# Patient Record
Sex: MALE | Race: WHITE | NOT HISPANIC OR LATINO | Employment: OTHER | URBAN - METROPOLITAN AREA
[De-identification: names, ages, dates, MRNs, and addresses within clinical notes are randomized per-mention and may not be internally consistent; named-entity substitution may affect disease eponyms.]

---

## 2017-03-12 ENCOUNTER — OFFICE VISIT (OUTPATIENT)
Dept: URGENT CARE | Facility: MEDICAL CENTER | Age: 67
End: 2017-03-12
Payer: MEDICARE

## 2017-03-12 DIAGNOSIS — J02.9 ACUTE PHARYNGITIS: ICD-10-CM

## 2017-03-12 PROCEDURE — 99203 OFFICE O/P NEW LOW 30 MIN: CPT

## 2017-03-12 PROCEDURE — G0463 HOSPITAL OUTPT CLINIC VISIT: HCPCS

## 2017-03-12 PROCEDURE — 87430 STREP A AG IA: CPT

## 2017-03-13 ENCOUNTER — APPOINTMENT (OUTPATIENT)
Dept: LAB | Facility: HOSPITAL | Age: 67
End: 2017-03-13
Payer: MEDICARE

## 2017-03-13 DIAGNOSIS — J02.9 ACUTE PHARYNGITIS: ICD-10-CM

## 2017-03-13 PROCEDURE — 87070 CULTURE OTHR SPECIMN AEROBIC: CPT

## 2017-03-15 LAB — BACTERIA THROAT CULT: NORMAL

## 2017-03-20 ENCOUNTER — TRANSCRIBE ORDERS (OUTPATIENT)
Dept: ADMINISTRATIVE | Facility: HOSPITAL | Age: 67
End: 2017-03-20

## 2017-03-20 ENCOUNTER — HOSPITAL ENCOUNTER (OUTPATIENT)
Dept: RADIOLOGY | Facility: MEDICAL CENTER | Age: 67
Discharge: HOME | End: 2017-03-20
Payer: MEDICARE

## 2017-03-20 DIAGNOSIS — J20.9 ACUTE BRONCHITIS, UNSPECIFIED ORGANISM: ICD-10-CM

## 2017-03-20 DIAGNOSIS — J20.9 ACUTE BRONCHITIS, UNSPECIFIED ORGANISM: Primary | ICD-10-CM

## 2017-03-20 PROCEDURE — 71020 HB CHEST X-RAY 2VW FRONTAL&LATL: CPT

## 2018-01-18 NOTE — PROGRESS NOTES
Assessment    1  Acute URI (465 9) (J06 9)    Plan  Acute URI    · Benzonatate 100 MG Oral Capsule; TAKE 1 CAPSULE 3 TIMES DAILY AS NEEDED   · Lidocaine Viscous 2 % Mouth/Throat Solution; USE 5ML EVERY 2 HOURS AS  NEEDED  Sore throat    · Rapid StrepA- POC; Source:Throat; Status:Resulted - Requires Verification,Retrospective  By Protocol Authorization;   Done: [id number] 10:00AM    Discussion/Summary  Discussion Summary:   Rapid strep test-negative  Patient placed on Xylocaine Viscous for sore throat and Tessalon for cough  Vies patient's follow-up with primary care provider in 2-3 days of symptoms worsen  Chief Complaint    1  Sore Throat  Chief Complaint Free Text Note Form: Pt with complaints of sore throat for 2 days  No other symptoms  Denies fever, chills or bodyaches  History of Present Illness  HPI: Patient with 2 day history of sore throat  Describes it is scratchy in nature  He's been taking some over-the-counter Robitussin for sore throat and cough with mild improvement  Denies any fever or chills  But is complaining some postnasal drip  Hospital Based Practices Required Assessment:   Pain Assessment   the patient states they have pain  The pain is located in the throat  The patient describes the pain as aching  (on a scale of 0 to 10, the patient rates the pain at 3 )    Prefered Language is  english  Primary Language is  english  Review of Systems  Focused-Male:   Constitutional: no fever or chills, feels well, no tiredness, no recent weight loss or weight gain  ENT: sore throat and nasal discharge  Respiratory: cough  Past Medical History    1  History of hypercholesterolemia (V12 29) (Z86 39)   2  History of hypertension (V12 59) (Z86 79)  Active Problems And Past Medical History Reviewed: The active problems and past medical history were reviewed and updated today  Social History    · Never smoker   · Occasional alcohol use    Current Meds   1  Atenolol-Chlorthalidone 50-25 MG Oral Tablet; Therapy: (Recorded:12Mar2017) to Recorded   2  Atorvastatin Calcium 20 MG Oral Tablet; Therapy: (Recorded:12Mar2017) to Recorded  Medication List Reviewed: The medication list was reviewed and updated today  Allergies    1  No Known Drug Allergies    Vitals  Signs   Recorded: [id number] 09:47AM   Temperature: 96 8 F  Heart Rate: 62  Respiration: 16  Systolic: 994  Diastolic: 74  Height: 6 ft   Weight: 263 lb   BMI Calculated: 35 67  BSA Calculated: 2 39  O2 Saturation: 99  Pain Scale: 3    Physical Exam    Constitutional   General appearance: No acute distress, well appearing and well nourished  Ears, Nose, Mouth, and Throat   External inspection of ears and nose: Normal     Otoscopic examination: Tympanic membrance translucent with normal light reflex  Canals patent without erythema  Nasal mucosa, septum, and turbinates: Abnormal   Hypertrophic turbinates  Clear to whitish mucoid discharge in posterior pharynx  No exudates are visualized  Pulmonary   Respiratory effort: No increased work of breathing or signs of respiratory distress  Auscultation of lungs: Clear to auscultation  Cardiovascular   Palpation of heart: Normal PMI, no thrills  Lymphatic   Palpation of lymph nodes in neck: No lymphadenopathy         Results/Data  Lab Studies Reviewed: Rapid strep test-negative      Signatures   Electronically signed by : HOPE Coombs ; Mar 12 2017 10:05AM EST                       (Author)

## 2018-11-14 ENCOUNTER — APPOINTMENT (OUTPATIENT)
Dept: MRI IMAGING | Facility: HOSPITAL | Age: 68
DRG: 556 | End: 2018-11-14
Payer: MEDICARE

## 2018-11-14 ENCOUNTER — APPOINTMENT (EMERGENCY)
Dept: CT IMAGING | Facility: HOSPITAL | Age: 68
DRG: 556 | End: 2018-11-14
Payer: MEDICARE

## 2018-11-14 ENCOUNTER — HOSPITAL ENCOUNTER (INPATIENT)
Facility: HOSPITAL | Age: 68
LOS: 1 days | Discharge: HOME | DRG: 556 | End: 2018-11-16
Attending: EMERGENCY MEDICINE | Admitting: FAMILY MEDICINE
Payer: MEDICARE

## 2018-11-14 ENCOUNTER — APPOINTMENT (OUTPATIENT)
Dept: RADIOLOGY | Facility: HOSPITAL | Age: 68
DRG: 556 | End: 2018-11-14
Payer: MEDICARE

## 2018-11-14 DIAGNOSIS — R29.898 UPPER EXTREMITY WEAKNESS: Primary | ICD-10-CM

## 2018-11-14 DIAGNOSIS — R29.898 BILATERAL ARM WEAKNESS: ICD-10-CM

## 2018-11-14 DIAGNOSIS — R05.9 COUGH: ICD-10-CM

## 2018-11-14 PROBLEM — I50.9 CHF (CONGESTIVE HEART FAILURE) (HCC): Status: ACTIVE | Noted: 2018-11-14

## 2018-11-14 PROBLEM — E87.6 HYPOKALEMIA: Status: ACTIVE | Noted: 2018-11-14

## 2018-11-14 PROBLEM — E78.5 HYPERLIPIDEMIA: Status: ACTIVE | Noted: 2018-11-14

## 2018-11-14 PROBLEM — Z98.890 HX OF RIGHT KNEE SURGERY: Status: ACTIVE | Noted: 2018-11-14

## 2018-11-14 PROBLEM — I10 ESSENTIAL HYPERTENSION: Status: ACTIVE | Noted: 2018-11-14

## 2018-11-14 PROBLEM — R53.1 WEAKNESS: Status: ACTIVE | Noted: 2018-11-14

## 2018-11-14 PROBLEM — R79.89 ELEVATED BRAIN NATRIURETIC PEPTIDE (BNP) LEVEL: Status: ACTIVE | Noted: 2018-11-14

## 2018-11-14 LAB
ANION GAP SERPL CALCULATED.3IONS-SCNC: 9 MMOL/L (ref 4–13)
ATRIAL RATE: 71 BPM
BACTERIA UR QL AUTO: ABNORMAL /HPF
BASOPHILS # BLD AUTO: 0.02 THOUSANDS/ΜL (ref 0–0.1)
BASOPHILS NFR BLD AUTO: 0 % (ref 0–1)
BILIRUB UR QL STRIP: ABNORMAL
BUN SERPL-MCNC: 14 MG/DL (ref 5–25)
CALCIUM SERPL-MCNC: 9.2 MG/DL (ref 8.3–10.1)
CHLORIDE SERPL-SCNC: 97 MMOL/L (ref 100–108)
CLARITY UR: CLEAR
CO2 SERPL-SCNC: 30 MMOL/L (ref 21–32)
COLOR UR: ABNORMAL
CREAT SERPL-MCNC: 1.06 MG/DL (ref 0.6–1.3)
EOSINOPHIL # BLD AUTO: 0.27 THOUSAND/ΜL (ref 0–0.61)
EOSINOPHIL NFR BLD AUTO: 3 % (ref 0–6)
ERYTHROCYTE [DISTWIDTH] IN BLOOD BY AUTOMATED COUNT: 12.2 % (ref 11.6–15.1)
GFR SERPL CREATININE-BSD FRML MDRD: 72 ML/MIN/1.73SQ M
GLUCOSE SERPL-MCNC: 168 MG/DL (ref 65–140)
GLUCOSE UR STRIP-MCNC: NEGATIVE MG/DL
HCT VFR BLD AUTO: 40.6 % (ref 36.5–49.3)
HGB BLD-MCNC: 14.1 G/DL (ref 12–17)
HGB UR QL STRIP.AUTO: ABNORMAL
IMM GRANULOCYTES # BLD AUTO: 0.05 THOUSAND/UL (ref 0–0.2)
IMM GRANULOCYTES NFR BLD AUTO: 1 % (ref 0–2)
KETONES UR STRIP-MCNC: ABNORMAL MG/DL
LEUKOCYTE ESTERASE UR QL STRIP: NEGATIVE
LYMPHOCYTES # BLD AUTO: 1.26 THOUSANDS/ΜL (ref 0.6–4.47)
LYMPHOCYTES NFR BLD AUTO: 15 % (ref 14–44)
MCH RBC QN AUTO: 30.9 PG (ref 26.8–34.3)
MCHC RBC AUTO-ENTMCNC: 34.7 G/DL (ref 31.4–37.4)
MCV RBC AUTO: 89 FL (ref 82–98)
MONOCYTES # BLD AUTO: 0.7 THOUSAND/ΜL (ref 0.17–1.22)
MONOCYTES NFR BLD AUTO: 8 % (ref 4–12)
NEUTROPHILS # BLD AUTO: 6.38 THOUSANDS/ΜL (ref 1.85–7.62)
NEUTS SEG NFR BLD AUTO: 73 % (ref 43–75)
NITRITE UR QL STRIP: NEGATIVE
NON-SQ EPI CELLS URNS QL MICRO: ABNORMAL /HPF
NRBC BLD AUTO-RTO: 0 /100 WBCS
NT-PROBNP SERPL-MCNC: 1143 PG/ML
P AXIS: -74 DEGREES
PH UR STRIP.AUTO: 6.5 [PH] (ref 4.5–8)
PLATELET # BLD AUTO: 219 THOUSANDS/UL (ref 149–390)
PMV BLD AUTO: 9.3 FL (ref 8.9–12.7)
POTASSIUM SERPL-SCNC: 3.2 MMOL/L (ref 3.5–5.3)
PR INTERVAL: 122 MS
PROT UR STRIP-MCNC: NEGATIVE MG/DL
QRS AXIS: 29 DEGREES
QRSD INTERVAL: 98 MS
QT INTERVAL: 382 MS
QTC INTERVAL: 415 MS
RBC # BLD AUTO: 4.56 MILLION/UL (ref 3.88–5.62)
RBC #/AREA URNS AUTO: ABNORMAL /HPF
SODIUM SERPL-SCNC: 136 MMOL/L (ref 136–145)
SP GR UR STRIP.AUTO: 1.01 (ref 1–1.03)
T WAVE AXIS: 63 DEGREES
TROPONIN I SERPL-MCNC: <0.02 NG/ML
UROBILINOGEN UR QL STRIP.AUTO: 1 E.U./DL
VENTRICULAR RATE: 71 BPM
WBC # BLD AUTO: 8.68 THOUSAND/UL (ref 4.31–10.16)
WBC #/AREA URNS AUTO: ABNORMAL /HPF

## 2018-11-14 PROCEDURE — 99285 EMERGENCY DEPT VISIT HI MDM: CPT

## 2018-11-14 PROCEDURE — 93010 ELECTROCARDIOGRAM REPORT: CPT | Performed by: INTERNAL MEDICINE

## 2018-11-14 PROCEDURE — 84484 ASSAY OF TROPONIN QUANT: CPT | Performed by: PHYSICIAN ASSISTANT

## 2018-11-14 PROCEDURE — 84484 ASSAY OF TROPONIN QUANT: CPT | Performed by: EMERGENCY MEDICINE

## 2018-11-14 PROCEDURE — 83880 ASSAY OF NATRIURETIC PEPTIDE: CPT | Performed by: EMERGENCY MEDICINE

## 2018-11-14 PROCEDURE — 36415 COLL VENOUS BLD VENIPUNCTURE: CPT | Performed by: EMERGENCY MEDICINE

## 2018-11-14 PROCEDURE — 70551 MRI BRAIN STEM W/O DYE: CPT

## 2018-11-14 PROCEDURE — 85025 COMPLETE CBC W/AUTO DIFF WBC: CPT | Performed by: EMERGENCY MEDICINE

## 2018-11-14 PROCEDURE — 99220 PR INITIAL OBSERVATION CARE/DAY 70 MINUTES: CPT | Performed by: PHYSICIAN ASSISTANT

## 2018-11-14 PROCEDURE — 93005 ELECTROCARDIOGRAM TRACING: CPT

## 2018-11-14 PROCEDURE — 70498 CT ANGIOGRAPHY NECK: CPT

## 2018-11-14 PROCEDURE — 81001 URINALYSIS AUTO W/SCOPE: CPT | Performed by: FAMILY MEDICINE

## 2018-11-14 PROCEDURE — 70496 CT ANGIOGRAPHY HEAD: CPT

## 2018-11-14 PROCEDURE — 80048 BASIC METABOLIC PNL TOTAL CA: CPT | Performed by: EMERGENCY MEDICINE

## 2018-11-14 PROCEDURE — 71046 X-RAY EXAM CHEST 2 VIEWS: CPT

## 2018-11-14 RX ORDER — ATORVASTATIN CALCIUM 10 MG/1
20 TABLET, FILM COATED ORAL EVERY EVENING
Status: DISCONTINUED | OUTPATIENT
Start: 2018-11-14 | End: 2018-11-16 | Stop reason: HOSPADM

## 2018-11-14 RX ORDER — CEPHALEXIN 500 MG/1
500 CAPSULE ORAL EVERY 6 HOURS SCHEDULED
COMMUNITY
End: 2018-11-16 | Stop reason: HOSPADM

## 2018-11-14 RX ORDER — CHLORTHALIDONE 25 MG/1
25 TABLET ORAL DAILY
Status: DISCONTINUED | OUTPATIENT
Start: 2018-11-14 | End: 2018-11-16 | Stop reason: HOSPADM

## 2018-11-14 RX ORDER — ATORVASTATIN CALCIUM 20 MG/1
20 TABLET, FILM COATED ORAL DAILY
COMMUNITY
End: 2019-05-06 | Stop reason: HOSPADM

## 2018-11-14 RX ORDER — POLYETHYLENE GLYCOL 3350 17 G/17G
17 POWDER, FOR SOLUTION ORAL DAILY
Status: DISCONTINUED | OUTPATIENT
Start: 2018-11-14 | End: 2018-11-16 | Stop reason: HOSPADM

## 2018-11-14 RX ORDER — ATENOLOL 50 MG/1
50 TABLET ORAL DAILY
Status: DISCONTINUED | OUTPATIENT
Start: 2018-11-14 | End: 2018-11-16 | Stop reason: HOSPADM

## 2018-11-14 RX ORDER — CHLORTHALIDONE 25 MG/1
25 TABLET ORAL DAILY
COMMUNITY
End: 2019-05-06 | Stop reason: HOSPADM

## 2018-11-14 RX ORDER — CHLORTHALIDONE 25 MG/1
25 TABLET ORAL DAILY
Status: DISCONTINUED | OUTPATIENT
Start: 2018-11-14 | End: 2018-11-14 | Stop reason: SDUPTHER

## 2018-11-14 RX ORDER — POTASSIUM CHLORIDE 20 MEQ/1
20 TABLET, EXTENDED RELEASE ORAL ONCE
Status: COMPLETED | OUTPATIENT
Start: 2018-11-14 | End: 2018-11-14

## 2018-11-14 RX ORDER — ASPIRIN 325 MG
81 TABLET ORAL DAILY
COMMUNITY
End: 2019-04-27

## 2018-11-14 RX ORDER — ONDANSETRON 2 MG/ML
4 INJECTION INTRAMUSCULAR; INTRAVENOUS EVERY 6 HOURS PRN
Status: DISCONTINUED | OUTPATIENT
Start: 2018-11-14 | End: 2018-11-16 | Stop reason: HOSPADM

## 2018-11-14 RX ORDER — DOCUSATE SODIUM 100 MG/1
100 CAPSULE, LIQUID FILLED ORAL 2 TIMES DAILY
Status: DISCONTINUED | OUTPATIENT
Start: 2018-11-14 | End: 2018-11-16 | Stop reason: HOSPADM

## 2018-11-14 RX ORDER — ACETAMINOPHEN 325 MG/1
650 TABLET ORAL EVERY 4 HOURS PRN
Status: DISCONTINUED | OUTPATIENT
Start: 2018-11-14 | End: 2018-11-16 | Stop reason: HOSPADM

## 2018-11-14 RX ORDER — ATENOLOL AND CHLORTHALIDONE TABLET 50; 25 MG/1; MG/1
1 TABLET ORAL DAILY
COMMUNITY
End: 2019-05-06 | Stop reason: HOSPADM

## 2018-11-14 RX ADMIN — DOCUSATE SODIUM 100 MG: 100 CAPSULE, LIQUID FILLED ORAL at 17:20

## 2018-11-14 RX ADMIN — ATORVASTATIN CALCIUM 20 MG: 10 TABLET, FILM COATED ORAL at 17:19

## 2018-11-14 RX ADMIN — IOHEXOL 85 ML: 350 INJECTION, SOLUTION INTRAVENOUS at 12:38

## 2018-11-14 RX ADMIN — ENOXAPARIN SODIUM 40 MG: 40 INJECTION SUBCUTANEOUS at 17:16

## 2018-11-14 RX ADMIN — CHLORTHALIDONE 25 MG: 25 TABLET ORAL at 17:20

## 2018-11-14 RX ADMIN — POLYETHYLENE GLYCOL (3350) 17 G: 17 POWDER, FOR SOLUTION ORAL at 17:16

## 2018-11-14 RX ADMIN — POTASSIUM CHLORIDE 20 MEQ: 1500 TABLET, EXTENDED RELEASE ORAL at 17:16

## 2018-11-14 RX ADMIN — ATENOLOL 50 MG: 50 TABLET ORAL at 17:16

## 2018-11-14 NOTE — ED PROVIDER NOTES
History  Chief Complaint   Patient presents with    Extremity Weakness     pt reports weakness to b/l arms reports left greater than right, pt denies numbness or tingling  pt reports right knee surgery on monday was prescribed antibiotics that evening had swelling of throat shortly after antibiotic  pt then reports he took 3 additional doses of antibiotic yesterday and noticed weakness to b/l arm in the evening  pt denies weakness to lower extremities, denies slurred speach or facial droop  HPI    Prior to Admission Medications   Prescriptions Last Dose Informant Patient Reported? Taking?   aspirin 325 mg tablet   Yes Yes   Sig: Take 325 mg by mouth daily   atenolol-chlorthalidone (TENORETIC) 50-25 mg per tablet   Yes No   Sig: Take 1 tablet by mouth daily     atorvastatin (LIPITOR) 20 mg tablet   Yes No   Sig: Take 20 mg by mouth daily   cephalexin (KEFLEX) 500 mg capsule   Yes Yes   Sig: Take 500 mg by mouth every 6 (six) hours   chlorthalidone 25 mg tablet   Yes Yes   Sig: Take 25 mg by mouth daily      Facility-Administered Medications: None       Past Medical History:   Diagnosis Date    CHF (congestive heart failure) ([facility] [address] )     Hypertension        History reviewed  No pertinent surgical history  History reviewed  No pertinent family history  I have reviewed and agree with the history as documented  Social History   Substance Use Topics    Smoking status: Not on file    Smokeless tobacco: Not on file    Alcohol use Not on file        Review of Systems    Physical Exam  Physical Exam   Constitutional: He is oriented to person, place, and time  He appears well-developed and well-nourished  HENT:   Mouth/Throat: No oropharyngeal exudate  TMs normal bilaterally no pharyngeal erythema no rhinorrhea nontender palpation of sinuses, normal looking turbinates   Eyes: Conjunctivae and EOM are normal    Neck: Normal range of motion  Neck supple  No meningeal signs    Under palpation over cervical thoracic spines  Patient has no pain with axial loading of the spine  There is no tenderness palpation noted over the cervical nerve roots  Cardiovascular: Normal rate, regular rhythm, normal heart sounds and intact distal pulses  Pulmonary/Chest: Effort normal and breath sounds normal  No respiratory distress  He has no wheezes  He has no rales  He exhibits no tenderness  Abdominal: Soft  Bowel sounds are normal  He exhibits no distension and no mass  There is no tenderness  No hernia  No cvat   Musculoskeletal: Normal range of motion  He exhibits no edema  Patient with weakness noted in the bilateral shoulders with no tenderness palpation, swelling, redness of the joint  Lymphadenopathy:     He has no cervical adenopathy  Neurological: He is alert and oriented to person, place, and time  No cranial nerve deficit  Please refer to NIH stroke scale   Skin: No rash noted  No erythema  No edema   Psychiatric: He has a normal mood and affect  His behavior is normal    Nursing note and vitals reviewed        Vital Signs  ED Triage Vitals   Temperature Pulse Respirations Blood Pressure SpO2   11/14/18 1028 11/14/18 1024 11/14/18 1024 11/14/18 1024 11/14/18 1024   97 8 °F (36 6 °C) 80 19 140/80 97 %      Temp Source Heart Rate Source Patient Position - Orthostatic VS BP Location FiO2 (%)   11/14/18 1028 11/14/18 1024 11/14/18 1024 11/14/18 1024 --   Oral Monitor Sitting Right arm       Pain Score       11/14/18 1028       No Pain           Vitals:    11/14/18 1024 11/14/18 1028 11/14/18 1314   BP: 140/80  128/73   Pulse: 80 78 66   Patient Position - Orthostatic VS: Sitting  Lying       Visual Acuity      ED Medications  Medications   iohexol (OMNIPAQUE) 350 MG/ML injection (MULTI-DOSE) 85 mL (85 mL Intravenous Given 11/14/18 1238)       Diagnostic Studies  Results Reviewed     Procedure Component Value Units Date/Time    Basic metabolic panel [[id number]]  (Abnormal) Collected:  11/14/18 1108    Lab Status:  Final result Specimen:  Blood from Arm, Left Updated:  11/14/18 1143     Sodium 136 mmol/L      Potassium 3 2 (L) mmol/L      Chloride 97 (L) mmol/L      CO2 30 mmol/L      ANION GAP 9 mmol/L      BUN 14 mg/dL      Creatinine 1 06 mg/dL      Glucose 168 (H) mg/dL      Calcium 9 2 mg/dL      eGFR 72 ml/min/1 73sq m     Narrative:         National Kidney Disease Education Program recommendations are as follows:  GFR calculation is accurate only with a steady state creatinine  Chronic Kidney disease less than 60 ml/min/1 73 sq  meters  Kidney failure less than 15 ml/min/1 73 sq  meters      B-type natriuretic peptide [[id number]]  (Abnormal) Collected:  11/14/18 1108    Lab Status:  Final result Specimen:  Blood from Arm, Left Updated:  11/14/18 1143     NT-proBNP 1,143 (H) pg/mL     Troponin I [[id number]]  (Normal) Collected:  11/14/18 1108    Lab Status:  Final result Specimen:  Blood from Arm, Left Updated:  11/14/18 1141     Troponin I <0 02 ng/mL     CBC and differential [[id number]] Collected:  11/14/18 1108    Lab Status:  Final result Specimen:  Blood from Arm, Left Updated:  11/14/18 1119     WBC 8 68 Thousand/uL      RBC 4 56 Million/uL      Hemoglobin 14 1 g/dL      Hematocrit 40 6 %      MCV 89 fL      MCH 30 9 pg      MCHC 34 7 g/dL      RDW 12 2 %      MPV 9 3 fL      Platelets 422 Thousands/uL      nRBC 0 /100 WBCs      Neutrophils Relative 73 %      Immat GRANS % 1 %      Lymphocytes Relative 15 %      Monocytes Relative 8 %      Eosinophils Relative 3 %      Basophils Relative 0 %      Neutrophils Absolute 6 38 Thousands/µL      Immature Grans Absolute 0 05 Thousand/uL      Lymphocytes Absolute 1 26 Thousands/µL      Monocytes Absolute 0 70 Thousand/µL      Eosinophils Absolute 0 27 Thousand/µL      Basophils Absolute 0 02 Thousands/µL     POCT urinalysis dipstick [[id number]]     Lab Status:  No result Specimen:  Urine                  CTA head and neck with and without contrast   ED Interpretation by Shahnaz Archer MD (11/14 0719)   No acute intracranial disease  No large vessel flow restrictive disease within the head or neck  Polypoid soft tissue density material within both sides of the nasal vault, direct visualization suggested  Final Result by Que Carreon MD (11/14 8264)      No acute intracranial disease  No large vessel flow restrictive disease within the head or neck  Polypoid soft tissue density material within both sides of the nasal vault, direct visualization suggested  The study was marked in EPIC for significant notification  Workstation performed: [id number]                    Procedures  ECG 12 Lead Documentation  Date/Time: 11/14/2018 12:50 PM  Performed by: Tomer Canseco by: Crystal CASTANO     ECG reviewed by me, the ED Provider: yes    Patient location:  ED  Rate:     ECG rate:  71    ECG rate assessment: normal    Rhythm:     Rhythm: sinus rhythm    Ectopy:     Ectopy: none    QRS:     QRS axis:  Normal    QRS intervals:  Normal  Conduction:     Conduction: normal    ST segments:     ST segments:  Normal  T waves:     T waves: normal             Phone Contacts  ED Phone Contact    ED Course  ED Course as of Nov 14 1321 Wed Nov 14, 2018   1309 Case d/w Dr Ashley Eason of neurology  Will admit   Stroke Assessment     Row Name 11/14/18 1314             NIH Stroke Scale    Interval Baseline      Level of Consciousness (1a ) 0      LOC Questions (1b ) 0      LOC Commands (1c ) 0      Best Gaze (2 ) 0      Visual (3 ) 0      Facial Palsy (4 ) 0      Motor Arm, Left (5a ) 1      Motor Arm, Right (5b ) 1      Motor Leg, Left (6a ) 0      Motor Leg, Right (6b ) 0      Limb Ataxia (7 ) 0      Sensory (8 ) 0      Best Language (9 ) 0      Dysarthria (10 ) 0      Extinction and Inattention (11 ) (Formerly Neglect) 0      Total 2                First Filed Value   TPA Decision  Patient not a TPA candidate  Patient is not a candidate options  -- [onset of symptoms outside of window for tpa]                        MDM  Number of Diagnoses or Management Options  Diagnosis management comments: Bilateral shoulder pain/weakness-will do cta head/neck to r/o acute pathology, cardiac work up, d/w neuro    CritCare Time    Disposition  Final diagnoses:   Upper extremity weakness - bilateral     Time reflects when diagnosis was documented in both MDM as applicable and the Disposition within this note     Time User Action Codes Description Comment    11/14/2018  1:20 PM Gordo Monzon Add [R29 898] Upper extremity weakness     11/14/2018  1:21 PM Gordo Monzon Modify [R29 898] Upper extremity weakness bilateral      ED Disposition     None      Follow-up Information    None         Patient's Medications   Discharge Prescriptions    No medications on file     No discharge procedures on file      ED Provider  Electronically Signed by           Keyonna Jerome MD  11/14/18 9094

## 2018-11-14 NOTE — PLAN OF CARE
CARDIOVASCULAR - ADULT     Maintains optimal cardiac output and hemodynamic stability Progressing     Absence of cardiac dysrhythmias or at baseline rhythm Progressing        DISCHARGE PLANNING     Discharge to home or other facility with appropriate resources Progressing        Knowledge Deficit     Patient/family/caregiver demonstrates understanding of disease process, treatment plan, medications, and discharge instructions Progressing        METABOLIC, FLUID AND ELECTROLYTES - ADULT     Electrolytes maintained within normal limits Progressing     Fluid balance maintained Progressing        NEUROSENSORY - ADULT     Achieves stable or improved neurological status Progressing     Achieves maximal functionality and self care Progressing        Potential for Falls     Patient will remain free of falls Progressing        SAFETY ADULT     Maintain or return to baseline ADL function Progressing     Maintain or return mobility status to optimal level Progressing

## 2018-11-14 NOTE — ASSESSMENT & PLAN NOTE
· Pro-BNP 1,143 POA  · Denies history of CHF, denies chest pain, shortness of breath, leg swelling or increased weight gain  · Does not appear to be fluid overloaded  · Will order chest x-ray and echocardiogram   · Pending results echocardiogram will consult Cardiology

## 2018-11-14 NOTE — H&P
H&P- Duran Setter 1950, 76 y o  male MRN: [id number]  Unit/Bed#: E4 -01 Encounter: [id number]  Primary Care Provider: Nicolasa Jamil MD   Date and time admitted to hospital: 11/14/2018 10:29 AM    * Bilateral arm weakness   Assessment & Plan    · POA, bilateral arm weakness since yesterday with numbness and tingling, L>R   · CT head:  No acute intracranial disease, no large vessel flow restrictive disease in the head or neck  · Initial Troponin negative  · proBNP 1,143   · Will place patient on stroke pathway, order MRI of brain, echocardiogram, chest x-ray  · Will consult Neurology  · trend troponins  · Pending results of echocardiogram, may consult Cardiology  · Neuro checks      Essential hypertension   Assessment & Plan    · BP currently acceptable  · Continue atenolol, chlorthalidone per home regimen  · Monitor vitals     Elevated brain natriuretic peptide (BNP) level   Assessment & Plan    · Pro-BNP 1,143 POA  · Denies history of CHF, denies chest pain, shortness of breath, leg swelling or increased weight gain  · Does not appear to be fluid overloaded  · Will order chest x-ray and echocardiogram   · Pending results echocardiogram will consult Cardiology     Hypokalemia   Assessment & Plan    · K+ 3 2, will replete  · Monitor BMP      Hyperlipidemia   Assessment & Plan    · Will check lipid panel  · Continue statin     Hx of right knee surgery   Assessment & Plan    · Patient recently underwent meniscus surgery of right knee at [facility]         VTE Prophylaxis: Enoxaparin (Lovenox)  / sequential compression device   Code Status:  Level 1 full code  POLST: There is no POLST form on file for this patient (pre-hospital)  Discussion with family:  Patient, wife at bedside    Anticipated Length of Stay:  Patient will be admitted on an Observation basis with an anticipated length of stay of  < 2 midnights     Justification for Hospital Stay:  Bilateral arm weakness    Total Time for Visit, including Counseling / Coordination of Care: 45 minutes  Greater than 50% of this total time spent on direct patient counseling and coordination of care  Chief Complaint:   Bilateral arm weakness with numbness and tingling    History of Present Illness:    Duran Cunningham is a 76 y o  male with past medical history of hypertension, recent meniscus surgery on his right knee at [facility] (on 11/12) who presents with bilateral arm weakness with numbness and tingling  Patient recently underwent meniscus surgery of right knee at [facility] and was prescribed Keflex after that procedure  He notes feeling like his throat was swollen after the 1st dose of Keflex on Monday that resolved on its own  He proceeded to take an additional 3 doses of Keflex on Tuesday, denies continued throat swelling but  he admits to noting this bilateral arm weakness  He admits to continued weakness in his left arm but improvement in his right arm weakness  He continues to admit to numbness and tingling feeling in both arms, as well as muscular pain in his upper back  He admits to pain with certain movements of his left upper extremity  He denies any chest pain, shortness of breath, weight gain, leg swelling, nausea, vomiting, diarrhea, leg pain, lightheadedness, dizziness, facial droop, dysarthria, confusion  He denies any trauma to his upper extremities  He denies a history of stroke or heart attack or history of congestive heart failure  He admits to taking aspirin after his surgery on Monday  Review of Systems:    Review of Systems   Constitutional: Negative for chills, diaphoresis and fever  Respiratory: Negative for cough and shortness of breath  Cardiovascular: Negative for chest pain, palpitations and leg swelling  Gastrointestinal: Negative for abdominal pain, diarrhea, nausea and vomiting  Genitourinary: Negative for difficulty urinating, dysuria and frequency  Musculoskeletal: Positive for back pain     Neurological: Positive for weakness, light-headedness and numbness  Negative for dizziness, facial asymmetry, speech difficulty and headaches  Psychiatric/Behavioral: Negative for confusion  All other systems reviewed and are negative  Past Medical and Surgical History:     Past Medical History:   Diagnosis Date    CHF [facility]congestive heart failure) ([facility] [address] [facility]     Hypertension        History reviewed  No pertinent surgical history  Meds/Allergies:    Prior to Admission medications    Medication Sig Start Date End Date Taking? Authorizing Provider   aspirin 325 mg tablet Take 325 mg by mouth daily   Yes Historical Provider, MD   atenolol-chlorthalidone (TENORETIC) 50-25 mg per tablet Take 1 tablet by mouth daily     Yes Historical Provider, MD   atorvastatin (LIPITOR) 20 mg tablet Take 20 mg by mouth daily   Yes Historical Provider, MD   cephalexin (KEFLEX) 500 mg capsule Take 500 mg by mouth every 6 (six) hours    Historical Provider, MD   chlorthalidone 25 mg tablet Take 25 mg by mouth daily    Historical Provider, MD     I have reviewed home medications with patient personally  Allergies: No Known Allergies    Social History:     Marital Status: /Civil Union   Occupation:  Retired  Patient Pre-hospital Living Situation:  Lives at home with wife  Patient Pre-hospital Level of Mobility:  Ambulatory without assistance prior to right meniscus surgery  Patient Pre-hospital Diet Restrictions:  Regular diet  Substance Use History:   History   Alcohol use Not on file     History   Smoking Status    Not on file   Smokeless Tobacco    Not on file     History   Drug use: Unknown       Family History:    History reviewed  No pertinent family history      Physical Exam:     Vitals:   Blood Pressure: 163/94 (11/14/18 1530)  Pulse: 72 (11/14/18 1530)  Temperature: 98 1 °F (36 7 °C) (11/14/18 1530)  Temp Source: Tympanic (11/14/18 1441)  Respirations: 18 (11/14/18 1530)  Height: 6' (182 9 cm) (11/14/18 1530)  Weight - Scale: 109 kg (240 lb 9 6 oz) (11/14/18 1530)  SpO2: 95 % (11/14/18 1530)    Physical Exam   Constitutional: He is oriented to person, place, and time  He appears well-developed  No distress  HENT:   Head: Normocephalic and atraumatic  Mouth/Throat: Oropharynx is clear and moist    Eyes: Pupils are equal, round, and reactive to light  Neck: Neck supple  Cardiovascular: Normal rate, regular rhythm and intact distal pulses  Pulmonary/Chest: Effort normal and breath sounds normal  No respiratory distress  He has no wheezes  He has no rales  He exhibits no tenderness  Abdominal: Soft  Bowel sounds are normal  He exhibits no distension  There is no tenderness  Musculoskeletal:   Presence of clean dry Ace bandage around right knee, pain upon adduction of the left arm across chest   Neurological: He is alert and oriented to person, place, and time  Strength 5/5 lower extremities, upper extremity strength R>L, sensation fully intact upper and lower extremities, no focal neurologic deficits, no facial asymmetry or dysarthria noted, no cranial nerve deficits   Skin: He is not diaphoretic  Nursing note and vitals reviewed  Additional Data:     Lab Results: I have personally reviewed pertinent reports  Results from last 7 days  Lab Units 11/14/18  1108   WBC Thousand/uL 8 68   HEMOGLOBIN g/dL 14 1   HEMATOCRIT % 40 6   PLATELETS Thousands/uL 219   NEUTROS ABS Thousands/µL 6 38   NEUTROS PCT % 73   LYMPHS PCT % 15   MONOS PCT % 8   EOS PCT % 3       Results from last 7 days  Lab Units 11/14/18  1108   POTASSIUM mmol/L 3 2*   CHLORIDE mmol/L 97*   CO2 mmol/L 30   BUN mg/dL 14   CREATININE mg/dL 1 06   ANION GAP mmol/L 9   CALCIUM mg/dL 9 2                       Imaging: I have personally reviewed pertinent reports  CTA head and neck with and without contrast   ED Interpretation by Kandice Tate MD (11/14 9432)   No acute intracranial disease  No large vessel flow restrictive disease within the head or neck  Polypoid soft tissue density material within both sides of the nasal vault, direct visualization suggested  Final Result by Mi Alexander MD (11/14 8623)      No acute intracranial disease  No large vessel flow restrictive disease within the head or neck  Polypoid soft tissue density material within both sides of the nasal vault, direct visualization suggested  The study was marked in EPIC for significant notification  Workstation performed: [id number]         MRI Inpatient Order    (Results Pending)   XR chest pa & lateral    (Results Pending)       EKG, Pathology, and Other Studies Reviewed on Admission:   · EKG:  Rate 71, normal sinus rhythm, normal EKG  · CT head and neck:  No acute intracranial disease  No large vessel flow restrictive disease in the head or neck  Polypoid soft tissue density material within both sides of the nasal vault, direct visualization suggested    Allscripts / Epic Records Reviewed: Yes     ** Please Note: This note has been constructed using a voice recognition system   **

## 2018-11-14 NOTE — ASSESSMENT & PLAN NOTE
· POA, bilateral arm weakness since yesterday with numbness and tingling, L>R   · CT head:  No acute intracranial disease, no large vessel flow restrictive disease in the head or neck  · Initial Troponin negative  · proBNP 1,143   · Will place patient on stroke pathway, order MRI of brain, echocardiogram, chest x-ray  · Will consult Neurology  · trend troponins  · Pending results of echocardiogram, may consult Cardiology  · Neuro checks

## 2018-11-15 ENCOUNTER — APPOINTMENT (OUTPATIENT)
Dept: NON INVASIVE DIAGNOSTICS | Facility: HOSPITAL | Age: 68
DRG: 556 | End: 2018-11-15
Payer: MEDICARE

## 2018-11-15 ENCOUNTER — APPOINTMENT (OUTPATIENT)
Dept: MRI IMAGING | Facility: HOSPITAL | Age: 68
DRG: 556 | End: 2018-11-15
Payer: MEDICARE

## 2018-11-15 PROBLEM — R73.09 ABNORMAL GLUCOSE LEVEL: Status: ACTIVE | Noted: 2018-11-15

## 2018-11-15 PROBLEM — R05.9 COUGH: Status: ACTIVE | Noted: 2018-11-15

## 2018-11-15 LAB
ANION GAP SERPL CALCULATED.3IONS-SCNC: 12 MMOL/L (ref 4–13)
BUN SERPL-MCNC: 17 MG/DL (ref 5–25)
CALCIUM SERPL-MCNC: 9.1 MG/DL (ref 8.3–10.1)
CHLORIDE SERPL-SCNC: 98 MMOL/L (ref 100–108)
CHOLEST SERPL-MCNC: 118 MG/DL (ref 50–200)
CO2 SERPL-SCNC: 26 MMOL/L (ref 21–32)
CREAT SERPL-MCNC: 0.99 MG/DL (ref 0.6–1.3)
EST. AVERAGE GLUCOSE BLD GHB EST-MCNC: 134 MG/DL
GFR SERPL CREATININE-BSD FRML MDRD: 78 ML/MIN/1.73SQ M
GLUCOSE P FAST SERPL-MCNC: 131 MG/DL (ref 65–99)
GLUCOSE SERPL-MCNC: 131 MG/DL (ref 65–140)
HBA1C MFR BLD: 6.3 % (ref 4.2–6.3)
HDLC SERPL-MCNC: 37 MG/DL (ref 40–60)
LDLC SERPL CALC-MCNC: 65 MG/DL (ref 0–100)
POTASSIUM SERPL-SCNC: 3.1 MMOL/L (ref 3.5–5.3)
SODIUM SERPL-SCNC: 136 MMOL/L (ref 136–145)
TRIGL SERPL-MCNC: 79 MG/DL

## 2018-11-15 PROCEDURE — 93306 TTE W/DOPPLER COMPLETE: CPT | Performed by: INTERNAL MEDICINE

## 2018-11-15 PROCEDURE — 71550 MRI CHEST W/O DYE: CPT

## 2018-11-15 PROCEDURE — 93306 TTE W/DOPPLER COMPLETE: CPT

## 2018-11-15 PROCEDURE — 80061 LIPID PANEL: CPT | Performed by: PHYSICIAN ASSISTANT

## 2018-11-15 PROCEDURE — 80048 BASIC METABOLIC PNL TOTAL CA: CPT | Performed by: PHYSICIAN ASSISTANT

## 2018-11-15 PROCEDURE — 99222 1ST HOSP IP/OBS MODERATE 55: CPT | Performed by: PSYCHIATRY & NEUROLOGY

## 2018-11-15 PROCEDURE — 93970 EXTREMITY STUDY: CPT

## 2018-11-15 PROCEDURE — 72141 MRI NECK SPINE W/O DYE: CPT

## 2018-11-15 PROCEDURE — 83036 HEMOGLOBIN GLYCOSYLATED A1C: CPT | Performed by: PHYSICIAN ASSISTANT

## 2018-11-15 PROCEDURE — 99232 SBSQ HOSP IP/OBS MODERATE 35: CPT | Performed by: FAMILY MEDICINE

## 2018-11-15 RX ORDER — POTASSIUM CHLORIDE 20 MEQ/1
40 TABLET, EXTENDED RELEASE ORAL ONCE
Status: COMPLETED | OUTPATIENT
Start: 2018-11-15 | End: 2018-11-15

## 2018-11-15 RX ORDER — BENZONATATE 100 MG/1
100 CAPSULE ORAL 3 TIMES DAILY PRN
Status: DISCONTINUED | OUTPATIENT
Start: 2018-11-15 | End: 2018-11-16 | Stop reason: HOSPADM

## 2018-11-15 RX ADMIN — BENZONATATE 100 MG: 100 CAPSULE ORAL at 12:49

## 2018-11-15 RX ADMIN — ATENOLOL 50 MG: 50 TABLET ORAL at 08:28

## 2018-11-15 RX ADMIN — ENOXAPARIN SODIUM 40 MG: 40 INJECTION SUBCUTANEOUS at 08:29

## 2018-11-15 RX ADMIN — ATORVASTATIN CALCIUM 20 MG: 10 TABLET, FILM COATED ORAL at 17:35

## 2018-11-15 RX ADMIN — POTASSIUM CHLORIDE 40 MEQ: 1500 TABLET, EXTENDED RELEASE ORAL at 08:29

## 2018-11-15 RX ADMIN — DOCUSATE SODIUM 100 MG: 100 CAPSULE, LIQUID FILLED ORAL at 17:35

## 2018-11-15 RX ADMIN — POLYETHYLENE GLYCOL (3350) 17 G: 17 POWDER, FOR SOLUTION ORAL at 08:30

## 2018-11-15 RX ADMIN — CHLORTHALIDONE 25 MG: 25 TABLET ORAL at 08:29

## 2018-11-15 RX ADMIN — BENZONATATE 100 MG: 100 CAPSULE ORAL at 19:52

## 2018-11-15 RX ADMIN — DOCUSATE SODIUM 100 MG: 100 CAPSULE, LIQUID FILLED ORAL at 08:28

## 2018-11-15 NOTE — PROGRESS NOTES
Progress Note - Krupa Case 1950, 76 y o  male MRN: [id number]  Unit/Bed#: E4 -01 Encounter: [id number]  Primary Care Provider: Colby Gilman MD   Date and time admitted to hospital: 11/14/2018 10:29 AM               Late entry note from 12:00 PM on 11/15/18       * Bilateral arm weakness   Assessment & Plan    · POA, bilateral arm weakness since yesterday with numbness and tingling, L>R   · CT head:  No acute intracranial disease, no large vessel flow restrictive disease in the head or neck  · Serial troponin negative  · proBNP 1,143   · Patient placed on stroke pathway  · MRI of brain:  No acute intracranial abnormality    Scattered nonspecific T2 and FLAIR foci are typically related to chronic small vessel ischemic changes, polypoid mucosal thickening throughout the paranasal sinuses   · Echocardiogram pending  · Chest x-ray pending  · Will order MRI cervical spine   · Appreciate Neurology consult  · Upper extremity venous doppler to exclude venous thrombosis bilaterally   · Added MRI brachial plexus   · If symptoms persist recommend EMG outpatient in 4-6 weeks   · Unclear etiology?: no evidence of stroke on CT/MRI, possible brachial plexus, cervical nerve injury/impingement, compression from recent use of crutches        Essential hypertension   Assessment & Plan    · BP currently acceptable  · Continue atenolol, chlorthalidone per home regimen  · Monitor vitals     Elevated brain natriuretic peptide (BNP) level   Assessment & Plan    · Pro-BNP 1,143 POA  · Denies history of CHF, denies chest pain, shortness of breath, leg swelling or increased weight gain  · Does not appear to be fluid overloaded  · chest x-ray and echocardiogram pending      Hypokalemia   Assessment & Plan    · K+ 3 1 after repletion yesterday   · Will replete again today   · Monitor BMP      Hyperlipidemia   Assessment & Plan    · Lipid panel: LDL 65  · Continue statin     Hx of right knee surgery   Assessment & Plan · Patient recently underwent meniscus surgery of right knee at [facility]  · No signs of acute DVT currently     Abnormal glucose level   Assessment & Plan    · Elevated fasting glucose  · UA:  + ketones  · A1c pending     Cough   Assessment & Plan    · POA without tachycardia, or leukocytosis   · Pending chest xray   · Will order tessalon perles        VTE Pharmacologic Prophylaxis:   Pharmacologic: Enoxaparin (Lovenox)  Mechanical VTE Prophylaxis in Place: Yes    Patient Centered Rounds: I have performed bedside rounds with nursing staff today  Discussions with Specialists or Other Care Team Provider: Neurology     Education and Discussions with Family / Patient: patient, wife at bedside     Time Spent for Care: 30 minutes  More than 50% of total time spent on counseling and coordination of care as described above  Current Length of Stay: 0 day(s)    Current Patient Status: Observation   Certification Statement: The patient will continue to require additional inpatient hospital stay due to evaluation for bilateral upper extremities weakness    Discharge Plan: pending     Code Status: Level 1 - Full Code      Subjective:   Patient continues to complain of upper extremity swelling, numbness, tingling, and pain with movements  He now states symptoms are worse on the right upper extremity  He denies chest pain, shortness of breath, abdominal pain, fevers, night sweats chills  He is complaining of a productive cough intermittently  Objective:     Vitals:   Temp (24hrs), Av 9 °F (36 6 °C), Min:97 °F (36 1 °C), Max:99 1 °F (37 3 °C)    Temp:  [97 °F (36 1 °C)-99 1 °F (37 3 °C)] 97 3 °F (36 3 °C)  HR:  [66-74] 70  Resp:  [18] 18  BP: (114-181)/() 114/85  SpO2:  [94 %-97 %] 95 %  Body mass index is 32 53 kg/m²  Input and Output Summary (last 24 hours):        Intake/Output Summary (Last 24 hours) at 11/15/18 1520  Last data filed at 11/15/18 0801   Gross per 24 hour   Intake              360 ml   Output 0 ml   Net              360 ml       Physical Exam:     Physical Exam   Constitutional: He is oriented to person, place, and time  No distress  HENT:   Head: Normocephalic and atraumatic  Mouth/Throat: Oropharynx is clear and moist    Eyes: Pupils are equal, round, and reactive to light  EOM are normal    Neck: Neck supple  Cardiovascular: Normal rate, regular rhythm and intact distal pulses  No peripheral edema noted   Pulmonary/Chest: Effort normal and breath sounds normal  No respiratory distress  He has no wheezes  Abdominal: Soft  Bowel sounds are normal  He exhibits no distension  There is no tenderness  Musculoskeletal:   Swelling of the hands bilaterally R>L, pain upon extension and abduction of both upper extremities    Neurological: He is alert and oriented to person, place, and time  Unable to fully close right hand for hand , otherwise strength symmetric, sensation intact bilaterally, no facial asymmetry or droop noted   Skin: Skin is warm and dry  He is not diaphoretic  Psychiatric: He has a normal mood and affect  Nursing note and vitals reviewed  Additional Data:     Labs:      Results from last 7 days  Lab Units 11/14/18  1108   WBC Thousand/uL 8 68   HEMOGLOBIN g/dL 14 1   HEMATOCRIT % 40 6   PLATELETS Thousands/uL 219   NEUTROS PCT % 73   LYMPHS PCT % 15   MONOS PCT % 8   EOS PCT % 3       Results from last 7 days  Lab Units 11/15/18  0517   POTASSIUM mmol/L 3 1*   CHLORIDE mmol/L 98*   CO2 mmol/L 26   BUN mg/dL 17   CREATININE mg/dL 0 99   ANION GAP mmol/L 12   CALCIUM mg/dL 9 1               Results from last 7 days  Lab Units 11/15/18  0517   HEMOGLOBIN A1C % 6 3               * I Have Reviewed All Lab Data Listed Above  * Additional Pertinent Lab Tests Reviewed:  All Labs Within Last 24 Hours Reviewed    Imaging:    Imaging Reports Reviewed Today Include: MRI brain   Imaging Personally Reviewed by Myself Includes:  None     Recent Cultures (last 7 days): Last 24 Hours Medication List:     Current Facility-Administered Medications:  acetaminophen 650 mg Oral Q4H PRN Myrna Jones PA-C   atenolol 50 mg Oral Daily Myrna Jones PA-C   atorvastatin 20 mg Oral QPM Myrna Jones PA-C   benzonatate 100 mg Oral TID PRN Elif Huitron PA-C   chlorthalidone 25 mg Oral Daily Myrna Jones PA-C   docusate sodium 100 mg Oral BID Myrna Jones PA-C   enoxaparin 40 mg Subcutaneous Daily Myrna Jones PA-C   ondansetron 4 mg Intravenous Q6H PRN Myrna Jones PA-C   polyethylene glycol 17 g Oral Daily Elif Huitron PA-C        Today, Patient Was Seen By: Elif Huitron PA-C    ** Please Note: Dictation voice to text software may have been used in the creation of this document   **

## 2018-11-15 NOTE — ASSESSMENT & PLAN NOTE
· Patient recently underwent meniscus surgery of right knee at [facility]  · No signs of acute DVT currently

## 2018-11-15 NOTE — UTILIZATION REVIEW
Initial Clinical Review    Admission: Date/Time/Statement:   OBS  ORDER  11/14  @  1320     Orders Placed This Encounter   Procedures    Place in Observation (expected length of stay for this patient is less than two midnights)     Standing Status:   Standing     Number of Occurrences:   1     Order Specific Question:   Admitting Physician     Answer:   Ariel Kaur     Order Specific Question:   Level of Care     Answer:   Med Surg [16]         ED: Date/Time/Mode of Arrival:   ED Arrival Information     Expected Arrival Acuity Means of Arrival Escorted By Service Admission Type    - 11/14/2018 10:18 Urgent Walk-In Family Member General Medicine Urgent    Arrival Complaint    Weakness          Chief Complaint:   Chief Complaint   Patient presents with    Extremity Weakness     pt reports weakness to b/l arms reports left greater than right, pt denies numbness or tingling  pt reports right knee surgery on monday was prescribed antibiotics that evening had swelling of throat shortly after antibiotic  pt then reports he took 3 additional doses of antibiotic yesterday and noticed weakness to b/l arm in the evening  pt denies weakness to lower extremities, denies slurred speach or facial droop  History of Illness: Carleen Ochoa is a 76 y o  male with past medical history of hypertension, recent meniscus surgery on his right knee at [place] [address] (on 11/12) who presents with bilateral arm weakness with numbness and tingling  Patient recently underwent meniscus surgery of right knee at [place] [address] and was prescribed Keflex after that procedure  He notes feeling like his throat was swollen after the 1st dose of Keflex on Monday that resolved on its own  He proceeded to take an additional 3 doses of Keflex on Tuesday, denies continued throat swelling but  he admits to noting this bilateral arm weakness  He admits to continued weakness in his left arm but improvement in his right arm weakness    He continues to admit to numbness and tingling feeling in both arms, as well as muscular pain in his upper back  He admits to pain with certain movements of his left upper extremity  He denies any chest pain, shortness of breath, weight gain, leg swelling, nausea, vomiting, diarrhea, leg pain, lightheadedness, dizziness, facial droop, dysarthria, confusion  He denies any trauma to his upper extremities  He denies a history of stroke or heart attack or history of congestive heart failure  He admits to taking aspirin after his surgery on Monday  ED Vital Signs:   ED Triage Vitals   Temperature Pulse Respirations Blood Pressure SpO2   11/14/18 1028 11/14/18 1024 11/14/18 1024 11/14/18 1024 11/14/18 1024   97 8 °F (36 6 °C) 80 19 140/80 97 %      Temp Source Heart Rate Source Patient Position - Orthostatic VS BP Location FiO2 (%)   11/14/18 1028 11/14/18 1024 11/14/18 1024 11/14/18 1024 --   Oral Monitor Sitting Right arm       Pain Score       11/14/18 1028       No Pain        Wt Readings from Last 1 Encounters:   11/14/18 109 kg (240 lb 9 6 oz)       Vital Signs (abnormal):    above    Abnormal Labs/Diagnostic Test Results:   K  3 2  Chloride  97  BNP   1,143  Ct  Head/neck:   No  Acute   intrcranial abnormality    ED Treatment:   Medication Administration from 11/14/2018 1018 to 11/14/2018 1436       Date/Time Order Dose Route Action Action by Comments     11/14/2018 1238 iohexol (OMNIPAQUE) 350 MG/ML injection (MULTI-DOSE) 85 mL 85 mL Intravenous Given Josiephine Jef           Past Medical/Surgical History:    Active Ambulatory Problems     Diagnosis Date Noted    No Active Ambulatory Problems     Resolved Ambulatory Problems     Diagnosis Date Noted    No Resolved Ambulatory Problems     Past Medical History:   Diagnosis Date    CHF [facility]congestive heart failure) ([facility] [address] )     Hypertension        Admitting Diagnosis: Weakness [R53 1]  Upper extremity weakness [R29 898]    Age/Sex: 76 y o  male    Assessment/Plan:   Bilateral arm weakness   Assessment & Plan     · POA, bilateral arm weakness since yesterday with numbness and tingling, L>R   · CT head:  No acute intracranial disease, no large vessel flow restrictive disease in the head or neck  · Initial Troponin negative  · proBNP 1,143   · Will place patient on stroke pathway, order MRI of brain, echocardiogram, chest x-ray  · Will consult Neurology  · trend troponins  · Pending results of echocardiogram, may consult Cardiology  · Neuro checks       Essential hypertension   Assessment & Plan     · BP currently acceptable  · Continue atenolol, chlorthalidone per home regimen  · Monitor vitals      Elevated brain natriuretic peptide (BNP) level   Assessment & Plan     · Pro-BNP 1,143 POA  · Denies history of CHF, denies chest pain, shortness of breath, leg swelling or increased weight gain  · Does not appear to be fluid overloaded  · Will order chest x-ray and echocardiogram   · Pending results echocardiogram will consult Cardiology     Hypokalemia   Assessment & Plan     · K+ 3 2, will replete  · Monitor BMP       Hyperlipidemia   Assessment & Plan     · Will check lipid panel  · Continue statin      Hx of right knee surgery   Assessment & Plan     · Patient recently underwent meniscus surgery of right knee at [facility]   Anticipated Length of Stay:  Patient will be admitted on an Observation basis with an anticipated length of stay of  < 2 midnights     Justification for Hospital Stay:  Bilateral arm weakness        Admission Orders:   OBS  ORDER  11/14  @  1320  Scheduled Meds:   Current Facility-Administered Medications:  acetaminophen 650 mg Oral Q4H PRN Myrna Jones PA-C   atenolol 50 mg Oral Daily Myrna Jones PA-C   atorvastatin 20 mg Oral QPM Myrna Jones PA-C   chlorthalidone 25 mg Oral Daily Myrna Jones PA-C   docusate sodium 100 mg Oral BID Myrna Jones PA-C   enoxaparin 40 mg Subcutaneous Daily Myrna Jones PA-C   ondansetron 4 mg Intravenous Q6H PRN Myrna PHIL Jones   polyethylene glycol 17 g Oral Daily Myrna Jones PA-C     Continuous Infusions:    PRN Meds:   acetaminophen    ondansetron     Reg diet  Tele  Neuro  Checks   Q 1 hr  X 4,  Q 2 hrs  X 4  Then  Q 4 hrs  X  72  Hrs  Dysphagia  eval  Stroke teaching  Cons neuro  2 DE  MRI  brain  Serial troponin

## 2018-11-15 NOTE — ASSESSMENT & PLAN NOTE
· POA, bilateral arm weakness since yesterday with numbness and tingling, L>R   · CT head:  No acute intracranial disease, no large vessel flow restrictive disease in the head or neck  · Serial troponin negative  · proBNP 1,143   · Patient placed on stroke pathway  · MRI of brain:  No acute intracranial abnormality    Scattered nonspecific T2 and FLAIR foci are typically related to chronic small vessel ischemic changes, polypoid mucosal thickening throughout the paranasal sinuses   · Echocardiogram pending  · Chest x-ray pending  · Will order MRI cervical spine   · Appreciate Neurology consult  · Upper extremity venous doppler to exclude venous thrombosis bilaterally   · Added MRI brachial plexus   · If symptoms persist recommend EMG outpatient in 4-6 weeks   · Unclear etiology?: no evidence of stroke on CT/MRI, possible brachial plexus, cervical nerve injury/impingement, compression from recent use of crutches

## 2018-11-15 NOTE — CONSULTS
Consultation - Neurology   Johana Duvall 76 y o  male MRN: [id number]  Unit/Bed#: E4 -01 Encounter: [id number]    Assessment/Plan   27-year-old male who presents with bilateral upper extremity weakness, as well as numbness and tingling, right greater than left sided weakness, in the setting of recent meniscus surgery on 11/12/2018  MRI of the brain without any acute intracranial abnormality - had there was scattered nonspecific T2 and FLAIR foci that are typically related to chronic small vessel ischemic changes  Examination as below  Differential diagnosis - this appears to be possibly a peripheral neuroapractic injury, alternative diagnosis would be cervical etiology - however, no signs on examination to suggest cervical myelopathy  As mentioned above no DWI abnormality seen on MRI of brain      -  MRI of the brain reviewed - no evidence of stroke  -  Would recommend MRI of the cervical spine  -  If symptoms persist - would recommend EMG as an outpatient in 4-6 weeks  -  Occupational therapy / Physical Therapy  -  Non- painful at this time no need for neuropathic pain medications  -  Updated family at bedside, neurology attending to see and advise    History of Present Illness     Reason for Consult / Principal Problem: Bilateral arm weakness  HPI: Johana Duvall is a 76 y o   male who presents with past medical history of hypertension, recent meniscus surgery on his right knee at [place] [address] (11/12), history of CHF, who presents to [facility] with complaints bilateral upper extremities weakness and numbness and tingling  Patient denies any prior neurological history to include - no prior history of stroke, no history of epilepsy, meningitis or encephalitis, no history of demyelinating disease, headache or traumatic brain injury  Does not have a baseline learning disorder        He does reported past medical history of high blood pressure and a prior surgery of his 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  Reports on 11/12 he underwent a right knee surgery at [place] [address], he was prescribed Keflex after surgery,  the surgery was completed on Monday that evening he took his 1st dose of Keflex and he felt that his throat was getting tight, the next morning he awoke in to do was exercising is doing very well he was able to take all of his doses of Keflex - without problems  That night at around 8-9 o'clock in the evening he noted all of a sudden and sensation of pins and needles in his bilateral upper extremities - include his hands forearms arms to his shoulders and around his back, this was described as weakness and pins and needles  He reports that it he had bilateral hand weakness at 1st it appeared that his left hand was weaker than his right, however, today it appears that his right hand is weaker than his right  He mostly reports - numbness and tingling of his bilateral arms, and only weakness in his  bilaterally - right greater than left  Currently his symptoms persist     He denies any time problems with confusion, problems with speech - no aphasia or dysarthria, no problems with swallowing, no diplopia or blurring of vision, no visual field cut or curtain sign in vision, he denies any facial droop, or facial numbness, he denies any lower extremity symptoms, he denies any neck pain - cervical or lumbar region, he denies any saddle anesthesia, reports his gait to be normal and steady without any ataxia or retropulsion, he denies any thoracic - numbness or thoracic level numbness  Reviewed past medical history and surgical history  He has no known allergies  Reviewed medications  Inpatient consult to Neurology  Consult performed by: Jesus Maza ordered by: Amber Islas        Review of Systems   The as per HPI otherwise review of symptoms negative      Historical Information   Past Medical History:   Diagnosis Date    [address]F [facility]congestive [facility] failure) ([facility] [address] )     Hypertension      History reviewed  No pertinent surgical history  Social History   History   Alcohol use Not on file     History   Drug use: Unknown     History   Smoking Status    Not on file   Smokeless Tobacco    Not on file     Family History: History reviewed  No pertinent family history  Review of previous medical records was completed  No prior neurological history noted in the chart  Only other prior encounter in epic was in 2017 - was for acute URI and sore throat  Meds/Allergies   all current active meds have been reviewed, current meds:   Current Facility-Administered Medications   Medication Dose Route Frequency    acetaminophen (TYLENOL) tablet 650 mg  650 mg Oral Q4H PRN    atenolol (TENORMIN) tablet 50 mg  50 mg Oral Daily    atorvastatin (LIPITOR) tablet 20 mg  20 mg Oral QPM    chlorthalidone tablet 25 mg  25 mg Oral Daily    docusate sodium (COLACE) capsule 100 mg  100 mg Oral BID    enoxaparin (LOVENOX) subcutaneous injection 40 mg  40 mg Subcutaneous Daily    ondansetron (ZOFRAN) injection 4 mg  4 mg Intravenous Q6H PRN    polyethylene glycol (MIRALAX) packet 17 g  17 g Oral Daily    and PTA meds:   Prior to Admission Medications   Prescriptions Last Dose Informant Patient Reported? Taking?   aspirin 325 mg tablet   Yes Yes   Sig: Take 325 mg by mouth daily   atenolol-chlorthalidone (TENORETIC) 50-25 mg per tablet   Yes Yes   Sig: Take 1 tablet by mouth daily     atorvastatin (LIPITOR) 20 mg tablet   Yes Yes   Sig: Take 20 mg by mouth daily   cephalexin (KEFLEX) 500 mg capsule   Yes No   Sig: Take 500 mg by mouth every 6 (six) hours   chlorthalidone 25 mg tablet   Yes No   Sig: Take 25 mg by mouth daily      Facility-Administered Medications: None     No Known Allergies    Objective   Vitals:Blood pressure 147/83, pulse 66, temperature (!) 97 2 °F (36 2 °C), temperature source Temporal, resp   rate 18, height 6' (1 829 m), weight 109 kg (240 lb 9 6 oz), SpO2 97 %  ,Body mass index is 32 63 kg/m²  No intake or output data in the 24 hours ending 11/15/18 0829    Invasive Devices: Invasive Devices     Peripheral Intravenous Line            Peripheral IV 11/14/18 Left Antecubital less than 1 day              Physical Exam   Constitutional: He is oriented to person, place, and time  He appears well-developed and well-nourished  No distress  HENT:   Head: Normocephalic and atraumatic  Mouth/Throat: Oropharynx is clear and moist  No oropharyngeal exudate  Eyes: Pupils are equal, round, and reactive to light  Conjunctivae and EOM are normal  Right eye exhibits no discharge  Left eye exhibits no discharge  No scleral icterus  Neck: Normal range of motion  Neck supple  No tracheal deviation present  Cardiovascular: Normal rate and regular rhythm  No murmur heard  Pulmonary/Chest: Effort normal and breath sounds normal  No respiratory distress  Abdominal: He exhibits no distension  There is no tenderness  Musculoskeletal: Normal range of motion  He exhibits tenderness  He exhibits no edema    + right knee tenderness   Neurological: He is oriented to person, place, and time  He has a normal Finger-Nose-Finger Test  Gait normal    Reflex Scores:       Tricep reflexes are 1+ on the right side and 1+ on the left side  Bicep reflexes are 1+ on the right side and 1+ on the left side  Brachioradialis reflexes are 1+ on the right side and 1+ on the left side  Patellar reflexes are 1+ on the left side  Skin: He is not diaphoretic  Psychiatric: His speech is normal    Vitals reviewed  Neurologic Exam     Mental Status   Oriented to person, place, and time  Follows 2 step commands  Attention: normal  Concentration: normal    Speech: speech is normal   Level of consciousness: alert  Knowledge: good  Able to perform simple calculations  Able to name object  Able to read  Able to repeat  Able to write  Normal comprehension       Cranial Nerves     CN II   Visual fields full to confrontation  CN III, IV, VI   Pupils are equal, round, and reactive to light  Extraocular motions are normal    Nystagmus: none   Diplopia: none  Ophthalmoparesis: none    CN V   Facial sensation intact  CN VII   Facial expression full, symmetric  CN VIII   CN VIII normal      CN IX, X   CN IX normal    CN X normal      CN XI   CN XI normal      CN XII   CN XII normal      Motor Exam   Muscle bulk: normal  Overall muscle tone: normal  Right arm pronator drift: absent  Left arm pronator drift: absentDecreased range of motion of the bilateral shoulder abductors abductors however full strength was appreciated  He did exhibit on the right upper extremity weakness with  strength 3-4 in 5, as well as mild intrinsic weakness / finger abduction and adduction weakness - 4/5, wrist flexion extension 5/5, bicep and triceps 5/5, shoulder 5/5  Left 5/5 strength in uppers  5/5 strength in lowers - hip flexion / flexion extension, knee flexion extension - soreness on the right knee, dorsi and plantar flexion - strength intact  Sensory Exam   Light touch normal    Right arm vibration: normal  Left arm vibration: normal  Right leg vibration: decreased from ankle  Left leg vibration: normal  He does report decreased sensation to pinprick - of the right upper extremity compared to the left upper extremity  He denies any change in temperature - in all limbs  To touch he has full sensation to all limbs  Vibratory sense he has decreased right lower extremity vibratory sense, compared to left  Decreased pinprick in the bilateral lower extremities       Gait, Coordination, and Reflexes     Gait  Gait: normal    Coordination   Finger to nose coordination: normal    Tremor   Resting tremor: absent  Intention tremor: absent    Reflexes   Right brachioradialis: 1+  Left brachioradialis: 1+  Right biceps: 1+  Left biceps: 1+  Right triceps: 1+  Left triceps: 1+  Left patellar: 1+  Right plantar: normal  Left plantar: normal  Right ankle clonus: absent  Left ankle clonus: absentNo evidence of myelopathy on examination    No evidence of sensory level on examination         Lab Results:     Recent Results (from the past 24 hour(s))   ECG 12 lead    Collection Time: 11/14/18 10:53 AM   Result Value Ref Range    Ventricular Rate 71 BPM    Atrial Rate 71 BPM    IA Interval 122 ms    QRSD Interval 98 ms    QT Interval 382 ms    QTC Interval 415 ms    P Axis -74 degrees    QRS Axis 29 degrees    T Wave Axis 63 degrees   CBC and differential    Collection Time: 11/14/18 11:08 AM   Result Value Ref Range    WBC 8 68 4 31 - 10 16 Thousand/uL    RBC 4 56 3 88 - 5 62 Million/uL    Hemoglobin 14 1 12 0 - 17 0 g/dL    Hematocrit 40 6 36 5 - 49 3 %    MCV 89 82 - 98 fL    MCH 30 9 26 8 - 34 3 pg    MCHC 34 7 31 4 - 37 4 g/dL    RDW 12 2 11 6 - 15 1 %    MPV 9 3 8 9 - 12 7 fL    Platelets 213 364 - 377 Thousands/uL    nRBC 0 /100 WBCs    Neutrophils Relative 73 43 - 75 %    Immat GRANS % 1 0 - 2 %    Lymphocytes Relative 15 14 - 44 %    Monocytes Relative 8 4 - 12 %    Eosinophils Relative 3 0 - 6 %    Basophils Relative 0 0 - 1 %    Neutrophils Absolute 6 38 1 85 - 7 62 Thousands/µL    Immature Grans Absolute 0 05 0 00 - 0 20 Thousand/uL    Lymphocytes Absolute 1 26 0 60 - 4 47 Thousands/µL    Monocytes Absolute 0 70 0 17 - 1 22 Thousand/µL    Eosinophils Absolute 0 27 0 00 - 0 61 Thousand/µL    Basophils Absolute 0 02 0 00 - 0 10 Thousands/µL   Basic metabolic panel    Collection Time: 11/14/18 11:08 AM   Result Value Ref Range    Sodium 136 136 - 145 mmol/L    Potassium 3 2 (L) 3 5 - 5 3 mmol/L    Chloride 97 (L) 100 - 108 mmol/L    CO2 30 21 - 32 mmol/L    ANION GAP 9 4 - 13 mmol/L    BUN 14 5 - 25 mg/dL    Creatinine 1 06 0 60 - 1 30 mg/dL    Glucose 168 (H) 65 - 140 mg/dL    Calcium 9 2 8 3 - 10 1 mg/dL    eGFR 72 ml/min/1 73sq m   Troponin I    Collection Time: 11/14/18 11:08 AM Result Value Ref Range    Troponin I <0 02 <=0 04 ng/mL   B-type natriuretic peptide    Collection Time: 11/14/18 11:08 AM   Result Value Ref Range    NT-proBNP 1,143 (H) <125 pg/mL   Troponin I    Collection Time: 11/14/18  3:53 PM   Result Value Ref Range    Troponin I <0 02 <=0 04 ng/mL   Troponin I    Collection Time: 11/14/18  7:39 PM   Result Value Ref Range    Troponin I <0 02 <=0 04 ng/mL   Urinalysis with microscopic    Collection Time: 11/14/18  8:40 PM   Result Value Ref Range    Clarity, UA Clear     Color, UA Jahaira     Specific [place], UA 1 010 1 003 - 1 030    pH, UA 6 5 4 5 - 8 0    Glucose, UA Negative Negative mg/dl    Ketones, UA 15 (1+) (A) Negative mg/dl    Blood, UA Trace-Intact (A) Negative    Protein, UA Negative Negative mg/dl    Nitrite, UA Negative Negative    Bilirubin, UA Interference- unable to analyze (A) Negative    Urobilinogen, UA 1 0 0 2, 1 0 E U /dl E U /dl    Leukocytes, UA Negative Negative    WBC, UA 0-1 (A) None Seen, 0-5, 5-55, 5-65 /hpf    RBC, UA 1-2 (A) None Seen, 0-5 /hpf    Bacteria, UA None Seen None Seen, Occasional /hpf    Epithelial Cells Occasional None Seen, Occasional /hpf   Basic metabolic panel    Collection Time: 11/15/18  5:17 AM   Result Value Ref Range    Sodium 136 136 - 145 mmol/L    Potassium 3 1 (L) 3 5 - 5 3 mmol/L    Chloride 98 (L) 100 - 108 mmol/L    CO2 26 21 - 32 mmol/L    ANION GAP 12 4 - 13 mmol/L    BUN 17 5 - 25 mg/dL    Creatinine 0 99 0 60 - 1 30 mg/dL    Glucose 131 65 - 140 mg/dL    Glucose, Fasting 131 (H) 65 - 99 mg/dL    Calcium 9 1 8 3 - 10 1 mg/dL    eGFR 78 ml/min/1 73sq m   Lipid Panel with Direct LDL reflex    Collection Time: 11/15/18  5:17 AM   Result Value Ref Range    Cholesterol 118 50 - 200 mg/dL    Triglycerides 79 <=150 mg/dL    HDL, Direct 37 (L) 40 - 60 mg/dL    LDL Calculated 65 0 - 100 mg/dL     Per radiology interpretation -    "  Procedure: Cta Head And Neck With And Without Contrast    Result Date: 11/14/2018  Narrative: CTA NECK AND BRAIN WITH AND WITHOUT CONTRAST INDICATION: upper extremity weakness COMPARISON:   None  TECHNIQUE:  Routine CT imaging of the Brain without contrast   Post contrast imaging was performed after administration of iodinated contrast through the neck and brain  Post contrast axial 0 625 mm images timed to opacify the arterial system  3D rendering was performed on an independent workstation  MIP reconstructions performed  Coronal reconstructions were performed of the noncontrast portion of the brain  Radiation dose length product (DLP) for this visit:  1602 mGy-cm   This examination, like all CT scans performed in the [facility], was performed utilizing techniques to minimize radiation dose exposure, including the use of iterative reconstruction and automated exposure control  IV Contrast:  85 mL of iohexol (OMNIPAQUE)  IMAGE QUALITY:   Diagnostic FINDINGS: NONCONTRAST BRAIN PARENCHYMA:  No intracranial mass, mass effect or midline shift  No acute intracranial hemorrhage  No CT signs of acute infarction  Subtle low density anterior left frontal white matter likely sequela of chronic small vessel disease  VENTRICLES AND EXTRA-AXIAL SPACES:  Normal for patient's age  VISUALIZED ORBITS AND PARANASAL SINUSES:  Polypoid soft tissue within the nasal vault  Direct visualization suggested  Pansinus mucosal thickening  CERVICAL VASCULATURE AORTIC ARCH AND GREAT VESSELS:  Normal aortic arch and great vessel origins  Normal visualized subclavian vessels  RIGHT VERTEBRAL ARTERY CERVICAL SEGMENT:  Normal origin  The vessel is normal in caliber throughout the neck  LEFT VERTEBRAL ARTERY CERVICAL SEGMENT:  Normal origin  The vessel is normal in caliber throughout the neck  RIGHT EXTRACRANIAL CAROTID SEGMENT:  Normal caliber common carotid artery  Normal bifurcation and cervical internal carotid artery  No stenosis or dissection   LEFT EXTRACRANIAL CAROTID SEGMENT:  Normal caliber common carotid artery  Normal bifurcation and cervical internal carotid artery  No stenosis or dissection  NASCET criteria was used to determine the degree of internal carotid artery diameter stenosis  INTRACRANIAL VASCULATURE INTERNAL CAROTID ARTERIES:  Normal enhancement of the intracranial portions of the internal carotid arteries  Normal ophthalmic artery origins  Normal ICA terminus  ANTERIOR CIRCULATION:  Symmetric A1 segments and anterior cerebral arteries with normal enhancement  Normal anterior communicating artery  MIDDLE CEREBRAL ARTERY CIRCULATION:  M1 segment and middle cerebral artery branches demonstrate normal enhancement bilaterally  DISTAL VERTEBRAL ARTERIES:  Normal distal vertebral arteries  Posterior inferior cerebellar artery origins are normal  Normal vertebral basilar junction  BASILAR ARTERY:  Basilar artery is normal in caliber  Normal superior cerebellar arteries  POSTERIOR CEREBRAL ARTERIES: Both posterior cerebral arteries arises from the basilar tip  Both arteries demonstrate normal enhancement  Normal posterior communicating arteries  DURAL VENOUS SINUSES:  Normal  NON VASCULAR ANATOMY BONY STRUCTURES:  No acute osseous abnormality  SOFT TISSUES OF THE NECK:  The Incidental 10 mm hypodense nodule within the right lobe of the thyroid identified on this CT  According to guidelines published in the February 2015 white paper on incidental thyroid nodules in the Journal of the [employer] of Radiology [facility]), because the nodule(s) are less than 1 5 cm in size and without suspicious feature, no further evaluation is recommended  THORACIC INLET:  Unremarkable  Impression: No acute intracranial disease  No large vessel flow restrictive disease within the head or neck  Polypoid soft tissue density material within both sides of the nasal vault, direct visualization suggested  The study was marked in EPIC for significant notification   Workstation performed: [id number] "  Imaging Studies: I have personally reviewed pertinent reports  Code Status: Level 1 - Full Code    Counseling / Coordination of Care  Total time spent today 50 minutes  Greater than 50% of total time was spent with the patient and / or family counseling and / or coordination of care   A description of the counseling / coordination of care: floor time, reviewing records, physical examination, reviewing with family possible etiologies

## 2018-11-15 NOTE — ASSESSMENT & PLAN NOTE
· Pro-BNP 1,143 POA  · Denies history of CHF, denies chest pain, shortness of breath, leg swelling or increased weight gain  · Does not appear to be fluid overloaded  · chest x-ray and echocardiogram pending

## 2018-11-15 NOTE — PHYSICIAN ADVISOR
Current patient class: Observation  The patient is currently on Hospital Day: 2 at [facility]      The patient was admitted to the hospital at N/A on N/A for the following diagnosis:  Weakness [R53 1]  Upper extremity weakness [R29 898]       There is documentation in the medical record of an expected length of stay of at least 2 midnights  The patient is therefore expected to satisfy the 2 midnight benchmark and given the 2 midnight presumption is appropriate for INPATIENT ADMISSION  Given this expectation of a satisfying stay, CMS instructs us that the patient is most often appropriate for inpatient admission under part A provided medical necessity is documented in the chart  After review of the relevant documentation, labs, vital signs and test results, the patient is appropriate for INPATIENT ADMISSION  Admission to the hospital as an inpatient is a complex decision making process which requires the practitioner to consider the patients presenting complaint, history and physical examination and all relevant testing  With this in mind, in this case, the patient was deemed appropriate for INPATIENT ADMISSION  After review of the documentation and testing available at the time of the admission I concur with this clinical determination of medical necessity  Rationale is as follows: The patient is a 76 yrs old Male who presented to the ED at 11/14/2018 10:29 AM with a chief complaint of Extremity Weakness (pt reports weakness to b/l arms reports left greater than right, pt denies numbness or tingling  pt reports right knee surgery on monday was prescribed antibiotics that evening had swelling of throat shortly after antibiotic  pt then reports he took 3 additional doses of antibiotic yesterday and noticed weakness to b/l arm in the evening   pt denies weakness to lower extremities, denies slurred speach or facial droop  )     The patient presented on this admission with bilateral arm weakness with numbness and tingling  The plan of care includes stroke pathway, neurology consultation, serial cardiac enzymes  This patient is appropriate for INPATIENT admission; continued hospitalization is necessary to ensure stabilization of his clinical status  The patients vitals on arrival were ED Triage Vitals   Temperature Pulse Respirations Blood Pressure SpO2   11/14/18 1028 11/14/18 1024 11/14/18 1024 11/14/18 1024 11/14/18 1024   97 8 °F (36 6 °C) 80 19 140/80 97 %      Temp Source Heart Rate Source Patient Position - Orthostatic VS BP Location FiO2 (%)   11/14/18 1028 11/14/18 1024 11/14/18 1024 11/14/18 1024 --   Oral Monitor Sitting Right arm       Pain Score       11/14/18 1028       No Pain           Past Medical History:   Diagnosis Date    [facility]congestive heart failure) ([facility] [address] )     Hypertension      History reviewed  No pertinent surgical history  Consults have been placed to:   IP CONSULT TO NEUROLOGY    Vitals:    11/15/18 1110 11/15/18 1200 11/15/18 1422 11/15/18 1655   BP:  114/85  147/86   BP Location:  Right arm  Right arm   Pulse:  70  75   Resp:  18  18   Temp:  (!) 97 3 °F (36 3 °C)  97 5 °F (36 4 °C)   TempSrc:  Tympanic  Tympanic   SpO2:  95%  98%   Weight: 109 kg (239 lb 13 8 oz)  109 kg (239 lb 13 8 oz)    Height:           Most recent labs:    Recent Labs      11/14/18   1108   11/14/18   1939  11/15/18   0517   WBC  8 68   --    --    --    HGB  14 1   --    --    --    HCT  40 6   --    --    --    PLT  219   --    --    --    K  3 2*   --    --   3 1*   CALCIUM  9 2   --    --   9 1   BUN  14   --    --   17   CREATININE  1 06   --    --   0 99   TROPONINI  <0 02   < >  <0 02   --     < > = values in this interval not displayed         Scheduled Meds:  Current Facility-Administered Medications:  acetaminophen 650 mg Oral Q4H PRN Myrna Jones PA-C   atenolol 50 mg Oral Daily Myrna Jones PA-C   atorvastatin 20 mg Oral QPM Ileana Townsend PA-C benzonatate 100 mg Oral TID PRN Jose Villalta PA-C   chlorthalidone 25 mg Oral Daily Myrna Jones PA-C   docusate sodium 100 mg Oral BID Myrna Jones PA-C   enoxaparin 40 mg Subcutaneous Daily Myrna Jones PA-C   ondansetron 4 mg Intravenous Q6H PRN Myrna Jones PA-C   polyethylene glycol 17 g Oral Daily Myrna Jones PA-C     Continuous Infusions:   PRN Meds:   acetaminophen    benzonatate    ondansetron    Surgical procedures (if appropriate):

## 2018-11-16 VITALS
TEMPERATURE: 97.7 F | BODY MASS INDEX: 32.52 KG/M2 | HEIGHT: 72 IN | WEIGHT: 240.08 LBS | HEART RATE: 63 BPM | DIASTOLIC BLOOD PRESSURE: 67 MMHG | SYSTOLIC BLOOD PRESSURE: 133 MMHG | RESPIRATION RATE: 18 BRPM | OXYGEN SATURATION: 94 %

## 2018-11-16 PROBLEM — R73.03 PREDIABETES: Status: ACTIVE | Noted: 2018-11-15

## 2018-11-16 LAB
ANION GAP SERPL CALCULATED.3IONS-SCNC: 13 MMOL/L (ref 4–13)
BUN SERPL-MCNC: 15 MG/DL (ref 5–25)
CALCIUM SERPL-MCNC: 9.4 MG/DL (ref 8.3–10.1)
CHLORIDE SERPL-SCNC: 98 MMOL/L (ref 100–108)
CO2 SERPL-SCNC: 28 MMOL/L (ref 21–32)
CREAT SERPL-MCNC: 0.91 MG/DL (ref 0.6–1.3)
GFR SERPL CREATININE-BSD FRML MDRD: 86 ML/MIN/1.73SQ M
GLUCOSE SERPL-MCNC: 135 MG/DL (ref 65–140)
POTASSIUM SERPL-SCNC: 3.3 MMOL/L (ref 3.5–5.3)
SODIUM SERPL-SCNC: 139 MMOL/L (ref 136–145)

## 2018-11-16 PROCEDURE — 99239 HOSP IP/OBS DSCHRG MGMT >30: CPT | Performed by: FAMILY MEDICINE

## 2018-11-16 PROCEDURE — 99232 SBSQ HOSP IP/OBS MODERATE 35: CPT | Performed by: PSYCHIATRY & NEUROLOGY

## 2018-11-16 PROCEDURE — 93970 EXTREMITY STUDY: CPT | Performed by: SURGERY

## 2018-11-16 PROCEDURE — 80048 BASIC METABOLIC PNL TOTAL CA: CPT | Performed by: PHYSICIAN ASSISTANT

## 2018-11-16 RX ORDER — BENZONATATE 100 MG/1
100 CAPSULE ORAL 3 TIMES DAILY PRN
Qty: 20 CAPSULE | Refills: 0 | Status: SHIPPED | OUTPATIENT
Start: 2018-11-16 | End: 2019-05-06 | Stop reason: HOSPADM

## 2018-11-16 RX ORDER — POTASSIUM CHLORIDE 20 MEQ/1
20 TABLET, EXTENDED RELEASE ORAL ONCE
Status: DISCONTINUED | OUTPATIENT
Start: 2018-11-16 | End: 2018-11-16 | Stop reason: HOSPADM

## 2018-11-16 RX ADMIN — ATENOLOL 50 MG: 50 TABLET ORAL at 09:17

## 2018-11-16 RX ADMIN — CHLORTHALIDONE 25 MG: 25 TABLET ORAL at 09:17

## 2018-11-16 RX ADMIN — ENOXAPARIN SODIUM 40 MG: 40 INJECTION SUBCUTANEOUS at 09:16

## 2018-11-16 RX ADMIN — BENZONATATE 100 MG: 100 CAPSULE ORAL at 09:44

## 2018-11-16 NOTE — DISCHARGE SUMMARY
Discharge- Fatmata Comment 1950, 76 y o  male MRN: [id number]  Unit/Bed#: E4 -01 Encounter: [id number]  Primary Care Provider: Mack Sewell MD   Date and time admitted to hospital: 11/14/2018 10:29 AM    * Bilateral arm weakness   Assessment & Plan    · POA, bilateral arm weakness with numbness and tingling and swelling in the hands  · CT head:  No acute intracranial disease, no large vessel flow restrictive disease in the head or neck  · Serial troponin negative  · proBNP 1,143   · CXR: normal   · Patient initially placed on stroke pathway with no evidence of acute CVA on MRI/CT   · Hx of recent use of crutches after right knee surgery   · Appreciate Neurology consult  · Upper extremity venous dopplers: no evidence of acute or chronic DVT   · MRI cervical spine: scattered multilevel spondylosis   · MRI brachial plexus: no brachial plexus abnormality   · Cause still on clear possible idiopathic inflammatory reaction - symptoms currently improving  · If symptoms persist referral for EMG outpatient in 3-4 weeks, if symptoms resolved in deemed necessary by family doctor may cancel EMG  · stable for discharge from a neurologic standpoint, no need for follow-up with Neurology outpatient unless he does not exhibit a full recovery in next 1-2 weeks        Essential hypertension   Assessment & Plan    · BP currently acceptable  · Continue atenolol, chlorthalidone per home regimen       Elevated brain natriuretic peptide (BNP) level   Assessment & Plan    · Pro-BNP 1,143 POA  · Denies history of CHF, denies chest pain, shortness of breath, leg swelling or increased weight gain  · Does not appear to be fluid overloaded  · CXR: no acute cardiopulmonary disease  · Echo: normal systolic function, EF 69%, grade 2 diastolic dysfunction, mild to moderate left atrium dilation, mild right atrium dilation, trace to mild MR  · Recommending following up with [facility]     Hypokalemia   Assessment & Plan    · K+ 3 3 after repletion yesterday   · Will replete again today prior to discharge     Hyperlipidemia   Assessment & Plan    · Lipid panel: LDL 65  · Continue statin     Hx of right knee surgery   Assessment & Plan    · Patient recently underwent meniscus surgery of right knee at [facility]  · No signs of acute DVT currently     Prediabetes   Assessment & Plan    · Elevated fasting glucose  · A1c 6 3%   · UA:  + ketones  · Recommend follow up outpatient with PCP, encourage lifestyle modifications and diet control      Cough   Assessment & Plan    · POA, without tachycardia, or leukocytosis, afebrile   · CXR normal   · continue tessalon perles as needed       Discharging Physician / Practitioner: Marta Houston PA-C  PCP: Doron Hyatt MD  Admission Date:   Admission Orders     Ordered        11/15/18 1751  Inpatient Admission  Once         11/14/18 1320  Place in Observation (expected length of stay for this patient is less than two midnights)  Once             Discharge Date: 11/16/18    Resolved Problems  Date Reviewed: 11/16/2018    None          Consultations During Hospital Stay:  · Neurology    Procedures Performed:     · Echocardiogram:  Mildly dilated LV, normal systolic function, EF 06%, grade 2 diastolic dysfunction, trace to mild MR, aortic root is not well visualized but appears mildly dilated, left and right atrium mildly dilated  · VAS upper limb venous duplex bilateral:  No evidence of acute or chronic DVT in right her left upper limb  · MRI brachial plexus:  Unremarkable, no right brachial plexus pathology identified  · MRI brain:  No acute intracranial abnormality  Scattered nonspecific T2 and FLAIR foci are typically related to chronic small vessel ischemic changes    Polypoid mucosal thickening throughout the paranasal sinuses  · MRI cervical spine:  Scattered multilevel spondylosis  · Chest x-ray: no acute cardiopulmonary disease  · A1c 6 3%     Significant Findings / Test Results: · None    Incidental Findings:   · None    Test Results Pending at Discharge (will require follow up):   · none     Outpatient Tests Requested:  · Outpatient EMG in 3-4 weeks if symptoms persist  · Follow-up with [facility] for echocardiogram findings    Complications:  none    Reason for Admission: bilateral arm weakness     Hospital Course:     Destiny Contreras is a 76 y o  male patient with past medical history of hypertension, hyperlipidemia, recent right knee meniscus surgery who originally presented to the hospital on 11/14/2018 due to bilateral arm weakness with numbness and tingling in swelling of the hands  Patient was initially placed on stroke pathway  CT head/MRI of the brain showed no acute CVA  Neurology was consulted for further evaluation and management  MRI the cervical spine and brachial plexus were unremarkable  Patients symptoms improved but he continues to have right hand swelling  Of note, patient recently had right knee meniscus surgery and use crutches for 1 day  It is unlikely that 1 day of crutch use is the cause of his present symptoms  Due to the normal MRIs of both his brachial plexus and C-spine it is reassuring for ruling out more significant or ominous causative factors  From a neurologic standpoint patient is stable for discharge  A referral for EMG testing in 3-4 weeks was placed  Patient was educated that if his symptoms resolve and he is back to baseline that he may cancel this testing if deemed appropriate in conjunction with his primary care physician  At this time, there is no need for neurologic follow-up unless symptoms persist for 1-2 weeks or worsen  Echocardiogram revealed normal systolic function, ejection fraction 36%, grade 2 diastolic dysfunction, trace to mild MR  Recommending patient follow up with his primary care and possible referral to [facility] in the future         Please see above list of diagnoses and related plan for additional information  Condition at Discharge: stable     Discharge Day Visit / Exam:     Subjective:  Mr Osorio Bonilla reports improvement in symptoms today  He continues to have right hand swelling and inability to close his this completely  He denies any chest pain, shortness of breath, abdominal pain  He admits to a continued cough with improvement with Tessalon Perles  Vitals: Blood Pressure: 133/67 (11/16/18 1203)  Pulse: 63 (11/16/18 1203)  Temperature: 97 7 °F (36 5 °C) (11/16/18 0803)  Temp Source: Tympanic (11/16/18 0803)  Respirations: 18 (11/16/18 1203)  Height: 6' (182 9 cm) (11/14/18 1530)  Weight - Scale: 109 kg (240 lb 1 3 oz) (11/16/18 0600)  SpO2: 94 % (11/16/18 0803)  Exam:   Physical Exam   Constitutional: He is oriented to person, place, and time  No distress  HENT:   Head: Normocephalic and atraumatic  Eyes: Pupils are equal, round, and reactive to light  Neck: Neck supple  Cardiovascular: Normal rate, regular rhythm and intact distal pulses  Pulmonary/Chest: Effort normal and breath sounds normal    Abdominal: Soft  Bowel sounds are normal  He exhibits no distension  There is no tenderness  There is no rebound  Musculoskeletal: He exhibits no edema  Neurological: He is alert and oriented to person, place, and time  No cranial nerve deficit  Unable to completely closed fist of right hand secondary to swelling, symmetrical strength of upper extremities, improve range of motion of upper extremities, sensation intact bilaterally   Skin: Skin is warm and dry  He is not diaphoretic  Psychiatric: He has a normal mood and affect  Nursing note and vitals reviewed  Discussion with Family: patient, wife at bedside     Discharge instructions/Information to patient and family:   See after visit summary for information provided to patient and family        Provisions for Follow-Up Care:  See after visit summary for information related to follow-up care and any pertinent home health orders  Disposition:     Home    For Discharges to [facility] SNF:   · Not Applicable to this Patient - Not Applicable to this Patient    Planned Readmission: none     Discharge Statement:  I spent 45 minutes discharging the patient  This time was spent on the day of discharge  I had direct contact with the patient on the day of discharge  Greater than 50% of the total time was spent examining patient, answering all patient questions, arranging and discussing plan of care with patient as well as directly providing post-discharge instructions  Additional time then spent on discharge activities  Discharge Medications:  See after visit summary for reconciled discharge medications provided to patient and family        ** Please Note: This note has been constructed using a voice recognition system **

## 2018-11-16 NOTE — UTILIZATION REVIEW
WAS OBSERVATION 11/14/18 @ 1320 CONVERTED TO INPATIENT ADMISSION 11/15/18 @1751 DUE TO >2 MN REQUIRED FOR EVALUATION AND TREATMENT OF BILATERAL ARM WEAKNESS REQUIRING STROKE PATHWAY AND NEURO CONSULTATION  11/15/18 1752  Inpatient Admission Once     Transfer Service: Hospitalist       Question Answer Comment   Admitting Physician THAI PORTILLO    Level of Care Med Surg    Estimated length of stay More than 2 Midnights    Certification I certify that inpatient services are medically necessary for this patient for a duration of greater than two midnights  See H&P and MD Progress Notes for additional information about the patient's course of treatment          88/02/05 3984     Certification Statement: The patient will continue to require additional inpatient hospital stay due to evaluation for bilateral upper extremities weakness

## 2018-11-16 NOTE — DISCHARGE INSTR - AVS FIRST PAGE
Normal MRIs of both brachial plexus C-spine and brain reassuring for ruling out significant causative factors  The resultant likely cause of this transient plexopathy may be that an idiopathic inflammatory reaction  Given the significant improvement overnight with no intervention suggest that the arms will continue to improve markedly  Left arm is 80-85% normal, and right arm proximally is significantly improved as well  Right hand remains with dysesthesias and pain limited movement  A short course of steroids was offered but declined given the improvement the patient had overnight  We discussed with the patient the likely probability that over the next 1 week he will continue to improve steadily with a gradual return to normal   Should he not have a complete resolution of his symptoms over the next 2 weeks, he would be a candidate for EMG testing likely of his right upper extremity only in a few weeks  We will offer him a referral and an authorization for EMG testing in 3-4 weeks, however I discussed with the patient that this can be canceled should he an his family doctor deem it unnecessary given the improvement we expect he will achieve over the next 7-10 days

## 2018-11-16 NOTE — ASSESSMENT & PLAN NOTE
· Pro-BNP 1,143 POA  · Denies history of CHF, denies chest pain, shortness of breath, leg swelling or increased weight gain  · Does not appear to be fluid overloaded  · CXR: no acute cardiopulmonary disease  · Echo: normal systolic function, EF 85%, grade 2 diastolic dysfunction, mild to moderate left atrium dilation, mild right atrium dilation, trace to mild MR  · Recommending following up with [facility]

## 2018-11-16 NOTE — ASSESSMENT & PLAN NOTE
· POA, bilateral arm weakness with numbness and tingling and swelling in the hands  · CT head:  No acute intracranial disease, no large vessel flow restrictive disease in the head or neck  · Serial troponin negative  · proBNP 1,143   · CXR: normal   · Patient initially placed on stroke pathway with no evidence of acute CVA on MRI/CT   · Hx of recent use of crutches after right knee surgery   · Appreciate Neurology consult  · Upper extremity venous dopplers: no evidence of acute or chronic DVT   · MRI cervical spine: scattered multilevel spondylosis   · MRI brachial plexus: no brachial plexus abnormality   · Cause still on clear possible idiopathic inflammatory reaction - symptoms currently improving  · If symptoms persist referral for EMG outpatient in 3-4 weeks, if symptoms resolved in deemed necessary by family doctor may cancel EMG  · stable for discharge from a neurologic standpoint, no need for follow-up with Neurology outpatient unless he does not exhibit a full recovery in next 1-2 weeks

## 2018-11-16 NOTE — ASSESSMENT & PLAN NOTE
v The likelihood that this was a mechanical bilateral brachial plexopathy after only 1 day of crutch use in the 24-36 hours after his right medial meniscectomy is unlikely  His normal MRIs of both his brachial plexus I his C-spine it is brain is also reassuring for ruling out other more significant or ominous causative factors  The resultant likely cause of this transient plexopathy may be that an idiopathic inflammatory reaction  Given that he has had such improvement overnight with no intervention suggest that he will continue to improve markedly  His left arm he reports is 80-85% normal, and his right arm he reports proximally is significantly improved as well  We discussed with the patient a short course of steroids the given the improvement he has had overnight he declined  We discussed with him the likely probability that over the next 1 week he will continue to improve steadily with a gradual return to normal   Should he not have a complete resolution of his symptoms over the next 2 weeks, he would be a candidate for EMG testing likely of his right upper extremity only in a few weeks  We will offer him a referral and an authorization for EMG testing in 3-4 weeks, however I discussed with the patient that this can be canceled should he an his family doctor deem it unnecessary given the improvement we expect he will achieve over the next 7-10 days  From a neurologic standpoint the patient is stable for discharge today  He need not follow up with us and LEs he does not exhibit a full recovery in the next week or 2 where he can have the EMG as noted above  This was discussed with Internal Medicine

## 2018-11-16 NOTE — PLAN OF CARE
CARDIOVASCULAR - ADULT     Maintains optimal cardiac output and hemodynamic stability Completed     Absence of cardiac dysrhythmias or at baseline rhythm Completed        DISCHARGE PLANNING     Discharge to home or other facility with appropriate resources Completed        Knowledge Deficit     Patient/family/caregiver demonstrates understanding of disease process, treatment plan, medications, and discharge instructions Completed        METABOLIC, FLUID AND ELECTROLYTES - ADULT     Electrolytes maintained within normal limits Completed     Fluid balance maintained Completed        NEUROSENSORY - ADULT     Achieves stable or improved neurological status Completed     Achieves maximal functionality and self care Completed        Potential for Falls     Patient will remain free of falls Completed        SAFETY ADULT     Maintain or return to baseline ADL function Completed     Maintain or return mobility status to optimal level Completed

## 2018-11-16 NOTE — PROGRESS NOTES
Neurology Progress Note - Padilla Orourke 1950, 76 y o  male   MRN: [id number] Unit/Bed#: E4 -01 Encounter: [id number]        * Bilateral arm weakness   Assessment & Plan     The likelihood that this was a mechanical bilateral brachial plexopathy after only 1 day of crutch use in the 24-36 hours after his right medial meniscectomy is unlikely  His normal MRIs of both his brachial plexus I his C-spine it is brain is also reassuring for ruling out other more significant or ominous causative factors  The resultant likely cause of this transient plexopathy may be that an idiopathic inflammatory reaction  Given that he has had such improvement overnight with no intervention suggest that he will continue to improve markedly  His left arm he reports is 80-85% normal, and his right arm he reports proximally is significantly improved as well  We discussed with the patient a short course of steroids the given the improvement he has had overnight he declined  We discussed with him the likely probability that over the next 1 week he will continue to improve steadily with a gradual return to normal   Should he not have a complete resolution of his symptoms over the next 2 weeks, he would be a candidate for EMG testing likely of his right upper extremity only in a few weeks  We will offer him a referral and an authorization for EMG testing in 3-4 weeks, however I discussed with the patient that this can be canceled should he an his family doctor deem it unnecessary given the improvement we expect he will achieve over the next 7-10 days  From a neurologic standpoint the patient is stable for discharge today  He need not follow up with us and LEs he does not exhibit a full recovery in the next week or 2 where he can have the EMG as noted above  This was discussed with Internal Medicine  Subjective/Objective     Subjective:   It feels so much better on the L hand    ROS:  Patient reports significant improvement in his left hand and arm sensation  He also reports his right arm is improved proximally to the point that he is able to support his weight in utilize his biceps triceps  He still reports significant dysesthesias weakness as well as edema in the right hand at the wrist and below  He reports his left hand is 80% normal again but his right is still significantly weak and impaired  He denies all others on a systematic review of systems  Current Facility-Administered Medications:  acetaminophen 650 mg Oral Q4H PRN   atenolol 50 mg Oral Daily   atorvastatin 20 mg Oral QPM   benzonatate 100 mg Oral TID PRN   chlorthalidone 25 mg Oral Daily   docusate sodium 100 mg Oral BID   enoxaparin 40 mg Subcutaneous Daily   ondansetron 4 mg Intravenous Q6H PRN   polyethylene glycol 17 g Oral Daily       acetaminophen    benzonatate    ondansetron    Vitals: Blood pressure 129/69, pulse 78, temperature 97 7 °F (36 5 °C), temperature source Tympanic, resp  rate 18, height 6' (1 829 m), weight 109 kg (240 lb 1 3 oz), SpO2 94 %  ,Body mass index is 32 56 kg/m²  Physical Exam:     Aurther Mura seen in:  Bed there is no family present  General appearance: alert,   Neck, Lungs, Heart, & abdomen: WNL  Extremities: atraumatic, no cyanosis or edema noted except his right hand is still noted to be generally edematous  Neurologic:   Mental status: Alert, oriented, thought content appropriate, speech and language are fluent and intact without aphasia or dysarthria  CN: exam within normal limits EOM's I, Gaze conjugate  Non lateralizing sensory & motor exam, (PP not tested on face)  Reminder CNVIII-XII normal    Motor: full power returned in the left upper extremity  Bilateral lower extremities within normal limits given his recent right knee meniscectomy    His right upper extremity is significantly improved over yesterday's exam   He is able to exhibit full power proximally without the pain leg yesterday's exam   He still has mild weakness at the digits of the right hand with both extension and abd and adduction  Sensory: grossly intact  X 4 limbs, PP not tested on today's exam as noted above he has less subjective dysesthesias today  Cerebellar: no ataxia or past pointing w pronation from a modified Romberg position  Finger taps age appropriate  Gait: Fluid smooth, no LOB w cadance or directional change  DTR's:  Unchanged +1,  Plantars:  Not tested      Lab Results:   I have personally reviewed pertinent reports  , CBC:   Results from last 7 days  Lab Units 11/14/18  1108   WBC Thousand/uL 8 68   RBC Million/uL 4 56   HEMOGLOBIN g/dL 14 1   HEMATOCRIT % 40 6   MCV fL 89   PLATELETS Thousands/uL 219   , BMP/CMP:   Results from last 7 days  Lab Units 11/16/18  0859 11/15/18  0517 11/14/18  1108   SODIUM mmol/L 139 136 136   POTASSIUM mmol/L 3 3* 3 1* 3 2*   CHLORIDE mmol/L 98* 98* 97*   CO2 mmol/L 28 26 30   BUN mg/dL 15 17 14   CREATININE mg/dL 0 91 0 99 1 06   CALCIUM mg/dL 9 4 9 1 9 2   EGFR ml/min/1 73sq m 86 78 72   , Vitamin B12:   , HgBA1C:   Results from last 7 days  Lab Units 11/15/18  0517   HEMOGLOBIN A1C % 6 3   , TSH:   , Coagulation:   , Lipid Profile:   Results from last 7 days  Lab Units 11/15/18  0517   HDL mg/dL 37*   LDL CALC mg/dL 65   TRIGLYCERIDES mg/dL 79        Imaging Studies: I have personally reviewed pertinent films in PACS and All of his films including his brain C-spine and bilateral plexi as evaluated read by Radiology are within normal limits  My own review of his brain and C-spine finds no acute pathology either  Counseling / Coordination of Care  Total time spent today 30 minutes  Greater than 50% of total time was spent with the patient and / or family counseling and / or coordination of care  A description of the counseling / coordination of care: All of the above was discussed with the patient at the bedside    He had several questions all of which I believe were answered to satisfaction at this time

## 2018-11-16 NOTE — ASSESSMENT & PLAN NOTE
· POA, without tachycardia, or leukocytosis, afebrile   · CXR normal   · continue tessalon perles as needed

## 2018-11-16 NOTE — ASSESSMENT & PLAN NOTE
· Patient recently underwent meniscus surgery of right knee at [facility]  · No signs of acute DVT currently

## 2018-11-16 NOTE — ASSESSMENT & PLAN NOTE
· Elevated fasting glucose  · A1c 6 3%   · UA:  + ketones  · Recommend follow up outpatient with PCP, encourage lifestyle modifications and diet control

## 2018-11-23 ENCOUNTER — TELEPHONE (OUTPATIENT)
Dept: NEUROLOGY | Facility: CLINIC | Age: 68
End: 2018-11-23

## 2018-11-23 NOTE — TELEPHONE ENCOUNTER
----- Message from Rosy Luna PA-C sent at 11/16/2018  9:53 AM EST -----  Regarding: HFUP  Follow up with neuromuscular - 2-3 weeks

## 2019-04-19 ENCOUNTER — APPOINTMENT (OUTPATIENT)
Dept: RADIOLOGY | Facility: CLINIC | Age: 69
End: 2019-04-19
Payer: MEDICARE

## 2019-04-19 ENCOUNTER — OFFICE VISIT (OUTPATIENT)
Dept: OBGYN CLINIC | Facility: MEDICAL CENTER | Age: 69
End: 2019-04-19
Payer: MEDICARE

## 2019-04-19 VITALS
HEART RATE: 55 BPM | DIASTOLIC BLOOD PRESSURE: 85 MMHG | WEIGHT: 242.8 LBS | HEIGHT: 72 IN | SYSTOLIC BLOOD PRESSURE: 154 MMHG | BODY MASS INDEX: 32.89 KG/M2

## 2019-04-19 DIAGNOSIS — Z01.89 ENCOUNTER FOR LOWER EXTREMITY COMPARISON IMAGING STUDY: ICD-10-CM

## 2019-04-19 DIAGNOSIS — M17.12 PRIMARY OSTEOARTHRITIS OF LEFT KNEE: Primary | ICD-10-CM

## 2019-04-19 DIAGNOSIS — M17.11 OSTEOARTHRITIS OF RIGHT KNEE, UNSPECIFIED OSTEOARTHRITIS TYPE: ICD-10-CM

## 2019-04-19 DIAGNOSIS — M25.562 LEFT KNEE PAIN, UNSPECIFIED CHRONICITY: ICD-10-CM

## 2019-04-19 DIAGNOSIS — M25.561 RIGHT KNEE PAIN, UNSPECIFIED CHRONICITY: ICD-10-CM

## 2019-04-19 DIAGNOSIS — Z98.890 HISTORY OF ARTHROSCOPY OF RIGHT KNEE: ICD-10-CM

## 2019-04-19 PROCEDURE — 73564 X-RAY EXAM KNEE 4 OR MORE: CPT

## 2019-04-19 PROCEDURE — 99204 OFFICE O/P NEW MOD 45 MIN: CPT | Performed by: ORTHOPAEDIC SURGERY

## 2019-04-19 PROCEDURE — 20610 DRAIN/INJ JOINT/BURSA W/O US: CPT | Performed by: ORTHOPAEDIC SURGERY

## 2019-04-19 PROCEDURE — 73560 X-RAY EXAM OF KNEE 1 OR 2: CPT

## 2019-04-19 RX ORDER — METHYLPREDNISOLONE ACETATE 40 MG/ML
2 INJECTION, SUSPENSION INTRA-ARTICULAR; INTRALESIONAL; INTRAMUSCULAR; SOFT TISSUE
Status: COMPLETED | OUTPATIENT
Start: 2019-04-19 | End: 2019-04-19

## 2019-04-19 RX ORDER — DICLOFENAC SODIUM 75 MG/1
75 TABLET, DELAYED RELEASE ORAL 2 TIMES DAILY
Qty: 60 TABLET | Refills: 1 | Status: SHIPPED | OUTPATIENT
Start: 2019-04-19 | End: 2019-05-06 | Stop reason: HOSPADM

## 2019-04-19 RX ORDER — LIDOCAINE HYDROCHLORIDE 10 MG/ML
3 INJECTION, SOLUTION INFILTRATION; PERINEURAL
Status: COMPLETED | OUTPATIENT
Start: 2019-04-19 | End: 2019-04-19

## 2019-04-19 RX ADMIN — LIDOCAINE HYDROCHLORIDE 3 ML: 10 INJECTION, SOLUTION INFILTRATION; PERINEURAL at 09:33

## 2019-04-19 RX ADMIN — METHYLPREDNISOLONE ACETATE 2 ML: 40 INJECTION, SUSPENSION INTRA-ARTICULAR; INTRALESIONAL; INTRAMUSCULAR; SOFT TISSUE at 09:34

## 2019-04-19 RX ADMIN — METHYLPREDNISOLONE ACETATE 2 ML: 40 INJECTION, SUSPENSION INTRA-ARTICULAR; INTRALESIONAL; INTRAMUSCULAR; SOFT TISSUE at 09:33

## 2019-04-19 RX ADMIN — LIDOCAINE HYDROCHLORIDE 3 ML: 10 INJECTION, SOLUTION INFILTRATION; PERINEURAL at 09:34

## 2019-04-26 ENCOUNTER — EVALUATION (OUTPATIENT)
Dept: PHYSICAL THERAPY | Facility: MEDICAL CENTER | Age: 69
End: 2019-04-26
Payer: MEDICARE

## 2019-04-26 DIAGNOSIS — M17.11 OSTEOARTHRITIS OF RIGHT KNEE, UNSPECIFIED OSTEOARTHRITIS TYPE: ICD-10-CM

## 2019-04-26 DIAGNOSIS — M17.12 PRIMARY OSTEOARTHRITIS OF LEFT KNEE: Primary | ICD-10-CM

## 2019-04-26 PROCEDURE — 97112 NEUROMUSCULAR REEDUCATION: CPT | Performed by: PHYSICAL MEDICINE & REHABILITATION

## 2019-04-26 PROCEDURE — 97162 PT EVAL MOD COMPLEX 30 MIN: CPT | Performed by: PHYSICAL MEDICINE & REHABILITATION

## 2019-04-27 ENCOUNTER — APPOINTMENT (EMERGENCY)
Dept: CT IMAGING | Facility: HOSPITAL | Age: 69
DRG: 023 | End: 2019-04-27
Payer: MEDICARE

## 2019-04-27 ENCOUNTER — HOSPITAL ENCOUNTER (OUTPATIENT)
Dept: RADIOLOGY | Facility: HOSPITAL | Age: 69
Discharge: HOME | DRG: 023 | End: 2019-04-27
Attending: NEUROLOGICAL SURGERY | Admitting: NEUROLOGICAL SURGERY
Payer: MEDICARE

## 2019-04-27 ENCOUNTER — APPOINTMENT (EMERGENCY)
Dept: RADIOLOGY | Facility: HOSPITAL | Age: 69
DRG: 023 | End: 2019-04-27
Payer: MEDICARE

## 2019-04-27 ENCOUNTER — HOSPITAL ENCOUNTER (EMERGENCY)
Facility: HOSPITAL | Age: 69
DRG: 023 | End: 2019-04-27
Attending: EMERGENCY MEDICINE | Admitting: EMERGENCY MEDICINE
Payer: MEDICARE

## 2019-04-27 ENCOUNTER — ANESTHESIA (INPATIENT)
Dept: RADIOLOGY | Facility: HOSPITAL | Age: 69
DRG: 023 | End: 2019-04-27
Payer: MEDICARE

## 2019-04-27 ENCOUNTER — HOSPITAL ENCOUNTER (INPATIENT)
Facility: HOSPITAL | Age: 69
LOS: 9 days | DRG: 023 | End: 2019-05-06
Attending: NEUROLOGICAL SURGERY | Admitting: INTERNAL MEDICINE
Payer: MEDICARE

## 2019-04-27 ENCOUNTER — ANESTHESIA EVENT (INPATIENT)
Dept: RADIOLOGY | Facility: HOSPITAL | Age: 69
DRG: 023 | End: 2019-04-27
Payer: MEDICARE

## 2019-04-27 VITALS
SYSTOLIC BLOOD PRESSURE: 149 MMHG | BODY MASS INDEX: 33.31 KG/M2 | DIASTOLIC BLOOD PRESSURE: 72 MMHG | HEART RATE: 57 BPM | RESPIRATION RATE: 16 BRPM | WEIGHT: 245.59 LBS | OXYGEN SATURATION: 96 %

## 2019-04-27 VITALS
HEART RATE: 60 BPM | RESPIRATION RATE: 16 BRPM | SYSTOLIC BLOOD PRESSURE: 142 MMHG | OXYGEN SATURATION: 100 % | DIASTOLIC BLOOD PRESSURE: 78 MMHG

## 2019-04-27 DIAGNOSIS — I63.511 ACUTE ISCHEMIC RIGHT MCA STROKE (HCC): ICD-10-CM

## 2019-04-27 DIAGNOSIS — R29.898 BILATERAL ARM WEAKNESS: Primary | ICD-10-CM

## 2019-04-27 DIAGNOSIS — I63.519 CEREBROVASCULAR ACCIDENT (CVA) DUE TO OCCLUSION OF MIDDLE CEREBRAL ARTERY, UNSPECIFIED BLOOD VESSEL LATERALITY (HCC): ICD-10-CM

## 2019-04-27 DIAGNOSIS — E78.2 MIXED HYPERLIPIDEMIA: ICD-10-CM

## 2019-04-27 DIAGNOSIS — I63.9 CVA (CEREBRAL VASCULAR ACCIDENT) (HCC): ICD-10-CM

## 2019-04-27 DIAGNOSIS — I67.9 CEREBRAL VASCULAR DISEASE: ICD-10-CM

## 2019-04-27 DIAGNOSIS — I10 ESSENTIAL HYPERTENSION: ICD-10-CM

## 2019-04-27 DIAGNOSIS — R53.1 LEFT-SIDED WEAKNESS: Primary | ICD-10-CM

## 2019-04-27 LAB
ANION GAP BLD CALC-SCNC: 18 MMOL/L (ref 4–13)
ANION GAP SERPL CALCULATED.3IONS-SCNC: 11 MMOL/L (ref 4–13)
APTT PPP: 30 SECONDS (ref 26–38)
BUN BLD-MCNC: 25 MG/DL (ref 5–25)
BUN SERPL-MCNC: 24 MG/DL (ref 5–25)
CA-I BLD-SCNC: 1.16 MMOL/L (ref 1.12–1.32)
CALCIUM SERPL-MCNC: 9.6 MG/DL (ref 8.3–10.1)
CHLORIDE BLD-SCNC: 100 MMOL/L (ref 100–108)
CHLORIDE SERPL-SCNC: 102 MMOL/L (ref 100–108)
CO2 SERPL-SCNC: 29 MMOL/L (ref 21–32)
CREAT BLD-MCNC: 1.1 MG/DL (ref 0.6–1.3)
CREAT SERPL-MCNC: 1.25 MG/DL (ref 0.6–1.3)
ERYTHROCYTE [DISTWIDTH] IN BLOOD BY AUTOMATED COUNT: 12.9 % (ref 11.6–15.1)
GFR SERPL CREATININE-BSD FRML MDRD: 59 ML/MIN/1.73SQ M
GFR SERPL CREATININE-BSD FRML MDRD: 69 ML/MIN/1.73SQ M
GLUCOSE SERPL-MCNC: 104 MG/DL (ref 65–140)
GLUCOSE SERPL-MCNC: 118 MG/DL (ref 65–140)
GLUCOSE SERPL-MCNC: 124 MG/DL (ref 65–140)
GLUCOSE SERPL-MCNC: 133 MG/DL (ref 65–140)
GLUCOSE SERPL-MCNC: 137 MG/DL (ref 65–140)
HCT VFR BLD AUTO: 47.8 % (ref 36.5–49.3)
HCT VFR BLD CALC: 47 % (ref 36.5–49.3)
HGB BLD-MCNC: 15.9 G/DL (ref 12–17)
HGB BLDA-MCNC: 16 G/DL (ref 12–17)
INR PPP: 0.92 (ref 0.86–1.17)
MCH RBC QN AUTO: 30.8 PG (ref 26.8–34.3)
MCHC RBC AUTO-ENTMCNC: 33.3 G/DL (ref 31.4–37.4)
MCV RBC AUTO: 93 FL (ref 82–98)
PCO2 BLD: 28 MMOL/L (ref 21–32)
PLATELET # BLD AUTO: 217 THOUSANDS/UL (ref 149–390)
PMV BLD AUTO: 8.9 FL (ref 8.9–12.7)
POTASSIUM BLD-SCNC: 3.5 MMOL/L (ref 3.5–5.3)
POTASSIUM SERPL-SCNC: 3.6 MMOL/L (ref 3.5–5.3)
PROTHROMBIN TIME: 12.5 SECONDS (ref 11.8–14.2)
RBC # BLD AUTO: 5.16 MILLION/UL (ref 3.88–5.62)
SODIUM BLD-SCNC: 141 MMOL/L (ref 136–145)
SODIUM SERPL-SCNC: 142 MMOL/L (ref 136–145)
SPECIMEN SOURCE: ABNORMAL
WBC # BLD AUTO: 6.52 THOUSAND/UL (ref 4.31–10.16)

## 2019-04-27 PROCEDURE — 93005 ELECTROCARDIOGRAM TRACING: CPT

## 2019-04-27 PROCEDURE — 80047 BASIC METABLC PNL IONIZED CA: CPT

## 2019-04-27 PROCEDURE — C1757 CATH, THROMBECTOMY/EMBOLECT: HCPCS

## 2019-04-27 PROCEDURE — 99291 CRITICAL CARE FIRST HOUR: CPT | Performed by: PSYCHIATRY & NEUROLOGY

## 2019-04-27 PROCEDURE — 03CG3ZZ EXTIRPATION OF MATTER FROM INTRACRANIAL ARTERY, PERCUTANEOUS APPROACH: ICD-10-PCS | Performed by: NEUROLOGICAL SURGERY

## 2019-04-27 PROCEDURE — 80048 BASIC METABOLIC PNL TOTAL CA: CPT | Performed by: EMERGENCY MEDICINE

## 2019-04-27 PROCEDURE — 82948 REAGENT STRIP/BLOOD GLUCOSE: CPT

## 2019-04-27 PROCEDURE — 36415 COLL VENOUS BLD VENIPUNCTURE: CPT | Performed by: EMERGENCY MEDICINE

## 2019-04-27 PROCEDURE — 70496 CT ANGIOGRAPHY HEAD: CPT

## 2019-04-27 PROCEDURE — C1894 INTRO/SHEATH, NON-LASER: HCPCS

## 2019-04-27 PROCEDURE — C1769 GUIDE WIRE: HCPCS

## 2019-04-27 PROCEDURE — 70450 CT HEAD/BRAIN W/O DYE: CPT

## 2019-04-27 PROCEDURE — 85027 COMPLETE CBC AUTOMATED: CPT | Performed by: EMERGENCY MEDICINE

## 2019-04-27 PROCEDURE — 99291 CRITICAL CARE FIRST HOUR: CPT | Performed by: ANESTHESIOLOGY

## 2019-04-27 PROCEDURE — 3E05317 INTRODUCTION OF OTHER THROMBOLYTIC INTO PERIPHERAL ARTERY, PERCUTANEOUS APPROACH: ICD-10-PCS | Performed by: NEUROLOGICAL SURGERY

## 2019-04-27 PROCEDURE — 70498 CT ANGIOGRAPHY NECK: CPT

## 2019-04-27 PROCEDURE — B31RYZZ FLUOROSCOPY OF INTRACRANIAL ARTERIES USING OTHER CONTRAST: ICD-10-PCS | Performed by: NEUROLOGICAL SURGERY

## 2019-04-27 PROCEDURE — B41FYZZ FLUOROSCOPY OF RIGHT LOWER EXTREMITY ARTERIES USING OTHER CONTRAST: ICD-10-PCS | Performed by: NEUROLOGICAL SURGERY

## 2019-04-27 PROCEDURE — 61645 PERQ ART M-THROMBECT &/NFS: CPT | Performed by: NEUROLOGICAL SURGERY

## 2019-04-27 PROCEDURE — 71045 X-RAY EXAM CHEST 1 VIEW: CPT

## 2019-04-27 PROCEDURE — NC001 PR NO CHARGE: Performed by: NEUROLOGICAL SURGERY

## 2019-04-27 PROCEDURE — 3E03317 INTRODUCTION OF OTHER THROMBOLYTIC INTO PERIPHERAL VEIN, PERCUTANEOUS APPROACH: ICD-10-PCS | Performed by: EMERGENCY MEDICINE

## 2019-04-27 PROCEDURE — 61645 PERQ ART M-THROMBECT &/NFS: CPT

## 2019-04-27 PROCEDURE — C1760 CLOSURE DEV, VASC: HCPCS

## 2019-04-27 PROCEDURE — C1887 CATHETER, GUIDING: HCPCS

## 2019-04-27 PROCEDURE — B318YZZ FLUOROSCOPY OF BILATERAL INTERNAL CAROTID ARTERIES USING OTHER CONTRAST: ICD-10-PCS | Performed by: NEUROLOGICAL SURGERY

## 2019-04-27 PROCEDURE — 99221 1ST HOSP IP/OBS SF/LOW 40: CPT | Performed by: NEUROLOGICAL SURGERY

## 2019-04-27 PROCEDURE — B313YZZ FLUOROSCOPY OF RIGHT COMMON CAROTID ARTERY USING OTHER CONTRAST: ICD-10-PCS | Performed by: NEUROLOGICAL SURGERY

## 2019-04-27 PROCEDURE — 99291 CRITICAL CARE FIRST HOUR: CPT

## 2019-04-27 PROCEDURE — 85730 THROMBOPLASTIN TIME PARTIAL: CPT | Performed by: EMERGENCY MEDICINE

## 2019-04-27 PROCEDURE — 96374 THER/PROPH/DIAG INJ IV PUSH: CPT

## 2019-04-27 PROCEDURE — 85014 HEMATOCRIT: CPT

## 2019-04-27 PROCEDURE — 85610 PROTHROMBIN TIME: CPT | Performed by: EMERGENCY MEDICINE

## 2019-04-27 PROCEDURE — 03CG3Z7 EXTIRPATION OF MATTER FROM INTRACRANIAL ARTERY USING STENT RETRIEVER, PERCUTANEOUS APPROACH: ICD-10-PCS | Performed by: NEUROLOGICAL SURGERY

## 2019-04-27 PROCEDURE — 99291 CRITICAL CARE FIRST HOUR: CPT | Performed by: EMERGENCY MEDICINE

## 2019-04-27 RX ORDER — SODIUM CHLORIDE 9 MG/ML
50 INJECTION, SOLUTION INTRAVENOUS ONCE
Status: DISCONTINUED | OUTPATIENT
Start: 2019-04-27 | End: 2019-04-27 | Stop reason: HOSPADM

## 2019-04-27 RX ORDER — SODIUM CHLORIDE 9 MG/ML
125 INJECTION, SOLUTION INTRAVENOUS CONTINUOUS
Status: DISCONTINUED | OUTPATIENT
Start: 2019-04-27 | End: 2019-04-28

## 2019-04-27 RX ORDER — SENNOSIDES 8.6 MG
1 TABLET ORAL
Status: DISCONTINUED | OUTPATIENT
Start: 2019-04-28 | End: 2019-05-06 | Stop reason: HOSPADM

## 2019-04-27 RX ORDER — ATORVASTATIN CALCIUM 40 MG/1
40 TABLET, FILM COATED ORAL EVERY EVENING
Status: DISCONTINUED | OUTPATIENT
Start: 2019-04-27 | End: 2019-04-27

## 2019-04-27 RX ORDER — ALBUTEROL SULFATE 2.5 MG/3ML
2.5 SOLUTION RESPIRATORY (INHALATION) EVERY 4 HOURS PRN
Status: DISCONTINUED | OUTPATIENT
Start: 2019-04-27 | End: 2019-05-04

## 2019-04-27 RX ORDER — SODIUM CHLORIDE 9 MG/ML
INJECTION, SOLUTION INTRAVENOUS CONTINUOUS PRN
Status: DISCONTINUED | OUTPATIENT
Start: 2019-04-27 | End: 2019-04-27 | Stop reason: SURG

## 2019-04-27 RX ORDER — ONDANSETRON 2 MG/ML
4 INJECTION INTRAMUSCULAR; INTRAVENOUS EVERY 6 HOURS PRN
Status: DISCONTINUED | OUTPATIENT
Start: 2019-04-27 | End: 2019-05-06 | Stop reason: HOSPADM

## 2019-04-27 RX ORDER — SODIUM CHLORIDE 9 MG/ML
125 INJECTION, SOLUTION INTRAVENOUS CONTINUOUS
Status: CANCELLED | OUTPATIENT
Start: 2019-04-27

## 2019-04-27 RX ORDER — SENNOSIDES 8.6 MG
1 TABLET ORAL DAILY
Status: DISCONTINUED | OUTPATIENT
Start: 2019-04-27 | End: 2019-04-27

## 2019-04-27 RX ORDER — ACETAMINOPHEN 325 MG/1
650 TABLET ORAL EVERY 6 HOURS PRN
Status: DISCONTINUED | OUTPATIENT
Start: 2019-04-27 | End: 2019-05-06 | Stop reason: HOSPADM

## 2019-04-27 RX ORDER — ATORVASTATIN CALCIUM 40 MG/1
40 TABLET, FILM COATED ORAL EVERY EVENING
Status: DISCONTINUED | OUTPATIENT
Start: 2019-04-28 | End: 2019-05-06 | Stop reason: HOSPADM

## 2019-04-27 RX ORDER — SODIUM CHLORIDE 9 MG/ML
75 INJECTION, SOLUTION INTRAVENOUS CONTINUOUS
Status: DISCONTINUED | OUTPATIENT
Start: 2019-04-27 | End: 2019-04-28 | Stop reason: SDUPTHER

## 2019-04-27 RX ORDER — LORAZEPAM 2 MG/ML
2 INJECTION INTRAMUSCULAR ONCE
Status: COMPLETED | OUTPATIENT
Start: 2019-04-27 | End: 2019-04-27

## 2019-04-27 RX ORDER — LABETALOL 20 MG/4 ML (5 MG/ML) INTRAVENOUS SYRINGE
10 EVERY 4 HOURS PRN
Status: DISCONTINUED | OUTPATIENT
Start: 2019-04-27 | End: 2019-05-06 | Stop reason: HOSPADM

## 2019-04-27 RX ADMIN — IODIXANOL 50 ML: 320 INJECTION, SOLUTION INTRAVASCULAR at 15:25

## 2019-04-27 RX ADMIN — ALTEPLASE 3 MG: 2.2 INJECTION, POWDER, LYOPHILIZED, FOR SOLUTION INTRAVENOUS at 14:31

## 2019-04-27 RX ADMIN — PHENYLEPHRINE HYDROCHLORIDE 50 MCG: 10 INJECTION INTRAVENOUS at 13:48

## 2019-04-27 RX ADMIN — ALTEPLASE 1 MG: 2.2 INJECTION, POWDER, LYOPHILIZED, FOR SOLUTION INTRAVENOUS at 14:41

## 2019-04-27 RX ADMIN — ALTEPLASE 3 MG: 2.2 INJECTION, POWDER, LYOPHILIZED, FOR SOLUTION INTRAVENOUS at 14:28

## 2019-04-27 RX ADMIN — PHENYLEPHRINE HYDROCHLORIDE 50 MCG: 10 INJECTION INTRAVENOUS at 14:21

## 2019-04-27 RX ADMIN — SODIUM CHLORIDE 2 MG/HR: 0.9 INJECTION, SOLUTION INTRAVENOUS at 14:46

## 2019-04-27 RX ADMIN — LORAZEPAM 2 MG: 2 INJECTION INTRAMUSCULAR; INTRAVENOUS at 12:36

## 2019-04-27 RX ADMIN — ALTEPLASE 81 MG: KIT at 13:05

## 2019-04-27 RX ADMIN — SODIUM CHLORIDE: 0.9 INJECTION, SOLUTION INTRAVENOUS at 13:39

## 2019-04-27 RX ADMIN — SODIUM CHLORIDE 100 ML/HR: 0.9 INJECTION, SOLUTION INTRAVENOUS at 17:20

## 2019-04-28 ENCOUNTER — APPOINTMENT (INPATIENT)
Dept: RADIOLOGY | Facility: HOSPITAL | Age: 69
DRG: 023 | End: 2019-04-28
Payer: MEDICARE

## 2019-04-28 ENCOUNTER — APPOINTMENT (INPATIENT)
Dept: NON INVASIVE DIAGNOSTICS | Facility: HOSPITAL | Age: 69
DRG: 023 | End: 2019-04-28
Payer: MEDICARE

## 2019-04-28 PROBLEM — G93.40 ENCEPHALOPATHY ACUTE: Status: ACTIVE | Noted: 2019-04-28

## 2019-04-28 LAB
ANION GAP SERPL CALCULATED.3IONS-SCNC: 5 MMOL/L (ref 4–13)
ANION GAP SERPL CALCULATED.3IONS-SCNC: 6 MMOL/L (ref 4–13)
BUN SERPL-MCNC: 14 MG/DL (ref 5–25)
BUN SERPL-MCNC: 18 MG/DL (ref 5–25)
CALCIUM SERPL-MCNC: 8.2 MG/DL (ref 8.3–10.1)
CALCIUM SERPL-MCNC: 8.3 MG/DL (ref 8.3–10.1)
CHLORIDE SERPL-SCNC: 105 MMOL/L (ref 100–108)
CHLORIDE SERPL-SCNC: 106 MMOL/L (ref 100–108)
CHOLEST SERPL-MCNC: 174 MG/DL (ref 50–200)
CO2 SERPL-SCNC: 28 MMOL/L (ref 21–32)
CO2 SERPL-SCNC: 28 MMOL/L (ref 21–32)
CREAT SERPL-MCNC: 0.91 MG/DL (ref 0.6–1.3)
CREAT SERPL-MCNC: 0.92 MG/DL (ref 0.6–1.3)
ERYTHROCYTE [DISTWIDTH] IN BLOOD BY AUTOMATED COUNT: 13.1 % (ref 11.6–15.1)
EST. AVERAGE GLUCOSE BLD GHB EST-MCNC: 128 MG/DL
GFR SERPL CREATININE-BSD FRML MDRD: 85 ML/MIN/1.73SQ M
GFR SERPL CREATININE-BSD FRML MDRD: 86 ML/MIN/1.73SQ M
GLUCOSE SERPL-MCNC: 133 MG/DL (ref 65–140)
GLUCOSE SERPL-MCNC: 138 MG/DL (ref 65–140)
GLUCOSE SERPL-MCNC: 138 MG/DL (ref 65–140)
GLUCOSE SERPL-MCNC: 146 MG/DL (ref 65–140)
HBA1C MFR BLD: 6.1 % (ref 4.2–6.3)
HCT VFR BLD AUTO: 42.5 % (ref 36.5–49.3)
HDLC SERPL-MCNC: 58 MG/DL (ref 40–60)
HGB BLD-MCNC: 14.1 G/DL (ref 12–17)
LDLC SERPL CALC-MCNC: 87 MG/DL (ref 0–100)
MAGNESIUM SERPL-MCNC: 1.7 MG/DL (ref 1.6–2.6)
MCH RBC QN AUTO: 30.5 PG (ref 26.8–34.3)
MCHC RBC AUTO-ENTMCNC: 33.2 G/DL (ref 31.4–37.4)
MCV RBC AUTO: 92 FL (ref 82–98)
OSMOLALITY UR/SERPL-RTO: 296 MMOL/KG (ref 282–298)
PLATELET # BLD AUTO: 190 THOUSANDS/UL (ref 149–390)
PMV BLD AUTO: 9.3 FL (ref 8.9–12.7)
POTASSIUM SERPL-SCNC: 3.4 MMOL/L (ref 3.5–5.3)
POTASSIUM SERPL-SCNC: 3.7 MMOL/L (ref 3.5–5.3)
RBC # BLD AUTO: 4.62 MILLION/UL (ref 3.88–5.62)
SODIUM SERPL-SCNC: 138 MMOL/L (ref 136–145)
SODIUM SERPL-SCNC: 140 MMOL/L (ref 136–145)
TRIGL SERPL-MCNC: 145 MG/DL
WBC # BLD AUTO: 11.24 THOUSAND/UL (ref 4.31–10.16)

## 2019-04-28 PROCEDURE — 83930 ASSAY OF BLOOD OSMOLALITY: CPT | Performed by: INTERNAL MEDICINE

## 2019-04-28 PROCEDURE — 82948 REAGENT STRIP/BLOOD GLUCOSE: CPT

## 2019-04-28 PROCEDURE — 80061 LIPID PANEL: CPT | Performed by: PHYSICIAN ASSISTANT

## 2019-04-28 PROCEDURE — 94760 N-INVAS EAR/PLS OXIMETRY 1: CPT

## 2019-04-28 PROCEDURE — 92610 EVALUATE SWALLOWING FUNCTION: CPT

## 2019-04-28 PROCEDURE — G8997 SWALLOW GOAL STATUS: HCPCS

## 2019-04-28 PROCEDURE — 80048 BASIC METABOLIC PNL TOTAL CA: CPT | Performed by: INTERNAL MEDICINE

## 2019-04-28 PROCEDURE — 93308 TTE F-UP OR LMTD: CPT | Performed by: INTERNAL MEDICINE

## 2019-04-28 PROCEDURE — 80048 BASIC METABOLIC PNL TOTAL CA: CPT | Performed by: PHYSICIAN ASSISTANT

## 2019-04-28 PROCEDURE — 99233 SBSQ HOSP IP/OBS HIGH 50: CPT | Performed by: EMERGENCY MEDICINE

## 2019-04-28 PROCEDURE — 70551 MRI BRAIN STEM W/O DYE: CPT

## 2019-04-28 PROCEDURE — 71045 X-RAY EXAM CHEST 1 VIEW: CPT

## 2019-04-28 PROCEDURE — 02HV33Z INSERTION OF INFUSION DEVICE INTO SUPERIOR VENA CAVA, PERCUTANEOUS APPROACH: ICD-10-PCS | Performed by: ANESTHESIOLOGY

## 2019-04-28 PROCEDURE — 92523 SPEECH SOUND LANG COMPREHEN: CPT

## 2019-04-28 PROCEDURE — 85027 COMPLETE CBC AUTOMATED: CPT | Performed by: PHYSICIAN ASSISTANT

## 2019-04-28 PROCEDURE — 99233 SBSQ HOSP IP/OBS HIGH 50: CPT | Performed by: PSYCHIATRY & NEUROLOGY

## 2019-04-28 PROCEDURE — G8996 SWALLOW CURRENT STATUS: HCPCS

## 2019-04-28 PROCEDURE — NC001 PR NO CHARGE: Performed by: EMERGENCY MEDICINE

## 2019-04-28 PROCEDURE — 83735 ASSAY OF MAGNESIUM: CPT | Performed by: PHYSICIAN ASSISTANT

## 2019-04-28 PROCEDURE — 36556 INSERT NON-TUNNEL CV CATH: CPT | Performed by: ANESTHESIOLOGY

## 2019-04-28 PROCEDURE — 93308 TTE F-UP OR LMTD: CPT

## 2019-04-28 PROCEDURE — 83036 HEMOGLOBIN GLYCOSYLATED A1C: CPT | Performed by: PHYSICIAN ASSISTANT

## 2019-04-28 PROCEDURE — 93321 DOPPLER ECHO F-UP/LMTD STD: CPT | Performed by: INTERNAL MEDICINE

## 2019-04-28 PROCEDURE — 93325 DOPPLER ECHO COLOR FLOW MAPG: CPT | Performed by: INTERNAL MEDICINE

## 2019-04-28 RX ORDER — POTASSIUM CHLORIDE 14.9 MG/ML
20 INJECTION INTRAVENOUS
Status: COMPLETED | OUTPATIENT
Start: 2019-04-28 | End: 2019-04-28

## 2019-04-28 RX ORDER — 3% SODIUM CHLORIDE 3 G/100ML
25 INJECTION, SOLUTION INTRAVENOUS CONTINUOUS
Status: DISCONTINUED | OUTPATIENT
Start: 2019-04-28 | End: 2019-05-02

## 2019-04-28 RX ORDER — POTASSIUM CHLORIDE 14.9 MG/ML
20 INJECTION INTRAVENOUS
Status: DISCONTINUED | OUTPATIENT
Start: 2019-04-28 | End: 2019-04-28

## 2019-04-28 RX ADMIN — LABETALOL 20 MG/4 ML (5 MG/ML) INTRAVENOUS SYRINGE 10 MG: at 14:20

## 2019-04-28 RX ADMIN — SODIUM CHLORIDE 30 ML/HR: 3 INJECTION, SOLUTION INTRAVENOUS at 17:10

## 2019-04-28 RX ADMIN — POTASSIUM CHLORIDE 20 MEQ: 200 INJECTION, SOLUTION INTRAVENOUS at 08:58

## 2019-04-28 RX ADMIN — POTASSIUM CHLORIDE 20 MEQ: 200 INJECTION, SOLUTION INTRAVENOUS at 10:11

## 2019-04-28 RX ADMIN — LABETALOL 20 MG/4 ML (5 MG/ML) INTRAVENOUS SYRINGE 10 MG: at 20:56

## 2019-04-29 ENCOUNTER — APPOINTMENT (INPATIENT)
Dept: RADIOLOGY | Facility: HOSPITAL | Age: 69
DRG: 023 | End: 2019-04-29
Payer: MEDICARE

## 2019-04-29 ENCOUNTER — APPOINTMENT (INPATIENT)
Dept: NON INVASIVE DIAGNOSTICS | Facility: HOSPITAL | Age: 69
DRG: 023 | End: 2019-04-29
Payer: MEDICARE

## 2019-04-29 LAB
ANION GAP SERPL CALCULATED.3IONS-SCNC: 5 MMOL/L (ref 4–13)
ANION GAP SERPL CALCULATED.3IONS-SCNC: 5 MMOL/L (ref 4–13)
ANION GAP SERPL CALCULATED.3IONS-SCNC: 7 MMOL/L (ref 4–13)
ATRIAL RATE: 58 BPM
ATRIAL RATE: 62 BPM
BASOPHILS # BLD AUTO: 0.02 THOUSANDS/ΜL (ref 0–0.1)
BASOPHILS NFR BLD AUTO: 0 % (ref 0–1)
BUN SERPL-MCNC: 14 MG/DL (ref 5–25)
BUN SERPL-MCNC: 14 MG/DL (ref 5–25)
BUN SERPL-MCNC: 15 MG/DL (ref 5–25)
CALCIUM SERPL-MCNC: 8.3 MG/DL (ref 8.3–10.1)
CALCIUM SERPL-MCNC: 8.4 MG/DL (ref 8.3–10.1)
CALCIUM SERPL-MCNC: 8.5 MG/DL (ref 8.3–10.1)
CHLORIDE SERPL-SCNC: 109 MMOL/L (ref 100–108)
CHLORIDE SERPL-SCNC: 110 MMOL/L (ref 100–108)
CHLORIDE SERPL-SCNC: 112 MMOL/L (ref 100–108)
CO2 SERPL-SCNC: 26 MMOL/L (ref 21–32)
CO2 SERPL-SCNC: 27 MMOL/L (ref 21–32)
CO2 SERPL-SCNC: 28 MMOL/L (ref 21–32)
CREAT SERPL-MCNC: 0.8 MG/DL (ref 0.6–1.3)
CREAT SERPL-MCNC: 0.83 MG/DL (ref 0.6–1.3)
CREAT SERPL-MCNC: 0.85 MG/DL (ref 0.6–1.3)
EOSINOPHIL # BLD AUTO: 0.04 THOUSAND/ΜL (ref 0–0.61)
EOSINOPHIL NFR BLD AUTO: 1 % (ref 0–6)
ERYTHROCYTE [DISTWIDTH] IN BLOOD BY AUTOMATED COUNT: 13 % (ref 11.6–15.1)
GFR SERPL CREATININE-BSD FRML MDRD: 90 ML/MIN/1.73SQ M
GFR SERPL CREATININE-BSD FRML MDRD: 90 ML/MIN/1.73SQ M
GFR SERPL CREATININE-BSD FRML MDRD: 92 ML/MIN/1.73SQ M
GLUCOSE SERPL-MCNC: 124 MG/DL (ref 65–140)
GLUCOSE SERPL-MCNC: 128 MG/DL (ref 65–140)
GLUCOSE SERPL-MCNC: 131 MG/DL (ref 65–140)
GLUCOSE SERPL-MCNC: 132 MG/DL (ref 65–140)
GLUCOSE SERPL-MCNC: 133 MG/DL (ref 65–140)
GLUCOSE SERPL-MCNC: 136 MG/DL (ref 65–140)
GLUCOSE SERPL-MCNC: 137 MG/DL (ref 65–140)
GLUCOSE SERPL-MCNC: 139 MG/DL (ref 65–140)
HCT VFR BLD AUTO: 40.8 % (ref 36.5–49.3)
HGB BLD-MCNC: 13.5 G/DL (ref 12–17)
IMM GRANULOCYTES # BLD AUTO: 0.03 THOUSAND/UL (ref 0–0.2)
IMM GRANULOCYTES NFR BLD AUTO: 0 % (ref 0–2)
LYMPHOCYTES # BLD AUTO: 1.14 THOUSANDS/ΜL (ref 0.6–4.47)
LYMPHOCYTES NFR BLD AUTO: 15 % (ref 14–44)
MCH RBC QN AUTO: 31 PG (ref 26.8–34.3)
MCHC RBC AUTO-ENTMCNC: 33.1 G/DL (ref 31.4–37.4)
MCV RBC AUTO: 94 FL (ref 82–98)
MONOCYTES # BLD AUTO: 0.62 THOUSAND/ΜL (ref 0.17–1.22)
MONOCYTES NFR BLD AUTO: 8 % (ref 4–12)
NEUTROPHILS # BLD AUTO: 5.97 THOUSANDS/ΜL (ref 1.85–7.62)
NEUTS SEG NFR BLD AUTO: 76 % (ref 43–75)
NRBC BLD AUTO-RTO: 0 /100 WBCS
OSMOLALITY UR/SERPL-RTO: 300 MMOL/KG (ref 282–298)
OSMOLALITY UR/SERPL-RTO: 302 MMOL/KG (ref 282–298)
P AXIS: 65 DEGREES
PLATELET # BLD AUTO: 159 THOUSANDS/UL (ref 149–390)
PMV BLD AUTO: 9.2 FL (ref 8.9–12.7)
POTASSIUM SERPL-SCNC: 3.7 MMOL/L (ref 3.5–5.3)
POTASSIUM SERPL-SCNC: 3.8 MMOL/L (ref 3.5–5.3)
POTASSIUM SERPL-SCNC: 3.9 MMOL/L (ref 3.5–5.3)
PR INTERVAL: 154 MS
PR INTERVAL: 160 MS
QRS AXIS: 17 DEGREES
QRS AXIS: 3 DEGREES
QRSD INTERVAL: 104 MS
QRSD INTERVAL: 84 MS
QT INTERVAL: 422 MS
QT INTERVAL: 458 MS
QTC INTERVAL: 414 MS
QTC INTERVAL: 466 MS
RBC # BLD AUTO: 4.36 MILLION/UL (ref 3.88–5.62)
SODIUM SERPL-SCNC: 141 MMOL/L (ref 136–145)
SODIUM SERPL-SCNC: 143 MMOL/L (ref 136–145)
SODIUM SERPL-SCNC: 145 MMOL/L (ref 136–145)
T WAVE AXIS: -18 DEGREES
T WAVE AXIS: 91 DEGREES
VENTRICULAR RATE: 58 BPM
VENTRICULAR RATE: 62 BPM
WBC # BLD AUTO: 7.82 THOUSAND/UL (ref 4.31–10.16)

## 2019-04-29 PROCEDURE — 93970 EXTREMITY STUDY: CPT

## 2019-04-29 PROCEDURE — 99291 CRITICAL CARE FIRST HOUR: CPT | Performed by: ANESTHESIOLOGY

## 2019-04-29 PROCEDURE — 99223 1ST HOSP IP/OBS HIGH 75: CPT | Performed by: PHYSICAL MEDICINE & REHABILITATION

## 2019-04-29 PROCEDURE — G8979 MOBILITY GOAL STATUS: HCPCS

## 2019-04-29 PROCEDURE — 93970 EXTREMITY STUDY: CPT | Performed by: SURGERY

## 2019-04-29 PROCEDURE — 99232 SBSQ HOSP IP/OBS MODERATE 35: CPT | Performed by: PSYCHIATRY & NEUROLOGY

## 2019-04-29 PROCEDURE — 80048 BASIC METABOLIC PNL TOTAL CA: CPT | Performed by: EMERGENCY MEDICINE

## 2019-04-29 PROCEDURE — 97167 OT EVAL HIGH COMPLEX 60 MIN: CPT

## 2019-04-29 PROCEDURE — 83930 ASSAY OF BLOOD OSMOLALITY: CPT | Performed by: EMERGENCY MEDICINE

## 2019-04-29 PROCEDURE — 97163 PT EVAL HIGH COMPLEX 45 MIN: CPT

## 2019-04-29 PROCEDURE — G8987 SELF CARE CURRENT STATUS: HCPCS

## 2019-04-29 PROCEDURE — 71045 X-RAY EXAM CHEST 1 VIEW: CPT

## 2019-04-29 PROCEDURE — 82948 REAGENT STRIP/BLOOD GLUCOSE: CPT

## 2019-04-29 PROCEDURE — 93010 ELECTROCARDIOGRAM REPORT: CPT | Performed by: INTERNAL MEDICINE

## 2019-04-29 PROCEDURE — 92526 ORAL FUNCTION THERAPY: CPT

## 2019-04-29 PROCEDURE — G8978 MOBILITY CURRENT STATUS: HCPCS

## 2019-04-29 PROCEDURE — 74018 RADEX ABDOMEN 1 VIEW: CPT

## 2019-04-29 PROCEDURE — 85025 COMPLETE CBC W/AUTO DIFF WBC: CPT | Performed by: INTERNAL MEDICINE

## 2019-04-29 PROCEDURE — 99232 SBSQ HOSP IP/OBS MODERATE 35: CPT | Performed by: PHYSICIAN ASSISTANT

## 2019-04-29 PROCEDURE — G8988 SELF CARE GOAL STATUS: HCPCS

## 2019-04-29 RX ORDER — ASPIRIN 300 MG/1
300 SUPPOSITORY RECTAL DAILY
Status: DISCONTINUED | OUTPATIENT
Start: 2019-04-29 | End: 2019-04-29

## 2019-04-29 RX ORDER — ASPIRIN 81 MG/1
324 TABLET, CHEWABLE ORAL DAILY
Status: DISCONTINUED | OUTPATIENT
Start: 2019-04-29 | End: 2019-04-29

## 2019-04-29 RX ORDER — HYDRALAZINE HYDROCHLORIDE 20 MG/ML
10 INJECTION INTRAMUSCULAR; INTRAVENOUS EVERY 6 HOURS PRN
Status: DISCONTINUED | OUTPATIENT
Start: 2019-04-29 | End: 2019-04-30

## 2019-04-29 RX ORDER — ASPIRIN 81 MG/1
81 TABLET, CHEWABLE ORAL DAILY
Status: DISCONTINUED | OUTPATIENT
Start: 2019-04-30 | End: 2019-05-06 | Stop reason: HOSPADM

## 2019-04-29 RX ORDER — SODIUM CHLORIDE 3 G/100ML
75 INJECTION, SOLUTION INTRAVENOUS ONCE
Status: COMPLETED | OUTPATIENT
Start: 2019-04-29 | End: 2019-04-30

## 2019-04-29 RX ORDER — SODIUM CHLORIDE 3 G/100ML
250 INJECTION, SOLUTION INTRAVENOUS ONCE
Status: COMPLETED | OUTPATIENT
Start: 2019-04-29 | End: 2019-04-29

## 2019-04-29 RX ADMIN — LABETALOL 20 MG/4 ML (5 MG/ML) INTRAVENOUS SYRINGE 10 MG: at 21:05

## 2019-04-29 RX ADMIN — ATORVASTATIN CALCIUM 40 MG: 40 TABLET, FILM COATED ORAL at 18:07

## 2019-04-29 RX ADMIN — ASPIRIN 300 MG: 300 SUPPOSITORY RECTAL at 12:51

## 2019-04-29 RX ADMIN — SODIUM CHLORIDE 75 ML: 3 INJECTION, SOLUTION INTRAVENOUS at 22:08

## 2019-04-29 RX ADMIN — SODIUM CHLORIDE 250 ML: 3 INJECTION, SOLUTION INTRAVENOUS at 09:24

## 2019-04-29 RX ADMIN — SENNOSIDES 8.6 MG: 8.6 TABLET, FILM COATED ORAL at 21:01

## 2019-04-29 RX ADMIN — SODIUM CHLORIDE 40 ML/HR: 3 INJECTION, SOLUTION INTRAVENOUS at 05:44

## 2019-04-30 ENCOUNTER — APPOINTMENT (INPATIENT)
Dept: RADIOLOGY | Facility: HOSPITAL | Age: 69
DRG: 023 | End: 2019-04-30
Payer: MEDICARE

## 2019-04-30 ENCOUNTER — TELEPHONE (OUTPATIENT)
Dept: NEUROSURGERY | Facility: CLINIC | Age: 69
End: 2019-04-30

## 2019-04-30 PROBLEM — R29.898 BILATERAL ARM WEAKNESS: Status: RESOLVED | Noted: 2018-11-14 | Resolved: 2019-04-30

## 2019-04-30 PROBLEM — E87.6 HYPOKALEMIA: Status: RESOLVED | Noted: 2018-11-14 | Resolved: 2019-04-30

## 2019-04-30 PROBLEM — R05.9 COUGH: Status: RESOLVED | Noted: 2018-11-15 | Resolved: 2019-04-30

## 2019-04-30 LAB
ANION GAP SERPL CALCULATED.3IONS-SCNC: 4 MMOL/L (ref 4–13)
ANION GAP SERPL CALCULATED.3IONS-SCNC: 4 MMOL/L (ref 4–13)
ANION GAP SERPL CALCULATED.3IONS-SCNC: 5 MMOL/L (ref 4–13)
ANION GAP SERPL CALCULATED.3IONS-SCNC: 7 MMOL/L (ref 4–13)
BUN SERPL-MCNC: 15 MG/DL (ref 5–25)
BUN SERPL-MCNC: 16 MG/DL (ref 5–25)
BUN SERPL-MCNC: 16 MG/DL (ref 5–25)
BUN SERPL-MCNC: 18 MG/DL (ref 5–25)
CALCIUM SERPL-MCNC: 8.4 MG/DL (ref 8.3–10.1)
CALCIUM SERPL-MCNC: 8.5 MG/DL (ref 8.3–10.1)
CALCIUM SERPL-MCNC: 8.7 MG/DL (ref 8.3–10.1)
CALCIUM SERPL-MCNC: 8.7 MG/DL (ref 8.3–10.1)
CHLORIDE SERPL-SCNC: 112 MMOL/L (ref 100–108)
CHLORIDE SERPL-SCNC: 113 MMOL/L (ref 100–108)
CHLORIDE SERPL-SCNC: 113 MMOL/L (ref 100–108)
CHLORIDE SERPL-SCNC: 115 MMOL/L (ref 100–108)
CO2 SERPL-SCNC: 25 MMOL/L (ref 21–32)
CO2 SERPL-SCNC: 27 MMOL/L (ref 21–32)
CO2 SERPL-SCNC: 27 MMOL/L (ref 21–32)
CO2 SERPL-SCNC: 28 MMOL/L (ref 21–32)
CREAT SERPL-MCNC: 0.76 MG/DL (ref 0.6–1.3)
CREAT SERPL-MCNC: 0.83 MG/DL (ref 0.6–1.3)
CREAT SERPL-MCNC: 0.84 MG/DL (ref 0.6–1.3)
CREAT SERPL-MCNC: 0.88 MG/DL (ref 0.6–1.3)
GFR SERPL CREATININE-BSD FRML MDRD: 88 ML/MIN/1.73SQ M
GFR SERPL CREATININE-BSD FRML MDRD: 90 ML/MIN/1.73SQ M
GFR SERPL CREATININE-BSD FRML MDRD: 90 ML/MIN/1.73SQ M
GFR SERPL CREATININE-BSD FRML MDRD: 94 ML/MIN/1.73SQ M
GLUCOSE SERPL-MCNC: 138 MG/DL (ref 65–140)
GLUCOSE SERPL-MCNC: 141 MG/DL (ref 65–140)
GLUCOSE SERPL-MCNC: 158 MG/DL (ref 65–140)
GLUCOSE SERPL-MCNC: 158 MG/DL (ref 65–140)
GLUCOSE SERPL-MCNC: 163 MG/DL (ref 65–140)
GLUCOSE SERPL-MCNC: 175 MG/DL (ref 65–140)
GLUCOSE SERPL-MCNC: 185 MG/DL (ref 65–140)
GLUCOSE SERPL-MCNC: 186 MG/DL (ref 65–140)
OSMOLALITY UR/SERPL-RTO: 302 MMOL/KG (ref 282–298)
OSMOLALITY UR/SERPL-RTO: 304 MMOL/KG (ref 282–298)
OSMOLALITY UR/SERPL-RTO: 307 MMOL/KG (ref 282–298)
OSMOLALITY UR/SERPL-RTO: 309 MMOL/KG (ref 282–298)
POTASSIUM SERPL-SCNC: 3.5 MMOL/L (ref 3.5–5.3)
POTASSIUM SERPL-SCNC: 3.7 MMOL/L (ref 3.5–5.3)
POTASSIUM SERPL-SCNC: 3.8 MMOL/L (ref 3.5–5.3)
POTASSIUM SERPL-SCNC: 3.9 MMOL/L (ref 3.5–5.3)
SODIUM SERPL-SCNC: 143 MMOL/L (ref 136–145)
SODIUM SERPL-SCNC: 145 MMOL/L (ref 136–145)
SODIUM SERPL-SCNC: 145 MMOL/L (ref 136–145)
SODIUM SERPL-SCNC: 147 MMOL/L (ref 136–145)

## 2019-04-30 PROCEDURE — 93005 ELECTROCARDIOGRAM TRACING: CPT

## 2019-04-30 PROCEDURE — 83930 ASSAY OF BLOOD OSMOLALITY: CPT | Performed by: EMERGENCY MEDICINE

## 2019-04-30 PROCEDURE — 99291 CRITICAL CARE FIRST HOUR: CPT | Performed by: ANESTHESIOLOGY

## 2019-04-30 PROCEDURE — 97112 NEUROMUSCULAR REEDUCATION: CPT

## 2019-04-30 PROCEDURE — 99232 SBSQ HOSP IP/OBS MODERATE 35: CPT | Performed by: PSYCHIATRY & NEUROLOGY

## 2019-04-30 PROCEDURE — 74018 RADEX ABDOMEN 1 VIEW: CPT

## 2019-04-30 PROCEDURE — 97530 THERAPEUTIC ACTIVITIES: CPT

## 2019-04-30 PROCEDURE — 80048 BASIC METABOLIC PNL TOTAL CA: CPT | Performed by: EMERGENCY MEDICINE

## 2019-04-30 PROCEDURE — 82948 REAGENT STRIP/BLOOD GLUCOSE: CPT

## 2019-04-30 PROCEDURE — 97535 SELF CARE MNGMENT TRAINING: CPT

## 2019-04-30 PROCEDURE — 92526 ORAL FUNCTION THERAPY: CPT

## 2019-04-30 PROCEDURE — 92507 TX SP LANG VOICE COMM INDIV: CPT

## 2019-04-30 RX ORDER — ATENOLOL 50 MG/1
50 TABLET ORAL DAILY
Status: DISCONTINUED | OUTPATIENT
Start: 2019-04-30 | End: 2019-05-06 | Stop reason: HOSPADM

## 2019-04-30 RX ORDER — OLANZAPINE 10 MG/1
5 INJECTION, POWDER, LYOPHILIZED, FOR SOLUTION INTRAMUSCULAR ONCE
Status: COMPLETED | OUTPATIENT
Start: 2019-04-30 | End: 2019-04-30

## 2019-04-30 RX ADMIN — ATORVASTATIN CALCIUM 40 MG: 40 TABLET, FILM COATED ORAL at 18:07

## 2019-04-30 RX ADMIN — SENNOSIDES 8.6 MG: 8.6 TABLET, FILM COATED ORAL at 21:09

## 2019-04-30 RX ADMIN — ASPIRIN 81 MG 81 MG: 81 TABLET ORAL at 08:23

## 2019-04-30 RX ADMIN — ACETAMINOPHEN 650 MG: 325 TABLET, FILM COATED ORAL at 21:09

## 2019-04-30 RX ADMIN — SODIUM CHLORIDE 60 ML/HR: 3 INJECTION, SOLUTION INTRAVENOUS at 10:53

## 2019-04-30 RX ADMIN — INSULIN LISPRO 1 UNITS: 100 INJECTION, SOLUTION INTRAVENOUS; SUBCUTANEOUS at 06:32

## 2019-04-30 RX ADMIN — OLANZAPINE 5 MG: 10 INJECTION, POWDER, FOR SOLUTION INTRAMUSCULAR at 22:45

## 2019-04-30 RX ADMIN — ATENOLOL 50 MG: 50 TABLET ORAL at 18:18

## 2019-04-30 RX ADMIN — SODIUM CHLORIDE 60 ML/HR: 3 INJECTION, SOLUTION INTRAVENOUS at 19:25

## 2019-04-30 RX ADMIN — INSULIN LISPRO 1 UNITS: 100 INJECTION, SOLUTION INTRAVENOUS; SUBCUTANEOUS at 18:07

## 2019-04-30 RX ADMIN — SODIUM CHLORIDE 60 ML/HR: 3 INJECTION, SOLUTION INTRAVENOUS at 02:06

## 2019-04-30 RX ADMIN — LABETALOL 20 MG/4 ML (5 MG/ML) INTRAVENOUS SYRINGE 10 MG: at 16:19

## 2019-04-30 RX ADMIN — LABETALOL 20 MG/4 ML (5 MG/ML) INTRAVENOUS SYRINGE 10 MG: at 20:51

## 2019-04-30 RX ADMIN — INSULIN LISPRO 1 UNITS: 100 INJECTION, SOLUTION INTRAVENOUS; SUBCUTANEOUS at 11:56

## 2019-05-01 LAB
ANION GAP SERPL CALCULATED.3IONS-SCNC: 3 MMOL/L (ref 4–13)
ANION GAP SERPL CALCULATED.3IONS-SCNC: 3 MMOL/L (ref 4–13)
ANION GAP SERPL CALCULATED.3IONS-SCNC: 7 MMOL/L (ref 4–13)
ANION GAP SERPL CALCULATED.3IONS-SCNC: 7 MMOL/L (ref 4–13)
ATRIAL RATE: 66 BPM
BUN SERPL-MCNC: 15 MG/DL (ref 5–25)
BUN SERPL-MCNC: 17 MG/DL (ref 5–25)
BUN SERPL-MCNC: 17 MG/DL (ref 5–25)
BUN SERPL-MCNC: 19 MG/DL (ref 5–25)
CALCIUM SERPL-MCNC: 7.7 MG/DL (ref 8.3–10.1)
CALCIUM SERPL-MCNC: 8.7 MG/DL (ref 8.3–10.1)
CALCIUM SERPL-MCNC: 8.7 MG/DL (ref 8.3–10.1)
CALCIUM SERPL-MCNC: 8.9 MG/DL (ref 8.3–10.1)
CHLORIDE SERPL-SCNC: 116 MMOL/L (ref 100–108)
CHLORIDE SERPL-SCNC: 117 MMOL/L (ref 100–108)
CHLORIDE SERPL-SCNC: 117 MMOL/L (ref 100–108)
CHLORIDE SERPL-SCNC: 120 MMOL/L (ref 100–108)
CO2 SERPL-SCNC: 24 MMOL/L (ref 21–32)
CO2 SERPL-SCNC: 26 MMOL/L (ref 21–32)
CO2 SERPL-SCNC: 27 MMOL/L (ref 21–32)
CO2 SERPL-SCNC: 28 MMOL/L (ref 21–32)
CREAT SERPL-MCNC: 0.61 MG/DL (ref 0.6–1.3)
CREAT SERPL-MCNC: 0.8 MG/DL (ref 0.6–1.3)
CREAT SERPL-MCNC: 0.81 MG/DL (ref 0.6–1.3)
CREAT SERPL-MCNC: 0.87 MG/DL (ref 0.6–1.3)
GFR SERPL CREATININE-BSD FRML MDRD: 103 ML/MIN/1.73SQ M
GFR SERPL CREATININE-BSD FRML MDRD: 89 ML/MIN/1.73SQ M
GFR SERPL CREATININE-BSD FRML MDRD: 91 ML/MIN/1.73SQ M
GFR SERPL CREATININE-BSD FRML MDRD: 92 ML/MIN/1.73SQ M
GLUCOSE SERPL-MCNC: 129 MG/DL (ref 65–140)
GLUCOSE SERPL-MCNC: 139 MG/DL (ref 65–140)
GLUCOSE SERPL-MCNC: 145 MG/DL (ref 65–140)
GLUCOSE SERPL-MCNC: 153 MG/DL (ref 65–140)
GLUCOSE SERPL-MCNC: 156 MG/DL (ref 65–140)
GLUCOSE SERPL-MCNC: 167 MG/DL (ref 65–140)
GLUCOSE SERPL-MCNC: 170 MG/DL (ref 65–140)
OSMOLALITY UR/SERPL-RTO: 309 MMOL/KG (ref 282–298)
OSMOLALITY UR/SERPL-RTO: 314 MMOL/KG (ref 282–298)
OSMOLALITY UR/SERPL-RTO: 315 MMOL/KG (ref 282–298)
OSMOLALITY UR/SERPL-RTO: 317 MMOL/KG (ref 282–298)
P AXIS: 69 DEGREES
POTASSIUM SERPL-SCNC: 3.2 MMOL/L (ref 3.5–5.3)
POTASSIUM SERPL-SCNC: 3.8 MMOL/L (ref 3.5–5.3)
POTASSIUM SERPL-SCNC: 3.9 MMOL/L (ref 3.5–5.3)
POTASSIUM SERPL-SCNC: 4 MMOL/L (ref 3.5–5.3)
PR INTERVAL: 138 MS
QRS AXIS: -24 DEGREES
QRSD INTERVAL: 96 MS
QT INTERVAL: 392 MS
QTC INTERVAL: 411 MS
SODIUM SERPL-SCNC: 147 MMOL/L (ref 136–145)
SODIUM SERPL-SCNC: 147 MMOL/L (ref 136–145)
SODIUM SERPL-SCNC: 150 MMOL/L (ref 136–145)
SODIUM SERPL-SCNC: 151 MMOL/L (ref 136–145)
T WAVE AXIS: 84 DEGREES
VENTRICULAR RATE: 66 BPM

## 2019-05-01 PROCEDURE — 80048 BASIC METABOLIC PNL TOTAL CA: CPT | Performed by: EMERGENCY MEDICINE

## 2019-05-01 PROCEDURE — 83930 ASSAY OF BLOOD OSMOLALITY: CPT | Performed by: EMERGENCY MEDICINE

## 2019-05-01 PROCEDURE — 94760 N-INVAS EAR/PLS OXIMETRY 1: CPT

## 2019-05-01 PROCEDURE — 99291 CRITICAL CARE FIRST HOUR: CPT | Performed by: EMERGENCY MEDICINE

## 2019-05-01 PROCEDURE — 99232 SBSQ HOSP IP/OBS MODERATE 35: CPT | Performed by: PSYCHIATRY & NEUROLOGY

## 2019-05-01 PROCEDURE — 93010 ELECTROCARDIOGRAM REPORT: CPT | Performed by: INTERNAL MEDICINE

## 2019-05-01 PROCEDURE — 92526 ORAL FUNCTION THERAPY: CPT

## 2019-05-01 PROCEDURE — 82948 REAGENT STRIP/BLOOD GLUCOSE: CPT

## 2019-05-01 RX ORDER — HEPARIN SODIUM 5000 [USP'U]/ML
5000 INJECTION, SOLUTION INTRAVENOUS; SUBCUTANEOUS EVERY 8 HOURS SCHEDULED
Status: DISCONTINUED | OUTPATIENT
Start: 2019-05-01 | End: 2019-05-06 | Stop reason: HOSPADM

## 2019-05-01 RX ORDER — POTASSIUM CHLORIDE 29.8 MG/ML
40 INJECTION INTRAVENOUS ONCE
Status: COMPLETED | OUTPATIENT
Start: 2019-05-01 | End: 2019-05-01

## 2019-05-01 RX ORDER — POTASSIUM CHLORIDE 20MEQ/15ML
40 LIQUID (ML) ORAL ONCE
Status: COMPLETED | OUTPATIENT
Start: 2019-05-01 | End: 2019-05-01

## 2019-05-01 RX ADMIN — HEPARIN SODIUM 5000 UNITS: 5000 INJECTION INTRAVENOUS; SUBCUTANEOUS at 21:01

## 2019-05-01 RX ADMIN — SODIUM CHLORIDE 60 ML/HR: 3 INJECTION, SOLUTION INTRAVENOUS at 03:49

## 2019-05-01 RX ADMIN — POTASSIUM CHLORIDE 40 MEQ: 20 SOLUTION ORAL at 00:52

## 2019-05-01 RX ADMIN — POTASSIUM CHLORIDE 40 MEQ: 400 INJECTION, SOLUTION INTRAVENOUS at 00:52

## 2019-05-01 RX ADMIN — SENNOSIDES 8.6 MG: 8.6 TABLET, FILM COATED ORAL at 21:01

## 2019-05-01 RX ADMIN — ASPIRIN 81 MG 81 MG: 81 TABLET ORAL at 08:30

## 2019-05-01 RX ADMIN — POTASSIUM & SODIUM PHOSPHATES POWDER PACK 280-160-250 MG 2 PACKET: 280-160-250 PACK at 15:12

## 2019-05-01 RX ADMIN — ATORVASTATIN CALCIUM 40 MG: 40 TABLET, FILM COATED ORAL at 18:18

## 2019-05-01 RX ADMIN — HEPARIN SODIUM 5000 UNITS: 5000 INJECTION INTRAVENOUS; SUBCUTANEOUS at 14:51

## 2019-05-01 RX ADMIN — ATENOLOL 50 MG: 50 TABLET ORAL at 08:30

## 2019-05-01 RX ADMIN — SODIUM CHLORIDE 50 ML/HR: 3 INJECTION, SOLUTION INTRAVENOUS at 12:49

## 2019-05-01 RX ADMIN — INSULIN LISPRO 1 UNITS: 100 INJECTION, SOLUTION INTRAVENOUS; SUBCUTANEOUS at 06:40

## 2019-05-02 ENCOUNTER — APPOINTMENT (INPATIENT)
Dept: NON INVASIVE DIAGNOSTICS | Facility: HOSPITAL | Age: 69
DRG: 023 | End: 2019-05-02
Payer: MEDICARE

## 2019-05-02 PROBLEM — R47.01 APHASIA DUE TO ACUTE STROKE (HCC): Status: ACTIVE | Noted: 2019-05-02

## 2019-05-02 PROBLEM — I63.9 APHASIA DUE TO ACUTE STROKE (HCC): Status: ACTIVE | Noted: 2019-05-02

## 2019-05-02 PROBLEM — G81.94 LEFT HEMIPLEGIA (HCC): Status: ACTIVE | Noted: 2019-05-02

## 2019-05-02 LAB
ANION GAP SERPL CALCULATED.3IONS-SCNC: 4 MMOL/L (ref 4–13)
ANION GAP SERPL CALCULATED.3IONS-SCNC: 5 MMOL/L (ref 4–13)
BUN SERPL-MCNC: 20 MG/DL (ref 5–25)
BUN SERPL-MCNC: 23 MG/DL (ref 5–25)
CALCIUM SERPL-MCNC: 8.7 MG/DL (ref 8.3–10.1)
CALCIUM SERPL-MCNC: 8.7 MG/DL (ref 8.3–10.1)
CHLORIDE SERPL-SCNC: 113 MMOL/L (ref 100–108)
CHLORIDE SERPL-SCNC: 117 MMOL/L (ref 100–108)
CO2 SERPL-SCNC: 29 MMOL/L (ref 21–32)
CO2 SERPL-SCNC: 29 MMOL/L (ref 21–32)
CREAT SERPL-MCNC: 0.75 MG/DL (ref 0.6–1.3)
CREAT SERPL-MCNC: 0.76 MG/DL (ref 0.6–1.3)
DEPRECATED AT III PPP: 111 % OF NORMAL (ref 92–136)
GFR SERPL CREATININE-BSD FRML MDRD: 94 ML/MIN/1.73SQ M
GFR SERPL CREATININE-BSD FRML MDRD: 94 ML/MIN/1.73SQ M
GLUCOSE SERPL-MCNC: 119 MG/DL (ref 65–140)
GLUCOSE SERPL-MCNC: 124 MG/DL (ref 65–140)
GLUCOSE SERPL-MCNC: 127 MG/DL (ref 65–140)
GLUCOSE SERPL-MCNC: 128 MG/DL (ref 65–140)
GLUCOSE SERPL-MCNC: 154 MG/DL (ref 65–140)
GLUCOSE SERPL-MCNC: 158 MG/DL (ref 65–140)
HCYS SERPL-SCNC: 6.1 UMOL/L (ref 5.3–14.2)
MAGNESIUM SERPL-MCNC: 1.9 MG/DL (ref 1.6–2.6)
OSMOLALITY UR/SERPL-RTO: 310 MMOL/KG (ref 282–298)
OSMOLALITY UR/SERPL-RTO: 312 MMOL/KG (ref 282–298)
PHOSPHATE SERPL-MCNC: 5 MG/DL (ref 2.3–4.1)
POTASSIUM SERPL-SCNC: 3.6 MMOL/L (ref 3.5–5.3)
POTASSIUM SERPL-SCNC: 3.7 MMOL/L (ref 3.5–5.3)
PROT C AG ACT/NOR PPP IA: 122 % OF NORMAL (ref 60–150)
SODIUM SERPL-SCNC: 147 MMOL/L (ref 136–145)
SODIUM SERPL-SCNC: 150 MMOL/L (ref 136–145)

## 2019-05-02 PROCEDURE — 80048 BASIC METABOLIC PNL TOTAL CA: CPT | Performed by: EMERGENCY MEDICINE

## 2019-05-02 PROCEDURE — 97535 SELF CARE MNGMENT TRAINING: CPT

## 2019-05-02 PROCEDURE — NC001 PR NO CHARGE: Performed by: EMERGENCY MEDICINE

## 2019-05-02 PROCEDURE — 92507 TX SP LANG VOICE COMM INDIV: CPT

## 2019-05-02 PROCEDURE — 85305 CLOT INHIBIT PROT S TOTAL: CPT | Performed by: PSYCHIATRY & NEUROLOGY

## 2019-05-02 PROCEDURE — 85732 THROMBOPLASTIN TIME PARTIAL: CPT | Performed by: PSYCHIATRY & NEUROLOGY

## 2019-05-02 PROCEDURE — 86225 DNA ANTIBODY NATIVE: CPT | Performed by: PSYCHIATRY & NEUROLOGY

## 2019-05-02 PROCEDURE — 83930 ASSAY OF BLOOD OSMOLALITY: CPT | Performed by: EMERGENCY MEDICINE

## 2019-05-02 PROCEDURE — 83735 ASSAY OF MAGNESIUM: CPT | Performed by: INTERNAL MEDICINE

## 2019-05-02 PROCEDURE — 83090 ASSAY OF HOMOCYSTEINE: CPT | Performed by: PSYCHIATRY & NEUROLOGY

## 2019-05-02 PROCEDURE — 93325 DOPPLER ECHO COLOR FLOW MAPG: CPT | Performed by: INTERNAL MEDICINE

## 2019-05-02 PROCEDURE — 81241 F5 GENE: CPT | Performed by: PSYCHIATRY & NEUROLOGY

## 2019-05-02 PROCEDURE — 85705 THROMBOPLASTIN INHIBITION: CPT | Performed by: PSYCHIATRY & NEUROLOGY

## 2019-05-02 PROCEDURE — 86147 CARDIOLIPIN ANTIBODY EA IG: CPT | Performed by: PSYCHIATRY & NEUROLOGY

## 2019-05-02 PROCEDURE — 81240 F2 GENE: CPT | Performed by: PSYCHIATRY & NEUROLOGY

## 2019-05-02 PROCEDURE — 99232 SBSQ HOSP IP/OBS MODERATE 35: CPT | Performed by: PHYSICAL MEDICINE & REHABILITATION

## 2019-05-02 PROCEDURE — 85613 RUSSELL VIPER VENOM DILUTED: CPT | Performed by: PSYCHIATRY & NEUROLOGY

## 2019-05-02 PROCEDURE — 86146 BETA-2 GLYCOPROTEIN ANTIBODY: CPT | Performed by: PSYCHIATRY & NEUROLOGY

## 2019-05-02 PROCEDURE — 85303 CLOT INHIBIT PROT C ACTIVITY: CPT | Performed by: PSYCHIATRY & NEUROLOGY

## 2019-05-02 PROCEDURE — 86235 NUCLEAR ANTIGEN ANTIBODY: CPT | Performed by: PSYCHIATRY & NEUROLOGY

## 2019-05-02 PROCEDURE — 84100 ASSAY OF PHOSPHORUS: CPT | Performed by: INTERNAL MEDICINE

## 2019-05-02 PROCEDURE — 76376 3D RENDER W/INTRP POSTPROCES: CPT

## 2019-05-02 PROCEDURE — 93312 ECHO TRANSESOPHAGEAL: CPT | Performed by: INTERNAL MEDICINE

## 2019-05-02 PROCEDURE — 93312 ECHO TRANSESOPHAGEAL: CPT

## 2019-05-02 PROCEDURE — 82948 REAGENT STRIP/BLOOD GLUCOSE: CPT

## 2019-05-02 PROCEDURE — 85306 CLOT INHIBIT PROT S FREE: CPT | Performed by: PSYCHIATRY & NEUROLOGY

## 2019-05-02 PROCEDURE — 85670 THROMBIN TIME PLASMA: CPT | Performed by: PSYCHIATRY & NEUROLOGY

## 2019-05-02 PROCEDURE — 93799 UNLISTED CV SVC/PROCEDURE: CPT

## 2019-05-02 PROCEDURE — 97530 THERAPEUTIC ACTIVITIES: CPT

## 2019-05-02 PROCEDURE — 85300 ANTITHROMBIN III ACTIVITY: CPT | Performed by: PSYCHIATRY & NEUROLOGY

## 2019-05-02 PROCEDURE — 93320 DOPPLER ECHO COMPLETE: CPT | Performed by: INTERNAL MEDICINE

## 2019-05-02 PROCEDURE — 99232 SBSQ HOSP IP/OBS MODERATE 35: CPT | Performed by: EMERGENCY MEDICINE

## 2019-05-02 RX ORDER — POTASSIUM CHLORIDE 14.9 MG/ML
20 INJECTION INTRAVENOUS ONCE
Status: COMPLETED | OUTPATIENT
Start: 2019-05-02 | End: 2019-05-02

## 2019-05-02 RX ORDER — SODIUM CHLORIDE, SODIUM GLUCONATE, SODIUM ACETATE, POTASSIUM CHLORIDE, MAGNESIUM CHLORIDE, SODIUM PHOSPHATE, DIBASIC, AND POTASSIUM PHOSPHATE .53; .5; .37; .037; .03; .012; .00082 G/100ML; G/100ML; G/100ML; G/100ML; G/100ML; G/100ML; G/100ML
75 INJECTION, SOLUTION INTRAVENOUS CONTINUOUS
Status: DISPENSED | OUTPATIENT
Start: 2019-05-02 | End: 2019-05-03

## 2019-05-02 RX ORDER — ASPIRIN 300 MG/1
300 SUPPOSITORY RECTAL ONCE
Status: COMPLETED | OUTPATIENT
Start: 2019-05-02 | End: 2019-05-02

## 2019-05-02 RX ORDER — BISACODYL 10 MG
10 SUPPOSITORY, RECTAL RECTAL DAILY
Status: DISCONTINUED | OUTPATIENT
Start: 2019-05-02 | End: 2019-05-06 | Stop reason: HOSPADM

## 2019-05-02 RX ORDER — LORAZEPAM 2 MG/ML
1 INJECTION INTRAMUSCULAR ONCE
Status: DISCONTINUED | OUTPATIENT
Start: 2019-05-02 | End: 2019-05-02

## 2019-05-02 RX ORDER — MIDAZOLAM HYDROCHLORIDE 1 MG/ML
4 INJECTION INTRAMUSCULAR; INTRAVENOUS ONCE
Status: COMPLETED | OUTPATIENT
Start: 2019-05-02 | End: 2019-05-02

## 2019-05-02 RX ORDER — MAGNESIUM SULFATE HEPTAHYDRATE 40 MG/ML
2 INJECTION, SOLUTION INTRAVENOUS ONCE
Status: COMPLETED | OUTPATIENT
Start: 2019-05-02 | End: 2019-05-02

## 2019-05-02 RX ORDER — FENTANYL CITRATE 50 UG/ML
100 INJECTION, SOLUTION INTRAMUSCULAR; INTRAVENOUS ONCE
Status: COMPLETED | OUTPATIENT
Start: 2019-05-02 | End: 2019-05-02

## 2019-05-02 RX ADMIN — HEPARIN SODIUM 5000 UNITS: 5000 INJECTION INTRAVENOUS; SUBCUTANEOUS at 21:01

## 2019-05-02 RX ADMIN — SODIUM CHLORIDE, SODIUM GLUCONATE, SODIUM ACETATE, POTASSIUM CHLORIDE, MAGNESIUM CHLORIDE, SODIUM PHOSPHATE, DIBASIC, AND POTASSIUM PHOSPHATE 75 ML/HR: .53; .5; .37; .037; .03; .012; .00082 INJECTION, SOLUTION INTRAVENOUS at 17:45

## 2019-05-02 RX ADMIN — MIDAZOLAM 4 MG: 1 INJECTION INTRAMUSCULAR; INTRAVENOUS at 15:44

## 2019-05-02 RX ADMIN — ASPIRIN 300 MG: 300 SUPPOSITORY RECTAL at 17:45

## 2019-05-02 RX ADMIN — HEPARIN SODIUM 5000 UNITS: 5000 INJECTION INTRAVENOUS; SUBCUTANEOUS at 05:34

## 2019-05-02 RX ADMIN — POTASSIUM CHLORIDE 20 MEQ: 200 INJECTION, SOLUTION INTRAVENOUS at 13:55

## 2019-05-02 RX ADMIN — FENTANYL CITRATE 50 MCG: 50 INJECTION, SOLUTION INTRAMUSCULAR; INTRAVENOUS at 15:42

## 2019-05-02 RX ADMIN — MAGNESIUM SULFATE HEPTAHYDRATE 2 G: 40 INJECTION, SOLUTION INTRAVENOUS at 13:55

## 2019-05-02 RX ADMIN — Medication 10 MG: at 10:17

## 2019-05-02 RX ADMIN — HEPARIN SODIUM 5000 UNITS: 5000 INJECTION INTRAVENOUS; SUBCUTANEOUS at 13:55

## 2019-05-02 RX ADMIN — INSULIN LISPRO 1 UNITS: 100 INJECTION, SOLUTION INTRAVENOUS; SUBCUTANEOUS at 00:23

## 2019-05-03 ENCOUNTER — APPOINTMENT (INPATIENT)
Dept: NON INVASIVE DIAGNOSTICS | Facility: HOSPITAL | Age: 69
DRG: 023 | End: 2019-05-03
Payer: MEDICARE

## 2019-05-03 ENCOUNTER — APPOINTMENT (INPATIENT)
Dept: RADIOLOGY | Facility: HOSPITAL | Age: 69
DRG: 023 | End: 2019-05-03
Payer: MEDICARE

## 2019-05-03 LAB
AMMONIA PLAS-SCNC: 21 UMOL/L (ref 11–35)
ANION GAP SERPL CALCULATED.3IONS-SCNC: 6 MMOL/L (ref 4–13)
BASE EXCESS BLDA CALC-SCNC: 2.2 MMOL/L
BASOPHILS # BLD AUTO: 0.03 THOUSANDS/ΜL (ref 0–0.1)
BASOPHILS NFR BLD AUTO: 0 % (ref 0–1)
BUN SERPL-MCNC: 26 MG/DL (ref 5–25)
CALCIUM SERPL-MCNC: 8.4 MG/DL (ref 8.3–10.1)
CARDIOLIPIN IGA SER IA-ACNC: <9 APL U/ML (ref 0–11)
CARDIOLIPIN IGG SER IA-ACNC: <9 GPL U/ML (ref 0–14)
CARDIOLIPIN IGM SER IA-ACNC: <9 MPL U/ML (ref 0–12)
CHLORIDE SERPL-SCNC: 108 MMOL/L (ref 100–108)
CO2 SERPL-SCNC: 27 MMOL/L (ref 21–32)
CREAT SERPL-MCNC: 0.86 MG/DL (ref 0.6–1.3)
DSDNA AB SER-ACNC: <1 IU/ML (ref 0–9)
ENA SS-A AB SER-ACNC: <0.2 AI (ref 0–0.9)
ENA SS-B AB SER-ACNC: <0.2 AI (ref 0–0.9)
EOSINOPHIL # BLD AUTO: 0.31 THOUSAND/ΜL (ref 0–0.61)
EOSINOPHIL NFR BLD AUTO: 3 % (ref 0–6)
ERYTHROCYTE [DISTWIDTH] IN BLOOD BY AUTOMATED COUNT: 13.1 % (ref 11.6–15.1)
GFR SERPL CREATININE-BSD FRML MDRD: 89 ML/MIN/1.73SQ M
GLUCOSE SERPL-MCNC: 119 MG/DL (ref 65–140)
GLUCOSE SERPL-MCNC: 119 MG/DL (ref 65–140)
GLUCOSE SERPL-MCNC: 122 MG/DL (ref 65–140)
GLUCOSE SERPL-MCNC: 124 MG/DL (ref 65–140)
GLUCOSE SERPL-MCNC: 142 MG/DL (ref 65–140)
GLUCOSE SERPL-MCNC: 152 MG/DL (ref 65–140)
HCO3 BLDA-SCNC: 25.2 MMOL/L (ref 22–28)
HCT VFR BLD AUTO: 38.9 % (ref 36.5–49.3)
HGB BLD-MCNC: 12.5 G/DL (ref 12–17)
IMM GRANULOCYTES # BLD AUTO: 0.04 THOUSAND/UL (ref 0–0.2)
IMM GRANULOCYTES NFR BLD AUTO: 0 % (ref 0–2)
LYMPHOCYTES # BLD AUTO: 1.55 THOUSANDS/ΜL (ref 0.6–4.47)
LYMPHOCYTES NFR BLD AUTO: 17 % (ref 14–44)
MAGNESIUM SERPL-MCNC: 2.3 MG/DL (ref 1.6–2.6)
MCH RBC QN AUTO: 31.1 PG (ref 26.8–34.3)
MCHC RBC AUTO-ENTMCNC: 32.1 G/DL (ref 31.4–37.4)
MCV RBC AUTO: 97 FL (ref 82–98)
MONOCYTES # BLD AUTO: 0.57 THOUSAND/ΜL (ref 0.17–1.22)
MONOCYTES NFR BLD AUTO: 6 % (ref 4–12)
NEUTROPHILS # BLD AUTO: 6.55 THOUSANDS/ΜL (ref 1.85–7.62)
NEUTS SEG NFR BLD AUTO: 74 % (ref 43–75)
NON VENT ROOM AIR: 21 %
NRBC BLD AUTO-RTO: 0 /100 WBCS
O2 CT BLDA-SCNC: 18.2 ML/DL (ref 16–23)
OXYHGB MFR BLDA: 93.9 % (ref 94–97)
PCO2 BLDA: 34.6 MM HG (ref 36–44)
PH BLDA: 7.48 [PH] (ref 7.35–7.45)
PLATELET # BLD AUTO: 149 THOUSANDS/UL (ref 149–390)
PMV BLD AUTO: 9.9 FL (ref 8.9–12.7)
PO2 BLDA: 74.6 MM HG (ref 75–129)
POTASSIUM SERPL-SCNC: 3.8 MMOL/L (ref 3.5–5.3)
RBC # BLD AUTO: 4.02 MILLION/UL (ref 3.88–5.62)
SODIUM SERPL-SCNC: 141 MMOL/L (ref 136–145)
SPECIMEN SOURCE: ABNORMAL
TSH SERPL DL<=0.05 MIU/L-ACNC: 1.73 UIU/ML (ref 0.36–3.74)
WBC # BLD AUTO: 9.05 THOUSAND/UL (ref 4.31–10.16)

## 2019-05-03 PROCEDURE — 80048 BASIC METABOLIC PNL TOTAL CA: CPT | Performed by: INTERNAL MEDICINE

## 2019-05-03 PROCEDURE — 94762 N-INVAS EAR/PLS OXIMTRY CONT: CPT

## 2019-05-03 PROCEDURE — 70450 CT HEAD/BRAIN W/O DYE: CPT

## 2019-05-03 PROCEDURE — 82948 REAGENT STRIP/BLOOD GLUCOSE: CPT

## 2019-05-03 PROCEDURE — 33285 INSJ SUBQ CAR RHYTHM MNTR: CPT

## 2019-05-03 PROCEDURE — 0JH602Z INSERTION OF MONITORING DEVICE INTO CHEST SUBCUTANEOUS TISSUE AND FASCIA, OPEN APPROACH: ICD-10-PCS | Performed by: INTERNAL MEDICINE

## 2019-05-03 PROCEDURE — 97530 THERAPEUTIC ACTIVITIES: CPT

## 2019-05-03 PROCEDURE — 36600 WITHDRAWAL OF ARTERIAL BLOOD: CPT

## 2019-05-03 PROCEDURE — 99232 SBSQ HOSP IP/OBS MODERATE 35: CPT | Performed by: PSYCHIATRY & NEUROLOGY

## 2019-05-03 PROCEDURE — 84443 ASSAY THYROID STIM HORMONE: CPT | Performed by: PHYSICIAN ASSISTANT

## 2019-05-03 PROCEDURE — 83735 ASSAY OF MAGNESIUM: CPT | Performed by: INTERNAL MEDICINE

## 2019-05-03 PROCEDURE — 82805 BLOOD GASES W/O2 SATURATION: CPT | Performed by: PHYSICIAN ASSISTANT

## 2019-05-03 PROCEDURE — 97110 THERAPEUTIC EXERCISES: CPT

## 2019-05-03 PROCEDURE — 33285 INSJ SUBQ CAR RHYTHM MNTR: CPT | Performed by: PHYSICIAN ASSISTANT

## 2019-05-03 PROCEDURE — 92526 ORAL FUNCTION THERAPY: CPT

## 2019-05-03 PROCEDURE — 97535 SELF CARE MNGMENT TRAINING: CPT

## 2019-05-03 PROCEDURE — 99232 SBSQ HOSP IP/OBS MODERATE 35: CPT | Performed by: PHYSICIAN ASSISTANT

## 2019-05-03 PROCEDURE — 85025 COMPLETE CBC W/AUTO DIFF WBC: CPT | Performed by: INTERNAL MEDICINE

## 2019-05-03 PROCEDURE — C1764 EVENT RECORDER, CARDIAC: HCPCS

## 2019-05-03 PROCEDURE — 82140 ASSAY OF AMMONIA: CPT | Performed by: PHYSICIAN ASSISTANT

## 2019-05-03 RX ORDER — SODIUM CHLORIDE, SODIUM GLUCONATE, SODIUM ACETATE, POTASSIUM CHLORIDE, MAGNESIUM CHLORIDE, SODIUM PHOSPHATE, DIBASIC, AND POTASSIUM PHOSPHATE .53; .5; .37; .037; .03; .012; .00082 G/100ML; G/100ML; G/100ML; G/100ML; G/100ML; G/100ML; G/100ML
75 INJECTION, SOLUTION INTRAVENOUS CONTINUOUS
Status: DISPENSED | OUTPATIENT
Start: 2019-05-03 | End: 2019-05-04

## 2019-05-03 RX ORDER — FLUOXETINE HYDROCHLORIDE 20 MG/1
20 CAPSULE ORAL DAILY
Status: DISCONTINUED | OUTPATIENT
Start: 2019-05-03 | End: 2019-05-03

## 2019-05-03 RX ORDER — ACETAZOLAMIDE 250 MG/1
500 TABLET ORAL 2 TIMES DAILY
Status: DISCONTINUED | OUTPATIENT
Start: 2019-05-03 | End: 2019-05-06 | Stop reason: HOSPADM

## 2019-05-03 RX ORDER — LIDOCAINE HYDROCHLORIDE AND EPINEPHRINE 10; 10 MG/ML; UG/ML
INJECTION, SOLUTION INFILTRATION; PERINEURAL CODE/TRAUMA/SEDATION MEDICATION
Status: COMPLETED | OUTPATIENT
Start: 2019-05-03 | End: 2019-05-03

## 2019-05-03 RX ADMIN — SODIUM CHLORIDE, SODIUM GLUCONATE, SODIUM ACETATE, POTASSIUM CHLORIDE, MAGNESIUM CHLORIDE, SODIUM PHOSPHATE, DIBASIC, AND POTASSIUM PHOSPHATE 75 ML/HR: .53; .5; .37; .037; .03; .012; .00082 INJECTION, SOLUTION INTRAVENOUS at 15:31

## 2019-05-03 RX ADMIN — SENNOSIDES 8.6 MG: 8.6 TABLET, FILM COATED ORAL at 23:09

## 2019-05-03 RX ADMIN — LIDOCAINE HYDROCHLORIDE,EPINEPHRINE BITARTRATE 15 ML: 10; .01 INJECTION, SOLUTION INFILTRATION; PERINEURAL at 14:21

## 2019-05-03 RX ADMIN — ATENOLOL 50 MG: 50 TABLET ORAL at 09:52

## 2019-05-03 RX ADMIN — HEPARIN SODIUM 5000 UNITS: 5000 INJECTION INTRAVENOUS; SUBCUTANEOUS at 15:28

## 2019-05-03 RX ADMIN — ASPIRIN 81 MG 81 MG: 81 TABLET ORAL at 09:52

## 2019-05-03 RX ADMIN — HEPARIN SODIUM 5000 UNITS: 5000 INJECTION INTRAVENOUS; SUBCUTANEOUS at 05:32

## 2019-05-03 RX ADMIN — FLUOXETINE 20 MG: 20 CAPSULE ORAL at 11:43

## 2019-05-03 RX ADMIN — ATORVASTATIN CALCIUM 40 MG: 40 TABLET, FILM COATED ORAL at 18:40

## 2019-05-03 RX ADMIN — INSULIN LISPRO 1 UNITS: 100 INJECTION, SOLUTION INTRAVENOUS; SUBCUTANEOUS at 11:45

## 2019-05-03 RX ADMIN — HEPARIN SODIUM 5000 UNITS: 5000 INJECTION INTRAVENOUS; SUBCUTANEOUS at 23:08

## 2019-05-03 RX ADMIN — ACETAZOLAMIDE 500 MG: 250 TABLET ORAL at 23:46

## 2019-05-04 PROBLEM — R13.10 DYSPHAGIA: Status: ACTIVE | Noted: 2019-05-04

## 2019-05-04 LAB
ALBUMIN SERPL BCP-MCNC: 2.9 G/DL (ref 3.5–5)
ALP SERPL-CCNC: 109 U/L (ref 46–116)
ALT SERPL W P-5'-P-CCNC: 37 U/L (ref 12–78)
ANION GAP SERPL CALCULATED.3IONS-SCNC: 9 MMOL/L (ref 4–13)
APTT SCREEN TO CONFIRM RATIO: 1 RATIO (ref 0–1.4)
AST SERPL W P-5'-P-CCNC: 42 U/L (ref 5–45)
B2 GLYCOPROT1 IGA SER-ACNC: <9 GPI IGA UNITS (ref 0–25)
B2 GLYCOPROT1 IGG SER-ACNC: <9 GPI IGG UNITS (ref 0–20)
B2 GLYCOPROT1 IGM SER-ACNC: <9 GPI IGM UNITS (ref 0–32)
BASOPHILS # BLD AUTO: 0.02 THOUSANDS/ΜL (ref 0–0.1)
BASOPHILS NFR BLD AUTO: 0 % (ref 0–1)
BILIRUB SERPL-MCNC: 1.26 MG/DL (ref 0.2–1)
BUN SERPL-MCNC: 27 MG/DL (ref 5–25)
CALCIUM SERPL-MCNC: 8.5 MG/DL (ref 8.3–10.1)
CHLORIDE SERPL-SCNC: 109 MMOL/L (ref 100–108)
CO2 SERPL-SCNC: 22 MMOL/L (ref 21–32)
CONFIRM APTT/NORMAL: 37.9 SEC (ref 0–55)
CREAT SERPL-MCNC: 0.88 MG/DL (ref 0.6–1.3)
EOSINOPHIL # BLD AUTO: 0.32 THOUSAND/ΜL (ref 0–0.61)
EOSINOPHIL NFR BLD AUTO: 4 % (ref 0–6)
ERYTHROCYTE [DISTWIDTH] IN BLOOD BY AUTOMATED COUNT: 12.8 % (ref 11.6–15.1)
GFR SERPL CREATININE-BSD FRML MDRD: 88 ML/MIN/1.73SQ M
GLUCOSE SERPL-MCNC: 121 MG/DL (ref 65–140)
GLUCOSE SERPL-MCNC: 126 MG/DL (ref 65–140)
GLUCOSE SERPL-MCNC: 127 MG/DL (ref 65–140)
GLUCOSE SERPL-MCNC: 128 MG/DL (ref 65–140)
GLUCOSE SERPL-MCNC: 136 MG/DL (ref 65–140)
GLUCOSE SERPL-MCNC: 141 MG/DL (ref 65–140)
GLUCOSE SERPL-MCNC: 146 MG/DL (ref 65–140)
HCT VFR BLD AUTO: 40.9 % (ref 36.5–49.3)
HGB BLD-MCNC: 13.3 G/DL (ref 12–17)
IMM GRANULOCYTES # BLD AUTO: 0.06 THOUSAND/UL (ref 0–0.2)
IMM GRANULOCYTES NFR BLD AUTO: 1 % (ref 0–2)
LA PPP-IMP: NORMAL
LYMPHOCYTES # BLD AUTO: 1.39 THOUSANDS/ΜL (ref 0.6–4.47)
LYMPHOCYTES NFR BLD AUTO: 15 % (ref 14–44)
MCH RBC QN AUTO: 30.8 PG (ref 26.8–34.3)
MCHC RBC AUTO-ENTMCNC: 32.5 G/DL (ref 31.4–37.4)
MCV RBC AUTO: 95 FL (ref 82–98)
MONOCYTES # BLD AUTO: 0.59 THOUSAND/ΜL (ref 0.17–1.22)
MONOCYTES NFR BLD AUTO: 6 % (ref 4–12)
NEUTROPHILS # BLD AUTO: 6.86 THOUSANDS/ΜL (ref 1.85–7.62)
NEUTS SEG NFR BLD AUTO: 74 % (ref 43–75)
NRBC BLD AUTO-RTO: 0 /100 WBCS
PLATELET # BLD AUTO: 143 THOUSANDS/UL (ref 149–390)
PMV BLD AUTO: 10.6 FL (ref 8.9–12.7)
POTASSIUM SERPL-SCNC: 3.4 MMOL/L (ref 3.5–5.3)
PROT S ACT/NOR PPP: 112 % (ref 57–157)
PROT S ACT/NOR PPP: 78 % (ref 63–140)
PROT S PPP-ACNC: 86 % (ref 60–150)
PROT SERPL-MCNC: 6.9 G/DL (ref 6.4–8.2)
RBC # BLD AUTO: 4.32 MILLION/UL (ref 3.88–5.62)
SCREEN APTT: 33.7 SEC (ref 0–51.9)
SCREEN DRVVT: 35.6 SEC (ref 0–47)
SODIUM SERPL-SCNC: 140 MMOL/L (ref 136–145)
THROMBIN TIME: 17 SEC (ref 0–23)
WBC # BLD AUTO: 9.24 THOUSAND/UL (ref 4.31–10.16)

## 2019-05-04 PROCEDURE — 82948 REAGENT STRIP/BLOOD GLUCOSE: CPT

## 2019-05-04 PROCEDURE — 97530 THERAPEUTIC ACTIVITIES: CPT

## 2019-05-04 PROCEDURE — 80053 COMPREHEN METABOLIC PANEL: CPT | Performed by: PHYSICIAN ASSISTANT

## 2019-05-04 PROCEDURE — 94762 N-INVAS EAR/PLS OXIMTRY CONT: CPT

## 2019-05-04 PROCEDURE — 99232 SBSQ HOSP IP/OBS MODERATE 35: CPT | Performed by: PSYCHIATRY & NEUROLOGY

## 2019-05-04 PROCEDURE — 97535 SELF CARE MNGMENT TRAINING: CPT

## 2019-05-04 PROCEDURE — 99232 SBSQ HOSP IP/OBS MODERATE 35: CPT | Performed by: PHYSICIAN ASSISTANT

## 2019-05-04 PROCEDURE — 85025 COMPLETE CBC W/AUTO DIFF WBC: CPT | Performed by: PHYSICIAN ASSISTANT

## 2019-05-04 RX ORDER — POTASSIUM CHLORIDE 20 MEQ/1
40 TABLET, EXTENDED RELEASE ORAL ONCE
Status: COMPLETED | OUTPATIENT
Start: 2019-05-04 | End: 2019-05-04

## 2019-05-04 RX ADMIN — ATENOLOL 50 MG: 50 TABLET ORAL at 08:11

## 2019-05-04 RX ADMIN — ACETAZOLAMIDE 500 MG: 250 TABLET ORAL at 08:23

## 2019-05-04 RX ADMIN — HEPARIN SODIUM 5000 UNITS: 5000 INJECTION INTRAVENOUS; SUBCUTANEOUS at 14:30

## 2019-05-04 RX ADMIN — Medication 10 MG: at 08:11

## 2019-05-04 RX ADMIN — SENNOSIDES 8.6 MG: 8.6 TABLET, FILM COATED ORAL at 22:57

## 2019-05-04 RX ADMIN — POTASSIUM CHLORIDE 40 MEQ: 1500 TABLET, EXTENDED RELEASE ORAL at 10:01

## 2019-05-04 RX ADMIN — HEPARIN SODIUM 5000 UNITS: 5000 INJECTION INTRAVENOUS; SUBCUTANEOUS at 22:57

## 2019-05-04 RX ADMIN — HEPARIN SODIUM 5000 UNITS: 5000 INJECTION INTRAVENOUS; SUBCUTANEOUS at 05:42

## 2019-05-04 RX ADMIN — ASPIRIN 81 MG 81 MG: 81 TABLET ORAL at 08:11

## 2019-05-05 PROBLEM — I63.9 CVA (CEREBRAL VASCULAR ACCIDENT) (HCC): Status: ACTIVE | Noted: 2019-04-27

## 2019-05-05 LAB
GLUCOSE SERPL-MCNC: 121 MG/DL (ref 65–140)
GLUCOSE SERPL-MCNC: 123 MG/DL (ref 65–140)
GLUCOSE SERPL-MCNC: 140 MG/DL (ref 65–140)
GLUCOSE SERPL-MCNC: 188 MG/DL (ref 65–140)

## 2019-05-05 PROCEDURE — 99232 SBSQ HOSP IP/OBS MODERATE 35: CPT | Performed by: PSYCHIATRY & NEUROLOGY

## 2019-05-05 PROCEDURE — 82948 REAGENT STRIP/BLOOD GLUCOSE: CPT

## 2019-05-05 PROCEDURE — 99232 SBSQ HOSP IP/OBS MODERATE 35: CPT | Performed by: PHYSICIAN ASSISTANT

## 2019-05-05 RX ADMIN — SENNOSIDES 8.6 MG: 8.6 TABLET, FILM COATED ORAL at 22:27

## 2019-05-05 RX ADMIN — HEPARIN SODIUM 5000 UNITS: 5000 INJECTION INTRAVENOUS; SUBCUTANEOUS at 22:27

## 2019-05-05 RX ADMIN — ACETAZOLAMIDE 500 MG: 250 TABLET ORAL at 09:26

## 2019-05-05 RX ADMIN — HEPARIN SODIUM 5000 UNITS: 5000 INJECTION INTRAVENOUS; SUBCUTANEOUS at 13:57

## 2019-05-05 RX ADMIN — ATENOLOL 50 MG: 50 TABLET ORAL at 09:26

## 2019-05-05 RX ADMIN — HEPARIN SODIUM 5000 UNITS: 5000 INJECTION INTRAVENOUS; SUBCUTANEOUS at 06:52

## 2019-05-05 RX ADMIN — ATORVASTATIN CALCIUM 40 MG: 40 TABLET, FILM COATED ORAL at 17:37

## 2019-05-05 RX ADMIN — ASPIRIN 81 MG 81 MG: 81 TABLET ORAL at 09:26

## 2019-05-05 RX ADMIN — ACETAZOLAMIDE 500 MG: 250 TABLET ORAL at 17:37

## 2019-05-05 RX ADMIN — Medication 10 MG: at 09:31

## 2019-05-06 VITALS
OXYGEN SATURATION: 96 % | BODY MASS INDEX: 26.13 KG/M2 | TEMPERATURE: 99 F | RESPIRATION RATE: 18 BRPM | HEIGHT: 72 IN | SYSTOLIC BLOOD PRESSURE: 146 MMHG | HEART RATE: 62 BPM | DIASTOLIC BLOOD PRESSURE: 81 MMHG | WEIGHT: 192.9 LBS

## 2019-05-06 PROBLEM — R13.10 DYSPHAGIA: Status: RESOLVED | Noted: 2019-05-04 | Resolved: 2019-05-06

## 2019-05-06 LAB
GLUCOSE SERPL-MCNC: 123 MG/DL (ref 65–140)
GLUCOSE SERPL-MCNC: 195 MG/DL (ref 65–140)

## 2019-05-06 PROCEDURE — 97110 THERAPEUTIC EXERCISES: CPT

## 2019-05-06 PROCEDURE — 92507 TX SP LANG VOICE COMM INDIV: CPT

## 2019-05-06 PROCEDURE — 99239 HOSP IP/OBS DSCHRG MGMT >30: CPT | Performed by: INTERNAL MEDICINE

## 2019-05-06 PROCEDURE — 97530 THERAPEUTIC ACTIVITIES: CPT

## 2019-05-06 PROCEDURE — 92526 ORAL FUNCTION THERAPY: CPT

## 2019-05-06 PROCEDURE — 97535 SELF CARE MNGMENT TRAINING: CPT

## 2019-05-06 PROCEDURE — 82948 REAGENT STRIP/BLOOD GLUCOSE: CPT

## 2019-05-06 RX ORDER — BISACODYL 10 MG
10 SUPPOSITORY, RECTAL RECTAL DAILY
Qty: 12 SUPPOSITORY | Refills: 0 | Status: SHIPPED | OUTPATIENT
Start: 2019-05-07 | End: 2019-05-31 | Stop reason: HOSPADM

## 2019-05-06 RX ORDER — ASPIRIN 81 MG/1
81 TABLET, CHEWABLE ORAL DAILY
Qty: 30 TABLET | Refills: 0 | Status: SHIPPED | OUTPATIENT
Start: 2019-05-07 | End: 2019-05-21 | Stop reason: HOSPADM

## 2019-05-06 RX ORDER — POTASSIUM CHLORIDE 20 MEQ/1
40 TABLET, EXTENDED RELEASE ORAL ONCE
Status: COMPLETED | OUTPATIENT
Start: 2019-05-06 | End: 2019-05-06

## 2019-05-06 RX ORDER — ATORVASTATIN CALCIUM 40 MG/1
40 TABLET, FILM COATED ORAL EVERY EVENING
Qty: 30 TABLET | Refills: 0 | Status: SHIPPED | OUTPATIENT
Start: 2019-05-06 | End: 2019-11-18 | Stop reason: ALTCHOICE

## 2019-05-06 RX ORDER — ATENOLOL 50 MG/1
50 TABLET ORAL DAILY
Qty: 30 TABLET | Refills: 0 | Status: SHIPPED | OUTPATIENT
Start: 2019-05-07 | End: 2019-05-21 | Stop reason: HOSPADM

## 2019-05-06 RX ORDER — ACETAZOLAMIDE 250 MG/1
500 TABLET ORAL 2 TIMES DAILY
Qty: 10 TABLET | Refills: 0 | Status: SHIPPED | OUTPATIENT
Start: 2019-05-06 | End: 2019-05-21 | Stop reason: HOSPADM

## 2019-05-06 RX ADMIN — POTASSIUM CHLORIDE 40 MEQ: 1500 TABLET, EXTENDED RELEASE ORAL at 09:39

## 2019-05-06 RX ADMIN — Medication 10 MG: at 09:26

## 2019-05-06 RX ADMIN — ASPIRIN 81 MG 81 MG: 81 TABLET ORAL at 09:39

## 2019-05-06 RX ADMIN — ATENOLOL 50 MG: 50 TABLET ORAL at 09:39

## 2019-05-06 RX ADMIN — HEPARIN SODIUM 5000 UNITS: 5000 INJECTION INTRAVENOUS; SUBCUTANEOUS at 13:09

## 2019-05-06 RX ADMIN — ACETAZOLAMIDE 500 MG: 250 TABLET ORAL at 09:40

## 2019-05-06 RX ADMIN — HEPARIN SODIUM 5000 UNITS: 5000 INJECTION INTRAVENOUS; SUBCUTANEOUS at 05:26

## 2019-05-07 LAB
F2 GENE MUT ANL BLD/T: NORMAL
F5 GENE MUT ANL BLD/T: NORMAL

## 2019-05-09 ENCOUNTER — TELEPHONE (OUTPATIENT)
Dept: GASTROENTEROLOGY | Facility: AMBULARY SURGERY CENTER | Age: 69
End: 2019-05-09

## 2019-05-10 ENCOUNTER — TELEPHONE (OUTPATIENT)
Dept: RADIOLOGY | Facility: HOSPITAL | Age: 69
End: 2019-05-10

## 2019-05-10 ENCOUNTER — TELEPHONE (OUTPATIENT)
Dept: GASTROENTEROLOGY | Facility: MEDICAL CENTER | Age: 69
End: 2019-05-10

## 2019-05-10 ENCOUNTER — TELEPHONE (OUTPATIENT)
Dept: NEUROLOGY | Facility: CLINIC | Age: 69
End: 2019-05-10

## 2019-05-13 ENCOUNTER — APPOINTMENT (EMERGENCY)
Dept: RADIOLOGY | Facility: HOSPITAL | Age: 69
DRG: 309 | End: 2019-05-13
Payer: MEDICARE

## 2019-05-13 ENCOUNTER — TELEPHONE (OUTPATIENT)
Dept: INPATIENT UNIT | Facility: HOSPITAL | Age: 69
End: 2019-05-13

## 2019-05-13 ENCOUNTER — HOSPITAL ENCOUNTER (INPATIENT)
Facility: HOSPITAL | Age: 69
LOS: 8 days | DRG: 309 | End: 2019-05-21
Attending: EMERGENCY MEDICINE | Admitting: HOSPITALIST
Payer: MEDICARE

## 2019-05-13 DIAGNOSIS — I48.91 NEW ONSET ATRIAL FIBRILLATION (HCC): Primary | ICD-10-CM

## 2019-05-13 DIAGNOSIS — I21.4 NSTEMI (NON-ST ELEVATED MYOCARDIAL INFARCTION) (HCC): ICD-10-CM

## 2019-05-13 DIAGNOSIS — Z86.73 HISTORY OF CVA (CEREBROVASCULAR ACCIDENT): ICD-10-CM

## 2019-05-13 DIAGNOSIS — I63.411 CEREBROVASCULAR ACCIDENT (CVA) DUE TO EMBOLISM OF RIGHT MIDDLE CEREBRAL ARTERY (HCC): ICD-10-CM

## 2019-05-13 DIAGNOSIS — R07.9 CHEST PAIN: ICD-10-CM

## 2019-05-13 DIAGNOSIS — R13.12 OROPHARYNGEAL DYSPHAGIA: ICD-10-CM

## 2019-05-13 PROBLEM — I48.0 PAROXYSMAL ATRIAL FIBRILLATION (HCC): Status: ACTIVE | Noted: 2019-05-13

## 2019-05-13 LAB
ALBUMIN SERPL BCP-MCNC: 2.1 G/DL (ref 3.5–5)
ALP SERPL-CCNC: 148 U/L (ref 46–116)
ALT SERPL W P-5'-P-CCNC: 208 U/L (ref 12–78)
ANION GAP SERPL CALCULATED.3IONS-SCNC: 5 MMOL/L (ref 4–13)
APTT PPP: 32 SECONDS (ref 26–38)
AST SERPL W P-5'-P-CCNC: 88 U/L (ref 5–45)
BASOPHILS # BLD AUTO: 0.03 THOUSANDS/ΜL (ref 0–0.1)
BASOPHILS NFR BLD AUTO: 0 % (ref 0–1)
BILIRUB SERPL-MCNC: 0.69 MG/DL (ref 0.2–1)
BUN SERPL-MCNC: 12 MG/DL (ref 5–25)
CALCIUM SERPL-MCNC: 9.2 MG/DL (ref 8.3–10.1)
CHLORIDE SERPL-SCNC: 104 MMOL/L (ref 100–108)
CO2 SERPL-SCNC: 26 MMOL/L (ref 21–32)
CREAT SERPL-MCNC: 0.7 MG/DL (ref 0.6–1.3)
EOSINOPHIL # BLD AUTO: 0.33 THOUSAND/ΜL (ref 0–0.61)
EOSINOPHIL NFR BLD AUTO: 4 % (ref 0–6)
ERYTHROCYTE [DISTWIDTH] IN BLOOD BY AUTOMATED COUNT: 13.4 % (ref 11.6–15.1)
GFR SERPL CREATININE-BSD FRML MDRD: 97 ML/MIN/1.73SQ M
GLUCOSE SERPL-MCNC: 160 MG/DL (ref 65–140)
HCT VFR BLD AUTO: 41.7 % (ref 36.5–49.3)
HGB BLD-MCNC: 13.9 G/DL (ref 12–17)
IMM GRANULOCYTES # BLD AUTO: 0.05 THOUSAND/UL (ref 0–0.2)
IMM GRANULOCYTES NFR BLD AUTO: 1 % (ref 0–2)
INR PPP: 1.07 (ref 0.86–1.17)
LYMPHOCYTES # BLD AUTO: 1.44 THOUSANDS/ΜL (ref 0.6–4.47)
LYMPHOCYTES NFR BLD AUTO: 16 % (ref 14–44)
MCH RBC QN AUTO: 31.4 PG (ref 26.8–34.3)
MCHC RBC AUTO-ENTMCNC: 33.3 G/DL (ref 31.4–37.4)
MCV RBC AUTO: 94 FL (ref 82–98)
MONOCYTES # BLD AUTO: 0.64 THOUSAND/ΜL (ref 0.17–1.22)
MONOCYTES NFR BLD AUTO: 7 % (ref 4–12)
NEUTROPHILS # BLD AUTO: 6.69 THOUSANDS/ΜL (ref 1.85–7.62)
NEUTS SEG NFR BLD AUTO: 72 % (ref 43–75)
NRBC BLD AUTO-RTO: 0 /100 WBCS
PLATELET # BLD AUTO: 241 THOUSANDS/UL (ref 149–390)
PMV BLD AUTO: 9.8 FL (ref 8.9–12.7)
POTASSIUM SERPL-SCNC: 3.9 MMOL/L (ref 3.5–5.3)
PROT SERPL-MCNC: 6.3 G/DL (ref 6.4–8.2)
PROTHROMBIN TIME: 14 SECONDS (ref 11.8–14.2)
RBC # BLD AUTO: 4.42 MILLION/UL (ref 3.88–5.62)
SODIUM SERPL-SCNC: 135 MMOL/L (ref 136–145)
TROPONIN I SERPL-MCNC: 0.07 NG/ML
TSH SERPL DL<=0.05 MIU/L-ACNC: 2.49 UIU/ML (ref 0.36–3.74)
WBC # BLD AUTO: 9.18 THOUSAND/UL (ref 4.31–10.16)

## 2019-05-13 PROCEDURE — 36415 COLL VENOUS BLD VENIPUNCTURE: CPT | Performed by: EMERGENCY MEDICINE

## 2019-05-13 PROCEDURE — 80053 COMPREHEN METABOLIC PANEL: CPT | Performed by: EMERGENCY MEDICINE

## 2019-05-13 PROCEDURE — 85025 COMPLETE CBC W/AUTO DIFF WBC: CPT | Performed by: EMERGENCY MEDICINE

## 2019-05-13 PROCEDURE — 84484 ASSAY OF TROPONIN QUANT: CPT | Performed by: EMERGENCY MEDICINE

## 2019-05-13 PROCEDURE — 85610 PROTHROMBIN TIME: CPT | Performed by: EMERGENCY MEDICINE

## 2019-05-13 PROCEDURE — 99285 EMERGENCY DEPT VISIT HI MDM: CPT

## 2019-05-13 PROCEDURE — 84443 ASSAY THYROID STIM HORMONE: CPT | Performed by: EMERGENCY MEDICINE

## 2019-05-13 PROCEDURE — 99223 1ST HOSP IP/OBS HIGH 75: CPT | Performed by: HOSPITALIST

## 2019-05-13 PROCEDURE — 93005 ELECTROCARDIOGRAM TRACING: CPT

## 2019-05-13 PROCEDURE — 99285 EMERGENCY DEPT VISIT HI MDM: CPT | Performed by: EMERGENCY MEDICINE

## 2019-05-13 PROCEDURE — 85730 THROMBOPLASTIN TIME PARTIAL: CPT | Performed by: EMERGENCY MEDICINE

## 2019-05-13 PROCEDURE — 71046 X-RAY EXAM CHEST 2 VIEWS: CPT

## 2019-05-13 RX ORDER — BISACODYL 10 MG
10 SUPPOSITORY, RECTAL RECTAL DAILY
Status: DISCONTINUED | OUTPATIENT
Start: 2019-05-14 | End: 2019-05-21 | Stop reason: HOSPADM

## 2019-05-13 RX ORDER — ASPIRIN 81 MG/1
81 TABLET, CHEWABLE ORAL DAILY
Status: DISCONTINUED | OUTPATIENT
Start: 2019-05-14 | End: 2019-05-16

## 2019-05-13 RX ORDER — ATORVASTATIN CALCIUM 40 MG/1
40 TABLET, FILM COATED ORAL EVERY EVENING
Status: DISCONTINUED | OUTPATIENT
Start: 2019-05-13 | End: 2019-05-21 | Stop reason: HOSPADM

## 2019-05-13 RX ORDER — DEXTROSE AND SODIUM CHLORIDE 5; .9 G/100ML; G/100ML
50 INJECTION, SOLUTION INTRAVENOUS CONTINUOUS
Status: DISCONTINUED | OUTPATIENT
Start: 2019-05-13 | End: 2019-05-14

## 2019-05-13 RX ORDER — ONDANSETRON 2 MG/ML
4 INJECTION INTRAMUSCULAR; INTRAVENOUS EVERY 6 HOURS PRN
Status: DISCONTINUED | OUTPATIENT
Start: 2019-05-13 | End: 2019-05-15 | Stop reason: SDUPTHER

## 2019-05-13 RX ORDER — ATENOLOL 50 MG/1
50 TABLET ORAL DAILY
Status: DISCONTINUED | OUTPATIENT
Start: 2019-05-14 | End: 2019-05-16

## 2019-05-13 RX ORDER — METOPROLOL TARTRATE 5 MG/5ML
2.5 INJECTION INTRAVENOUS EVERY 6 HOURS PRN
Status: DISCONTINUED | OUTPATIENT
Start: 2019-05-13 | End: 2019-05-21 | Stop reason: HOSPADM

## 2019-05-13 RX ADMIN — DEXTROSE AND SODIUM CHLORIDE 50 ML/HR: 5; .9 INJECTION, SOLUTION INTRAVENOUS at 18:51

## 2019-05-14 ENCOUNTER — APPOINTMENT (INPATIENT)
Dept: CT IMAGING | Facility: HOSPITAL | Age: 69
DRG: 309 | End: 2019-05-14
Payer: MEDICARE

## 2019-05-14 LAB
ANION GAP SERPL CALCULATED.3IONS-SCNC: 8 MMOL/L (ref 4–13)
APTT PPP: 32 SECONDS (ref 26–38)
APTT PPP: 46 SECONDS (ref 26–38)
ATRIAL RATE: 340 BPM
BUN SERPL-MCNC: 13 MG/DL (ref 5–25)
CALCIUM SERPL-MCNC: 8.8 MG/DL (ref 8.3–10.1)
CHLORIDE SERPL-SCNC: 103 MMOL/L (ref 100–108)
CO2 SERPL-SCNC: 25 MMOL/L (ref 21–32)
CREAT SERPL-MCNC: 0.72 MG/DL (ref 0.6–1.3)
ERYTHROCYTE [DISTWIDTH] IN BLOOD BY AUTOMATED COUNT: 13.2 % (ref 11.6–15.1)
ERYTHROCYTE [DISTWIDTH] IN BLOOD BY AUTOMATED COUNT: 13.3 % (ref 11.6–15.1)
GFR SERPL CREATININE-BSD FRML MDRD: 96 ML/MIN/1.73SQ M
GLUCOSE SERPL-MCNC: 158 MG/DL (ref 65–140)
HCT VFR BLD AUTO: 37.3 % (ref 36.5–49.3)
HCT VFR BLD AUTO: 40.2 % (ref 36.5–49.3)
HGB BLD-MCNC: 12.6 G/DL (ref 12–17)
HGB BLD-MCNC: 13.7 G/DL (ref 12–17)
INR PPP: 1.06 (ref 0.86–1.17)
INR PPP: 1.08 (ref 0.86–1.17)
MCH RBC QN AUTO: 31.4 PG (ref 26.8–34.3)
MCH RBC QN AUTO: 31.6 PG (ref 26.8–34.3)
MCHC RBC AUTO-ENTMCNC: 33.8 G/DL (ref 31.4–37.4)
MCHC RBC AUTO-ENTMCNC: 34.1 G/DL (ref 31.4–37.4)
MCV RBC AUTO: 93 FL (ref 82–98)
MCV RBC AUTO: 93 FL (ref 82–98)
PLATELET # BLD AUTO: 223 THOUSANDS/UL (ref 149–390)
PLATELET # BLD AUTO: 240 THOUSANDS/UL (ref 149–390)
PMV BLD AUTO: 9.4 FL (ref 8.9–12.7)
PMV BLD AUTO: 9.9 FL (ref 8.9–12.7)
POTASSIUM SERPL-SCNC: 3.8 MMOL/L (ref 3.5–5.3)
PROTHROMBIN TIME: 13.9 SECONDS (ref 11.8–14.2)
PROTHROMBIN TIME: 14.1 SECONDS (ref 11.8–14.2)
QRS AXIS: 16 DEGREES
QRSD INTERVAL: 86 MS
QT INTERVAL: 376 MS
QTC INTERVAL: 419 MS
RBC # BLD AUTO: 4.01 MILLION/UL (ref 3.88–5.62)
RBC # BLD AUTO: 4.33 MILLION/UL (ref 3.88–5.62)
SODIUM SERPL-SCNC: 136 MMOL/L (ref 136–145)
T WAVE AXIS: 76 DEGREES
VENTRICULAR RATE: 75 BPM
WBC # BLD AUTO: 8.01 THOUSAND/UL (ref 4.31–10.16)
WBC # BLD AUTO: 8.36 THOUSAND/UL (ref 4.31–10.16)

## 2019-05-14 PROCEDURE — 85730 THROMBOPLASTIN TIME PARTIAL: CPT | Performed by: HOSPITALIST

## 2019-05-14 PROCEDURE — 99222 1ST HOSP IP/OBS MODERATE 55: CPT | Performed by: INTERNAL MEDICINE

## 2019-05-14 PROCEDURE — 99024 POSTOP FOLLOW-UP VISIT: CPT | Performed by: INTERNAL MEDICINE

## 2019-05-14 PROCEDURE — 93010 ELECTROCARDIOGRAM REPORT: CPT | Performed by: INTERNAL MEDICINE

## 2019-05-14 PROCEDURE — 80048 BASIC METABOLIC PNL TOTAL CA: CPT | Performed by: HOSPITALIST

## 2019-05-14 PROCEDURE — 85610 PROTHROMBIN TIME: CPT | Performed by: HOSPITALIST

## 2019-05-14 PROCEDURE — 70450 CT HEAD/BRAIN W/O DYE: CPT

## 2019-05-14 PROCEDURE — 99233 SBSQ HOSP IP/OBS HIGH 50: CPT | Performed by: HOSPITALIST

## 2019-05-14 PROCEDURE — 85027 COMPLETE CBC AUTOMATED: CPT | Performed by: HOSPITALIST

## 2019-05-14 RX ORDER — HEPARIN SODIUM 10000 [USP'U]/100ML
3-20 INJECTION, SOLUTION INTRAVENOUS
Status: DISPENSED | OUTPATIENT
Start: 2019-05-14 | End: 2019-05-16

## 2019-05-14 RX ORDER — DEXTROSE MONOHYDRATE 50 MG/ML
50 INJECTION, SOLUTION INTRAVENOUS CONTINUOUS
Status: DISPENSED | OUTPATIENT
Start: 2019-05-14 | End: 2019-05-16

## 2019-05-14 RX ADMIN — DEXTROSE 50 ML/HR: 5 SOLUTION INTRAVENOUS at 18:34

## 2019-05-14 RX ADMIN — HEPARIN SODIUM AND DEXTROSE 11.1 UNITS/KG/HR: 10000; 5 INJECTION INTRAVENOUS at 13:36

## 2019-05-14 RX ADMIN — ENOXAPARIN SODIUM 40 MG: 40 INJECTION SUBCUTANEOUS at 10:21

## 2019-05-14 RX ADMIN — ASPIRIN 81 MG 81 MG: 81 TABLET ORAL at 10:21

## 2019-05-15 ENCOUNTER — ANESTHESIA (INPATIENT)
Dept: GASTROENTEROLOGY | Facility: HOSPITAL | Age: 69
DRG: 309 | End: 2019-05-15
Payer: MEDICARE

## 2019-05-15 ENCOUNTER — ANESTHESIA EVENT (INPATIENT)
Dept: GASTROENTEROLOGY | Facility: HOSPITAL | Age: 69
DRG: 309 | End: 2019-05-15
Payer: MEDICARE

## 2019-05-15 ENCOUNTER — APPOINTMENT (INPATIENT)
Dept: GASTROENTEROLOGY | Facility: HOSPITAL | Age: 69
DRG: 309 | End: 2019-05-15
Payer: MEDICARE

## 2019-05-15 LAB
ANION GAP SERPL CALCULATED.3IONS-SCNC: 8 MMOL/L (ref 4–13)
APTT PPP: 59 SECONDS (ref 26–38)
BUN SERPL-MCNC: 11 MG/DL (ref 5–25)
CALCIUM SERPL-MCNC: 9.1 MG/DL (ref 8.3–10.1)
CHLORIDE SERPL-SCNC: 102 MMOL/L (ref 100–108)
CO2 SERPL-SCNC: 26 MMOL/L (ref 21–32)
CREAT SERPL-MCNC: 0.7 MG/DL (ref 0.6–1.3)
GFR SERPL CREATININE-BSD FRML MDRD: 97 ML/MIN/1.73SQ M
GLUCOSE SERPL-MCNC: 167 MG/DL (ref 65–140)
HCT VFR BLD AUTO: 38.6 % (ref 36.5–49.3)
HGB BLD-MCNC: 13.2 G/DL (ref 12–17)
POTASSIUM SERPL-SCNC: 3.7 MMOL/L (ref 3.5–5.3)
SODIUM SERPL-SCNC: 136 MMOL/L (ref 136–145)

## 2019-05-15 PROCEDURE — 99232 SBSQ HOSP IP/OBS MODERATE 35: CPT | Performed by: HOSPITALIST

## 2019-05-15 PROCEDURE — G8988 SELF CARE GOAL STATUS: HCPCS

## 2019-05-15 PROCEDURE — 97167 OT EVAL HIGH COMPLEX 60 MIN: CPT

## 2019-05-15 PROCEDURE — G8979 MOBILITY GOAL STATUS: HCPCS | Performed by: PHYSICAL THERAPIST

## 2019-05-15 PROCEDURE — 85018 HEMOGLOBIN: CPT | Performed by: HOSPITALIST

## 2019-05-15 PROCEDURE — G8987 SELF CARE CURRENT STATUS: HCPCS

## 2019-05-15 PROCEDURE — 94762 N-INVAS EAR/PLS OXIMTRY CONT: CPT

## 2019-05-15 PROCEDURE — 80048 BASIC METABOLIC PNL TOTAL CA: CPT | Performed by: HOSPITALIST

## 2019-05-15 PROCEDURE — G8978 MOBILITY CURRENT STATUS: HCPCS | Performed by: PHYSICAL THERAPIST

## 2019-05-15 PROCEDURE — 85730 THROMBOPLASTIN TIME PARTIAL: CPT | Performed by: HOSPITALIST

## 2019-05-15 PROCEDURE — 99024 POSTOP FOLLOW-UP VISIT: CPT | Performed by: INTERNAL MEDICINE

## 2019-05-15 PROCEDURE — 97163 PT EVAL HIGH COMPLEX 45 MIN: CPT | Performed by: PHYSICAL THERAPIST

## 2019-05-15 PROCEDURE — 0DH63UZ INSERTION OF FEEDING DEVICE INTO STOMACH, PERCUTANEOUS APPROACH: ICD-10-PCS | Performed by: HOSPITALIST

## 2019-05-15 PROCEDURE — 43246 EGD PLACE GASTROSTOMY TUBE: CPT | Performed by: INTERNAL MEDICINE

## 2019-05-15 PROCEDURE — 85014 HEMATOCRIT: CPT | Performed by: HOSPITALIST

## 2019-05-15 RX ORDER — PROPOFOL 10 MG/ML
INJECTION, EMULSION INTRAVENOUS AS NEEDED
Status: DISCONTINUED | OUTPATIENT
Start: 2019-05-15 | End: 2019-05-15 | Stop reason: SURG

## 2019-05-15 RX ORDER — ONDANSETRON 2 MG/ML
4 INJECTION INTRAMUSCULAR; INTRAVENOUS EVERY 6 HOURS PRN
Status: DISCONTINUED | OUTPATIENT
Start: 2019-05-15 | End: 2019-05-21 | Stop reason: HOSPADM

## 2019-05-15 RX ORDER — PANTOPRAZOLE SODIUM 40 MG/1
40 INJECTION, POWDER, FOR SOLUTION INTRAVENOUS EVERY 12 HOURS SCHEDULED
Status: DISCONTINUED | OUTPATIENT
Start: 2019-05-15 | End: 2019-05-15

## 2019-05-15 RX ORDER — VANCOMYCIN HYDROCHLORIDE 1 G/200ML
1000 INJECTION, SOLUTION INTRAVENOUS ONCE
Status: DISCONTINUED | OUTPATIENT
Start: 2019-05-15 | End: 2019-05-15 | Stop reason: ALTCHOICE

## 2019-05-15 RX ORDER — SODIUM CHLORIDE 9 MG/ML
INJECTION, SOLUTION INTRAVENOUS CONTINUOUS PRN
Status: DISCONTINUED | OUTPATIENT
Start: 2019-05-15 | End: 2019-05-15 | Stop reason: SURG

## 2019-05-15 RX ORDER — SODIUM CHLORIDE 9 MG/ML
125 INJECTION, SOLUTION INTRAVENOUS CONTINUOUS
Status: DISCONTINUED | OUTPATIENT
Start: 2019-05-15 | End: 2019-05-15

## 2019-05-15 RX ADMIN — VANCOMYCIN HYDROCHLORIDE 1500 MG: 5 INJECTION, POWDER, LYOPHILIZED, FOR SOLUTION INTRAVENOUS at 15:13

## 2019-05-15 RX ADMIN — HEPARIN SODIUM AND DEXTROSE 15.1 UNITS/KG/HR: 10000; 5 INJECTION INTRAVENOUS at 18:27

## 2019-05-15 RX ADMIN — SODIUM CHLORIDE: 0.9 INJECTION, SOLUTION INTRAVENOUS at 16:05

## 2019-05-15 RX ADMIN — PROPOFOL 50 MG: 10 INJECTION, EMULSION INTRAVENOUS at 15:49

## 2019-05-15 RX ADMIN — PROPOFOL 50 MG: 10 INJECTION, EMULSION INTRAVENOUS at 15:55

## 2019-05-15 RX ADMIN — SODIUM CHLORIDE: 0.9 INJECTION, SOLUTION INTRAVENOUS at 15:44

## 2019-05-15 RX ADMIN — DEXTROSE 50 ML/HR: 5 SOLUTION INTRAVENOUS at 18:05

## 2019-05-15 RX ADMIN — FAMOTIDINE 20 MG: 10 INJECTION, SOLUTION INTRAVENOUS at 20:24

## 2019-05-15 RX ADMIN — PROPOFOL 40 MG: 10 INJECTION, EMULSION INTRAVENOUS at 16:01

## 2019-05-15 RX ADMIN — PROPOFOL 50 MG: 10 INJECTION, EMULSION INTRAVENOUS at 15:57

## 2019-05-15 RX ADMIN — PROPOFOL 50 MG: 10 INJECTION, EMULSION INTRAVENOUS at 15:52

## 2019-05-16 LAB
ALBUMIN SERPL BCP-MCNC: 2.1 G/DL (ref 3.5–5)
ALP SERPL-CCNC: 150 U/L (ref 46–116)
ALT SERPL W P-5'-P-CCNC: 130 U/L (ref 12–78)
ANION GAP SERPL CALCULATED.3IONS-SCNC: 18 MMOL/L (ref 4–13)
APTT PPP: 68 SECONDS (ref 26–38)
APTT PPP: 71 SECONDS (ref 26–38)
AST SERPL W P-5'-P-CCNC: 64 U/L (ref 5–45)
BASOPHILS # BLD AUTO: 0.02 THOUSANDS/ΜL (ref 0–0.1)
BASOPHILS NFR BLD AUTO: 0 % (ref 0–1)
BILIRUB DIRECT SERPL-MCNC: 0.28 MG/DL (ref 0–0.2)
BILIRUB SERPL-MCNC: 0.66 MG/DL (ref 0.2–1)
BUN SERPL-MCNC: 18 MG/DL (ref 5–25)
CALCIUM SERPL-MCNC: 8.5 MG/DL (ref 8.3–10.1)
CHLORIDE SERPL-SCNC: 90 MMOL/L (ref 100–108)
CO2 SERPL-SCNC: 15 MMOL/L (ref 21–32)
CREAT SERPL-MCNC: 1.38 MG/DL (ref 0.6–1.3)
EOSINOPHIL # BLD AUTO: 0.01 THOUSAND/ΜL (ref 0–0.61)
EOSINOPHIL NFR BLD AUTO: 0 % (ref 0–6)
ERYTHROCYTE [DISTWIDTH] IN BLOOD BY AUTOMATED COUNT: 13.4 % (ref 11.6–15.1)
GFR SERPL CREATININE-BSD FRML MDRD: 52 ML/MIN/1.73SQ M
GLUCOSE SERPL-MCNC: 204 MG/DL (ref 65–140)
GLUCOSE SERPL-MCNC: 672 MG/DL (ref 65–140)
HCT VFR BLD AUTO: 42.4 % (ref 36.5–49.3)
HGB BLD-MCNC: 13.9 G/DL (ref 12–17)
IMM GRANULOCYTES # BLD AUTO: 0.05 THOUSAND/UL (ref 0–0.2)
IMM GRANULOCYTES NFR BLD AUTO: 1 % (ref 0–2)
LYMPHOCYTES # BLD AUTO: 0.59 THOUSANDS/ΜL (ref 0.6–4.47)
LYMPHOCYTES NFR BLD AUTO: 6 % (ref 14–44)
MCH RBC QN AUTO: 30.7 PG (ref 26.8–34.3)
MCHC RBC AUTO-ENTMCNC: 32.8 G/DL (ref 31.4–37.4)
MCV RBC AUTO: 94 FL (ref 82–98)
MONOCYTES # BLD AUTO: 0.62 THOUSAND/ΜL (ref 0.17–1.22)
MONOCYTES NFR BLD AUTO: 6 % (ref 4–12)
NEUTROPHILS # BLD AUTO: 9.01 THOUSANDS/ΜL (ref 1.85–7.62)
NEUTS SEG NFR BLD AUTO: 87 % (ref 43–75)
NRBC BLD AUTO-RTO: 0 /100 WBCS
PLATELET # BLD AUTO: 269 THOUSANDS/UL (ref 149–390)
PMV BLD AUTO: 9.8 FL (ref 8.9–12.7)
POTASSIUM SERPL-SCNC: 3.9 MMOL/L (ref 3.5–5.3)
PROT SERPL-MCNC: 6.2 G/DL (ref 6.4–8.2)
RBC # BLD AUTO: 4.53 MILLION/UL (ref 3.88–5.62)
SODIUM SERPL-SCNC: 123 MMOL/L (ref 136–145)
WBC # BLD AUTO: 10.3 THOUSAND/UL (ref 4.31–10.16)

## 2019-05-16 PROCEDURE — 99232 SBSQ HOSP IP/OBS MODERATE 35: CPT | Performed by: INTERNAL MEDICINE

## 2019-05-16 PROCEDURE — 94762 N-INVAS EAR/PLS OXIMTRY CONT: CPT

## 2019-05-16 PROCEDURE — 92610 EVALUATE SWALLOWING FUNCTION: CPT

## 2019-05-16 PROCEDURE — 82948 REAGENT STRIP/BLOOD GLUCOSE: CPT

## 2019-05-16 PROCEDURE — 85025 COMPLETE CBC W/AUTO DIFF WBC: CPT | Performed by: HOSPITALIST

## 2019-05-16 PROCEDURE — 80048 BASIC METABOLIC PNL TOTAL CA: CPT | Performed by: HOSPITALIST

## 2019-05-16 PROCEDURE — 85730 THROMBOPLASTIN TIME PARTIAL: CPT | Performed by: HOSPITALIST

## 2019-05-16 PROCEDURE — 80076 HEPATIC FUNCTION PANEL: CPT | Performed by: PHYSICIAN ASSISTANT

## 2019-05-16 PROCEDURE — 99233 SBSQ HOSP IP/OBS HIGH 50: CPT | Performed by: HOSPITALIST

## 2019-05-16 RX ADMIN — APIXABAN 5 MG: 5 TABLET, FILM COATED ORAL at 18:19

## 2019-05-16 RX ADMIN — METOPROLOL TARTRATE 12.5 MG: 25 TABLET ORAL at 12:51

## 2019-05-16 RX ADMIN — BISACODYL 10 MG: 10 SUPPOSITORY RECTAL at 09:01

## 2019-05-16 RX ADMIN — ATORVASTATIN CALCIUM 40 MG: 40 TABLET, FILM COATED ORAL at 18:19

## 2019-05-16 RX ADMIN — ONDANSETRON 4 MG: 2 INJECTION INTRAMUSCULAR; INTRAVENOUS at 06:12

## 2019-05-16 RX ADMIN — HEPARIN SODIUM AND DEXTROSE 15.1 UNITS/KG/HR: 10000; 5 INJECTION INTRAVENOUS at 13:30

## 2019-05-16 RX ADMIN — METOPROLOL TARTRATE 2.5 MG: 5 INJECTION, SOLUTION INTRAVENOUS at 08:47

## 2019-05-16 RX ADMIN — METOPROLOL TARTRATE 12.5 MG: 25 TABLET ORAL at 18:19

## 2019-05-16 RX ADMIN — ACETAMINOPHEN 325 MG: 325 SUPPOSITORY RECTAL at 02:40

## 2019-05-16 RX ADMIN — OMEPRAZOLE 20 MG: 20 CAPSULE, DELAYED RELEASE ORAL at 18:20

## 2019-05-17 ENCOUNTER — APPOINTMENT (INPATIENT)
Dept: RADIOLOGY | Facility: HOSPITAL | Age: 69
DRG: 309 | End: 2019-05-17
Payer: MEDICARE

## 2019-05-17 ENCOUNTER — APPOINTMENT (INPATIENT)
Dept: ULTRASOUND IMAGING | Facility: HOSPITAL | Age: 69
DRG: 309 | End: 2019-05-17
Payer: MEDICARE

## 2019-05-17 LAB
ALBUMIN SERPL BCP-MCNC: 2 G/DL (ref 3.5–5)
ALP SERPL-CCNC: 128 U/L (ref 46–116)
ALT SERPL W P-5'-P-CCNC: 103 U/L (ref 12–78)
ANION GAP SERPL CALCULATED.3IONS-SCNC: 8 MMOL/L (ref 4–13)
AST SERPL W P-5'-P-CCNC: 47 U/L (ref 5–45)
BILIRUB SERPL-MCNC: 0.54 MG/DL (ref 0.2–1)
BUN SERPL-MCNC: 24 MG/DL (ref 5–25)
CALCIUM SERPL-MCNC: 9.2 MG/DL (ref 8.3–10.1)
CHLORIDE SERPL-SCNC: 104 MMOL/L (ref 100–108)
CO2 SERPL-SCNC: 27 MMOL/L (ref 21–32)
CREAT SERPL-MCNC: 0.96 MG/DL (ref 0.6–1.3)
ERYTHROCYTE [DISTWIDTH] IN BLOOD BY AUTOMATED COUNT: 13.5 % (ref 11.6–15.1)
GFR SERPL CREATININE-BSD FRML MDRD: 80 ML/MIN/1.73SQ M
GLUCOSE SERPL-MCNC: 157 MG/DL (ref 65–140)
HCT VFR BLD AUTO: 38.3 % (ref 36.5–49.3)
HGB BLD-MCNC: 12.7 G/DL (ref 12–17)
MCH RBC QN AUTO: 31.2 PG (ref 26.8–34.3)
MCHC RBC AUTO-ENTMCNC: 33.2 G/DL (ref 31.4–37.4)
MCV RBC AUTO: 94 FL (ref 82–98)
PLATELET # BLD AUTO: 203 THOUSANDS/UL (ref 149–390)
PMV BLD AUTO: 9.8 FL (ref 8.9–12.7)
POTASSIUM SERPL-SCNC: 3.7 MMOL/L (ref 3.5–5.3)
PROT SERPL-MCNC: 5.8 G/DL (ref 6.4–8.2)
RBC # BLD AUTO: 4.07 MILLION/UL (ref 3.88–5.62)
SODIUM SERPL-SCNC: 139 MMOL/L (ref 136–145)
WBC # BLD AUTO: 8.12 THOUSAND/UL (ref 4.31–10.16)

## 2019-05-17 PROCEDURE — 85027 COMPLETE CBC AUTOMATED: CPT | Performed by: HOSPITALIST

## 2019-05-17 PROCEDURE — 99232 SBSQ HOSP IP/OBS MODERATE 35: CPT | Performed by: PHYSICIAN ASSISTANT

## 2019-05-17 PROCEDURE — 99232 SBSQ HOSP IP/OBS MODERATE 35: CPT | Performed by: HOSPITALIST

## 2019-05-17 PROCEDURE — 99024 POSTOP FOLLOW-UP VISIT: CPT | Performed by: INTERNAL MEDICINE

## 2019-05-17 PROCEDURE — 92526 ORAL FUNCTION THERAPY: CPT

## 2019-05-17 PROCEDURE — 92611 MOTION FLUOROSCOPY/SWALLOW: CPT

## 2019-05-17 PROCEDURE — 76705 ECHO EXAM OF ABDOMEN: CPT

## 2019-05-17 PROCEDURE — 74230 X-RAY XM SWLNG FUNCJ C+: CPT

## 2019-05-17 PROCEDURE — 80053 COMPREHEN METABOLIC PANEL: CPT | Performed by: HOSPITALIST

## 2019-05-17 RX ADMIN — METOPROLOL TARTRATE 12.5 MG: 25 TABLET ORAL at 10:00

## 2019-05-17 RX ADMIN — ATORVASTATIN CALCIUM 40 MG: 40 TABLET, FILM COATED ORAL at 18:20

## 2019-05-17 RX ADMIN — APIXABAN 5 MG: 5 TABLET, FILM COATED ORAL at 10:02

## 2019-05-17 RX ADMIN — APIXABAN 5 MG: 5 TABLET, FILM COATED ORAL at 18:20

## 2019-05-17 RX ADMIN — Medication 20 MG: at 07:48

## 2019-05-17 RX ADMIN — METOPROLOL TARTRATE 12.5 MG: 25 TABLET ORAL at 21:58

## 2019-05-17 RX ADMIN — Medication 20 MG: at 15:29

## 2019-05-18 LAB
ANION GAP SERPL CALCULATED.3IONS-SCNC: 5 MMOL/L (ref 4–13)
BUN SERPL-MCNC: 22 MG/DL (ref 5–25)
CALCIUM SERPL-MCNC: 9.1 MG/DL (ref 8.3–10.1)
CHLORIDE SERPL-SCNC: 103 MMOL/L (ref 100–108)
CO2 SERPL-SCNC: 28 MMOL/L (ref 21–32)
CREAT SERPL-MCNC: 0.8 MG/DL (ref 0.6–1.3)
GFR SERPL CREATININE-BSD FRML MDRD: 91 ML/MIN/1.73SQ M
GLUCOSE SERPL-MCNC: 166 MG/DL (ref 65–140)
POTASSIUM SERPL-SCNC: 3.6 MMOL/L (ref 3.5–5.3)
SODIUM SERPL-SCNC: 136 MMOL/L (ref 136–145)

## 2019-05-18 PROCEDURE — 99232 SBSQ HOSP IP/OBS MODERATE 35: CPT | Performed by: HOSPITALIST

## 2019-05-18 PROCEDURE — 80048 BASIC METABOLIC PNL TOTAL CA: CPT | Performed by: HOSPITALIST

## 2019-05-18 RX ORDER — FINASTERIDE 5 MG/1
5 TABLET, FILM COATED ORAL DAILY
Status: DISCONTINUED | OUTPATIENT
Start: 2019-05-18 | End: 2019-05-21 | Stop reason: HOSPADM

## 2019-05-18 RX ADMIN — APIXABAN 5 MG: 5 TABLET, FILM COATED ORAL at 17:10

## 2019-05-18 RX ADMIN — Medication 20 MG: at 18:06

## 2019-05-18 RX ADMIN — METOPROLOL TARTRATE 12.5 MG: 25 TABLET ORAL at 21:23

## 2019-05-18 RX ADMIN — METOPROLOL TARTRATE 12.5 MG: 25 TABLET ORAL at 09:26

## 2019-05-18 RX ADMIN — ATORVASTATIN CALCIUM 40 MG: 40 TABLET, FILM COATED ORAL at 17:10

## 2019-05-18 RX ADMIN — BISACODYL 10 MG: 10 SUPPOSITORY RECTAL at 09:26

## 2019-05-18 RX ADMIN — FINASTERIDE 5 MG: 5 TABLET, FILM COATED ORAL at 13:12

## 2019-05-18 RX ADMIN — Medication 20 MG: at 06:07

## 2019-05-18 RX ADMIN — APIXABAN 5 MG: 5 TABLET, FILM COATED ORAL at 09:26

## 2019-05-19 PROCEDURE — 99232 SBSQ HOSP IP/OBS MODERATE 35: CPT | Performed by: HOSPITALIST

## 2019-05-19 PROCEDURE — 97110 THERAPEUTIC EXERCISES: CPT

## 2019-05-19 PROCEDURE — 97530 THERAPEUTIC ACTIVITIES: CPT

## 2019-05-19 RX ADMIN — ATORVASTATIN CALCIUM 40 MG: 40 TABLET, FILM COATED ORAL at 17:14

## 2019-05-19 RX ADMIN — FINASTERIDE 5 MG: 5 TABLET, FILM COATED ORAL at 08:31

## 2019-05-19 RX ADMIN — Medication 20 MG: at 05:21

## 2019-05-19 RX ADMIN — METOPROLOL TARTRATE 12.5 MG: 25 TABLET ORAL at 21:40

## 2019-05-19 RX ADMIN — METOPROLOL TARTRATE 12.5 MG: 25 TABLET ORAL at 08:31

## 2019-05-19 RX ADMIN — APIXABAN 5 MG: 5 TABLET, FILM COATED ORAL at 17:14

## 2019-05-19 RX ADMIN — Medication 20 MG: at 19:08

## 2019-05-19 RX ADMIN — APIXABAN 5 MG: 5 TABLET, FILM COATED ORAL at 08:31

## 2019-05-20 LAB
ANION GAP SERPL CALCULATED.3IONS-SCNC: 7 MMOL/L (ref 4–13)
BUN SERPL-MCNC: 16 MG/DL (ref 5–25)
CALCIUM SERPL-MCNC: 8.9 MG/DL (ref 8.3–10.1)
CHLORIDE SERPL-SCNC: 103 MMOL/L (ref 100–108)
CO2 SERPL-SCNC: 26 MMOL/L (ref 21–32)
CREAT SERPL-MCNC: 0.73 MG/DL (ref 0.6–1.3)
GFR SERPL CREATININE-BSD FRML MDRD: 95 ML/MIN/1.73SQ M
GLUCOSE SERPL-MCNC: 172 MG/DL (ref 65–140)
POTASSIUM SERPL-SCNC: 3.8 MMOL/L (ref 3.5–5.3)
SODIUM SERPL-SCNC: 136 MMOL/L (ref 136–145)

## 2019-05-20 PROCEDURE — 99232 SBSQ HOSP IP/OBS MODERATE 35: CPT | Performed by: HOSPITALIST

## 2019-05-20 PROCEDURE — 97530 THERAPEUTIC ACTIVITIES: CPT

## 2019-05-20 PROCEDURE — 97535 SELF CARE MNGMENT TRAINING: CPT

## 2019-05-20 PROCEDURE — 97110 THERAPEUTIC EXERCISES: CPT

## 2019-05-20 PROCEDURE — 80048 BASIC METABOLIC PNL TOTAL CA: CPT | Performed by: HOSPITALIST

## 2019-05-20 RX ORDER — OMEPRAZOLE 20 MG/1
20 CAPSULE, DELAYED RELEASE ORAL
Qty: 60 CAPSULE | Refills: 0 | Status: SHIPPED | OUTPATIENT
Start: 2019-05-20 | End: 2019-05-31 | Stop reason: HOSPADM

## 2019-05-20 RX ORDER — FINASTERIDE 5 MG/1
5 TABLET, FILM COATED ORAL DAILY
Qty: 30 TABLET | Refills: 0 | Status: SHIPPED | OUTPATIENT
Start: 2019-05-21 | End: 2019-06-10 | Stop reason: HOSPADM

## 2019-05-20 RX ADMIN — APIXABAN 5 MG: 5 TABLET, FILM COATED ORAL at 08:50

## 2019-05-20 RX ADMIN — FINASTERIDE 5 MG: 5 TABLET, FILM COATED ORAL at 08:49

## 2019-05-20 RX ADMIN — BISACODYL 10 MG: 10 SUPPOSITORY RECTAL at 08:50

## 2019-05-20 RX ADMIN — ATORVASTATIN CALCIUM 40 MG: 40 TABLET, FILM COATED ORAL at 17:12

## 2019-05-20 RX ADMIN — Medication 20 MG: at 05:30

## 2019-05-20 RX ADMIN — METOPROLOL TARTRATE 12.5 MG: 25 TABLET ORAL at 21:42

## 2019-05-20 RX ADMIN — Medication 20 MG: at 17:11

## 2019-05-20 RX ADMIN — METOPROLOL TARTRATE 12.5 MG: 25 TABLET ORAL at 08:50

## 2019-05-20 RX ADMIN — APIXABAN 5 MG: 5 TABLET, FILM COATED ORAL at 17:12

## 2019-05-21 ENCOUNTER — EPISODE CHANGES (OUTPATIENT)
Dept: CASE MANAGEMENT | Facility: HOSPITAL | Age: 69
End: 2019-05-21

## 2019-05-21 VITALS
DIASTOLIC BLOOD PRESSURE: 90 MMHG | TEMPERATURE: 97.9 F | OXYGEN SATURATION: 97 % | RESPIRATION RATE: 18 BRPM | HEIGHT: 72 IN | BODY MASS INDEX: 32.01 KG/M2 | HEART RATE: 76 BPM | SYSTOLIC BLOOD PRESSURE: 153 MMHG | WEIGHT: 236.33 LBS

## 2019-05-21 PROCEDURE — 99239 HOSP IP/OBS DSCHRG MGMT >30: CPT | Performed by: HOSPITALIST

## 2019-05-21 RX ADMIN — FINASTERIDE 5 MG: 5 TABLET, FILM COATED ORAL at 08:04

## 2019-05-21 RX ADMIN — Medication 20 MG: at 06:02

## 2019-05-21 RX ADMIN — METOPROLOL TARTRATE 12.5 MG: 25 TABLET ORAL at 08:04

## 2019-05-21 RX ADMIN — BISACODYL 10 MG: 10 SUPPOSITORY RECTAL at 08:04

## 2019-05-21 RX ADMIN — APIXABAN 5 MG: 5 TABLET, FILM COATED ORAL at 08:04

## 2019-05-23 ENCOUNTER — HOSPITAL ENCOUNTER (INPATIENT)
Facility: HOSPITAL | Age: 69
LOS: 8 days | DRG: 377 | End: 2019-05-31
Attending: EMERGENCY MEDICINE | Admitting: INTERNAL MEDICINE
Payer: MEDICARE

## 2019-05-23 DIAGNOSIS — K26.4 GASTROINTESTINAL HEMORRHAGE ASSOCIATED WITH DUODENAL ULCER: ICD-10-CM

## 2019-05-23 DIAGNOSIS — K92.2 GI BLEEDING: Primary | ICD-10-CM

## 2019-05-23 DIAGNOSIS — I48.0 PAROXYSMAL ATRIAL FIBRILLATION (HCC): ICD-10-CM

## 2019-05-23 DIAGNOSIS — D62 ACUTE BLOOD LOSS ANEMIA: ICD-10-CM

## 2019-05-23 DIAGNOSIS — E87.0 HYPERNATREMIA: ICD-10-CM

## 2019-05-23 DIAGNOSIS — I10 ESSENTIAL HYPERTENSION: ICD-10-CM

## 2019-05-23 LAB
ALBUMIN SERPL BCP-MCNC: 2.2 G/DL (ref 3.5–5)
ALP SERPL-CCNC: 116 U/L (ref 46–116)
ALT SERPL W P-5'-P-CCNC: 88 U/L (ref 12–78)
ANION GAP SERPL CALCULATED.3IONS-SCNC: 10 MMOL/L (ref 4–13)
APTT PPP: 35 SECONDS (ref 26–38)
AST SERPL W P-5'-P-CCNC: 34 U/L (ref 5–45)
BACTERIA UR QL AUTO: ABNORMAL /HPF
BASOPHILS # BLD AUTO: 0.01 THOUSANDS/ΜL (ref 0–0.1)
BASOPHILS NFR BLD AUTO: 0 % (ref 0–1)
BILIRUB SERPL-MCNC: 0.5 MG/DL (ref 0.2–1)
BILIRUB UR QL STRIP: NEGATIVE
BUN SERPL-MCNC: 40 MG/DL (ref 5–25)
CALCIUM SERPL-MCNC: 8.9 MG/DL (ref 8.3–10.1)
CAOX CRY URNS QL MICRO: ABNORMAL /HPF
CHLORIDE SERPL-SCNC: 103 MMOL/L (ref 100–108)
CLARITY UR: CLEAR
CLARITY, POC: CLEAR
CO2 SERPL-SCNC: 26 MMOL/L (ref 21–32)
COLOR UR: YELLOW
COLOR, POC: YELLOW
CREAT SERPL-MCNC: 0.79 MG/DL (ref 0.6–1.3)
EOSINOPHIL # BLD AUTO: 0.24 THOUSAND/ΜL (ref 0–0.61)
EOSINOPHIL NFR BLD AUTO: 3 % (ref 0–6)
ERYTHROCYTE [DISTWIDTH] IN BLOOD BY AUTOMATED COUNT: 13.5 % (ref 11.6–15.1)
GFR SERPL CREATININE-BSD FRML MDRD: 92 ML/MIN/1.73SQ M
GLUCOSE SERPL-MCNC: 136 MG/DL (ref 65–140)
GLUCOSE UR STRIP-MCNC: NEGATIVE MG/DL
HCT VFR BLD AUTO: 35.9 % (ref 36.5–49.3)
HGB BLD-MCNC: 11.8 G/DL (ref 12–17)
HGB UR QL STRIP.AUTO: ABNORMAL
IMM GRANULOCYTES # BLD AUTO: 0.07 THOUSAND/UL (ref 0–0.2)
IMM GRANULOCYTES NFR BLD AUTO: 1 % (ref 0–2)
INR PPP: 1.18 (ref 0.86–1.17)
KETONES UR STRIP-MCNC: ABNORMAL MG/DL
LEUKOCYTE ESTERASE UR QL STRIP: ABNORMAL
LYMPHOCYTES # BLD AUTO: 1.77 THOUSANDS/ΜL (ref 0.6–4.47)
LYMPHOCYTES NFR BLD AUTO: 18 % (ref 14–44)
MCH RBC QN AUTO: 31.1 PG (ref 26.8–34.3)
MCHC RBC AUTO-ENTMCNC: 32.9 G/DL (ref 31.4–37.4)
MCV RBC AUTO: 95 FL (ref 82–98)
MONOCYTES # BLD AUTO: 0.65 THOUSAND/ΜL (ref 0.17–1.22)
MONOCYTES NFR BLD AUTO: 7 % (ref 4–12)
NEUTROPHILS # BLD AUTO: 6.96 THOUSANDS/ΜL (ref 1.85–7.62)
NEUTS SEG NFR BLD AUTO: 71 % (ref 43–75)
NITRITE UR QL STRIP: NEGATIVE
NON-SQ EPI CELLS URNS QL MICRO: ABNORMAL /HPF
NRBC BLD AUTO-RTO: 0 /100 WBCS
PH UR STRIP.AUTO: 5.5 [PH] (ref 4.5–8)
PLATELET # BLD AUTO: 295 THOUSANDS/UL (ref 149–390)
PMV BLD AUTO: 9.7 FL (ref 8.9–12.7)
POTASSIUM SERPL-SCNC: 4.2 MMOL/L (ref 3.5–5.3)
PROT SERPL-MCNC: 6.1 G/DL (ref 6.4–8.2)
PROT UR STRIP-MCNC: NEGATIVE MG/DL
PROTHROMBIN TIME: 15.1 SECONDS (ref 11.8–14.2)
RBC # BLD AUTO: 3.8 MILLION/UL (ref 3.88–5.62)
RBC #/AREA URNS AUTO: ABNORMAL /HPF
SODIUM SERPL-SCNC: 139 MMOL/L (ref 136–145)
SP GR UR STRIP.AUTO: 1.02 (ref 1–1.03)
TROPONIN I SERPL-MCNC: 0.03 NG/ML
UROBILINOGEN UR QL STRIP.AUTO: 1 E.U./DL
WBC # BLD AUTO: 9.7 THOUSAND/UL (ref 4.31–10.16)
WBC #/AREA URNS AUTO: ABNORMAL /HPF

## 2019-05-23 PROCEDURE — 86900 BLOOD TYPING SEROLOGIC ABO: CPT | Performed by: EMERGENCY MEDICINE

## 2019-05-23 PROCEDURE — 86920 COMPATIBILITY TEST SPIN: CPT

## 2019-05-23 PROCEDURE — 99285 EMERGENCY DEPT VISIT HI MDM: CPT | Performed by: EMERGENCY MEDICINE

## 2019-05-23 PROCEDURE — 93005 ELECTROCARDIOGRAM TRACING: CPT

## 2019-05-23 PROCEDURE — 85025 COMPLETE CBC W/AUTO DIFF WBC: CPT | Performed by: EMERGENCY MEDICINE

## 2019-05-23 PROCEDURE — 82272 OCCULT BLD FECES 1-3 TESTS: CPT

## 2019-05-23 PROCEDURE — 99284 EMERGENCY DEPT VISIT MOD MDM: CPT

## 2019-05-23 PROCEDURE — C9113 INJ PANTOPRAZOLE SODIUM, VIA: HCPCS | Performed by: EMERGENCY MEDICINE

## 2019-05-23 PROCEDURE — 85610 PROTHROMBIN TIME: CPT | Performed by: EMERGENCY MEDICINE

## 2019-05-23 PROCEDURE — 36415 COLL VENOUS BLD VENIPUNCTURE: CPT | Performed by: EMERGENCY MEDICINE

## 2019-05-23 PROCEDURE — 81001 URINALYSIS AUTO W/SCOPE: CPT

## 2019-05-23 PROCEDURE — 86901 BLOOD TYPING SEROLOGIC RH(D): CPT | Performed by: EMERGENCY MEDICINE

## 2019-05-23 PROCEDURE — 85730 THROMBOPLASTIN TIME PARTIAL: CPT | Performed by: EMERGENCY MEDICINE

## 2019-05-23 PROCEDURE — 84484 ASSAY OF TROPONIN QUANT: CPT | Performed by: EMERGENCY MEDICINE

## 2019-05-23 PROCEDURE — 96374 THER/PROPH/DIAG INJ IV PUSH: CPT

## 2019-05-23 PROCEDURE — 80053 COMPREHEN METABOLIC PANEL: CPT | Performed by: EMERGENCY MEDICINE

## 2019-05-23 PROCEDURE — 99222 1ST HOSP IP/OBS MODERATE 55: CPT | Performed by: PHYSICIAN ASSISTANT

## 2019-05-23 PROCEDURE — 86850 RBC ANTIBODY SCREEN: CPT | Performed by: EMERGENCY MEDICINE

## 2019-05-23 RX ORDER — DOCUSATE SODIUM 100 MG/1
100 CAPSULE, LIQUID FILLED ORAL AS NEEDED
COMMUNITY
End: 2019-11-18 | Stop reason: ALTCHOICE

## 2019-05-23 RX ORDER — FINASTERIDE 5 MG/1
5 TABLET, FILM COATED ORAL DAILY
Status: DISCONTINUED | OUTPATIENT
Start: 2019-05-24 | End: 2019-05-31 | Stop reason: HOSPADM

## 2019-05-23 RX ORDER — ACETAMINOPHEN 500 MG
500 TABLET ORAL EVERY 6 HOURS PRN
COMMUNITY
End: 2019-11-18 | Stop reason: ALTCHOICE

## 2019-05-23 RX ORDER — LORAZEPAM 2 MG/ML
0.5 INJECTION INTRAMUSCULAR EVERY 6 HOURS PRN
Status: DISCONTINUED | OUTPATIENT
Start: 2019-05-23 | End: 2019-05-31 | Stop reason: HOSPADM

## 2019-05-23 RX ORDER — ATORVASTATIN CALCIUM 40 MG/1
40 TABLET, FILM COATED ORAL
Status: DISCONTINUED | OUTPATIENT
Start: 2019-05-24 | End: 2019-05-31 | Stop reason: HOSPADM

## 2019-05-23 RX ORDER — ACETAMINOPHEN 650 MG/1
650 SUPPOSITORY RECTAL EVERY 4 HOURS PRN
Status: DISCONTINUED | OUTPATIENT
Start: 2019-05-23 | End: 2019-05-31 | Stop reason: HOSPADM

## 2019-05-23 RX ORDER — PANTOPRAZOLE SODIUM 40 MG/1
40 INJECTION, POWDER, FOR SOLUTION INTRAVENOUS ONCE
Status: COMPLETED | OUTPATIENT
Start: 2019-05-23 | End: 2019-05-23

## 2019-05-23 RX ORDER — ONDANSETRON 2 MG/ML
4 INJECTION INTRAMUSCULAR; INTRAVENOUS EVERY 6 HOURS PRN
Status: DISCONTINUED | OUTPATIENT
Start: 2019-05-23 | End: 2019-05-31 | Stop reason: HOSPADM

## 2019-05-23 RX ORDER — SODIUM CHLORIDE 9 MG/ML
75 INJECTION, SOLUTION INTRAVENOUS CONTINUOUS
Status: DISPENSED | OUTPATIENT
Start: 2019-05-23 | End: 2019-05-24

## 2019-05-23 RX ORDER — ONDANSETRON 4 MG/1
4 TABLET, ORALLY DISINTEGRATING ORAL EVERY 6 HOURS PRN
COMMUNITY
End: 2019-11-18 | Stop reason: ALTCHOICE

## 2019-05-23 RX ADMIN — MORPHINE SULFATE 2 MG: 2 INJECTION, SOLUTION INTRAMUSCULAR; INTRAVENOUS at 21:24

## 2019-05-23 RX ADMIN — SODIUM CHLORIDE 8 MG/HR: 9 INJECTION, SOLUTION INTRAVENOUS at 21:42

## 2019-05-23 RX ADMIN — PANTOPRAZOLE SODIUM 40 MG: 40 INJECTION, POWDER, FOR SOLUTION INTRAVENOUS at 21:25

## 2019-05-23 RX ADMIN — PANTOPRAZOLE SODIUM 40 MG: 40 INJECTION, POWDER, FOR SOLUTION INTRAVENOUS at 20:07

## 2019-05-23 RX ADMIN — SODIUM CHLORIDE 75 ML/HR: 0.9 INJECTION, SOLUTION INTRAVENOUS at 23:29

## 2019-05-24 ENCOUNTER — APPOINTMENT (INPATIENT)
Dept: GASTROENTEROLOGY | Facility: HOSPITAL | Age: 69
DRG: 377 | End: 2019-05-24
Payer: MEDICARE

## 2019-05-24 ENCOUNTER — EPISODE CHANGES (OUTPATIENT)
Dept: CASE MANAGEMENT | Facility: OTHER | Age: 69
End: 2019-05-24

## 2019-05-24 ENCOUNTER — ANESTHESIA (INPATIENT)
Dept: GASTROENTEROLOGY | Facility: HOSPITAL | Age: 69
DRG: 377 | End: 2019-05-24
Payer: MEDICARE

## 2019-05-24 ENCOUNTER — ANESTHESIA EVENT (INPATIENT)
Dept: GASTROENTEROLOGY | Facility: HOSPITAL | Age: 69
DRG: 377 | End: 2019-05-24
Payer: MEDICARE

## 2019-05-24 LAB
ATRIAL RATE: 416 BPM
HGB BLD-MCNC: 10.2 G/DL (ref 12–17)
HGB BLD-MCNC: 8.5 G/DL (ref 12–17)
HGB BLD-MCNC: 9.4 G/DL (ref 12–17)
QRS AXIS: -6 DEGREES
QRSD INTERVAL: 86 MS
QT INTERVAL: 368 MS
QTC INTERVAL: 429 MS
T WAVE AXIS: -15 DEGREES
VENTRICULAR RATE: 82 BPM

## 2019-05-24 PROCEDURE — 93010 ELECTROCARDIOGRAM REPORT: CPT | Performed by: INTERNAL MEDICINE

## 2019-05-24 PROCEDURE — 99232 SBSQ HOSP IP/OBS MODERATE 35: CPT | Performed by: INTERNAL MEDICINE

## 2019-05-24 PROCEDURE — C9113 INJ PANTOPRAZOLE SODIUM, VIA: HCPCS | Performed by: PHYSICIAN ASSISTANT

## 2019-05-24 PROCEDURE — 0DJ08ZZ INSPECTION OF UPPER INTESTINAL TRACT, VIA NATURAL OR ARTIFICIAL OPENING ENDOSCOPIC: ICD-10-PCS | Performed by: INTERNAL MEDICINE

## 2019-05-24 PROCEDURE — C9113 INJ PANTOPRAZOLE SODIUM, VIA: HCPCS | Performed by: INTERNAL MEDICINE

## 2019-05-24 PROCEDURE — 85018 HEMOGLOBIN: CPT | Performed by: PHYSICIAN ASSISTANT

## 2019-05-24 RX ORDER — SODIUM CHLORIDE 9 MG/ML
75 INJECTION, SOLUTION INTRAVENOUS CONTINUOUS
Status: DISPENSED | OUTPATIENT
Start: 2019-05-24 | End: 2019-05-24

## 2019-05-24 RX ORDER — PROPOFOL 10 MG/ML
INJECTION, EMULSION INTRAVENOUS AS NEEDED
Status: DISCONTINUED | OUTPATIENT
Start: 2019-05-24 | End: 2019-05-24 | Stop reason: SURG

## 2019-05-24 RX ORDER — SODIUM CHLORIDE 9 MG/ML
75 INJECTION, SOLUTION INTRAVENOUS CONTINUOUS
Status: DISCONTINUED | OUTPATIENT
Start: 2019-05-24 | End: 2019-05-26

## 2019-05-24 RX ADMIN — PHENYLEPHRINE HYDROCHLORIDE 100 MCG: 10 INJECTION INTRAVENOUS at 14:08

## 2019-05-24 RX ADMIN — SODIUM CHLORIDE 8 MG/HR: 9 INJECTION, SOLUTION INTRAVENOUS at 19:59

## 2019-05-24 RX ADMIN — PHENYLEPHRINE HYDROCHLORIDE 100 MCG: 10 INJECTION INTRAVENOUS at 14:01

## 2019-05-24 RX ADMIN — PHENYLEPHRINE HYDROCHLORIDE 100 MCG: 10 INJECTION INTRAVENOUS at 13:59

## 2019-05-24 RX ADMIN — PHENYLEPHRINE HYDROCHLORIDE 100 MCG: 10 INJECTION INTRAVENOUS at 14:03

## 2019-05-24 RX ADMIN — SODIUM CHLORIDE 8 MG/HR: 9 INJECTION, SOLUTION INTRAVENOUS at 08:38

## 2019-05-24 RX ADMIN — METOPROLOL TARTRATE 12.5 MG: 25 TABLET ORAL at 12:35

## 2019-05-24 RX ADMIN — PHENYLEPHRINE HYDROCHLORIDE 100 MCG: 10 INJECTION INTRAVENOUS at 14:05

## 2019-05-24 RX ADMIN — PHENYLEPHRINE HYDROCHLORIDE 100 MCG: 10 INJECTION INTRAVENOUS at 14:07

## 2019-05-24 RX ADMIN — ATORVASTATIN CALCIUM 40 MG: 40 TABLET, FILM COATED ORAL at 18:08

## 2019-05-24 RX ADMIN — FINASTERIDE 5 MG: 5 TABLET, FILM COATED ORAL at 12:34

## 2019-05-24 RX ADMIN — SODIUM CHLORIDE 75 ML/HR: 0.9 INJECTION, SOLUTION INTRAVENOUS at 12:20

## 2019-05-24 RX ADMIN — PROPOFOL 40 MG: 10 INJECTION, EMULSION INTRAVENOUS at 14:01

## 2019-05-24 RX ADMIN — MORPHINE SULFATE 2 MG: 2 INJECTION, SOLUTION INTRAMUSCULAR; INTRAVENOUS at 01:06

## 2019-05-24 RX ADMIN — PROPOFOL 110 MG: 10 INJECTION, EMULSION INTRAVENOUS at 13:56

## 2019-05-24 RX ADMIN — LIDOCAINE HYDROCHLORIDE 50 MG: 20 INJECTION, SOLUTION INTRAVENOUS at 13:56

## 2019-05-25 ENCOUNTER — ANESTHESIA EVENT (INPATIENT)
Dept: GASTROENTEROLOGY | Facility: HOSPITAL | Age: 69
DRG: 377 | End: 2019-05-25
Payer: MEDICARE

## 2019-05-25 ENCOUNTER — APPOINTMENT (INPATIENT)
Dept: PERIOP | Facility: HOSPITAL | Age: 69
DRG: 377 | End: 2019-05-25
Payer: MEDICARE

## 2019-05-25 ENCOUNTER — ANESTHESIA (INPATIENT)
Dept: GASTROENTEROLOGY | Facility: HOSPITAL | Age: 69
DRG: 377 | End: 2019-05-25
Payer: MEDICARE

## 2019-05-25 LAB
ABO GROUP BLD: NORMAL
ANION GAP SERPL CALCULATED.3IONS-SCNC: 13 MMOL/L (ref 4–13)
BLD GP AB SCN SERPL QL: NEGATIVE
BUN SERPL-MCNC: 84 MG/DL (ref 5–25)
CALCIUM SERPL-MCNC: 8.6 MG/DL (ref 8.3–10.1)
CHLORIDE SERPL-SCNC: 109 MMOL/L (ref 100–108)
CO2 SERPL-SCNC: 20 MMOL/L (ref 21–32)
CREAT SERPL-MCNC: 1.58 MG/DL (ref 0.6–1.3)
ERYTHROCYTE [DISTWIDTH] IN BLOOD BY AUTOMATED COUNT: 14.6 % (ref 11.6–15.1)
GFR SERPL CREATININE-BSD FRML MDRD: 44 ML/MIN/1.73SQ M
GLUCOSE SERPL-MCNC: 210 MG/DL (ref 65–140)
HCT VFR BLD AUTO: 20.3 % (ref 36.5–49.3)
HCT VFR BLD AUTO: 22.1 % (ref 36.5–49.3)
HGB BLD-MCNC: 6.7 G/DL (ref 12–17)
HGB BLD-MCNC: 7.2 G/DL (ref 12–17)
MCH RBC QN AUTO: 31.9 PG (ref 26.8–34.3)
MCHC RBC AUTO-ENTMCNC: 32.5 G/DL (ref 31.4–37.4)
MCV RBC AUTO: 98 FL (ref 82–98)
PLATELET # BLD AUTO: 297 THOUSANDS/UL (ref 149–390)
PMV BLD AUTO: 10.2 FL (ref 8.9–12.7)
POTASSIUM SERPL-SCNC: 4.6 MMOL/L (ref 3.5–5.3)
RBC # BLD AUTO: 2.07 MILLION/UL (ref 3.88–5.62)
RH BLD: POSITIVE
SODIUM SERPL-SCNC: 142 MMOL/L (ref 136–145)
SPECIMEN EXPIRATION DATE: NORMAL
WBC # BLD AUTO: 15.54 THOUSAND/UL (ref 4.31–10.16)

## 2019-05-25 PROCEDURE — 85014 HEMATOCRIT: CPT | Performed by: INTERNAL MEDICINE

## 2019-05-25 PROCEDURE — 3E0G8GC INTRODUCTION OF OTHER THERAPEUTIC SUBSTANCE INTO UPPER GI, VIA NATURAL OR ARTIFICIAL OPENING ENDOSCOPIC: ICD-10-PCS | Performed by: INTERNAL MEDICINE

## 2019-05-25 PROCEDURE — 0W3P8ZZ CONTROL BLEEDING IN GASTROINTESTINAL TRACT, VIA NATURAL OR ARTIFICIAL OPENING ENDOSCOPIC: ICD-10-PCS | Performed by: INTERNAL MEDICINE

## 2019-05-25 PROCEDURE — 85018 HEMOGLOBIN: CPT | Performed by: INTERNAL MEDICINE

## 2019-05-25 PROCEDURE — C9113 INJ PANTOPRAZOLE SODIUM, VIA: HCPCS | Performed by: INTERNAL MEDICINE

## 2019-05-25 PROCEDURE — 99232 SBSQ HOSP IP/OBS MODERATE 35: CPT | Performed by: INTERNAL MEDICINE

## 2019-05-25 PROCEDURE — 94762 N-INVAS EAR/PLS OXIMTRY CONT: CPT

## 2019-05-25 PROCEDURE — P9021 RED BLOOD CELLS UNIT: HCPCS

## 2019-05-25 PROCEDURE — 85027 COMPLETE CBC AUTOMATED: CPT | Performed by: INTERNAL MEDICINE

## 2019-05-25 PROCEDURE — 80048 BASIC METABOLIC PNL TOTAL CA: CPT | Performed by: INTERNAL MEDICINE

## 2019-05-25 PROCEDURE — 30233N1 TRANSFUSION OF NONAUTOLOGOUS RED BLOOD CELLS INTO PERIPHERAL VEIN, PERCUTANEOUS APPROACH: ICD-10-PCS | Performed by: FAMILY MEDICINE

## 2019-05-25 RX ORDER — METOPROLOL TARTRATE 5 MG/5ML
5 INJECTION INTRAVENOUS EVERY 6 HOURS PRN
Status: DISCONTINUED | OUTPATIENT
Start: 2019-05-25 | End: 2019-05-31 | Stop reason: HOSPADM

## 2019-05-25 RX ORDER — KETAMINE HYDROCHLORIDE 50 MG/ML
INJECTION, SOLUTION, CONCENTRATE INTRAMUSCULAR; INTRAVENOUS AS NEEDED
Status: DISCONTINUED | OUTPATIENT
Start: 2019-05-25 | End: 2019-05-25 | Stop reason: SURG

## 2019-05-25 RX ORDER — PROPOFOL 10 MG/ML
INJECTION, EMULSION INTRAVENOUS AS NEEDED
Status: DISCONTINUED | OUTPATIENT
Start: 2019-05-25 | End: 2019-05-25 | Stop reason: SURG

## 2019-05-25 RX ORDER — MIDAZOLAM HYDROCHLORIDE 1 MG/ML
INJECTION INTRAMUSCULAR; INTRAVENOUS AS NEEDED
Status: DISCONTINUED | OUTPATIENT
Start: 2019-05-25 | End: 2019-05-25 | Stop reason: SURG

## 2019-05-25 RX ADMIN — METOPROLOL TARTRATE 5 MG: 1 INJECTION, SOLUTION INTRAVENOUS at 13:24

## 2019-05-25 RX ADMIN — SODIUM CHLORIDE 75 ML/HR: 0.9 INJECTION, SOLUTION INTRAVENOUS at 22:54

## 2019-05-25 RX ADMIN — METOPROLOL TARTRATE 5 MG: 1 INJECTION, SOLUTION INTRAVENOUS at 23:54

## 2019-05-25 RX ADMIN — MIDAZOLAM 1 MG: 1 INJECTION INTRAMUSCULAR; INTRAVENOUS at 14:27

## 2019-05-25 RX ADMIN — MIDAZOLAM 1 MG: 1 INJECTION INTRAMUSCULAR; INTRAVENOUS at 14:17

## 2019-05-25 RX ADMIN — FINASTERIDE 5 MG: 5 TABLET, FILM COATED ORAL at 09:35

## 2019-05-25 RX ADMIN — METOPROLOL TARTRATE 25 MG: 25 TABLET, FILM COATED ORAL at 22:26

## 2019-05-25 RX ADMIN — KETAMINE HYDROCHLORIDE 40 MG: 50 INJECTION INTRAMUSCULAR; INTRAVENOUS at 14:19

## 2019-05-25 RX ADMIN — METOPROLOL TARTRATE 12.5 MG: 25 TABLET ORAL at 09:35

## 2019-05-25 RX ADMIN — SODIUM CHLORIDE 8 MG/HR: 9 INJECTION, SOLUTION INTRAVENOUS at 07:32

## 2019-05-25 RX ADMIN — PROPOFOL 10 MG: 10 INJECTION, EMULSION INTRAVENOUS at 14:39

## 2019-05-26 LAB
ABO GROUP BLD BPU: NORMAL
ABO GROUP BLD BPU: NORMAL
ANION GAP SERPL CALCULATED.3IONS-SCNC: 11 MMOL/L (ref 4–13)
ANION GAP SERPL CALCULATED.3IONS-SCNC: 7 MMOL/L (ref 4–13)
APTT PPP: 28 SECONDS (ref 26–38)
APTT PPP: 52 SECONDS (ref 26–38)
BPU ID: NORMAL
BPU ID: NORMAL
BUN SERPL-MCNC: 70 MG/DL (ref 5–25)
BUN SERPL-MCNC: 84 MG/DL (ref 5–25)
CALCIUM SERPL-MCNC: 8.7 MG/DL (ref 8.3–10.1)
CALCIUM SERPL-MCNC: 8.7 MG/DL (ref 8.3–10.1)
CHLORIDE SERPL-SCNC: 114 MMOL/L (ref 100–108)
CHLORIDE SERPL-SCNC: 117 MMOL/L (ref 100–108)
CO2 SERPL-SCNC: 22 MMOL/L (ref 21–32)
CO2 SERPL-SCNC: 24 MMOL/L (ref 21–32)
CREAT SERPL-MCNC: 1.25 MG/DL (ref 0.6–1.3)
CREAT SERPL-MCNC: 1.5 MG/DL (ref 0.6–1.3)
CROSSMATCH: NORMAL
CROSSMATCH: NORMAL
ERYTHROCYTE [DISTWIDTH] IN BLOOD BY AUTOMATED COUNT: 17.2 % (ref 11.6–15.1)
GFR SERPL CREATININE-BSD FRML MDRD: 47 ML/MIN/1.73SQ M
GFR SERPL CREATININE-BSD FRML MDRD: 58 ML/MIN/1.73SQ M
GLUCOSE SERPL-MCNC: 172 MG/DL (ref 65–140)
GLUCOSE SERPL-MCNC: 182 MG/DL (ref 65–140)
HCT VFR BLD AUTO: 22.8 % (ref 36.5–49.3)
HCT VFR BLD AUTO: 23.2 % (ref 36.5–49.3)
HCT VFR BLD AUTO: 24.4 % (ref 36.5–49.3)
HCT VFR BLD AUTO: 25.5 % (ref 36.5–49.3)
HGB BLD-MCNC: 7.3 G/DL (ref 12–17)
HGB BLD-MCNC: 7.5 G/DL (ref 12–17)
HGB BLD-MCNC: 7.8 G/DL (ref 12–17)
HGB BLD-MCNC: 8.1 G/DL (ref 12–17)
INR PPP: 1.06 (ref 0.86–1.17)
MCH RBC QN AUTO: 30.9 PG (ref 26.8–34.3)
MCHC RBC AUTO-ENTMCNC: 32 G/DL (ref 31.4–37.4)
MCV RBC AUTO: 97 FL (ref 82–98)
PLATELET # BLD AUTO: 241 THOUSANDS/UL (ref 149–390)
PMV BLD AUTO: 9.6 FL (ref 8.9–12.7)
POTASSIUM SERPL-SCNC: 3.6 MMOL/L (ref 3.5–5.3)
POTASSIUM SERPL-SCNC: 3.8 MMOL/L (ref 3.5–5.3)
PROTHROMBIN TIME: 13.9 SECONDS (ref 11.8–14.2)
RBC # BLD AUTO: 2.36 MILLION/UL (ref 3.88–5.62)
SODIUM SERPL-SCNC: 147 MMOL/L (ref 136–145)
SODIUM SERPL-SCNC: 148 MMOL/L (ref 136–145)
UNIT DISPENSE STATUS: NORMAL
UNIT DISPENSE STATUS: NORMAL
UNIT PRODUCT CODE: NORMAL
UNIT PRODUCT CODE: NORMAL
UNIT RH: NORMAL
UNIT RH: NORMAL
WBC # BLD AUTO: 13.77 THOUSAND/UL (ref 4.31–10.16)

## 2019-05-26 PROCEDURE — 99232 SBSQ HOSP IP/OBS MODERATE 35: CPT | Performed by: INTERNAL MEDICINE

## 2019-05-26 PROCEDURE — 85730 THROMBOPLASTIN TIME PARTIAL: CPT | Performed by: INTERNAL MEDICINE

## 2019-05-26 PROCEDURE — 85018 HEMOGLOBIN: CPT | Performed by: INTERNAL MEDICINE

## 2019-05-26 PROCEDURE — 85610 PROTHROMBIN TIME: CPT | Performed by: INTERNAL MEDICINE

## 2019-05-26 PROCEDURE — 85014 HEMATOCRIT: CPT | Performed by: INTERNAL MEDICINE

## 2019-05-26 PROCEDURE — 99232 SBSQ HOSP IP/OBS MODERATE 35: CPT | Performed by: PHYSICIAN ASSISTANT

## 2019-05-26 PROCEDURE — 80048 BASIC METABOLIC PNL TOTAL CA: CPT | Performed by: INTERNAL MEDICINE

## 2019-05-26 PROCEDURE — P9040 RBC LEUKOREDUCED IRRADIATED: HCPCS

## 2019-05-26 PROCEDURE — 85027 COMPLETE CBC AUTOMATED: CPT | Performed by: INTERNAL MEDICINE

## 2019-05-26 PROCEDURE — C9113 INJ PANTOPRAZOLE SODIUM, VIA: HCPCS | Performed by: INTERNAL MEDICINE

## 2019-05-26 RX ORDER — DEXTROSE MONOHYDRATE 50 MG/ML
50 INJECTION, SOLUTION INTRAVENOUS CONTINUOUS
Status: DISCONTINUED | OUTPATIENT
Start: 2019-05-26 | End: 2019-05-27

## 2019-05-26 RX ORDER — HEPARIN SODIUM 10000 [USP'U]/100ML
3-20 INJECTION, SOLUTION INTRAVENOUS
Status: DISCONTINUED | OUTPATIENT
Start: 2019-05-26 | End: 2019-05-28

## 2019-05-26 RX ADMIN — HEPARIN SODIUM AND DEXTROSE 11.1 UNITS/KG/HR: 10000; 5 INJECTION INTRAVENOUS at 12:05

## 2019-05-26 RX ADMIN — METOPROLOL TARTRATE 25 MG: 25 TABLET, FILM COATED ORAL at 08:42

## 2019-05-26 RX ADMIN — ATORVASTATIN CALCIUM 40 MG: 40 TABLET, FILM COATED ORAL at 15:34

## 2019-05-26 RX ADMIN — METOPROLOL TARTRATE 5 MG: 1 INJECTION, SOLUTION INTRAVENOUS at 05:49

## 2019-05-26 RX ADMIN — DEXTROSE 50 ML/HR: 5 SOLUTION INTRAVENOUS at 08:39

## 2019-05-26 RX ADMIN — FINASTERIDE 5 MG: 5 TABLET, FILM COATED ORAL at 08:39

## 2019-05-26 RX ADMIN — METOPROLOL TARTRATE 25 MG: 25 TABLET, FILM COATED ORAL at 21:16

## 2019-05-26 RX ADMIN — SODIUM CHLORIDE 8 MG/HR: 9 INJECTION, SOLUTION INTRAVENOUS at 14:03

## 2019-05-26 RX ADMIN — SODIUM CHLORIDE 8 MG/HR: 9 INJECTION, SOLUTION INTRAVENOUS at 03:01

## 2019-05-27 PROBLEM — D62 ACUTE BLOOD LOSS ANEMIA: Status: ACTIVE | Noted: 2019-05-27

## 2019-05-27 PROBLEM — G93.40 ACUTE ENCEPHALOPATHY: Status: ACTIVE | Noted: 2019-05-27

## 2019-05-27 PROBLEM — I50.32 CHRONIC DIASTOLIC CHF (CONGESTIVE HEART FAILURE) (HCC): Status: ACTIVE | Noted: 2019-05-27

## 2019-05-27 PROBLEM — N17.9 ACUTE KIDNEY INJURY (HCC): Status: ACTIVE | Noted: 2019-05-27

## 2019-05-27 PROBLEM — E87.0 HYPERNATREMIA: Status: ACTIVE | Noted: 2019-05-27

## 2019-05-27 LAB
ABO GROUP BLD BPU: NORMAL
ANION GAP SERPL CALCULATED.3IONS-SCNC: 7 MMOL/L (ref 4–13)
APTT PPP: 60 SECONDS (ref 26–38)
APTT PPP: 72 SECONDS (ref 26–38)
BPU ID: NORMAL
BUN SERPL-MCNC: 48 MG/DL (ref 5–25)
CALCIUM SERPL-MCNC: 8.5 MG/DL (ref 8.3–10.1)
CHLORIDE SERPL-SCNC: 118 MMOL/L (ref 100–108)
CO2 SERPL-SCNC: 24 MMOL/L (ref 21–32)
CREAT SERPL-MCNC: 1.02 MG/DL (ref 0.6–1.3)
CROSSMATCH: NORMAL
ERYTHROCYTE [DISTWIDTH] IN BLOOD BY AUTOMATED COUNT: 18 % (ref 11.6–15.1)
GFR SERPL CREATININE-BSD FRML MDRD: 75 ML/MIN/1.73SQ M
GLUCOSE SERPL-MCNC: 168 MG/DL (ref 65–140)
HCT VFR BLD AUTO: 22.2 % (ref 36.5–49.3)
HCT VFR BLD AUTO: 23.3 % (ref 36.5–49.3)
HCT VFR BLD AUTO: 23.3 % (ref 36.5–49.3)
HCT VFR BLD AUTO: 23.4 % (ref 36.5–49.3)
HGB BLD-MCNC: 7.1 G/DL (ref 12–17)
HGB BLD-MCNC: 7.4 G/DL (ref 12–17)
HGB BLD-MCNC: 7.5 G/DL (ref 12–17)
HGB BLD-MCNC: 7.6 G/DL (ref 12–17)
MCH RBC QN AUTO: 31.1 PG (ref 26.8–34.3)
MCHC RBC AUTO-ENTMCNC: 32.5 G/DL (ref 31.4–37.4)
MCV RBC AUTO: 96 FL (ref 82–98)
PLATELET # BLD AUTO: 192 THOUSANDS/UL (ref 149–390)
PMV BLD AUTO: 9.2 FL (ref 8.9–12.7)
POTASSIUM SERPL-SCNC: 3.2 MMOL/L (ref 3.5–5.3)
RBC # BLD AUTO: 2.44 MILLION/UL (ref 3.88–5.62)
SODIUM SERPL-SCNC: 149 MMOL/L (ref 136–145)
UNIT DISPENSE STATUS: NORMAL
UNIT PRODUCT CODE: NORMAL
UNIT RH: NORMAL
WBC # BLD AUTO: 9.74 THOUSAND/UL (ref 4.31–10.16)

## 2019-05-27 PROCEDURE — 85018 HEMOGLOBIN: CPT | Performed by: INTERNAL MEDICINE

## 2019-05-27 PROCEDURE — C9113 INJ PANTOPRAZOLE SODIUM, VIA: HCPCS | Performed by: INTERNAL MEDICINE

## 2019-05-27 PROCEDURE — 99222 1ST HOSP IP/OBS MODERATE 55: CPT | Performed by: INTERNAL MEDICINE

## 2019-05-27 PROCEDURE — 85014 HEMATOCRIT: CPT | Performed by: INTERNAL MEDICINE

## 2019-05-27 PROCEDURE — 99232 SBSQ HOSP IP/OBS MODERATE 35: CPT | Performed by: INTERNAL MEDICINE

## 2019-05-27 PROCEDURE — 85730 THROMBOPLASTIN TIME PARTIAL: CPT | Performed by: INTERNAL MEDICINE

## 2019-05-27 PROCEDURE — 99232 SBSQ HOSP IP/OBS MODERATE 35: CPT | Performed by: FAMILY MEDICINE

## 2019-05-27 PROCEDURE — 85027 COMPLETE CBC AUTOMATED: CPT | Performed by: INTERNAL MEDICINE

## 2019-05-27 PROCEDURE — 80048 BASIC METABOLIC PNL TOTAL CA: CPT | Performed by: INTERNAL MEDICINE

## 2019-05-27 RX ORDER — POTASSIUM CHLORIDE 14.9 MG/ML
20 INJECTION INTRAVENOUS ONCE
Status: COMPLETED | OUTPATIENT
Start: 2019-05-27 | End: 2019-05-28

## 2019-05-27 RX ORDER — POTASSIUM CHLORIDE 14.9 MG/ML
20 INJECTION INTRAVENOUS ONCE
Status: COMPLETED | OUTPATIENT
Start: 2019-05-27 | End: 2019-05-27

## 2019-05-27 RX ORDER — DEXTROSE MONOHYDRATE 50 MG/ML
60 INJECTION, SOLUTION INTRAVENOUS CONTINUOUS
Status: DISCONTINUED | OUTPATIENT
Start: 2019-05-27 | End: 2019-05-29

## 2019-05-27 RX ADMIN — ATORVASTATIN CALCIUM 40 MG: 40 TABLET, FILM COATED ORAL at 16:04

## 2019-05-27 RX ADMIN — POTASSIUM CHLORIDE 20 MEQ: 200 INJECTION, SOLUTION INTRAVENOUS at 11:46

## 2019-05-27 RX ADMIN — SODIUM CHLORIDE 8 MG/HR: 9 INJECTION, SOLUTION INTRAVENOUS at 02:30

## 2019-05-27 RX ADMIN — SODIUM CHLORIDE 8 MG/HR: 9 INJECTION, SOLUTION INTRAVENOUS at 21:48

## 2019-05-27 RX ADMIN — DEXTROSE 80 ML/HR: 5 SOLUTION INTRAVENOUS at 21:48

## 2019-05-27 RX ADMIN — METOPROLOL TARTRATE 25 MG: 25 TABLET, FILM COATED ORAL at 21:48

## 2019-05-27 RX ADMIN — POTASSIUM CHLORIDE 20 MEQ: 200 INJECTION, SOLUTION INTRAVENOUS at 16:07

## 2019-05-27 RX ADMIN — SODIUM CHLORIDE 8 MG/HR: 9 INJECTION, SOLUTION INTRAVENOUS at 11:01

## 2019-05-27 RX ADMIN — DEXTROSE 80 ML/HR: 5 SOLUTION INTRAVENOUS at 16:03

## 2019-05-27 RX ADMIN — DEXTROSE 50 ML/HR: 5 SOLUTION INTRAVENOUS at 07:29

## 2019-05-27 RX ADMIN — POTASSIUM CHLORIDE 20 MEQ: 200 INJECTION, SOLUTION INTRAVENOUS at 09:27

## 2019-05-27 RX ADMIN — FINASTERIDE 5 MG: 5 TABLET, FILM COATED ORAL at 09:46

## 2019-05-27 RX ADMIN — HEPARIN SODIUM AND DEXTROSE 13.1 UNITS/KG/HR: 10000; 5 INJECTION INTRAVENOUS at 11:01

## 2019-05-28 ENCOUNTER — APPOINTMENT (INPATIENT)
Dept: GASTROENTEROLOGY | Facility: HOSPITAL | Age: 69
DRG: 377 | End: 2019-05-28
Payer: MEDICARE

## 2019-05-28 ENCOUNTER — ANESTHESIA EVENT (INPATIENT)
Dept: GASTROENTEROLOGY | Facility: HOSPITAL | Age: 69
DRG: 377 | End: 2019-05-28
Payer: MEDICARE

## 2019-05-28 ENCOUNTER — ANESTHESIA (INPATIENT)
Dept: GASTROENTEROLOGY | Facility: HOSPITAL | Age: 69
DRG: 377 | End: 2019-05-28
Payer: MEDICARE

## 2019-05-28 LAB
ABO GROUP BLD BPU: NORMAL
ABO GROUP BLD: NORMAL
ANION GAP SERPL CALCULATED.3IONS-SCNC: 11 MMOL/L (ref 4–13)
ANION GAP SERPL CALCULATED.3IONS-SCNC: 8 MMOL/L (ref 4–13)
APTT PPP: 29 SECONDS (ref 26–38)
APTT PPP: 66 SECONDS (ref 26–38)
BLD GP AB SCN SERPL QL: NEGATIVE
BPU ID: NORMAL
BUN SERPL-MCNC: 27 MG/DL (ref 5–25)
BUN SERPL-MCNC: 31 MG/DL (ref 5–25)
CALCIUM SERPL-MCNC: 8.3 MG/DL (ref 8.3–10.1)
CALCIUM SERPL-MCNC: 8.5 MG/DL (ref 8.3–10.1)
CHLORIDE SERPL-SCNC: 113 MMOL/L (ref 100–108)
CHLORIDE SERPL-SCNC: 115 MMOL/L (ref 100–108)
CO2 SERPL-SCNC: 21 MMOL/L (ref 21–32)
CO2 SERPL-SCNC: 24 MMOL/L (ref 21–32)
CREAT SERPL-MCNC: 0.77 MG/DL (ref 0.6–1.3)
CREAT SERPL-MCNC: 0.85 MG/DL (ref 0.6–1.3)
CROSSMATCH: NORMAL
ERYTHROCYTE [DISTWIDTH] IN BLOOD BY AUTOMATED COUNT: 19.4 % (ref 11.6–15.1)
GFR SERPL CREATININE-BSD FRML MDRD: 89 ML/MIN/1.73SQ M
GFR SERPL CREATININE-BSD FRML MDRD: 93 ML/MIN/1.73SQ M
GLUCOSE SERPL-MCNC: 160 MG/DL (ref 65–140)
GLUCOSE SERPL-MCNC: 178 MG/DL (ref 65–140)
HCT VFR BLD AUTO: 22.3 % (ref 36.5–49.3)
HCT VFR BLD AUTO: 26 % (ref 36.5–49.3)
HCT VFR BLD AUTO: 26.8 % (ref 36.5–49.3)
HCT VFR BLD AUTO: 27.1 % (ref 36.5–49.3)
HGB BLD-MCNC: 6.9 G/DL (ref 12–17)
HGB BLD-MCNC: 8 G/DL (ref 12–17)
HGB BLD-MCNC: 8.4 G/DL (ref 12–17)
HGB BLD-MCNC: 8.4 G/DL (ref 12–17)
INR PPP: 1.04 (ref 0.86–1.17)
MAGNESIUM SERPL-MCNC: 2 MG/DL (ref 1.6–2.6)
MCH RBC QN AUTO: 30.4 PG (ref 26.8–34.3)
MCHC RBC AUTO-ENTMCNC: 31 G/DL (ref 31.4–37.4)
MCV RBC AUTO: 98 FL (ref 82–98)
PLATELET # BLD AUTO: 162 THOUSANDS/UL (ref 149–390)
PMV BLD AUTO: 9.7 FL (ref 8.9–12.7)
POTASSIUM SERPL-SCNC: 3.4 MMOL/L (ref 3.5–5.3)
POTASSIUM SERPL-SCNC: 3.5 MMOL/L (ref 3.5–5.3)
PROTHROMBIN TIME: 13.7 SECONDS (ref 11.8–14.2)
RBC # BLD AUTO: 2.76 MILLION/UL (ref 3.88–5.62)
RH BLD: POSITIVE
SODIUM SERPL-SCNC: 145 MMOL/L (ref 136–145)
SODIUM SERPL-SCNC: 147 MMOL/L (ref 136–145)
SPECIMEN EXPIRATION DATE: NORMAL
UNIT DISPENSE STATUS: NORMAL
UNIT PRODUCT CODE: NORMAL
UNIT RH: NORMAL
WBC # BLD AUTO: 7.09 THOUSAND/UL (ref 4.31–10.16)

## 2019-05-28 PROCEDURE — 85730 THROMBOPLASTIN TIME PARTIAL: CPT | Performed by: FAMILY MEDICINE

## 2019-05-28 PROCEDURE — 86920 COMPATIBILITY TEST SPIN: CPT

## 2019-05-28 PROCEDURE — 86901 BLOOD TYPING SEROLOGIC RH(D): CPT | Performed by: PHYSICIAN ASSISTANT

## 2019-05-28 PROCEDURE — 85014 HEMATOCRIT: CPT | Performed by: INTERNAL MEDICINE

## 2019-05-28 PROCEDURE — 86900 BLOOD TYPING SEROLOGIC ABO: CPT | Performed by: PHYSICIAN ASSISTANT

## 2019-05-28 PROCEDURE — 85027 COMPLETE CBC AUTOMATED: CPT | Performed by: FAMILY MEDICINE

## 2019-05-28 PROCEDURE — C9113 INJ PANTOPRAZOLE SODIUM, VIA: HCPCS | Performed by: INTERNAL MEDICINE

## 2019-05-28 PROCEDURE — P9021 RED BLOOD CELLS UNIT: HCPCS

## 2019-05-28 PROCEDURE — 83735 ASSAY OF MAGNESIUM: CPT | Performed by: FAMILY MEDICINE

## 2019-05-28 PROCEDURE — 0DJ08ZZ INSPECTION OF UPPER INTESTINAL TRACT, VIA NATURAL OR ARTIFICIAL OPENING ENDOSCOPIC: ICD-10-PCS | Performed by: INTERNAL MEDICINE

## 2019-05-28 PROCEDURE — C9113 INJ PANTOPRAZOLE SODIUM, VIA: HCPCS | Performed by: PHYSICIAN ASSISTANT

## 2019-05-28 PROCEDURE — 85610 PROTHROMBIN TIME: CPT | Performed by: FAMILY MEDICINE

## 2019-05-28 PROCEDURE — 80048 BASIC METABOLIC PNL TOTAL CA: CPT | Performed by: NURSE PRACTITIONER

## 2019-05-28 PROCEDURE — 43235 EGD DIAGNOSTIC BRUSH WASH: CPT | Performed by: INTERNAL MEDICINE

## 2019-05-28 PROCEDURE — 85018 HEMOGLOBIN: CPT | Performed by: INTERNAL MEDICINE

## 2019-05-28 PROCEDURE — 99232 SBSQ HOSP IP/OBS MODERATE 35: CPT | Performed by: NURSE PRACTITIONER

## 2019-05-28 PROCEDURE — 86850 RBC ANTIBODY SCREEN: CPT | Performed by: PHYSICIAN ASSISTANT

## 2019-05-28 PROCEDURE — 99232 SBSQ HOSP IP/OBS MODERATE 35: CPT | Performed by: FAMILY MEDICINE

## 2019-05-28 PROCEDURE — 80048 BASIC METABOLIC PNL TOTAL CA: CPT | Performed by: FAMILY MEDICINE

## 2019-05-28 RX ORDER — HEPARIN SODIUM 10000 [USP'U]/100ML
300-2000 INJECTION, SOLUTION INTRAVENOUS
Status: DISCONTINUED | OUTPATIENT
Start: 2019-05-28 | End: 2019-05-28

## 2019-05-28 RX ORDER — PROPOFOL 10 MG/ML
INJECTION, EMULSION INTRAVENOUS AS NEEDED
Status: DISCONTINUED | OUTPATIENT
Start: 2019-05-28 | End: 2019-05-28 | Stop reason: SURG

## 2019-05-28 RX ORDER — POTASSIUM CHLORIDE 14.9 MG/ML
20 INJECTION INTRAVENOUS ONCE
Status: COMPLETED | OUTPATIENT
Start: 2019-05-28 | End: 2019-05-28

## 2019-05-28 RX ORDER — PANTOPRAZOLE SODIUM 40 MG/1
40 INJECTION, POWDER, FOR SOLUTION INTRAVENOUS EVERY 12 HOURS SCHEDULED
Status: DISCONTINUED | OUTPATIENT
Start: 2019-05-28 | End: 2019-05-31

## 2019-05-28 RX ORDER — SODIUM CHLORIDE 9 MG/ML
INJECTION, SOLUTION INTRAVENOUS CONTINUOUS PRN
Status: DISCONTINUED | OUTPATIENT
Start: 2019-05-28 | End: 2019-05-28 | Stop reason: SURG

## 2019-05-28 RX ORDER — HEPARIN SODIUM 1000 [USP'U]/ML
8400 INJECTION, SOLUTION INTRAVENOUS; SUBCUTANEOUS AS NEEDED
Status: DISCONTINUED | OUTPATIENT
Start: 2019-05-28 | End: 2019-05-29 | Stop reason: ALTCHOICE

## 2019-05-28 RX ORDER — HEPARIN SODIUM 10000 [USP'U]/100ML
3-30 INJECTION, SOLUTION INTRAVENOUS
Status: DISCONTINUED | OUTPATIENT
Start: 2019-05-28 | End: 2019-05-29 | Stop reason: ALTCHOICE

## 2019-05-28 RX ORDER — HEPARIN SODIUM 1000 [USP'U]/ML
4200 INJECTION, SOLUTION INTRAVENOUS; SUBCUTANEOUS AS NEEDED
Status: DISCONTINUED | OUTPATIENT
Start: 2019-05-28 | End: 2019-05-29 | Stop reason: ALTCHOICE

## 2019-05-28 RX ADMIN — ATORVASTATIN CALCIUM 40 MG: 40 TABLET, FILM COATED ORAL at 16:26

## 2019-05-28 RX ADMIN — POTASSIUM CHLORIDE 20 MEQ: 200 INJECTION, SOLUTION INTRAVENOUS at 09:25

## 2019-05-28 RX ADMIN — PANTOPRAZOLE SODIUM 40 MG: 40 INJECTION, POWDER, FOR SOLUTION INTRAVENOUS at 21:45

## 2019-05-28 RX ADMIN — DEXTROSE 100 ML/HR: 5 SOLUTION INTRAVENOUS at 13:50

## 2019-05-28 RX ADMIN — SODIUM CHLORIDE: 0.9 INJECTION, SOLUTION INTRAVENOUS at 12:42

## 2019-05-28 RX ADMIN — HEPARIN SODIUM AND DEXTROSE 18 UNITS/KG/HR: 10000; 5 INJECTION INTRAVENOUS at 18:18

## 2019-05-28 RX ADMIN — FINASTERIDE 5 MG: 5 TABLET, FILM COATED ORAL at 09:27

## 2019-05-28 RX ADMIN — METOPROLOL TARTRATE 25 MG: 25 TABLET, FILM COATED ORAL at 09:27

## 2019-05-28 RX ADMIN — PROPOFOL 50 MG: 10 INJECTION, EMULSION INTRAVENOUS at 12:43

## 2019-05-28 RX ADMIN — SODIUM CHLORIDE 8 MG/HR: 9 INJECTION, SOLUTION INTRAVENOUS at 09:40

## 2019-05-28 RX ADMIN — METOPROLOL TARTRATE 25 MG: 25 TABLET, FILM COATED ORAL at 21:45

## 2019-05-28 RX ADMIN — HEPARIN SODIUM AND DEXTROSE 13.1 UNITS/KG/HR: 10000; 5 INJECTION INTRAVENOUS at 06:15

## 2019-05-29 LAB
ABO GROUP BLD: NORMAL
ANION GAP SERPL CALCULATED.3IONS-SCNC: 7 MMOL/L (ref 4–13)
APTT PPP: 134 SECONDS (ref 26–38)
APTT PPP: 86 SECONDS (ref 26–38)
BLD GP AB SCN SERPL QL: NEGATIVE
BUN SERPL-MCNC: 23 MG/DL (ref 5–25)
CALCIUM SERPL-MCNC: 8.2 MG/DL (ref 8.3–10.1)
CHLORIDE SERPL-SCNC: 111 MMOL/L (ref 100–108)
CO2 SERPL-SCNC: 25 MMOL/L (ref 21–32)
CREAT SERPL-MCNC: 0.7 MG/DL (ref 0.6–1.3)
GFR SERPL CREATININE-BSD FRML MDRD: 96 ML/MIN/1.73SQ M
GLUCOSE SERPL-MCNC: 166 MG/DL (ref 65–140)
HCT VFR BLD AUTO: 26.4 % (ref 36.5–49.3)
HCT VFR BLD AUTO: 26.6 % (ref 36.5–49.3)
HGB BLD-MCNC: 8.1 G/DL (ref 12–17)
HGB BLD-MCNC: 8.2 G/DL (ref 12–17)
MAGNESIUM SERPL-MCNC: 1.7 MG/DL (ref 1.6–2.6)
POTASSIUM SERPL-SCNC: 3.3 MMOL/L (ref 3.5–5.3)
RH BLD: POSITIVE
SODIUM SERPL-SCNC: 143 MMOL/L (ref 136–145)
SPECIMEN EXPIRATION DATE: NORMAL

## 2019-05-29 PROCEDURE — 85018 HEMOGLOBIN: CPT | Performed by: INTERNAL MEDICINE

## 2019-05-29 PROCEDURE — 99232 SBSQ HOSP IP/OBS MODERATE 35: CPT | Performed by: NURSE PRACTITIONER

## 2019-05-29 PROCEDURE — 94762 N-INVAS EAR/PLS OXIMTRY CONT: CPT

## 2019-05-29 PROCEDURE — 99232 SBSQ HOSP IP/OBS MODERATE 35: CPT | Performed by: FAMILY MEDICINE

## 2019-05-29 PROCEDURE — 85014 HEMATOCRIT: CPT | Performed by: INTERNAL MEDICINE

## 2019-05-29 PROCEDURE — 99232 SBSQ HOSP IP/OBS MODERATE 35: CPT | Performed by: INTERNAL MEDICINE

## 2019-05-29 PROCEDURE — 80048 BASIC METABOLIC PNL TOTAL CA: CPT | Performed by: FAMILY MEDICINE

## 2019-05-29 PROCEDURE — 83735 ASSAY OF MAGNESIUM: CPT | Performed by: FAMILY MEDICINE

## 2019-05-29 PROCEDURE — 85730 THROMBOPLASTIN TIME PARTIAL: CPT | Performed by: FAMILY MEDICINE

## 2019-05-29 PROCEDURE — C9113 INJ PANTOPRAZOLE SODIUM, VIA: HCPCS | Performed by: PHYSICIAN ASSISTANT

## 2019-05-29 RX ORDER — POTASSIUM CHLORIDE 14.9 MG/ML
20 INJECTION INTRAVENOUS ONCE
Status: COMPLETED | OUTPATIENT
Start: 2019-05-29 | End: 2019-05-29

## 2019-05-29 RX ADMIN — POTASSIUM CHLORIDE 20 MEQ: 200 INJECTION, SOLUTION INTRAVENOUS at 12:26

## 2019-05-29 RX ADMIN — APIXABAN 5 MG: 5 TABLET, FILM COATED ORAL at 13:52

## 2019-05-29 RX ADMIN — POTASSIUM CHLORIDE 20 MEQ: 200 INJECTION, SOLUTION INTRAVENOUS at 09:43

## 2019-05-29 RX ADMIN — METOPROLOL TARTRATE 25 MG: 25 TABLET, FILM COATED ORAL at 21:56

## 2019-05-29 RX ADMIN — PANTOPRAZOLE SODIUM 40 MG: 40 INJECTION, POWDER, FOR SOLUTION INTRAVENOUS at 09:35

## 2019-05-29 RX ADMIN — PANTOPRAZOLE SODIUM 40 MG: 40 INJECTION, POWDER, FOR SOLUTION INTRAVENOUS at 21:56

## 2019-05-29 RX ADMIN — APIXABAN 5 MG: 5 TABLET, FILM COATED ORAL at 21:56

## 2019-05-29 RX ADMIN — ATORVASTATIN CALCIUM 40 MG: 40 TABLET, FILM COATED ORAL at 16:01

## 2019-05-29 RX ADMIN — FINASTERIDE 5 MG: 5 TABLET, FILM COATED ORAL at 09:46

## 2019-05-29 RX ADMIN — DEXTROSE 100 ML/HR: 5 SOLUTION INTRAVENOUS at 00:23

## 2019-05-29 RX ADMIN — HEPARIN SODIUM AND DEXTROSE 18 UNITS/KG/HR: 10000; 5 INJECTION INTRAVENOUS at 07:48

## 2019-05-30 LAB
ANION GAP SERPL CALCULATED.3IONS-SCNC: 7 MMOL/L (ref 4–13)
BUN SERPL-MCNC: 17 MG/DL (ref 5–25)
CALCIUM SERPL-MCNC: 8.5 MG/DL (ref 8.3–10.1)
CHLORIDE SERPL-SCNC: 110 MMOL/L (ref 100–108)
CO2 SERPL-SCNC: 23 MMOL/L (ref 21–32)
CREAT SERPL-MCNC: 0.68 MG/DL (ref 0.6–1.3)
GFR SERPL CREATININE-BSD FRML MDRD: 97 ML/MIN/1.73SQ M
GLUCOSE SERPL-MCNC: 143 MG/DL (ref 65–140)
HCT VFR BLD AUTO: 28 % (ref 36.5–49.3)
HGB BLD-MCNC: 9 G/DL (ref 12–17)
MAGNESIUM SERPL-MCNC: 1.6 MG/DL (ref 1.6–2.6)
POTASSIUM SERPL-SCNC: 3.6 MMOL/L (ref 3.5–5.3)
SODIUM SERPL-SCNC: 140 MMOL/L (ref 136–145)

## 2019-05-30 PROCEDURE — 94762 N-INVAS EAR/PLS OXIMTRY CONT: CPT

## 2019-05-30 PROCEDURE — 85014 HEMATOCRIT: CPT | Performed by: FAMILY MEDICINE

## 2019-05-30 PROCEDURE — 80048 BASIC METABOLIC PNL TOTAL CA: CPT | Performed by: FAMILY MEDICINE

## 2019-05-30 PROCEDURE — 99232 SBSQ HOSP IP/OBS MODERATE 35: CPT | Performed by: NURSE PRACTITIONER

## 2019-05-30 PROCEDURE — 83735 ASSAY OF MAGNESIUM: CPT | Performed by: FAMILY MEDICINE

## 2019-05-30 PROCEDURE — 99232 SBSQ HOSP IP/OBS MODERATE 35: CPT | Performed by: FAMILY MEDICINE

## 2019-05-30 PROCEDURE — 85018 HEMOGLOBIN: CPT | Performed by: FAMILY MEDICINE

## 2019-05-30 PROCEDURE — 86677 HELICOBACTER PYLORI ANTIBODY: CPT | Performed by: PHYSICIAN ASSISTANT

## 2019-05-30 PROCEDURE — C9113 INJ PANTOPRAZOLE SODIUM, VIA: HCPCS | Performed by: FAMILY MEDICINE

## 2019-05-30 RX ADMIN — ATORVASTATIN CALCIUM 40 MG: 40 TABLET, FILM COATED ORAL at 16:52

## 2019-05-30 RX ADMIN — METOPROLOL TARTRATE 25 MG: 25 TABLET, FILM COATED ORAL at 21:55

## 2019-05-30 RX ADMIN — PANTOPRAZOLE SODIUM 40 MG: 40 INJECTION, POWDER, FOR SOLUTION INTRAVENOUS at 21:54

## 2019-05-30 RX ADMIN — PANTOPRAZOLE SODIUM 40 MG: 40 INJECTION, POWDER, FOR SOLUTION INTRAVENOUS at 09:36

## 2019-05-30 RX ADMIN — METOPROLOL TARTRATE 25 MG: 25 TABLET, FILM COATED ORAL at 09:37

## 2019-05-30 RX ADMIN — APIXABAN 5 MG: 5 TABLET, FILM COATED ORAL at 09:37

## 2019-05-30 RX ADMIN — APIXABAN 5 MG: 5 TABLET, FILM COATED ORAL at 21:54

## 2019-05-30 RX ADMIN — FINASTERIDE 5 MG: 5 TABLET, FILM COATED ORAL at 09:37

## 2019-05-31 VITALS
SYSTOLIC BLOOD PRESSURE: 141 MMHG | HEIGHT: 72 IN | WEIGHT: 236.11 LBS | TEMPERATURE: 97 F | BODY MASS INDEX: 31.98 KG/M2 | RESPIRATION RATE: 19 BRPM | DIASTOLIC BLOOD PRESSURE: 90 MMHG | HEART RATE: 89 BPM | OXYGEN SATURATION: 100 %

## 2019-05-31 LAB
BASOPHILS # BLD AUTO: 0.01 THOUSANDS/ΜL (ref 0–0.1)
BASOPHILS NFR BLD AUTO: 0 % (ref 0–1)
EOSINOPHIL # BLD AUTO: 0.27 THOUSAND/ΜL (ref 0–0.61)
EOSINOPHIL NFR BLD AUTO: 5 % (ref 0–6)
ERYTHROCYTE [DISTWIDTH] IN BLOOD BY AUTOMATED COUNT: 18.6 % (ref 11.6–15.1)
H PYLORI IGG SER IA-ACNC: 0.8 INDEX VALUE (ref 0–0.79)
HCT VFR BLD AUTO: 26.3 % (ref 36.5–49.3)
HGB BLD-MCNC: 8 G/DL (ref 12–17)
IMM GRANULOCYTES # BLD AUTO: 0.06 THOUSAND/UL (ref 0–0.2)
IMM GRANULOCYTES NFR BLD AUTO: 1 % (ref 0–2)
LYMPHOCYTES # BLD AUTO: 0.88 THOUSANDS/ΜL (ref 0.6–4.47)
LYMPHOCYTES NFR BLD AUTO: 15 % (ref 14–44)
MCH RBC QN AUTO: 30.5 PG (ref 26.8–34.3)
MCHC RBC AUTO-ENTMCNC: 30.4 G/DL (ref 31.4–37.4)
MCV RBC AUTO: 100 FL (ref 82–98)
MONOCYTES # BLD AUTO: 0.31 THOUSAND/ΜL (ref 0.17–1.22)
MONOCYTES NFR BLD AUTO: 5 % (ref 4–12)
NEUTROPHILS # BLD AUTO: 4.21 THOUSANDS/ΜL (ref 1.85–7.62)
NEUTS SEG NFR BLD AUTO: 74 % (ref 43–75)
NRBC BLD AUTO-RTO: 0 /100 WBCS
PLATELET # BLD AUTO: 148 THOUSANDS/UL (ref 149–390)
PMV BLD AUTO: 9.8 FL (ref 8.9–12.7)
RBC # BLD AUTO: 2.62 MILLION/UL (ref 3.88–5.62)
WBC # BLD AUTO: 5.74 THOUSAND/UL (ref 4.31–10.16)

## 2019-05-31 PROCEDURE — G8988 SELF CARE GOAL STATUS: HCPCS

## 2019-05-31 PROCEDURE — G8987 SELF CARE CURRENT STATUS: HCPCS

## 2019-05-31 PROCEDURE — C9113 INJ PANTOPRAZOLE SODIUM, VIA: HCPCS | Performed by: FAMILY MEDICINE

## 2019-05-31 PROCEDURE — 97167 OT EVAL HIGH COMPLEX 60 MIN: CPT

## 2019-05-31 PROCEDURE — G8978 MOBILITY CURRENT STATUS: HCPCS

## 2019-05-31 PROCEDURE — G8979 MOBILITY GOAL STATUS: HCPCS

## 2019-05-31 PROCEDURE — 99239 HOSP IP/OBS DSCHRG MGMT >30: CPT | Performed by: FAMILY MEDICINE

## 2019-05-31 PROCEDURE — 97163 PT EVAL HIGH COMPLEX 45 MIN: CPT

## 2019-05-31 PROCEDURE — 94762 N-INVAS EAR/PLS OXIMTRY CONT: CPT

## 2019-05-31 PROCEDURE — 85025 COMPLETE CBC W/AUTO DIFF WBC: CPT | Performed by: FAMILY MEDICINE

## 2019-05-31 RX ORDER — PANTOPRAZOLE SODIUM 40 MG/1
40 TABLET, DELAYED RELEASE ORAL
Qty: 120 TABLET | Refills: 0
Start: 2019-05-31

## 2019-05-31 RX ORDER — PANTOPRAZOLE SODIUM 40 MG/1
40 TABLET, DELAYED RELEASE ORAL
Status: DISCONTINUED | OUTPATIENT
Start: 2019-05-31 | End: 2019-05-31 | Stop reason: HOSPADM

## 2019-05-31 RX ADMIN — FINASTERIDE 5 MG: 5 TABLET, FILM COATED ORAL at 08:54

## 2019-05-31 RX ADMIN — METOPROLOL TARTRATE 25 MG: 25 TABLET, FILM COATED ORAL at 08:54

## 2019-05-31 RX ADMIN — PANTOPRAZOLE SODIUM 40 MG: 40 INJECTION, POWDER, FOR SOLUTION INTRAVENOUS at 08:55

## 2019-05-31 RX ADMIN — APIXABAN 5 MG: 5 TABLET, FILM COATED ORAL at 08:55

## 2019-06-02 ENCOUNTER — HOSPITAL ENCOUNTER (INPATIENT)
Facility: HOSPITAL | Age: 69
LOS: 8 days | DRG: 378 | End: 2019-06-10
Attending: EMERGENCY MEDICINE | Admitting: INTERNAL MEDICINE
Payer: MEDICARE

## 2019-06-02 DIAGNOSIS — F06.31 DEPRESSION DUE TO ACUTE STROKE (HCC): ICD-10-CM

## 2019-06-02 DIAGNOSIS — I63.411 CEREBROVASCULAR ACCIDENT (CVA) DUE TO EMBOLISM OF RIGHT MIDDLE CEREBRAL ARTERY (HCC): ICD-10-CM

## 2019-06-02 DIAGNOSIS — F41.9 ANXIETY: ICD-10-CM

## 2019-06-02 DIAGNOSIS — R33.9 URINARY RETENTION: ICD-10-CM

## 2019-06-02 DIAGNOSIS — I63.9 DEPRESSION DUE TO ACUTE STROKE (HCC): ICD-10-CM

## 2019-06-02 DIAGNOSIS — I48.0 PAROXYSMAL ATRIAL FIBRILLATION (HCC): ICD-10-CM

## 2019-06-02 DIAGNOSIS — K92.2 GI BLEED: Primary | ICD-10-CM

## 2019-06-02 PROBLEM — K92.1 HEMATOCHEZIA: Status: ACTIVE | Noted: 2019-06-02

## 2019-06-02 LAB
ABO GROUP BLD: NORMAL
ALBUMIN SERPL BCP-MCNC: 2.3 G/DL (ref 3.5–5)
ALP SERPL-CCNC: 109 U/L (ref 46–116)
ALT SERPL W P-5'-P-CCNC: 63 U/L (ref 12–78)
ANION GAP SERPL CALCULATED.3IONS-SCNC: 6 MMOL/L (ref 4–13)
APTT PPP: 31 SECONDS (ref 26–38)
AST SERPL W P-5'-P-CCNC: 32 U/L (ref 5–45)
BASOPHILS # BLD AUTO: 0.01 THOUSANDS/ΜL (ref 0–0.1)
BASOPHILS NFR BLD AUTO: 0 % (ref 0–1)
BILIRUB DIRECT SERPL-MCNC: 0.15 MG/DL (ref 0–0.2)
BILIRUB SERPL-MCNC: 0.47 MG/DL (ref 0.2–1)
BLD GP AB SCN SERPL QL: NEGATIVE
BUN SERPL-MCNC: 15 MG/DL (ref 5–25)
CALCIUM SERPL-MCNC: 8.7 MG/DL (ref 8.3–10.1)
CHLORIDE SERPL-SCNC: 106 MMOL/L (ref 100–108)
CO2 SERPL-SCNC: 27 MMOL/L (ref 21–32)
CREAT SERPL-MCNC: 0.66 MG/DL (ref 0.6–1.3)
EOSINOPHIL # BLD AUTO: 0.33 THOUSAND/ΜL (ref 0–0.61)
EOSINOPHIL NFR BLD AUTO: 5 % (ref 0–6)
ERYTHROCYTE [DISTWIDTH] IN BLOOD BY AUTOMATED COUNT: 18.6 % (ref 11.6–15.1)
GFR SERPL CREATININE-BSD FRML MDRD: 99 ML/MIN/1.73SQ M
GLUCOSE SERPL-MCNC: 155 MG/DL (ref 65–140)
HCT VFR BLD AUTO: 30.6 % (ref 36.5–49.3)
HGB BLD-MCNC: 9 G/DL (ref 12–17)
HGB BLD-MCNC: 9.7 G/DL (ref 12–17)
IMM GRANULOCYTES # BLD AUTO: 0.03 THOUSAND/UL (ref 0–0.2)
IMM GRANULOCYTES NFR BLD AUTO: 1 % (ref 0–2)
INR PPP: 1.13 (ref 0.86–1.17)
LYMPHOCYTES # BLD AUTO: 1 THOUSANDS/ΜL (ref 0.6–4.47)
LYMPHOCYTES NFR BLD AUTO: 15 % (ref 14–44)
MAGNESIUM SERPL-MCNC: 1.5 MG/DL (ref 1.6–2.6)
MCH RBC QN AUTO: 31.1 PG (ref 26.8–34.3)
MCHC RBC AUTO-ENTMCNC: 31.7 G/DL (ref 31.4–37.4)
MCV RBC AUTO: 98 FL (ref 82–98)
MONOCYTES # BLD AUTO: 0.37 THOUSAND/ΜL (ref 0.17–1.22)
MONOCYTES NFR BLD AUTO: 6 % (ref 4–12)
NEUTROPHILS # BLD AUTO: 4.79 THOUSANDS/ΜL (ref 1.85–7.62)
NEUTS SEG NFR BLD AUTO: 73 % (ref 43–75)
NRBC BLD AUTO-RTO: 0 /100 WBCS
NT-PROBNP SERPL-MCNC: 226 PG/ML
PLATELET # BLD AUTO: 200 THOUSANDS/UL (ref 149–390)
PMV BLD AUTO: 9.7 FL (ref 8.9–12.7)
POTASSIUM SERPL-SCNC: 3.7 MMOL/L (ref 3.5–5.3)
PROT SERPL-MCNC: 5.8 G/DL (ref 6.4–8.2)
PROTHROMBIN TIME: 14.6 SECONDS (ref 11.8–14.2)
RBC # BLD AUTO: 3.12 MILLION/UL (ref 3.88–5.62)
RH BLD: POSITIVE
SODIUM SERPL-SCNC: 139 MMOL/L (ref 136–145)
SPECIMEN EXPIRATION DATE: NORMAL
TSH SERPL DL<=0.05 MIU/L-ACNC: 2.85 UIU/ML (ref 0.36–3.74)
WBC # BLD AUTO: 6.53 THOUSAND/UL (ref 4.31–10.16)

## 2019-06-02 PROCEDURE — 80048 BASIC METABOLIC PNL TOTAL CA: CPT | Performed by: EMERGENCY MEDICINE

## 2019-06-02 PROCEDURE — 85018 HEMOGLOBIN: CPT | Performed by: INTERNAL MEDICINE

## 2019-06-02 PROCEDURE — 96361 HYDRATE IV INFUSION ADD-ON: CPT

## 2019-06-02 PROCEDURE — 99223 1ST HOSP IP/OBS HIGH 75: CPT | Performed by: INTERNAL MEDICINE

## 2019-06-02 PROCEDURE — 86901 BLOOD TYPING SEROLOGIC RH(D): CPT | Performed by: EMERGENCY MEDICINE

## 2019-06-02 PROCEDURE — 83880 ASSAY OF NATRIURETIC PEPTIDE: CPT | Performed by: EMERGENCY MEDICINE

## 2019-06-02 PROCEDURE — 87040 BLOOD CULTURE FOR BACTERIA: CPT | Performed by: INTERNAL MEDICINE

## 2019-06-02 PROCEDURE — 36415 COLL VENOUS BLD VENIPUNCTURE: CPT | Performed by: EMERGENCY MEDICINE

## 2019-06-02 PROCEDURE — 85025 COMPLETE CBC W/AUTO DIFF WBC: CPT | Performed by: EMERGENCY MEDICINE

## 2019-06-02 PROCEDURE — 86850 RBC ANTIBODY SCREEN: CPT | Performed by: EMERGENCY MEDICINE

## 2019-06-02 PROCEDURE — 99285 EMERGENCY DEPT VISIT HI MDM: CPT | Performed by: EMERGENCY MEDICINE

## 2019-06-02 PROCEDURE — C9113 INJ PANTOPRAZOLE SODIUM, VIA: HCPCS | Performed by: EMERGENCY MEDICINE

## 2019-06-02 PROCEDURE — 83735 ASSAY OF MAGNESIUM: CPT | Performed by: EMERGENCY MEDICINE

## 2019-06-02 PROCEDURE — 80076 HEPATIC FUNCTION PANEL: CPT | Performed by: EMERGENCY MEDICINE

## 2019-06-02 PROCEDURE — 85610 PROTHROMBIN TIME: CPT | Performed by: EMERGENCY MEDICINE

## 2019-06-02 PROCEDURE — 96376 TX/PRO/DX INJ SAME DRUG ADON: CPT

## 2019-06-02 PROCEDURE — 82272 OCCULT BLD FECES 1-3 TESTS: CPT

## 2019-06-02 PROCEDURE — 96365 THER/PROPH/DIAG IV INF INIT: CPT

## 2019-06-02 PROCEDURE — C9113 INJ PANTOPRAZOLE SODIUM, VIA: HCPCS | Performed by: INTERNAL MEDICINE

## 2019-06-02 PROCEDURE — 84443 ASSAY THYROID STIM HORMONE: CPT | Performed by: EMERGENCY MEDICINE

## 2019-06-02 PROCEDURE — 99285 EMERGENCY DEPT VISIT HI MDM: CPT

## 2019-06-02 PROCEDURE — 85730 THROMBOPLASTIN TIME PARTIAL: CPT | Performed by: EMERGENCY MEDICINE

## 2019-06-02 PROCEDURE — 86900 BLOOD TYPING SEROLOGIC ABO: CPT | Performed by: EMERGENCY MEDICINE

## 2019-06-02 RX ORDER — PANTOPRAZOLE SODIUM 40 MG/1
40 INJECTION, POWDER, FOR SOLUTION INTRAVENOUS ONCE
Status: COMPLETED | OUTPATIENT
Start: 2019-06-02 | End: 2019-06-02

## 2019-06-02 RX ORDER — ACETAMINOPHEN 325 MG/1
650 TABLET ORAL EVERY 6 HOURS PRN
Status: DISCONTINUED | OUTPATIENT
Start: 2019-06-02 | End: 2019-06-10 | Stop reason: HOSPADM

## 2019-06-02 RX ORDER — FINASTERIDE 5 MG/1
5 TABLET, FILM COATED ORAL DAILY
Status: DISCONTINUED | OUTPATIENT
Start: 2019-06-02 | End: 2019-06-10 | Stop reason: HOSPADM

## 2019-06-02 RX ADMIN — SODIUM CHLORIDE 8 MG/HR: 9 INJECTION, SOLUTION INTRAVENOUS at 11:22

## 2019-06-02 RX ADMIN — PANTOPRAZOLE SODIUM 40 MG: 40 INJECTION, POWDER, FOR SOLUTION INTRAVENOUS at 10:54

## 2019-06-02 RX ADMIN — SODIUM CHLORIDE 8 MG/HR: 9 INJECTION, SOLUTION INTRAVENOUS at 22:56

## 2019-06-02 RX ADMIN — METOPROLOL TARTRATE 25 MG: 25 TABLET, FILM COATED ORAL at 21:47

## 2019-06-02 RX ADMIN — SODIUM CHLORIDE 1000 ML: 0.9 INJECTION, SOLUTION INTRAVENOUS at 10:50

## 2019-06-03 ENCOUNTER — EPISODE CHANGES (OUTPATIENT)
Dept: CASE MANAGEMENT | Facility: OTHER | Age: 69
End: 2019-06-03

## 2019-06-03 ENCOUNTER — TELEPHONE (OUTPATIENT)
Dept: NEUROLOGY | Facility: CLINIC | Age: 69
End: 2019-06-03

## 2019-06-03 PROBLEM — F06.31 DEPRESSION DUE TO ACUTE STROKE (HCC): Status: ACTIVE | Noted: 2019-06-03

## 2019-06-03 PROBLEM — K92.2 GI BLEED NOT REQUIRING MORE THAN 4 UNITS OF BLOOD IN 24 HOURS, ICU, OR SURGERY: Status: ACTIVE | Noted: 2019-06-02

## 2019-06-03 PROBLEM — I63.9 DEPRESSION DUE TO ACUTE STROKE (HCC): Status: ACTIVE | Noted: 2019-06-03

## 2019-06-03 LAB
ALBUMIN SERPL BCP-MCNC: 2.1 G/DL (ref 3.5–5)
ALP SERPL-CCNC: 96 U/L (ref 46–116)
ALT SERPL W P-5'-P-CCNC: 51 U/L (ref 12–78)
ANION GAP SERPL CALCULATED.3IONS-SCNC: 8 MMOL/L (ref 4–13)
AST SERPL W P-5'-P-CCNC: 28 U/L (ref 5–45)
BILIRUB SERPL-MCNC: 0.6 MG/DL (ref 0.2–1)
BUN SERPL-MCNC: 12 MG/DL (ref 5–25)
CALCIUM SERPL-MCNC: 8.8 MG/DL (ref 8.3–10.1)
CHLORIDE SERPL-SCNC: 106 MMOL/L (ref 100–108)
CO2 SERPL-SCNC: 26 MMOL/L (ref 21–32)
CREAT SERPL-MCNC: 0.58 MG/DL (ref 0.6–1.3)
ERYTHROCYTE [DISTWIDTH] IN BLOOD BY AUTOMATED COUNT: 18.5 % (ref 11.6–15.1)
GFR SERPL CREATININE-BSD FRML MDRD: 104 ML/MIN/1.73SQ M
GLUCOSE SERPL-MCNC: 134 MG/DL (ref 65–140)
HCT VFR BLD AUTO: 28.3 % (ref 36.5–49.3)
HCT VFR BLD AUTO: 30.7 % (ref 36.5–49.3)
HGB BLD-MCNC: 8.7 G/DL (ref 12–17)
HGB BLD-MCNC: 9 G/DL (ref 12–17)
HGB BLD-MCNC: 9.6 G/DL (ref 12–17)
MAGNESIUM SERPL-MCNC: 1.5 MG/DL (ref 1.6–2.6)
MCH RBC QN AUTO: 31.1 PG (ref 26.8–34.3)
MCHC RBC AUTO-ENTMCNC: 31.8 G/DL (ref 31.4–37.4)
MCV RBC AUTO: 98 FL (ref 82–98)
PLATELET # BLD AUTO: 189 THOUSANDS/UL (ref 149–390)
PMV BLD AUTO: 9.8 FL (ref 8.9–12.7)
POTASSIUM SERPL-SCNC: 3.7 MMOL/L (ref 3.5–5.3)
PROT SERPL-MCNC: 5.3 G/DL (ref 6.4–8.2)
RBC # BLD AUTO: 2.89 MILLION/UL (ref 3.88–5.62)
SODIUM SERPL-SCNC: 140 MMOL/L (ref 136–145)
WBC # BLD AUTO: 4.74 THOUSAND/UL (ref 4.31–10.16)

## 2019-06-03 PROCEDURE — 83735 ASSAY OF MAGNESIUM: CPT | Performed by: INTERNAL MEDICINE

## 2019-06-03 PROCEDURE — 85018 HEMOGLOBIN: CPT | Performed by: INTERNAL MEDICINE

## 2019-06-03 PROCEDURE — C9113 INJ PANTOPRAZOLE SODIUM, VIA: HCPCS | Performed by: INTERNAL MEDICINE

## 2019-06-03 PROCEDURE — 99232 SBSQ HOSP IP/OBS MODERATE 35: CPT | Performed by: INTERNAL MEDICINE

## 2019-06-03 PROCEDURE — 80053 COMPREHEN METABOLIC PANEL: CPT | Performed by: INTERNAL MEDICINE

## 2019-06-03 PROCEDURE — 85014 HEMATOCRIT: CPT | Performed by: INTERNAL MEDICINE

## 2019-06-03 PROCEDURE — 85027 COMPLETE CBC AUTOMATED: CPT | Performed by: INTERNAL MEDICINE

## 2019-06-03 RX ORDER — FLUOXETINE 10 MG/1
10 CAPSULE ORAL DAILY
Status: DISCONTINUED | OUTPATIENT
Start: 2019-06-03 | End: 2019-06-10 | Stop reason: HOSPADM

## 2019-06-03 RX ADMIN — FINASTERIDE 5 MG: 5 TABLET, FILM COATED ORAL at 08:56

## 2019-06-03 RX ADMIN — SODIUM CHLORIDE 8 MG/HR: 9 INJECTION, SOLUTION INTRAVENOUS at 10:41

## 2019-06-03 RX ADMIN — METOPROLOL TARTRATE 25 MG: 25 TABLET, FILM COATED ORAL at 08:56

## 2019-06-03 RX ADMIN — FLUOXETINE 10 MG: 10 CAPSULE ORAL at 14:20

## 2019-06-03 RX ADMIN — METOPROLOL TARTRATE 25 MG: 25 TABLET, FILM COATED ORAL at 21:39

## 2019-06-04 PROBLEM — R33.9 URINARY RETENTION: Status: ACTIVE | Noted: 2019-06-04

## 2019-06-04 LAB
ANION GAP SERPL CALCULATED.3IONS-SCNC: 10 MMOL/L (ref 4–13)
BUN SERPL-MCNC: 11 MG/DL (ref 5–25)
CALCIUM SERPL-MCNC: 9 MG/DL (ref 8.3–10.1)
CHLORIDE SERPL-SCNC: 105 MMOL/L (ref 100–108)
CO2 SERPL-SCNC: 26 MMOL/L (ref 21–32)
CREAT SERPL-MCNC: 0.61 MG/DL (ref 0.6–1.3)
ERYTHROCYTE [DISTWIDTH] IN BLOOD BY AUTOMATED COUNT: 18.1 % (ref 11.6–15.1)
GFR SERPL CREATININE-BSD FRML MDRD: 102 ML/MIN/1.73SQ M
GLUCOSE SERPL-MCNC: 132 MG/DL (ref 65–140)
HCT VFR BLD AUTO: 30.9 % (ref 36.5–49.3)
HGB BLD-MCNC: 9.5 G/DL (ref 12–17)
INR PPP: 1.04 (ref 0.86–1.17)
MCH RBC QN AUTO: 30.3 PG (ref 26.8–34.3)
MCHC RBC AUTO-ENTMCNC: 30.7 G/DL (ref 31.4–37.4)
MCV RBC AUTO: 98 FL (ref 82–98)
PLATELET # BLD AUTO: 226 THOUSANDS/UL (ref 149–390)
PMV BLD AUTO: 9.4 FL (ref 8.9–12.7)
POTASSIUM SERPL-SCNC: 3.5 MMOL/L (ref 3.5–5.3)
PROTHROMBIN TIME: 13.7 SECONDS (ref 11.8–14.2)
RBC # BLD AUTO: 3.14 MILLION/UL (ref 3.88–5.62)
SODIUM SERPL-SCNC: 141 MMOL/L (ref 136–145)
WBC # BLD AUTO: 5.72 THOUSAND/UL (ref 4.31–10.16)

## 2019-06-04 PROCEDURE — G8997 SWALLOW GOAL STATUS: HCPCS

## 2019-06-04 PROCEDURE — 85610 PROTHROMBIN TIME: CPT | Performed by: INTERNAL MEDICINE

## 2019-06-04 PROCEDURE — 85027 COMPLETE CBC AUTOMATED: CPT | Performed by: INTERNAL MEDICINE

## 2019-06-04 PROCEDURE — 80048 BASIC METABOLIC PNL TOTAL CA: CPT | Performed by: INTERNAL MEDICINE

## 2019-06-04 PROCEDURE — 99232 SBSQ HOSP IP/OBS MODERATE 35: CPT | Performed by: INTERNAL MEDICINE

## 2019-06-04 PROCEDURE — G8996 SWALLOW CURRENT STATUS: HCPCS

## 2019-06-04 PROCEDURE — 92523 SPEECH SOUND LANG COMPREHEN: CPT

## 2019-06-04 PROCEDURE — 92610 EVALUATE SWALLOWING FUNCTION: CPT

## 2019-06-04 RX ORDER — BACLOFEN 10 MG/1
5 TABLET ORAL 3 TIMES DAILY
Status: DISCONTINUED | OUTPATIENT
Start: 2019-06-04 | End: 2019-06-05

## 2019-06-04 RX ORDER — LORAZEPAM 1 MG/1
0.5 TABLET ORAL EVERY 8 HOURS PRN
Status: DISCONTINUED | OUTPATIENT
Start: 2019-06-04 | End: 2019-06-10 | Stop reason: HOSPADM

## 2019-06-04 RX ADMIN — BACLOFEN 5 MG: 10 TABLET ORAL at 22:26

## 2019-06-04 RX ADMIN — RIVAROXABAN 15 MG: 15 TABLET, FILM COATED ORAL at 15:02

## 2019-06-04 RX ADMIN — FINASTERIDE 5 MG: 5 TABLET, FILM COATED ORAL at 08:21

## 2019-06-04 RX ADMIN — Medication 20 MG: at 08:21

## 2019-06-04 RX ADMIN — Medication 20 MG: at 11:30

## 2019-06-04 RX ADMIN — METOPROLOL TARTRATE 25 MG: 25 TABLET, FILM COATED ORAL at 22:26

## 2019-06-04 RX ADMIN — METOPROLOL TARTRATE 25 MG: 25 TABLET, FILM COATED ORAL at 08:21

## 2019-06-04 RX ADMIN — FLUOXETINE 10 MG: 10 CAPSULE ORAL at 08:21

## 2019-06-05 LAB
BASOPHILS # BLD AUTO: 0.01 THOUSANDS/ΜL (ref 0–0.1)
BASOPHILS NFR BLD AUTO: 0 % (ref 0–1)
EOSINOPHIL # BLD AUTO: 0.24 THOUSAND/ΜL (ref 0–0.61)
EOSINOPHIL NFR BLD AUTO: 4 % (ref 0–6)
ERYTHROCYTE [DISTWIDTH] IN BLOOD BY AUTOMATED COUNT: 18.2 % (ref 11.6–15.1)
HCT VFR BLD AUTO: 30.1 % (ref 36.5–49.3)
HGB BLD-MCNC: 9.3 G/DL (ref 12–17)
IMM GRANULOCYTES # BLD AUTO: 0.03 THOUSAND/UL (ref 0–0.2)
IMM GRANULOCYTES NFR BLD AUTO: 1 % (ref 0–2)
LYMPHOCYTES # BLD AUTO: 1.37 THOUSANDS/ΜL (ref 0.6–4.47)
LYMPHOCYTES NFR BLD AUTO: 24 % (ref 14–44)
MCH RBC QN AUTO: 30.9 PG (ref 26.8–34.3)
MCHC RBC AUTO-ENTMCNC: 30.9 G/DL (ref 31.4–37.4)
MCV RBC AUTO: 100 FL (ref 82–98)
MONOCYTES # BLD AUTO: 0.44 THOUSAND/ΜL (ref 0.17–1.22)
MONOCYTES NFR BLD AUTO: 8 % (ref 4–12)
NEUTROPHILS # BLD AUTO: 3.59 THOUSANDS/ΜL (ref 1.85–7.62)
NEUTS SEG NFR BLD AUTO: 63 % (ref 43–75)
NRBC BLD AUTO-RTO: 0 /100 WBCS
PLATELET # BLD AUTO: 252 THOUSANDS/UL (ref 149–390)
PMV BLD AUTO: 9.2 FL (ref 8.9–12.7)
RBC # BLD AUTO: 3.01 MILLION/UL (ref 3.88–5.62)
WBC # BLD AUTO: 5.68 THOUSAND/UL (ref 4.31–10.16)

## 2019-06-05 PROCEDURE — 97163 PT EVAL HIGH COMPLEX 45 MIN: CPT

## 2019-06-05 PROCEDURE — 85025 COMPLETE CBC W/AUTO DIFF WBC: CPT | Performed by: PHYSICIAN ASSISTANT

## 2019-06-05 PROCEDURE — 87338 HPYLORI STOOL AG IA: CPT | Performed by: NURSE PRACTITIONER

## 2019-06-05 PROCEDURE — G8978 MOBILITY CURRENT STATUS: HCPCS

## 2019-06-05 PROCEDURE — 97167 OT EVAL HIGH COMPLEX 60 MIN: CPT

## 2019-06-05 PROCEDURE — G8979 MOBILITY GOAL STATUS: HCPCS

## 2019-06-05 PROCEDURE — G8987 SELF CARE CURRENT STATUS: HCPCS

## 2019-06-05 PROCEDURE — G8988 SELF CARE GOAL STATUS: HCPCS

## 2019-06-05 PROCEDURE — 99232 SBSQ HOSP IP/OBS MODERATE 35: CPT | Performed by: INTERNAL MEDICINE

## 2019-06-05 PROCEDURE — 92526 ORAL FUNCTION THERAPY: CPT

## 2019-06-05 RX ORDER — BACLOFEN 10 MG/1
5 TABLET ORAL 3 TIMES DAILY
Status: DISCONTINUED | OUTPATIENT
Start: 2019-06-05 | End: 2019-06-06

## 2019-06-05 RX ADMIN — RIVAROXABAN 15 MG: 15 TABLET, FILM COATED ORAL at 08:41

## 2019-06-05 RX ADMIN — Medication 20 MG: at 10:38

## 2019-06-05 RX ADMIN — BACLOFEN 5 MG: 10 TABLET ORAL at 22:00

## 2019-06-05 RX ADMIN — BACLOFEN 5 MG: 10 TABLET ORAL at 16:52

## 2019-06-05 RX ADMIN — FLUOXETINE 10 MG: 10 CAPSULE ORAL at 08:41

## 2019-06-05 RX ADMIN — METOPROLOL TARTRATE 25 MG: 25 TABLET, FILM COATED ORAL at 22:00

## 2019-06-05 RX ADMIN — FINASTERIDE 5 MG: 5 TABLET, FILM COATED ORAL at 08:41

## 2019-06-05 RX ADMIN — METOPROLOL TARTRATE 25 MG: 25 TABLET, FILM COATED ORAL at 08:41

## 2019-06-05 RX ADMIN — BACLOFEN 5 MG: 10 TABLET ORAL at 08:41

## 2019-06-05 RX ADMIN — Medication 20 MG: at 16:53

## 2019-06-06 LAB
ANION GAP SERPL CALCULATED.3IONS-SCNC: 9 MMOL/L (ref 4–13)
BUN SERPL-MCNC: 15 MG/DL (ref 5–25)
CALCIUM SERPL-MCNC: 8.8 MG/DL (ref 8.3–10.1)
CHLORIDE SERPL-SCNC: 105 MMOL/L (ref 100–108)
CO2 SERPL-SCNC: 25 MMOL/L (ref 21–32)
CREAT SERPL-MCNC: 0.56 MG/DL (ref 0.6–1.3)
ERYTHROCYTE [DISTWIDTH] IN BLOOD BY AUTOMATED COUNT: 18 % (ref 11.6–15.1)
GFR SERPL CREATININE-BSD FRML MDRD: 106 ML/MIN/1.73SQ M
GLUCOSE SERPL-MCNC: 116 MG/DL (ref 65–140)
H PYLORI AG STL QL IA: NEGATIVE
HCT VFR BLD AUTO: 32.5 % (ref 36.5–49.3)
HGB BLD-MCNC: 10 G/DL (ref 12–17)
MCH RBC QN AUTO: 31.2 PG (ref 26.8–34.3)
MCHC RBC AUTO-ENTMCNC: 30.8 G/DL (ref 31.4–37.4)
MCV RBC AUTO: 101 FL (ref 82–98)
PLATELET # BLD AUTO: 254 THOUSANDS/UL (ref 149–390)
PMV BLD AUTO: 9.3 FL (ref 8.9–12.7)
POTASSIUM SERPL-SCNC: 4 MMOL/L (ref 3.5–5.3)
RBC # BLD AUTO: 3.21 MILLION/UL (ref 3.88–5.62)
SODIUM SERPL-SCNC: 139 MMOL/L (ref 136–145)
WBC # BLD AUTO: 5.99 THOUSAND/UL (ref 4.31–10.16)

## 2019-06-06 PROCEDURE — 92526 ORAL FUNCTION THERAPY: CPT

## 2019-06-06 PROCEDURE — 85027 COMPLETE CBC AUTOMATED: CPT | Performed by: INTERNAL MEDICINE

## 2019-06-06 PROCEDURE — 80048 BASIC METABOLIC PNL TOTAL CA: CPT | Performed by: INTERNAL MEDICINE

## 2019-06-06 PROCEDURE — 92507 TX SP LANG VOICE COMM INDIV: CPT

## 2019-06-06 PROCEDURE — 99232 SBSQ HOSP IP/OBS MODERATE 35: CPT | Performed by: INTERNAL MEDICINE

## 2019-06-06 RX ORDER — TERAZOSIN 1 MG/1
1 CAPSULE ORAL
Status: DISCONTINUED | OUTPATIENT
Start: 2019-06-06 | End: 2019-06-10 | Stop reason: HOSPADM

## 2019-06-06 RX ORDER — BACLOFEN 10 MG/1
10 TABLET ORAL 3 TIMES DAILY
Status: DISCONTINUED | OUTPATIENT
Start: 2019-06-06 | End: 2019-06-10 | Stop reason: HOSPADM

## 2019-06-06 RX ADMIN — BACLOFEN 10 MG: 10 TABLET ORAL at 22:06

## 2019-06-06 RX ADMIN — TERAZOSIN HYDROCHLORIDE 1 MG: 1 CAPSULE ORAL at 22:07

## 2019-06-06 RX ADMIN — BACLOFEN 10 MG: 10 TABLET ORAL at 16:20

## 2019-06-06 RX ADMIN — FLUOXETINE 10 MG: 10 CAPSULE ORAL at 08:44

## 2019-06-06 RX ADMIN — RIVAROXABAN 15 MG: 15 TABLET, FILM COATED ORAL at 08:44

## 2019-06-06 RX ADMIN — METOPROLOL TARTRATE 25 MG: 25 TABLET, FILM COATED ORAL at 22:07

## 2019-06-06 RX ADMIN — Medication 20 MG: at 16:20

## 2019-06-06 RX ADMIN — BACLOFEN 5 MG: 10 TABLET ORAL at 08:44

## 2019-06-06 RX ADMIN — METOPROLOL TARTRATE 25 MG: 25 TABLET, FILM COATED ORAL at 08:44

## 2019-06-06 RX ADMIN — Medication 20 MG: at 08:44

## 2019-06-06 RX ADMIN — FINASTERIDE 5 MG: 5 TABLET, FILM COATED ORAL at 08:44

## 2019-06-07 LAB
BACTERIA BLD CULT: NORMAL
BACTERIA BLD CULT: NORMAL
HCT VFR BLD AUTO: 35.2 % (ref 36.5–49.3)
HGB BLD-MCNC: 10.7 G/DL (ref 12–17)

## 2019-06-07 PROCEDURE — 97530 THERAPEUTIC ACTIVITIES: CPT

## 2019-06-07 PROCEDURE — 85014 HEMATOCRIT: CPT | Performed by: INTERNAL MEDICINE

## 2019-06-07 PROCEDURE — 92507 TX SP LANG VOICE COMM INDIV: CPT

## 2019-06-07 PROCEDURE — 97110 THERAPEUTIC EXERCISES: CPT

## 2019-06-07 PROCEDURE — 97535 SELF CARE MNGMENT TRAINING: CPT

## 2019-06-07 PROCEDURE — 92526 ORAL FUNCTION THERAPY: CPT

## 2019-06-07 PROCEDURE — 99232 SBSQ HOSP IP/OBS MODERATE 35: CPT | Performed by: INTERNAL MEDICINE

## 2019-06-07 PROCEDURE — 85018 HEMOGLOBIN: CPT | Performed by: INTERNAL MEDICINE

## 2019-06-07 RX ORDER — KETOTIFEN FUMARATE 0.35 MG/ML
1 SOLUTION/ DROPS OPHTHALMIC 2 TIMES DAILY
Status: COMPLETED | OUTPATIENT
Start: 2019-06-07 | End: 2019-06-09

## 2019-06-07 RX ADMIN — BACLOFEN 10 MG: 10 TABLET ORAL at 08:40

## 2019-06-07 RX ADMIN — TERAZOSIN HYDROCHLORIDE 1 MG: 1 CAPSULE ORAL at 21:05

## 2019-06-07 RX ADMIN — RIVAROXABAN 15 MG: 15 TABLET, FILM COATED ORAL at 08:40

## 2019-06-07 RX ADMIN — BACLOFEN 10 MG: 10 TABLET ORAL at 17:00

## 2019-06-07 RX ADMIN — FLUOXETINE 10 MG: 10 CAPSULE ORAL at 08:40

## 2019-06-07 RX ADMIN — Medication 20 MG: at 17:00

## 2019-06-07 RX ADMIN — METOPROLOL TARTRATE 25 MG: 25 TABLET, FILM COATED ORAL at 08:40

## 2019-06-07 RX ADMIN — Medication 20 MG: at 08:46

## 2019-06-07 RX ADMIN — KETOTIFEN FUMARATE 1 DROP: 0.35 SOLUTION/ DROPS OPHTHALMIC at 17:00

## 2019-06-07 RX ADMIN — BACLOFEN 10 MG: 10 TABLET ORAL at 21:06

## 2019-06-07 RX ADMIN — KETOTIFEN FUMARATE 1 DROP: 0.35 SOLUTION/ DROPS OPHTHALMIC at 12:23

## 2019-06-07 RX ADMIN — METOPROLOL TARTRATE 25 MG: 25 TABLET, FILM COATED ORAL at 21:06

## 2019-06-07 RX ADMIN — FINASTERIDE 5 MG: 5 TABLET, FILM COATED ORAL at 08:40

## 2019-06-08 PROBLEM — M79.604 ACUTE LEG PAIN, RIGHT: Status: ACTIVE | Noted: 2019-06-08

## 2019-06-08 LAB
HCT VFR BLD AUTO: 32.6 % (ref 36.5–49.3)
HGB BLD-MCNC: 10 G/DL (ref 12–17)

## 2019-06-08 PROCEDURE — 85014 HEMATOCRIT: CPT | Performed by: INTERNAL MEDICINE

## 2019-06-08 PROCEDURE — 99232 SBSQ HOSP IP/OBS MODERATE 35: CPT | Performed by: INTERNAL MEDICINE

## 2019-06-08 PROCEDURE — 85018 HEMOGLOBIN: CPT | Performed by: INTERNAL MEDICINE

## 2019-06-08 RX ORDER — DIAZEPAM 5 MG/ML
2.5 INJECTION, SOLUTION INTRAMUSCULAR; INTRAVENOUS ONCE
Status: COMPLETED | OUTPATIENT
Start: 2019-06-08 | End: 2019-06-08

## 2019-06-08 RX ADMIN — ACETAMINOPHEN 650 MG: 325 TABLET ORAL at 21:18

## 2019-06-08 RX ADMIN — KETOTIFEN FUMARATE 1 DROP: 0.35 SOLUTION/ DROPS OPHTHALMIC at 17:15

## 2019-06-08 RX ADMIN — KETOTIFEN FUMARATE 1 DROP: 0.35 SOLUTION/ DROPS OPHTHALMIC at 09:37

## 2019-06-08 RX ADMIN — FINASTERIDE 5 MG: 5 TABLET, FILM COATED ORAL at 09:36

## 2019-06-08 RX ADMIN — RIVAROXABAN 15 MG: 15 TABLET, FILM COATED ORAL at 09:36

## 2019-06-08 RX ADMIN — Medication 2.5 MG: at 13:45

## 2019-06-08 RX ADMIN — BACLOFEN 10 MG: 10 TABLET ORAL at 17:12

## 2019-06-08 RX ADMIN — FLUOXETINE 10 MG: 10 CAPSULE ORAL at 09:37

## 2019-06-08 RX ADMIN — METOPROLOL TARTRATE 25 MG: 25 TABLET, FILM COATED ORAL at 21:20

## 2019-06-08 RX ADMIN — TERAZOSIN HYDROCHLORIDE 1 MG: 1 CAPSULE ORAL at 21:20

## 2019-06-08 RX ADMIN — METOPROLOL TARTRATE 25 MG: 25 TABLET, FILM COATED ORAL at 09:36

## 2019-06-08 RX ADMIN — Medication 20 MG: at 09:36

## 2019-06-08 RX ADMIN — BACLOFEN 10 MG: 10 TABLET ORAL at 21:20

## 2019-06-08 RX ADMIN — Medication 20 MG: at 20:10

## 2019-06-08 RX ADMIN — BACLOFEN 10 MG: 10 TABLET ORAL at 09:37

## 2019-06-09 LAB
HCT VFR BLD AUTO: 33.8 % (ref 36.5–49.3)
HGB BLD-MCNC: 10.5 G/DL (ref 12–17)

## 2019-06-09 PROCEDURE — 85014 HEMATOCRIT: CPT | Performed by: INTERNAL MEDICINE

## 2019-06-09 PROCEDURE — 85018 HEMOGLOBIN: CPT | Performed by: INTERNAL MEDICINE

## 2019-06-09 PROCEDURE — 99232 SBSQ HOSP IP/OBS MODERATE 35: CPT | Performed by: INTERNAL MEDICINE

## 2019-06-09 RX ADMIN — FLUOXETINE 10 MG: 10 CAPSULE ORAL at 09:06

## 2019-06-09 RX ADMIN — KETOTIFEN FUMARATE 1 DROP: 0.35 SOLUTION/ DROPS OPHTHALMIC at 09:05

## 2019-06-09 RX ADMIN — METOPROLOL TARTRATE 25 MG: 25 TABLET, FILM COATED ORAL at 09:06

## 2019-06-09 RX ADMIN — BACLOFEN 10 MG: 10 TABLET ORAL at 16:47

## 2019-06-09 RX ADMIN — BACLOFEN 10 MG: 10 TABLET ORAL at 09:06

## 2019-06-09 RX ADMIN — RIVAROXABAN 15 MG: 15 TABLET, FILM COATED ORAL at 09:05

## 2019-06-09 RX ADMIN — TERAZOSIN HYDROCHLORIDE 1 MG: 1 CAPSULE ORAL at 21:42

## 2019-06-09 RX ADMIN — METOPROLOL TARTRATE 25 MG: 25 TABLET, FILM COATED ORAL at 21:42

## 2019-06-09 RX ADMIN — BACLOFEN 10 MG: 10 TABLET ORAL at 21:42

## 2019-06-09 RX ADMIN — FINASTERIDE 5 MG: 5 TABLET, FILM COATED ORAL at 09:05

## 2019-06-09 RX ADMIN — Medication 20 MG: at 09:09

## 2019-06-09 RX ADMIN — Medication 20 MG: at 16:47

## 2019-06-10 ENCOUNTER — EPISODE CHANGES (OUTPATIENT)
Dept: CASE MANAGEMENT | Facility: OTHER | Age: 69
End: 2019-06-10

## 2019-06-10 VITALS
BODY MASS INDEX: 32.59 KG/M2 | RESPIRATION RATE: 18 BRPM | TEMPERATURE: 97.5 F | OXYGEN SATURATION: 97 % | WEIGHT: 240.3 LBS | HEART RATE: 90 BPM | DIASTOLIC BLOOD PRESSURE: 86 MMHG | SYSTOLIC BLOOD PRESSURE: 145 MMHG

## 2019-06-10 PROBLEM — K92.2 GI BLEED NOT REQUIRING MORE THAN 4 UNITS OF BLOOD IN 24 HOURS, ICU, OR SURGERY: Status: RESOLVED | Noted: 2019-06-02 | Resolved: 2019-06-10

## 2019-06-10 LAB
ANION GAP SERPL CALCULATED.3IONS-SCNC: 9 MMOL/L (ref 4–13)
BUN SERPL-MCNC: 19 MG/DL (ref 5–25)
CALCIUM SERPL-MCNC: 9 MG/DL (ref 8.3–10.1)
CHLORIDE SERPL-SCNC: 104 MMOL/L (ref 100–108)
CO2 SERPL-SCNC: 24 MMOL/L (ref 21–32)
CREAT SERPL-MCNC: 0.61 MG/DL (ref 0.6–1.3)
ERYTHROCYTE [DISTWIDTH] IN BLOOD BY AUTOMATED COUNT: 17.1 % (ref 11.6–15.1)
GFR SERPL CREATININE-BSD FRML MDRD: 102 ML/MIN/1.73SQ M
GLUCOSE SERPL-MCNC: 146 MG/DL (ref 65–140)
HCT VFR BLD AUTO: 35.8 % (ref 36.5–49.3)
HGB BLD-MCNC: 10.8 G/DL (ref 12–17)
MCH RBC QN AUTO: 30.5 PG (ref 26.8–34.3)
MCHC RBC AUTO-ENTMCNC: 30.2 G/DL (ref 31.4–37.4)
MCV RBC AUTO: 101 FL (ref 82–98)
PLATELET # BLD AUTO: 226 THOUSANDS/UL (ref 149–390)
PMV BLD AUTO: 9.2 FL (ref 8.9–12.7)
POTASSIUM SERPL-SCNC: 3.9 MMOL/L (ref 3.5–5.3)
RBC # BLD AUTO: 3.54 MILLION/UL (ref 3.88–5.62)
SODIUM SERPL-SCNC: 137 MMOL/L (ref 136–145)
WBC # BLD AUTO: 6.36 THOUSAND/UL (ref 4.31–10.16)

## 2019-06-10 PROCEDURE — 99239 HOSP IP/OBS DSCHRG MGMT >30: CPT | Performed by: HOSPITALIST

## 2019-06-10 PROCEDURE — 85027 COMPLETE CBC AUTOMATED: CPT | Performed by: INTERNAL MEDICINE

## 2019-06-10 PROCEDURE — 80048 BASIC METABOLIC PNL TOTAL CA: CPT | Performed by: INTERNAL MEDICINE

## 2019-06-10 RX ORDER — TERAZOSIN 1 MG/1
1 CAPSULE ORAL
Start: 2019-06-10 | End: 2019-11-18 | Stop reason: ALTCHOICE

## 2019-06-10 RX ORDER — LORAZEPAM 0.5 MG/1
0.5 TABLET ORAL EVERY 8 HOURS PRN
Refills: 0
Start: 2019-06-10 | End: 2019-09-03

## 2019-06-10 RX ORDER — BACLOFEN 10 MG/1
10 TABLET ORAL 3 TIMES DAILY
Refills: 0
Start: 2019-06-10 | End: 2019-12-29 | Stop reason: HOSPADM

## 2019-06-10 RX ORDER — FLUOXETINE 10 MG/1
10 CAPSULE ORAL DAILY
Start: 2019-06-10 | End: 2019-12-29 | Stop reason: HOSPADM

## 2019-06-10 RX ADMIN — BACLOFEN 10 MG: 10 TABLET ORAL at 09:03

## 2019-06-10 RX ADMIN — FINASTERIDE 5 MG: 5 TABLET, FILM COATED ORAL at 09:03

## 2019-06-10 RX ADMIN — RIVAROXABAN 15 MG: 15 TABLET, FILM COATED ORAL at 09:03

## 2019-06-10 RX ADMIN — Medication 20 MG: at 09:03

## 2019-06-10 RX ADMIN — METOPROLOL TARTRATE 25 MG: 25 TABLET, FILM COATED ORAL at 09:03

## 2019-06-10 RX ADMIN — FLUOXETINE 10 MG: 10 CAPSULE ORAL at 09:03

## 2019-06-25 ENCOUNTER — PATIENT OUTREACH (OUTPATIENT)
Dept: CASE MANAGEMENT | Facility: OTHER | Age: 69
End: 2019-06-25

## 2019-07-25 ENCOUNTER — PATIENT OUTREACH (OUTPATIENT)
Dept: CASE MANAGEMENT | Facility: OTHER | Age: 69
End: 2019-07-25

## 2019-08-09 ENCOUNTER — OFFICE VISIT (OUTPATIENT)
Dept: NEUROLOGY | Facility: CLINIC | Age: 69
End: 2019-08-09
Payer: MEDICARE

## 2019-08-09 VITALS
WEIGHT: 213 LBS | SYSTOLIC BLOOD PRESSURE: 110 MMHG | HEART RATE: 57 BPM | DIASTOLIC BLOOD PRESSURE: 80 MMHG | BODY MASS INDEX: 28.89 KG/M2

## 2019-08-09 DIAGNOSIS — I63.9 APHASIA DUE TO ACUTE STROKE (HCC): ICD-10-CM

## 2019-08-09 DIAGNOSIS — R73.03 PREDIABETES: ICD-10-CM

## 2019-08-09 DIAGNOSIS — E78.2 MIXED HYPERLIPIDEMIA: ICD-10-CM

## 2019-08-09 DIAGNOSIS — F06.31 DEPRESSION DUE TO ACUTE STROKE (HCC): ICD-10-CM

## 2019-08-09 DIAGNOSIS — I48.0 PAROXYSMAL ATRIAL FIBRILLATION (HCC): ICD-10-CM

## 2019-08-09 DIAGNOSIS — R47.01 APHASIA DUE TO ACUTE STROKE (HCC): ICD-10-CM

## 2019-08-09 DIAGNOSIS — I63.9 DEPRESSION DUE TO ACUTE STROKE (HCC): ICD-10-CM

## 2019-08-09 DIAGNOSIS — Z86.73 HISTORY OF STROKE: ICD-10-CM

## 2019-08-09 DIAGNOSIS — R13.12 OROPHARYNGEAL DYSPHAGIA: Primary | ICD-10-CM

## 2019-08-09 PROCEDURE — 99215 OFFICE O/P EST HI 40 MIN: CPT | Performed by: PSYCHIATRY & NEUROLOGY

## 2019-08-09 RX ORDER — TAMSULOSIN HYDROCHLORIDE 0.4 MG/1
0.4 CAPSULE ORAL 2 TIMES DAILY
COMMUNITY
End: 2020-06-15

## 2019-08-09 RX ORDER — BETHANECHOL CHLORIDE 10 MG/1
10 TABLET ORAL 2 TIMES DAILY
Refills: 0 | COMMUNITY
Start: 2019-07-25 | End: 2019-12-29 | Stop reason: HOSPADM

## 2019-08-09 RX ORDER — BISACODYL 10 MG
10 SUPPOSITORY, RECTAL RECTAL DAILY PRN
COMMUNITY
End: 2019-11-18 | Stop reason: ALTCHOICE

## 2019-08-09 RX ORDER — DIAPER,BRIEF,INFANT-TODD,DISP
EACH MISCELLANEOUS 2 TIMES DAILY PRN
COMMUNITY

## 2019-08-09 NOTE — PATIENT INSTRUCTIONS
Stroke:  Jeremias Thompson presents for a follow-up with regard to his recent large right-sided anterior circulation stroke which was the result of previously undiagnosed atrial fibrillation  In the interval since his hospitalization his PEG tube has been removed  He did have 2 episodes of significant GI bleeding on Eliquis and subsequently was switched to Xarelto  Since that time, he continues to receive therapy / rehab services  He has not had any falls  He has had no additional bleeding or bruising issues or episodes concerning for recurrent stroke  He has clearly experienced a degree of post stroke anxiety and depression which is quite understandable  At this point in time they feel that the Prozac has been reasonably helpful     - for ongoing stroke prevention he should continue his current combination of Xarelto, statin, and appropriate blood pressure and glycemic control     -we will defer to the good judgment of his primary care team for monitoring of his cholesterol panel and blood sugar numbers  We would suggest targeting a hemoglobin A1c of less than 7% on a blood pressure of less than 130/80 on an ongoing basis  -we will plan for Prozac to continue at his current dose of 20 mg once daily  If his degree of frustration/anxiety /depression were to increase in any significant way would not be unreasonable to increase to 30 or 40 mg     - he is clearly benefit from ongoing therapy services and I would suggest that PT/OT / speech therapy should continue and would be medically necessary  This should follow up with their physical medicine and rehab team with regard to baclofen  I agree that it may be reasonable to wean baclofen off as this may improve his energy level and also improve/ increase the tone in his left like to permit him to walk more stably  If he does have significant muscle spasms or pain in the left upper extremity it may be reasonable to consider Botox injections      - would like for him to remain physically active at home as much as he feels capable of doing so  - if he were to have bleeding issues on Xarelto, which is a possibility, then I would suggest that he is a good candidate for placement of the Watchman device as we would be able to eventually transition him away from full anticoagulation in all likelihood  At this point in time that is not necessary, but would be the planned in the eventuality that he would have any significant bleeding complication  His kidney function is excellent and there is no indication for either Xarelto or Eliquis at a reduced dose  I will plan for him to return to the office to see me in 4 months time but I would be happy to see him sooner if the need should arise  If he has symptoms concerning for TIA or stroke such as new sudden painless loss of vision or double vision, difficulty speaking or swallowing that is new/ extreme, new weakness/ numbness affecting 1 side of the face or body, or vertigo / room spinning that does not quickly resolve he should proceed by ambulance to the nearest emergency room immediately  Because he is taking Xarelto, if he were to fall and strike his head and have any residual symptoms whatsoever or to developed a sudden extremely severe unusual headache he should also be seen at the nearest emergency room

## 2019-08-09 NOTE — PROGRESS NOTES
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([facility] [address] )    Paroxysmal atrial fibrillation (HCC)    Mixed hyperlipidemia    Prediabetes    History of stroke    Other orders  -     bethanechol (URECHOLINE) 10 mg tablet; Take 10 mg by mouth 3 (three) times a day  -     bisacodyl (DULCOLAX) 10 mg suppository; Insert 10 mg into the rectum daily as needed for constipation  -     hydrocortisone 1 % cream; Apply topically 2 (two) times a day  -     POLYETHYLENE GLYCOL 3350 PO; 17 g by Per G Tube route daily  -     tamsulosin (FLOMAX) 0 4 mg; Take 0 4 mg by mouth daily with dinner       Patient Instructions   Stroke:  Jeremias Thompson presents for a follow-up with regard to his recent large right-sided anterior circulation stroke which was the result of previously undiagnosed atrial fibrillation  In the interval since his hospitalization his PEG tube has been removed  He did have 2 episodes of significant GI bleeding on Eliquis and subsequently was switched to Xarelto  Since that time, he continues to receive therapy / rehab services  He has not had any falls  He has had no additional bleeding or bruising issues or episodes concerning for recurrent stroke  He has clearly experienced a degree of post stroke anxiety and depression which is quite understandable  At this point in time they feel that the Prozac has been reasonably helpful     - for ongoing stroke prevention he should continue his current combination of Xarelto, statin, and appropriate blood pressure and glycemic control     -we will defer to the good judgment of his primary care team for monitoring of his cholesterol panel and blood sugar numbers    We would suggest targeting a hemoglobin A1c of less than 7% on a blood pressure of less than 130/80 on an ongoing basis  -we will plan for Prozac to continue at his current dose of 20 mg once daily  If his degree of frustration/anxiety /depression were to increase in any significant way would not be unreasonable to increase to 30 or 40 mg     - he is clearly benefit from ongoing therapy services and I would suggest that PT/OT / speech therapy should continue and would be medically necessary  This should follow up with their physical medicine and rehab team with regard to baclofen  I agree that it may be reasonable to wean baclofen off as this may improve his energy level and also improve/ increase the tone in his left like to permit him to walk more stably  If he does have significant muscle spasms or pain in the left upper extremity it may be reasonable to consider Botox injections  - would like for him to remain physically active at home as much as he feels capable of doing so  - if he were to have bleeding issues on Xarelto, which is a possibility, then I would suggest that he is a good candidate for placement of the watch min device as we would be able to eventually transition him away from full anticoagulation in all likelihood  At this point in time that is not necessary, but would be the planned in the eventuality that he would have any significant bleeding complication  His kidney function is excellent and there is no indication for either Xarelto or Eliquis at a reduced dose  I will plan for him to return to the office to see me in 4 months time but I would be happy to see him sooner if the need should arise  If he has symptoms concerning for TIA or stroke such as new sudden painless loss of vision or double vision, difficulty speaking or swallowing that is new/ extreme, new weakness/ numbness affecting 1 side of the face or body, or vertigo / room spinning that does not quickly resolve he should proceed by ambulance to the nearest emergency room immediately    Because he is taking Xarelto, if he were to fall and strike his head and have any residual symptoms whatsoever or to developed a sudden extremely severe unusual headache he should also be seen at the nearest emergency room  Subjective:    HPI       Julia Guillaume presents with his wife and daughter for follow-up with regard to his relatively recent large right-sided anterior circulation stroke which was likely a result of his undiagnosed atrial fibrillation  In the interval since his hospitalization he has had repeat occipital is a shins for GI bleeding which did require transfusions on Eliquis  He was subsequently transitioned to Xarelto and is doing reasonably well from that standpoint  His implantable loop monitor did reveal/confirm atrial fibrillation  He has returned from his initial rehab stay to home  He is receiving therapy services  He continues to work with the physical medicine and rehab team at [facility]  Reportedly he is having some issues with his left knee buckling when they try and walk him, and there has been discussion with regard to lowering his baclofen in order to improve his left lower extremity tone, although he previously was having left upper extremity spasms with pain  We had a long discussion in this regard today I suggested that decreasing the baclofen and applying Botox injections to the left upper extremity might be a reasonable option  Reportedly he did have a degree of post stroke depression and anxiety which has improved on his current dose of Prozac  He previously had a PEG tube which has been removed  Reportedly he is sleeping reasonably well and his appetite is appropriate  We did have a discussion in the office with regard to stroke physiology / pathophysiology prognosis for recovery, and appropriate secondary stroke prevention measures    If he were to have a bleeding issue on Xarelto (I did detailed the fact that Xarelto has a tendency, based on the original studies, towards a higher risk of GI bleeding compared with Eliquis; however he is doing well on it at this point  ) , then we would likely consider placement of a watchman device  Past Medical History:   Diagnosis Date    Arthritis     BL knees    CHF (congestive heart failure) (HCC)     Colon polyp     Depression     Hypertension     Irregular heart beat     Afib    Stroke [facility])        Social History     Socioeconomic History    Marital status: /Civil Union     Spouse name: None    Number of children: None    Years of education: None    Highest education level: None   Occupational History    None   Social Needs    Financial resource strain: None    Food insecurity:     Worry: None     Inability: None    Transportation needs:     Medical: None     Non-medical: None   Tobacco Use    Smoking status: Never Smoker    Smokeless tobacco: Never Used   Substance and Sexual Activity    Alcohol use: Not Currently     Frequency: 2-4 times a month     Comment: social    Drug use: Never    Sexual activity: None   Lifestyle    Physical activity:     Days per week: None     Minutes per session: None    Stress: None   Relationships    Social connections:     Talks on phone: None     Gets together: None     Attends Anabaptist service: None     Active member of club or organization: None     Attends meetings of clubs or organizations: None     Relationship status: None    Intimate partner violence:     Fear of current or ex partner: None     Emotionally abused: None     Physically abused: None     Forced sexual activity: None   Other Topics Concern    None   Social History Narrative    None       History reviewed  No pertinent family history        Current Outpatient Medications:     atorvastatin (LIPITOR) 40 mg tablet, Take 1 tablet (40 mg total) by mouth every evening, Disp: 30 tablet, Rfl: 0    baclofen 10 mg tablet, 1 tablet (10 mg total) by Per G Tube route 3 (three) times a day, Disp: , Rfl: 0   bisacodyl (DULCOLAX) 10 mg suppository, Insert 10 mg into the rectum daily as needed for constipation, Disp: , Rfl:     docusate sodium (COLACE) 100 mg capsule, 100 mg 2 (two) times a day, Disp: , Rfl:     FLUoxetine (PROzac) 10 mg capsule, 1 capsule (10 mg total) by Per G Tube route daily (Patient taking differently: Take 20 mg by mouth daily ), Disp: , Rfl:     hydrocortisone 1 % cream, Apply topically 2 (two) times a day, Disp: , Rfl:     metoprolol tartrate (LOPRESSOR) 25 mg tablet, Take 1 tablet (25 mg total) by mouth every 12 (twelve) hours, Disp: 60 tablet, Rfl: 3    pantoprazole (PROTONIX) 40 mg tablet, Take 1 tablet (40 mg total) by mouth 2 (two) times a day before meals, Disp: 120 tablet, Rfl: 0    POLYETHYLENE GLYCOL 3350 PO, 17 g by Per G Tube route daily, Disp: , Rfl:     rivaroxaban (XARELTO) 15 mg tablet, 1 tablet (15 mg total) by Per G Tube route daily with breakfast (Patient taking differently: Take 20 mg by mouth daily with breakfast ), Disp: , Rfl:     senna 8 8 mg/5 mL syrup, Take 8 6 mg by mouth 2 (two) times a day as needed for constipation , Disp: , Rfl:     tamsulosin (FLOMAX) 0 4 mg, Take 0 4 mg by mouth daily with dinner, Disp: , Rfl:     acetaminophen (TYLENOL) 500 mg tablet, 500 mg by Per G Tube route every 6 (six) hours as needed for mild pain, Disp: , Rfl:     bethanechol (URECHOLINE) 10 mg tablet, Take 10 mg by mouth 3 (three) times a day, Disp: , Rfl: 0    LORazepam (ATIVAN) 0 5 mg tablet, 1 tablet (0 5 mg total) by Per G Tube route every 8 (eight) hours as needed for anxiety for up to 10 days, Disp: , Rfl: 0    ondansetron (ZOFRAN-ODT) 4 mg disintegrating tablet, Take 4 mg by mouth every 6 (six) hours as needed for nausea or vomiting, Disp: , Rfl:     Petrolatum-Zinc Oxide (PHYTOPLEX Z-GUARD EX), Apply topically, Disp: , Rfl:     terazosin (HYTRIN) 1 mg capsule, 1 capsule (1 mg total) by Per G Tube route daily at bedtime, Disp: , Rfl:         The following portions of the patient's history were reviewed: allergies, current medications, past family history, past medical history, past social history and problem list            Objective:    Blood pressure 110/80, pulse 57, weight 96 6 kg (213 lb)  Physical Exam    Neurological Exam      At the time of my evaluation he was awake, alert, and in no distress  He does have mild dysarthria with significant hypophonia  There is a clear degree of expressive aphasia / speech apraxia  He does have insight into his difficulties with communication  He is able to follow some verbal commands, and some pantomime commands, but not all  Does perseverate  His pupils were equal round reactive to light with intact extraocular movements  Although his visual fields appeared full he did extinguish to simultaneous stimulation on the left  There is a clear left facial droop  Motor testing reveals 5/5 strength in the right upper extremity with strong antigravity movements in the right lower extremity with at least 4+/5 strength  The left upper extremity is spastic which limits is passive range of motion but he did not have any severe pain  He has intact sensation in the left upper extremity distally to noxious stem  He does have a degree of neglect  He does not have somatagnosia however when asked to point at his left arm he gestured towards the practitioners left arm  The left lower extremity was more flaccid with limited to no voluntary movement and a flaccid posture at the ankle  ROS:    Review of Systems   Constitutional: Negative  Negative for appetite change and fever  HENT: Negative  Negative for hearing loss, tinnitus, trouble swallowing and voice change  Eyes: Positive for visual disturbance (Possible vision problems since the stroke)  Negative for photophobia and pain  Respiratory: Negative  Negative for shortness of breath  Cardiovascular: Negative  Negative for palpitations          Afib   Gastrointestinal: Negative  Negative for nausea and vomiting  Endocrine: Negative  Negative for cold intolerance and heat intolerance  Genitourinary: Negative  Negative for dysuria, frequency and urgency  Musculoskeletal: Negative  Negative for myalgias and neck pain  Immobility/loss of function on the left side of the body   Skin: Negative  Negative for rash  Neurological: Positive for speech difficulty  Negative for dizziness, tremors, seizures, syncope, facial asymmetry, weakness, light-headedness, numbness and headaches  Hematological: Bruises/bleeds easily (Xarelto)  Psychiatric/Behavioral: Negative  Negative for confusion, hallucinations and sleep disturbance  Reviewed ROS as entered by medical assistant  I have spent 50 minutes with Patient and family today in which greater than 50% of this time was spent in counseling/coordination of care regarding Prognosis, Risks and benefits of tx options, Patient and family education and Risk factor reductions

## 2019-08-09 NOTE — LETTER
August 9, 2019     Johann Mann MD  [address] [place] [address]    Patient: Yelitza Abt   YOB: 1950   Date of Visit: 8/9/2019       Dear Dr Sandro Dill:    Thank you for referring Maurilio Bumpers to me for evaluation  Below are my notes for this consultation  If you have questions, please do not hesitate to call me  I look forward to following your patient along with you           Sincerely,        Bryce Vernon MD        CC: Thalia Bryson MD

## 2019-08-19 ENCOUNTER — PATIENT OUTREACH (OUTPATIENT)
Dept: CASE MANAGEMENT | Facility: OTHER | Age: 69
End: 2019-08-19

## 2019-08-26 ENCOUNTER — HOSPITAL ENCOUNTER (EMERGENCY)
Facility: HOSPITAL | Age: 69
Discharge: HOME | End: 2019-08-26
Attending: EMERGENCY MEDICINE
Payer: MEDICARE

## 2019-08-26 ENCOUNTER — APPOINTMENT (EMERGENCY)
Dept: RADIOLOGY | Facility: HOSPITAL | Age: 69
End: 2019-08-26
Payer: MEDICARE

## 2019-08-26 VITALS
TEMPERATURE: 97.8 F | OXYGEN SATURATION: 97 % | DIASTOLIC BLOOD PRESSURE: 88 MMHG | HEART RATE: 53 BPM | SYSTOLIC BLOOD PRESSURE: 148 MMHG | RESPIRATION RATE: 18 BRPM

## 2019-08-26 DIAGNOSIS — N39.0 UTI (URINARY TRACT INFECTION): ICD-10-CM

## 2019-08-26 DIAGNOSIS — R53.1 WEAKNESS: Primary | ICD-10-CM

## 2019-08-26 LAB
ALBUMIN SERPL BCP-MCNC: 3.1 G/DL (ref 3.5–5)
ALP SERPL-CCNC: 91 U/L (ref 46–116)
ALT SERPL W P-5'-P-CCNC: 16 U/L (ref 12–78)
ANION GAP SERPL CALCULATED.3IONS-SCNC: 8 MMOL/L (ref 4–13)
APTT PPP: 40 SECONDS (ref 23–37)
AST SERPL W P-5'-P-CCNC: 13 U/L (ref 5–45)
BACTERIA UR QL AUTO: ABNORMAL /HPF
BASOPHILS # BLD AUTO: 0.02 THOUSANDS/ΜL (ref 0–0.1)
BASOPHILS NFR BLD AUTO: 0 % (ref 0–1)
BILIRUB SERPL-MCNC: 0.41 MG/DL (ref 0.2–1)
BILIRUB UR QL STRIP: NEGATIVE
BUN SERPL-MCNC: 9 MG/DL (ref 5–25)
CALCIUM SERPL-MCNC: 9.2 MG/DL (ref 8.3–10.1)
CHLORIDE SERPL-SCNC: 105 MMOL/L (ref 100–108)
CLARITY UR: ABNORMAL
CO2 SERPL-SCNC: 28 MMOL/L (ref 21–32)
COLOR UR: YELLOW
CREAT SERPL-MCNC: 0.81 MG/DL (ref 0.6–1.3)
EOSINOPHIL # BLD AUTO: 0.29 THOUSAND/ΜL (ref 0–0.61)
EOSINOPHIL NFR BLD AUTO: 5 % (ref 0–6)
ERYTHROCYTE [DISTWIDTH] IN BLOOD BY AUTOMATED COUNT: 14.5 % (ref 11.6–15.1)
GFR SERPL CREATININE-BSD FRML MDRD: 91 ML/MIN/1.73SQ M
GLUCOSE SERPL-MCNC: 118 MG/DL (ref 65–140)
GLUCOSE UR STRIP-MCNC: NEGATIVE MG/DL
HCT VFR BLD AUTO: 40.4 % (ref 36.5–49.3)
HGB BLD-MCNC: 12.5 G/DL (ref 12–17)
HGB UR QL STRIP.AUTO: ABNORMAL
IMM GRANULOCYTES # BLD AUTO: 0.01 THOUSAND/UL (ref 0–0.2)
IMM GRANULOCYTES NFR BLD AUTO: 0 % (ref 0–2)
INR PPP: 1.87 (ref 0.84–1.19)
KETONES UR STRIP-MCNC: NEGATIVE MG/DL
LEUKOCYTE ESTERASE UR QL STRIP: ABNORMAL
LYMPHOCYTES # BLD AUTO: 1.94 THOUSANDS/ΜL (ref 0.6–4.47)
LYMPHOCYTES NFR BLD AUTO: 32 % (ref 14–44)
MCH RBC QN AUTO: 27.9 PG (ref 26.8–34.3)
MCHC RBC AUTO-ENTMCNC: 30.9 G/DL (ref 31.4–37.4)
MCV RBC AUTO: 90 FL (ref 82–98)
MONOCYTES # BLD AUTO: 0.56 THOUSAND/ΜL (ref 0.17–1.22)
MONOCYTES NFR BLD AUTO: 9 % (ref 4–12)
NEUTROPHILS # BLD AUTO: 3.34 THOUSANDS/ΜL (ref 1.85–7.62)
NEUTS SEG NFR BLD AUTO: 54 % (ref 43–75)
NITRITE UR QL STRIP: POSITIVE
NON-SQ EPI CELLS URNS QL MICRO: ABNORMAL /HPF
NRBC BLD AUTO-RTO: 0 /100 WBCS
PH UR STRIP.AUTO: 7 [PH]
PLATELET # BLD AUTO: 178 THOUSANDS/UL (ref 149–390)
PMV BLD AUTO: 10 FL (ref 8.9–12.7)
POTASSIUM SERPL-SCNC: 4 MMOL/L (ref 3.5–5.3)
PROT SERPL-MCNC: 6.4 G/DL (ref 6.4–8.2)
PROT UR STRIP-MCNC: NEGATIVE MG/DL
PROTHROMBIN TIME: 21.9 SECONDS (ref 11.6–14.5)
RBC # BLD AUTO: 4.48 MILLION/UL (ref 3.88–5.62)
RBC #/AREA URNS AUTO: ABNORMAL /HPF
SODIUM SERPL-SCNC: 141 MMOL/L (ref 136–145)
SP GR UR STRIP.AUTO: 1.01 (ref 1–1.03)
TROPONIN I SERPL-MCNC: <0.02 NG/ML
TSH SERPL DL<=0.05 MIU/L-ACNC: 1.8 UIU/ML (ref 0.36–3.74)
UROBILINOGEN UR QL STRIP.AUTO: 0.2 E.U./DL
WBC # BLD AUTO: 6.16 THOUSAND/UL (ref 4.31–10.16)
WBC #/AREA URNS AUTO: ABNORMAL /HPF

## 2019-08-26 PROCEDURE — 81001 URINALYSIS AUTO W/SCOPE: CPT | Performed by: EMERGENCY MEDICINE

## 2019-08-26 PROCEDURE — 84443 ASSAY THYROID STIM HORMONE: CPT | Performed by: EMERGENCY MEDICINE

## 2019-08-26 PROCEDURE — 96366 THER/PROPH/DIAG IV INF ADDON: CPT

## 2019-08-26 PROCEDURE — 85730 THROMBOPLASTIN TIME PARTIAL: CPT | Performed by: EMERGENCY MEDICINE

## 2019-08-26 PROCEDURE — 87186 SC STD MICRODIL/AGAR DIL: CPT | Performed by: EMERGENCY MEDICINE

## 2019-08-26 PROCEDURE — 99284 EMERGENCY DEPT VISIT MOD MDM: CPT | Performed by: EMERGENCY MEDICINE

## 2019-08-26 PROCEDURE — 85025 COMPLETE CBC W/AUTO DIFF WBC: CPT

## 2019-08-26 PROCEDURE — 96365 THER/PROPH/DIAG IV INF INIT: CPT

## 2019-08-26 PROCEDURE — 80053 COMPREHEN METABOLIC PANEL: CPT

## 2019-08-26 PROCEDURE — 71046 X-RAY EXAM CHEST 2 VIEWS: CPT

## 2019-08-26 PROCEDURE — 93005 ELECTROCARDIOGRAM TRACING: CPT

## 2019-08-26 PROCEDURE — 84484 ASSAY OF TROPONIN QUANT: CPT

## 2019-08-26 PROCEDURE — 87147 CULTURE TYPE IMMUNOLOGIC: CPT | Performed by: EMERGENCY MEDICINE

## 2019-08-26 PROCEDURE — 85610 PROTHROMBIN TIME: CPT | Performed by: EMERGENCY MEDICINE

## 2019-08-26 PROCEDURE — 99285 EMERGENCY DEPT VISIT HI MDM: CPT

## 2019-08-26 PROCEDURE — 36415 COLL VENOUS BLD VENIPUNCTURE: CPT

## 2019-08-26 PROCEDURE — 87086 URINE CULTURE/COLONY COUNT: CPT | Performed by: EMERGENCY MEDICINE

## 2019-08-26 RX ORDER — LEVOFLOXACIN 5 MG/ML
750 INJECTION, SOLUTION INTRAVENOUS ONCE
Status: COMPLETED | OUTPATIENT
Start: 2019-08-26 | End: 2019-08-26

## 2019-08-26 RX ORDER — LEVOFLOXACIN 500 MG/1
500 TABLET, FILM COATED ORAL DAILY
Qty: 7 TABLET | Refills: 0 | Status: SHIPPED | OUTPATIENT
Start: 2019-08-26 | End: 2019-09-03

## 2019-08-26 RX ORDER — BACLOFEN 10 MG/1
10 TABLET ORAL ONCE
Status: COMPLETED | OUTPATIENT
Start: 2019-08-26 | End: 2019-08-26

## 2019-08-26 RX ADMIN — SODIUM CHLORIDE 500 ML: 0.9 INJECTION, SOLUTION INTRAVENOUS at 12:49

## 2019-08-26 RX ADMIN — LEVOFLOXACIN 750 MG: 5 INJECTION, SOLUTION INTRAVENOUS at 14:22

## 2019-08-26 RX ADMIN — BACLOFEN 10 MG: 10 TABLET ORAL at 13:17

## 2019-08-26 NOTE — ED NOTES
Per Dr Vamsi Hammonds pt does not need blood cultures prior to iv abx administration      Sravanthi Zheng, RN  08/26/19 7338

## 2019-08-26 NOTE — ED PROVIDER NOTES
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, atrial fibrillation, GI bleed presents to the emergency department with a 3 day history of fatigue  Patient's wife provides most of the history as patient still has some speech deficits  She states that he has been home since July and receiving outpatient physical therapy  Over the last 3 days he has been very fatigued and weak  Today he went to his outpatient physical therapy treatment and could not complete the treatment  It was also noted there that his blood pressure dropped to 387 systolic  He has not had any fevers but has felt cold  No nausea or vomiting  Patient's wife states that his appetite has been normal   No cough  History provided by:  Spouse  History limited by: Aphasia  Fatigue   Severity:  Unable to specify  Onset quality:  Gradual  Duration:  3 days  Timing:  Constant  Progression:  Unchanged  Chronicity:  New  Context: not change in medication, not decreased sleep, not dehydration, not drug use, not recent infection, not stress and not urinary tract infection    Relieved by:  None tried  Worsened by:   Activity  Ineffective treatments:  None tried  Associated symptoms: difficulty walking    Associated symptoms: no abdominal pain, no anorexia, no arthralgias, no chest pain, no cough, no diarrhea, no dizziness, no dysuria, no falls, no fever, no foul-smelling urine, no frequency, no headaches, no lethargy, no loss of consciousness, no melena, no myalgias, no nausea, no seizures, no shortness of breath, no syncope and no vomiting    Risk factors: heart disease    Risk factors: no anemia, no congestive heart failure, no coronary artery disease, no diabetes and no new medications        Prior to Admission Medications   Prescriptions Last Dose Informant Patient Reported? Taking?    FLUoxetine (PROzac) 10 mg capsule   No No   Si capsule (10 mg total) by Per G Tube route daily   Patient taking differently: Take 20 mg by mouth daily    LORazepam (ATIVAN) 0 5 mg tablet   No No   Si tablet (0 5 mg total) by Per G Tube route every 8 (eight) hours as needed for anxiety for up to 10 days   POLYETHYLENE GLYCOL 3350 PO   Yes No   Si g by Per G Tube route daily   Petrolatum-Zinc Oxide (PHYTOPLEX Z-GUARD EX)   Yes No   Sig: Apply topically   acetaminophen (TYLENOL) 500 mg tablet   Yes No   Si mg by Per G Tube route every 6 (six) hours as needed for mild pain   atorvastatin (LIPITOR) 40 mg tablet   No No   Sig: Take 1 tablet (40 mg total) by mouth every evening   baclofen 10 mg tablet   No No   Si tablet (10 mg total) by Per G Tube route 3 (three) times a day   bethanechol (URECHOLINE) 10 mg tablet   Yes No   Sig: Take 10 mg by mouth 3 (three) times a day   bisacodyl (DULCOLAX) 10 mg suppository   Yes No   Sig: Insert 10 mg into the rectum daily as needed for constipation   docusate sodium (COLACE) 100 mg capsule   Yes No   Si mg 2 (two) times a day   hydrocortisone 1 % cream   Yes No   Sig: Apply topically 2 (two) times a day   metoprolol tartrate (LOPRESSOR) 25 mg tablet   No No   Sig: Take 1 tablet (25 mg total) by mouth every 12 (twelve) hours   ondansetron (ZOFRAN-ODT) 4 mg disintegrating tablet   Yes No   Sig: Take 4 mg by mouth every 6 (six) hours as needed for nausea or vomiting   pantoprazole (PROTONIX) 40 mg tablet   No No   Sig: Take 1 tablet (40 mg total) by mouth 2 (two) times a day before meals   rivaroxaban (XARELTO) 15 mg tablet   No No   Si tablet (15 mg total) by Per G Tube route daily with breakfast   Patient taking differently: Take 20 mg by mouth daily with breakfast    senna 8 8 mg/5 mL syrup   Yes No Sig: Take 8 6 mg by mouth 2 (two) times a day as needed for constipation    tamsulosin (FLOMAX) 0 4 mg   Yes No   Sig: Take 0 4 mg by mouth daily with dinner   terazosin (HYTRIN) 1 mg capsule   No No   Si capsule (1 mg total) by Per G Tube route daily at bedtime      Facility-Administered Medications: None       Past Medical History:   Diagnosis Date    Arthritis     BL knees    CHF (congestive heart failure) (HCC)     Colon polyp     CVA [facility]cere[facility] vascular accident[facility] ([facility] [address] ) 2019    NIHSS 26 on presentation    Depression     Hypertension     Irregular heart beat     Afib    Stroke [facility])        Past Surgical History:   Procedure Laterality Date    COLONOSCOPY      IR STROKE ALERT  2019    MENISCECTOMY Right        History reviewed  No pertinent family history  I have reviewed and agree with the history as documented  Social History     Tobacco Use    Smoking status: Never Smoker    Smokeless tobacco: Never Used   Substance Use Topics    Alcohol use: Not Currently     Frequency: 2-4 times a month     Comment: social    Drug use: Never        Review of Systems   Constitutional: Positive for activity change and fatigue  Negative for appetite change, chills and fever  HENT: Negative  Eyes: Negative  Respiratory: Negative  Negative for cough and shortness of breath  Cardiovascular: Negative  Negative for chest pain and syncope  Gastrointestinal: Negative  Negative for abdominal pain, anorexia, diarrhea, melena, nausea and vomiting  Genitourinary: Negative  Negative for dysuria and frequency  Musculoskeletal: Negative for arthralgias, falls, myalgias and neck pain  Skin: Negative  Allergic/Immunologic: Negative  Neurological: Positive for weakness  Negative for dizziness, seizures, loss of consciousness, numbness and headaches  Hematological: Negative  Psychiatric/Behavioral: Negative  All other systems reviewed and are negative        Physical Exam  Physical Exam   Constitutional: He is oriented to person, place, and time  He appears well-developed and well-nourished  He is cooperative  Non-toxic appearance  He does not have a sickly appearance  He appears ill (Chronically ill-appearing)  No distress  HENT:   Head: Normocephalic and atraumatic  Right Ear: External ear normal    Left Ear: External ear normal    Mouth/Throat: Oropharynx is clear and moist    Eyes: Pupils are equal, round, and reactive to light  Conjunctivae and EOM are normal  No scleral icterus  Neck: Normal range of motion  Neck supple  No thyromegaly present  Cardiovascular: Normal rate, regular rhythm and normal heart sounds  Pulmonary/Chest: Effort normal and breath sounds normal    Abdominal: Soft  Bowel sounds are normal  He exhibits no distension and no mass  There is no tenderness  No hernia  Musculoskeletal: Normal range of motion  He exhibits no edema, tenderness or deformity  Lymphadenopathy:     He has no cervical adenopathy  Neurological: He is alert and oriented to person, place, and time  He has normal strength and normal reflexes  He displays normal reflexes  No cranial nerve deficit  He exhibits abnormal muscle tone (Left-sided paresis (chronic))  Skin: Skin is warm and dry  No rash noted  He is not diaphoretic  No erythema  No pallor  Psychiatric: He has a normal mood and affect  Nursing note and vitals reviewed        Vital Signs  ED Triage Vitals   Temperature Pulse Respirations Blood Pressure SpO2   08/26/19 1222 08/26/19 1222 08/26/19 1222 08/26/19 1222 08/26/19 1222   97 8 °F (36 6 °C) 57 18 160/71 98 %      Temp Source Heart Rate Source Patient Position - Orthostatic VS BP Location FiO2 (%)   08/26/19 1222 08/26/19 1222 08/26/19 1222 08/26/19 1222 --   Oral Monitor Sitting Right arm       Pain Score       08/26/19 1316       1           Vitals:    08/26/19 1222 08/26/19 1316 08/26/19 1330 08/26/19 1415   BP: 160/71 134/64     Pulse: 57 64 56 56 Patient Position - Orthostatic VS: Sitting Sitting           Visual Acuity  Visual Acuity      Most Recent Value   L Pupil Size (mm)  2   R Pupil Size (mm)  2          ED Medications  Medications   levofloxacin (LEVAQUIN) IVPB (premix) 750 mg (750 mg Intravenous New Bag 8/26/19 1422)   baclofen tablet 10 mg (10 mg Oral Given 8/26/19 1317)   sodium chloride 0 9 % bolus 500 mL (0 mL Intravenous Stopped 8/26/19 1319)       Diagnostic Studies  Results Reviewed     Procedure Component Value Units Date/Time    Protime-INR [[id number]]  (Abnormal) Collected:  08/26/19 1332    Lab Status:  Final result Specimen:  Blood from Arm, Left Updated:  08/26/19 1416     Protime 21 9 seconds      INR 1 87    APTT [[id number]]  (Abnormal) Collected:  08/26/19 1332    Lab Status:  Final result Specimen:  Blood from Arm, Left Updated:  08/26/19 1416     PTT 40 seconds     Urine Microscopic [[id number]]  (Abnormal) Collected:  08/26/19 1325    Lab Status:  Final result Specimen:  Urine, Clean Catch Updated:  08/26/19 1409     RBC, UA 0-1 /hpf      WBC, UA 30-50 /hpf      Epithelial Cells Occasional /hpf      Bacteria, UA Moderate /hpf     Urine culture [[id number]] Collected:  08/26/19 1325    Lab Status: In process Specimen:  Urine, Clean Catch Updated:  08/26/19 1409    TSH [[id number]]  (Normal) Collected:  08/26/19 1249    Lab Status:  Final result Specimen:  Blood from Arm, Right Updated:  08/26/19 1358     TSH 3RD GENERATON 1 803 uIU/mL     Narrative:       Patients undergoing fluorescein dye angiography may retain small amounts of fluorescein in the body for 48-72 hours post procedure  Samples containing fluorescein can produce falsely depressed TSH values  If the patient had this procedure,a specimen should be resubmitted post fluorescein clearance        UA w Reflex to Microscopic w Reflex to Culture [[id number]]  (Abnormal) Collected:  08/26/19 1325    Lab Status:  Final result Specimen:  Urine, Clean Catch Updated:  08/26/19 6923 Color, UA Yellow     Clarity, UA Cloudy     Specific Gravity, UA 1 015     pH, UA 7 0     Leukocytes, UA Moderate     Nitrite, UA Positive     Protein, UA Negative mg/dl      Glucose, UA Negative mg/dl      Ketones, UA Negative mg/dl      Urobilinogen, UA 0 2 E U /dl      Bilirubin, UA Negative     Blood, UA Trace-Intact    Comprehensive metabolic panel [[id number]]  (Abnormal) Collected:  08/26/19 1245    Lab Status:  Final result Specimen:  Blood from Arm, Right Updated:  08/26/19 1324     Sodium 141 mmol/L      Potassium 4 0 mmol/L      Chloride 105 mmol/L      CO2 28 mmol/L      ANION GAP 8 mmol/L      BUN 9 mg/dL      Creatinine 0 81 mg/dL      Glucose 118 mg/dL      Calcium 9 2 mg/dL      AST 13 U/L      ALT 16 U/L      Alkaline Phosphatase 91 U/L      Total Protein 6 4 g/dL      Albumin 3 1 g/dL      Total Bilirubin 0 41 mg/dL      eGFR 91 ml/min/1 73sq m     Narrative:       [facility] guidelines for Chronic Kidney Disease (CKD):     Stage 1 with normal or high GFR (GFR > 90 mL/min/1 73 square meters)    Stage 2 Mild CKD (GFR = 60-89 mL/min/1 73 square meters)    Stage 3A Moderate CKD (GFR = 45-59 mL/min/1 73 square meters)    Stage 3B Moderate CKD (GFR = 30-44 mL/min/1 73 square meters)    Stage 4 Severe CKD (GFR = 15-29 mL/min/1 73 square meters)    Stage 5 End Stage CKD (GFR <15 mL/min/1 73 square meters)  Note: GFR calculation is accurate only with a steady state creatinine    Troponin I [[id number]]  (Normal) Collected:  08/26/19 1245    Lab Status:  Final result Specimen:  Blood from Arm, Right Updated:  08/26/19 1313     Troponin I <0 02 ng/mL     CBC and differential [[id number]]  (Abnormal) Collected:  08/26/19 1245    Lab Status:  Final result Specimen:  Blood from Arm, Right Updated:  08/26/19 1254     WBC 6 16 Thousand/uL      RBC 4 48 Million/uL      Hemoglobin 12 5 g/dL      Hematocrit 40 4 %      MCV 90 fL      MCH 27 9 pg      MCHC 30 9 g/dL      RDW 14 5 % MPV 10 0 fL      Platelets 005 Thousands/uL      nRBC 0 /100 WBCs      Neutrophils Relative 54 %      Immat GRANS % 0 %      Lymphocytes Relative 32 %      Monocytes Relative 9 %      Eosinophils Relative 5 %      Basophils Relative 0 %      Neutrophils Absolute 3 34 Thousands/µL      Immature Grans Absolute 0 01 Thousand/uL      Lymphocytes Absolute 1 94 Thousands/µL      Monocytes Absolute 0 56 Thousand/µL      Eosinophils Absolute 0 29 Thousand/µL      Basophils Absolute 0 02 Thousands/µL                  XR chest 2 views   ED Interpretation by Constantin Wyman DO (08/26 4961)   nad                 Procedures  Procedures       ED Course                               MDM  Number of Diagnoses or Management Options  Diagnosis management comments: 71-year-old male with a history of recent CVA with resultant left-sided paralysis presents with a 3 day history of generalized fatigue  Patient has been doing well with outpatient therapy since the end of July, however over the last 3 days he has been very fatigued and weak  Today he went to his physical therapy and was unable to complete this session  It was also noted there that his blood pressure was only 805 systolic  According to the patient's wife he has had no fevers or any other signs of illness and he has been eating well  On exam he appears chronically ill but is awake and alert in no distress  He does have the left-sided hemiparesis  No outwardly signs of infection  Will do broad-based workup will rule out infection, electrolyte abnormality, MI         Amount and/or Complexity of Data Reviewed  Clinical lab tests: ordered and reviewed  Tests in the radiology section of CPT®: ordered and reviewed  Decide to obtain previous medical records or to obtain history from someone other than the patient: yes  Review and summarize past medical records: yes  Independent visualization of images, tracings, or specimens: yes        Disposition  Final diagnoses: Weakness   UTI (urinary tract infection)     Time reflects when diagnosis was documented in both MDM as applicable and the Disposition within this note     Time User Action Codes Description Comment    8/26/2019  2:34 PM Vinie Band Add [R53 1] Weakness     8/26/2019  2:34 PM Vinie Band Add [N39 0] UTI (urinary tract infection)       ED Disposition     ED Disposition Condition Date/Time Comment    Discharge Good Mon Aug 26, 2019  2:34  [address] discharge to home/self care  Follow-up Information     Follow up With Specialties Details Why Contact Info    Moshe Sanders MD [facility] Schedule an appointment as soon as possible for a visit in 2 days  [address] [place] [address]            Patient's Medications   Discharge Prescriptions    LEVOFLOXACIN (LEVAQUIN) 500 MG TABLET    Take 1 tablet (500 mg total) by mouth daily for 7 days       Start Date: 8/26/2019 End Date: 9/2/2019       Order Dose: 500 mg       Quantity: 7 tablet    Refills: 0     No discharge procedures on file      ED Provider  Electronically Signed by           Kale Baker DO  08/26/19 1464

## 2019-08-26 NOTE — ED PROCEDURE NOTE
PROCEDURE  ECG 12 Lead Documentation Only  Date/Time: 8/26/2019 12:38 PM  Performed by: Trevor Warner DO  Authorized by: Trevor Warner DO     Indications / Diagnosis:  Weakness  ECG reviewed by me, the ED Provider: yes    Patient location:  ED  Interpretation:     Interpretation: normal    Rate:     ECG rate:  57    ECG rate assessment: bradycardic    Rhythm:     Rhythm: sinus bradycardia    Ectopy:     Ectopy: none    Conduction:     Conduction: normal    ST segments:     ST segments:  Normal  T waves:     T waves: normal           Trevor Warner DO  08/26/19 1238

## 2019-08-26 NOTE — ED NOTES
Assumed care of patient at this time  Patient resting comfortably in bed  No distress noted or complaints  Updated on plan of care  ID band on, call bell within reach, and side rails x2 up        Zahida Bermudez RN  08/26/19 5530

## 2019-08-27 LAB
ATRIAL RATE: 57 BPM
P AXIS: 64 DEGREES
PR INTERVAL: 134 MS
QRS AXIS: 48 DEGREES
QRSD INTERVAL: 102 MS
QT INTERVAL: 426 MS
QTC INTERVAL: 414 MS
T WAVE AXIS: 90 DEGREES
VENTRICULAR RATE: 57 BPM

## 2019-08-27 PROCEDURE — 93010 ELECTROCARDIOGRAM REPORT: CPT | Performed by: INTERNAL MEDICINE

## 2019-08-28 ENCOUNTER — REMOTE DEVICE CLINIC VISIT (OUTPATIENT)
Dept: CARDIOLOGY CLINIC | Facility: CLINIC | Age: 69
End: 2019-08-28
Payer: MEDICARE

## 2019-08-28 DIAGNOSIS — Z95.818 PRESENCE OF OTHER CARDIAC IMPLANTS AND GRAFTS: ICD-10-CM

## 2019-08-28 DIAGNOSIS — Z86.73 PERSONAL HISTORY OF TRANSIENT ISCHEMIC ATTACK: Primary | ICD-10-CM

## 2019-08-28 DIAGNOSIS — I48.0 PAROXYSMAL ATRIAL FIBRILLATION (HCC): ICD-10-CM

## 2019-08-28 LAB — BACTERIA UR CULT: ABNORMAL

## 2019-08-28 PROCEDURE — 93299 PR REM INTERROG ICPMS/SCRMS <30 D TECH REVIEW: CPT | Performed by: INTERNAL MEDICINE

## 2019-08-28 PROCEDURE — 93298 REM INTERROG DEV EVAL SCRMS: CPT | Performed by: INTERNAL MEDICINE

## 2019-08-28 NOTE — PROGRESS NOTES
MDT LOOP RECORDER  [employer]EL[employer] TRANSMISSION: LOOP RECORDER  PRESENTING RHYTHM SB @ 58 BPM  BATTERY STATUS "OK"  5 TACHY EPISODES W/ EGRAMS SHOWING RVR @ 182-188 BPM W/ OVERSENSING  192 AF EPISODES W/ EGRAMS SUGGESTING SR W/ PACs, PVCs [ TRIPLETS AND COUPLETS] AF AND OVERSENSING  AF BURDEN = 17 2%, HX OF PAF  PT TAKES XARELTO AND METOPROLOL TART  NO PATIENT ACTIVATED EPISODES  NORMAL DEVICE FUNCTION   DL

## 2019-09-03 ENCOUNTER — TELEPHONE (OUTPATIENT)
Dept: CARDIOLOGY CLINIC | Facility: CLINIC | Age: 69
End: 2019-09-03

## 2019-09-03 ENCOUNTER — HOSPITAL ENCOUNTER (EMERGENCY)
Facility: HOSPITAL | Age: 69
Discharge: HOME | End: 2019-09-03
Attending: EMERGENCY MEDICINE | Admitting: EMERGENCY MEDICINE
Payer: MEDICARE

## 2019-09-03 VITALS
RESPIRATION RATE: 16 BRPM | TEMPERATURE: 97.9 F | SYSTOLIC BLOOD PRESSURE: 151 MMHG | OXYGEN SATURATION: 99 % | DIASTOLIC BLOOD PRESSURE: 67 MMHG | HEART RATE: 59 BPM

## 2019-09-03 DIAGNOSIS — I48.0 PAROXYSMAL ATRIAL FIBRILLATION (HCC): ICD-10-CM

## 2019-09-03 DIAGNOSIS — I95.9 TRANSIENT HYPOTENSION: Primary | ICD-10-CM

## 2019-09-03 LAB
ALBUMIN SERPL BCP-MCNC: 3.2 G/DL (ref 3.5–5)
ALP SERPL-CCNC: 91 U/L (ref 46–116)
ALT SERPL W P-5'-P-CCNC: 11 U/L (ref 12–78)
ANION GAP SERPL CALCULATED.3IONS-SCNC: 7 MMOL/L (ref 4–13)
AST SERPL W P-5'-P-CCNC: 11 U/L (ref 5–45)
ATRIAL RATE: 57 BPM
BACTERIA UR QL AUTO: ABNORMAL /HPF
BASOPHILS # BLD AUTO: 0.02 THOUSANDS/ΜL (ref 0–0.1)
BASOPHILS NFR BLD AUTO: 0 % (ref 0–1)
BILIRUB SERPL-MCNC: 0.57 MG/DL (ref 0.2–1)
BILIRUB UR QL STRIP: NEGATIVE
BILIRUB UR QL STRIP: NEGATIVE
BUN SERPL-MCNC: 11 MG/DL (ref 5–25)
CALCIUM SERPL-MCNC: 9.2 MG/DL (ref 8.3–10.1)
CHLORIDE SERPL-SCNC: 103 MMOL/L (ref 100–108)
CLARITY UR: CLEAR
CLARITY UR: CLEAR
CO2 SERPL-SCNC: 30 MMOL/L (ref 21–32)
COLOR UR: YELLOW
COLOR UR: YELLOW
COLOR, POC: YELLOW
CREAT SERPL-MCNC: 0.84 MG/DL (ref 0.6–1.3)
EOSINOPHIL # BLD AUTO: 0.25 THOUSAND/ΜL (ref 0–0.61)
EOSINOPHIL NFR BLD AUTO: 5 % (ref 0–6)
ERYTHROCYTE [DISTWIDTH] IN BLOOD BY AUTOMATED COUNT: 14.7 % (ref 11.6–15.1)
GFR SERPL CREATININE-BSD FRML MDRD: 89 ML/MIN/1.73SQ M
GLUCOSE SERPL-MCNC: 130 MG/DL (ref 65–140)
GLUCOSE UR STRIP-MCNC: NEGATIVE MG/DL
GLUCOSE UR STRIP-MCNC: NEGATIVE MG/DL
HCT VFR BLD AUTO: 40 % (ref 36.5–49.3)
HGB BLD-MCNC: 12.6 G/DL (ref 12–17)
HGB UR QL STRIP.AUTO: ABNORMAL
HGB UR QL STRIP.AUTO: NEGATIVE
IMM GRANULOCYTES # BLD AUTO: 0.01 THOUSAND/UL (ref 0–0.2)
IMM GRANULOCYTES NFR BLD AUTO: 0 % (ref 0–2)
KETONES UR STRIP-MCNC: NEGATIVE MG/DL
KETONES UR STRIP-MCNC: NEGATIVE MG/DL
LEUKOCYTE ESTERASE UR QL STRIP: NEGATIVE
LEUKOCYTE ESTERASE UR QL STRIP: NEGATIVE
LYMPHOCYTES # BLD AUTO: 1.42 THOUSANDS/ΜL (ref 0.6–4.47)
LYMPHOCYTES NFR BLD AUTO: 30 % (ref 14–44)
MCH RBC QN AUTO: 28.3 PG (ref 26.8–34.3)
MCHC RBC AUTO-ENTMCNC: 31.5 G/DL (ref 31.4–37.4)
MCV RBC AUTO: 90 FL (ref 82–98)
MONOCYTES # BLD AUTO: 0.32 THOUSAND/ΜL (ref 0.17–1.22)
MONOCYTES NFR BLD AUTO: 7 % (ref 4–12)
NEUTROPHILS # BLD AUTO: 2.74 THOUSANDS/ΜL (ref 1.85–7.62)
NEUTS SEG NFR BLD AUTO: 58 % (ref 43–75)
NITRITE UR QL STRIP: NEGATIVE
NITRITE UR QL STRIP: NEGATIVE
NON-SQ EPI CELLS URNS QL MICRO: ABNORMAL /HPF
NRBC BLD AUTO-RTO: 0 /100 WBCS
P AXIS: 248 DEGREES
PH UR STRIP.AUTO: 6 [PH] (ref 4.5–8)
PH UR STRIP.AUTO: 7 [PH] (ref 4.5–8)
PLATELET # BLD AUTO: 174 THOUSANDS/UL (ref 149–390)
PMV BLD AUTO: 9.9 FL (ref 8.9–12.7)
POTASSIUM SERPL-SCNC: 3.8 MMOL/L (ref 3.5–5.3)
PR INTERVAL: 168 MS
PROT SERPL-MCNC: 6.5 G/DL (ref 6.4–8.2)
PROT UR STRIP-MCNC: NEGATIVE MG/DL
PROT UR STRIP-MCNC: NEGATIVE MG/DL
QRS AXIS: 34 DEGREES
QRSD INTERVAL: 90 MS
QT INTERVAL: 430 MS
QTC INTERVAL: 418 MS
RBC # BLD AUTO: 4.45 MILLION/UL (ref 3.88–5.62)
RBC #/AREA URNS AUTO: ABNORMAL /HPF
SODIUM SERPL-SCNC: 140 MMOL/L (ref 136–145)
SP GR UR STRIP.AUTO: 1.01 (ref 1–1.03)
SP GR UR STRIP.AUTO: 1.01 (ref 1–1.03)
T WAVE AXIS: 82 DEGREES
TROPONIN I SERPL-MCNC: <0.02 NG/ML
UROBILINOGEN UR QL STRIP.AUTO: 0.2 E.U./DL
UROBILINOGEN UR QL STRIP.AUTO: 0.2 E.U./DL
VENTRICULAR RATE: 57 BPM
WBC # BLD AUTO: 4.76 THOUSAND/UL (ref 4.31–10.16)
WBC #/AREA URNS AUTO: ABNORMAL /HPF

## 2019-09-03 PROCEDURE — 96361 HYDRATE IV INFUSION ADD-ON: CPT

## 2019-09-03 PROCEDURE — 36415 COLL VENOUS BLD VENIPUNCTURE: CPT | Performed by: EMERGENCY MEDICINE

## 2019-09-03 PROCEDURE — 93005 ELECTROCARDIOGRAM TRACING: CPT

## 2019-09-03 PROCEDURE — 80053 COMPREHEN METABOLIC PANEL: CPT | Performed by: EMERGENCY MEDICINE

## 2019-09-03 PROCEDURE — 99285 EMERGENCY DEPT VISIT HI MDM: CPT | Performed by: EMERGENCY MEDICINE

## 2019-09-03 PROCEDURE — 85025 COMPLETE CBC W/AUTO DIFF WBC: CPT | Performed by: EMERGENCY MEDICINE

## 2019-09-03 PROCEDURE — 99285 EMERGENCY DEPT VISIT HI MDM: CPT

## 2019-09-03 PROCEDURE — 81001 URINALYSIS AUTO W/SCOPE: CPT

## 2019-09-03 PROCEDURE — 93010 ELECTROCARDIOGRAM REPORT: CPT | Performed by: INTERNAL MEDICINE

## 2019-09-03 PROCEDURE — 81003 URINALYSIS AUTO W/O SCOPE: CPT

## 2019-09-03 PROCEDURE — 96360 HYDRATION IV INFUSION INIT: CPT

## 2019-09-03 PROCEDURE — 84484 ASSAY OF TROPONIN QUANT: CPT | Performed by: EMERGENCY MEDICINE

## 2019-09-03 PROCEDURE — ND001 PR NO DOCUMENTATION: Performed by: PHYSICIAN ASSISTANT

## 2019-09-03 RX ADMIN — SODIUM CHLORIDE 1000 ML: 0.9 INJECTION, SOLUTION INTRAVENOUS at 15:20

## 2019-09-03 NOTE — ED NOTES
Patient appearing to have muscle spasms, Dr Mikaela Zamora aware and okay for patient's wife to administer baclofen PO 10 mg from home  Baclofen 10 mg administered by patient's wife at this time       Zayra Pringle RN  09/03/19 8741

## 2019-09-03 NOTE — ED NOTES
Called patients pharmacy regarding metoprolol  Pharmacy confirmed that patient takes 25 mg PO every 12 hours        Denise Pickens RN  09/03/19 1953

## 2019-09-03 NOTE — DISCHARGE INSTRUCTIONS
Your dose of metoprolol is changing  Start 12 5 mg (1/2 tablet) tonight and then twice daily every day after that until or unless your are instructed to change it by your follow up specialists  Return to the ED as needed for new or persistent low blood pressure less than 90, or HR less than 50, or other new concerns

## 2019-09-03 NOTE — ED PROVIDER NOTES
History  Chief Complaint   Patient presents with    Hypotension     pt currently in therapy after having a stroke  pt struggling with BP issues  pt "tanked" again at therapy per wife  pts BP dropped to 93/54 at therapy and pt more lethargic   pt drowsy in triage  wife answering questions for pt      70 yo male with HTN, CVA, PAF recovering from CVA in 2019, with residual left hemiparesis, expressive aphasia, brought from [facility] where he was going through outpatient rehab appointment, when he became very weak, pale, with low blood pressure, into the SBP 90s  This has been a recurrent issue over the past few weeks, so that his rehab has deteriorated after initially improving well  History provided by:  Patient and spouse  History limited by: aphasia  Prior to Admission Medications   Prescriptions Last Dose Informant Patient Reported? Taking?    FLUoxetine (PROzac) 10 mg capsule   No Yes   Si capsule (10 mg total) by Per G Tube route daily   Patient taking differently: Take 20 mg by mouth daily    POLYETHYLENE GLYCOL 3350 PO   Yes Yes   Si g by Per G Tube route daily   Petrolatum-Zinc Oxide (PHYTOPLEX Z-GUARD EX)   Yes Yes   Sig: Apply topically   acetaminophen (TYLENOL) 500 mg tablet Not Taking at Unknown time  Yes No   Si mg by Per G Tube route every 6 (six) hours as needed for mild pain   atorvastatin (LIPITOR) 40 mg tablet   No Yes   Sig: Take 1 tablet (40 mg total) by mouth every evening   baclofen 10 mg tablet   No Yes   Si tablet (10 mg total) by Per G Tube route 3 (three) times a day   bethanechol (URECHOLINE) 10 mg tablet   Yes Yes   Sig: Take 10 mg by mouth 3 (three) times a day   bisacodyl (DULCOLAX) 10 mg suppository Not Taking at Unknown time  Yes No   Sig: Insert 10 mg into the rectum daily as needed for constipation   docusate sodium (COLACE) 100 mg capsule Not Taking at Unknown time  Yes No   Si mg 2 (two) times a day   hydrocortisone 1 % cream   Yes Yes   Sig: Apply topically 2 (two) times a day   metoprolol tartrate (LOPRESSOR) 25 mg tablet   No Yes   Sig: Take 1 tablet (25 mg total) by mouth every 12 (twelve) hours   ondansetron (ZOFRAN-ODT) 4 mg disintegrating tablet Not Taking at Unknown time  Yes No   Sig: Take 4 mg by mouth every 6 (six) hours as needed for nausea or vomiting   pantoprazole (PROTONIX) 40 mg tablet   No Yes   Sig: Take 1 tablet (40 mg total) by mouth 2 (two) times a day before meals   rivaroxaban (XARELTO) 15 mg tablet   No Yes   Si tablet (15 mg total) by Per G Tube route daily with breakfast   Patient taking differently: Take 20 mg by mouth daily with breakfast    senna 8 8 mg/5 mL syrup Not Taking at Unknown time  Yes No   Sig: Take 8 6 mg by mouth 2 (two) times a day as needed for constipation    tamsulosin (FLOMAX) 0 4 mg   Yes Yes   Sig: Take 0 4 mg by mouth daily with dinner   terazosin (HYTRIN) 1 mg capsule   No Yes   Si capsule (1 mg total) by Per G Tube route daily at bedtime      Facility-Administered Medications: None       Past Medical History:   Diagnosis Date    Arthritis     BL knees    CHF (congestive heart failure) (HCC)     Colon polyp     [facility]    NIHSS 26 on presentation    Depression     Hypertension     Irregular heart beat     Afib    Stroke [facility])        Past Surgical History:   Procedure Laterality Date    COLONOSCOPY      IR STROKE ALERT  2019    MENISCECTOMY Right        History reviewed  No pertinent family history  I have reviewed and agree with the history as documented      Social History     Tobacco Use    Smoking status: Never Smoker    Smokeless tobacco: Never Used   Substance Use Topics    Alcohol use: Not Currently     Frequency: 2-4 times a month     Comment: social    Drug use: Never        Review of Systems   Unable to perform ROS: Other       Physical Exam  Physical Exam   Constitutional: Vital signs are normal  He appears well-developed and well-nourished  No distress  HENT:   Head: Normocephalic  Right Ear: External ear normal    Left Ear: External ear normal    Nose: Nose normal    Mouth/Throat: Oropharynx is clear and moist    Eyes: Pupils are equal, round, and reactive to light  Neck: Normal range of motion  Cardiovascular: Normal rate and regular rhythm  Pulmonary/Chest: Effort normal  No respiratory distress  Abdominal: Soft  Bowel sounds are normal  There is no tenderness  Neurological: He is alert  Baseline expressive aphasia, left sided hemiparesis   Skin: Skin is warm  Capillary refill takes less than 2 seconds         Vital Signs  ED Triage Vitals   Temperature Pulse Respirations Blood Pressure SpO2   09/03/19 1436 09/03/19 1436 09/03/19 1436 09/03/19 1436 09/03/19 1436   97 9 °F (36 6 °C) 58 16 110/65 98 %      Temp Source Heart Rate Source Patient Position - Orthostatic VS BP Location FiO2 (%)   09/03/19 1436 09/03/19 1539 09/03/19 1539 09/03/19 1539 --   Temporal Monitor Lying Left arm       Pain Score       09/03/19 1436       No Pain           Vitals:    09/03/19 1436 09/03/19 1539 09/03/19 1651   BP: 110/65 142/70 151/67   Pulse: 58 60 59   Patient Position - Orthostatic VS:  Lying Sitting         Visual Acuity      ED Medications  Medications   sodium chloride 0 9 % bolus 1,000 mL (0 mL Intravenous Stopped 9/3/19 1717)       Diagnostic Studies  Results Reviewed     Procedure Component Value Units Date/Time    Troponin I [[id number]]  (Normal) Collected:  09/03/19 1518    Lab Status:  Final result Specimen:  Blood from Arm, Right Updated:  09/03/19 1619     Troponin I <0 02 ng/mL     Comprehensive metabolic panel [[id number]]  (Abnormal) Collected:  09/03/19 1518    Lab Status:  Final result Specimen:  Blood from Arm, Right Updated:  09/03/19 1558     Sodium 140 mmol/L      Potassium 3 8 mmol/L      Chloride 103 mmol/L      CO2 30 mmol/L      ANION GAP 7 mmol/L      BUN 11 mg/dL      Creatinine 0 84 mg/dL      Glucose 130 mg/dL      Calcium 9 2 mg/dL      AST 11 U/L      ALT 11 U/L      Alkaline Phosphatase 91 U/L      Total Protein 6 5 g/dL      Albumin 3 2 g/dL      Total Bilirubin 0 57 mg/dL      eGFR 89 ml/min/1 73sq m     Narrative:       National Kidney Disease Foundation guidelines for Chronic Kidney Disease (CKD):     Stage 1 with normal or high GFR (GFR > 90 mL/min/1 73 square meters)    Stage 2 Mild CKD (GFR = 60-89 mL/min/1 73 square meters)    Stage 3A Moderate CKD (GFR = 45-59 mL/min/1 73 square meters)    Stage 3B Moderate CKD (GFR = 30-44 mL/min/1 73 square meters)    Stage 4 Severe CKD (GFR = 15-29 mL/min/1 73 square meters)    Stage 5 End Stage CKD (GFR <15 mL/min/1 73 square meters)  Note: GFR calculation is accurate only with a steady state creatinine    Urine Microscopic [[id number]]  (Abnormal) Collected:  09/03/19 1526    Lab Status:  Final result Specimen:  Urine, Clean Catch Updated:  09/03/19 1550     RBC, UA 0-1 /hpf      WBC, UA None Seen /hpf      Epithelial Cells Occasional /hpf      Bacteria, UA None Seen /hpf     ED Urine Macroscopic [[id number]]  (Abnormal) Collected:  09/03/19 1526    Lab Status:  Final result Specimen:  Urine Updated:  09/03/19 1527     Color, UA Yellow     Clarity, UA Clear     pH, UA 7 0     Leukocytes, UA Negative     Nitrite, UA Negative     Protein, UA Negative mg/dl      Glucose, UA Negative mg/dl      Ketones, UA Negative mg/dl      Urobilinogen, UA 0 2 E U /dl      Bilirubin, UA Negative     Blood, UA Trace     Specific [place], UA 1 015    Narrative:       CLINITEK RESULT    CBC and differential [[id number]] Collected:  09/03/19 1518    Lab Status:  Final result Specimen:  Blood from Arm, Right Updated:  09/03/19 1527     WBC 4 76 Thousand/uL      RBC 4 45 Million/uL      Hemoglobin 12 6 g/dL      Hematocrit 40 0 %      MCV 90 fL      MCH 28 3 pg      MCHC 31 5 g/dL      RDW 14 7 %      MPV 9 9 fL      Platelets 851 Thousands/uL      nRBC 0 /100 WBCs      Neutrophils Relative 58 %      Immat GRANS % 0 %      Lymphocytes Relative 30 %      Monocytes Relative 7 %      Eosinophils Relative 5 %      Basophils Relative 0 %      Neutrophils Absolute 2 74 Thousands/µL      Immature Grans Absolute 0 01 Thousand/uL      Lymphocytes Absolute 1 42 Thousands/µL      Monocytes Absolute 0 32 Thousand/µL      Eosinophils Absolute 0 25 Thousand/µL      Basophils Absolute 0 02 Thousands/µL     POCT urinalysis dipstick [[id number]]  (Normal) Resulted:  09/03/19 1524    Lab Status:  Final result Specimen:  Urine Updated:  09/03/19 1524     Color, UA Yellow    ED Urine Macroscopic [[id number]] Collected:  09/03/19 1523    Lab Status:  Final result Specimen:  Urine Updated:  09/03/19 1524     Color, UA Yellow     Clarity, UA Clear     pH, UA 6 0     Leukocytes, UA Negative     Nitrite, UA Negative     Protein, UA Negative mg/dl      Glucose, UA Negative mg/dl      Ketones, UA Negative mg/dl      Urobilinogen, UA 0 2 E U /dl      Bilirubin, UA Negative     Blood, UA Negative     Specific Gravity, UA 1 010    Narrative:       CLINITEK RESULT                 No orders to display              Procedures  ECG 12 Lead Documentation Only  Date/Time: 9/3/2019 2:51 PM  Performed by: Pedro Holliday MD  Authorized by: Pedro Holliday MD     Rate:     ECG rate:  53  Rhythm:     Rhythm: sinus rhythm    Ectopy:     Ectopy: none    QRS:     QRS axis:  Normal    QRS intervals:  Normal  Conduction:     Conduction: normal    ST segments:     ST segments:  Normal  T waves:     T waves: normal             ED Course  ED Course as of Sep 03 1805   Tue Sep 03, 2019   1628 D/W   Dr Candace Collins who agrees the lopressor can be adjusted down to 12 5 bid--wear compression, stocking which he is doing      56 Confirmed with pharmacy metoprolol 25 mg bid, they can be taken by half      1713 EMR shows our ED callback system flagged his UA from 8/26/19 for potentially needing different abx, and follow up phone call was made  Contact was not made and his wife confirms he completed the course  The UA today looks much improved, and I would consider it treated  MDM    Disposition  Final diagnoses:   Transient hypotension   Paroxysmal atrial fibrillation ([address] )     Time reflects when diagnosis was documented in both MDM as applicable and the Disposition within this note     Time User Action Codes Description Comment    9/3/2019  5:15 PM Larraine Del L Add [I95 9] Transient hypotension     9/3/2019  5:19 PM Larraine Del L Add [I48 0] Paroxysmal atrial fibrillation [facility])       ED Disposition     ED Disposition Condition Date/Time Comment    Discharge Good Tue Sep 3, 2019  5:15  [address] discharge to home/self care  Follow-up Information     Follow up With Specialties Details Why Nancy 176, DO Cardiology Go to  As scheduled 9/6/19 [address]   [facility]  [phone]            Discharge Medication List as of 9/3/2019  5:27 PM      CONTINUE these medications which have CHANGED    Details   metoprolol tartrate (LOPRESSOR) 25 mg tablet Take 0 5 tablets (12 5 mg total) by mouth 2 (two) times a day for 7 days, Starting Tue 9/3/2019, Until Tue 9/10/2019, Print         CONTINUE these medications which have NOT CHANGED    Details   atorvastatin (LIPITOR) 40 mg tablet Take 1 tablet (40 mg total) by mouth every evening, Starting Mon 5/6/2019, Print      baclofen 10 mg tablet 1 tablet (10 mg total) by Per G Tube route 3 (three) times a day, Starting Mon 6/10/2019, No Print      bethanechol (URECHOLINE) 10 mg tablet Take 10 mg by mouth 3 (three) times a day, Starting Thu 7/25/2019, Historical Med      FLUoxetine (PROzac) 10 mg capsule 1 capsule (10 mg total) by Per G Tube route daily, Starting Mon 6/10/2019, No Print      hydrocortisone 1 % cream Apply topically 2 (two) times a day, Historical Med      pantoprazole (PROTONIX) 40 mg tablet Take 1 tablet (40 mg total) by mouth 2 (two) times a day before meals, Starting Fri 5/31/2019, No Print      Petrolatum-Zinc Oxide (PHYTOPLEX Z-GUARD EX) Apply topically, Historical Med      POLYETHYLENE GLYCOL 3350 PO 17 g by Per G Tube route daily, Historical Med      rivaroxaban (XARELTO) 15 mg tablet 1 tablet (15 mg total) by Per G Tube route daily with breakfast, Starting Mon 6/10/2019, No Print      tamsulosin (FLOMAX) 0 4 mg Take 0 4 mg by mouth daily with dinner, Historical Med      terazosin (HYTRIN) 1 mg capsule 1 capsule (1 mg total) by Per G Tube route daily at bedtime, Starting Mon 6/10/2019, No Print      acetaminophen (TYLENOL) 500 mg tablet 500 mg by Per G Tube route every 6 (six) hours as needed for mild pain, Historical Med      bisacodyl (DULCOLAX) 10 mg suppository Insert 10 mg into the rectum daily as needed for constipation, Historical Med      docusate sodium (COLACE) 100 mg capsule 100 mg 2 (two) times a day, Historical Med      ondansetron (ZOFRAN-ODT) 4 mg disintegrating tablet Take 4 mg by mouth every 6 (six) hours as needed for nausea or vomiting, Historical Med      senna 8 8 mg/5 mL syrup Take 8 6 mg by mouth 2 (two) times a day as needed for constipation , Historical Med           No discharge procedures on file      ED Provider  Electronically Signed by           Eufemia Black MD  09/03/19 2006

## 2019-09-03 NOTE — TELEPHONE ENCOUNTER
Pc from wife patient was at rehab today and was leaving and would like patient to be seen today due to BP reading being quite low  Bp 93/54  Spoke to Dr Barrington Martinez patient is to be seen as a new patient on Friday  Dr Barrington Martinez wanted to know if patient is symptomatic at all  Returned call to wife to ask additional information and patient was going to [facility] ED due to BP issues and feeling very lethargic  Dr Barrington Martinez notified

## 2019-09-04 ENCOUNTER — TELEPHONE (OUTPATIENT)
Dept: CARDIOLOGY CLINIC | Facility: CLINIC | Age: 69
End: 2019-09-04

## 2019-09-04 NOTE — TELEPHONE ENCOUNTER
Wife called stating pt was seen in Ed yesterday and ED recommended adjusting metoprolol to 12  5 BID per instructions of Dr Esthela Waters who was reached by the Ed staff  Wife states patient is lethagic still today /66 Hr 53  Pt has OV with Dr Claudia Jennings as new patient on Friday   wife would like patient seen today if possible  Please advise

## 2019-09-04 NOTE — TELEPHONE ENCOUNTER
Discussed call with Dr Orquidea Hubbard   Called wife back we will hold the metoprolol completely for the rest of today tomorrow, and Friday   the patient will be seen on Friday at which time we will evaluate how he is feeling   recomended to hydrate more today and to check BP  Wife was concerned that BP will spike without metoprolol   told her we will evaluate with BP reading and if any issues she is to call   told her since he has OV on Friday this will be a good oppurtunity to see how he feels with the medication adjustment   she agreed

## 2019-09-05 ENCOUNTER — TELEPHONE (OUTPATIENT)
Dept: CARDIOLOGY CLINIC | Facility: CLINIC | Age: 69
End: 2019-09-05

## 2019-09-05 NOTE — TELEPHONE ENCOUNTER
Wife called BP today 131/77  Pt feeling less lethargic  She wanted clearence to go to rehab today   told her we can not clear because we are not seeing til tomorrow  She concerned BP now too high to go to rehab  Ask her if they monitor Bp at rehab she said yes told her sure the Rehab has parameters they follow and if they feel uncomfortable with reading they will stop  We will see him tomorrow as a new patient

## 2019-09-06 ENCOUNTER — OFFICE VISIT (OUTPATIENT)
Dept: CARDIOLOGY CLINIC | Facility: CLINIC | Age: 69
End: 2019-09-06
Payer: MEDICARE

## 2019-09-06 VITALS
BODY MASS INDEX: 28.85 KG/M2 | HEIGHT: 72 IN | WEIGHT: 213 LBS | HEART RATE: 79 BPM | DIASTOLIC BLOOD PRESSURE: 71 MMHG | SYSTOLIC BLOOD PRESSURE: 133 MMHG

## 2019-09-06 DIAGNOSIS — I48.0 PAROXYSMAL ATRIAL FIBRILLATION (HCC): Primary | ICD-10-CM

## 2019-09-06 PROCEDURE — 99214 OFFICE O/P EST MOD 30 MIN: CPT | Performed by: INTERNAL MEDICINE

## 2019-09-06 PROCEDURE — 1124F ACP DISCUSS-NO DSCNMKR DOCD: CPT | Performed by: INTERNAL MEDICINE

## 2019-09-06 PROCEDURE — 93000 ELECTROCARDIOGRAM COMPLETE: CPT | Performed by: INTERNAL MEDICINE

## 2019-09-06 NOTE — PROGRESS NOTES
Cardiology Follow Up        [address] [facility]      1950      [id number]      Discussion/Summary:    1  Paroxysmal atrial fibrillation, loop recorder in place  2  Right ROB/MCA territory embolic CVA  3  Dyslipidemia    · Sinus rhythm with PACs on ECG today  He developed hypotension and lethargy with metoprolol 25 mg b i d  And was decreased to 12 5 mg b i d   Although his lethargy has improved he had slight fatigue, for which metoprolol was thereafter held  Will attempt to restart metoprolol at low dose, 12 5 mg once daily  If he tolerates this well for a week, his wife will increase this to twice daily for several days to see if this is tolerated  The patient will hydrate adequately during this time as well  If tolerated, he will continue 12 5 mg twice daily  · I have given them the option of starting amiodarone, and have discussed indications, risks, and benefits of the same, especially as he has not been tolerating higher doses of metoprolol very well  They will think about this option, although for now would prefer metoprolol  · Will request electrophysiology evaluation to help managing tough case of atrial fibrillation  If metoprolol not tolerated, either amiodarone or ablation procedure may be considered  · Continue Xarelto anticoagulation which he is tolerating well  · Continue high-dose atorvastatin          Interval History: This is a 79-year-old male admitted to [facility] 04/2019 with acute CVA, and subsequent event right MCA and ROB thrombectomy and tPA infusion  He residual aphasia and dysphasia  Subsequent ALYX revealed no atrial septal interruption, no evidence of thrombus  Subsequently, he underwent loop recorder placement  He then presents to the hospital 05/13/2019 after a routine ECG revealed rapid atrial fibrillation  Loop recorder interrogation had revealed new onset atrial fibrillation ongoing since 05/11/2019    He was then initiated on Eliquis and metoprolol, and a rate control strategy was pursued  After discharge, he has started rehabilitation  His wife called 09/03/2019, stating that he was quite lethargic with low blood pressure readings  He went to the ER, where metoprolol was decreased to 12 5 mg b i d   Although he felt better, he still continued to have lethargy, after which metoprolol was completed held 09/04/2019  He presents today for follow-up  His wife states his lethargy has improved with holding metoprolol  He denies chest pain, shortness of breath, significant lower extremity edema  She does feel that he has some component of anxiety  He has been tolerating anticoagulation well                 Vitals:  Vitals:    09/06/19 1424   BP: 133/71   BP Location: Left arm   Patient Position: Sitting   Cuff Size: Adult   Pulse: 79   Weight: 96 6 kg (213 lb)   Height: 6' (1 829 m)         Past Medical History:   Diagnosis Date    Arthritis     BL knees    [facility]     Colon polyp     [facility] 4/27/2019    NIHSS 26 on presentation    Depression     Hypertension     Irregular heart beat     Afib    Stroke [facility])      Social History     Socioeconomic History    Marital status: /Civil Union     Spouse name: Not on file    Number of children: Not on file    Years of education: Not on file    Highest education level: Not on file   Occupational History    Not on file   Social Needs    Financial resource strain: Not on file    Food insecurity:     Worry: Not on file     Inability: Not on file    Transportation needs:     Medical: Not on file     Non-medical: Not on file   Tobacco Use    Smoking status: Never Smoker    Smokeless tobacco: Never Used   Substance and Sexual Activity    Alcohol use: Not Currently     Frequency: 2-4 times a month     Comment: social    Drug use: Never    Sexual activity: Not on file   Lifestyle    Physical activity:     Days per week: Not on file     Minutes per session: Not on file    Stress: Not on file   Relationships    Social connections:     Talks on phone: Not on file     Gets together: Not on file     Attends Jain service: Not on file     Active member of club or organization: Not on file     Attends meetings of clubs or organizations: Not on file     Relationship status: Not on file    Intimate partner violence:     Fear of current or ex partner: Not on file     Emotionally abused: Not on file     Physically abused: Not on file     Forced sexual activity: Not on file   Other Topics Concern    Not on file   Social History Narrative    Not on file      No family history on file    Past Surgical History:   Procedure Laterality Date    COLONOSCOPY      IR STROKE ALERT  4/27/2019    MENISCECTOMY Right        Current Outpatient Medications:     acetaminophen (TYLENOL) 500 mg tablet, 500 mg by Per G Tube route every 6 (six) hours as needed for mild pain, Disp: , Rfl:     atorvastatin (LIPITOR) 40 mg tablet, Take 1 tablet (40 mg total) by mouth every evening, Disp: 30 tablet, Rfl: 0    baclofen 10 mg tablet, 1 tablet (10 mg total) by Per G Tube route 3 (three) times a day, Disp: , Rfl: 0    bethanechol (URECHOLINE) 10 mg tablet, Take 10 mg by mouth 3 (three) times a day, Disp: , Rfl: 0    bisacodyl (DULCOLAX) 10 mg suppository, Insert 10 mg into the rectum daily as needed for constipation, Disp: , Rfl:     docusate sodium (COLACE) 100 mg capsule, 100 mg 2 (two) times a day, Disp: , Rfl:     FLUoxetine (PROzac) 10 mg capsule, 1 capsule (10 mg total) by Per G Tube route daily (Patient taking differently: Take 20 mg by mouth daily ), Disp: , Rfl:     hydrocortisone 1 % cream, Apply topically 2 (two) times a day, Disp: , Rfl:     metoprolol tartrate (LOPRESSOR) 25 mg tablet, Take 0 5 tablets (12 5 mg total) by mouth 2 (two) times a day for 7 days, Disp: 30 tablet, Rfl: 0    ondansetron (ZOFRAN-ODT) 4 mg disintegrating tablet, Take 4 mg by mouth every 6 (six) hours as needed for nausea or vomiting, Disp: , Rfl:     pantoprazole (PROTONIX) 40 mg tablet, Take 1 tablet (40 mg total) by mouth 2 (two) times a day before meals, Disp: 120 tablet, Rfl: 0    Petrolatum-Zinc Oxide (PHYTOPLEX Z-GUARD EX), Apply topically, Disp: , Rfl:     POLYETHYLENE GLYCOL 3350 PO, 17 g by Per G Tube route daily, Disp: , Rfl:     rivaroxaban (XARELTO) 15 mg tablet, 1 tablet (15 mg total) by Per G Tube route daily with breakfast (Patient taking differently: Take 20 mg by mouth daily with breakfast ), Disp: , Rfl:     senna 8 8 mg/5 mL syrup, Take 8 6 mg by mouth 2 (two) times a day as needed for constipation , Disp: , Rfl:     tamsulosin (FLOMAX) 0 4 mg, Take 0 4 mg by mouth daily with dinner, Disp: , Rfl:     terazosin (HYTRIN) 1 mg capsule, 1 capsule (1 mg total) by Per G Tube route daily at bedtime, Disp: , Rfl:         Review of Systems:  Review of Systems   Constitutional: Positive for fatigue  Respiratory: Negative  Cardiovascular: Positive for leg swelling  All other systems reviewed and are negative  Physical Exam:  Physical Exam   Constitutional: He is oriented to person, place, and time  He appears well-developed and well-nourished  No distress  HENT:   Head: Normocephalic and atraumatic  Eyes: Pupils are equal, round, and reactive to light  EOM are normal  Right eye exhibits no discharge  No scleral icterus  Neck: Normal range of motion  Neck supple  No thyromegaly present  Cardiovascular: Normal rate, regular rhythm and normal heart sounds  Exam reveals no gallop and no friction rub  No murmur heard  Pulmonary/Chest: Effort normal and breath sounds normal    Abdominal: He exhibits no distension  There is no tenderness  There is no rebound and no guarding  Musculoskeletal: Normal range of motion  He exhibits no edema     Neurological: He is alert and oriented to person, place, and time  Coordination normal    Skin: Skin is warm and dry  No rash noted  He is not diaphoretic  No erythema  No pallor  Psychiatric: He has a normal mood and affect  His behavior is normal  Judgment and thought content normal        This note was completed in part utilizing M-Modal Fluency Direct Software  Grammatical errors, random word insertions, spelling mistakes, and incomplete sentences can be an occasional consequence of this system secondary to software limitations, ambient noise, and hardware issues  If you have any questions or concerns about the content, text, or information contained within the body of this dictation, please contact the provider for clarification

## 2019-09-08 NOTE — PROGRESS NOTES
9/8/19 7265 Pt's wife received letter in mail, called regarding results  Pt had been on Levaquin for UTI and based on c/s, may not be susceptible  Pt had repeat UA last week which was negative for nitrites, micro was also negative for bacteria  Pt's symptoms seem to have resolved  Advised that the pt call his PCP tomorrow but will hold off on initiating new antibiotic, as most recent UA was negative and pt does not have symptoms

## 2019-09-17 ENCOUNTER — TELEPHONE (OUTPATIENT)
Dept: NEUROLOGY | Facility: CLINIC | Age: 69
End: 2019-09-17

## 2019-09-17 DIAGNOSIS — G81.94 LEFT HEMIPLEGIA (HCC): ICD-10-CM

## 2019-09-17 DIAGNOSIS — Z86.73 HISTORY OF STROKE: ICD-10-CM

## 2019-09-17 DIAGNOSIS — R47.01 APHASIA DUE TO ACUTE STROKE (HCC): ICD-10-CM

## 2019-09-17 DIAGNOSIS — R13.12 OROPHARYNGEAL DYSPHAGIA: Primary | ICD-10-CM

## 2019-09-17 DIAGNOSIS — I63.9 APHASIA DUE TO ACUTE STROKE (HCC): ICD-10-CM

## 2019-09-17 NOTE — TELEPHONE ENCOUNTER
Patient's daughter Kimberlee Shi asking for referral for patient to have PT/OT and speech therapy with [facility]  She states the patient is currently going to outpatient therapy with [facility] and they are frustrated with the care he is receiving  She states patient was walking with assistance at discharge from inpatient rehab  Outpatient PT has only gotten the patient up 5x, she states some days they will not stand the patient and have him walk at all  They are interested in switching to [facility] therapies  Please order if appropriate, thank you!     Kimberlee Shi [phone]  Okay to leave detailed message

## 2019-09-17 NOTE — TELEPHONE ENCOUNTER
Certainly  Quite appropriate indeed  Ordered entered  Please also give to them the locations of the [facility] neuro rehab centers as that would clearly be the best place for Wilmer Powell

## 2019-09-18 ENCOUNTER — TRANSCRIBE ORDERS (OUTPATIENT)
Dept: ADMINISTRATIVE | Facility: HOSPITAL | Age: 69
End: 2019-09-18

## 2019-09-18 ENCOUNTER — APPOINTMENT (OUTPATIENT)
Dept: LAB | Facility: MEDICAL CENTER | Age: 69
End: 2019-09-18
Payer: MEDICARE

## 2019-09-18 DIAGNOSIS — N39.0 LOWER URINARY TRACT INFECTIOUS DISEASE: ICD-10-CM

## 2019-09-18 DIAGNOSIS — I10 ESSENTIAL HYPERTENSION, MALIGNANT: ICD-10-CM

## 2019-09-18 DIAGNOSIS — R35.1 NOCTURIA: ICD-10-CM

## 2019-09-18 DIAGNOSIS — E11.649 UNCONTROLLED TYPE 2 DIABETES MELLITUS WITH HYPOGLYCEMIA, UNSPECIFIED HYPOGLYCEMIA COMA STATUS (HCC): ICD-10-CM

## 2019-09-18 DIAGNOSIS — I48.91 ATRIAL FIBRILLATION, UNSPECIFIED TYPE (HCC): ICD-10-CM

## 2019-09-18 DIAGNOSIS — E78.2 MIXED HYPERLIPIDEMIA: ICD-10-CM

## 2019-09-18 DIAGNOSIS — E11.649 UNCONTROLLED TYPE 2 DIABETES MELLITUS WITH HYPOGLYCEMIA, UNSPECIFIED HYPOGLYCEMIA COMA STATUS (HCC): Primary | ICD-10-CM

## 2019-09-18 LAB
ALBUMIN SERPL BCP-MCNC: 3.6 G/DL (ref 3.5–5)
ALP SERPL-CCNC: 91 U/L (ref 46–116)
ALT SERPL W P-5'-P-CCNC: 17 U/L (ref 12–78)
ANION GAP SERPL CALCULATED.3IONS-SCNC: 6 MMOL/L (ref 4–13)
AST SERPL W P-5'-P-CCNC: 11 U/L (ref 5–45)
BACTERIA UR QL AUTO: ABNORMAL /HPF
BASOPHILS # BLD AUTO: 0.02 THOUSANDS/ΜL (ref 0–0.1)
BASOPHILS NFR BLD AUTO: 1 % (ref 0–1)
BILIRUB SERPL-MCNC: 0.5 MG/DL (ref 0.2–1)
BILIRUB UR QL STRIP: NEGATIVE
BUN SERPL-MCNC: 10 MG/DL (ref 5–25)
CALCIUM SERPL-MCNC: 9.4 MG/DL (ref 8.3–10.1)
CHLORIDE SERPL-SCNC: 107 MMOL/L (ref 100–108)
CHOLEST SERPL-MCNC: 134 MG/DL (ref 50–200)
CLARITY UR: CLEAR
CO2 SERPL-SCNC: 29 MMOL/L (ref 21–32)
COLOR UR: YELLOW
CREAT SERPL-MCNC: 0.81 MG/DL (ref 0.6–1.3)
EOSINOPHIL # BLD AUTO: 0.21 THOUSAND/ΜL (ref 0–0.61)
EOSINOPHIL NFR BLD AUTO: 5 % (ref 0–6)
ERYTHROCYTE [DISTWIDTH] IN BLOOD BY AUTOMATED COUNT: 15.1 % (ref 11.6–15.1)
EST. AVERAGE GLUCOSE BLD GHB EST-MCNC: 128 MG/DL
GFR SERPL CREATININE-BSD FRML MDRD: 91 ML/MIN/1.73SQ M
GLUCOSE SERPL-MCNC: 93 MG/DL (ref 65–140)
GLUCOSE UR STRIP-MCNC: NEGATIVE MG/DL
HBA1C MFR BLD: 6.1 % (ref 4.2–6.3)
HCT VFR BLD AUTO: 42.2 % (ref 36.5–49.3)
HDLC SERPL-MCNC: 42 MG/DL (ref 40–60)
HGB BLD-MCNC: 12.8 G/DL (ref 12–17)
HGB UR QL STRIP.AUTO: NEGATIVE
IMM GRANULOCYTES # BLD AUTO: 0 THOUSAND/UL (ref 0–0.2)
IMM GRANULOCYTES NFR BLD AUTO: 0 % (ref 0–2)
KETONES UR STRIP-MCNC: NEGATIVE MG/DL
LDLC SERPL CALC-MCNC: 77 MG/DL (ref 0–100)
LEUKOCYTE ESTERASE UR QL STRIP: ABNORMAL
LYMPHOCYTES # BLD AUTO: 1.54 THOUSANDS/ΜL (ref 0.6–4.47)
LYMPHOCYTES NFR BLD AUTO: 35 % (ref 14–44)
MCH RBC QN AUTO: 27.4 PG (ref 26.8–34.3)
MCHC RBC AUTO-ENTMCNC: 30.3 G/DL (ref 31.4–37.4)
MCV RBC AUTO: 90 FL (ref 82–98)
MONOCYTES # BLD AUTO: 0.32 THOUSAND/ΜL (ref 0.17–1.22)
MONOCYTES NFR BLD AUTO: 7 % (ref 4–12)
NEUTROPHILS # BLD AUTO: 2.29 THOUSANDS/ΜL (ref 1.85–7.62)
NEUTS SEG NFR BLD AUTO: 52 % (ref 43–75)
NITRITE UR QL STRIP: NEGATIVE
NON-SQ EPI CELLS URNS QL MICRO: ABNORMAL /HPF
NONHDLC SERPL-MCNC: 92 MG/DL
NRBC BLD AUTO-RTO: 0 /100 WBCS
PH UR STRIP.AUTO: 7 [PH]
PLATELET # BLD AUTO: 209 THOUSANDS/UL (ref 149–390)
PMV BLD AUTO: 10.2 FL (ref 8.9–12.7)
POTASSIUM SERPL-SCNC: 4.1 MMOL/L (ref 3.5–5.3)
PROT SERPL-MCNC: 6.8 G/DL (ref 6.4–8.2)
PROT UR STRIP-MCNC: NEGATIVE MG/DL
RBC # BLD AUTO: 4.67 MILLION/UL (ref 3.88–5.62)
RBC #/AREA URNS AUTO: ABNORMAL /HPF
SODIUM SERPL-SCNC: 142 MMOL/L (ref 136–145)
SP GR UR STRIP.AUTO: 1.01 (ref 1–1.03)
TRIGL SERPL-MCNC: 75 MG/DL
TSH SERPL DL<=0.05 MIU/L-ACNC: 1.53 UIU/ML (ref 0.36–3.74)
UROBILINOGEN UR QL STRIP.AUTO: 0.2 E.U./DL
WBC # BLD AUTO: 4.38 THOUSAND/UL (ref 4.31–10.16)
WBC #/AREA URNS AUTO: ABNORMAL /HPF

## 2019-09-18 PROCEDURE — 81001 URINALYSIS AUTO W/SCOPE: CPT | Performed by: FAMILY MEDICINE

## 2019-09-18 PROCEDURE — 83036 HEMOGLOBIN GLYCOSYLATED A1C: CPT

## 2019-09-18 PROCEDURE — 80061 LIPID PANEL: CPT

## 2019-09-18 PROCEDURE — 80053 COMPREHEN METABOLIC PANEL: CPT

## 2019-09-18 PROCEDURE — 84443 ASSAY THYROID STIM HORMONE: CPT

## 2019-09-18 PROCEDURE — 85025 COMPLETE CBC W/AUTO DIFF WBC: CPT

## 2019-09-18 NOTE — TELEPHONE ENCOUNTER
Telephone call to patients daughter and informed her that the order was put in for him to have PT/OT and speech therapy and I gave her the number to the facility on therapy facility on [facility] in [facility]  This is the closest one to where her father resides   She stated she will call them to setup an appointment

## 2019-09-30 ENCOUNTER — EVALUATION (OUTPATIENT)
Dept: PHYSICAL THERAPY | Facility: REHABILITATION | Age: 69
End: 2019-09-30
Payer: MEDICARE

## 2019-09-30 DIAGNOSIS — G81.94 LEFT HEMIPARESIS (HCC): Primary | ICD-10-CM

## 2019-09-30 PROCEDURE — 97112 NEUROMUSCULAR REEDUCATION: CPT | Performed by: PHYSICAL THERAPIST

## 2019-09-30 PROCEDURE — 97163 PT EVAL HIGH COMPLEX 45 MIN: CPT | Performed by: PHYSICAL THERAPIST

## 2019-09-30 NOTE — PROGRESS NOTES
PHYSICAL THERAPY EVALUATION    Today's date: 2019  Patient name: Juan M Atkins  : 1950  Referring provider: Gae Klinefelter, MD    SUBJECTIVE:  HPI: Juan M Atkins is a 71 y o  male referred to outpatient physical therapy for the following diagnosis   Encounter Diagnosis   Name Primary?  Left hemiparesis ([address] ) Yes          Patient reports stroke 19 and a lot of medical issues  He was hospitalized until the end of 2019  He is here for a second opinion  He was doing better in inpatient than he has been doing at home  He has been doing outpatient physical therapy with [facility]  In July, he was walking with assistance  In outpatient, they started the whole process over again, had brace fixed, so wasn't walking for 3 weeks  Then they felt the left knee may buckle, so they suggested against walking at home  He goes to outpatient physical therapy 2x/week, uses [employer] and bike at the gym (started 2 weeks ago, with a ), using a stander  He isn't doing too much at home  They haven't had any episodes of the left knee buckling  He is being changed in standing, holding onto a grab bar  About 2 weeks ago, his blood pressure dropped after about 25 minutes in the standing frame  His blood pressure went down to about 90/60, he laid down and then was able to continue  Physician since cut his blood pressure medication in half  His on Xarelto  Prior to CVA 19, he was walking independently  He was active, physically fit  They do continue 2x/week at [facility] for occupational and speech therapy  He had to go up a size with his brace, has MAFO with check strap  They are transferring in standing, holding onto bar or the arms of a chair  They are using a shower chair  No stairs to enter the home, have two ramps, live in bi-level and can exist on one level  Initially he was having terrible muscle spasms, most recently in August with a UTI    He's been on Baclofen since June  They are considering Botox  He is about 6 feet tall and about 210 lbs  Patient goals: walk with a walker, get a second opinion about physical therapy        Past Medical History:   Diagnosis Date    Arthritis     [facility] knees    [facility]est[facility] failure[facility])     Colon polyp     CVA (cerebral vascular accident) ([facility] [address] ) 4/27/2019    NIHSS 26 on presentation    Depression     Hypertension     Irregular heart beat     Afib    Stroke [facility])        Current Outpatient Medications:     acetaminophen (TYLENOL) 500 mg tablet, 500 mg by Per G Tube route every 6 (six) hours as needed for mild pain, Disp: , Rfl:     atorvastatin (LIPITOR) 40 mg tablet, Take 1 tablet (40 mg total) by mouth every evening, Disp: 30 tablet, Rfl: 0    baclofen 10 mg tablet, 1 tablet (10 mg total) by Per G Tube route 3 (three) times a day, Disp: , Rfl: 0    bethanechol (URECHOLINE) 10 mg tablet, Take 10 mg by mouth 3 (three) times a day, Disp: , Rfl: 0    bisacodyl (DULCOLAX) 10 mg suppository, Insert 10 mg into the rectum daily as needed for constipation, Disp: , Rfl:     docusate sodium (COLACE) 100 mg capsule, 100 mg 2 (two) times a day, Disp: , Rfl:     FLUoxetine (PROzac) 10 mg capsule, 1 capsule (10 mg total) by Per G Tube route daily (Patient taking differently: Take 20 mg by mouth daily ), Disp: , Rfl:     hydrocortisone 1 % cream, Apply topically 2 (two) times a day, Disp: , Rfl:     metoprolol tartrate (LOPRESSOR) 25 mg tablet, Take 0 5 tablets (12 5 mg total) by mouth 2 (two) times a day for 7 days, Disp: 30 tablet, Rfl: 0    ondansetron (ZOFRAN-ODT) 4 mg disintegrating tablet, Take 4 mg by mouth every 6 (six) hours as needed for nausea or vomiting, Disp: , Rfl:     pantoprazole (PROTONIX) 40 mg tablet, Take 1 tablet (40 mg total) by mouth 2 (two) times a day before meals, Disp: 120 tablet, Rfl: 0    Petrolatum-Zinc Oxide (PHYTOPLEX Z-GUARD EX), Apply topically, Disp: , Rfl:     POLYETHYLENE GLYCOL 3350 PO, 17 g by Per G Tube route daily, Disp: , Rfl:     rivaroxaban (XARELTO) 15 mg tablet, 1 tablet (15 mg total) by Per G Tube route daily with breakfast (Patient taking differently: Take 20 mg by mouth daily with breakfast ), Disp: , Rfl:     senna 8 8 mg/5 mL syrup, Take 8 6 mg by mouth 2 (two) times a day as needed for constipation , Disp: , Rfl:     tamsulosin (FLOMAX) 0 4 mg, Take 0 4 mg by mouth daily with dinner, Disp: , Rfl:     terazosin (HYTRIN) 1 mg capsule, 1 capsule (1 mg total) by Per G Tube route daily at bedtime, Disp: , Rfl:     OBJECTIVE:  Transfers with squat pivot to the right, wife estimates min-mod assist   This varies throughout the day, better in the afternoon  Notes a lot of assistance getting in and out of bed  Manual Muscle Testing:    RIGHT LEFT   Hip Flexion 5/5 At least 1/5   Knee Extension 5/5 At least 2-/5, extends knee about 10 degrees from seated position   Hip Extension (clears single leg bridge?) Yes No   Dorsiflexion 5/5 0l5     Range of Motion: clonus left ankle  Moderate resistance with passive left hip and knee flexion and extension  Limited but functional hamstring length, lifelong runner/walker  Minimal to moderate assist supine to sitting up to patient's right  About 1 finger subluxation left shoulder  Flexed hand  Stands from about 19" surface with min assist   Maintains standing with contact guard  Stands from 22" surfaces with contact guard only  /85, heart rate 65 bpm    Stands with increased right versus left weight bearing  Stands feet apart without loss of balance without external assist   Increased postural sway with eyes closed  Standing at rolling walker, we see some flexion and extension of the left knee, no buckling with use of the right hand on a rolling walker (assisted keeping left hand on walker) or parallel bar    Unable to advance the left leg without significant assist   Within the parallel bars, with smoother surface, he is able to advance the left foot slightly beyond the right, frequently adducts beyond midline before moving back across midline to load the foot, very slowed movement  We did not use the MAFO  ASSESSMENT:  Lianne Villeda is a 71year old male with dense hemiplegia following CVA in April 2019  He is currently doing physical therapy at [facility], patient and wife concerned that they are not walking much during physical therapy and they were recommended against standing at home  While he does have volitional weakness of the left leg, he did well with weight bearing on the left leg during this session  Certainly, within a physical therapy treatment, he should be doing a lot of walking  Physical therapy should have tools to mitigate risk of falling such as body weight supported system or parallel bars  Walking itself seems the best tool to improve the ability to walk, as he must relearn weight acceptance and advancement of the paretic leg  We did not assess use of the brace, although this would likely improve both stance phase stability and swing phase advancement  At home, using a kitchen sink in front and his large wheelchair behind, he should be doing a lot of standing and weight shifting exercises with his wife spotting to his left  He should also be doing a lot of sitting to standing, as he is able to activate the left quadriceps very well with this transfer  I recommend against walking at home at this point, but in the meantime there are plenty of things he should be working on at home that'd safely get him on his feet  He also should be practicing supine to sitting transfers, anticipate that he can at least complete at supervision level with practice  Finally, given that he does better in the afternoon, this evaluation may look different in the morning    Agree with neurologist's recommendation that patient trial botox versus baclofen may manage muscle tone without having the systemic effects of sedation  PLAN OF CARE:  Treatment will likely continue at [facility], this evaluation was for a second opinion about course of physical therapy  Patient can contact me if they'd like any further assistance        Erika Heredia, PT  9/30/2019

## 2019-09-30 NOTE — PATIENT INSTRUCTIONS
HEP with wife to left, gait belt, countertop sink in front, wheelchair behind:  Sit to stand  Weight shifting in standing  Heel raises

## 2019-10-18 ENCOUNTER — TELEPHONE (OUTPATIENT)
Dept: NEUROLOGY | Facility: CLINIC | Age: 69
End: 2019-10-18

## 2019-10-18 NOTE — TELEPHONE ENCOUNTER
Patient's wife returned call  She would like to discuss with Dr Savannah Garcia and she will call back with an answer

## 2019-10-18 NOTE — TELEPHONE ENCOUNTER
LMOM for patient/wife to call back  ----- Message from Rhina Wild MD sent at 10/17/2019  5:09 PM EDT -----  Contact: [phone]  Clinical team: Please will you give Randolph Martin and his wife a call re: below  If they are interested in pursuing an evaluation for botox we can get them set up to be seen for a consultation in the botox clinic  Randolph Martin form PT seems to agree that this might be a good option and may help to decrease the sedation effect if we can decrease the muscle relaxers  If they are interested I would refer then to be seen by Dr Shawna Thornton and we can go from there  Just let me know  Thanks! Dr Shawna Thornton    Just FYI   ----- Message -----  From: Sima Johnson, PT  Sent: 9/30/2019   3:31 PM EDT  To: Rhina Wild MD    Hi Dr Leopoldo Kiss,  I did a "2nd physical therapy opinion" for Irwin Mendoza  I spoke to Irwin Mendoza and his wife about how he should be doing a lot of walking during physical therapy treatment  At home, I recommend against walking at this point, but he should be doing standing at a kitchen sink/weight shifting/transferring to stand  These are safe and he needs to be on his feet as much as possible at this point, then re-evaluate later as his left leg becomes more reliable  They are interested in following up on your recommendation to use botox to replace baclofen  Dr Jesus Chau, PM&R at [facility], wasn't as positive about this switch but the family said they'd certainly listen to your thoughts on this  I think it'd likely help the low energy/partially sedated issue in the morning  Her number is [phone] if you'd like to help coordinate this      Thank you for your referral here,  Randolph Fineson

## 2019-10-25 ENCOUNTER — CONSULT (OUTPATIENT)
Dept: CARDIOLOGY CLINIC | Facility: CLINIC | Age: 69
End: 2019-10-25
Payer: MEDICARE

## 2019-10-25 VITALS
SYSTOLIC BLOOD PRESSURE: 122 MMHG | HEIGHT: 72 IN | HEART RATE: 60 BPM | DIASTOLIC BLOOD PRESSURE: 72 MMHG | BODY MASS INDEX: 28.89 KG/M2

## 2019-10-25 DIAGNOSIS — R53.82 CHRONIC FATIGUE: ICD-10-CM

## 2019-10-25 DIAGNOSIS — E78.00 PURE HYPERCHOLESTEROLEMIA: ICD-10-CM

## 2019-10-25 DIAGNOSIS — Z86.73 HISTORY OF STROKE: ICD-10-CM

## 2019-10-25 DIAGNOSIS — I10 ESSENTIAL HYPERTENSION: ICD-10-CM

## 2019-10-25 DIAGNOSIS — I48.0 PAROXYSMAL A-FIB (HCC): ICD-10-CM

## 2019-10-25 DIAGNOSIS — I48.0 PAROXYSMAL ATRIAL FIBRILLATION (HCC): Primary | ICD-10-CM

## 2019-10-25 PROBLEM — R53.83 FATIGUE: Status: ACTIVE | Noted: 2019-10-25

## 2019-10-25 PROCEDURE — 99215 OFFICE O/P EST HI 40 MIN: CPT | Performed by: INTERNAL MEDICINE

## 2019-10-25 NOTE — PROGRESS NOTES
EPS Consultation/New Patient Evaluation - Brittani Harris 71 y o  male MRN: [id number]           ASSESSMENT:  1  Paroxysmal atrial fibrillation (HCC)  CANCELED: POCT ECG   2  Chronic fatigue     3  History of stroke     4  Pure hypercholesterolemia     5  Essential hypertension     6  Paroxysmal A-fib (HCC)             PLAN:  1  Paroxysmal atrial fibrillation  LVEF estimated 65% from [facility] in May 2019  On Lopressor 12 5 mg twice daily and anticoagulated with Xarelto  The device is clearly over estimating his atrial fibrillation burden  All of the EKG saved episodes that I reviewed or sinus rhythm with premature atrial contractions or premature ventricular contractions which I explained to the patient and his wife for completely harmless  I would not recommend any change in drug therapy regarding his atrial fibrillation the only possibility would be to change his metoprolol 12 5 b i d  To diltiazem 60 mg b i d  To see if this would help with his fatigue  2  History of stroke  Adequately anticoagulated Xarelto 15 mg daily    3  Chronic diastolic congestive heart failure no evidence of heart failure      4  Hypertension  Well-controlled    5  Hyperlipidemia  Tolerating statin     6  History of CVA possibly secondary to atrial fibrillation he is on lower dose Xarelto secondary to GI bleed x2 on Eliquis    Follow up in       CC/HPI:   Brittani Harris is a 71 y o  male who presents to the office today paroxysmal atrial fibrillation consultation referred by Dr Charis Pallas  Patient has history hypertension, chronic diastolic congestive heart failure, depression status post stroke, hyperlipidemia, and anemia  He has been having significant fatigue recently his cholesterol medicine was held and his baclofen was changed and there is some improvement there is a real question as to whether the metoprolol or the atrial fibrillation is causing fatigue    Although his device states that he is in atrial fibrillation anywhere from 11-17% of the time my personal review of his device suggest that he has not had any atrial fibrillation since May 30th  He is having premature atrial contractions and premature ventricular contractions that are being been does atrial fibrillation  ROS:   Negative for palpitations, shortness of breath, chest discomfort, presyncope or syncope, lower extremity swelling  All other 12 point ROS negative     Objective:     Vitals: Blood pressure 122/72, pulse 60, height 6' (1 829 m)  , Body mass index is 28 89 kg/m²  ,        Physical Exam:    GEN: [place] [facility] appears well, alert and oriented x 3, pleasant and cooperative   HEENT: pupils equal, round, and reactive to light; extraocular muscles intact  NECK: supple, no carotid bruits   HEART: regular rhythm, normal S1 and S2, no murmurs, clicks, gallops or rubs   LUNGS: clear to auscultation bilaterally; no wheezes, rales, or rhonchi   ABDOMEN: normal bowel sounds, soft, no tenderness, no distention  EXTREMITIES: peripheral pulses normal; no clubbing, cyanosis, or edema  NEURO: no focal findings   SKIN: normal without suspicious lesions on exposed skin    Medications:      Current Outpatient Medications:     acetaminophen (TYLENOL) 500 mg tablet, 500 mg by Per G Tube route every 6 (six) hours as needed for mild pain, Disp: , Rfl:     atorvastatin (LIPITOR) 40 mg tablet, Take 1 tablet (40 mg total) by mouth every evening (Patient taking differently: Take 20 mg by mouth every evening ), Disp: 30 tablet, Rfl: 0    baclofen 10 mg tablet, 1 tablet (10 mg total) by Per G Tube route 3 (three) times a day, Disp: , Rfl: 0    bethanechol (URECHOLINE) 10 mg tablet, Take 10 mg by mouth 2 (two) times a day , Disp: , Rfl: 0    bisacodyl (DULCOLAX) 10 mg suppository, Insert 10 mg into the rectum daily as needed for constipation, Disp: , Rfl:     docusate sodium (COLACE) 100 mg capsule, 100 mg as needed , Disp: , Rfl:     FLUoxetine (PROzac) 10 mg capsule, 1 capsule (10 mg total) by Per G Tube route daily (Patient taking differently: Take 20 mg by mouth daily ), Disp: , Rfl:     hydrocortisone 1 % cream, Apply topically 2 (two) times a day, Disp: , Rfl:     metoprolol tartrate (LOPRESSOR) 25 mg tablet, Take 0 5 tablets (12 5 mg total) by mouth 2 (two) times a day for 7 days (Patient taking differently: Take 12 5 mg by mouth daily ), Disp: 30 tablet, Rfl: 0    ondansetron (ZOFRAN-ODT) 4 mg disintegrating tablet, Take 4 mg by mouth every 6 (six) hours as needed for nausea or vomiting, Disp: , Rfl:     pantoprazole (PROTONIX) 40 mg tablet, Take 1 tablet (40 mg total) by mouth 2 (two) times a day before meals, Disp: 120 tablet, Rfl: 0    rivaroxaban (XARELTO) 15 mg tablet, 1 tablet (15 mg total) by Per G Tube route daily with breakfast (Patient taking differently: Take 20 mg by mouth daily with breakfast ), Disp: , Rfl:     senna 8 8 mg/5 mL syrup, Take 8 6 mg by mouth 2 (two) times a day as needed for constipation , Disp: , Rfl:     tamsulosin (FLOMAX) 0 4 mg, Take 0 4 mg by mouth daily with dinner, Disp: , Rfl:     Petrolatum-Zinc Oxide (PHYTOPLEX Z-GUARD EX), Apply topically, Disp: , Rfl:     POLYETHYLENE GLYCOL 3350 PO, 17 g by Per G Tube route daily, Disp: , Rfl:     terazosin (HYTRIN) 1 mg capsule, 1 capsule (1 mg total) by Per G Tube route daily at bedtime (Patient not taking: Reported on 10/25/2019), Disp: , Rfl:      No family history of CVA  Social History     Socioeconomic History    Marital status: /Civil Union     Spouse name: Not on file    Number of children: Not on file    Years of education: Not on file    Highest education level: Not on file   Occupational History    Not on file   Social Needs    Financial resource strain: Not on file    Food insecurity:     Worry: Not on file     Inability: Not on file    Transportation needs:     Medical: Not on file     Non-medical: Not on file   Tobacco Use    Smoking status: Never Smoker    Smokeless tobacco: Never Used   Substance and Sexual Activity    Alcohol use: Not Currently     Frequency: 2-4 times a month     Comment: social    Drug use: Never    Sexual activity: Not on file   Lifestyle    Physical activity:     Days per week: Not on file     Minutes per session: Not on file    Stress: Not on file   Relationships    Social connections:     Talks on phone: Not on file     Gets together: Not on file     Attends Judaism service: Not on file     Active member of club or organization: Not on file     Attends meetings of clubs or organizations: Not on file     Relationship status: Not on file    Intimate partner violence:     Fear of current or ex partner: Not on file     Emotionally abused: Not on file     Physically abused: Not on file     Forced sexual activity: Not on file   Other Topics Concern    Not on file   Social History Narrative    Not on file     Social History     Tobacco Use   Smoking Status Never Smoker   Smokeless Tobacco Never Used     Social History     Substance and Sexual Activity   Alcohol Use Not Currently    Frequency: 2-4 times a month    Comment: social       Labs & Results:  Below is the patient's most recent value for Albumin, ALT, AST, BUN, Calcium, Chloride, Cholesterol, CO2, Creatinine, GFR, Glucose, HDL, Hematocrit, Hemoglobin, Hemoglobin A1C, LDL, Magnesium, Phosphorus, Platelets, Potassium, PSA, Sodium, Triglycerides, and WBC  Lab Results   Component Value Date    ALT 17 09/18/2019    AST 11 09/18/2019    BUN 10 09/18/2019    CALCIUM 9 4 09/18/2019     09/18/2019    CO2 29 09/18/2019    CREATININE 0 81 09/18/2019    HDL 42 09/18/2019    HCT 42 2 09/18/2019    HGB 12 8 09/18/2019    HGBA1C 6 1 09/18/2019    MG 1 5 (L) 06/03/2019    PHOS 5 0 (H) 05/02/2019     09/18/2019    K 4 1 09/18/2019    TRIG 75 09/18/2019    WBC 4 38 09/18/2019     Note: for a comprehensive list of the patient's lab results, access the Results Review activity  Cardiac testing:   Results for orders placed during the hospital encounter of 18   Echo complete with contrast if indicated    Narrative [address]  [place], [address]  [phone]    Transthoracic Echocardiogram  2D, M-mode, Doppler, and Color Doppler    Study date:  15-Nov-2018    Patient: Derry Gosselin  MR number: [id number]  Account number: [de-identified]  : 1950  Age: 76 years  Gender: Male  Status: Outpatient  Location: Bedside  Height: 70 in  Weight: 240 lb  BP: 141/ 77 mmHg    Indications: heart failure    Diagnoses: I50 9 - Heart failure, unspecified    Sonographer:  GLENIS Wheat  Primary Physician:  Aubrey Zimmer MD  Referring Physician:  Janel Ibarra MD  Group:  [facility]  Interpreting Physician:  Pierre Guzmán MD    SUMMARY    LEFT VENTRICLE:  The ventricle was mildly dilated  Systolic function was normal  Ejection fraction was estimated to be 60 %  Although no diagnostic regional wall motion abnormality was identified, this possibility cannot be completely excluded on the basis of this study  Features were consistent with a pseudonormal left ventricular filling pattern, with concomitant abnormal relaxation and increased filling pressure (grade 2 diastolic dysfunction)  LEFT ATRIUM:  The atrium was mildly to moderately dilated  RIGHT ATRIUM:  The atrium was mildly dilated  MITRAL VALVE:  There was trace to mild regurgitation  AORTA:  The aortic root is not well visualized but appears mildly dilated  HISTORY: PRIOR HISTORY: HTN  Chol  CHF  PROCEDURE: The procedure was performed at the bedside  This was a routine study  The transthoracic approach was used  The study included complete 2D imaging, M-mode, complete spectral Doppler, and color Doppler  The heart rate was 70 bpm,  at the start of the study   Images were obtained from the parasternal, apical, subcostal, and suprasternal notch acoustic windows  Image quality was adequate  LEFT VENTRICLE: The ventricle was mildly dilated  Systolic function was normal  Ejection fraction was estimated to be 60 %  Although no diagnostic regional wall motion abnormality was identified, this possibility cannot be completely  excluded on the basis of this study  Wall thickness was normal  No evidence of apical thrombus  DOPPLER: Features were consistent with a pseudonormal left ventricular filling pattern, with concomitant abnormal relaxation and increased  filling pressure (grade 2 diastolic dysfunction)  RIGHT VENTRICLE: The size was normal  Systolic function was normal  Wall thickness was normal     LEFT ATRIUM: The atrium was mildly to moderately dilated  RIGHT ATRIUM: The atrium was mildly dilated  MITRAL VALVE: Valve structure was normal  There was normal leaflet separation  DOPPLER: The transmitral velocity was within the normal range  There was no evidence for stenosis  There was trace to mild regurgitation  AORTIC VALVE: The valve was probably trileaflet  Leaflets exhibited normal thickness, mild calcification, and normal cuspal separation  DOPPLER: Transaortic velocity was within the normal range  There was no evidence for stenosis  There  was no significant regurgitation  TRICUSPID VALVE: The valve structure was normal  There was normal leaflet separation  DOPPLER: The transtricuspid velocity was within the normal range  There was no evidence for stenosis  There was no significant regurgitation  PULMONIC VALVE: Not well visualized  DOPPLER: The transpulmonic velocity was within the normal range  There was no significant regurgitation  PERICARDIUM: There was no pericardial effusion  The pericardium was normal in appearance  AORTA: The aortic root is not well visualized but appears mildly dilated  SYSTEMIC VEINS: IVC: The inferior vena cava was normal in size      SYSTEM MEASUREMENT TABLES    2D  %FS: 31 1 %  Ao Diam: 2 8 cm  EDV(Teich): 157 4 ml  EF(Teich): 58 2 %  ESV(Teich): 65 9 ml  IVSd: 1 cm  LA Area: 26 cm2  LA Diam: 4 6 cm  LVEDV MOD A4C: 142 2 ml  LVEF MOD A4C: 73 4 %  LVESV MOD A4C: 37 8 ml  LVIDd: 5 7 cm  LVIDs: 3 9 cm  LVLd A4C: 8 9 cm  LVLs A4C: 6 8 cm  LVPWd: 0 9 cm  RA Area: 20 2 cm2  RVIDd: 3 6 cm  SV MOD A4C: 104 4 ml  SV(Teich): 91 6 ml    MM  TAPSE: 2 6 cm    PW  E': 0 1 m/s  MV A Larry: 0 m/s  MV E Larry: 0 8 m/s  MV E/A Ratio: 186 1    [facility] Accredited [facility]chocardi[facility]    Prepared and electronically signed by    Laya May MD  Signed [id number] 14:40:07       Results for orders placed during the hospital encounter of 04/27/19   ALY[facility]    Narrative Liz Brantley MD     5/2/2019  4:02 PM  Patient tolerated procedure well    Sedation was Versed 4mg, Fentanyl 50mcg  Probe passed once  No blood on return  No desaturations or hemodynamic changes  LVEF normal  Mild mitral regurgitation  Mild aortic regurgitation  Mild left atrial dilation  No valvular masses  No BAKARI thrombus  No PFO  No obvious cardioembolic source  Consider Loop recorder  No results found for this or any previous visit  No results found for this or any previous visit           Scribe Attestation    I,:   Christina Gaspar am acting as a scribe while in the presence of the attending physician :        I,:   Laya Lee DO personally performed the services described in this documentation    as scribed in my presence :

## 2019-11-18 ENCOUNTER — OFFICE VISIT (OUTPATIENT)
Dept: NEUROSURGERY | Facility: CLINIC | Age: 69
End: 2019-11-18
Payer: MEDICARE

## 2019-11-18 VITALS
DIASTOLIC BLOOD PRESSURE: 60 MMHG | RESPIRATION RATE: 16 BRPM | BODY MASS INDEX: 28.31 KG/M2 | TEMPERATURE: 97.2 F | HEIGHT: 72 IN | SYSTOLIC BLOOD PRESSURE: 125 MMHG | WEIGHT: 209 LBS | HEART RATE: 58 BPM

## 2019-11-18 DIAGNOSIS — Z86.73 HISTORY OF STROKE: ICD-10-CM

## 2019-11-18 DIAGNOSIS — G81.94 LEFT HEMIPLEGIA (HCC): Primary | ICD-10-CM

## 2019-11-18 PROCEDURE — 99213 OFFICE O/P EST LOW 20 MIN: CPT | Performed by: NEUROLOGICAL SURGERY

## 2019-11-18 RX ORDER — ASCORBIC ACID 1000 MG
TABLET ORAL DAILY
COMMUNITY

## 2019-11-18 RX ORDER — UBIDECARENONE 75 MG
CAPSULE ORAL DAILY
COMMUNITY

## 2019-11-18 NOTE — PROGRESS NOTES
Patient Id: April Marroquin is a 71 y o  male        Handedness: Right      Assessment/Plan:    Diagnoses and all orders for this visit:    Left hemiplegia ([address] )    History of stroke    Other orders  -     Coenzyme Q10 (CO Q 10) 10 MG CAPS; Take by mouth daily  -     cyanocobalamin (VITAMIN B-12) 100 mcg tablet; Take by mouth daily        Discussion Summary:   1  Status post right MCA and ROB thrombectomy, 04/27/2018  He is approximately 6 months status post thrombectomy  He has had significant improvement in speech and strength  His modified Michelle score is a 4  He is being evaluated by Neurology and Cardiology currently  He is undergoing therapies at [facility]  This juncture he did not require any further neurosurgical intervention or follow-up  I am pleased with his improvement  They should not hesitate to call with any questions or concerns  Chief Complaint: Follow-up (3 month clinical hospital follow up)        HPI:   This is a pleasant 58-year-old gentleman who presented to the hospital with acute MCA and ROB syndrome of the right hand side 4 hours after his last known well and an NIH SS of 26  He underwent right-sided thrombectomy is with recanalization of his ROB and MCA territory  He was discovered to have a new onset atrial fibrillation which is now currently being treated with anticoagulation as well as diltiazem  He has had a significant improvement with working in [place]  He still has some speech difficulties  In rehab he is ambulating with a walker and assistance  He still requires assistance with self-care  He uses a wheelchair a majority of the time  Review of systems obtained by the MA reviewed and updated below  Review of Systems   Constitutional: Negative  HENT: Negative  Eyes: Negative  Respiratory: Negative  Cardiovascular: Negative  Gastrointestinal: Negative  Endocrine: Negative  Genitourinary: Negative  Musculoskeletal: Negative  Skin: Negative  Allergic/Immunologic: Negative  Neurological: Negative  Hematological: Negative  Psychiatric/Behavioral: Negative  All other systems reviewed and are negative  Physical Exam  Vitals:    11/18/19 1543   BP: 125/60   Pulse: 58   Resp: 16   Temp: (!) 97 2 °F (36 2 °C)    He is awake alert and oriented  He has speech difficulties which are expressive in nature  He is able to follow commands  His speech is otherwise holding  He has a slight left-sided droop  His right side is full strength in his upper and lower extremity  His distal left extremities significantly weak  He has antigravity proximally  He has 3 to 4-in his left lower extremity  Diminished sensation      The following portions of the patient's history were reviewed and updated as appropriate: allergies, current medications, past family history, past medical history, past social history, past surgical history and problem list     Active Ambulatory Problems     Diagnosis Date Noted    Essential hypertension 11/14/2018    Hypokalemia 11/14/2018    Elevated brain natriuretic peptide (BNP) level 11/14/2018    Hx of right knee surgery 11/14/2018    Hyperlipidemia 11/14/2018    Prediabetes 11/15/2018    Encephalopathy acute 04/28/2019    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 ) 05/02/2019    Left hemiplegia ([facility] ) 05/02/2019    Dysphagia 05/04/2019    Paroxysmal atrial fibrillation [facility] ) 05/13/2019    Hypernatremia 05/27/2019    Acute encephalopathy 05/27/2019    Acute blood loss anemia 05/27/2019    Acute kidney injury ([facility] ) 05/27/2019    Chronic diastolic CHF (congestive heart failure) ([facility] ) 05/27/2019    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([facility] ) 06/03/2019    Urinary retention 06/04/2019    Acute leg pain, right 06/08/2019    History of stroke 08/09/2019    Fatigue 10/25/2019    Paroxysmal A-fib ([facility] ) 10/25/2019     Resolved Ambulatory Problems     Diagnosis Date Noted    Bilateral arm weakness 11/14/2018    Cough 11/15/2018    CVA [facility]cerebral vascular accident) [facility] 04/27/2019    GI bleed not requiring more than 4 units of blood in 24 hours, ICU, or surgery 06/02/2019     Past Medical History:   Diagnosis Date    Arthritis     CHF (congestive heart failure) (HCC)     Colon polyp     Depression     Hypertension     Irregular heart beat     [facility])        Past Surgical History:   Procedure Laterality Date    COLONOSCOPY      IR STROKE ALERT  4/27/2019    MENISCECTOMY Right          Current Outpatient Medications:     baclofen 10 mg tablet, 1 tablet (10 mg total) by Per G Tube route 3 (three) times a day, Disp: , Rfl: 0    bethanechol (URECHOLINE) 10 mg tablet, Take 10 mg by mouth 2 (two) times a day , Disp: , Rfl: 0    Coenzyme Q10 (CO Q 10) 10 MG CAPS, Take by mouth daily, Disp: , Rfl:     cyanocobalamin (VITAMIN B-12) 100 mcg tablet, Take by mouth daily, Disp: , Rfl:     FLUoxetine (PROzac) 10 mg capsule, 1 capsule (10 mg total) by Per G Tube route daily (Patient taking differently: Take 20 mg by mouth daily ), Disp: , Rfl:     hydrocortisone 1 % cream, Apply topically 2 (two) times a day, Disp: , Rfl:     pantoprazole (PROTONIX) 40 mg tablet, Take 1 tablet (40 mg total) by mouth 2 (two) times a day before meals, Disp: 120 tablet, Rfl: 0    rivaroxaban (XARELTO) 15 mg tablet, 1 tablet (15 mg total) by Per G Tube route daily with breakfast (Patient taking differently: Take 20 mg by mouth daily with breakfast ), Disp: , Rfl:     tamsulosin (FLOMAX) 0 4 mg, Take 0 4 mg by mouth daily with dinner, Disp: , Rfl:     metoprolol tartrate (LOPRESSOR) 25 mg tablet, Take 0 5 tablets (12 5 mg total) by mouth 2 (two) times a day for 7 days (Patient taking differently: Take 12 5 mg by mouth daily ), Disp: 30 tablet, Rfl: 0    Results/Data:  We discussed in detail his arteriogram and thrombectomy procedure  We reviewed his imaging in detail

## 2019-11-19 ENCOUNTER — REMOTE DEVICE CLINIC VISIT (OUTPATIENT)
Dept: CARDIOLOGY CLINIC | Facility: CLINIC | Age: 69
End: 2019-11-19
Payer: MEDICARE

## 2019-11-19 DIAGNOSIS — Z95.818 PRESENCE OF OTHER CARDIAC IMPLANTS AND GRAFTS: Primary | ICD-10-CM

## 2019-11-19 PROCEDURE — 93298 REM INTERROG DEV EVAL SCRMS: CPT | Performed by: INTERNAL MEDICINE

## 2019-11-19 PROCEDURE — 93299 PR REM INTERROG ICPMS/SCRMS <30 D TECH REVIEW: CPT | Performed by: INTERNAL MEDICINE

## 2019-11-19 NOTE — PROGRESS NOTES
MDT LOOP RECORDER  CARELINK TRANSMISSION: LOOP RECORDER  PRESENTING RHYTHM OVERSENSING  BATTERY STATUS "OK"  3 TACHY EPISODES W/ EGRAMS SHOWING SR W/ OVERSENSING  #206 PREVIOUSLY ADDRESSED  111 AF EPISODES W/ EGRAMS SUGGESTING SR W/ PACs, PVCs [ BIGEMINAL AT TIMES], OVERSENSING  AF BURDEN = 0 7%, CAN NOT R/O AF DUE TO NOISE  PT TAKES METOPROLOL TART AND XARELTO  NO PATIENT ACTIVATED EPISODES  NORMAL DEVICE FUNCTION   DL

## 2019-12-16 ENCOUNTER — TELEPHONE (OUTPATIENT)
Dept: NEUROLOGY | Facility: CLINIC | Age: 69
End: 2019-12-16

## 2019-12-16 NOTE — TELEPHONE ENCOUNTER
Called patient and rescheduled his appt due to Dr Kiara Cabrera being out of the office on 12/27  Patient's wife requested a Monday or Friday appt and was okay with waiting until February to be seen  R/S appt to 2/28 at 10:30 AM in [facility]  Patient's wife declined updated appt card  Patient's wife also stated that she'd like me to let you know Dr Kiara Cabrera that Dr Rayne Dudley at [facility] is performing Botox injections this week in the patient's left leg in the patient's thigh and calf to control his tone and to help with the buckling in his left knee as you recommended

## 2019-12-26 ENCOUNTER — HOSPITAL ENCOUNTER (INPATIENT)
Facility: HOSPITAL | Age: 69
LOS: 3 days | Discharge: HOME HEALTH | DRG: 064 | End: 2019-12-29
Attending: EMERGENCY MEDICINE | Admitting: INTERNAL MEDICINE
Payer: MEDICARE

## 2019-12-26 ENCOUNTER — APPOINTMENT (EMERGENCY)
Dept: CT IMAGING | Facility: HOSPITAL | Age: 69
DRG: 064 | End: 2019-12-26
Payer: MEDICARE

## 2019-12-26 ENCOUNTER — APPOINTMENT (EMERGENCY)
Dept: RADIOLOGY | Facility: HOSPITAL | Age: 69
DRG: 064 | End: 2019-12-26
Payer: MEDICARE

## 2019-12-26 DIAGNOSIS — R56.9 SEIZURE (HCC): ICD-10-CM

## 2019-12-26 DIAGNOSIS — R47.01 APHASIA DUE TO ACUTE STROKE (HCC): ICD-10-CM

## 2019-12-26 DIAGNOSIS — R55 SYNCOPE: Primary | ICD-10-CM

## 2019-12-26 DIAGNOSIS — I63.9 APHASIA DUE TO ACUTE STROKE (HCC): ICD-10-CM

## 2019-12-26 DIAGNOSIS — I63.411 CEREBROVASCULAR ACCIDENT (CVA) DUE TO EMBOLISM OF RIGHT MIDDLE CEREBRAL ARTERY (HCC): ICD-10-CM

## 2019-12-26 PROBLEM — I69.398 SEIZURE AS LATE EFFECT OF CEREBROVASCULAR ACCIDENT (CVA) (HCC): Status: ACTIVE | Noted: 2019-12-26

## 2019-12-26 LAB
ALBUMIN SERPL BCP-MCNC: 3 G/DL (ref 3.5–5)
ALP SERPL-CCNC: 87 U/L (ref 46–116)
ALT SERPL W P-5'-P-CCNC: 14 U/L (ref 12–78)
ANION GAP SERPL CALCULATED.3IONS-SCNC: 7 MMOL/L (ref 4–13)
APTT PPP: 33 SECONDS (ref 23–37)
AST SERPL W P-5'-P-CCNC: 16 U/L (ref 5–45)
BASOPHILS # BLD AUTO: 0.01 THOUSANDS/ΜL (ref 0–0.1)
BASOPHILS NFR BLD AUTO: 0 % (ref 0–1)
BILIRUB SERPL-MCNC: 0.63 MG/DL (ref 0.2–1)
BUN SERPL-MCNC: 17 MG/DL (ref 5–25)
CALCIUM SERPL-MCNC: 9.2 MG/DL (ref 8.3–10.1)
CHLORIDE SERPL-SCNC: 106 MMOL/L (ref 100–108)
CO2 SERPL-SCNC: 29 MMOL/L (ref 21–32)
CREAT SERPL-MCNC: 0.94 MG/DL (ref 0.6–1.3)
EOSINOPHIL # BLD AUTO: 0.12 THOUSAND/ΜL (ref 0–0.61)
EOSINOPHIL NFR BLD AUTO: 3 % (ref 0–6)
ERYTHROCYTE [DISTWIDTH] IN BLOOD BY AUTOMATED COUNT: 13.8 % (ref 11.6–15.1)
GFR SERPL CREATININE-BSD FRML MDRD: 82 ML/MIN/1.73SQ M
GLUCOSE SERPL-MCNC: 136 MG/DL (ref 65–140)
HCT VFR BLD AUTO: 41.1 % (ref 36.5–49.3)
HGB BLD-MCNC: 13.1 G/DL (ref 12–17)
IMM GRANULOCYTES # BLD AUTO: 0 THOUSAND/UL (ref 0–0.2)
IMM GRANULOCYTES NFR BLD AUTO: 0 % (ref 0–2)
INR PPP: 1.3 (ref 0.84–1.19)
LYMPHOCYTES # BLD AUTO: 1.1 THOUSANDS/ΜL (ref 0.6–4.47)
LYMPHOCYTES NFR BLD AUTO: 29 % (ref 14–44)
MCH RBC QN AUTO: 29.6 PG (ref 26.8–34.3)
MCHC RBC AUTO-ENTMCNC: 31.9 G/DL (ref 31.4–37.4)
MCV RBC AUTO: 93 FL (ref 82–98)
MONOCYTES # BLD AUTO: 0.21 THOUSAND/ΜL (ref 0.17–1.22)
MONOCYTES NFR BLD AUTO: 6 % (ref 4–12)
NEUTROPHILS # BLD AUTO: 2.37 THOUSANDS/ΜL (ref 1.85–7.62)
NEUTS SEG NFR BLD AUTO: 62 % (ref 43–75)
NRBC BLD AUTO-RTO: 0 /100 WBCS
PLATELET # BLD AUTO: 158 THOUSANDS/UL (ref 149–390)
PMV BLD AUTO: 9.7 FL (ref 8.9–12.7)
POTASSIUM SERPL-SCNC: 4.1 MMOL/L (ref 3.5–5.3)
PROT SERPL-MCNC: 6.2 G/DL (ref 6.4–8.2)
PROTHROMBIN TIME: 16.4 SECONDS (ref 11.6–14.5)
RBC # BLD AUTO: 4.42 MILLION/UL (ref 3.88–5.62)
SODIUM SERPL-SCNC: 142 MMOL/L (ref 136–145)
TROPONIN I SERPL-MCNC: <0.02 NG/ML
WBC # BLD AUTO: 3.81 THOUSAND/UL (ref 4.31–10.16)

## 2019-12-26 PROCEDURE — 80053 COMPREHEN METABOLIC PANEL: CPT

## 2019-12-26 PROCEDURE — 84484 ASSAY OF TROPONIN QUANT: CPT | Performed by: INTERNAL MEDICINE

## 2019-12-26 PROCEDURE — 99285 EMERGENCY DEPT VISIT HI MDM: CPT | Performed by: EMERGENCY MEDICINE

## 2019-12-26 PROCEDURE — 85025 COMPLETE CBC W/AUTO DIFF WBC: CPT

## 2019-12-26 PROCEDURE — 96374 THER/PROPH/DIAG INJ IV PUSH: CPT

## 2019-12-26 PROCEDURE — NC001 PR NO CHARGE: Performed by: PSYCHIATRY & NEUROLOGY

## 2019-12-26 PROCEDURE — 99285 EMERGENCY DEPT VISIT HI MDM: CPT

## 2019-12-26 PROCEDURE — 93005 ELECTROCARDIOGRAM TRACING: CPT

## 2019-12-26 PROCEDURE — 71046 X-RAY EXAM CHEST 2 VIEWS: CPT

## 2019-12-26 PROCEDURE — 84484 ASSAY OF TROPONIN QUANT: CPT

## 2019-12-26 PROCEDURE — 85610 PROTHROMBIN TIME: CPT | Performed by: EMERGENCY MEDICINE

## 2019-12-26 PROCEDURE — 99223 1ST HOSP IP/OBS HIGH 75: CPT | Performed by: INTERNAL MEDICINE

## 2019-12-26 PROCEDURE — 70450 CT HEAD/BRAIN W/O DYE: CPT

## 2019-12-26 PROCEDURE — 85730 THROMBOPLASTIN TIME PARTIAL: CPT | Performed by: EMERGENCY MEDICINE

## 2019-12-26 PROCEDURE — 36415 COLL VENOUS BLD VENIPUNCTURE: CPT

## 2019-12-26 PROCEDURE — 99222 1ST HOSP IP/OBS MODERATE 55: CPT | Performed by: PSYCHIATRY & NEUROLOGY

## 2019-12-26 RX ORDER — METOPROLOL SUCCINATE 50 MG/1
25 TABLET, EXTENDED RELEASE ORAL DAILY
Status: DISCONTINUED | OUTPATIENT
Start: 2019-12-26 | End: 2019-12-26

## 2019-12-26 RX ORDER — POLYETHYLENE GLYCOL 3350 17 G/17G
17 POWDER, FOR SOLUTION ORAL DAILY
Status: DISCONTINUED | OUTPATIENT
Start: 2019-12-26 | End: 2019-12-27

## 2019-12-26 RX ORDER — DOCUSATE SODIUM 100 MG/1
100 CAPSULE, LIQUID FILLED ORAL 2 TIMES DAILY
Status: DISCONTINUED | OUTPATIENT
Start: 2019-12-26 | End: 2019-12-27

## 2019-12-26 RX ORDER — ACETAMINOPHEN 325 MG/1
650 TABLET ORAL EVERY 4 HOURS PRN
Status: DISCONTINUED | OUTPATIENT
Start: 2019-12-26 | End: 2019-12-29 | Stop reason: HOSPADM

## 2019-12-26 RX ORDER — BETHANECHOL CHLORIDE 5 MG
10 TABLET ORAL 2 TIMES DAILY
Status: DISCONTINUED | OUTPATIENT
Start: 2019-12-26 | End: 2019-12-26 | Stop reason: ALTCHOICE

## 2019-12-26 RX ORDER — METOPROLOL SUCCINATE 25 MG/1
12.5 TABLET, EXTENDED RELEASE ORAL DAILY
Status: DISCONTINUED | OUTPATIENT
Start: 2019-12-27 | End: 2019-12-27

## 2019-12-26 RX ORDER — FLUOXETINE HYDROCHLORIDE 20 MG/1
20 CAPSULE ORAL DAILY
Status: DISCONTINUED | OUTPATIENT
Start: 2019-12-26 | End: 2019-12-29 | Stop reason: HOSPADM

## 2019-12-26 RX ORDER — TAMSULOSIN HYDROCHLORIDE 0.4 MG/1
0.8 CAPSULE ORAL
Status: DISCONTINUED | OUTPATIENT
Start: 2019-12-26 | End: 2019-12-29 | Stop reason: HOSPADM

## 2019-12-26 RX ORDER — PANTOPRAZOLE SODIUM 40 MG/1
40 TABLET, DELAYED RELEASE ORAL
Status: DISCONTINUED | OUTPATIENT
Start: 2019-12-26 | End: 2019-12-29 | Stop reason: HOSPADM

## 2019-12-26 RX ORDER — ASPIRIN 81 MG/1
81 TABLET, CHEWABLE ORAL DAILY
Status: DISCONTINUED | OUTPATIENT
Start: 2019-12-26 | End: 2019-12-29 | Stop reason: HOSPADM

## 2019-12-26 RX ORDER — ONDANSETRON 2 MG/ML
4 INJECTION INTRAMUSCULAR; INTRAVENOUS EVERY 6 HOURS PRN
Status: DISCONTINUED | OUTPATIENT
Start: 2019-12-26 | End: 2019-12-29 | Stop reason: HOSPADM

## 2019-12-26 RX ORDER — ATORVASTATIN CALCIUM 40 MG/1
40 TABLET, FILM COATED ORAL EVERY EVENING
Status: DISCONTINUED | OUTPATIENT
Start: 2019-12-26 | End: 2019-12-29 | Stop reason: HOSPADM

## 2019-12-26 RX ORDER — LEVETIRACETAM 750 MG/1
750 TABLET ORAL EVERY 12 HOURS SCHEDULED
Status: DISCONTINUED | OUTPATIENT
Start: 2019-12-26 | End: 2019-12-29 | Stop reason: HOSPADM

## 2019-12-26 RX ORDER — METOPROLOL SUCCINATE 25 MG/1
25 TABLET, EXTENDED RELEASE ORAL DAILY
COMMUNITY
End: 2019-12-29 | Stop reason: HOSPADM

## 2019-12-26 RX ADMIN — LEVETIRACETAM 1000 MG: 100 INJECTION, SOLUTION INTRAVENOUS at 09:53

## 2019-12-26 RX ADMIN — ATORVASTATIN CALCIUM 40 MG: 40 TABLET, FILM COATED ORAL at 17:24

## 2019-12-26 RX ADMIN — LEVETIRACETAM 750 MG: 750 TABLET, FILM COATED ORAL at 21:20

## 2019-12-26 RX ADMIN — DOCUSATE SODIUM 100 MG: 100 CAPSULE, LIQUID FILLED ORAL at 17:24

## 2019-12-26 RX ADMIN — TAMSULOSIN HYDROCHLORIDE 0.8 MG: 0.4 CAPSULE ORAL at 21:20

## 2019-12-26 RX ADMIN — Medication 100 MCG: at 13:21

## 2019-12-26 RX ADMIN — FLUOXETINE 20 MG: 20 CAPSULE ORAL at 13:19

## 2019-12-26 RX ADMIN — RIVAROXABAN 15 MG: 15 TABLET, FILM COATED ORAL at 14:52

## 2019-12-26 RX ADMIN — PANTOPRAZOLE SODIUM 40 MG: 40 TABLET, DELAYED RELEASE ORAL at 17:24

## 2019-12-26 RX ADMIN — Medication 30 MG: at 13:23

## 2019-12-26 RX ADMIN — ENOXAPARIN SODIUM 40 MG: 40 INJECTION SUBCUTANEOUS at 13:23

## 2019-12-26 NOTE — CONSULTS
Consultation - Neurology   Argenis Malone 71 y o  male MRN: [id number]  Unit/Bed#: ED 15 Encounter: [id number]      Assessment/Plan   1)  Focal left-sided Seizure-like activity with secondary generalization, likely in conjunction with acute on chronic CVA  Will evaluate for additional acute intracranial and metabolic processes  Initial MRI brain from patient's prior CVA with greater than expected cortical abnormality    -admit to stroke pathway   -MRI brain pending   -CT head demonstrates extensive chronic right ROB and MCA territory infarctions, as well as development of mild to moderate edema in the previously infarcted territories in the right frontal, parietal and temporal lobes, consistent with acute on chronic infarction    -tele   -cardiac loop report positive for 24 AFib episodes   -continue Xarelto for now   -statin   -HbA1c, lipid profile pending   -patient loaded with Keppra 1 g    -continue Keppra 750 mg p o  B i d  For now   -PT/OT/speech   -will continue follow closely, please monitor exam and notify with changes    History of Present Illness     Reason for Consult / Principal Problem:  1)  syncope 2)  seizure  Hx and PE limited by:  Postictal drowsiness  HPI: James Monsalve is a 71 y o   male with a past medical history of a large right ROB and MCA CVA secondary to previously undiagnosed AFib in April, 2019  Patient is status post trach and PEG, with PEG removed and currently on Xarelto  Patient's past hospitalization also complicated secondary to symptomatic GI bleeding on Eliquis  Prior to presentation to the [facility] Emergency Department on 12/26/2019, the patient was in his normal state of health in the morning  He went to take a shower with assistance of his caregiver, and was noted to have left lower extremity rhythmic shaking, followed by loss of consciousness and diaphoresis lasting approximately 2-3 minutes    CT head in the emergency department demonstrated extensive chronic right ROB and MCA territory infarctions, as well as development of mild to moderate edema in the previously infarcted territories in the right frontal, parietal and temporal lobes, unfortunately consistent with acute on chronic infarction  Due to likelihood of seizure activity as cause of loss of consciousness, the patient was loaded with Keppra 1 g  In further history gathering with the patient's wife and caregiver in the room, the patient has been in his usual state of health  The patient's wife reports that he has actually been doing very well with physical therapy  No recent illness, no medication changes no head trauma, no changes in sleep or stress level  On exam today, the patient is sleeping in bed in no acute distress  He is easily arousable by verbal stimuli  He does appear to have some cognitive slowing, however he is oriented x3 and able to follow all simple one-step commands  Patient does have residual left central facial droop, left upper extremity spasticity and left lower extremity weakness  Examination was limited secondary to postictal drowsiness  A 12 point review systems was completed and is negative      Inpatient consult to Neurology  Consult performed by: Gideon Oneill PA-C  Consult ordered by: Jericho Villagran MD          Review of Systems   See HPI    Historical Information   Past Medical History:   Diagnosis Date    Arthritis     BL knees    Atrial fibrillation ([address] )     CHF (congestive heart failure) ([address] )     Colon polyp     CVA (cerebral vascular accident) ([address] ) 4/27/2019    NIHSS 26 on presentation    Depression     Diabetes mellitus ([address] )     Hypertension     Irregular heart beat     Afib    Stroke [facility])     Urinary retention      Past Surgical History:   Procedure Laterality Date    COLONOSCOPY      IR STROKE ALERT  4/27/2019    MENISCECTOMY Right      Social History   Social History     Substance and Sexual Activity   Alcohol Use Not Currently    Frequency: 2-4 times a month    Comment: social     Social History     Substance and Sexual Activity   Drug Use Never     Social History     Tobacco Use   Smoking Status Never Smoker   Smokeless Tobacco Never Used     Family History: non-contributory    Review of previous medical records was completed  Meds/Allergies   Scheduled Meds:  Current Facility-Administered Medications:  levETIRAcetam 1,000 mg Intravenous Once Jhony Benites MD Last Rate: 1,000 mg (12/26/19 0953)     Continuous Infusions:   PRN Meds:  Allergies   Allergen Reactions    Cephalexin Throat Swelling     Pt reported throat swelling after taking antibiotic     Penicillins Other (See Comments)       Objective   Vitals:Blood pressure 139/70, pulse (!) 53, temperature 97 5 °F (36 4 °C), temperature source Temporal, resp  rate 14, weight 94 2 kg (207 lb 10 8 oz), SpO2 98 %  ,Body mass index is 28 17 kg/m²  No intake or output data in the 24 hours ending 12/26/19 0956    Invasive Devices: Invasive Devices     Peripheral Intravenous Line            Peripheral IV 12/26/19 Left Hand less than 1 day          Drain            Gastrostomy/Enterostomy PEG-jejunostomy 20 Fr   days    Urethral Catheter Straight-tip 16 Fr  203 days          Nasogastric/Orogastric Tube            Feeding Tube Other (Comment) 20 Fr  Other (Comment) 224 days                Physical Exam   Constitutional: He appears well-developed and well-nourished  No distress  HENT:   Head: Normocephalic and atraumatic  Right Ear: External ear normal    Left Ear: External ear normal    Mouth/Throat: Oropharynx is clear and moist  No oropharyngeal exudate  Eyes: Conjunctivae are normal  Right eye exhibits no discharge  Left eye exhibits no discharge  No scleral icterus  Neck: Normal range of motion  Neck supple  Pulmonary/Chest: Effort normal  No respiratory distress  Musculoskeletal: He exhibits no edema, tenderness or deformity     Skin: Skin is warm and dry  No rash noted  He is not diaphoretic  No erythema  No pallor  Psychiatric: He has a normal mood and affect  His behavior is normal    Vitals reviewed  Neurologic Exam     Mental Status     Some cognitive slowing, able to eventually answer orientation questions correctly  Follows all simple one-step commands  Verbal output is appropriate without word-finding difficulty     Cranial Nerves   Cranial nerves II through XII intact  With exception of left central facial droop and right gaze preference  Visual testing limited secondary drowsiness     Motor Exam   Right upper and lower extremity strength intact  Spastic left upper extremity, without voluntary movement  Left lower extremity at least 3/5     Sensory Exam     Light touch intact x4, sensory testing not optimal secondary to drowsiness     Gait, Coordination, and Reflexes     Gait  Gait: (Deferred for safety)    Tremor   Resting tremor: absent      Lab Results: I have personally reviewed pertinent reports       Recent Results (from the past 24 hour(s))   CBC and differential    Collection Time: 12/26/19  8:12 AM   Result Value Ref Range    WBC 3 81 (L) 4 31 - 10 16 Thousand/uL    RBC 4 42 3 88 - 5 62 Million/uL    Hemoglobin 13 1 12 0 - 17 0 g/dL    Hematocrit 41 1 36 5 - 49 3 %    MCV 93 82 - 98 fL    MCH 29 6 26 8 - 34 3 pg    MCHC 31 9 31 4 - 37 4 g/dL    RDW 13 8 11 6 - 15 1 %    MPV 9 7 8 9 - 12 7 fL    Platelets 872 816 - 613 Thousands/uL    nRBC 0 /100 WBCs    Neutrophils Relative 62 43 - 75 %    Immat GRANS % 0 0 - 2 %    Lymphocytes Relative 29 14 - 44 %    Monocytes Relative 6 4 - 12 %    Eosinophils Relative 3 0 - 6 %    Basophils Relative 0 0 - 1 %    Neutrophils Absolute 2 37 1 85 - 7 62 Thousands/µL    Immature Grans Absolute 0 00 0 00 - 0 20 Thousand/uL    Lymphocytes Absolute 1 10 0 60 - 4 47 Thousands/µL    Monocytes Absolute 0 21 0 17 - 1 22 Thousand/µL    Eosinophils Absolute 0 12 0 00 - 0 61 Thousand/µL    Basophils Absolute 0  01 0 00 - 0 10 Thousands/µL   Comprehensive metabolic panel    Collection Time: 12/26/19  8:12 AM   Result Value Ref Range    Sodium 142 136 - 145 mmol/L    Potassium 4 1 3 5 - 5 3 mmol/L    Chloride 106 100 - 108 mmol/L    CO2 29 21 - 32 mmol/L    ANION GAP 7 4 - 13 mmol/L    BUN 17 5 - 25 mg/dL    Creatinine 0 94 0 60 - 1 30 mg/dL    Glucose 136 65 - 140 mg/dL    Calcium 9 2 8 3 - 10 1 mg/dL    AST 16 5 - 45 U/L    ALT 14 12 - 78 U/L    Alkaline Phosphatase 87 46 - 116 U/L    Total Protein 6 2 (L) 6 4 - 8 2 g/dL    Albumin 3 0 (L) 3 5 - 5 0 g/dL    Total Bilirubin 0 63 0 20 - 1 00 mg/dL    eGFR 82 ml/min/1 73sq m   Troponin I    Collection Time: 12/26/19  8:12 AM   Result Value Ref Range    Troponin I <0 02 <=0 04 ng/mL   Protime-INR    Collection Time: 12/26/19  8:28 AM   Result Value Ref Range    Protime 16 4 (H) 11 6 - 14 5 seconds    INR 1 30 (H) 0 84 - 1 19   APTT    Collection Time: 12/26/19  8:28 AM   Result Value Ref Range    PTT 33 23 - 37 seconds   ]    Imaging Studies: I have personally reviewed pertinent reports  and I have personally reviewed pertinent films in PACS  EKG, Pathology, and Other Studies: I have personally reviewed pertinent reports      VTE Prophylaxis: Sequential compression device (Venodyne)  and Xarelto     Code Status: Prior  Advance Directive and Living Will:      Power of : Yes  POLST:

## 2019-12-26 NOTE — ASSESSMENT & PLAN NOTE
Loaded with Keppra - CT with concern for acute on chronic stroke    - Plan to monitor for seizure activity, no new medication changes - Did have recent Botox injection Dec 19th   No tounge biting     MRI Brain with and without - with evidence of new stroke, likely post stroke seizure    Serial Cardiac enzymes negative for myocardial ischemia

## 2019-12-26 NOTE — PLAN OF CARE
Problem: Potential for Falls  Goal: Patient will remain free of falls  Description  INTERVENTIONS:  - Assess patient frequently for physical needs  -  Identify cognitive and physical deficits and behaviors that affect risk of falls    -  [place] fall precautions as indicated by assessment   - Educate patient/family on patient safety including physical limitations  - Instruct patient to call for assistance with activity based on assessment  - Modify environment to reduce risk of injury  - Consider OT/PT consult to assist with strengthening/mobility  Outcome: Progressing     Problem: Prexisting or High Potential for Compromised Skin Integrity  Goal: Skin integrity is maintained or improved  Description  INTERVENTIONS:  - Identify patients at risk for skin breakdown  - Assess and monitor skin integrity  - Assess and monitor nutrition and hydration status  - Monitor labs   - Assess for incontinence   - Turn and reposition patient  - Assist with mobility/ambulation  - Relieve pressure over bony prominences  - Avoid friction and shearing  - Provide appropriate hygiene as needed including keeping skin clean and dry  - Evaluate need for skin moisturizer/barrier cream  - Collaborate with interdisciplinary team   - Patient/family teaching  - Consider wound care consult   Outcome: Progressing     Problem: PAIN - ADULT  Goal: Verbalizes/displays adequate comfort level or baseline comfort level  Description  Interventions:  - Encourage patient to monitor pain and request assistance  - Assess pain using appropriate pain scale  - Administer analgesics based on type and severity of pain and evaluate response  - Implement non-pharmacological measures as appropriate and evaluate response  - Consider cultural and social influences on pain and pain management  - Notify physician/advanced practitioner if interventions unsuccessful or patient reports new pain  Outcome: Progressing     Problem: INFECTION - ADULT  Goal: Absence or prevention of progression during hospitalization  Description  INTERVENTIONS:  - Assess and monitor for signs and symptoms of infection  - Monitor lab/diagnostic results  - Monitor all insertion sites, i e  indwelling lines, tubes, and drains  - Monitor endotracheal if appropriate and nasal secretions for changes in amount and color  - [place] appropriate cooling/warming therapies per order  - Administer medications as ordered  - Instruct and encourage patient and family to use good hand hygiene technique  - Identify and instruct in appropriate isolation precautions for identified infection/condition  Outcome: Progressing  Goal: Absence of fever/infection during neutropenic period  Description  INTERVENTIONS:  - Monitor WBC    Outcome: Progressing     Problem: SAFETY ADULT  Goal: Maintain or return to baseline ADL function  Description  INTERVENTIONS:  -  Assess patient's ability to carry out ADLs; assess patient's baseline for ADL function and identify physical deficits which impact ability to perform ADLs (bathing, care of mouth/teeth, toileting, grooming, dressing, etc )  - Assess/evaluate cause of self-care deficits   - Assess range of motion  - Assess patient's mobility; develop plan if impaired  - Assess patient's need for assistive devices and provide as appropriate  - Encourage maximum independence but intervene and supervise when necessary  - Involve family in performance of ADLs  - Assess for home care needs following discharge   - Consider OT consult to assist with ADL evaluation and planning for discharge  - Provide patient education as appropriate  Outcome: Progressing  Goal: Maintain or return mobility status to optimal level  Description  INTERVENTIONS:  - Assess patient's baseline mobility status (ambulation, transfers, stairs, etc )    - Identify cognitive and physical deficits and behaviors that affect mobility  - Identify mobility aids required to assist with transfers and/or ambulation (gait belt, sit-to-stand, lift, walker, cane, etc )  - [place] fall precautions as indicated by assessment  - Record patient progress and toleration of activity level on Mobility SBAR; progress patient to next Phase/Stage  - Instruct patient to call for assistance with activity based on assessment  - Consider rehabilitation consult to assist with strengthening/weightbearing, etc   Outcome: Progressing     Problem: DISCHARGE PLANNING  Goal: Discharge to home or other facility with appropriate resources  Description  INTERVENTIONS:  - Identify barriers to discharge w/patient and caregiver  - Arrange for needed discharge resources and transportation as appropriate  - Identify discharge learning needs (meds, wound care, etc )  - Arrange for interpretive services to assist at discharge as needed  - Refer to Case Management Department for coordinating discharge planning if the patient needs post-hospital services based on physician/advanced practitioner order or complex needs related to functional status, cognitive ability, or social support system  Outcome: Progressing     Problem: Knowledge Deficit  Goal: Patient/family/caregiver demonstrates understanding of disease process, treatment plan, medications, and discharge instructions  Description  Complete learning assessment and assess knowledge base  Interventions:  - Provide teaching at level of understanding  - Provide teaching via preferred learning methods  Outcome: Progressing     Problem: Neurological Deficit  Goal: Neurological status is stable or improving  Description  Interventions:  - Monitor and assess patient's level of consciousness, motor function, sensory function, and level of assistance needed for ADLs  - Monitor and report changes from baseline  Collaborate with interdisciplinary team to initiate plan and implement interventions as ordered  - Provide and maintain a safe environment  - Consider seizure precautions  - Consider fall precautions    - Consider aspiration precautions  - Consider bleeding precautions  Outcome: Progressing     Problem: Activity Intolerance/Impaired Mobility  Goal: Mobility/activity is maintained at optimum level for patient  Description  Interventions:  - Assess and monitor patient  barriers to mobility and need for assistive/adaptive devices  - Assess patient's emotional response to limitations  - Collaborate with interdisciplinary team and initiate plans and interventions as ordered  - Encourage independent activity per ability   - Maintain proper body alignment  - Perform active/passive rom as tolerated/ordered  - Plan activities to conserve energy   - Turn patient as appropriate  Outcome: Progressing     Problem: Communication Impairment  Goal: Ability to express needs and understand communication  Description  Assess patient's communication skills and ability to understand information  Patient will demonstrate use of effective communication techniques, alternative methods of communication and understanding even if not able to speak  - Encourage communication and provide alternate methods of communication as needed  - Collaborate with case management/ for discharge needs  - Include patient/family/caregiver in decisions related to communication  Outcome: Progressing     Problem: Potential for Aspiration  Goal: Non-ventilated patient's risk of aspiration is minimized  Description  Assess and monitor vital signs, respiratory status, and labs (WBC)  Monitor for signs of aspiration (tachypnea, cough, rales, wheezing, cyanosis, fever)  - Assess and monitor patient's ability to swallow  - Place patient up in chair to eat if possible  - HOB up at 90 degrees to eat if unable to get patient up into chair   - Supervise patient during oral intake  - Instruct patient/ family to take small bites  - Instruct patient/ family to take small single sips when taking liquids    - Follow patient-specific strategies generated by speech pathologist   Outcome: Progressing  Goal: Ventilated patient's risk of aspiration is minimized  Description  Assess and monitor vital signs, respiratory status, airway cuff pressure, and labs (WBC)  Monitor for signs of aspiration (tachypnea, cough, rales, wheezing, cyanosis, fever)  - Elevate head of bed 30 degrees if patient has tube feeding   - Monitor tube feeding  Outcome: Progressing     Problem: Nutrition  Goal: Nutrition/Hydration status is improving  Description  Monitor and assess patient's nutrition/hydration status for malnutrition (ex- brittle hair, bruises, dry skin, pale skin and conjunctiva, muscle wasting, smooth red tongue, and disorientation)  Collaborate with interdisciplinary team and initiate plan and interventions as ordered  Monitor patient's weight and dietary intake as ordered or per policy  Utilize nutrition screening tool and intervene per policy  Determine patient's food preferences and provide high-protein, high-caloric foods as appropriate  - Assist patient with eating   - Allow adequate time for meals   - Encourage patient to take dietary supplement as ordered  - Collaborate with clinical nutritionist   - Include patient/family/caregiver in decisions related to nutrition    Outcome: Progressing

## 2019-12-26 NOTE — ASSESSMENT & PLAN NOTE
Resume home medications - Allow some permissive HTN per Neurologic recommendations   Resume Beta Blocker due to Afib

## 2019-12-26 NOTE — ASSESSMENT & PLAN NOTE
History of previous R hemispheric stroke with left sided weakness  -     Now with evidence of new acute  stroke on diffusion-weighted images of the MRI  Physical medicine and rehabilitation consultations placed  CT imaging does demonstrate mild-to-moderate cerebral edema, and he is now pending CTA of the head and neck given that he has extensive chronic right ROB and MCA territory infarcts  This is evidence of acute on chronic in for occasion, with old stroke present with new stroke on top of previous stroke in the right hemispheric region      Continue current stroke workup

## 2019-12-26 NOTE — ASSESSMENT & PLAN NOTE
Ventricular rate controlled -  Afib on Loop recorderd - Continue Xarelto per Neurology recommendations

## 2019-12-26 NOTE — ED NOTES
EMS reporting patient had a previous stroke in the past and has expressive aphasia as well as L sided weakness and deficits from a stroke 6 months ago  No new deficits reported         Elana Krause RN  12/26/19 5563

## 2019-12-26 NOTE — ED PROVIDER NOTES
History  Chief Complaint   Patient presents with    Syncope     Per ems and home health aid, patient was in the shower this morning and had a syncopal episode  The episode was witnessed by the home health aid who was able to lower the patient to the ground, she states the episode lasted only for a few seconds  The home health aid states the patient began to shake prior to the episode and then became unresponsive and diphoretic, shaking throughout the episode  History provided by:  Patient, caregiver and spouse   used: No    Syncope   Episode history:  Single  Most recent episode: Today  Timing:  Sporadic  Progression:  Resolved  Chronicity:  New  Context: standing up    Witnessed: yes    Relieved by:  Nothing  Worsened by:  Nothing  Ineffective treatments:  None tried  Associated symptoms: focal weakness (left side at baseline from previous Stroke)    Associated symptoms: no chest pain, no difficulty breathing, no dizziness, no fever, no headaches, no nausea, no recent fall, no shortness of breath and no vomiting    Focal weakness:     Location:  L lower extremity and L upper extremity (At baseline from previous Stroke)    Difficulty with: articulating words      Severity:  Decreased muscle function    Progression:  Unchanged  Risk factors comment:  PAfib in [place]      Prior to Admission Medications   Prescriptions Last Dose Informant Patient Reported? Taking?    Coenzyme Q10 (CO Q 10) 10 MG CAPS 2019 at Unknown time Spouse/Significant Other Yes Yes   Sig: Take by mouth daily   FLUoxetine (PROzac) 10 mg capsule 2019 at Unknown time Spouse/Significant Other No Yes   Si capsule (10 mg total) by Per G Tube route daily   Patient taking differently: Take 20 mg by mouth daily    baclofen 10 mg tablet  Spouse/Significant Other No Yes   Si tablet (10 mg total) by Per G Tube route 3 (three) times a day   Patient taking differently: 10 mg by Per G Tube route 2 (two) times a day bethanechol (URECHOLINE) 10 mg tablet Not Taking at Unknown time Spouse/Significant Other Yes No   Sig: Take 10 mg by mouth 2 (two) times a day    cyanocobalamin (VITAMIN B-12) 100 mcg tablet 2019 at Unknown time Spouse/Significant Other Yes Yes   Sig: Take by mouth daily   hydrocortisone 1 % cream  Spouse/Significant Other Yes Yes   Sig: Apply topically 2 (two) times a day as needed    metoprolol succinate (TOPROL-XL) 25 mg 24 hr tablet Not Taking at Unknown time  Yes No   Sig: Take 25 mg by mouth daily   metoprolol tartrate (LOPRESSOR) 25 mg tablet  Spouse/Significant Other No No   Sig: Take 0 5 tablets (12 5 mg total) by mouth 2 (two) times a day for 7 days   Patient taking differently: Take 12 5 mg by mouth daily    pantoprazole (PROTONIX) 40 mg tablet 2019 at Unknown time Spouse/Significant Other No Yes   Sig: Take 1 tablet (40 mg total) by mouth 2 (two) times a day before meals   rivaroxaban (XARELTO) 15 mg tablet 2019 at Unknown time Spouse/Significant Other No Yes   Si tablet (15 mg total) by Per G Tube route daily with breakfast   Patient taking differently: Take 20 mg by mouth daily with breakfast    tamsulosin (FLOMAX) 0 4 mg 2019 at Unknown time Spouse/Significant Other Yes Yes   Sig: Take 0 8 mg by mouth daily at bedtime       Facility-Administered Medications: None       Past Medical History:   Diagnosis Date    Arthritis     BL knees    Atrial fibrillation (HCC)     CHF (congestive heart failure) (HCC)     Colon polyp     CVA [facility]cerebral vascular accident) ([facility] [address] ) 2019    NIHSS 26 on presentation    Depression     Diabetes mellitus ([facility] [address] )     Hypertension     Irregular heart beat     Afib    Stroke [facility])     Urinary retention        Past Surgical History:   Procedure Laterality Date    COLONOSCOPY      IR STROKE ALERT  2019    MENISCECTOMY Right        History reviewed  No pertinent family history    I have reviewed and agree with the history as documented  Social History     Tobacco Use    Smoking status: Never Smoker    Smokeless tobacco: Never Used   Substance Use Topics    Alcohol use: Not Currently     Frequency: 2-4 times a month     Comment: social    Drug use: Never        Review of Systems   Constitutional: Negative for chills and fever  HENT: Negative for facial swelling, sore throat and trouble swallowing  Eyes: Negative for pain and visual disturbance  Respiratory: Negative for cough and shortness of breath  Cardiovascular: Positive for syncope  Negative for chest pain and leg swelling  Gastrointestinal: Negative for abdominal pain, blood in stool, diarrhea, nausea and vomiting  Genitourinary: Negative for dysuria and flank pain  Musculoskeletal: Negative for back pain, neck pain and neck stiffness  Skin: Negative for pallor and rash  Allergic/Immunologic: Negative for environmental allergies and immunocompromised state  Neurological: Positive for focal weakness (left side at baseline from previous Stroke)  Negative for dizziness and headaches  Hematological: Negative for adenopathy  Does not bruise/bleed easily  Psychiatric/Behavioral: Negative for agitation and behavioral problems  All other systems reviewed and are negative  Physical Exam  Physical Exam   Constitutional: He is oriented to person, place, and time  He appears well-developed and well-nourished  No distress  HENT:   Head: Normocephalic and atraumatic  Eyes: Pupils are equal, round, and reactive to light  EOM are normal    Neck: Normal range of motion  Neck supple  Cardiovascular: Normal rate, regular rhythm, normal heart sounds and intact distal pulses  Pulmonary/Chest: Effort normal and breath sounds normal    Abdominal: Soft  Bowel sounds are normal  There is no tenderness  There is no rebound and no guarding  Musculoskeletal: Normal range of motion  Neurological: He is alert and oriented to person, place, and time     Baseline left sided weakness    Skin: Skin is warm and dry  Psychiatric: He has a normal mood and affect  Nursing note and vitals reviewed        Vital Signs  ED Triage Vitals   Temperature Pulse Respirations Blood Pressure SpO2   12/26/19 0755 12/26/19 0755 12/26/19 0755 12/26/19 0755 12/26/19 0755   97 5 °F (36 4 °C) (!) 53 14 139/70 98 %      Temp Source Heart Rate Source Patient Position - Orthostatic VS BP Location FiO2 (%)   12/26/19 0755 12/26/19 0755 12/26/19 0755 12/26/19 1130 --   Temporal Monitor Lying Left arm       Pain Score       12/26/19 0755       No Pain           Vitals:    12/26/19 1330 12/26/19 1430 12/26/19 1630 12/26/19 1830   BP: 123/64 115/60 115/67 111/67   Pulse: 56 57 57 55   Patient Position - Orthostatic VS: Sitting Lying  Lying         Visual Acuity  Visual Acuity      Most Recent Value   L Pupil Size (mm)  2   R Pupil Size (mm)  2   L Pupil Shape  Round   R Pupil Shape  Round          ED Medications  Medications   levETIRAcetam (KEPPRA) tablet 750 mg (has no administration in time range)   acetaminophen (TYLENOL) tablet 650 mg (has no administration in time range)   docusate sodium (COLACE) capsule 100 mg (100 mg Oral Given 12/26/19 1724)   polyethylene glycol (MIRALAX) packet 17 g (17 g Oral Refused 12/26/19 1322)   ondansetron (ZOFRAN) injection 4 mg (has no administration in time range)   atorvastatin (LIPITOR) tablet 40 mg (40 mg Oral Given 12/26/19 1724)   aspirin chewable tablet 81 mg (81 mg Oral Refused 12/26/19 1321)   co-enzyme Q-10 capsule 30 mg (30 mg Oral Given 12/26/19 1323)   cyanocobalamin (VITAMIN B-12) tablet 100 mcg (100 mcg Oral Given 12/26/19 1321)   FLUoxetine (PROzac) capsule 20 mg (20 mg Oral Given 12/26/19 1319)   pantoprazole (PROTONIX) EC tablet 40 mg (40 mg Oral Given 12/26/19 1724)   tamsulosin (FLOMAX) capsule 0 8 mg (has no administration in time range)   metoprolol succinate (TOPROL-XL) 24 hr tablet 12 5 mg (has no administration in time range)   rivaroxaban (XARELTO) tablet 15 mg (15 mg Oral Given 12/26/19 5902)   levETIRAcetam (KEPPRA) 1,000 mg in sodium chloride 0 9 % 100 mL IVPB (0 mg Intravenous Stopped 12/26/19 1008)       Diagnostic Studies  Results Reviewed     Procedure Component Value Units Date/Time    Troponin I [[id number]]  (Normal) Collected:  12/26/19 1819    Lab Status:  Final result Specimen:  Blood from Arm, Left Updated:  12/26/19 1911     Troponin I <0 02 ng/mL     Troponin I [[id number]]  (Normal) Collected:  12/26/19 1505    Lab Status:  Final result Specimen:  Blood from Arm, Right Updated:  12/26/19 1538     Troponin I <0 02 ng/mL     Troponin I [[id number]]     Lab Status:  No result Specimen:  Blood     Platelet count [[id number]]     Lab Status:  No result Specimen:  Blood     Protime-INR [[id number]]  (Abnormal) Collected:  12/26/19 0828    Lab Status:  Final result Specimen:  Blood from Arm, Left Updated:  12/26/19 0901     Protime 16 4 seconds      INR 1 30    APTT [[id number]]  (Normal) Collected:  12/26/19 0828    Lab Status:  Final result Specimen:  Blood from Arm, Left Updated:  12/26/19 0901     PTT 33 seconds     Troponin I [[id number]]  (Normal) Collected:  12/26/19 0812    Lab Status:  Final result Specimen:  Blood from Hand, Left Updated:  12/26/19 0842     Troponin I <0 02 ng/mL     Comprehensive metabolic panel [[id number]]  (Abnormal) Collected:  12/26/19 0812    Lab Status:  Final result Specimen:  Blood from Hand, Left Updated:  12/26/19 0836     Sodium 142 mmol/L      Potassium 4 1 mmol/L      Chloride 106 mmol/L      CO2 29 mmol/L      ANION GAP 7 mmol/L      BUN 17 mg/dL      Creatinine 0 94 mg/dL      Glucose 136 mg/dL      Calcium 9 2 mg/dL      AST 16 U/L      ALT 14 U/L      Alkaline Phosphatase 87 U/L      Total Protein 6 2 g/dL      Albumin 3 0 g/dL      Total Bilirubin 0 63 mg/dL      eGFR 82 ml/min/1 73sq m     Narrative:       [place] guidelines for Chronic Kidney Disease (CKD):     Stage 1 with normal or high GFR (GFR > 90 mL/min/1 73 square meters)    Stage 2 Mild CKD (GFR = 60-89 mL/min/1 73 square meters)    Stage 3A Moderate CKD (GFR = 45-59 mL/min/1 73 square meters)    Stage 3B Moderate CKD (GFR = 30-44 mL/min/1 73 square meters)    Stage 4 Severe CKD (GFR = 15-29 mL/min/1 73 square meters)    Stage 5 End Stage CKD (GFR <15 mL/min/1 73 square meters)  Note: GFR calculation is accurate only with a steady state creatinine    CBC and differential [[id number]]  (Abnormal) Collected:  12/26/19 0812    Lab Status:  Final result Specimen:  Blood from Hand, Left Updated:  12/26/19 0833     WBC 3 81 Thousand/uL      RBC 4 42 Million/uL      Hemoglobin 13 1 g/dL      Hematocrit 41 1 %      MCV 93 fL      MCH 29 6 pg      MCHC 31 9 g/dL      RDW 13 8 %      MPV 9 7 fL      Platelets 294 Thousands/uL      nRBC 0 /100 WBCs      Neutrophils Relative 62 %      Immat GRANS % 0 %      Lymphocytes Relative 29 %      Monocytes Relative 6 %      Eosinophils Relative 3 %      Basophils Relative 0 %      Neutrophils Absolute 2 37 Thousands/µL      Immature Grans Absolute 0 00 Thousand/uL      Lymphocytes Absolute 1 10 Thousands/µL      Monocytes Absolute 0 21 Thousand/µL      Eosinophils Absolute 0 12 Thousand/µL      Basophils Absolute 0 01 Thousands/µL     POCT urinalysis dipstick [[id number]]     Lab Status:  No result Specimen:  Urine                  XR chest 2 views   Final Result by Kely Dumas MD (12/26 1971)      No acute cardiopulmonary disease  Workstation performed: [id number]         CT head without contrast   Final Result by Shabnam Villa MD (06/45 2642)         1  Evidence for extensive chronic right anterior and middle cerebral artery territory infarcts with development of diffuse mild-moderate edema involving the previously infarcted territories in the right frontal, parietal and temporal lobes consistent    with acute upon chronic infarct  No hemorrhage noted    There is mild mass effect without midline shift noted  MRI may be useful in further characterizing this abnormality  2   Additional stable findings as noted  I personally discussed this study with Bin Kumar on 12/26/2019 at 9:14 AM                            Workstation performed: [id number]         MRI Inpatient Order    (Results Pending)              Procedures  ECG 12 Lead Documentation Only  Date/Time: 12/26/2019 8:01 AM  Performed by: Evita Gamez MD  Authorized by: Evita Gamez MD     Indications / Diagnosis:  Syncope  ECG reviewed by me, the ED Provider: yes    Patient location:  ED  Interpretation:     Interpretation: normal    Rate:     ECG rate:  51    ECG rate assessment: normal    Rhythm:     Rhythm: sinus rhythm    Ectopy:     Ectopy: none    QRS:     QRS axis:  Normal  Conduction:     Conduction: normal    ST segments:     ST segments:  Normal  T waves:     T waves: normal               ED Course  ED Course as of Dec 26 2011   Thu Dec 26, 2019   0922 WBC(!): 3 81   0923 Hemoglobin: 13 1   0923 Sodium: 142   0923 Potassium: 4 1   0923 Labs reviewed  Troponin I: <0 02   0931 CT scan results reviewed:    Evidence for extensive chronic right anterior and middle cerebral artery territory infarcts with development of diffuse mild-moderate edema involving the previously infarcted territories in the right frontal, parietal and temporal lobes consistent   with acute upon chronic infarct  No hemorrhage noted  There is mild mass effect without midline shift noted  MRI may be useful in further characterizing this abnormality  5699 Case discussed with Dr Beth Mijares, Neurology, clinical presentation and CT findings discussed, will look at CT, no CTA advised at this time; advised to load with Keppra 1 g IV, admit under SLIM, patient will be evaluated by Neurology; neuro consult placed        1200 The patient and family continuously updated about the workup results, and conversation with Neurology, Rx with Keppra; are okay with plan for admission  Patrick 9702 paged for admission  [id number] SCORE      Most Recent Value   [id number]   Age  1 Filed at: 12/26/2019 1516   Sex  0 Filed at: 12/26/2019 1516   CHF History  1 Filed at: 12/26/2019 1516   HTN History  1 Filed at: 12/26/2019 1516   Stroke or TIA Symptoms Previously  2 Filed at: 12/26/2019 1516   Vascular Disease History  0 Filed at: 12/26/2019 1516   Diabetes History  0 Filed at: 12/26/2019 1516   [id number] Score  5 Filed at: 12/26/2019 1516                              MDM  Number of Diagnoses or Management Options  Seizure [facility]): new and requires workup  Syncope: new and requires workup  Diagnosis management comments: Patient is a 22-year-old male, history of paroxysmal atrial fibrillation, on Xarelto, previous stroke, baseline left-sided weakness, on baclofen, was being helped by the caregiver today morning to take a shower, was standing up, holding the bar, caregiver said that left side of his body, especially the leg started shaking, and he passed out briefly for a few seconds, the caregiver the held the patient, put him down on the floor, patient did not have a fall; caregiver called his wife in the house, who called 911; patient recovered on scene and subsequently brought to the ER for evaluation; patient denies headache, chest pain, dizziness, or any other preceding symptoms to the episode; no recent illness, no fever, no change in medications  On exam patient is conscious, alert, oriented at baseline, baseline left-sided weakness this confirmed by the wife and caregiver present in the room, intact right upper and lower extremity motor/sensory exam, normal coordination finger-to-nose on the right hand; lungs clear, heart sounds normal, abdomen soft, nondistended, nontender; no calf swelling or tenderness    Differential diagnosis:  Syncope, seizure, electrolyte imbalance, dehydration, although no signs or symptoms suggestive of infection, will check labs, urine, and chest x-ray, CT head  Amount and/or Complexity of Data Reviewed  Clinical lab tests: reviewed and ordered  Tests in the radiology section of CPT®: ordered and reviewed  Tests in the medicine section of CPT®: ordered and reviewed  Discuss the patient with other providers: yes  Independent visualization of images, tracings, or specimens: yes          Disposition  Final diagnoses:   Syncope   Seizure ([facility] [address] ) - possible     Time reflects when diagnosis was documented in both MDM as applicable and the Disposition within this note     Time User Action Codes Description Comment    12/26/2019  8:25 AM Antoniette Criselda Add [R55] Syncope     12/26/2019  9:41 AM Antoniette Criselda Add [R56 9] Seizure ([facility] [address] )     12/26/2019  9:41 AM Antoniette Criselda Modify [R56 9] Seizure ([facility] [address] ) possible    12/26/2019 10:16 AM Emily Pineda Add [I63 9,  R47 01] 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 [facility])       ED Disposition     ED Disposition Condition Date/Time Comment    Admit Stable Thu Dec 26, 2019 10:12 AM Case was discussed with Dr Brooke Wasserman and the patient's admission status was agreed to be Admission Status: inpatient status to the service of Dr Brooke Wasserman          Follow-up Information    None         Current Discharge Medication List      CONTINUE these medications which have NOT CHANGED    Details   baclofen 10 mg tablet 1 tablet (10 mg total) by Per G Tube route 3 (three) times a day  Refills: 0    Associated Diagnoses: Cerebrovascular accident (CVA) due to embolism of right middle cerebral artery (HCC)      Coenzyme Q10 (CO Q 10) 10 MG CAPS Take by mouth daily      cyanocobalamin (VITAMIN B-12) 100 mcg tablet Take by mouth daily      FLUoxetine (PROzac) 10 mg capsule 1 capsule (10 mg total) by Per G Tube route daily    Associated Diagnoses: 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)      hydrocortisone 1 % cream Apply topically 2 (two) times a day as needed       pantoprazole (PROTONIX) 40 mg tablet Take 1 tablet (40 mg total) by mouth 2 (two) times a day before meals  Qty: 120 tablet, Refills: 0    Associated Diagnoses: Gastrointestinal hemorrhage associated with duodenal ulcer      rivaroxaban (XARELTO) 15 mg tablet 1 tablet (15 mg total) by Per G Tube route daily with breakfast    Associated Diagnoses: Paroxysmal atrial fibrillation (HCC)      tamsulosin (FLOMAX) 0 4 mg Take 0 8 mg by mouth daily at bedtime       bethanechol (URECHOLINE) 10 mg tablet Take 10 mg by mouth 2 (two) times a day   Refills: 0      metoprolol succinate (TOPROL-XL) 25 mg 24 hr tablet Take 25 mg by mouth daily      metoprolol tartrate (LOPRESSOR) 25 mg tablet Take 0 5 tablets (12 5 mg total) by mouth 2 (two) times a day for 7 days  Qty: 30 tablet, Refills: 0    Associated Diagnoses: Paroxysmal atrial fibrillation (HCC)           No discharge procedures on file      ED Provider  Electronically Signed by           Tamika Mckeon MD  12/26/19 2011

## 2019-12-26 NOTE — ASSESSMENT & PLAN NOTE
Wt Readings from Last 3 Encounters:   12/26/19 89 7 kg (197 lb 11 2 oz)   11/18/19 94 8 kg (209 lb)   09/06/19 96 6 kg (213 lb)         No evidence of exacerbation - monitor volume status

## 2019-12-26 NOTE — H&P
History and Physical     Rosa Ruth 1950, 71 y o  male MRN: [id number]     Unit/Bed#: E4 -01 Encounter: [id number]     Primary Care Provider: Qiana Babb MD   Date and time admitted to hospital: 12/26/2019  7:47 AM      Assessment: This is a 42-year-old male patient with a previous history of cerebrovascular accident presenting with seizure episode while at home with evidence of acute on chronic infarct  He is admitted to the general medical floor on telemetry status with plans for expedited neurologic evaluation as well as monitoring for seizure activity  He has been given Keppra in the emergency room and will continue that dosage which has been ordered by the neurology service  Patient will require physical therapy and occupational therapy evaluations as well as advanced neuro imaging         Chronic diastolic CHF (congestive heart failure) ([facility] [address] )  Assessment & Plan      Wt Readings from Last 3 Encounters:   12/26/19 89 7 kg (197 lb 11 2 oz)   11/18/19 94 8 kg (209 lb)   09/06/19 96 6 kg (213 lb)            No evidence of exacerbation     Paroxysmal atrial fibrillation (HCC)  Assessment & Plan  Ventricular rate controlled -  Afib on Loop recorderd - Continue Xarelto per Neurology recommendations     Left hemiplegia [facility])  Assessment & Plan  Continue PT/OT while here     CVA (cerebral vascular accident) [facility])  Assessment & Plan  History of previous R hemispheric stroke with left sided weakness  - Monitor for new neurologic changes       Prediabetes  Assessment & Plan  Check A1c -     Essential hypertension  Assessment & Plan  Resume home medications - Allow some permissive HTN per Neurologic recommendations   Resume Beta Blocker due to Afib     * 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 [address] )  Assessment & Plan  Loaded with Keppra - CT with concern for acute on chronic stroke    - Plan to monitor for seizure activity, no new medication changes - Did have recent Botox injection Dec 19th  No tounge biting   - Continue Neurochecks   MRI Brain with and without  Serial Cardiac enzymes to rule out ischemia as etiology        VTE Prophylaxis: Rivaroxaban (Xarelto)  / foot pump applied   Code Status:  Full  POLST: POLST form is on file already (pre-hospital)  Discussion with family:  Wife and caretaker     Anticipated Length of Stay:  Patient will be admitted on an Inpatient basis with an anticipated length of stay of  greater than 2 midnights  Justification for Hospital Stay:  Patient has significant concern for neurologic change     Total Time for Visit, including Counseling / Coordination of Care: 30 minutes  Greater than 50% of this total time spent on direct patient counseling and coordination of care      Chief Complaint:   Seizure as well as loss of conscious at home lasting 2 minutes     History of Present Illness:     Sanford Navas is a 71 y o  male who presents with past medical history of CVA with known left-sided deficit who presented to the emergency room after having seizure episode with shaking of arm and leg as well as loss of consciousness for approximately 2 minutes  The patient has a history of left hemiplegia as a result of his stroke, and otherwise been improving at [employer]  He motioned recently received Botox injections on 12/19/2019, there was no obvious complications  Patient does have an implantable loop recorder and has a known history of atrial fibrillation  In the emergency room the patient was alert and oriented x3, he had left-sided weakness which was not worse, does describe no difficulty swallowing  He is previous PEG tube placement which has been her was subsequently removed    The patient denies any fevers chills no nausea vomiting or diarrhea           Review of Systems:     A complete and comprehensive 14 point organ system review has been performed and all other systems are negative other than stated above  Past Medical and Surgical History:      Medical History        Past Medical History:   Diagnosis Date    Arthritis       BL knees    Atrial fibrillation (HCC)      CHF (congestive heart failure) (HCC)      Colon polyp      [facility] 4/27/2019     NIHSS 26 on presentation    Depression      Diabetes mellitus [facility]      Hypertension      Irregular heart beat       Afib    Stroke ([facility] )      Urinary retention              Surgical History         Past Surgical History:   Procedure Laterality Date    COLONOSCOPY        IR STROKE ALERT   4/27/2019    MENISCECTOMY Right              Meds/Allergies:             Prior to Admission medications    Medication Sig Start Date End Date Taking?  Authorizing Provider   baclofen 10 mg tablet 1 tablet (10 mg total) by Per G Tube route 3 (three) times a day  Patient taking differently: 10 mg by Per G Tube route 2 (two) times a day  6/10/19   Yes Benjamin Morelos MD   Coenzyme Q10 (CO Q 10) 10 MG CAPS Take by mouth daily     Yes Historical Provider, MD   cyanocobalamin (VITAMIN B-12) 100 mcg tablet Take by mouth daily     Yes Historical Provider, MD   FLUoxetine (PROzac) 10 mg capsule 1 capsule (10 mg total) by Per G Tube route daily  Patient taking differently: Take 20 mg by mouth daily  6/10/19   Yes Benjamin Morelos MD   hydrocortisone 1 % cream Apply topically 2 (two) times a day as needed      Yes Historical Provider, MD   pantoprazole (PROTONIX) 40 mg tablet Take 1 tablet (40 mg total) by mouth 2 (two) times a day before meals 5/31/19   Yes Tashia Dorantes MD   rivaroxaban (XARELTO) 15 mg tablet 1 tablet (15 mg total) by Per G Tube route daily with breakfast  Patient taking differently: Take 20 mg by mouth daily with breakfast  6/10/19   Yes Benjamin Morelos MD   tamsulosin (FLOMAX) 0 4 mg Take 0 8 mg by mouth daily at bedtime      Yes Historical Provider, MD   bethanechol (URECHOLINE) 10 mg tablet Take 10 mg by mouth 2 (two) times a day  7/25/19     Historical Provider, MD   metoprolol succinate (TOPROL-XL) 25 mg 24 hr tablet Take 25 mg by mouth daily       Historical Provider, MD   metoprolol tartrate (LOPRESSOR) 25 mg tablet Take 0 5 tablets (12 5 mg total) by mouth 2 (two) times a day for 7 days  Patient taking differently: Take 12 5 mg by mouth daily  9/3/19 10/25/19   Quita Garcia MD      I have reviewed home medications with patient personally      Allergies: Allergies   Allergen Reactions    Cephalexin Throat Swelling       Pt reported throat swelling after taking antibiotic     Penicillins Other (See Comments)         Social History:     Marital Status: /Civil Union   Lives with wife and is retired  Social History           Substance and Sexual Activity   Alcohol Use Not Currently    Frequency: 2-4 times a month     Comment: social      Social History          Tobacco Use   Smoking Status Never Smoker   Smokeless Tobacco Never Used      Social History          Substance and Sexual Activity   Drug Use Never         Family History:     History reviewed   No pertinent family history      Physical Exam:      Vitals:   Blood Pressure: 123/64 (12/26/19 1330)  Pulse: 56 (12/26/19 1330)  Temperature: 97 6 °F (36 4 °C) (12/26/19 1130)  Temp Source: Temporal (12/26/19 1130)  Respirations: 16 (12/26/19 1130)  Height: 6' (182 9 cm) (12/26/19 1130)  Weight - Scale: 89 7 kg (197 lb 11 2 oz) (12/26/19 1130)  SpO2: 99 % (12/26/19 1330)     Physical Exam     General: well appearing, no acute distress  HEENT: atraumatic, PERRLA, moist mucosa, normal pharynx, normal tonsils and adenoids, normal tongue, no fluid in sinuses  Neck: Trachea midline, no carotid bruit, no masses  Respiratory: normal chest wall expansion, CTA B, no r/r/w, no rubs  Cardiovascular:  Irregularly irregular, no m/r/g, Normal S1 and S2  Abdomen: Soft, non-tender, non-distended, normal bowel sounds in all quadrants, no hepatosplenomegaly, no tympany  Rectal: deferred  Musculoskeletal: normal ROM in upper and lower extremities  Integumentary: warm, dry, and pink, with no rash, purpura, or petechia  Heme/Lymph: no lymphadenopathy, no bruises  Neurological: Cranial Nerves II-XII grossly intact, left-sided weakness with muscle strength graded 2/5 on the left, with 4-5 on the right  Psychiatric: cooperative with normal mood, affect, and cognition        Additional Data:      Lab Results: I have personally reviewed pertinent reports             Results from last 7 days   Lab Units 12/26/19  0812   WBC Thousand/uL 3 81*   HEMOGLOBIN g/dL 13 1   HEMATOCRIT % 41 1   PLATELETS Thousands/uL 158   NEUTROS PCT % 62   LYMPHS PCT % 29   MONOS PCT % 6   EOS PCT % 3           Results from last 7 days   Lab Units 12/26/19  0812   SODIUM mmol/L 142   POTASSIUM mmol/L 4 1   CHLORIDE mmol/L 106   CO2 mmol/L 29   BUN mg/dL 17   CREATININE mg/dL 0 94   ANION GAP mmol/L 7   CALCIUM mg/dL 9 2   ALBUMIN g/dL 3 0*   TOTAL BILIRUBIN mg/dL 0 63   ALK PHOS U/L 87   ALT U/L 14   AST U/L 16   GLUCOSE RANDOM mg/dL 136           Results from last 7 days   Lab Units 12/26/19  0828   INR   1 30*                     Imaging: I have personally reviewed pertinent reports        XR chest 2 views   Final Result by Jayne Sandhu MD (12/26 8800)       No acute cardiopulmonary disease                Workstation performed: [id number]           CT head without contrast   Final Result by Suki Jay MD (12/26 2721)           1  Evidence for extensive chronic right anterior and middle cerebral artery territory infarcts with development of diffuse mild-moderate edema involving the previously infarcted territories in the right frontal, parietal and temporal lobes consistent    with acute upon chronic infarct  No hemorrhage noted  There is mild mass effect without midline shift noted  MRI may be useful in further characterizing this abnormality     2  Additional stable findings as noted                I personally discussed this study with Clemencia Velez on 12/26/2019 at 9:14 AM                                    Workstation performed: [id number]           MRI Inpatient Order    (Results Pending)         EKG, Pathology, and Other Studies Reviewed on Admission:   · No new EKG     Allscripts / Epic Records Reviewed: Yes      ** Please Note: This note has been constructed using a voice recognition system   **

## 2019-12-26 NOTE — PROGRESS NOTES
Progress Note - Raine Cedeño 1950, 71 y o  male MRN: [id number]    Unit/Bed#: E4 -01 Encounter: [id number]    Primary Care Provider: Colleen Allen MD   Date and time admitted to hospital: 12/26/2019  7:47 AM    Assessment: This is a 60-year-old male patient with a previous history of cerebrovascular accident presenting with seizure episode while at home with evidence of acute on chronic infarct  He is admitted to the general medical floor on telemetry status with plans for expedited neurologic evaluation as well as monitoring for seizure activity  He has been given Keppra in the emergency room and will continue that dosage which has been ordered by the neurology service  Patient will require physical therapy and occupational therapy evaluations as well as advanced neuro imaging  Chronic diastolic CHF (congestive heart failure) (HCC)  Assessment & Plan  Wt Readings from Last 3 Encounters:   12/26/19 89 7 kg (197 lb 11 2 oz)   11/18/19 94 8 kg (209 lb)   09/06/19 96 6 kg (213 lb)         No evidence of exacerbation    Paroxysmal atrial fibrillation (HCC)  Assessment & Plan  Ventricular rate controlled -  Afib on Loop recorderd - Continue Xarelto per Neurology recommendations    Left hemiplegia [facility])  Assessment & Plan  Continue PT/OT while here    CVA (cerebral vascular accident) [facility])  Assessment & Plan  History of previous R hemispheric stroke with left sided weakness  - Monitor for new neurologic changes  Prediabetes  Assessment & Plan  Check A1c -    Essential hypertension  Assessment & Plan  Resume home medications - Allow some permissive HTN per Neurologic recommendations  Resume Beta Blocker due to Afib    * 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) ([facility] [address] )  Assessment & Plan  Loaded with Keppra - CT with concern for acute on chronic stroke    - Plan to monitor for seizure activity, no new medication changes - Did have recent Botox injection Dec 19th   No tounge biting   - Continue Neurochecks   MRI Brain with and without  Serial Cardiac enzymes to rule out ischemia as etiology      VTE Prophylaxis: Rivaroxaban (Xarelto)  / foot pump applied   Code Status:  Full  POLST: POLST form is on file already (pre-hospital)  Discussion with family:  Wife and caretaker    Anticipated Length of Stay:  Patient will be admitted on an Inpatient basis with an anticipated length of stay of  greater than 2 midnights  Justification for Hospital Stay:  Patient has significant concern for neurologic change    Total Time for Visit, including Counseling / Coordination of Care: 30 minutes  Greater than 50% of this total time spent on direct patient counseling and coordination of care  Chief Complaint:   Seizure as well as loss of conscious at home lasting 2 minutes    History of Present Illness:    Abdoul Kaufman is a 71 y o  male who presents with past medical history of CVA with known left-sided deficit who presented to the emergency room after having seizure episode with shaking of arm and leg as well as loss of consciousness for approximately 2 minutes  The patient has a history of left hemiplegia as a result of his stroke, and otherwise been improving at [address]  He motioned recently received Botox injections on 12/19/2019, there was no obvious complications  Patient does have an implantable loop recorder and has a known history of atrial fibrillation  In the emergency room the patient was alert and oriented x3, he had left-sided weakness which was not worse, does describe no difficulty swallowing  He is previous PEG tube placement which has been her was subsequently removed  The patient denies any fevers chills no nausea vomiting or diarrhea      Review of Systems:    A complete and comprehensive 14 point organ system review has been performed and all other systems are negative other than stated above    Past Medical and Surgical History:     Past Medical History:   Diagnosis Date    Arthritis     BL knees    Atrial fibrillation (HCC)     CHF (congestive heart failure) (HCC)     Colon polyp     CV[facility]cere[facility]) ([facility] [address] [facility] 4/27/2019    NIHSS 26 on presentation    Depression     Diabetes mellitus ([facility] [address] [facility]     Hypertension     Irregular heart beat     Afib    Stroke [facility])     Urinary retention        Past Surgical History:   Procedure Laterality Date    COLONOSCOPY      IR STROKE ALERT  4/27/2019    MENISCECTOMY Right        Meds/Allergies:    Prior to Admission medications    Medication Sig Start Date End Date Taking?  Authorizing Provider   baclofen 10 mg tablet 1 tablet (10 mg total) by Per G Tube route 3 (three) times a day  Patient taking differently: 10 mg by Per G Tube route 2 (two) times a day  6/10/19  Yes Parish Jalloh MD   Coenzyme Q10 (CO Q 10) 10 MG CAPS Take by mouth daily   Yes Historical Provider, MD   cyanocobalamin (VITAMIN B-12) 100 mcg tablet Take by mouth daily   Yes Historical Provider, MD   FLUoxetine (PROzac) 10 mg capsule 1 capsule (10 mg total) by Per G Tube route daily  Patient taking differently: Take 20 mg by mouth daily  6/10/19  Yes Parish Jalloh MD   hydrocortisone 1 % cream Apply topically 2 (two) times a day as needed    Yes Historical Provider, MD   pantoprazole (PROTONIX) 40 mg tablet Take 1 tablet (40 mg total) by mouth 2 (two) times a day before meals 5/31/19  Yes Chicho Kirkpatrick MD   rivaroxaban (XARELTO) 15 mg tablet 1 tablet (15 mg total) by Per G Tube route daily with breakfast  Patient taking differently: Take 20 mg by mouth daily with breakfast  6/10/19  Yes Parish Jalloh MD   tamsulosin (FLOMAX) 0 4 mg Take 0 8 mg by mouth daily at bedtime    Yes Historical Provider, MD   bethanechol (URECHOLINE) 10 mg tablet Take 10 mg by mouth 2 (two) times a day  7/25/19   Historical Provider, MD   metoprolol succinate (TOPROL-XL) 25 mg 24 hr tablet Take 25 mg by mouth daily    Historical Provider, MD   metoprolol tartrate (LOPRESSOR) 25 mg tablet Take 0 5 tablets (12 5 mg total) by mouth 2 (two) times a day for 7 days  Patient taking differently: Take 12 5 mg by mouth daily  9/3/19 10/25/19  Alison Foreman MD     I have reviewed home medications with patient personally  Allergies: Allergies   Allergen Reactions    Cephalexin Throat Swelling     Pt reported throat swelling after taking antibiotic     Penicillins Other (See Comments)       Social History:     Marital Status: /Civil Union   Lives with wife and is retired  Social History     Substance and Sexual Activity   Alcohol Use Not Currently    Frequency: 2-4 times a month    Comment: social     Social History     Tobacco Use   Smoking Status Never Smoker   Smokeless Tobacco Never Used     Social History     Substance and Sexual Activity   Drug Use Never       Family History:    History reviewed  No pertinent family history      Physical Exam:     Vitals:   Blood Pressure: 123/64 (12/26/19 1330)  Pulse: 56 (12/26/19 1330)  Temperature: 97 6 °F (36 4 °C) (12/26/19 1130)  Temp Source: Temporal (12/26/19 1130)  Respirations: 16 (12/26/19 1130)  Height: 6' (182 9 cm) (12/26/19 1130)  Weight - Scale: 89 7 kg (197 lb 11 2 oz) (12/26/19 1130)  SpO2: 99 % (12/26/19 1330)    Physical Exam    General: well appearing, no acute distress  HEENT: atraumatic, PERRLA, moist mucosa, normal pharynx, normal tonsils and adenoids, normal tongue, no fluid in sinuses  Neck: Trachea midline, no carotid bruit, no masses  Respiratory: normal chest wall expansion, CTA B, no r/r/w, no rubs  Cardiovascular:  Irregularly irregular, no m/r/g, Normal S1 and S2  Abdomen: Soft, non-tender, non-distended, normal bowel sounds in all quadrants, no hepatosplenomegaly, no tympany  Rectal: deferred  Musculoskeletal: normal ROM in upper and lower extremities  Integumentary: warm, dry, and pink, with no rash, purpura, or petechia  Heme/Lymph: no lymphadenopathy, no bruises  Neurological: Cranial Nerves II-XII grossly intact, left-sided weakness with muscle strength graded 2/5 on the left, with 4-5 on the right  Psychiatric: cooperative with normal mood, affect, and cognition      Additional Data:     Lab Results: I have personally reviewed pertinent reports  Results from last 7 days   Lab Units 12/26/19  0812   WBC Thousand/uL 3 81*   HEMOGLOBIN g/dL 13 1   HEMATOCRIT % 41 1   PLATELETS Thousands/uL 158   NEUTROS PCT % 62   LYMPHS PCT % 29   MONOS PCT % 6   EOS PCT % 3     Results from last 7 days   Lab Units 12/26/19  0812   SODIUM mmol/L 142   POTASSIUM mmol/L 4 1   CHLORIDE mmol/L 106   CO2 mmol/L 29   BUN mg/dL 17   CREATININE mg/dL 0 94   ANION GAP mmol/L 7   CALCIUM mg/dL 9 2   ALBUMIN g/dL 3 0*   TOTAL BILIRUBIN mg/dL 0 63   ALK PHOS U/L 87   ALT U/L 14   AST U/L 16   GLUCOSE RANDOM mg/dL 136     Results from last 7 days   Lab Units 12/26/19  0828   INR  1 30*                   Imaging: I have personally reviewed pertinent reports  XR chest 2 views   Final Result by Catracho Cruz MD (12/26 1928)      No acute cardiopulmonary disease  Workstation performed: [id number]         CT head without contrast   Final Result by Katty Parsons MD (19/20 3573)         1  Evidence for extensive chronic right anterior and middle cerebral artery territory infarcts with development of diffuse mild-moderate edema involving the previously infarcted territories in the right frontal, parietal and temporal lobes consistent    with acute upon chronic infarct  No hemorrhage noted  There is mild mass effect without midline shift noted  MRI may be useful in further characterizing this abnormality  2   Additional stable findings as noted               I personally discussed this study with Yue Gill on 12/26/2019 at 9:14 AM                            Workstation performed: [id number]         MRI Inpatient Order    (Results Pending)       EKG, Pathology, and Other Studies Reviewed on Admission:   · No new EKG    Allscripts / Epic Records Reviewed: Yes     ** Please Note: This note has been constructed using a voice recognition system   **

## 2019-12-26 NOTE — ED NOTES
No oral medication administered in the department due to patient failing dysphagia assessment         Renata Pritchard RN  12/26/19 7390

## 2019-12-27 ENCOUNTER — APPOINTMENT (INPATIENT)
Dept: CT IMAGING | Facility: HOSPITAL | Age: 69
DRG: 064 | End: 2019-12-27
Payer: MEDICARE

## 2019-12-27 ENCOUNTER — APPOINTMENT (INPATIENT)
Dept: MRI IMAGING | Facility: HOSPITAL | Age: 69
DRG: 064 | End: 2019-12-27
Payer: MEDICARE

## 2019-12-27 LAB
ANION GAP SERPL CALCULATED.3IONS-SCNC: 8 MMOL/L (ref 4–13)
ATRIAL RATE: 51 BPM
BUN SERPL-MCNC: 17 MG/DL (ref 5–25)
CALCIUM SERPL-MCNC: 8.9 MG/DL (ref 8.3–10.1)
CHLORIDE SERPL-SCNC: 107 MMOL/L (ref 100–108)
CHOLEST SERPL-MCNC: 131 MG/DL (ref 50–200)
CO2 SERPL-SCNC: 26 MMOL/L (ref 21–32)
CREAT SERPL-MCNC: 0.85 MG/DL (ref 0.6–1.3)
EST. AVERAGE GLUCOSE BLD GHB EST-MCNC: 114 MG/DL
GFR SERPL CREATININE-BSD FRML MDRD: 89 ML/MIN/1.73SQ M
GLUCOSE SERPL-MCNC: 115 MG/DL (ref 65–140)
HBA1C MFR BLD: 5.6 % (ref 4.2–6.3)
HDLC SERPL-MCNC: 33 MG/DL
LDLC SERPL CALC-MCNC: 83 MG/DL (ref 0–100)
MAGNESIUM SERPL-MCNC: 1.7 MG/DL (ref 1.6–2.6)
P AXIS: 110 DEGREES
POTASSIUM SERPL-SCNC: 3.7 MMOL/L (ref 3.5–5.3)
PR INTERVAL: 180 MS
QRS AXIS: 19 DEGREES
QRSD INTERVAL: 88 MS
QT INTERVAL: 456 MS
QTC INTERVAL: 420 MS
SODIUM SERPL-SCNC: 141 MMOL/L (ref 136–145)
T WAVE AXIS: 65 DEGREES
TRIGL SERPL-MCNC: 73 MG/DL
VENTRICULAR RATE: 51 BPM

## 2019-12-27 PROCEDURE — 97167 OT EVAL HIGH COMPLEX 60 MIN: CPT

## 2019-12-27 PROCEDURE — 93010 ELECTROCARDIOGRAM REPORT: CPT | Performed by: INTERNAL MEDICINE

## 2019-12-27 PROCEDURE — 97163 PT EVAL HIGH COMPLEX 45 MIN: CPT

## 2019-12-27 PROCEDURE — A9585 GADOBUTROL INJECTION: HCPCS | Performed by: INTERNAL MEDICINE

## 2019-12-27 PROCEDURE — G8978 MOBILITY CURRENT STATUS: HCPCS

## 2019-12-27 PROCEDURE — 99223 1ST HOSP IP/OBS HIGH 75: CPT | Performed by: PHYSICAL MEDICINE & REHABILITATION

## 2019-12-27 PROCEDURE — 83036 HEMOGLOBIN GLYCOSYLATED A1C: CPT | Performed by: INTERNAL MEDICINE

## 2019-12-27 PROCEDURE — 83735 ASSAY OF MAGNESIUM: CPT | Performed by: INTERNAL MEDICINE

## 2019-12-27 PROCEDURE — G8987 SELF CARE CURRENT STATUS: HCPCS

## 2019-12-27 PROCEDURE — 70553 MRI BRAIN STEM W/O & W/DYE: CPT

## 2019-12-27 PROCEDURE — 70496 CT ANGIOGRAPHY HEAD: CPT

## 2019-12-27 PROCEDURE — G8997 SWALLOW GOAL STATUS: HCPCS

## 2019-12-27 PROCEDURE — 92610 EVALUATE SWALLOWING FUNCTION: CPT

## 2019-12-27 PROCEDURE — 70498 CT ANGIOGRAPHY NECK: CPT

## 2019-12-27 PROCEDURE — 99232 SBSQ HOSP IP/OBS MODERATE 35: CPT | Performed by: INTERNAL MEDICINE

## 2019-12-27 PROCEDURE — 80061 LIPID PANEL: CPT | Performed by: INTERNAL MEDICINE

## 2019-12-27 PROCEDURE — G8988 SELF CARE GOAL STATUS: HCPCS

## 2019-12-27 PROCEDURE — G8979 MOBILITY GOAL STATUS: HCPCS

## 2019-12-27 PROCEDURE — 80048 BASIC METABOLIC PNL TOTAL CA: CPT | Performed by: INTERNAL MEDICINE

## 2019-12-27 PROCEDURE — G8998 SWALLOW D/C STATUS: HCPCS

## 2019-12-27 PROCEDURE — 99232 SBSQ HOSP IP/OBS MODERATE 35: CPT | Performed by: PSYCHIATRY & NEUROLOGY

## 2019-12-27 PROCEDURE — G8996 SWALLOW CURRENT STATUS: HCPCS

## 2019-12-27 RX ORDER — POLYETHYLENE GLYCOL 3350 17 G/17G
17 POWDER, FOR SOLUTION ORAL DAILY PRN
Status: DISCONTINUED | OUTPATIENT
Start: 2019-12-27 | End: 2019-12-29 | Stop reason: HOSPADM

## 2019-12-27 RX ORDER — DOCUSATE SODIUM 100 MG/1
100 CAPSULE, LIQUID FILLED ORAL 2 TIMES DAILY PRN
Status: DISCONTINUED | OUTPATIENT
Start: 2019-12-27 | End: 2019-12-29 | Stop reason: HOSPADM

## 2019-12-27 RX ORDER — BACLOFEN 10 MG/1
10 TABLET ORAL 2 TIMES DAILY
Status: DISCONTINUED | OUTPATIENT
Start: 2019-12-27 | End: 2019-12-29 | Stop reason: HOSPADM

## 2019-12-27 RX ORDER — METOPROLOL SUCCINATE 25 MG/1
12.5 TABLET, EXTENDED RELEASE ORAL DAILY
Status: DISCONTINUED | OUTPATIENT
Start: 2019-12-27 | End: 2019-12-29 | Stop reason: HOSPADM

## 2019-12-27 RX ADMIN — FLUOXETINE 20 MG: 20 CAPSULE ORAL at 10:33

## 2019-12-27 RX ADMIN — RIVAROXABAN 15 MG: 15 TABLET, FILM COATED ORAL at 10:34

## 2019-12-27 RX ADMIN — PANTOPRAZOLE SODIUM 40 MG: 40 TABLET, DELAYED RELEASE ORAL at 06:08

## 2019-12-27 RX ADMIN — GADOBUTROL 9 ML: 604.72 INJECTION INTRAVENOUS at 15:37

## 2019-12-27 RX ADMIN — BACLOFEN 10 MG: 10 TABLET ORAL at 13:45

## 2019-12-27 RX ADMIN — PANTOPRAZOLE SODIUM 40 MG: 40 TABLET, DELAYED RELEASE ORAL at 17:48

## 2019-12-27 RX ADMIN — BACLOFEN 10 MG: 10 TABLET ORAL at 17:48

## 2019-12-27 RX ADMIN — IOHEXOL 85 ML: 350 INJECTION, SOLUTION INTRAVENOUS at 18:55

## 2019-12-27 RX ADMIN — METOPROLOL SUCCINATE 12.5 MG: 25 TABLET, EXTENDED RELEASE ORAL at 17:49

## 2019-12-27 RX ADMIN — LEVETIRACETAM 750 MG: 750 TABLET, FILM COATED ORAL at 23:00

## 2019-12-27 RX ADMIN — LEVETIRACETAM 750 MG: 750 TABLET, FILM COATED ORAL at 10:32

## 2019-12-27 RX ADMIN — ATORVASTATIN CALCIUM 40 MG: 40 TABLET, FILM COATED ORAL at 17:48

## 2019-12-27 RX ADMIN — Medication 30 MG: at 10:32

## 2019-12-27 RX ADMIN — ASPIRIN 81 MG 81 MG: 81 TABLET ORAL at 10:34

## 2019-12-27 RX ADMIN — Medication 100 MCG: at 10:32

## 2019-12-27 RX ADMIN — TAMSULOSIN HYDROCHLORIDE 0.8 MG: 0.4 CAPSULE ORAL at 23:00

## 2019-12-27 NOTE — SPEECH THERAPY NOTE
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) [facility])  Active Problems:    Essential hypertension    Prediabetes    CVA (cerebral vascular accident) ([facility] [address] )    Left hemiplegia (HCC)    Paroxysmal atrial fibrillation (HCC)    Chronic diastolic CHF (congestive heart failure) ([facility] [address] )    Past Medical History  Past Medical History:   Diagnosis Date    Arthritis     BL knees    Atrial fibrillation ([facility] [address] )     CHF (congestive heart failure) (HCC)     Colon polyp     CVA (cerebral vascular accident) ([facility] [address] ) 4/27/2019    NIHSS 26 on presentation    Depression     [facility] mellitus [facility]     Hypertension     Irregular heart beat     Afib    Stroke [facility])     Urinary retention      Past Surgical History  Past Surgical History:   Procedure Laterality Date    COLONOSCOPY      IR STROKE ALERT  4/27/2019    MENISCECTOMY Right      Chief Complaint:   Seizure as well as loss of conscious at home lasting 2 minutes  History of Present Illness:  Holly Macdonald a 71 y  o  male who presents with past medical history of CVA with known left-sided deficit who presented to the emergency room after having seizure episode with shaking of arm and leg as well as loss of consciousness for approximately 2 minutes   The patient has a history of left hemiplegia as a result of his stroke, and otherwise been improving at [address] recently received Botox injections on 12/19/2019, there was no obvious complications   Patient does have an implantable loop recorder and has a known history of atrial fibrillation   In the emergency room the patient was alert and oriented x3, he had left-sided weakness which was not worse, does describe no difficulty swallowing   He is previous PEG tube placement which has been her was subsequently removed   The patient denies any fevers chills no nausea vomiting or diarrhea       Bedside Swallow Evaluation:    Summary:  Pt presents w/ oral and pharyngeal stages that are WNL  Wife reports pt was d/c'd marjorie Hernández 55 on a regular diet ans has been doing well  + word-finding deficits, premorbid  Recommendations:  Diet: regular  Liquid:thin  Meds:as tolerated/desired  Supervision:none  Assist w/ setup if needed  Positioning:Upright  Strategies: Pt to take PO/Meds only when fully alert and upright  Oral care  Aspiration precautions  Reflux precautions    Eval only, No f/u tx indicated  Patient's goal:none stated    Reason for consult:  R/o aspiration  Determine safest and least restrictive diet  Change in mental status  New neuro event  H/o neurological disease  h/o dysphagia     Current diet:  regular  Premorbid diet[de-identified]  regular  Previous VBS:  5/17/19 following cva  PEG as needed, along w/ puree and nectar  O2 requirement:  none  Voice/Speech:  "I know you!"  Pt recalled me from previous admit  Receptive skills better than expressive this session  No dysarthria  Social:  Home w/ spouse  Follows commands:  basic                    Cognitive Status:  Alert and pleasant (pt had eaten breakfast and was sleeping when I went to see him in the am)  Oral mech exam:  Dentition:natural  Labial strength and ROM:+  Lingual strength and ROM:+  Mandibular strength and ROM:+  Secretion management:+     Items administered:  [place] [facility] burger, chicken soup, thin liquids       Oral stage:wnl  Lip closure:+  Mastication:+  Bolus formation:+  Bolus control:+  Transfer:+  Oral residue:-  Pocketing:-    Pharyngeal stage: wnl  Swallow promptness:+  Laryngeal rise:+  Wet voice:-  Throat clear:-  Cough:-  Secondary swallows:-  Audible swallows:-  No overt s/s aspiration    Esophageal stage:  No s/s reported

## 2019-12-27 NOTE — PHYSICAL THERAPY NOTE
PT EVALUATION    Pt  Name: Cam Haji  Pt  Age: 71 y o  MRN: [id number]  LENGTH OF STAY: 1    Patient Active Problem List   Diagnosis    Essential hypertension    Hypokalemia    Elevated brain natriuretic peptide (BNP) level    Hx of right knee surgery    Hyperlipidemia    Prediabetes    CVA [facility]cerebral vascular accident) 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[facility] )    Urinary retention    Acute leg pain, right    History of stroke    Fatigue    [facility]oxysmal A-fib ([facility])    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) ([facility] )       Admitting Diagnoses:   Seizure ([place] [address] ) [R56 9]  Syncope [R55]  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 ([place] [address] ) [I63 9, R47 01]    Past Medical History:   Diagnosis Date    Arthritis     BL knees    Atrial fibrillation (HCC)     CHF [facility]congestive heart failure) ([facility])     Colon polyp     CVA [facility]cerebral vascular accident) [facility] ) 4/27/2019    NIHSS 26 on presentation    Depression     Diabetes mellitus ([place] [address] )     Hypertension     Irregular heart beat     Afib    Stroke [facility])     Urinary retention        Past Surgical History:   Procedure Laterality Date    COLONOSCOPY      IR STROKE ALERT  4/27/2019    MENISCECTOMY Right        Imaging Studies:  XR chest 2 views   Final Result by Genesis Marinelli MD (12/26 1264)      No acute cardiopulmonary disease  Workstation performed: [id number]         CT head without contrast   Final Result by Cornell Montes MD (00/04 1342)         1    Evidence for extensive chronic right anterior and middle cerebral artery territory infarcts with development of diffuse mild-moderate edema involving the previously infarcted territories in the right frontal, parietal and temporal lobes consistent    with acute upon chronic infarct  No hemorrhage noted  There is mild mass effect without midline shift noted  MRI may be useful in further characterizing this abnormality  2   Additional stable findings as noted  I personally discussed this study with Alondra Genao on 12/26/2019 at 9:14 AM                            Workstation performed: [id number]         MRI Inpatient Order    (Results Pending)        12/27/19 1026   Note Type   Note type Eval only   Pain Assessment   Pain Score No Pain   Home Living   Type of [address] to live on main level with bedroom/bathroom; Performs ADLs on one level;Ramped entrance  (bilevel)   Bathroom Shower/Tub Walk-in shower   Bathroom Equipment Tub transfer bench;Commode   Home Equipment Walker; Wheelchair-manual   Additional Comments Pt has 1st flr set up   Prior Function   Level of [place] Needs assistance with ADLs and functional mobility   Lives With Spouse   Receives Help From Family;Home health; Outpatient therapy   ADL Assistance Needs assistance   Falls in the last 6 months 0   Vocational Retired   Comments At baseline, pt requires (A) w/ overall ADL's & transfers; pt mainly w/c bound at home & able to SPT w/ (A); pt goes to outpt PT & has been working on amb w/ RW w/ Ax2 & started using ekoskeleton for amb  Restrictions/Precautions   Weight Bearing Precautions Per Order No   Braces or Orthoses LE Braces  (per wife, pt wears LLE brace at baseline; brace at home)   Other Precautions Telemetry; Fall Risk;Cognitive; Chair Alarm; Bed Alarm;Seizure   General   Additional Pertinent History h/o CVA w/ residual L hemiparesis 4/27/19   Family/Caregiver Present Yes  (wife)   Cognition   Arousal/Participation Responsive   Orientation Level Oriented to person   Following Commands Follows one step commands with increased time or repetition   RUE Assessment   RUE Assessment (refer to OT)   LUE Assessment   LUE Assessment   (refer to OT)   RLE Assessment   RLE Assessment WFL  (4/5 grossly)   LLE Assessment   LLE Assessment X  (WFL PROM; unable to assess MMT pt unable to follow commands)   Bed Mobility   Supine to Sit 2  Maximal assistance   Additional items Assist x 2;HOB elevated; Bedrails; Increased time required;Verbal cues;LE management   Additional Comments cues for techniques   Transfers   Sit to Stand 2  Maximal assistance   Additional items Assist x 2; Increased time required;Verbal cues   Stand to Sit 2  Maximal assistance   Additional items Assist x 2; Increased time required;Verbal cues   Additional Comments cues for techniques & safety   Ambulation/Elevation   Gait pattern L Hemiparesis;L Foot drag;Improper Weight shift;Decreased foot clearance;Decreased L stance; Step to;Excessively slow  (unsteady, difficulty advancing LLE, L knee hyperextension)   Gait Assistance 2  Maximal assist   Additional items Assist x 2;Verbal cues; Tactile cues   Assistive Device Rolling walker   Distance 2'x1 bed to chair; attempted 1 step forward/backward & 1 lateral stepping to FOB & HOB   Balance   Static Sitting Fair   Static Standing Poor +  (w/ RW)   Ambulatory Poor  (w/ RW)   Endurance Deficit   Endurance Deficit Yes   Endurance Deficit Description fatigue   Activity Tolerance   Activity Tolerance Patient limited by fatigue   Medical Staff Made Aware OTR Renaldo   Nurse Made Aware MICHELLE Simeon   Assessment   Prognosis Fair   Problem List Decreased strength;Decreased endurance; Impaired balance;Decreased mobility; Decreased coordination;Decreased cognition; Impaired judgement;Decreased safety awareness; Impaired tone   Assessment  Pt  69 y o male w/ h/o CVA w/ residual L hemiparesis presented w/ seizure episode w/ possible acute on chronic CVA   CT head demonstrates extensive chronic right ROB and MCA territory infarctions, as well as development of mild to moderate edema in the previously infarcted territories in the right frontal, parietal and temporal lobes, consistent with acute on chronic infarction  MRI pending  Pt admitted for 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) ([facility] [address] )  Pt referred to PT for mobility assessment & D/C planning w/ orders of up & OOB as tolerated  PTA, pt require (A) w/ ADL's and & SPT to w/c  Pt has 6-8hrs of home health aide  Per wife, pt mainly w/c bound at home & is not allowed to amb at home for safety but pt is working on amb w/ RW [place] at [employer]  Pt lives w/ wife in a bilevel house w/ ramped entrance  Pt has 1st floor set up  On eval, pt demonstrate dec mobility, balance, endurance & amb  Pt require maxAx2 for most functional mobility + cues for techniques  Gait deviations as above, slow & unsteady w/ difficulty advancing LLE w/ L knee hyperextension & impaired wt shifting  Pt require cues & physical assistance to advance LLE  No dizziness reported t/o session  /80  Nsg staff most recent vital signs as follows: /62 (BP Location: Right arm)   Pulse 62   Temp 97 5 °F (36 4 °C) (Tympanic)   Resp 18   Ht 6' (1 829 m)   Wt 89 7 kg (197 lb 11 2 oz)   SpO2 96%   BMI 26 81 kg/m²   At end of session, pt tolerated OOB in chair w/o issues, call bell & phone in reach  Fall precautions reinforced w/ good understanding  Pt functioning below baseline hence will continue skilled PT to improve function & safety  At this time, STR beneficial but pt's wife prefers to take pt home & continue [facility]PPT at [facility]  CM to follow  [facility] staff to continue to mobilized pt (OOB in chair for all meals w/ use of quick move device for safety to prevent further decline in function  Ns notified      Goals   Patient Goals none stated but pt's wife goal is to take pt home at D/C   STG Expiration Date 01/10/20   Short Term Goal #1  Goals to be met in 14 days; pt will be able to: 1) inc strength & balance by 1/2 grade to improve overall functional mobility & dec fall risk; 2) inc bed mobility to modAx1 for pt to be able to get in/OOB safely w/ proper techniques 100% of the time, to dec caregiver assistance & safely function at home; 3) inc transfers to modAx1 for pt to transition safely from one surface to another w/o % of the time, to dec caregiver assistance & safely function at home; 4) inc amb w/ RW approx  150' w/ modAx1 for pt to ambulate household distances w/o any % of the time, to dec caregiver assistance & safely function at home; 5) inc barthel score to 55 to decrease overall risk for falls; 6) pt/caregiver ed   PT Treatment Day 0   Plan   Treatment/Interventions Functional transfer training;LE strengthening/ROM; Therapeutic exercise; Endurance training;Patient/family training;Bed mobility;Gait training;Spoke to nursing;OT;Family   PT Frequency Other (Comment)  (3-5x/wk)   Recommendation   Recommendation 24 hour supervision/assist;Outpatient PT; Home with family support; Short-term skilled PT  (STR beneficial but pt's wife prefers to take pt home w/ OPPT)   Equipment Recommended Wheelchair;Walker; Other (Comment)  (quick move device for safe transfers OOB<>chair)   Barthel Index   Feeding 5   Bathing 0   Grooming Score 0   Dressing Score 5   Bladder Score 5   Bowels Score 10   Toilet Use Score 5   Transfers (Bed/Chair) Score 5   Mobility (Level Surface) Score 0   Stairs Score 0   Barthel Index Score 35   Hx/personal factors: co-morbidities, mutliple lines, telemetry, use of AD, dec cognition, fall risk and assist w/ ADL's  Examination: dec mobility, dec balance, dec endurance, dec amb, high fall risk, dec cognition  Clinical: unpredictable (ongoing medical status, abnormal lab values, high fall risk and imaging test/result pending)  Complexity: high     Pastor Plane, PT

## 2019-12-27 NOTE — PROGRESS NOTES
Progress Note - Yusef Rangel 1950, 71 y o  male MRN: [id number]    Unit/Bed#: E4 -01 Encounter: [id number]    Primary Care Provider: Juan Francisco Gandhi MD   Date and time admitted to hospital: 12/26/2019  7:47 AM        Chronic diastolic CHF (congestive heart failure) ([facility] [address] )  Assessment & Plan  Wt Readings from Last 3 Encounters:   12/26/19 89 7 kg (197 lb 11 2 oz)   11/18/19 94 8 kg (209 lb)   09/06/19 96 6 kg (213 lb)         No evidence of exacerbation - monitor volume status    Paroxysmal atrial fibrillation (HCC)  Assessment & Plan  Ventricular rate controlled -  Afib on Loop recorderd - Continue Xarelto per Neurology recommendations    Left hemiplegia [facility])  Assessment & Plan  Continue PT/OT while here, resume baclofen per family's request    CVA (cerebral vascular accident) [facility])  Assessment & Plan  History of previous R hemispheric stroke with left sided weakness  -     Now with evidence of new acute  stroke on diffusion-weighted images of the MRI  Physical medicine and rehabilitation consultations placed  CT imaging does demonstrate mild-to-moderate cerebral edema, and he is now pending CTA of the head and neck given that he has extensive chronic right ROB and MCA territory infarcts  This is evidence of acute on chronic in for occasion, with old stroke present with new stroke on top of previous stroke in the right hemispheric region  Continue current stroke workup    Prediabetes  Assessment & Plan  A1c within normal parameters    Essential hypertension  Assessment & Plan  Resume home medications - Allow some permissive HTN per Neurologic recommendations  Resume Beta Blocker due to Afib    * 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) ([facility] )  Assessment & Plan  Loaded with Keppra - CT with concern for acute on chronic stroke    - Plan to monitor for seizure activity, no new medication changes - Did have recent Botox injection Dec 19th   No tounge biting     MRI Brain with and without - with evidence of new stroke, likely post stroke seizure    Serial Cardiac enzymes negative for myocardial ischemia      [facility] Internal Medicine Progress Note  Patient: Ryan Yang 71 y o  male   MRN: [id number]  PCP: Andrew Barnes MD  Unit/Bed#: E4 -01 Encounter: [id number]  Date Of Visit: 12/27/19    Assessment:    Principal Problem:    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) ([facility] )  Active Problems:    Essential hypertension    Prediabetes    CVA (cerebral vascular accident) ([facility] [address] )    Left hemiplegia ([facility] [address] )    Paroxysmal atrial fibrillation ([facility] )    Chronic diastolic CHF (congestive heart failure) ([facility] )      Plan:    · Physical medicine rehabilitation  · CTA has been ordered  · Continue risk stratification  · Keppra per Neurology recommendation       VTE Pharmacologic Prophylaxis:   Pharmacologic: Rivaroxaban (Xarelto)  Mechanical VTE Prophylaxis in Place: Yes    Patient Centered Rounds: I have performed bedside rounds with nursing staff today  Discussions with Specialists or Other Care Team Provider:  Case management    Education and Discussions with Family / Patient:  Patient and wife    Time Spent for Care: 20 minutes  More than 50% of total time spent on counseling and coordination of care as described above  Current Length of Stay: 1 day(s)    Current Patient Status: Inpatient   Certification Statement: The patient will continue to require additional inpatient hospital stay due to Acute stroke ruled in    Discharge Plan / Estimated Discharge Date:  48 hours    Code Status: Level 1 - Full Code      Subjective:   Patient seen and examined, reports his weakness is unchanged, no chest pain shortness of breath or difficulty breathing  Plan discussed with wife as well as the daughter at bedside    A complete and comprehensive 14 point organ system review has been performed and all other systems are negative other than stated above      Objective:     Vitals: Temp (24hrs), Av °F (36 7 °C), Min:96 8 °F (36 °C), Max:99 2 °F (37 3 °C)    Temp:  [96 8 °F (36 °C)-99 2 °F (37 3 °C)] 96 8 °F (36 °C)  HR:  [55-64] 55  Resp:  [17-18] 18  BP: (108-137)/(62-69) 118/64  SpO2:  [96 %-98 %] 98 %  Body mass index is 26 81 kg/m²  Input and Output Summary (last 24 hours): Intake/Output Summary (Last 24 hours) at 2019 1613  Last data filed at 2019 3964  Gross per 24 hour   Intake    Output 302 ml   Net -302 ml       Physical Exam:     General: well appearing, no acute distress  HEENT: atraumatic, PERRLA, moist mucosa, normal pharynx, normal tonsils and adenoids, normal tongue, no fluid in sinuses  Neck: Trachea midline, no carotid bruit, no masses  Respiratory: normal chest wall expansion, CTA B, no r/r/w, no rubs  Cardiovascular: RRR, no m/r/g, Normal S1 and S2  Abdomen: Soft, non-tender, non-distended, normal bowel sounds in all quadrants, no hepatosplenomegaly, no tympany  Rectal: deferred  Musculoskeletal: normal ROM in upper and lower extremities  Integumentary: warm, dry, and pink, with no rash, purpura, or petechia  Heme/Lymph: no lymphadenopathy, no bruises  Neurological:  Left-sided weakness  3/5, right side 4+ out of 5  Psychiatric: cooperative with normal mood, affect, and cognition      Additional Data:     Labs:    Results from last 7 days   Lab Units 19  0812   WBC Thousand/uL 3 81*   HEMOGLOBIN g/dL 13 1   HEMATOCRIT % 41 1   PLATELETS Thousands/uL 158   NEUTROS PCT % 62   LYMPHS PCT % 29   MONOS PCT % 6   EOS PCT % 3     Results from last 7 days   Lab Units 19  0613 19  0812   POTASSIUM mmol/L 3 7 4 1   CHLORIDE mmol/L 107 106   CO2 mmol/L 26 29   BUN mg/dL 17 17   CREATININE mg/dL 0 85 0 94   CALCIUM mg/dL 8 9 9 2   ALK PHOS U/L  --  87   ALT U/L  --  14   AST U/L  --  16     Results from last 7 days   Lab Units 19  0828   INR  1 30*       * I Have Reviewed All Lab Data Listed Above    * Additional Pertinent Lab Tests Reviewed: Rachel 66 Admission Reviewed    Imaging:  MRI of the brain    Recent Cultures (last 7 days):           Last 24 Hours Medication List:     Current Facility-Administered Medications:  acetaminophen 650 mg Oral Q4H PRN Cyrilla Blew, DO   aspirin 81 mg Oral Daily Maco NOEL Ballard, DO   atorvastatin 40 mg Oral QPM Maco NOEL Ballard, DO   baclofen 10 mg Oral BID Shaun D Gill, DO   co-enzyme Q-10 30 mg Oral Daily Maco NOEL Ballard, DO   cyanocobalamin 100 mcg Oral Daily Shaun D Gill, DO   docusate sodium 100 mg Oral BID PRN Cyrilla Blew, DO   FLUoxetine 20 mg Oral Daily Shaun D Gill, DO   levETIRAcetam 750 mg Oral Q12H [address] [place] PA-C   metoprolol succinate 12 5 mg Oral Daily Shaun D Gill, DO   ondansetron 4 mg Intravenous Q6H PRN Maco Ballard, DO   pantoprazole 40 mg Oral BID AC Maco NOEL Ballard, DO   polyethylene glycol 17 g Oral Daily PRN Cyrilla Blew, DO   rivaroxaban 15 mg Oral Daily With Breakfast Macomarcia Ballard, DO   tamsulosin 0 8 mg Oral HS Cyrilla Blew, DO        Today, Patient Was Seen By: Kennedy Fitzgerald DO    ** Please Note: This note has been constructed using a voice recognition system   **

## 2019-12-27 NOTE — CONSULTS
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 2/2 A  Fib s/p thrombectomy (recently discharged from [facility] to home in 2019)  · Left spastic hemiparesis  · Neurogenic bladder    Recommendations:  Rehabilitation Plan:  · Continue PT/OT/SLP while on acute care  · Personally discussed history and current functional status with both patient and his wife at bedside today  In reviewing his history, patient had a long day at acute inpatient rehabilitation at [facility] discharge home in 2019 followed by aggressive outpatient neuro rehabilitation with outpatient PT/OT/SLP  Both he and his wife, state he is at his functional baseline, without apparent functional decline during this hospitalization  Therefore, current recommendation is to continue outpatient neuro rehabilitation program with PT/OT/SLP once medically stabilized  · Patient will require new prescriptions for outpatient PT, outpatient OT, outpatient SLP for mobility, self-care, cognitive deficits  These will need to be printed out and given to patient and his family prior to discharge as he will resume his program at [facility]      Medical Co-morbidities Plan:  Focal seizure: Workup in progress  Currently managed on [facility]  Neurology following  History of large right MCA/ROB ischemic embolic CVA in 9741 secondary to atrial fibrillation  Left spastic hemiparesis:  Continue low-dose baclofen 10 mg b i d  Neurogenic bladder  Atrial fibrillation  CHF  Hypertension    Thank you for this consultation  Do not hesitate to contact service with further questions        Devan Quinn MD  PM&R    History of Present Illness:  Erica Macario is a 71 y o  male with  has a past medical history of Arthritis, Atrial fibrillation ([facility] [address] ), CHF (congestive heart failure) ([facility] [address] ), Colon polyp, CVA (cerebral vascular accident) ([facility] [address] ) (4/27/2019), Depression, Diabetes mellitus ([facility] [address] ), Hypertension, Irregular heart beat, Stroke ([facility] [address] ), and Urinary retention     Patient presented to the [address] [place] [address] on 12/26/19 presenting with seizure-like activity  Patient seen by neurology and loaded with Keppra  Workup underway  Patient has a known history of large right MCA/ROB stroke diagnosed in April of 2019 and has resultant left spastic hemiparesis  He is s/p loop recorder placement and +Afib, anticoagulated on Xarelto (GIB on Eliquis in the past)  He went to acute inpatient rehabilitation at [facility] and is now continued in their outpatient rehabilitation program   Her lower extremity spasticity he was just treated with Botox injections by Dr Mikel Meyer physiatrist on 12/19/19  Review of Systems: 10 point ROS negative except for what is noted in HPI    Function:  Prior level of function and living situation:  Patient lives with his wife in a multilevel home with 1st floor setup  - he reports he is moving to a rent style house as we can     He has assistance at home with home healthcare 6-8 hours per day  Prior to admission, patient did require assistance with ADLs, transfers, wheelchair mobility       He has continued [facility] rehabilitation - working with Exo- skeleton with ambulation  Current level of function:  Physical Therapy:  Transfers are maximal assistance    Patient is total assist needing 2 people and rolling walker to step forward to feet forward and backward with therapy  Occupational Therapy:  Grooming is minimal assist, upper body ADLs moderate assist, lower body ADLs max assist      Physical Exam:  /69 (BP Location: Right arm) Pulse 64   Temp 97 6 °F (36 4 °C) (Tympanic)   Resp 18   Ht 6' (1 829 m)   Wt 89 7 kg (197 lb 11 2 oz)   SpO2 98%   BMI 26 81 kg/m²        Intake/Output Summary (Last 24 hours) at 12/27/2019 1326  Last data filed at 12/27/2019 0653  Gross per 24 hour   Intake    Output 302 ml   Net -302 ml       Body mass index is 26 81 kg/m²  Physical Exam   Constitutional: He appears well-developed and well-nourished  HENT:   Head: Normocephalic and atraumatic  Eyes: Pupils are equal, round, and reactive to light  EOM are normal    Cardiovascular: Normal rate and regular rhythm  Pulmonary/Chest: Breath sounds normal  He has no wheezes  He has no rales  Abdominal: Soft  Bowel sounds are normal  He exhibits no distension  There is no tenderness  Musculoskeletal:   Moderate to severe spasticity left upper extremity flexor synergistic pattern  Moderate spasticity left lower extremity   Neurological:   Speech is fluent  Mild cognitive deficits  Mild left neglect   Sensation to light appear intact x 4  Motor:   Right upper extremity/right lower extremity: 4+/5   LUE: 1/5 SF, EF; 3-/5, 1/5 WE, FF, FE  LLE: 2/5 HF, 0/5 KE, KF, DF, PF, EHL   Skin: Skin is warm  Psychiatric: He has a normal mood and affect  Nursing note and vitals reviewed         Social History:    Social History     Socioeconomic History    Marital status: /Civil Union     Spouse name: None    Number of children: None    Years of education: None    Highest education level: None   Occupational History    None   Social Needs    Financial resource strain: None    Food insecurity:     Worry: None     Inability: None    Transportation needs:     Medical: None     Non-medical: None   Tobacco Use    Smoking status: Never Smoker    Smokeless tobacco: Never Used   Substance and Sexual Activity    Alcohol use: Not Currently     Frequency: 2-4 times a month     Comment: social    Drug use: Never    Sexual activity: None   Lifestyle    Physical activity:     Days per week: None     Minutes per session: None    Stress: None   Relationships    Social connections:     Talks on phone: None     Gets together: None     Attends [employer] service: None     Active member of club or organization: None     Attends meetings of clubs or organizations: None     Relationship status: None    Intimate partner violence:     Fear of current or ex partner: None     Emotionally abused: None     Physically abused: None     Forced sexual activity: None   Other Topics Concern    None   Social History Narrative    None        Family History:    History reviewed  No pertinent family history        Medications:     Current Facility-Administered Medications:     acetaminophen (TYLENOL) tablet 650 mg, 650 mg, Oral, Q4H PRN, Maco Ballard DO    aspirin chewable tablet 81 mg, 81 mg, Oral, Daily, Maco Ballard DO, 81 mg at 12/27/19 1034    atorvastatin (LIPITOR) tablet 40 mg, 40 mg, Oral, QPM, Maco Ballard DO, 40 mg at 12/26/19 1724    baclofen tablet 10 mg, 10 mg, Oral, BID, Maco Ballard DO    co-enzyme Q-10 capsule 30 mg, 30 mg, Oral, Daily, Maco Ballard DO, 30 mg at 12/27/19 1032    cyanocobalamin (VITAMIN B-12) tablet 100 mcg, 100 mcg, Oral, Daily, Maco Ballard DO, 100 mcg at 12/27/19 1032    docusate sodium (COLACE) capsule 100 mg, 100 mg, Oral, BID PRN, Maco Ballard DO    FLUoxetine (PROzac) capsule 20 mg, 20 mg, Oral, Daily, Maco Ballard DO, 20 mg at 12/27/19 1033    levETIRAcetam (KEPPRA) tablet 750 mg, 750 mg, Oral, Q12H [address], [place] PA-C, 750 mg at 12/27/19 1032    metoprolol succinate (TOPROL-XL) 24 hr tablet 12 5 mg, 12 5 mg, Oral, Daily, Maco Ballard DO    ondansetron (ZOFRAN) injection 4 mg, 4 mg, Intravenous, Q6H PRN, Maco Ballard DO    pantoprazole (PROTONIX) EC tablet 40 mg, 40 mg, Oral, BID AC, Maco Ballard DO, 40 mg at 12/27/19 0608    polyethylene glycol (MIRALAX) packet 17 g, 17 g, Oral, Daily PRN, [place], DO   rivaroxaban (XARELTO) tablet 15 mg, 15 mg, Oral, Daily With Breakfast, Maco Ballard DO, 15 mg at 12/27/19 1034    tamsulosin (FLOMAX) capsule 0 8 mg, 0 8 mg, Oral, HS, Maco Ballard, DO, 0 8 mg at 12/26/19 2120    Past Medical History:     Past Medical History:   Diagnosis Date    Arthritis     BL knees    Atrial fibrillation ([facility] )     [facility]est[facility]     Colon polyp     CVA (cerebral vascular accident) ([facility] ) 4/27/2019    NIHSS 26 on presentation    Depression     Diabetes mellitus ([facility] [facility] )     Hypertension     Irregular heart beat     Afib    Stroke [facility])     Urinary retention         Past Surgical History:     Past Surgical History:   Procedure Laterality Date    COLONOSCOPY      IR STROKE ALERT  4/27/2019    MENISCECTOMY Right          Allergies: Allergies   Allergen Reactions    Cephalexin Throat Swelling     Pt reported throat swelling after taking antibiotic     Penicillins Other (See Comments)           LABORATORY RESULTS:      Lab Results   Component Value Date    HGB 13 1 12/26/2019    HCT 41 1 12/26/2019    WBC 3 81 (L) 12/26/2019     Lab Results   Component Value Date    BUN 17 12/27/2019    K 3 7 12/27/2019     12/27/2019    GLUCOSE 133 04/27/2019    CREATININE 0 85 12/27/2019     Lab Results   Component Value Date    PROTIME 16 4 (H) 12/26/2019    INR 1 30 (H) 12/26/2019        DIAGNOSTIC STUDIES: Reviewed  Xr Chest 2 Views    Result Date: 12/26/2019  Impression: No acute cardiopulmonary disease  Workstation performed: [id number]     Ct Head Without Contrast    Result Date: 12/26/2019  Impression: 1  Evidence for extensive chronic right anterior and middle cerebral artery territory infarcts with development of diffuse mild-moderate edema involving the previously infarcted territories in the right frontal, parietal and temporal lobes consistent with acute upon chronic infarct  No hemorrhage noted  There is mild mass effect without midline shift noted    MRI may be useful in further characterizing this abnormality  2   Additional stable findings as noted    I personally discussed this study with Lenin Prather on 12/26/2019 at 9:14 AM  Workstation performed: [id number]

## 2019-12-27 NOTE — CONSULTS
Consulted for stroke  PT tolerating regular textures with no concerns of chewing or swallowing problems  PT and wife reported PT has a good appetite  Reviewed diet hx: PT lives with wife at home has been home since July, goes to outpt physical therapy at [facility]  PT eats regular textures at home  B: eggs 2x week, panckaes once a week, oatmeal, fresh fruit, yogurt, juice, L: sandwich, soup, oranges or apples D: meat, potato, veggies, fruit  Weight hx records indicate PT has been losing weight  Will continue to monitor weight  Reviewed labs, lipid profile wnl except HDL 33  PT has no diet questions or concerns  PT currently on low fat diet, d/t HTN, hx CVA, paroxysmal Afib, and CHF will adjust diet to 2gm Na diet

## 2019-12-27 NOTE — PLAN OF CARE
Problem: Potential for Falls  Goal: Patient will remain free of falls  Description  INTERVENTIONS:  - Assess patient frequently for physical needs  -  Identify cognitive and physical deficits and behaviors that affect risk of falls    -  [place] fall precautions as indicated by assessment   - Educate patient/family on patient safety including physical limitations  - Instruct patient to call for assistance with activity based on assessment  - Modify environment to reduce risk of injury  - Consider OT/PT consult to assist with strengthening/mobility  Outcome: Progressing     Problem: Prexisting or High Potential for Compromised Skin Integrity  Goal: Skin integrity is maintained or improved  Description  INTERVENTIONS:  - Identify patients at risk for skin breakdown  - Assess and monitor skin integrity  - Assess and monitor nutrition and hydration status  - Monitor labs   - Assess for incontinence   - Turn and reposition patient  - Assist with mobility/ambulation  - Relieve pressure over bony prominences  - Avoid friction and shearing  - Provide appropriate hygiene as needed including keeping skin clean and dry  - Evaluate need for skin moisturizer/barrier cream  - Collaborate with interdisciplinary team   - Patient/family teaching  - Consider wound care consult   Outcome: Progressing     Problem: PAIN - ADULT  Goal: Verbalizes/displays adequate comfort level or baseline comfort level  Description  Interventions:  - Encourage patient to monitor pain and request assistance  - Assess pain using appropriate pain scale  - Administer analgesics based on type and severity of pain and evaluate response  - Implement non-pharmacological measures as appropriate and evaluate response  - Consider cultural and social influences on pain and pain management  - Notify physician/advanced practitioner if interventions unsuccessful or patient reports new pain  Outcome: Progressing     Problem: INFECTION - ADULT  Goal: Absence or prevention of progression during hospitalization  Description  INTERVENTIONS:  - Assess and monitor for signs and symptoms of infection  - Monitor lab/diagnostic results  - Monitor all insertion sites, i e  indwelling lines, tubes, and drains  - Monitor endotracheal if appropriate and nasal secretions for changes in amount and color  - [place] appropriate cooling/warming therapies per order  - Administer medications as ordered  - Instruct and encourage patient and family to use good hand hygiene technique  - Identify and instruct in appropriate isolation precautions for identified infection/condition  Outcome: Progressing  Goal: Absence of fever/infection during neutropenic period  Description  INTERVENTIONS:  - Monitor WBC    Outcome: Progressing     Problem: SAFETY ADULT  Goal: Maintain or return to baseline ADL function  Description  INTERVENTIONS:  -  Assess patient's ability to carry out ADLs; assess patient's baseline for ADL function and identify physical deficits which impact ability to perform ADLs (bathing, care of mouth/teeth, toileting, grooming, dressing, etc )  - Assess/evaluate cause of self-care deficits   - Assess range of motion  - Assess patient's mobility; develop plan if impaired  - Assess patient's need for assistive devices and provide as appropriate  - Encourage maximum independence but intervene and supervise when necessary  - Involve family in performance of ADLs  - Assess for home care needs following discharge   - Consider OT consult to assist with ADL evaluation and planning for discharge  - Provide patient education as appropriate  Outcome: Progressing  Goal: Maintain or return mobility status to optimal level  Description  INTERVENTIONS:  - Assess patient's baseline mobility status (ambulation, transfers, stairs, etc )    - Identify cognitive and physical deficits and behaviors that affect mobility  - Identify mobility aids required to assist with transfers and/or ambulation (gait belt, sit-to-stand, lift, walker, cane, etc )  - [place] fall precautions as indicated by assessment  - Record patient progress and toleration of activity level on Mobility SBAR; progress patient to next Phase/Stage  - Instruct patient to call for assistance with activity based on assessment  - Consider rehabilitation consult to assist with strengthening/weightbearing, etc   Outcome: Progressing     Problem: DISCHARGE PLANNING  Goal: Discharge to home or other facility with appropriate resources  Description  INTERVENTIONS:  - Identify barriers to discharge w/patient and caregiver  - Arrange for needed discharge resources and transportation as appropriate  - Identify discharge learning needs (meds, wound care, etc )  - Arrange for interpretive services to assist at discharge as needed  - Refer to Case Management Department for coordinating discharge planning if the patient needs post-hospital services based on physician/advanced practitioner order or complex needs related to functional status, cognitive ability, or social support system  Outcome: Progressing     Problem: Knowledge Deficit  Goal: Patient/family/caregiver demonstrates understanding of disease process, treatment plan, medications, and discharge instructions  Description  Complete learning assessment and assess knowledge base  Interventions:  - Provide teaching at level of understanding  - Provide teaching via preferred learning methods  Outcome: Progressing     Problem: Neurological Deficit  Goal: Neurological status is stable or improving  Description  Interventions:  - Monitor and assess patient's level of consciousness, motor function, sensory function, and level of assistance needed for ADLs  - Monitor and report changes from baseline  Collaborate with interdisciplinary team to initiate plan and implement interventions as ordered  - Provide and maintain a safe environment  - Consider seizure precautions  - Consider fall precautions    - Consider aspiration precautions  - Consider bleeding precautions  Outcome: Progressing     Problem: Activity Intolerance/Impaired Mobility  Goal: Mobility/activity is maintained at optimum level for patient  Description  Interventions:  - Assess and monitor patient  barriers to mobility and need for assistive/adaptive devices  - Assess patient's emotional response to limitations  - Collaborate with interdisciplinary team and initiate plans and interventions as ordered  - Encourage independent activity per ability   - Maintain proper body alignment  - Perform active/passive rom as tolerated/ordered  - Plan activities to conserve energy   - Turn patient as appropriate  Outcome: Progressing     Problem: Communication Impairment  Goal: Ability to express needs and understand communication  Description  Assess patient's communication skills and ability to understand information  Patient will demonstrate use of effective communication techniques, alternative methods of communication and understanding even if not able to speak  - Encourage communication and provide alternate methods of communication as needed  - Collaborate with case management/ for discharge needs  - Include patient/family/caregiver in decisions related to communication  Outcome: Progressing     Problem: Potential for Aspiration  Goal: Non-ventilated patient's risk of aspiration is minimized  Description  Assess and monitor vital signs, respiratory status, and labs (WBC)  Monitor for signs of aspiration (tachypnea, cough, rales, wheezing, cyanosis, fever)  - Assess and monitor patient's ability to swallow  - Place patient up in chair to eat if possible  - HOB up at 90 degrees to eat if unable to get patient up into chair   - Supervise patient during oral intake  - Instruct patient/ family to take small bites  - Instruct patient/ family to take small single sips when taking liquids    - Follow patient-specific strategies generated by speech pathologist   Outcome: Progressing  Goal: Ventilated patient's risk of aspiration is minimized  Description  Assess and monitor vital signs, respiratory status, airway cuff pressure, and labs (WBC)  Monitor for signs of aspiration (tachypnea, cough, rales, wheezing, cyanosis, fever)  - Elevate head of bed 30 degrees if patient has tube feeding   - Monitor tube feeding  Outcome: Progressing     Problem: Nutrition  Goal: Nutrition/Hydration status is improving  Description  Monitor and assess patient's nutrition/hydration status for malnutrition (ex- brittle hair, bruises, dry skin, pale skin and conjunctiva, muscle wasting, smooth red tongue, and disorientation)  Collaborate with interdisciplinary team and initiate plan and interventions as ordered  Monitor patient's weight and dietary intake as ordered or per policy  Utilize nutrition screening tool and intervene per policy  Determine patient's food preferences and provide high-protein, high-caloric foods as appropriate  - Assist patient with eating   - Allow adequate time for meals   - Encourage patient to take dietary supplement as ordered  - Collaborate with clinical nutritionist   - Include patient/family/caregiver in decisions related to nutrition    Outcome: Progressing

## 2019-12-27 NOTE — PROGRESS NOTES
Progress Note - Neurology   Ritchie Malone 71 y o  male MRN: [id number]  Unit/Bed#: E4 -01 Encounter: [id number]    Assessment/ Plan:  1)  Focal left-sided Seizure-like activity with secondary generalization in conjunction with acute on chronic CVA               -MRI brain demonstrates severe degeneration as well as acute restriction diffusion in the R MCA/ ROB territories, official read pending   -CTA head and neck pending                -CT head demonstrates extensive chronic right ROB and MCA territory infarctions, as well as development of mild to moderate edema in the previously infarcted territories in the right frontal, parietal and temporal lobes, consistent with acute on chronic infarction               -tele              -cardiac loop report positive for 24 AFib episodes              -Continue Xarelto for now, will consider change s/p CTA head and neck    -Of note, pt with severe GI bleeding requiring transfusion on Eliquis in the past               -statin              -HbA1c pending, lipid profile WNL              -patient loaded with Keppra 1 g                          -continue Keppra 750 mg p o  B i d  For now              -PT/OT/speech              -will continue follow closely, please monitor exam and notify with changes    Subjective:   Jud Vo is a 71 y o   male with a past medical history of a large right ROB and MCA CVA secondary to previously undiagnosed AFib in April, 2019  Patient is status post trach and PEG, with PEG removed and currently on Xarelto  Patient's past hospitalization also complicated secondary to symptomatic GI bleeding on Eliquis  Prior to presentation to the [facility] Emergency Department on 12/26/2019, the patient was in his normal state of health in the morning    He went to take a shower with assistance of his caregiver, and was noted to have left lower extremity rhythmic shaking, followed by loss of consciousness and diaphoresis lasting approximately 2-3 minutes  CT head in the emergency department demonstrated extensive chronic right ROB and MCA territory infarctions, as well as development of mild to moderate edema in the previously infarcted territories in the right frontal, parietal and temporal lobes, unfortunately consistent with acute on chronic infarction  Due to likelihood of seizure activity as cause of loss of consciousness, the patient was loaded with Keppra 1 g       In further history gathering with the patient's wife and caregiver in the room, the patient has been in his usual state of health  The patient's wife reports that he has actually been doing very well with physical therapy  No recent illness, no medication changes no head trauma, no changes in sleep or stress level  No acute events overnight  On exam today, the patient is examined with physical therapy and his wife in the room  A 12 point review systems was completed and is negative  The patient demonstrates stable neurological exam from previous as detailed below  Addendum: MRI B completed, positive for acute on chronic CVA as detailed above       ROS:  See subjective    Medications:  Scheduled Meds:  Current Facility-Administered Medications:  acetaminophen 650 mg Oral Q4H PRN Bjorn Salomon, DO   aspirin 81 mg Oral Daily Maco Ballard, DO   atorvastatin 40 mg Oral QPM Maco Ballard, DO   baclofen 10 mg Oral BID Maco Ballard, DO   co-enzyme Q-10 30 mg Oral Daily Maco Ballard, DO   cyanocobalamin 100 mcg Oral Daily Maco Ballard, DO   docusate sodium 100 mg Oral BID Maco Ballard, DO   FLUoxetine 20 mg Oral Daily Maco Ballard, DO   levETIRAcetam 750 mg Oral Q12H [facility] & alf La Gomez PA-C   metoprolol succinate 12 5 mg Oral Daily Maco Ballard, DO   ondansetron 4 mg Intravenous Q6H PRN Maco Ballard, DO   pantoprazole 40 mg Oral BID AC Maco Ballard, DO   polyethylene glycol 17 g Oral Daily Maco Ballard, DO   rivaroxaban 15 mg Oral Daily With Breakfast Maco COHEN DO Nellie   tamsulosin 0 8 mg Oral HS Maco NOEL Ballard DO     Continuous Infusions:   PRN Meds:   acetaminophen    ondansetron      Vitals: Blood pressure 111/63, pulse 57, temperature 99 1 °F (37 3 °C), temperature source Temporal, resp  rate 18, height 6' (1 829 m), weight 89 7 kg (197 lb 11 2 oz), SpO2 96 %  ,Body mass index is 26 81 kg/m²  Physical Exam:   Physical Exam   Constitutional: He is oriented to person, place, and time  He appears well-developed and well-nourished  No distress  HENT:   Head: Normocephalic and atraumatic  Right Ear: External ear normal    Left Ear: External ear normal    Mouth/Throat: Oropharynx is clear and moist  No oropharyngeal exudate  Eyes: Conjunctivae are normal  Right eye exhibits no discharge  Left eye exhibits no discharge  No scleral icterus  Neck: Normal range of motion  Neck supple  Pulmonary/Chest: Effort normal  No respiratory distress  Musculoskeletal: He exhibits no edema or deformity  Neurological: He is oriented to person, place, and time  Skin: Skin is warm and dry  No rash noted  He is not diaphoretic  No erythema  No pallor  Psychiatric: He has a normal mood and affect  His speech is normal and behavior is normal    Nursing note and vitals reviewed  Neurologic Exam     Mental Status   Oriented to person, place, and time  Follows 2 step commands  Attention: normal  Concentration: normal    Speech: speech is normal   Level of consciousness: alert  Knowledge: good  Normal comprehension  Cranial Nerves   Cranial nerves II through XII intact       With exception of left central facial droop       Motor Exam   Right upper and lower extremity strength intact  Right upper extremity spastic with no purposeful movement  Right lower extremity 3/5     Sensory Exam     Patient able to grossly feel light touch bilaterally, does have left-sided neglect     Gait, Coordination, and Reflexes     Gait  Gait: (Extremely cautious, slow gait, requires assistance and heavy use of walker)    Tremor   Resting tremor: absent      Lab, Imaging and other studies: I have personally reviewed pertinent reports  I personally reviewed pertinent imaging in PACs  Recent Results (from the past 24 hour(s))   Troponin I    Collection Time: 12/26/19  3:05 PM   Result Value Ref Range    Troponin I <0 02 <=0 04 ng/mL   Troponin I    Collection Time: 12/26/19  6:19 PM   Result Value Ref Range    Troponin I <0 02 <=0 04 ng/mL   Lipid Panel with Direct LDL reflex    Collection Time: 12/27/19  6:13 AM   Result Value Ref Range    Cholesterol 131 50 - 200 mg/dL    Triglycerides 73 <=150 mg/dL    HDL, Direct 33 (L) >=40 mg/dL    LDL Calculated 83 0 - 100 mg/dL   Basic metabolic panel    Collection Time: 12/27/19  6:13 AM   Result Value Ref Range    Sodium 141 136 - 145 mmol/L    Potassium 3 7 3 5 - 5 3 mmol/L    Chloride 107 100 - 108 mmol/L    CO2 26 21 - 32 mmol/L    ANION GAP 8 4 - 13 mmol/L    BUN 17 5 - 25 mg/dL    Creatinine 0 85 0 60 - 1 30 mg/dL    Glucose 115 65 - 140 mg/dL    Calcium 8 9 8 3 - 10 1 mg/dL    eGFR 89 ml/min/1 73sq m   Magnesium    Collection Time: 12/27/19  6:13 AM   Result Value Ref Range    Magnesium 1 7 1 6 - 2 6 mg/dL   ]    VTE Prophylaxis: Sequential compression device (Venodyne)  and Xarelto    Counseling / Coordination of Care  Total time spent today 25 minutes  Greater than 50% of total time was spent with the patient and / or family counseling and / or coordination of care  A description of the counseling / coordination of care: Jing Narayanan The pt was seen and examined by myself and the attending physician  The chart was reviewed thoroughly, including laboratory values and imaging studies  The pt was counseled in the room

## 2019-12-27 NOTE — PLAN OF CARE
Problem: PHYSICAL THERAPY ADULT  Goal: Performs mobility at highest level of function for planned discharge setting  See evaluation for individualized goals  Description  Treatment/Interventions: Functional transfer training, LE strengthening/ROM, Therapeutic exercise, Endurance training, Patient/family training, Bed mobility, Gait training, Spoke to nursing, OT, Family  Equipment Recommended: Wheelchair, Aly Trejo, Other (Comment)(quick move device for safe transfers OOB<>chair)       See flowsheet documentation for full assessment, interventions and recommendations  Note:   Prognosis: Fair  Problem List: Decreased strength, Decreased endurance, Impaired balance, Decreased mobility, Decreased coordination, Decreased cognition, Impaired judgement, Decreased safety awareness, Impaired tone  Assessment:  Pt  69 y o male w/ h/o CVA w/ residual L hemiparesis presented w/ seizure episode w/ possible acute on chronic CVA  CT head demonstrates extensive chronic right ROB and MCA territory infarctions, as well as development of mild to moderate edema in the previously infarcted territories in the right frontal, parietal and temporal lobes, consistent with acute on chronic infarction  MRI pending  Pt admitted for 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) ([facility] [address] )  Pt referred to PT for mobility assessment & D/C planning w/ orders of up & OOB as tolerated  PTA, pt require (A) w/ ADL's and & SPT to w/c  Pt has 6-8hrs of home health aide  Per wife, pt mainly w/c bound at home & is not allowed to amb at home for safety but pt is working on amb w/ [employer] at [employer]  Pt lives w/ wife in a bilevel house w/ ramped entrance  Pt has 1st floor set up  On eval, pt demonstrate dec mobility, balance, endurance & amb  Pt require maxAx2 for most functional mobility + cues for techniques  Gait deviations as above, slow & unsteady w/ difficulty advancing LLE w/ L knee hyperextension & impaired wt shifting   Pt require cues & physical assistance to advance LLE  No dizziness reported t/o session  /80  Nsg staff most recent vital signs as follows: /62 (BP Location: Right arm)   Pulse 62   Temp 97 5 °F (36 4 °C) (Tympanic)   Resp 18   Ht 6' (1 829 m)   Wt 89 7 kg (197 lb 11 2 oz)   SpO2 96%   BMI 26 81 kg/m²   At end of session, pt tolerated OOB in chair w/o issues, call bell & phone in reach  Fall precautions reinforced w/ good understanding  Pt functioning below baseline hence will continue skilled PT to improve function & safety  At this time, STR beneficial but pt's wife prefers to take pt home & continue OPPT at [facility]  CM to follow  [facility] staff to continue to mobilized pt (OOB in chair for all meals w/ use of quick move device for safety to prevent further decline in function  Ns notified  Recommendation: 24 hour supervision/assist, Outpatient PT, Home with family support, Short-term skilled PT(STR beneficial but pt's wife prefers to take pt home w/ OPPT)          See flowsheet documentation for full assessment

## 2019-12-27 NOTE — PLAN OF CARE
Problem: Potential for Falls  Goal: Patient will remain free of falls  Description  INTERVENTIONS:  - Assess patient frequently for physical needs  -  Identify cognitive and physical deficits and behaviors that affect risk of falls    -  [place] fall precautions as indicated by assessment   - Educate patient/family on patient safety including physical limitations  - Instruct patient to call for assistance with activity based on assessment  - Modify environment to reduce risk of injury  - Consider OT/PT consult to assist with strengthening/mobility  Outcome: Progressing     Problem: Prexisting or High Potential for Compromised Skin Integrity  Goal: Skin integrity is maintained or improved  Description  INTERVENTIONS:  - Identify patients at risk for skin breakdown  - Assess and monitor skin integrity  - Assess and monitor nutrition and hydration status  - Monitor labs   - Assess for incontinence   - Turn and reposition patient  - Assist with mobility/ambulation  - Relieve pressure over bony prominences  - Avoid friction and shearing  - Provide appropriate hygiene as needed including keeping skin clean and dry  - Evaluate need for skin moisturizer/barrier cream  - Collaborate with interdisciplinary team   - Patient/family teaching  - Consider wound care consult   Outcome: Progressing     Problem: PAIN - ADULT  Goal: Verbalizes/displays adequate comfort level or baseline comfort level  Description  Interventions:  - Encourage patient to monitor pain and request assistance  - Assess pain using appropriate pain scale  - Administer analgesics based on type and severity of pain and evaluate response  - Implement non-pharmacological measures as appropriate and evaluate response  - Consider cultural and social influences on pain and pain management  - Notify physician/advanced practitioner if interventions unsuccessful or patient reports new pain  Outcome: Progressing     Problem: INFECTION - ADULT  Goal: Absence or prevention of progression during hospitalization  Description  INTERVENTIONS:  - Assess and monitor for signs and symptoms of infection  - Monitor lab/diagnostic results  - Monitor all insertion sites, i e  indwelling lines, tubes, and drains  - Monitor endotracheal if appropriate and nasal secretions for changes in amount and color  - [place] appropriate cooling/warming therapies per order  - Administer medications as ordered  - Instruct and encourage patient and family to use good hand hygiene technique  - Identify and instruct in appropriate isolation precautions for identified infection/condition  Outcome: Progressing  Goal: Absence of fever/infection during neutropenic period  Description  INTERVENTIONS:  - Monitor WBC    Outcome: Progressing     Problem: SAFETY ADULT  Goal: Maintain or return to baseline ADL function  Description  INTERVENTIONS:  -  Assess patient's ability to carry out ADLs; assess patient's baseline for ADL function and identify physical deficits which impact ability to perform ADLs (bathing, care of mouth/teeth, toileting, grooming, dressing, etc )  - Assess/evaluate cause of self-care deficits   - Assess range of motion  - Assess patient's mobility; develop plan if impaired  - Assess patient's need for assistive devices and provide as appropriate  - Encourage maximum independence but intervene and supervise when necessary  - Involve family in performance of ADLs  - Assess for home care needs following discharge   - Consider OT consult to assist with ADL evaluation and planning for discharge  - Provide patient education as appropriate  Outcome: Progressing  Goal: Maintain or return mobility status to optimal level  Description  INTERVENTIONS:  - Assess patient's baseline mobility status (ambulation, transfers, stairs, etc )    - Identify cognitive and physical deficits and behaviors that affect mobility  - Identify mobility aids required to assist with transfers and/or ambulation (gait belt, sit-to-stand, lift, walker, cane, etc )  - [place] fall precautions as indicated by assessment  - Record patient progress and toleration of activity level on Mobility SBAR; progress patient to next Phase/Stage  - Instruct patient to call for assistance with activity based on assessment  - Consider rehabilitation consult to assist with strengthening/weightbearing, etc   Outcome: Progressing     Problem: DISCHARGE PLANNING  Goal: Discharge to home or other facility with appropriate resources  Description  INTERVENTIONS:  - Identify barriers to discharge w/patient and caregiver  - Arrange for needed discharge resources and transportation as appropriate  - Identify discharge learning needs (meds, wound care, etc )  - Arrange for interpretive services to assist at discharge as needed  - Refer to Case Management Department for coordinating discharge planning if the patient needs post-hospital services based on physician/advanced practitioner order or complex needs related to functional status, cognitive ability, or social support system  Outcome: Progressing     Problem: Knowledge Deficit  Goal: Patient/family/caregiver demonstrates understanding of disease process, treatment plan, medications, and discharge instructions  Description  Complete learning assessment and assess knowledge base  Interventions:  - Provide teaching at level of understanding  - Provide teaching via preferred learning methods  Outcome: Progressing     Problem: Neurological Deficit  Goal: Neurological status is stable or improving  Description  Interventions:  - Monitor and assess patient's level of consciousness, motor function, sensory function, and level of assistance needed for ADLs  - Monitor and report changes from baseline  Collaborate with interdisciplinary team to initiate plan and implement interventions as ordered  - Provide and maintain a safe environment  - Consider seizure precautions  - Consider fall precautions    - Consider aspiration precautions  - Consider bleeding precautions  Outcome: Progressing     Problem: Activity Intolerance/Impaired Mobility  Goal: Mobility/activity is maintained at optimum level for patient  Description  Interventions:  - Assess and monitor patient  barriers to mobility and need for assistive/adaptive devices  - Assess patient's emotional response to limitations  - Collaborate with interdisciplinary team and initiate plans and interventions as ordered  - Encourage independent activity per ability   - Maintain proper body alignment  - Perform active/passive rom as tolerated/ordered  - Plan activities to conserve energy   - Turn patient as appropriate  Outcome: Progressing     Problem: Communication Impairment  Goal: Ability to express needs and understand communication  Description  Assess patient's communication skills and ability to understand information  Patient will demonstrate use of effective communication techniques, alternative methods of communication and understanding even if not able to speak  - Encourage communication and provide alternate methods of communication as needed  - Collaborate with case management/ for discharge needs  - Include patient/family/caregiver in decisions related to communication  Outcome: Progressing     Problem: Potential for Aspiration  Goal: Non-ventilated patient's risk of aspiration is minimized  Description  Assess and monitor vital signs, respiratory status, and labs (WBC)  Monitor for signs of aspiration (tachypnea, cough, rales, wheezing, cyanosis, fever)  - Assess and monitor patient's ability to swallow  - Place patient up in chair to eat if possible  - HOB up at 90 degrees to eat if unable to get patient up into chair   - Supervise patient during oral intake  - Instruct patient/ family to take small bites  - Instruct patient/ family to take small single sips when taking liquids    - Follow patient-specific strategies generated by speech pathologist   Outcome: Progressing  Goal: Ventilated patient's risk of aspiration is minimized  Description  Assess and monitor vital signs, respiratory status, airway cuff pressure, and labs (WBC)  Monitor for signs of aspiration (tachypnea, cough, rales, wheezing, cyanosis, fever)  - Elevate head of bed 30 degrees if patient has tube feeding   - Monitor tube feeding  Outcome: Progressing     Problem: Nutrition  Goal: Nutrition/Hydration status is improving  Description  Monitor and assess patient's nutrition/hydration status for malnutrition (ex- brittle hair, bruises, dry skin, pale skin and conjunctiva, muscle wasting, smooth red tongue, and disorientation)  Collaborate with interdisciplinary team and initiate plan and interventions as ordered  Monitor patient's weight and dietary intake as ordered or per policy  Utilize nutrition screening tool and intervene per policy  Determine patient's food preferences and provide high-protein, high-caloric foods as appropriate  - Assist patient with eating   - Allow adequate time for meals   - Encourage patient to take dietary supplement as ordered  - Collaborate with clinical nutritionist   - Include patient/family/caregiver in decisions related to nutrition    Outcome: Progressing

## 2019-12-27 NOTE — PLAN OF CARE
Problem: OCCUPATIONAL THERAPY ADULT  Goal: Performs self-care activities at highest level of function for planned discharge setting  See evaluation for individualized goals  Description  Treatment Interventions: ADL retraining, Functional transfer training, UE strengthening/ROM, Endurance training, Cognitive reorientation, Patient/family training, Equipment evaluation/education, Compensatory technique education, Continued evaluation          See flowsheet documentation for full assessment, interventions and recommendations  Note:   Limitation: Decreased ADL status, Decreased UE ROM, Decreased UE strength, Decreased Safe judgement during ADL, Decreased cognition, Decreased endurance, Decreased fine motor control, Decreased high-level ADLs, Non-func L UE  Prognosis: Fair  Assessment: Pt is a 64y/o male admitted to the hospital after having a wittnessed seizure, +LOC; CT(brain)mild/moderate edema noted in prior infarct sites  Pt with PMH CVA(4/19) with L hemiplegia and A-fib  PTA wife states pt had assistance with his ADLs, transfers, ambulation(at therapy--203ft), neg falls, HHA 6-8hrs/daily, neg home alone  During initial eval, pt demonstrated deficits with his functional balance, functional mobility, ADL status, L UE strength/functional use, transfer safety, L UE ROM/functional use, and cognition(i e direction-following, problem-solving)  Pt would benefit from continued OT tx for the above deficits  3-5xwk/1-2wks  OT Discharge Recommendation: (continue outpt therapies at [facility])  OT - OK to Discharge:  Yes

## 2019-12-27 NOTE — OCCUPATIONAL THERAPY NOTE
OccupationalTherapy Evaluation(time=6108-6633)     Patient Name: Erica Macario  SSFWH'E Date: 12/27/2019  Problem List  Principal Problem:    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) [facility])  Active Problems:    Essential hypertension    Prediabetes    CVA (cerebral vascular accident) ([facility] [address] )    Left hemiplegia (HCC)    Paroxysmal atrial fibrillation (HCC)    Chronic diastolic CHF (congestive heart failure) ([facility] )    Past Medical History  Past Medical History:   Diagnosis Date    Arthritis     BL knees    Atrial fibrillation ([facility] )     CHF (congestive heart failure) (HCC)     Colon polyp     CVA (cerebral vascular accident) ([facility] ) 4/27/2019    NIHSS 26 on presentation    Depression     Diabetes mellitus ([facility] )     Hypertension     Irregular heart beat     Afib    Stroke [facility])     Urinary retention      Past Surgical History  Past Surgical History:   Procedure Laterality Date    COLONOSCOPY      IR STROKE ALERT  4/27/2019    MENISCECTOMY Right         12/27/19 1025   Note Type   Note type Eval only   Restrictions/Precautions   Weight Bearing Precautions Per Order No   Braces or Orthoses LE Braces  (wife states pt with L LE brace)   Other Precautions Fall Risk;Cognitive   Pain Assessment   Pain Assessment No/denies pain   Home Living   Type of Home House   Home Layout Multi-level; Able to live on main level with bedroom/bathroom  (bilevel;plans to move [place] a ranch this wkend)   Bathroom Shower/Tub Walk-in shower   [address] [facility]; [address]   Prior Function   Lives With Spouse   ADL Assistance Needs assistance   Falls in the last 6 months 0   Lifestyle   Autonomy PTA wife states pt had assistance with his ADLs, transfers, ambulation(at therapy--203ft), neg falls, HHA 6-8hrs/daily, neg home alone   Reciprocal Relationships supportive wife, dtr   Service to 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   Psychosocial Psychosocial (WDL) X   Patient Behaviors/Mood Flat affect; Cooperative   Subjective   Subjective "I'm at [address] "   ADL   Where Assessed Edge of bed   Eating Assistance 5  Supervision/Setup   Grooming Assistance 4  Minimal Assistance   UB Bathing Assistance 3  Moderate Assistance   LB Bathing Assistance 2  Maximal [address] 3  Moderate Assistance   LB Dressing Assistance 2  Maximal Assistance   Bed Mobility   Supine to Sit 2  Maximal assistance   Additional items Assist x 2; Increased time required;Verbal cues;LE management   Transfers   Sit to Stand 2  Maximal assistance   Additional items Assist x 2; Increased time required;Verbal cues   Stand to Sit 2  Maximal assistance   Additional items Assist x 2; Increased time required;Verbal cues   Functional Mobility   Functional Mobility 2  Maximal assistance  (recommend "quick-move" for OOB with nsg)   Additional Comments x2   Additional items Rolling walker   Balance   Static Sitting Fair   Dynamic Sitting Fair -   Static Standing Poor +   Dynamic Standing Poor   Activity Tolerance   Activity Tolerance Patient limited by fatigue   Medical Staff Made Aware nsg, P T     RUE Assessment   RUE Assessment WFL   RUE Strength   RUE Overall Strength Within Functional Limits - able to perform ADL tasks with strength  (4/5 throughout)   LUE Assessment   LUE Assessment X  (poor P/AROM noted throughout)   LUE Strength   LUE Overall Strength   (shr=0/5, elbow=1/5, hand/wrist/forearm=0/5)   Hand Function   Gross Motor Coordination   (R=intact, L=impaired)   Fine Motor Coordination   (R=intact, L=impaired)   Sensation   Light Touch No apparent deficits   Proprioception   Proprioception No apparent deficits   Vision-Basic Assessment   Current Vision   (glasses)   Vision - Complex Assessment   Acuity Able to read clock/calendar on wall without difficulty   Perception   Inattention/Neglect Cues to attend to left side of body   Cognition   Overall Cognitive Status Impaired   Arousal/Participation Alert   Attention Attends with cues to redirect   Orientation Level Oriented X4   Memory Decreased short term memory;Decreased recall of precautions   Following Commands Follows one step commands with increased time or repetition   Assessment   Limitation Decreased ADL status; Decreased UE ROM; Decreased UE strength;Decreased Safe judgement during ADL;Decreased cognition;Decreased endurance;Decreased fine motor control;Decreased high-level ADLs; Non-func L UE   Prognosis Fair   Assessment Pt is a 64y/o male admitted to the hospital after having a wittnessed seizure, +LOC; CT(brain)mild/moderate edema noted in prior infarct sites  Pt with PMH CVA(4/19) with L hemiplegia and A-fib  PTA wife states pt had assistance with his ADLs, transfers, ambulation(at therapy--203ft), neg falls, HHA 6-8hrs/daily, neg home alone  During initial eval, pt demonstrated deficits with his functional balance, functional mobility, ADL status, L UE strength/functional use, transfer safety, L UE ROM/functional use, and cognition(i e direction-following, problem-solving)  Pt would benefit from continued OT tx for the above deficits  3-5xwk/1-2wks  Goals   Patient Goals "to go home"   STG Time Frame   (1-7 days)   Short Term Goal #1 Pt will demonstrate improved activity tolerance to good(20-30mins) and standing tolerance to 3-5mins to assist with ADLs  Short Term Goal #2 Pt will demonstrate Dedrick with his bed mobility and supervision with his sit-stand transfers to assist with ADLs  Short Term Goal  Pt will demonstrate proper walker/transfer safety 100% of the time  LTG Time Frame   (7-14days)   Long Term Goal #1 Pt will demonstrate improved functional balance by 1 grade to assist with ADLs/transfers  Long Term Goal #2 Pt will demonstrate Dedrick with his UE and mod A with his LE bathing/dressing  Long Term Goal Pt will demonstrate supervision with his L UE % of the time      Plan Treatment Interventions ADL retraining;Functional transfer training;UE strengthening/ROM; Endurance training;Cognitive reorientation;Patient/family training;Equipment evaluation/education; Compensatory technique education;Continued evaluation   Goal Expiration Date 01/10/20   OT Treatment Day 0   OT Frequency 3-5x/wk   Recommendation   OT Discharge Recommendation   (continue outpt therapies at [facility])   OT - OK to Discharge Yes   Barthel Index   Feeding 5   Bathing 0   Grooming Score 0   Dressing Score 5   Bladder Score 5   Bowels Score 10   Toilet Use Score 5   Transfers (Bed/Chair) Score 5   Mobility (Level Surface) Score 0   Stairs Score 0   Barthel Index Score 35   Modified Rogers Scale   Modified Rogers Scale 4   Carrillo Drew, OT

## 2019-12-28 LAB
ANION GAP SERPL CALCULATED.3IONS-SCNC: 5 MMOL/L (ref 4–13)
BUN SERPL-MCNC: 20 MG/DL (ref 5–25)
CALCIUM SERPL-MCNC: 8.9 MG/DL (ref 8.3–10.1)
CHLORIDE SERPL-SCNC: 107 MMOL/L (ref 100–108)
CO2 SERPL-SCNC: 28 MMOL/L (ref 21–32)
CREAT SERPL-MCNC: 0.95 MG/DL (ref 0.6–1.3)
ERYTHROCYTE [DISTWIDTH] IN BLOOD BY AUTOMATED COUNT: 14.3 % (ref 11.6–15.1)
GFR SERPL CREATININE-BSD FRML MDRD: 81 ML/MIN/1.73SQ M
GLUCOSE SERPL-MCNC: 127 MG/DL (ref 65–140)
HCT VFR BLD AUTO: 37.9 % (ref 36.5–49.3)
HGB BLD-MCNC: 12.2 G/DL (ref 12–17)
MCH RBC QN AUTO: 30 PG (ref 26.8–34.3)
MCHC RBC AUTO-ENTMCNC: 32.2 G/DL (ref 31.4–37.4)
MCV RBC AUTO: 93 FL (ref 82–98)
PLATELET # BLD AUTO: 143 THOUSANDS/UL (ref 149–390)
PMV BLD AUTO: 9.2 FL (ref 8.9–12.7)
POTASSIUM SERPL-SCNC: 3.7 MMOL/L (ref 3.5–5.3)
RBC # BLD AUTO: 4.06 MILLION/UL (ref 3.88–5.62)
SODIUM SERPL-SCNC: 140 MMOL/L (ref 136–145)
WBC # BLD AUTO: 3.92 THOUSAND/UL (ref 4.31–10.16)

## 2019-12-28 PROCEDURE — 85027 COMPLETE CBC AUTOMATED: CPT | Performed by: INTERNAL MEDICINE

## 2019-12-28 PROCEDURE — 99232 SBSQ HOSP IP/OBS MODERATE 35: CPT | Performed by: INTERNAL MEDICINE

## 2019-12-28 PROCEDURE — 80048 BASIC METABOLIC PNL TOTAL CA: CPT | Performed by: INTERNAL MEDICINE

## 2019-12-28 PROCEDURE — 99232 SBSQ HOSP IP/OBS MODERATE 35: CPT | Performed by: PSYCHIATRY & NEUROLOGY

## 2019-12-28 RX ORDER — MINERAL OIL AND PETROLATUM 150; 830 MG/G; MG/G
OINTMENT OPHTHALMIC
Status: DISCONTINUED | OUTPATIENT
Start: 2019-12-28 | End: 2019-12-29 | Stop reason: HOSPADM

## 2019-12-28 RX ORDER — ECHINACEA PURPUREA EXTRACT 125 MG
1 TABLET ORAL
Status: DISCONTINUED | OUTPATIENT
Start: 2019-12-28 | End: 2019-12-29 | Stop reason: HOSPADM

## 2019-12-28 RX ORDER — POLYVINYL ALCOHOL 14 MG/ML
1 SOLUTION/ DROPS OPHTHALMIC
Status: DISCONTINUED | OUTPATIENT
Start: 2019-12-28 | End: 2019-12-28 | Stop reason: CLARIF

## 2019-12-28 RX ADMIN — PANTOPRAZOLE SODIUM 40 MG: 40 TABLET, DELAYED RELEASE ORAL at 17:12

## 2019-12-28 RX ADMIN — Medication 30 MG: at 09:03

## 2019-12-28 RX ADMIN — WHITE PETROLATUM 57.7 %-MINERAL OIL 31.9 % EYE OINTMENT: at 22:14

## 2019-12-28 RX ADMIN — PANTOPRAZOLE SODIUM 40 MG: 40 TABLET, DELAYED RELEASE ORAL at 09:07

## 2019-12-28 RX ADMIN — TAMSULOSIN HYDROCHLORIDE 0.8 MG: 0.4 CAPSULE ORAL at 21:59

## 2019-12-28 RX ADMIN — Medication 100 MCG: at 09:02

## 2019-12-28 RX ADMIN — ASPIRIN 81 MG 81 MG: 81 TABLET ORAL at 09:02

## 2019-12-28 RX ADMIN — FLUOXETINE 20 MG: 20 CAPSULE ORAL at 09:02

## 2019-12-28 RX ADMIN — LEVETIRACETAM 750 MG: 750 TABLET, FILM COATED ORAL at 09:02

## 2019-12-28 RX ADMIN — LEVETIRACETAM 750 MG: 750 TABLET, FILM COATED ORAL at 21:59

## 2019-12-28 RX ADMIN — RIVAROXABAN 15 MG: 15 TABLET, FILM COATED ORAL at 09:02

## 2019-12-28 RX ADMIN — BACLOFEN 10 MG: 10 TABLET ORAL at 09:02

## 2019-12-28 RX ADMIN — ATORVASTATIN CALCIUM 40 MG: 40 TABLET, FILM COATED ORAL at 17:12

## 2019-12-28 RX ADMIN — METOPROLOL SUCCINATE 12.5 MG: 25 TABLET, EXTENDED RELEASE ORAL at 17:12

## 2019-12-28 RX ADMIN — BACLOFEN 10 MG: 10 TABLET ORAL at 17:12

## 2019-12-28 RX ADMIN — DEXTRAN 70 AND HYPROMELLOSE 2910 1 DROP: 1; 3 SOLUTION/ DROPS OPHTHALMIC at 22:03

## 2019-12-28 NOTE — ASSESSMENT & PLAN NOTE
History of previous R hemispheric stroke with left sided weakness  -    Now with evidence of new acute  stroke on diffusion-weighted images of the MRI  Physical medicine and rehabilitation consultations reviewed  CT imaging does demonstrate mild-to-moderate cerebral edema,Has extensive chronic right ROB and MCA territory infarcts  CTA reviewed    This is evidence of acute on chronic in for occasion, with old stroke present with new stroke on top of previous stroke in the right hemispheric region      Await final neurologic recommendations

## 2019-12-28 NOTE — ASSESSMENT & PLAN NOTE
Loaded with Keppra - CT with concern for acute on chronic stroke on imaging  CTA with no acute flow limiting stenosis    - Plan to monitor for seizure activity, no new medication changes - Did have recent Botox injection Dec 19th   No tounge biting     MRI Brain with and without - with evidence of new stroke, likely post stroke seizure  Serial Cardiac enzymes negative for myocardial ischemia    Rehab evals and PMR recommendations noted  Remains on [place] falls

## 2019-12-29 VITALS
WEIGHT: 197.7 LBS | HEART RATE: 55 BPM | DIASTOLIC BLOOD PRESSURE: 56 MMHG | TEMPERATURE: 97.8 F | HEIGHT: 72 IN | OXYGEN SATURATION: 95 % | SYSTOLIC BLOOD PRESSURE: 112 MMHG | BODY MASS INDEX: 26.78 KG/M2 | RESPIRATION RATE: 18 BRPM

## 2019-12-29 PROCEDURE — 99239 HOSP IP/OBS DSCHRG MGMT >30: CPT | Performed by: INTERNAL MEDICINE

## 2019-12-29 RX ORDER — LEVETIRACETAM 750 MG/1
750 TABLET ORAL EVERY 12 HOURS SCHEDULED
Qty: 180 EACH | Refills: 0 | Status: SHIPPED | OUTPATIENT
Start: 2019-12-29 | End: 2019-12-29

## 2019-12-29 RX ORDER — FLUOXETINE HYDROCHLORIDE 20 MG/1
20 CAPSULE ORAL DAILY
Qty: 30 CAPSULE | Refills: 0 | Status: SHIPPED | OUTPATIENT
Start: 2019-12-30 | End: 2021-04-30

## 2019-12-29 RX ORDER — PRAVASTATIN SODIUM 20 MG
20 TABLET ORAL DAILY
Qty: 30 TABLET | Refills: 0 | Status: SHIPPED | OUTPATIENT
Start: 2019-12-29

## 2019-12-29 RX ORDER — BACLOFEN 10 MG/1
10 TABLET ORAL 2 TIMES DAILY
Qty: 30 TABLET | Refills: 0 | Status: SHIPPED | OUTPATIENT
Start: 2019-12-29 | End: 2020-01-28

## 2019-12-29 RX ORDER — ATORVASTATIN CALCIUM 40 MG/1
40 TABLET, FILM COATED ORAL EVERY EVENING
Qty: 30 TABLET | Refills: 0 | Status: SHIPPED | OUTPATIENT
Start: 2019-12-29 | End: 2019-12-29 | Stop reason: HOSPADM

## 2019-12-29 RX ORDER — METOPROLOL SUCCINATE 25 MG/1
12.5 TABLET, EXTENDED RELEASE ORAL DAILY
Qty: 30 TABLET | Refills: 0 | Status: SHIPPED | OUTPATIENT
Start: 2019-12-29 | End: 2020-01-31 | Stop reason: HOSPADM

## 2019-12-29 RX ORDER — LEVETIRACETAM 750 MG/1
750 TABLET ORAL EVERY 12 HOURS SCHEDULED
Qty: 60 TABLET | Refills: 0 | Status: SHIPPED | OUTPATIENT
Start: 2019-12-29 | End: 2019-12-31

## 2019-12-29 RX ORDER — ASPIRIN 81 MG/1
81 TABLET, CHEWABLE ORAL DAILY
Qty: 90 TABLET | Refills: 0 | Status: SHIPPED | OUTPATIENT
Start: 2019-12-30 | End: 2019-12-29

## 2019-12-29 RX ORDER — ATORVASTATIN CALCIUM 40 MG/1
40 TABLET, FILM COATED ORAL EVERY EVENING
Qty: 30 TABLET | Refills: 0 | Status: SHIPPED | OUTPATIENT
Start: 2019-12-29 | End: 2019-12-29

## 2019-12-29 RX ORDER — ASPIRIN 81 MG/1
81 TABLET, CHEWABLE ORAL DAILY
Qty: 90 TABLET | Refills: 0 | Status: SHIPPED | OUTPATIENT
Start: 2019-12-30 | End: 2020-01-03 | Stop reason: ALTCHOICE

## 2019-12-29 RX ADMIN — BACLOFEN 10 MG: 10 TABLET ORAL at 08:29

## 2019-12-29 RX ADMIN — LEVETIRACETAM 750 MG: 750 TABLET, FILM COATED ORAL at 08:45

## 2019-12-29 RX ADMIN — FLUOXETINE 20 MG: 20 CAPSULE ORAL at 08:29

## 2019-12-29 RX ADMIN — DOCUSATE SODIUM 100 MG: 100 CAPSULE, LIQUID FILLED ORAL at 08:31

## 2019-12-29 RX ADMIN — RIVAROXABAN 15 MG: 15 TABLET, FILM COATED ORAL at 08:29

## 2019-12-29 RX ADMIN — Medication 100 MCG: at 08:29

## 2019-12-29 RX ADMIN — ASPIRIN 81 MG 81 MG: 81 TABLET ORAL at 08:29

## 2019-12-29 RX ADMIN — Medication 30 MG: at 08:29

## 2019-12-29 RX ADMIN — PANTOPRAZOLE SODIUM 40 MG: 40 TABLET, DELAYED RELEASE ORAL at 06:24

## 2019-12-29 NOTE — DISCHARGE INSTR - AVS FIRST PAGE
Mr  Obi Perez,    Your treated for seizure disorder on the 4th floor by Dr Kendy Samuel as well as the [facility] neurology service  You have been started on a medication called Keppra which you will take twice a day  Please continue her physical therapy and occupational therapy, and follow up with Neurology in 2-4 weeks to discuss any additional medication changes  Thank you for choosing [facility] for your healthcare needs  If I can be of any assistance in the future, please feel to contact me  Have a healthy and safe New Year!     Dr Kendy Samuel

## 2019-12-29 NOTE — DISCHARGE INSTRUCTIONS
Recurrent Seizures in Adults   WHAT YOU NEED TO KNOW:   A seizure is an episode of abnormal brain activity  A seizure can cause jerky muscle movements, loss of consciousness, or confusion  Recurrent means you have a seizure more than once  The cause of your seizures may not be known  Recurrent seizures may occur if you do not take antiseizure medicine as directed  Some common triggers are alcohol, drugs, lack of sleep, fever, or a virus  High or low blood sugar levels can also trigger a seizure  DISCHARGE INSTRUCTIONS:   Call 911 or have someone else call for any of the following:   · Your seizure lasts longer than 5 minutes  · You have a second seizure within 24 hours of your first     · You have trouble breathing after a seizure  · You cannot be woken after your seizure  · You have more than 1 seizure before you are fully awake or aware  · You have diabetes or are pregnant and have a seizure  · You have a seizure in water  Return to the emergency department if:   · You are injured during a seizure  Contact your healthcare provider if:   · You have a fever  · You are planning to get pregnant or are currently pregnant  · You have questions or concerns about your condition or care  Medicine:   · Antiepileptic  medicine may be given to control or prevent seizures  Do not  stop taking this medicine without the direction of a healthcare provider  · Take your medicine as directed  Contact your healthcare provider if you think your medicine is not helping or if you have side effects  Tell him of her if you are allergic to any medicine  Keep a list of the medicines, vitamins, and herbs you take  Include the amounts, and when and why you take them  Bring the list or the pill bottles to follow-up visits  Carry your medicine list with you in case of an emergency  Prevent another seizure:   · Take your antiseizure medicine every day at the same time  This will also help reduce side effects   Do not skip any doses  Do not stop taking this medicine unless directed by a healthcare provider  · Manage stress  Stress can trigger a seizure  Exercise can help you reduce stress  Talk to your healthcare provider about exercise that is safe for you  Other ways to manage stress include yoga, meditation, and biofeedback  Illness can be a form of stress  Eat a variety of healthy foods and drink plenty of liquids during an illness  · Set a regular sleep schedule  A lack of sleep can trigger a seizure  Try to go to sleep and wake up at the same times every day  Keep your bedroom quiet and dark  Talk to your healthcare provider if you are having trouble sleeping  · Manage other medical conditions  Manage other health conditions that may increase your risk for a seizure  Keep your blood sugar levels and blood pressure under control  · Limit or do not drink alcohol as directed  Alcohol can trigger a seizure, especially if you drink a large amount at one time  A drink of alcohol is 12 ounces of beer, 1½ ounces of liquor, or 5 ounces of wine  Talk to your healthcare provider about a safe amount of alcohol for you  Your provider may recommend that you do not drink any alcohol  Tell him or her if you need help to quit drinking  Manage recurrent seizures:   · Ask what safety precautions you should take  Talk with your healthcare provider about driving  You may not be able to drive until you are seizure-free for a period of time  You will need to check the law where you live  Also talk to your healthcare provider about swimming and bathing  You may drown or develop life-threatening heart or lung damage if you have a seizure in water  · Tell your friends, family members, and coworkers that you had a seizure  Give them the following instructions to use if you have another seizure:     ¨ Do not panic  ¨ Gently guide me to the floor or a soft surface             ¨ Do not hold me down or put anything in my mouth     ¨ Place me on my side to help prevent me from swallowing saliva or vomit  ¨ Protect me from injury  Remove sharp or hard objects from the area surrounding me, or cushion my head  ¨ Loosen the clothing around my head and neck  ¨ Time how long my seizure lasts  Call 911 if my seizure lasts longer than 5 minutes or if I have a second seizure  ¨ Stay with me until my seizure ends  Let me rest until I am fully awake  ¨ Perform CPR if I stop breathing or you cannot feel my pulse  ¨ Do not give me anything to eat or drink until I am fully awake  Follow up with your healthcare provider or neurologist as directed: You may need more tests to find the cause of your seizure  You may also need tests to check the level of antiseizure medicine in your blood  Your neurologist may need to change or adjust your medicine  Write down your questions so you remember to ask them during your visits  © 2017 [address] [employer] Information is for End User's use only and may not be sold, redistributed or otherwise used for commercial purposes  All illustrations and images included in CareNotes® are the copyrighted property of [employer]  or [employer]  The above information is an  only  It is not intended as medical advice for individual conditions or treatments  Talk to your doctor, nurse or pharmacist before following any medical regimen to see if it is safe and effective for you

## 2019-12-29 NOTE — PROGRESS NOTES
PATIENT NAME: April Marroquin,  YOB: 1950  MEDICAL RECORD NUMBER: [id number]  Neurology Follow up Note     Following patient for: acute on chronic stroke/seizure     Overnight Events: None    Subjective: Patient feels at baseline currently  12 point ROS negative for any changes  Scheduled Meds:  Current Facility-Administered Medications:  acetaminophen 650 mg Oral Q4H PRN Cyrilla Blew, DO   artificial tear  Both Eyes HS Shaun D Gill, DO   aspirin 81 mg Oral Daily Shaun D Gill, DO   atorvastatin 40 mg Oral QPM Shaun D Gill, DO   baclofen 10 mg Oral BID Shaun D Gill, DO   co-enzyme Q-10 30 mg Oral Daily Shaun D Gill, DO   cyanocobalamin 100 mcg Oral Daily Shaun D Gill, DO   dextran 70-hypromellose 1 drop Both Eyes Q3H PRN Shaun D Gill, DO   docusate sodium 100 mg Oral BID PRN Cyrilla Blew, DO   FLUoxetine 20 mg Oral Daily Shaun D Gill, DO   levETIRAcetam 750 mg Oral Q12H [address] [place] PA-C   metoprolol succinate 12 5 mg Oral Daily Shaun D Gill, DO   ondansetron 4 mg Intravenous Q6H PRN Maco NOEL Wilfred Book, DO   pantoprazole 40 mg Oral BID AC Shaun D Gill, DO   polyethylene glycol 17 g Oral Daily PRN Cyrilla Blew, DO   rivaroxaban 15 mg Oral Daily With Breakfast Shaun D Gill, DO   sodium chloride 1 spray Each Nare Q1H PRN Cyrilla Blew, DO   tamsulosin 0 8 mg Oral HS Shaun D Gill, DO     Continuous Infusions:   PRN Meds:   acetaminophen    dextran 70-hypromellose    docusate sodium    ondansetron    polyethylene glycol    sodium chloride      Objective  /64 (BP Location: Right arm)   Pulse 63   Temp (!) 97 °F (36 1 °C) (Tympanic)   Resp 18   Ht 6' (1 829 m)   Wt 89 7 kg (197 lb 11 2 oz)   SpO2 96%   BMI 26 81 kg/m²   GEN: Comfortable, NAD  HEENT: NC/AT  Anicteric  PPC  MMM   CVS: RRR  S1S2  No M/R/G    LUNGS: B/L entry, no wheezes, rhonchi or rales  EXT: B/L pulses, no edema  Mental Status: The patient was awake, alert, attentive, oriented to person, place, and time  Recent Results (from the past 24 hour(s))   Basic metabolic panel    Collection Time: 12/28/19  5:17 AM   Result Value Ref Range    Sodium 140 136 - 145 mmol/L    Potassium 3 7 3 5 - 5 3 mmol/L    Chloride 107 100 - 108 mmol/L    CO2 28 21 - 32 mmol/L    ANION GAP 5 4 - 13 mmol/L    BUN 20 5 - 25 mg/dL    Creatinine 0 95 0 60 - 1 30 mg/dL    Glucose 127 65 - 140 mg/dL    Calcium 8 9 8 3 - 10 1 mg/dL    eGFR 81 ml/min/1 73sq m   CBC    Collection Time: 12/28/19  5:17 AM   Result Value Ref Range    WBC 3 92 (L) 4 31 - 10 16 Thousand/uL    RBC 4 06 3 88 - 5 62 Million/uL    Hemoglobin 12 2 12 0 - 17 0 g/dL    Hematocrit 37 9 36 5 - 49 3 %    MCV 93 82 - 98 fL    MCH 30 0 26 8 - 34 3 pg    MCHC 32 2 31 4 - 37 4 g/dL    RDW 14 3 11 6 - 15 1 %    Platelets 538 (L) 375 - 390 Thousands/uL    MPV 9 2 8 9 - 12 7 fL         A/P  71 y o  Male with new onset seizure in setting of previous stroke with possibly new stroke in same distribtuion  Agree with anticoagulation on xarelto  D/C aspirin  F/u with Dr Sherryll Essex as outpatient  MRI reviewed - certainyl possibly he had acute on subactue stroke, however may also just be MRI changes related to seizure  Regardless, needs stroke prophylaxis and certainly keppra to be continued  No further inpatient neurological workup, please call with questions

## 2019-12-29 NOTE — DISCHARGE SUMMARY
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, and will need to be determined by patient's Neurologist if this was an actual NEW stroke  Chronic diastolic CHF (congestive heart failure) (HCC)  Assessment & Plan  Wt Readings from Last 3 Encounters:   12/26/19 89 7 kg (197 lb 11 2 oz)   11/18/19 94 8 kg (209 lb)   09/06/19 96 6 kg (213 lb)         No evidence of exacerbation - monitor volume status    Paroxysmal atrial fibrillation (HCC)  Assessment & Plan  Ventricular rate controlled -  Afib on Loop recorderd - Continue Xarelto per Neurology recommendations    Left hemiplegia [facility])  Assessment & Plan  Continue PT/OT while here, resumed baclofen    CVA (cerebral vascular accident) [facility])  Assessment & Plan  History of previous R hemispheric stroke with left sided weakness  -    Now with evidence of new acute  stroke on diffusion-weighted images of the MRI  Physical medicine and rehabilitation consultations reviewed  CT imaging does demonstrate mild-to-moderate cerebral edema,Has extensive chronic right ROB and MCA territory infarcts  CTA reviewed    This is evidence of acute on chronic in for occasion, with old stroke present with new stroke on top of previous stroke in the right hemispheric region      Outpatient physical therapy and occupational therapy consultation    Prediabetes  Assessment & Plan  A1c within normal parameters    Essential hypertension  Assessment & Plan  Resume home medications    * 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) [facility])  Assessment & Plan  Loaded with Keppra - CT with concern for acute on chronic stroke on imaging  Continue Keppra and follow up with Neurology as an outpatient  CTA with no acute flow limiting stenosis    - Plan to monitor for seizure activity, no new medication changes - Did have recent Botox injection Dec 19th  No tounge biting     MRI Brain with and without - with evidence of new stroke, likely post stroke seizure  Serial Cardiac enzymes negative for myocardial ischemia    Rehab evals and PMR recommendations noted  Remains on [place]      Admitting Provider:  Dina Mahoney DO  Discharge Provider:  Cathy Oh DO  Admission Date: 12/26/2019       Discharge Date: 12/29/19   LOS: 3  Primary Care Physician at Discharge: Helaine Siemens, [place]:  Maximino Nelson is a 71 y o  male who presented seizure episode at home which was manifested by left arm shaking  The patient has a history of previous stroke  The patient was loaded with Keppra in the emergency room and placed on oral Keppra during his hospitalization  The patient did have repeat MRI and CTA performed of the brain, it was felt that his symptoms likely represented post stroke epilepsy and or acute on chronic stroke  The patient was evaluated in consultation by the neurology service and it was recommended that he continue his Xarelto as well as continue physical rehabilitation  The patient was felt to be at his neurologic baseline, and evaluated in consultation by the physical medicine and rehabilitation services which felt that he would be appropriate for outpatient  rehabilitation  Patient did improve, he was found on his implantable loop recorder to have episodes of atrial fibrillation, however he did remain in normal sinus rhythm during his hospitalization on telemetry monitoring   At the time of discharge the patient was tolerating oral diet he was without any acute complaints and subsequently met criteria for discharge  Patient will follow up with Neurology in approximately 2-4 weeks  The patient's aspirin was discontinued at the time of discharge, and his wife was contacted on 12/29/2019 at 5:39 p m  to inform her of the change  At the time of discharge the patient was tolerating oral diet, he was of any acute complaints and subsequently released in stable condition  All questions were answered to the patient and family's satisfaction  Thank you for choosing [facility] for your healthcare needs  If I can be of any assistance in the future, please feel to contact me  DISCHARGE DIAGNOSES  Chronic diastolic CHF (congestive heart failure) (HCC)  Assessment & Plan  Wt Readings from Last 3 Encounters:   12/26/19 89 7 kg (197 lb 11 2 oz)   11/18/19 94 8 kg (209 lb)   09/06/19 96 6 kg (213 lb)         No evidence of exacerbation - monitor volume status    Paroxysmal atrial fibrillation (HCC)  Assessment & Plan  Ventricular rate controlled -  Afib on Loop recorderd - Continue Xarelto per Neurology recommendations    Left hemiplegia [facility])  Assessment & Plan  Continue PT/OT while here, resumed baclofen    CVA (cerebral vascular accident) [facility])  Assessment & Plan  History of previous R hemispheric stroke with left sided weakness  -    Now with evidence of new acute  stroke on diffusion-weighted images of the MRI  Physical medicine and rehabilitation consultations reviewed  CT imaging does demonstrate mild-to-moderate cerebral edema,Has extensive chronic right ROB and MCA territory infarcts  CTA reviewed    This is evidence of acute on chronic in for occasion, with old stroke present with new stroke on top of previous stroke in the right hemispheric region      Outpatient physical therapy and occupational therapy consultation    Prediabetes  Assessment & Plan  A1c within normal parameters    Essential hypertension  Assessment & Plan  Resume home medications    * 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)  Assessment & Plan  Loaded with Keppra - CT with concern for acute on chronic stroke on imaging  Continue Keppra and follow up with Neurology as an outpatient  CTA with no acute flow limiting stenosis    - Plan to monitor for seizure activity, no new medication changes - Did have recent Botox injection Dec 19th  No tounge biting     MRI Brain with and without - with evidence of new stroke, likely post stroke seizure  Serial Cardiac enzymes negative for myocardial ischemia    Rehab evals and PMR recommendations noted  Remains on Xarleto      CONSULTING PROVIDERS   IP CONSULT TO NEUROLOGY  IP CONSULT TO PHYSICAL MEDICINE REHAB  IP CONSULT TO NEUROLOGY  IP CONSULT TO CASE MANAGEMENT  IP CONSULT TO NUTRITION SERVICES    PROCEDURES PERFORMED  * No surgery found *    RADIOLOGY RESULTS  Cta Head And Neck W Wo Contrast    Result Date: 12/27/2019  Narrative: CTA NECK AND BRAIN WITH AND WITHOUT CONTRAST INDICATION: Neuro deficit, acute, stroke suspected new onset seizure  COMPARISON:   4/27/2019 TECHNIQUE:  Routine CT imaging of the Brain without contrast   Post contrast imaging was performed after administration of iodinated contrast through the neck and brain  Post contrast axial 0 625 mm images timed to opacify the arterial system  3D rendering was performed on an independent workstation  MIP reconstructions performed  Coronal reconstructions were performed of the noncontrast portion of the brain  Radiation dose length product (DLP) for this visit:  1523 mGy-cm   This examination, like all CT scans performed in the [facility], was performed utilizing techniques to minimize radiation dose exposure, including the use of iterative reconstruction and automated exposure control     IV Contrast:  85 mL of iohexol (OMNIPAQUE)  IMAGE QUALITY:   Diagnostic FINDINGS: NONCONTRAST BRAIN PARENCHYMA:  Large amount of cystic encephalomalacia and gliosis in the right anterior cerebral and middle cerebral artery vascular territories indicative of chronic infarction  Additionally more hypodense region in the right frontal opercular region correlating to diffusion abnormality indicative of recent /acute on chronic infarction  No acute intracranial hemorrhage  No mass effect  Hypodensity and volume loss in the right hemipons and midbrain likely reflective of Wallerian degeneration  VENTRICLES AND EXTRA-AXIAL SPACES:  Mild ex vacuo dilatation right lateral ventricle  VISUALIZED ORBITS AND PARANASAL SINUSES:  Scattered moderate paranasal sinus opacification  Bilateral nasal cavity opacity  CERVICAL VASCULATURE AORTIC ARCH AND GREAT VESSELS:  Prominence of the ascending aorta measuring 3 7 cm maximal transverse dimension  No aneurysm or significant stenosis  RIGHT VERTEBRAL ARTERY CERVICAL SEGMENT:  Normal origin  The vessel is normal in caliber throughout the neck  LEFT VERTEBRAL ARTERY CERVICAL SEGMENT:  Normal origin  The vessel is normal in caliber throughout the neck  RIGHT EXTRACRANIAL CAROTID SEGMENT:  Mild atherosclerotic disease of the distal common carotid artery and proximal cervical internal carotid artery without significant stenosis compared to the more distal ICA  LEFT EXTRACRANIAL CAROTID SEGMENT:  Mild atherosclerotic disease of the distal common carotid artery and proximal cervical internal carotid artery without significant stenosis compared to the more distal ICA  NASCET criteria was used to determine the degree of internal carotid artery diameter stenosis  INTRACRANIAL VASCULATURE INTERNAL CAROTID ARTERIES: Atherosclerotic calcifications of the cavernous segment of the internal carotid artery are mild     Normal ophthalmic artery origins  Normal ICA terminus  ANTERIOR CIRCULATION:  Symmetric A1 segments and anterior cerebral arteries with normal enhancement  Normal anterior communicating artery   MIDDLE CEREBRAL ARTERY CIRCULATION:  Left middle cerebral artery is normal   The M1 segment of the right middle cerebral artery is normal   Distal M2 and M3 segments the right middle cerebral artery significantly diminished in number, subtending the regions of infarction  The appearance is stable from previous studies  DISTAL VERTEBRAL ARTERIES:  Normal distal vertebral arteries  Posterior inferior cerebellar artery origins are normal  Normal vertebral basilar junction  BASILAR ARTERY:  Basilar artery is normal in caliber  Normal superior cerebellar arteries  POSTERIOR CEREBRAL ARTERIES: Both posterior cerebral arteries arises from the basilar tip  Both arteries demonstrate normal enhancement  Normal posterior communicating arteries  DURAL VENOUS SINUSES:  Normal  NON VASCULAR ANATOMY BONY STRUCTURES:  Scattered sinus opacities in the paranasal sinuses  Mild to moderate spondylotic changes noted SOFT TISSUES OF THE NECK:  Recorder in the left anterior chest wall noted  THORACIC INLET:  Unremarkable  Impression: 1  No hemodynamically significant stenosis in the major arteries of the neck  2   Significantly diminished number of cortical branches of the right MCA territory subtending the regions of infarction, similar to prior studies    3   Large regions of cystic encephalomalacia and gliosis in the right anterior and middle cerebral artery territories with new hypodensities identified in the frontal opercular region correlating diffusion abnormality on brain MRI performed earlier today, indicative of acute on chronic infarctions    4   Right-sided Wallerian degeneration Workstation performed: [id number]     Xr Chest 2 Views    Result Date: 12/26/2019  Narrative: CHEST INDICATION:   Chest Pain  Dizziness COMPARISON:  08/26/2019 EXAM PERFORMED/VIEWS:  XR CHEST PA & LATERAL Images: 4 FINDINGS: Cardiomediastinal silhouette appears unremarkable  A loop recorder overlies the left chest  The lungs are clear    No pneumothorax or pleural effusion  Osseous structures appear within normal limits for patient age  Impression: No acute cardiopulmonary disease  Workstation performed: [id number]     Ct Head Without Contrast    Result Date: 12/26/2019  Narrative: CT BRAIN - WITHOUT CONTRAST INDICATION:   ?Seizure, Syncope  Known prior stroke with intervention 4/27/2019 COMPARISON:  Multiple prior studies, most recently CT 5/14/2019 TECHNIQUE:  CT examination of the brain was performed  In addition to axial images, coronal 2D reformatted images were created and submitted for interpretation  Radiation dose length product (DLP) for this visit:  909 mGy-cm   This examination, like all CT scans performed in the [facility], was performed utilizing techniques to minimize radiation dose exposure, including the use of iterative reconstruction and automated exposure control  IMAGE QUALITY:  Diagnostic  FINDINGS: PARENCHYMA:  Again identified is evidence for previous extensive infarct involving the right anterior and middle cerebral artery territory distributions  Findings are characterized by diffuse low-attenuation in these territories predominantly in the frontal, parietal and temporal lobes with some evidence for encephalomalacia  However, in contrast to the study from 5/14/2019, there is a greater degree of diffuse low-attenuation present with increased mild-moderate mass effect with sulcal effacement concerning for an acute upon chronic infarct in the anterior and middle cerebral artery territories  No hemorrhage is noted  No midline shift is appreciated  Scattered microangiopathic changes are again noted similar the prior studies  VENTRICLES AND EXTRA-AXIAL SPACES:  Enlargement of ventricles and extra-axial CSF spaces, out of proportion to the patient's age most consistent with cerebral and cerebellar atrophy  VISUALIZED ORBITS AND PARANASAL SINUSES:  Orbits are unremarkable    There is moderate nodular soft tissue within both nasal cavities with opacification of the ethmoid air cells bilaterally and with chronic mucoperiosteal thickening involving both maxillary sinuses  Changes in the nasal cavities may reflect underlying sinonasal polyposis  CALVARIUM AND EXTRACRANIAL SOFT TISSUES:  Normal      Impression: 1  Evidence for extensive chronic right anterior and middle cerebral artery territory infarcts with development of diffuse mild-moderate edema involving the previously infarcted territories in the right frontal, parietal and temporal lobes consistent with acute upon chronic infarct  No hemorrhage noted  There is mild mass effect without midline shift noted  MRI may be useful in further characterizing this abnormality  2   Additional stable findings as noted  I personally discussed this study with Gopal Alva on 12/26/2019 at 9:14 AM  Workstation performed: [id number]     Mri Brain W Wo Contrast    Result Date: 12/27/2019  Narrative: MRI BRAIN WITH AND WITHOUT CONTRAST INDICATION: Seizure concern for new stroke  COMPARISON:  4/28/2019 and CT from 12/26/2019 TECHNIQUE: Sagittal T1, axial T2, axial FLAIR, axial T1, axial [place], axial diffusion  Sagittal, axial T1 postcontrast   Axial bravo postcontrast with coronal reconstructions  IV Contrast:  9 mL of Gadobutrol injection (SINGLE-DOSE)  IMAGE QUALITY:   Diagnostic  FINDINGS: BRAIN PARENCHYMA:  There is been evolution of a previously seen large chronic right MCA distribution infarct with encephalomalacia and gliosis noted throughout the right frontoparietal and temporal lobes  There is also cortical laminar necrosis  There is  superimposed restricted diffusion involving the right frontoparietal lobes in the region of infarction compatible with an acute on chronic infarct  There is volume loss in the right cerebral hemisphere  There is evidence of wallerian degeneration tracking along the right-sided cortical spinal tracts into the midbrain   There is rightward bowing of the septum pellucidum  There is scattered chronic microvascular ischemic change  There is chronic hemosiderin deposition within the right cerebellum  There is no abnormal parenchymal or leptomeningeal enhancement identified  VENTRICLES:  Normal for the patient's age  SELLA AND PITUITARY GLAND:  Normal  ORBITS:  Normal  PARANASAL SINUSES:  There is mucosal thickening of the right maxillary sinus  There is nasal polyposis noted  There is patchy ethmoid air cell opacification  There is a right mastoid air cell effusion  There is also trace fluid within the left mastoid air cells  There is opacification and mucosal thickening of the frontal sinuses  VASCULATURE:  Evaluation of the major intracranial vasculature demonstrates appropriate flow voids  CALVARIUM AND SKULL BASE:  Normal  EXTRACRANIAL SOFT TISSUES:  Normal      Impression: Acute on chronic right MCA distribution infarct, as described above  No associated acute intraparenchymal hemorrhage identified    I personally discussed this study with Dr Tonya Merino on 12/27/2019 at 4:45 PM  Workstation performed: [id number]       LABS  Results from last 7 days   Lab Units 12/28/19  0517 12/26/19  0828 12/26/19  0812   WBC Thousand/uL 3 92*  --  3 81*   HEMOGLOBIN g/dL 12 2  --  13 1   HEMATOCRIT % 37 9  --  41 1   MCV fL 93  --  93   PLATELETS Thousands/uL 143*  --  158   INR   --  1 30*  --      Results from last 7 days   Lab Units 12/28/19  0517 12/27/19  0613 12/26/19  0812   SODIUM mmol/L 140 141 142   POTASSIUM mmol/L 3 7 3 7 4 1   CHLORIDE mmol/L 107 107 106   CO2 mmol/L 28 26 29   BUN mg/dL 20 17 17   CREATININE mg/dL 0 95 0 85 0 94   CALCIUM mg/dL 8 9 8 9 9 2   ALBUMIN g/dL  --   --  3 0*   TOTAL BILIRUBIN mg/dL  --   --  0 63   ALK PHOS U/L  --   --  87   ALT U/L  --   --  14   AST U/L  --   --  16   EGFR ml/min/1 73sq m 81 89 82   GLUCOSE RANDOM mg/dL 127 115 136     Results from last 7 days   Lab Units 12/26/19  1819 12/26/19  1505 12/26/19  0812   TROPONIN I ng/mL <0 02 <0 02 <0 02                  Results from last 7 days   Lab Units 12/27/19 0613   HEMOGLOBIN A1C % 5 6             Results from last 7 days   Lab Units 12/27/19 0613   TRIGLYCERIDES mg/dL 73   CHOLESTEROL mg/dL 131   LDL CALC mg/dL 83   HDL mg/dL 33*       Cultures:         Invalid input(s): URIBILINOGEN              PHYSICAL EXAM:  Vitals:   Blood Pressure: 112/56 (12/29/19 0733)  Pulse: 55 (12/29/19 0733)  Temperature: 97 8 °F (36 6 °C) (12/29/19 0733)  Temp Source: Tympanic (12/29/19 0733)  Respirations: 18 (12/29/19 0733)  Height: 6' (182 9 cm) (12/26/19 1130)  Weight - Scale: 89 7 kg (197 lb 11 2 oz) (12/26/19 1130)  SpO2: 95 % (12/29/19 0733)    General: well appearing, no acute distress  HEENT: atraumatic, PERRLA, moist mucosa, normal pharynx, normal tonsils and adenoids, normal tongue, no fluid in sinuses  Neck: Trachea midline, no carotid bruit, no masses  Respiratory: normal chest wall expansion, CTA B, no r/r/w, no rubs  Cardiovascular: RRR, no m/r/g, Normal S1 and S2  Abdomen: Soft, non-tender, non-distended, normal bowel sounds in all quadrants, no hepatosplenomegaly, no tympany  Rectal: deferred  Musculoskeletal: normal ROM in upper and lower extremities  Integumentary: warm, dry, and pink, with no rash, purpura, or petechia  Heme/Lymph: no lymphadenopathy, no bruises  Neurological: Cranial Nerves II-XII grossly intact, notable left-sided weakness, 2/5  Psychiatric: cooperative with normal mood, affect, and cognition      Discharge Disposition: Home/Self Care      Test Results Pending at Discharge:           Medications   · Summary of Medication Adjustments made as a result of this hospitalization:  Keppra 750 mg twice a day, note aspirin was discontinued  · Medication Dosing Tapers - Please refer to Discharge Medication List for details on any medication dosing tapers (if applicable to patient)  · Discharge Medication List: See after visit summary for reconciled discharge medications  Diet restrictions: Activity restrictions: No strenuous activity  Discharge Condition: good    Outpatient Follow-Up and Discharge Instructions  See after visit summary section titled Discharge Instructions for information provided to patient and family  Code Status: Prior  Discharge Statement   I spent 35 minutes discharging the patient  This time was spent on the day of discharge  Greater than 50% of total time was spent with the patient and / or family counseling and / or coordination of care  ** Please Note: This note has been constructed using a voice recognition system   **

## 2019-12-29 NOTE — ASSESSMENT & PLAN NOTE
Loaded with Keppra - CT with concern for acute on chronic stroke on imaging  Continue Keppra and follow up with Neurology as an outpatient  CTA with no acute flow limiting stenosis    - Plan to monitor for seizure activity, no new medication changes - Did have recent Botox injection Dec 19th   No tounge biting     MRI Brain with and without - with evidence of new stroke, likely post stroke seizure  Serial Cardiac enzymes negative for myocardial ischemia    Rehab evals and PMR recommendations noted  Remains on [place]

## 2019-12-29 NOTE — PLAN OF CARE
Problem: Potential for Falls  Goal: Patient will remain free of falls  Description  INTERVENTIONS:  - Assess patient frequently for physical needs  -  Identify cognitive and physical deficits and behaviors that affect risk of falls    -  [place] fall precautions as indicated by assessment   - Educate patient/family on patient safety including physical limitations  - Instruct patient to call for assistance with activity based on assessment  - Modify environment to reduce risk of injury  - Consider OT/PT consult to assist with strengthening/mobility  Outcome: Progressing     Problem: Prexisting or High Potential for Compromised Skin Integrity  Goal: Skin integrity is maintained or improved  Description  INTERVENTIONS:  - Identify patients at risk for skin breakdown  - Assess and monitor skin integrity  - Assess and monitor nutrition and hydration status  - Monitor labs   - Assess for incontinence   - Turn and reposition patient  - Assist with mobility/ambulation  - Relieve pressure over bony prominences  - Avoid friction and shearing  - Provide appropriate hygiene as needed including keeping skin clean and dry  - Evaluate need for skin moisturizer/barrier cream  - Collaborate with interdisciplinary team   - Patient/family teaching  - Consider wound care consult   Outcome: Progressing     Problem: PAIN - ADULT  Goal: Verbalizes/displays adequate comfort level or baseline comfort level  Description  Interventions:  - Encourage patient to monitor pain and request assistance  - Assess pain using appropriate pain scale  - Administer analgesics based on type and severity of pain and evaluate response  - Implement non-pharmacological measures as appropriate and evaluate response  - Consider cultural and social influences on pain and pain management  - Notify physician/advanced practitioner if interventions unsuccessful or patient reports new pain  Outcome: Progressing     Problem: INFECTION - ADULT  Goal: Absence or prevention of progression during hospitalization  Description  INTERVENTIONS:  - Assess and monitor for signs and symptoms of infection  - Monitor lab/diagnostic results  - Monitor all insertion sites, i e  indwelling lines, tubes, and drains  - Monitor endotracheal if appropriate and nasal secretions for changes in amount and color  - [place] appropriate cooling/warming therapies per order  - Administer medications as ordered  - Instruct and encourage patient and family to use good hand hygiene technique  - Identify and instruct in appropriate isolation precautions for identified infection/condition  Outcome: Progressing  Goal: Absence of fever/infection during neutropenic period  Description  INTERVENTIONS:  - Monitor WBC    Outcome: Progressing     Problem: SAFETY ADULT  Goal: Maintain or return to baseline ADL function  Description  INTERVENTIONS:  -  Assess patient's ability to carry out ADLs; assess patient's baseline for ADL function and identify physical deficits which impact ability to perform ADLs (bathing, care of mouth/teeth, toileting, grooming, dressing, etc )  - Assess/evaluate cause of self-care deficits   - Assess range of motion  - Assess patient's mobility; develop plan if impaired  - Assess patient's need for assistive devices and provide as appropriate  - Encourage maximum independence but intervene and supervise when necessary  - Involve family in performance of ADLs  - Assess for home care needs following discharge   - Consider OT consult to assist with ADL evaluation and planning for discharge  - Provide patient education as appropriate  Outcome: Progressing  Goal: Maintain or return mobility status to optimal level  Description  INTERVENTIONS:  - Assess patient's baseline mobility status (ambulation, transfers, stairs, etc )    - Identify cognitive and physical deficits and behaviors that affect mobility  - Identify mobility aids required to assist with transfers and/or ambulation (gait belt, sit-to-stand, lift, walker, cane, etc )  - [place] fall precautions as indicated by assessment  - Record patient progress and toleration of activity level on Mobility SBAR; progress patient to next Phase/Stage  - Instruct patient to call for assistance with activity based on assessment  - Consider rehabilitation consult to assist with strengthening/weightbearing, etc   Outcome: Progressing     Problem: DISCHARGE PLANNING  Goal: Discharge to home or other facility with appropriate resources  Description  INTERVENTIONS:  - Identify barriers to discharge w/patient and caregiver  - Arrange for needed discharge resources and transportation as appropriate  - Identify discharge learning needs (meds, wound care, etc )  - Arrange for interpretive services to assist at discharge as needed  - Refer to Case Management Department for coordinating discharge planning if the patient needs post-hospital services based on physician/advanced practitioner order or complex needs related to functional status, cognitive ability, or social support system  Outcome: Progressing     Problem: Knowledge Deficit  Goal: Patient/family/caregiver demonstrates understanding of disease process, treatment plan, medications, and discharge instructions  Description  Complete learning assessment and assess knowledge base  Interventions:  - Provide teaching at level of understanding  - Provide teaching via preferred learning methods  Outcome: Progressing     Problem: Neurological Deficit  Goal: Neurological status is stable or improving  Description  Interventions:  - Monitor and assess patient's level of consciousness, motor function, sensory function, and level of assistance needed for ADLs  - Monitor and report changes from baseline  Collaborate with interdisciplinary team to initiate plan and implement interventions as ordered  - Provide and maintain a safe environment  - Consider seizure precautions  - Consider fall precautions    - Consider aspiration precautions  - Consider bleeding precautions  Outcome: Progressing     Problem: Activity Intolerance/Impaired Mobility  Goal: Mobility/activity is maintained at optimum level for patient  Description  Interventions:  - Assess and monitor patient  barriers to mobility and need for assistive/adaptive devices  - Assess patient's emotional response to limitations  - Collaborate with interdisciplinary team and initiate plans and interventions as ordered  - Encourage independent activity per ability   - Maintain proper body alignment  - Perform active/passive rom as tolerated/ordered  - Plan activities to conserve energy   - Turn patient as appropriate  Outcome: Progressing     Problem: Communication Impairment  Goal: Ability to express needs and understand communication  Description  Assess patient's communication skills and ability to understand information  Patient will demonstrate use of effective communication techniques, alternative methods of communication and understanding even if not able to speak  - Encourage communication and provide alternate methods of communication as needed  - Collaborate with case management/ for discharge needs  - Include patient/family/caregiver in decisions related to communication  Outcome: Progressing     Problem: Potential for Aspiration  Goal: Non-ventilated patient's risk of aspiration is minimized  Description  Assess and monitor vital signs, respiratory status, and labs (WBC)  Monitor for signs of aspiration (tachypnea, cough, rales, wheezing, cyanosis, fever)  - Assess and monitor patient's ability to swallow  - Place patient up in chair to eat if possible  - HOB up at 90 degrees to eat if unable to get patient up into chair   - Supervise patient during oral intake  - Instruct patient/ family to take small bites  - Instruct patient/ family to take small single sips when taking liquids    - Follow patient-specific strategies generated by speech pathologist   Outcome: Progressing  Goal: Ventilated patient's risk of aspiration is minimized  Description  Assess and monitor vital signs, respiratory status, airway cuff pressure, and labs (WBC)  Monitor for signs of aspiration (tachypnea, cough, rales, wheezing, cyanosis, fever)  - Elevate head of bed 30 degrees if patient has tube feeding   - Monitor tube feeding  Outcome: Progressing     Problem: Nutrition  Goal: Nutrition/Hydration status is improving  Description  Monitor and assess patient's nutrition/hydration status for malnutrition (ex- brittle hair, bruises, dry skin, pale skin and conjunctiva, muscle wasting, smooth red tongue, and disorientation)  Collaborate with interdisciplinary team and initiate plan and interventions as ordered  Monitor patient's weight and dietary intake as ordered or per policy  Utilize nutrition screening tool and intervene per policy  Determine patient's food preferences and provide high-protein, high-caloric foods as appropriate  - Assist patient with eating   - Allow adequate time for meals   - Encourage patient to take dietary supplement as ordered  - Collaborate with clinical nutritionist   - Include patient/family/caregiver in decisions related to nutrition    Outcome: Progressing

## 2019-12-29 NOTE — SOCIAL WORK
CM called the patients wife to complete an assessment and address d/c needs:     Home Living   Type of [address] to live on main level with bedroom/bathroom; Performs ADLs on one level;Ramped entrance  (bilevel)   Bathroom Shower/Tub Walk-in shower   Bathroom Equipment Tub transfer bench;Commode   Home Equipment Walker; Wheelchair-manual   Additional Comments Pt has 1st flr set up   Prior Function   Level of [place] Needs assistance with ADLs and functional mobility   Lives With Spouse   Receives Help From Family;Home health; Outpatient therapy   ADL Assistance Needs assistance   Falls in the last 6 months 0   Vocational Retired   Comments At baseline, pt requires (A) w/ overall ADL's & transfers; pt mainly w/c bound at home & able to SPT w/ (A); pt goes to outpt PT & has been working on amb w/ RW w/ Ax2 & started using ekoskeleton for amb  POA is the patients wife, Nurys Chacko  His PCP is Dr Сергей Gar  No D&A history  NO MH history    The patient needs rx for OPPT to continue to follow up, as well as a physicians note  CM left the PMR consult in the patients room for the family to provide to [facility] when he returns  A post acute care recommendation was made by your care team for Outpatient PT  Discussed [place] of Choice with caregiver  Choice is to return/continue services  IMM letter explained over the phone  NO other needs

## 2019-12-29 NOTE — ASSESSMENT & PLAN NOTE
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

## 2019-12-31 ENCOUNTER — TELEPHONE (OUTPATIENT)
Dept: NEUROLOGY | Facility: CLINIC | Age: 69
End: 2019-12-31

## 2019-12-31 DIAGNOSIS — R56.9 SEIZURE AS LATE EFFECT OF CEREBROVASCULAR ACCIDENT (CVA) (HCC): Primary | ICD-10-CM

## 2019-12-31 DIAGNOSIS — I69.398 SEIZURE AS LATE EFFECT OF CEREBROVASCULAR ACCIDENT (CVA) (HCC): Primary | ICD-10-CM

## 2019-12-31 RX ORDER — LEVETIRACETAM 500 MG/1
500 TABLET ORAL EVERY 12 HOURS SCHEDULED
Qty: 60 TABLET | Refills: 5 | Status: SHIPPED | OUTPATIENT
Start: 2019-12-31 | End: 2020-01-03 | Stop reason: CLARIF

## 2019-12-31 NOTE — TELEPHONE ENCOUNTER
pt's wife called and states that pt was dc'd from the hospital on levetiracetam   she states that since he has been home he has been sleeping all the time, trouble transfering pt due to balance  no seizures      levetiracetam 750mg 1 tab bid  [phone] to leave a detailed message

## 2020-01-02 ENCOUNTER — TELEPHONE (OUTPATIENT)
Dept: NEUROLOGY | Facility: CLINIC | Age: 70
End: 2020-01-02

## 2020-01-02 NOTE — TELEPHONE ENCOUNTER
Dr Ollie Colmenares and team,    This Patient appointment at 11 am on Juan Antonio  3 at [facility] with Dr Renetta Rich   Patient is Missael Livingston,  1950  Daughter is a NICU nurse  [place]  Patient usually sees Dr Juanjo Frederick, Josekii Captain Dr Juanjo Frederick and I asked him to reach out to Dr Renetta Rich with his thoughts on the case     Patient is a stroke patient, has seen Juanjo Frederick, he was admitted recently for questionable seizures  He was prescribed Keppra and Destiny Silva is reporting he has been very sleepy to the point he cannot participate in therapy  His Keppra was reduced via the office today, but he is still so sleepy  Nursing told her to give him the medication as directed as long as his swallowing was not compromised  She asked that he be seen in the office to have someone take a look at his presentation and symptoms  The daughter is beside herself  I know we had the appointment on hold for a wait list patient, however, I needed the appointment due to urgency need for this patient  Thanks for your understanding    Maria Teresa Wild  [facility]    Specialty Services  [phone]

## 2020-01-02 NOTE — TELEPHONE ENCOUNTER
Awaiting response from  Dr Nina Cobos     In the meantime, I spoke w/ Dr Ivette Srivastava who advised not to skip a dose as long as pt is awake and alert enough to take his meds and to continue monitor the pt  Francois Martinez made aware  She is requesting a call back from Dr Nina Cobos  She can be reached at [phone]

## 2020-01-02 NOTE — TELEPHONE ENCOUNTER
Pt's dtr Redgie Owego called w/ an update  Keppra was decreased to 500 mg q12 on 12/31  States that pt still "out of it"  Has been sleeping a lot, still lethargic-"cannot barely open his eyes"   States that current BP this morning 116/65, HR 48-50's   Other physician is weaning pt off of baclofen and eventually will be weaned off prozac  No seizure                [phone] ok to leave detailed message

## 2020-01-02 NOTE — TELEPHONE ENCOUNTER
Pt's dtr Katlyn Lee called re: below  Pt is still lethargic, drooling at times  Asking if ok to skip 1 dose? Tiger text message sent to Dr Long January

## 2020-01-03 ENCOUNTER — OFFICE VISIT (OUTPATIENT)
Dept: NEUROLOGY | Facility: CLINIC | Age: 70
End: 2020-01-03
Payer: MEDICARE

## 2020-01-03 ENCOUNTER — TELEPHONE (OUTPATIENT)
Dept: NEUROLOGY | Facility: CLINIC | Age: 70
End: 2020-01-03

## 2020-01-03 ENCOUNTER — APPOINTMENT (OUTPATIENT)
Dept: LAB | Facility: MEDICAL CENTER | Age: 70
End: 2020-01-03
Payer: MEDICARE

## 2020-01-03 VITALS
HEART RATE: 52 BPM | SYSTOLIC BLOOD PRESSURE: 100 MMHG | BODY MASS INDEX: 26.81 KG/M2 | DIASTOLIC BLOOD PRESSURE: 60 MMHG | HEIGHT: 72 IN

## 2020-01-03 DIAGNOSIS — I48.0 PAROXYSMAL ATRIAL FIBRILLATION (HCC): ICD-10-CM

## 2020-01-03 DIAGNOSIS — I63.9 APHASIA DUE TO ACUTE STROKE (HCC): ICD-10-CM

## 2020-01-03 DIAGNOSIS — R56.9 SEIZURE AS LATE EFFECT OF CEREBROVASCULAR ACCIDENT (CVA) (HCC): ICD-10-CM

## 2020-01-03 DIAGNOSIS — G40.909 SEIZURE DISORDER (HCC): Primary | ICD-10-CM

## 2020-01-03 DIAGNOSIS — G81.94 LEFT HEMIPLEGIA (HCC): ICD-10-CM

## 2020-01-03 DIAGNOSIS — R00.1 BRADYCARDIA: ICD-10-CM

## 2020-01-03 DIAGNOSIS — I69.398 SEIZURE AS LATE EFFECT OF CEREBROVASCULAR ACCIDENT (CVA) (HCC): ICD-10-CM

## 2020-01-03 DIAGNOSIS — R47.01 APHASIA DUE TO ACUTE STROKE (HCC): ICD-10-CM

## 2020-01-03 DIAGNOSIS — I63.411 CEREBROVASCULAR ACCIDENT (CVA) DUE TO EMBOLISM OF RIGHT MIDDLE CEREBRAL ARTERY (HCC): ICD-10-CM

## 2020-01-03 DIAGNOSIS — G40.909 NONINTRACTABLE EPILEPSY WITHOUT STATUS EPILEPTICUS, UNSPECIFIED EPILEPSY TYPE (HCC): ICD-10-CM

## 2020-01-03 DIAGNOSIS — G40.909 SEIZURE DISORDER (HCC): ICD-10-CM

## 2020-01-03 LAB
ALBUMIN SERPL BCP-MCNC: 3.4 G/DL (ref 3.5–5)
ALP SERPL-CCNC: 101 U/L (ref 46–116)
ALT SERPL W P-5'-P-CCNC: 20 U/L (ref 12–78)
ANION GAP SERPL CALCULATED.3IONS-SCNC: 3 MMOL/L (ref 4–13)
AST SERPL W P-5'-P-CCNC: 14 U/L (ref 5–45)
BASOPHILS # BLD AUTO: 0.02 THOUSANDS/ΜL (ref 0–0.1)
BASOPHILS NFR BLD AUTO: 0 % (ref 0–1)
BILIRUB SERPL-MCNC: 0.52 MG/DL (ref 0.2–1)
BUN SERPL-MCNC: 15 MG/DL (ref 5–25)
CALCIUM SERPL-MCNC: 9.7 MG/DL (ref 8.3–10.1)
CHLORIDE SERPL-SCNC: 109 MMOL/L (ref 100–108)
CO2 SERPL-SCNC: 29 MMOL/L (ref 21–32)
CREAT SERPL-MCNC: 0.9 MG/DL (ref 0.6–1.3)
EOSINOPHIL # BLD AUTO: 0.12 THOUSAND/ΜL (ref 0–0.61)
EOSINOPHIL NFR BLD AUTO: 3 % (ref 0–6)
ERYTHROCYTE [DISTWIDTH] IN BLOOD BY AUTOMATED COUNT: 14.4 % (ref 11.6–15.1)
GFR SERPL CREATININE-BSD FRML MDRD: 87 ML/MIN/1.73SQ M
GLUCOSE SERPL-MCNC: 86 MG/DL (ref 65–140)
HCT VFR BLD AUTO: 45.3 % (ref 36.5–49.3)
HGB BLD-MCNC: 14.3 G/DL (ref 12–17)
IMM GRANULOCYTES # BLD AUTO: 0.01 THOUSAND/UL (ref 0–0.2)
IMM GRANULOCYTES NFR BLD AUTO: 0 % (ref 0–2)
LYMPHOCYTES # BLD AUTO: 1.36 THOUSANDS/ΜL (ref 0.6–4.47)
LYMPHOCYTES NFR BLD AUTO: 29 % (ref 14–44)
MCH RBC QN AUTO: 29.9 PG (ref 26.8–34.3)
MCHC RBC AUTO-ENTMCNC: 31.6 G/DL (ref 31.4–37.4)
MCV RBC AUTO: 95 FL (ref 82–98)
MONOCYTES # BLD AUTO: 0.28 THOUSAND/ΜL (ref 0.17–1.22)
MONOCYTES NFR BLD AUTO: 6 % (ref 4–12)
NEUTROPHILS # BLD AUTO: 2.94 THOUSANDS/ΜL (ref 1.85–7.62)
NEUTS SEG NFR BLD AUTO: 62 % (ref 43–75)
NRBC BLD AUTO-RTO: 0 /100 WBCS
PLATELET # BLD AUTO: 158 THOUSANDS/UL (ref 149–390)
PMV BLD AUTO: 11.2 FL (ref 8.9–12.7)
POTASSIUM SERPL-SCNC: 4.4 MMOL/L (ref 3.5–5.3)
PROT SERPL-MCNC: 6.7 G/DL (ref 6.4–8.2)
RBC # BLD AUTO: 4.78 MILLION/UL (ref 3.88–5.62)
SODIUM SERPL-SCNC: 141 MMOL/L (ref 136–145)
WBC # BLD AUTO: 4.73 THOUSAND/UL (ref 4.31–10.16)

## 2020-01-03 PROCEDURE — 99215 OFFICE O/P EST HI 40 MIN: CPT | Performed by: PSYCHIATRY & NEUROLOGY

## 2020-01-03 PROCEDURE — 85025 COMPLETE CBC W/AUTO DIFF WBC: CPT

## 2020-01-03 PROCEDURE — 80053 COMPREHEN METABOLIC PANEL: CPT

## 2020-01-03 PROCEDURE — 36415 COLL VENOUS BLD VENIPUNCTURE: CPT

## 2020-01-03 RX ORDER — DIVALPROEX SODIUM 500 MG/1
500 TABLET, DELAYED RELEASE ORAL EVERY 12 HOURS SCHEDULED
Qty: 60 TABLET | Refills: 3 | Status: SHIPPED | OUTPATIENT
Start: 2020-01-03 | End: 2020-01-31 | Stop reason: HOSPADM

## 2020-01-03 NOTE — ASSESSMENT & PLAN NOTE
-c/w with combination of xarelto, pravastatin for secondary stroke prevention  -BP goal < 130/80, BP is at goal currently  -Defer to PCP regarding BP and bradycardia management  -does not smoke at this time   -denies any history of snoring    -Continue with PT/OT and Speech therapy  -continue with botox for LLE spasticity     -I advised patient to avoid using NSAIDs for headaches or other pain  -Recommend mediterranean diet & regular exercise regimen atleast 4-5 times a week for 20-30 minutes     -I educated patient/family regarding medication compliance

## 2020-01-03 NOTE — ASSESSMENT & PLAN NOTE
-patient has been having low HR mid 40s consistently, which could be contributing to his lethargy  -patients PCP not in town till Monday, so I advised the wife to check his HR prior to giving his metoprolol and if it is less than 55, to hold metoprolol, and do the same Sunday and then to call PCP on Monday

## 2020-01-03 NOTE — ASSESSMENT & PLAN NOTE
Patient could not tolerate Keppra made him extremely lethargic and somnolent  -will obtain routine EEG  -will switch him to Depakote 500 mg twice a day side effects reviewed  -okay to stop Keppra from this evening  -check CBC CMP as a baseline before starting Depakote

## 2020-01-03 NOTE — ASSESSMENT & PLAN NOTE
-Continue with Botox injections every 3 months for left lower extremity spasticity     -patient weaning off baclofen

## 2020-01-13 ENCOUNTER — TELEPHONE (OUTPATIENT)
Dept: NEUROLOGY | Facility: CLINIC | Age: 70
End: 2020-01-13

## 2020-01-13 DIAGNOSIS — G93.40 ENCEPHALOPATHY ACUTE: ICD-10-CM

## 2020-01-13 DIAGNOSIS — I63.411 CEREBROVASCULAR ACCIDENT (CVA) DUE TO EMBOLISM OF RIGHT MIDDLE CEREBRAL ARTERY (HCC): Primary | ICD-10-CM

## 2020-01-13 NOTE — TELEPHONE ENCOUNTER
she states that pt did see pcp, he came to his house  pcp  stopped metoprolol last monday  she states that   his heart rate has been better  running anywhere between 60-80's  no change in drowsiness since stopping metoprolol  whene she spoke to pcp this am, she made him aware that he was still drowsy and they want us to advise  please advise if you want him to wean off deapkote

## 2020-01-13 NOTE — TELEPHONE ENCOUNTER
Patient's wife calling to say patient is not tolerating Depakote 500 mg BID  States  slept most of the weekend and starting to lose strength  Doing everyday tasks are becoming harder  States it is harder to stand patient up  Patient had similar results on Keppra  She is asking if there is an alternative to this medication  Patient last took medication last night 1/12/2020 @ 8pm      Patient is scheduled for an EEG 2/10/2020  Would like to see if there is an earlier appointment available  Patient transferred to scheduling  [phone]: [phone] Mary Tran to leave a detailed message

## 2020-01-13 NOTE — TELEPHONE ENCOUNTER
pt's wife called to check status of message  she states that she gave him depakote 500mg at 10:15am   she states that he is drowsy now  she also called pcp and they advised that we should advise  eeg is now scheduled for 1/17

## 2020-01-13 NOTE — TELEPHONE ENCOUNTER
At this point I am not completely sure that it is related to the medication, there was some concern that this was related to his persistent bradycardia as well  We have 2 options    One would be to go on the premise that this is truly med related in which case we can wean the Depakote off and get the EEG  The other would be to ask his PCP to evaluate his bradycardia and do a trial of changing or holding his blood pressure medication briefly while we maintain the Depakote in place  We should also check a Depakote level which I ordered  If his PCP has not evaluated his bradycardia as had been suggested by Dr Lissette Neumann it would be good to do that first potentially  If they feels strongly that this really has nothing to do with his lethargy we can wean down by 1/2 tab every week (1/2 tab AM 1 tab HS X 1 week, then 1/2 tab Q12h X 1 week, then 1/2 tab HS X 1 week, then off) and if he gets much better as we wean down, provided that there are no other explanations, we can try a different medication again  This may be a higher risk time for him to have a breakthrough seizure as we wean down the Depakote but with this possible side effect I do not think that we can overlap treatments

## 2020-01-15 NOTE — TELEPHONE ENCOUNTER
[facility] made aware, she verbalizes understanding  Again reviewed instructions for weaning with her  Also [facility]  She confirmed patient uses [facility] and will go tomorrow morning

## 2020-01-15 NOTE — TELEPHONE ENCOUNTER
Yes should get CMP again, ok to start weaning depakote now since patient drowsy and to see if the drowsiness improved while weaning off depakote, and agree with EEG on Friday   Thanks

## 2020-01-15 NOTE — TELEPHONE ENCOUNTER
Patient's wife Jodee Leiva made aware, she verbalizes understanding  She is asking for weaning instructions to be sent to patient's MyChart  Sent  She also reports patient had labs drawn recently  Pt had CMP drawn on 1/3  Do you want this drawn again? She is also asking if they should wait to start weaning Depakote until after EEG on Friday  Please advise

## 2020-01-15 NOTE — TELEPHONE ENCOUNTER
Yes, bearing that in mind we should wean the Depakote  The labs should be drawn first if possible, and she can start with the instructions below  We would like a call in no more than 3-4 weeks to let us know how he is doing, and she will need to monitor for any seizure like activity (please be sure she understands what to look for)  We will plan for if and what to replace Depakote if and when he is clearly better off of it

## 2020-01-17 ENCOUNTER — APPOINTMENT (OUTPATIENT)
Dept: LAB | Facility: MEDICAL CENTER | Age: 70
End: 2020-01-17
Payer: MEDICARE

## 2020-01-17 ENCOUNTER — HOSPITAL ENCOUNTER (OUTPATIENT)
Dept: NEUROLOGY | Facility: CLINIC | Age: 70
Discharge: HOME | End: 2020-01-17
Payer: MEDICARE

## 2020-01-17 DIAGNOSIS — G40.909 SEIZURE DISORDER (HCC): ICD-10-CM

## 2020-01-17 DIAGNOSIS — R56.9 SEIZURE AS LATE EFFECT OF CEREBROVASCULAR ACCIDENT (CVA) (HCC): ICD-10-CM

## 2020-01-17 DIAGNOSIS — I63.411 CEREBROVASCULAR ACCIDENT (CVA) DUE TO EMBOLISM OF RIGHT MIDDLE CEREBRAL ARTERY (HCC): ICD-10-CM

## 2020-01-17 DIAGNOSIS — I69.398 SEIZURE AS LATE EFFECT OF CEREBROVASCULAR ACCIDENT (CVA) (HCC): ICD-10-CM

## 2020-01-17 DIAGNOSIS — G93.40 ENCEPHALOPATHY ACUTE: ICD-10-CM

## 2020-01-17 LAB
ALBUMIN SERPL BCP-MCNC: 3.1 G/DL (ref 3.5–5)
ALP SERPL-CCNC: 85 U/L (ref 46–116)
ALT SERPL W P-5'-P-CCNC: 12 U/L (ref 12–78)
ANION GAP SERPL CALCULATED.3IONS-SCNC: 4 MMOL/L (ref 4–13)
AST SERPL W P-5'-P-CCNC: 5 U/L (ref 5–45)
BILIRUB SERPL-MCNC: 0.39 MG/DL (ref 0.2–1)
BUN SERPL-MCNC: 18 MG/DL (ref 5–25)
CALCIUM SERPL-MCNC: 9.3 MG/DL (ref 8.3–10.1)
CHLORIDE SERPL-SCNC: 109 MMOL/L (ref 100–108)
CO2 SERPL-SCNC: 30 MMOL/L (ref 21–32)
CREAT SERPL-MCNC: 0.9 MG/DL (ref 0.6–1.3)
GFR SERPL CREATININE-BSD FRML MDRD: 87 ML/MIN/1.73SQ M
GLUCOSE SERPL-MCNC: 110 MG/DL (ref 65–140)
POTASSIUM SERPL-SCNC: 4.5 MMOL/L (ref 3.5–5.3)
PROT SERPL-MCNC: 6.3 G/DL (ref 6.4–8.2)
SODIUM SERPL-SCNC: 143 MMOL/L (ref 136–145)
VALPROATE SERPL-MCNC: 88 UG/ML (ref 50–100)

## 2020-01-17 PROCEDURE — 95816 EEG AWAKE AND DROWSY: CPT

## 2020-01-17 PROCEDURE — 80053 COMPREHEN METABOLIC PANEL: CPT

## 2020-01-17 PROCEDURE — 95812 EEG 41-60 MINUTES: CPT | Performed by: PSYCHIATRY & NEUROLOGY

## 2020-01-17 PROCEDURE — 80164 ASSAY DIPROPYLACETIC ACD TOT: CPT

## 2020-01-17 PROCEDURE — 36415 COLL VENOUS BLD VENIPUNCTURE: CPT

## 2020-01-20 ENCOUNTER — APPOINTMENT (OUTPATIENT)
Dept: LAB | Facility: HOSPITAL | Age: 70
End: 2020-01-20
Attending: PSYCHIATRY & NEUROLOGY
Payer: MEDICARE

## 2020-01-20 DIAGNOSIS — I63.411 CEREBROVASCULAR ACCIDENT (CVA) DUE TO EMBOLISM OF RIGHT MIDDLE CEREBRAL ARTERY (HCC): ICD-10-CM

## 2020-01-20 DIAGNOSIS — G93.40 ENCEPHALOPATHY ACUTE: ICD-10-CM

## 2020-01-20 LAB — AMMONIA PLAS-SCNC: 12 UMOL/L (ref 11–35)

## 2020-01-20 PROCEDURE — 82140 ASSAY OF AMMONIA: CPT

## 2020-01-20 PROCEDURE — 36415 COLL VENOUS BLD VENIPUNCTURE: CPT

## 2020-01-21 ENCOUNTER — TELEPHONE (OUTPATIENT)
Dept: NEUROLOGY | Facility: CLINIC | Age: 70
End: 2020-01-21

## 2020-01-22 NOTE — TELEPHONE ENCOUNTER
Patient's wife made aware  States that she will begin to wean patient of medication starting tomorrow

## 2020-01-22 NOTE — TELEPHONE ENCOUNTER
Unfortunately no, he can't just stop it but it does help us feel a little better about weaning the Depakote as we have planned  This does not rule out his seizure from before, just proves that he did not have active seizure at the time of the EEG so we would suspect that his current fatigue may not be as likely to be related to active ongoing seizure right now    He should continue to wean the medication down and once we see how he is doing without it we can decide on next steps

## 2020-01-22 NOTE — TELEPHONE ENCOUNTER
Patient's wife informed og EEG results  Would like to know what the next steps are  She asks if the patient is now clear and if he can get off of the Depakote? States that the patient is barely functional on this medication      Please advise

## 2020-01-28 ENCOUNTER — APPOINTMENT (EMERGENCY)
Dept: CT IMAGING | Facility: HOSPITAL | Age: 70
DRG: 312 | End: 2020-01-28
Payer: MEDICARE

## 2020-01-28 ENCOUNTER — HOSPITAL ENCOUNTER (INPATIENT)
Facility: HOSPITAL | Age: 70
LOS: 3 days | Discharge: HOME | DRG: 312 | End: 2020-01-31
Attending: EMERGENCY MEDICINE | Admitting: INTERNAL MEDICINE
Payer: MEDICARE

## 2020-01-28 ENCOUNTER — APPOINTMENT (EMERGENCY)
Dept: RADIOLOGY | Facility: HOSPITAL | Age: 70
DRG: 312 | End: 2020-01-28
Payer: MEDICARE

## 2020-01-28 DIAGNOSIS — I63.411 CEREBROVASCULAR ACCIDENT (CVA) DUE TO EMBOLISM OF RIGHT MIDDLE CEREBRAL ARTERY (HCC): ICD-10-CM

## 2020-01-28 DIAGNOSIS — I48.0 PAROXYSMAL ATRIAL FIBRILLATION (HCC): ICD-10-CM

## 2020-01-28 DIAGNOSIS — G81.94 LEFT HEMIPLEGIA (HCC): ICD-10-CM

## 2020-01-28 DIAGNOSIS — G40.909 SEIZURE DISORDER (HCC): ICD-10-CM

## 2020-01-28 DIAGNOSIS — R53.83 FATIGUE: ICD-10-CM

## 2020-01-28 DIAGNOSIS — R55 SYNCOPE: Primary | ICD-10-CM

## 2020-01-28 DIAGNOSIS — I10 ESSENTIAL HYPERTENSION: ICD-10-CM

## 2020-01-28 DIAGNOSIS — N39.0 UTI (URINARY TRACT INFECTION): ICD-10-CM

## 2020-01-28 LAB
ALBUMIN SERPL BCP-MCNC: 2.8 G/DL (ref 3.5–5)
ALP SERPL-CCNC: 72 U/L (ref 46–116)
ALT SERPL W P-5'-P-CCNC: 10 U/L (ref 12–78)
ANION GAP SERPL CALCULATED.3IONS-SCNC: 9 MMOL/L (ref 4–13)
APTT PPP: 32 SECONDS (ref 23–37)
AST SERPL W P-5'-P-CCNC: 11 U/L (ref 5–45)
BASOPHILS # BLD AUTO: 0.02 THOUSANDS/ΜL (ref 0–0.1)
BASOPHILS NFR BLD AUTO: 0 % (ref 0–1)
BILIRUB SERPL-MCNC: 0.39 MG/DL (ref 0.2–1)
BUN SERPL-MCNC: 19 MG/DL (ref 5–25)
CALCIUM SERPL-MCNC: 9 MG/DL (ref 8.3–10.1)
CHLORIDE SERPL-SCNC: 107 MMOL/L (ref 100–108)
CO2 SERPL-SCNC: 28 MMOL/L (ref 21–32)
CREAT SERPL-MCNC: 0.93 MG/DL (ref 0.6–1.3)
EOSINOPHIL # BLD AUTO: 0.12 THOUSAND/ΜL (ref 0–0.61)
EOSINOPHIL NFR BLD AUTO: 2 % (ref 0–6)
ERYTHROCYTE [DISTWIDTH] IN BLOOD BY AUTOMATED COUNT: 13.8 % (ref 11.6–15.1)
GFR SERPL CREATININE-BSD FRML MDRD: 83 ML/MIN/1.73SQ M
GLUCOSE SERPL-MCNC: 117 MG/DL (ref 65–140)
HCT VFR BLD AUTO: 42.8 % (ref 36.5–49.3)
HGB BLD-MCNC: 13.4 G/DL (ref 12–17)
IMM GRANULOCYTES # BLD AUTO: 0.02 THOUSAND/UL (ref 0–0.2)
IMM GRANULOCYTES NFR BLD AUTO: 0 % (ref 0–2)
INR PPP: 1.12 (ref 0.84–1.19)
LYMPHOCYTES # BLD AUTO: 1.16 THOUSANDS/ΜL (ref 0.6–4.47)
LYMPHOCYTES NFR BLD AUTO: 17 % (ref 14–44)
MCH RBC QN AUTO: 29.8 PG (ref 26.8–34.3)
MCHC RBC AUTO-ENTMCNC: 31.3 G/DL (ref 31.4–37.4)
MCV RBC AUTO: 95 FL (ref 82–98)
MONOCYTES # BLD AUTO: 0.48 THOUSAND/ΜL (ref 0.17–1.22)
MONOCYTES NFR BLD AUTO: 7 % (ref 4–12)
NEUTROPHILS # BLD AUTO: 4.94 THOUSANDS/ΜL (ref 1.85–7.62)
NEUTS SEG NFR BLD AUTO: 74 % (ref 43–75)
NRBC BLD AUTO-RTO: 0 /100 WBCS
PLATELET # BLD AUTO: 117 THOUSANDS/UL (ref 149–390)
PMV BLD AUTO: 9.9 FL (ref 8.9–12.7)
POTASSIUM SERPL-SCNC: 3.8 MMOL/L (ref 3.5–5.3)
PROT SERPL-MCNC: 6.1 G/DL (ref 6.4–8.2)
PROTHROMBIN TIME: 14.6 SECONDS (ref 11.6–14.5)
RBC # BLD AUTO: 4.5 MILLION/UL (ref 3.88–5.62)
SODIUM SERPL-SCNC: 144 MMOL/L (ref 136–145)
TROPONIN I SERPL-MCNC: <0.02 NG/ML
WBC # BLD AUTO: 6.74 THOUSAND/UL (ref 4.31–10.16)

## 2020-01-28 PROCEDURE — 99285 EMERGENCY DEPT VISIT HI MDM: CPT

## 2020-01-28 PROCEDURE — 99223 1ST HOSP IP/OBS HIGH 75: CPT | Performed by: PSYCHIATRY & NEUROLOGY

## 2020-01-28 PROCEDURE — 85730 THROMBOPLASTIN TIME PARTIAL: CPT | Performed by: EMERGENCY MEDICINE

## 2020-01-28 PROCEDURE — 1123F ACP DISCUSS/DSCN MKR DOCD: CPT | Performed by: INTERNAL MEDICINE

## 2020-01-28 PROCEDURE — 93005 ELECTROCARDIOGRAM TRACING: CPT

## 2020-01-28 PROCEDURE — 84484 ASSAY OF TROPONIN QUANT: CPT | Performed by: EMERGENCY MEDICINE

## 2020-01-28 PROCEDURE — 99285 EMERGENCY DEPT VISIT HI MDM: CPT | Performed by: EMERGENCY MEDICINE

## 2020-01-28 PROCEDURE — 99223 1ST HOSP IP/OBS HIGH 75: CPT | Performed by: INTERNAL MEDICINE

## 2020-01-28 PROCEDURE — 93010 ELECTROCARDIOGRAM REPORT: CPT | Performed by: INTERNAL MEDICINE

## 2020-01-28 PROCEDURE — 70450 CT HEAD/BRAIN W/O DYE: CPT

## 2020-01-28 PROCEDURE — 84484 ASSAY OF TROPONIN QUANT: CPT | Performed by: INTERNAL MEDICINE

## 2020-01-28 PROCEDURE — 99222 1ST HOSP IP/OBS MODERATE 55: CPT

## 2020-01-28 PROCEDURE — 85610 PROTHROMBIN TIME: CPT | Performed by: EMERGENCY MEDICINE

## 2020-01-28 PROCEDURE — 71046 X-RAY EXAM CHEST 2 VIEWS: CPT

## 2020-01-28 PROCEDURE — 85025 COMPLETE CBC W/AUTO DIFF WBC: CPT | Performed by: EMERGENCY MEDICINE

## 2020-01-28 PROCEDURE — 36415 COLL VENOUS BLD VENIPUNCTURE: CPT

## 2020-01-28 PROCEDURE — 80053 COMPREHEN METABOLIC PANEL: CPT | Performed by: EMERGENCY MEDICINE

## 2020-01-28 RX ORDER — DIVALPROEX SODIUM 250 MG/1
250 TABLET, DELAYED RELEASE ORAL EVERY 12 HOURS SCHEDULED
Status: DISCONTINUED | OUTPATIENT
Start: 2020-01-28 | End: 2020-01-29

## 2020-01-28 RX ORDER — TAMSULOSIN HYDROCHLORIDE 0.4 MG/1
0.8 CAPSULE ORAL
Status: DISCONTINUED | OUTPATIENT
Start: 2020-01-28 | End: 2020-01-31 | Stop reason: HOSPADM

## 2020-01-28 RX ORDER — DIVALPROEX SODIUM 250 MG/1
250 TABLET, DELAYED RELEASE ORAL ONCE
Status: COMPLETED | OUTPATIENT
Start: 2020-01-28 | End: 2020-01-28

## 2020-01-28 RX ORDER — PRAVASTATIN SODIUM 10 MG
20 TABLET ORAL
Status: DISCONTINUED | OUTPATIENT
Start: 2020-01-28 | End: 2020-01-31 | Stop reason: HOSPADM

## 2020-01-28 RX ORDER — FLUOXETINE HYDROCHLORIDE 20 MG/1
20 CAPSULE ORAL DAILY
Status: DISCONTINUED | OUTPATIENT
Start: 2020-01-29 | End: 2020-01-31 | Stop reason: HOSPADM

## 2020-01-28 RX ORDER — PANTOPRAZOLE SODIUM 40 MG/1
40 TABLET, DELAYED RELEASE ORAL
Status: DISCONTINUED | OUTPATIENT
Start: 2020-01-28 | End: 2020-01-31 | Stop reason: HOSPADM

## 2020-01-28 RX ORDER — DIVALPROEX SODIUM 500 MG/1
500 TABLET, DELAYED RELEASE ORAL EVERY 12 HOURS SCHEDULED
Status: DISCONTINUED | OUTPATIENT
Start: 2020-01-28 | End: 2020-01-28

## 2020-01-28 RX ORDER — ACETAMINOPHEN 325 MG/1
650 TABLET ORAL EVERY 6 HOURS PRN
Status: DISCONTINUED | OUTPATIENT
Start: 2020-01-28 | End: 2020-01-30

## 2020-01-28 RX ADMIN — DIVALPROEX SODIUM 250 MG: 250 TABLET, DELAYED RELEASE ORAL at 11:19

## 2020-01-28 RX ADMIN — TAMSULOSIN HYDROCHLORIDE 0.8 MG: 0.4 CAPSULE ORAL at 21:20

## 2020-01-28 RX ADMIN — DIVALPROEX SODIUM 250 MG: 250 TABLET, DELAYED RELEASE ORAL at 21:20

## 2020-01-28 RX ADMIN — PRAVASTATIN SODIUM 20 MG: 10 TABLET ORAL at 18:11

## 2020-01-28 RX ADMIN — RIVAROXABAN 20 MG: 20 TABLET, FILM COATED ORAL at 11:33

## 2020-01-28 RX ADMIN — PANTOPRAZOLE SODIUM 40 MG: 40 TABLET, DELAYED RELEASE ORAL at 18:10

## 2020-01-28 NOTE — ASSESSMENT & PLAN NOTE
Wt Readings from Last 3 Encounters:   01/28/20 135 kg (297 lb)   12/26/19 89 7 kg (197 lb 11 2 oz)   11/18/19 94 8 kg (209 lb)       Patient euvolemic

## 2020-01-28 NOTE — CONSULTS
Consultation - Neurology   Astrid Malone 71 y o  male MRN: [id number]  Unit/Bed#: ED 16 Encounter: [id number]      Assessment/Plan   Assessment:  3 71year-old with prior R ROB and MCA infarction, prior seizure activity, and atrial fibrillation on Xarelto who presented with unresponsiveness episode with associated dizziness, diaphoresis, shortness of breath, and right hand "tremor"  Unclear etiology at this time, suspect most likely cardiogenic syncope in the setting of possible bradycardia and hypoxia  Plan:  - Recommend loop recorder interrogation  - Cardiology consulted; appreciate recommendations  - Continue Depakote taper: 250mg AM and 500mg PM  - Continue Xarelto 20mg daily  - Telemetry  - Notify Neurology with any changes in neuro examination  - Further recommendations as per Attending Neurologist attestation     Results:  1  CT head: Stable primarily chronic but also late subacute ischemic change within the right hemisphere primarily involving the frontal lobe and parietal lobe extending inferiorly to the operculum  No mass effect or hemorrhagic transformation  Stable opacification of the paranasal sinuses and nasal cavity in this patient with prior endoscopic sinus surgery  Findings are suggestive of sinonasal polyposis  2  Routine EEG (1/3/2020): This Routine EEG recorded during wakefulness, drowsiness, and sleep is abnormal 1  A diffuse background slowing 2  Focal slowing involving the right temporoparietal region 3  In asymmetric driving response seen more predominantly on the left compared to the right consistent with a focal slowing and the prior cerebral ischemia 4  No epileptiform features and no seizures      History of Present Illness     Reason for Consult / Principal Problem: Syncope, 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, left hemiplegia, seizure disorder  Hx and PE limited by: N/A  HPI: Stephanie Galeano is a 71 y o  male with prior large R ROB and MCA infarction in April 2019 with residual left sided weakness, seizure activity in December 2019, atrial fibrillation on Xarelto, prior GI bleed on Eliquis who presented to [facility] ED on 1/27 for episode of unresponsiveness  History obtained from patient's wife  Patient was sitting on the shower chair and became dizzy  Patient was being transferred from the bathroom to the bedroom in a transfer chair and when he lost consciousness  Patient was unconscious for 1-2 minutes  Patient was staring off, became pale, and started sweating  She states that it appeared that patient was short of breath and was gasping for air  Patient's wife noticed a right hand "tremor" that lasted for several seconds  Patient was not confused following the episode  Patient's daughter is a nurse and came over to check on the patient  She reports that both his heart rate and blood pressure were low (unclear what the specific numbers were)  Upon arrival to the ED, BP was 114/57  ECG showed sinus bradycardia with frequent PACs and nonspecific ST changes  CT head showed stable chronic/late subacute ischemic change within right hemisphere without mass effect or hemorrhagic conversion  Of note, patient was admitted in December 2019 for left lower extremity shaking followed by episode of loss of consciousness and diaphoresis that lasted several minutes  MRI brain showed possibly new stroke in the same distribution of his prior strokes  Patient was given a Keppra load and initiated on Keppra 750mg BID  Patient became somnolent and was transitioned to Depakote 500mg BID  Patient underwent routine EEG on 1/3/2020 which showed did not show evidence of epileptiform features and no seizures  Patient continued to have drowsiness on Depakote and is currently in the process of weaning off the medication  Patient's last outpatient Neurology appointment was with Dr Caleb Fritz on 1/3/2020  Patient's wife states that he has been extremely drowsy over the past three weeks   Patient's wife denies any other episodes of seizure like activity since December  Inpatient consult to Neurology  Consult performed by: Roberto Moraes PA-C  Consult ordered by: Stephanie Cornejo MD        Review of Systems   Reason unable to perform ROS: A 12 point ROS was performed but difficult to accurately assess due to aphasia  Historical Information   Past Medical History:   Diagnosis Date    Arthritis     BL knees    Atrial fibrillation (HCC)     CHF (congestive heart failure) (HCC)     Colon polyp     [facility]re[facility] [address] [facility] 4/27/2019    NIHSS 26 on presentation    Depression     Diabetes mellitus ([facility] [address] [facility]     Hypertension     Irregular heart beat     Afib    Stroke [facility])     Urinary retention      Past Surgical History:   Procedure Laterality Date    COLONOSCOPY      IR STROKE ALERT  4/27/2019    MENISCECTOMY Right      Social History   Social History     Substance and Sexual Activity   Alcohol Use Not Currently    Frequency: 2-4 times a month    Comment: social     Social History     Substance and Sexual Activity   Drug Use Never     Social History     Tobacco Use   Smoking Status Never Smoker   Smokeless Tobacco Never Used     Family History: History reviewed  No pertinent family history  Review of previous medical records was completed  Meds/Allergies   current meds:   No current facility-administered medications for this encounter  and PTA meds:   Prior to Admission Medications   Prescriptions Last Dose Informant Patient Reported? Taking?    Coenzyme Q10 (CO Q 10) 10 MG CAPS  Spouse/Significant Other Yes Yes   Sig: Take by mouth daily   FLUoxetine (PROzac) 20 mg capsule   No Yes   Sig: Take 1 capsule (20 mg total) by mouth daily   cyanocobalamin (VITAMIN B-12) 100 mcg tablet  Spouse/Significant Other Yes Yes   Sig: Take by mouth daily   divalproex sodium (DEPAKOTE) 500 mg EC tablet   No Yes   Sig: Take 1 tablet (500 mg total) by mouth every 12 (twelve) hours   Patient taking differently: Take 500 mg by mouth every 12 (twelve) hours    hydrocortisone 1 % cream  Spouse/Significant Other Yes Yes   Sig: Apply topically 2 (two) times a day as needed    metoprolol succinate (TOPROL-XL) 25 mg 24 hr tablet   No Yes   Sig: Take 0 5 tablets (12 5 mg total) by mouth daily   Patient taking differently: Take 12 5 mg by mouth daily    pantoprazole (PROTONIX) 40 mg tablet  Spouse/Significant Other No Yes   Sig: Take 1 tablet (40 mg total) by mouth 2 (two) times a day before meals   pravastatin (PRAVACHOL) 20 mg tablet   No Yes   Sig: Take 1 tablet (20 mg total) by mouth daily   rivaroxaban (XARELTO) 20 mg tablet   No Yes   Sig: Take 1 tablet (20 mg total) by mouth daily with breakfast   tamsulosin (FLOMAX) 0 4 mg  Spouse/Significant Other Yes Yes   Sig: Take 0 8 mg by mouth daily at bedtime       Facility-Administered Medications: None       Allergies   Allergen Reactions    Cephalexin Throat Swelling     Pt reported throat swelling after taking antibiotic     Penicillins Other (See Comments)       Objective   Vitals:Blood pressure 122/62, pulse 65, temperature (!) 97 1 °F (36 2 °C), temperature source Temporal, resp  rate 16, height 6' (1 829 m), weight 135 kg (297 lb), SpO2 98 %  ,Body mass index is 40 28 kg/m²  No intake or output data in the 24 hours ending 01/28/20 1123    Invasive Devices: Invasive Devices     Peripheral Intravenous Line            Peripheral IV 01/28/20 Right Hand less than 1 day              Physical Exam   Constitutional: No distress  HENT:   Right Ear: External ear normal    Left Ear: External ear normal    Nose: Nose normal    Eyes: Pupils are equal, round, and reactive to light  EOM are normal    Pulmonary/Chest: No respiratory distress  Musculoskeletal: Normal range of motion  Neurological: He is alert  Reflex Scores:       Bicep reflexes are 1+ on the right side and 3+ on the left side         Brachioradialis reflexes are 1+ on the right side and 3+ on the left side  Patellar reflexes are 0 on the right side and 0 on the left side  Achilles reflexes are 2+ on the left side  Skin: He is not diaphoretic  Psychiatric: Judgment and thought content normal      Neurologic Exam     Mental Status   Patient is alert and oriented to person, place, and date  Word finding difficulty noted     Cranial Nerves     CN II   Visual fields full to confrontation  CN III, IV, VI   Pupils are equal, round, and reactive to light  Extraocular motions are normal    Conjugate gaze: present    CN V   Facial sensation intact  CN VII   Left facial weakness: Left facial asymmetry  CN VIII   Hearing: intact    CN IX, X   Palate: symmetric    CN XII   CN XII normal      Motor Exam   Left arm tone: spastic  Patient is able to lift RUE and RLE without drift  RUE/RLE strength: 5/5  Minimal movement noted in LUE and LLE  Left dorsiflexion strength: 2/5  Left hip flexion: 2/5     Sensory Exam   Limited sensory examination secondary to aphasia     Gait, Coordination, and Reflexes     Tremor   Resting tremor: absent    Reflexes   Right brachioradialis: 1+  Left brachioradialis: 3+  Right biceps: 1+  Left biceps: 3+  Right patellar: 0  Left patellar: 0  Left achilles: 2+  Right plantar: equivocal  Left plantar: equivocal     Lab Results: I have personally reviewed pertinent reports  Imaging Studies: I have personally reviewed pertinent reports  and I have personally reviewed pertinent films in PACS  EKG, Pathology, and Other Studies: I have personally reviewed pertinent reports      VTE Prophylaxis: Sequential compression device Teena Selma)     Code Status: Prior  Advance Directive and Living Will:      Power of : Yes  POLST:

## 2020-01-28 NOTE — ASSESSMENT & PLAN NOTE
This is a 17-year-old male with history of hypertension, hyperlipidemia, paroxysmal atrial fibrillation on Xarelto, history of CVA with residual left-sided weakness presenting with episode of unresponsiveness at home     -as per wife patient had associated dizziness, diaphoresis, dyspnea and right hand tremor  -unclear etiology, rule out cardiogenic syncope versus seizure  -CT head reveals stable chronic but also late subacute ischemic changes within right hemisphere involving frontal lobe and parietal lobe  -neurology consultation appreciated  -cardiology consultation appreciated  -plan to interrogate loop recorder  -will trend troponin, monitor on telemetry

## 2020-01-28 NOTE — CONSULTS
Consult - Cardiology   Areta Body Kira 71 y o  male MRN: [id number]  Unit/Bed#: ED 22 Encounter: [id number]        Reason For Consult:  Syncope                 ASSESSMENT:  1  Syncope, primary reason for consultation    2  Paroxysmal atrial fibrillation   -chads Vasc 4 (age, HTN, CVA)   -stroke prophylaxis:  Xarelto   -rate control:  Lopressor 12 5 b i d     3  History of right ROB/MCA embolic CVA (5/08/7395)   -s/p thrombectomy and tPA   -residual aphasia and dysphagia    4  Hypertension  5  Dyslipidemia  6  History of GI bleed on Eliquis      PLAN/ DISCUSSION:     1  Will interrogate loop recorder tomorrow to investigate heart rate and rhythm at the time of unresponsive event and assessed burden of dysrhythmias since last interrogation    2  Continue Xarelto, prior progress notes indicated he was on 15 mg due to prior history of GI bleed  If able to tolerate would continue 20 mg daily    3  Okay to remain off of metoprolol tartrate in light of recent fatigue, device interrogation forthcoming to investigate overall heart rate trend  Tele currently showing sinus in 60's    4  Neurology input much appreciated regarding altered mental status and episode of unresponsiveness    5  Continue Depakote taper with goal to simplify medical regimen as much as possible       History Of Present Illness: This is a very kind 60-year-old gentleman with a cardiac past medical history significant for paroxysmal atrial fibrillation  He was hospitalized in April 2019 for acute CVA and underwent thrombectomy and tPA administration  On this admission he had an [employer] loop recorder implanted on 05/02/2019  He was discharged to rehab after this admission  While at rehab he had an EKG performed which showed atrial fibrillation and he was brought back to the hospital in May 2019 for consultation by Cardiology  On this admission he was placed on Eliquis and discharged back to rehab    He returned twice to the hospital next month    05/23/19:  Admitted for GI bleed, underwent EGD with clipping, continue on Eliquis   06/02/2019:  Admitted for anemia, seen by GI  No intervention  Changed to Xarelto   09/06/2019: Seen by Dr Cecilia Fu (cardiology) and was doing well though had some fatigue for which his metoprolol was decreased to 12 5 daily   10/25/2019:  Seen by EP and had device reports reviewed which showed no further AFib and was actually just sinus rhythm with frequent PACs and PVCs  He in fact has not had any documented atrial fibrillation since May  His most recent device report on 12/26/2019 indicated 24 possible AFib episodes but most likely sinus rhythm with PACs and PVCs and over sensing  Interval history: This gentleman has been experiencing generalized fatigue for the greater part of 1 month  For this he discontinued his metoprolol about 3 weeks ago  That has not helped very much  He has been gradually weaning off of Depakote as well for history of seizures which has not helped his fatigue very much at all either  Wife at bedside helps provide history  Over the weekend she says he was so fatigued that he could barely lift his head to eat  He is sleeping for the majority of the day and night  He normally transfers with the help of 1 person however has required 2 people to help transfer in recent days  Early this morning around 0730 he transferred into the shower and was getting washed with help of home health aide when he became very dizzy  He reportedly had an episode of unresponsiveness where his head rolled forward but he did not fall off the chair  This lasted between 1 and 5 minutes  After about 5 minutes he awoke with some deep breathing but according to his wife looked very pale and had a weak pulse  There daughter evaluated him (she has a NICU RN) and called an ambulance to bring him to the hospital   We are asked to see him in consultation for syncope  Presently he has no physical complaints  Past Medical History:        Past Medical History:   Diagnosis Date    Arthritis     BL knees    Atrial fibrillation (HCC)     CHF (congestive heart failure) (HCC)     Colon polyp     [facility]A [facility]cere[facility]) ([facility] [address] ) 4/27/2019    NIHSS 26 on presentation    Depression     [facility]     Hypertension     Irregular heart beat     Afib    [facility]roke [facility])     Urinary retention       Past Surgical History:   Procedure Laterality Date    COLONOSCOPY      IR STROKE ALERT  4/27/2019    MENISCECTOMY Right         Allergy:        Allergies   Allergen Reactions    Cephalexin Throat Swelling     Pt reported throat swelling after taking antibiotic     Penicillins Other (See Comments)       Medications:       Prior to Admission medications    Medication Sig Start Date End Date Taking?  Authorizing Provider   Coenzyme Q10 (CO Q 10) 10 MG CAPS Take by mouth daily   Yes Historical Provider, MD   cyanocobalamin (VITAMIN B-12) 100 mcg tablet Take by mouth daily   Yes Historical Provider, MD   divalproex sodium (DEPAKOTE) 500 mg EC tablet Take 1 tablet (500 mg total) by mouth every 12 (twelve) hours  Patient taking differently: Take 500 mg by mouth every 12 (twelve) hours  1/3/20  Yes Tilman Canavan, MD   FLUoxetine (PROzac) 20 mg capsule Take 1 capsule (20 mg total) by mouth daily 12/30/19  Yes Maco Ballard DO   hydrocortisone 1 % cream Apply topically 2 (two) times a day as needed    Yes Historical Provider, MD   metoprolol succinate (TOPROL-XL) 25 mg 24 hr tablet Take 0 5 tablets (12 5 mg total) by mouth daily  Patient taking differently: Take 12 5 mg by mouth daily  12/29/19 1/28/20 Yes Maco Ballard DO   pantoprazole (PROTONIX) 40 mg tablet Take 1 tablet (40 mg total) by mouth 2 (two) times a day before meals 5/31/19  Yes Danuta Pruitt MD   pravastatin (PRAVACHOL) 20 mg tablet Take 1 tablet (20 mg total) by mouth daily 12/29/19  Yes Maco Ballard DO   rivaroxaban (XARELTO) 20 mg tablet Take 1 tablet (20 mg total) by mouth daily with breakfast 12/29/19  Yes Maco Ballard DO   tamsulosin (FLOMAX) 0 4 mg Take 0 8 mg by mouth daily at bedtime    Yes Historical Provider, MD   baclofen 10 mg tablet Take 1 tablet (10 mg total) by mouth 2 (two) times a day 12/29/19 1/28/20  Omid Charles DO       Family History:     History reviewed  No pertinent family history  Social History:       Social History     Socioeconomic History    Marital status: /Civil Union     Spouse name: None    Number of children: None    Years of education: None    Highest education level: None   Occupational History    None   Social Needs    Financial resource strain: None    Food insecurity:     Worry: None     Inability: None    Transportation needs:     Medical: None     Non-medical: None   Tobacco Use    Smoking status: Never Smoker    Smokeless tobacco: Never Used   Substance and Sexual Activity    Alcohol use: Not Currently     Frequency: 2-4 times a month     Comment: social    Drug use: Never    Sexual activity: None   Lifestyle    Physical activity:     Days per week: None     Minutes per session: None    Stress: None   Relationships    Social connections:     Talks on phone: None     Gets together: None     Attends Islam service: None     Active member of club or organization: None     Attends meetings of clubs or organizations: None     Relationship status: None    Intimate partner violence:     Fear of current or ex partner: None     Emotionally abused: None     Physically abused: None     Forced sexual activity: None   Other Topics Concern    None   Social History Narrative    None       ROS:  Symptoms per HPI  Remainder review of systems is negative    Exam:  General:  alert, oriented and in no distress, cooperative   Expressive aphasia   Decent historian but has some trouble speaking  Head: Normocephalic, atraumatic  Eyes:  EOMI  Pupils - equal, round, reactive to accomodation  No icterus  Normal Conjunctiva  Oropharynx: moist and normal-appearing mucosa  Neck: supple, symmetrical, trachea midline and no JVD  Heart:  RRR, No: murmer, rub or gallop, S1 & S2 normal   Respiratory effort / Chest Inspection: unlabored  Lungs:  normal air entry, lungs clear to auscultation and no rales, rhonchi or wheezing   Abdomen: flat, normal findings: bowel sounds normal and soft, non-tender  Lower Limbs:  no pitting edema  Pulses[de-identified]  RLE - DP: present 2+                 LLE - DP: present 2+  Musculoskeletal: ROM grossly normal      DATA:      ECG:                 Undetermined rhythm  Probable sinus with sinus arrhythmia      Telemetry: Normal sinus rhythm,   HR 60's          Echocardiogram:      April 2019:  SUMMARY     LEFT VENTRICLE:  Systolic function was normal  Ejection fraction was estimated to be 60 %  There were no regional wall motion abnormalities      HISTORY: PRIOR HISTORY: Hypertension, Hyperlipidemia      PROCEDURE: The procedure was performed at the bedside  This was a routine study  The transthoracic approach was used  The study included limited 2D imaging, M-mode, limited spectral Doppler, and color Doppler  The heart rate was 80 bpm, at  the start of the study  Images were obtained from the parasternal, apical, and subcostal acoustic windows  Image quality was adequate      LEFT VENTRICLE: Size was normal  Systolic function was normal  Ejection fraction was estimated to be 60 %  There were no regional wall motion abnormalities  Wall thickness was normal  No evidence of apical thrombus  DOPPLER: Left  ventricular diastolic function parameters were normal      RIGHT VENTRICLE: The size was normal  Systolic function was normal  Wall thickness was normal      LEFT ATRIUM: Size was normal      RIGHT ATRIUM: Size was normal      MITRAL VALVE: Valve structure was normal  There was normal leaflet separation  DOPPLER: The transmitral velocity was within the normal range  There was no evidence for stenosis  There was no significant regurgitation      AORTIC VALVE: The valve was trileaflet  Leaflets exhibited normal thickness and normal cuspal separation  DOPPLER: Transaortic velocity was within the normal range  There was no evidence for stenosis  There was no significant  regurgitation      TRICUSPID VALVE: The valve structure was normal  There was normal leaflet separation  DOPPLER: The transtricuspid velocity was within the normal range  There was no evidence for stenosis  There was no significant regurgitation      PULMONIC VALVE: Leaflets exhibited normal thickness, no calcification, and normal cuspal separation  DOPPLER: The transpulmonic velocity was within the normal range  There was no significant regurgitation      PERICARDIUM: There was no pericardial effusion  The pericardium was normal in appearance      AORTA: The root exhibited normal size      SYSTEMIC VEINS: IVC: The inferior vena cava was normal in size      SYSTEM MEASUREMENT TABLES     2D  %FS: 27 13 %  Ao Diam: 3 62 cm  EDV(Teich): 99 ml  EF(Teich): 52 88 %  ESV(Teich): 46 64 ml  IVSd: 1 11 cm  LA Diam: 3 94 cm  LVEDV MOD A4C: 111 01 ml  LVEF MOD A4C: 61 66 %  LVESV MOD A4C: 42 56 ml  LVIDd: 4 63 cm  LVIDs: 3 38 cm  LVLd A4C: 7 85 cm  LVLs A4C: 6 5 cm  LVPWd: 1 1 cm  SV MOD A4C: 68 45 ml  SV(Teich): 52 36 ml     PW  E': 0 07 m/s  E/E': 11 04  MV A Larry: 0 65 m/s  MV Dec [place]: 4 18 m/s2  MV DecT: 189 42 ms  MV E Larry: 0 79 m/s  MV E/A Ratio: 1 22  MV PHT: 54 93 ms  MVA By PHT: 4 01 cm2              Weights: Wt Readings from Last 3 Encounters:   01/28/20 135 kg (297 lb)   12/26/19 89 7 kg (197 lb 11 2 oz)   11/18/19 94 8 kg (209 lb)   , Body mass index is 40 28 kg/m²           Lab Studies:    Results from last 7 days   Lab Units 01/28/20  0854   TROPONIN I ng/mL <0 02          Results from last 7 days   Lab Units 01/28/20  0854   WBC Thousand/uL 6 74   HEMOGLOBIN g/dL 13 4   HEMATOCRIT % 42 8   PLATELETS Thousands/uL 117*   ,   Results from last 7 days   Lab Units 01/28/20  0854   POTASSIUM mmol/L 3 8   CHLORIDE mmol/L 107   CO2 mmol/L 28   BUN mg/dL 19   CREATININE mg/dL 0 93   CALCIUM mg/dL 9 0   ALK PHOS U/L 72   ALT U/L 10*   AST U/L 11

## 2020-01-28 NOTE — ED PROVIDER NOTES
History  Chief Complaint   Patient presents with    Syncope     Per EMS patient became weak and unresponsive while getting out of shower this morning as reported by family  Patient has a seizure hx however family states no seizure activity reported today  Denies striking head -loc  No complaints at this time  A 70-year-old male with past medical history of CVA (April 2019, with residual L sided weakness and aphasia), CHF, diabetes, hypertension, AFib (on Xarelto) and depression; presents after a syncopal episode in the shower  Patient was in the shower with the assistance of his visiting nurse, when he became unresponsive  Patient's wife did witness the episode, and noted shaking to his right arm however no other seizure-like activity  Patient's wife states that he was unconscious for several minutes before regaining consciousness  Upon regaining consciousness, the patient had no confusion or new focal deficits  Currently patient offers no complaints  Patient does not recall the episode  On review of records, patient was admitted in the beginning of December for similar symptoms  There was concern the patient may have been developing seizures secondary to his stroke  Patient was started on Keppra however due to excessive fatigue his Keppra was switched to Depakote  Patient has been compliant with his Depakote however his wife reports persistent excessive fatigue, to the point where he is unable to participate in normal activities and physical therapy  Patient's wife has spoken with Neurology, who are currently weaning down his Depakote  A/P:  Syncope, without prodromal symptoms  Doubt acute seizure based on HPI, however patient does have a complicated history  Will check lab work for electrolyte abnormality, anemia and renal impairment  Will obtain EKG to evaluate for arrhythmia  Will obtain CT head to evaluate for intracranial pathology    Patient will require admission for further treatment and evaluation  History provided by:  Patient, medical records and spouse  Syncope       Prior to Admission Medications   Prescriptions Last Dose Informant Patient Reported? Taking?    Coenzyme Q10 (CO Q 10) 10 MG CAPS  Spouse/Significant Other Yes Yes   Sig: Take by mouth daily   FLUoxetine (PROzac) 20 mg capsule   No Yes   Sig: Take 1 capsule (20 mg total) by mouth daily   cyanocobalamin (VITAMIN B-12) 100 mcg tablet  Spouse/Significant Other Yes Yes   Sig: Take by mouth daily   divalproex sodium (DEPAKOTE) 500 mg EC tablet   No Yes   Sig: Take 1 tablet (500 mg total) by mouth every 12 (twelve) hours   Patient taking differently: Take 500 mg by mouth every 12 (twelve) hours    hydrocortisone 1 % cream  Spouse/Significant Other Yes Yes   Sig: Apply topically 2 (two) times a day as needed    metoprolol succinate (TOPROL-XL) 25 mg 24 hr tablet   No Yes   Sig: Take 0 5 tablets (12 5 mg total) by mouth daily   Patient taking differently: Take 12 5 mg by mouth daily    pantoprazole (PROTONIX) 40 mg tablet  Spouse/Significant Other No Yes   Sig: Take 1 tablet (40 mg total) by mouth 2 (two) times a day before meals   pravastatin (PRAVACHOL) 20 mg tablet   No Yes   Sig: Take 1 tablet (20 mg total) by mouth daily   rivaroxaban (XARELTO) 20 mg tablet   No Yes   Sig: Take 1 tablet (20 mg total) by mouth daily with breakfast   tamsulosin (FLOMAX) 0 4 mg  Spouse/Significant Other Yes Yes   Sig: Take 0 8 mg by mouth daily at bedtime       Facility-Administered Medications: None       Past Medical History:   Diagnosis Date    Arthritis     BL knees    Atrial fibrillation (HCC)     CHF (congestive heart failure) (HCC)     Colon polyp     [facility] 4[facility]2019    NIHSS 26 on presentation    Depression     [facility] mellitus [facility]     Hypertension     Irregular heart beat     Afib    Stroke [facility])     Urinary retention        Past Surgical History:   Procedure Laterality Date    COLONOSCOPY  IR STROKE ALERT  4/27/2019    MENISCECTOMY Right        History reviewed  No pertinent family history  I have reviewed and agree with the history as documented  Social History     Tobacco Use    Smoking status: Never Smoker    Smokeless tobacco: Never Used   Substance Use Topics    Alcohol use: Not Currently     Frequency: 2-4 times a month     Comment: social    Drug use: Never        Review of Systems   Cardiovascular: Positive for syncope  Neurological: Positive for syncope  All other systems reviewed and are negative  Physical Exam  Physical Exam  General Appearance: alert and oriented, nad, non toxic appearing  Skin:  Warm, dry, intact  HEENT: atraumatic, normocephalic  Neck: Supple, trachea midline  Cardiac: RRR; no murmurs, rub, gallops  Pulmonary: lungs CTAB; no wheezes, rales, rhonchi  Gastrointestinal: abdomen soft, nontender, nondistended; no guarding or rebound tenderness; good bowel sounds, no mass or bruits  Extremities:  no pedal edema, 2+ pulses; no calf tenderness, no clubbing, no cyanosis  Neuro:  Aphasia  Left sided weakness, residual from patient's recent stroke  5/5 motor strength to RUE and RLE    Psych:  Normal mood and affect, normal judgement and insight      Vital Signs  ED Triage Vitals   Temperature Pulse Respirations Blood Pressure SpO2   01/28/20 0827 01/28/20 0827 01/28/20 0827 01/28/20 0827 01/28/20 0827   (!) 97 1 °F (36 2 °C) 60 16 114/57 98 %      Temp Source Heart Rate Source Patient Position - Orthostatic VS BP Location FiO2 (%)   01/28/20 0827 01/28/20 1122 01/28/20 1015 01/28/20 1015 --   Temporal Monitor Lying Right arm       Pain Score       01/28/20 0827       No Pain           Vitals:    01/28/20 0933 01/28/20 1015 01/28/20 1122 01/28/20 1219   BP: 112/58 124/63 122/62 118/66   Pulse: 57 60 65 62   Patient Position - Orthostatic VS:  Lying Lying Lying         Visual Acuity      ED Medications  Medications   divalproex sodium (DEPAKOTE) EC tablet 250 mg (250 mg Oral Given 1/28/20 1119)   rivaroxaban (XARELTO) tablet 20 mg (20 mg Oral Given 1/28/20 1133)       Diagnostic Studies  Results Reviewed     Procedure Component Value Units Date/Time    Comprehensive metabolic panel [[id number]]  (Abnormal) Collected:  01/28/20 0854    Lab Status:  Final result Specimen:  Blood from Arm, Left Updated:  01/28/20 0925     Sodium 144 mmol/L      Potassium 3 8 mmol/L      Chloride 107 mmol/L      CO2 28 mmol/L      ANION GAP 9 mmol/L      BUN 19 mg/dL      Creatinine 0 93 mg/dL      Glucose 117 mg/dL      Calcium 9 0 mg/dL      AST 11 U/L      ALT 10 U/L      Alkaline Phosphatase 72 U/L      Total Protein 6 1 g/dL      Albumin 2 8 g/dL      Total Bilirubin 0 39 mg/dL      eGFR 83 ml/min/1 73sq m     Narrative:       [place] guidelines for Chronic Kidney Disease (CKD):     Stage 1 with normal or high GFR (GFR > 90 mL/min/1 73 square meters)    Stage 2 Mild CKD (GFR = 60-89 mL/min/1 73 square meters)    Stage 3A Moderate CKD (GFR = 45-59 mL/min/1 73 square meters)    Stage 3B Moderate CKD (GFR = 30-44 mL/min/1 73 square meters)    Stage 4 Severe CKD (GFR = 15-29 mL/min/1 73 square meters)    Stage 5 End Stage CKD (GFR <15 mL/min/1 73 square meters)  Note: GFR calculation is accurate only with a steady state creatinine    Troponin I [[id number]]  (Normal) Collected:  01/28/20 0854    Lab Status:  Final result Specimen:  Blood from Arm, Left Updated:  01/28/20 0924     Troponin I <0 02 ng/mL     Protime-INR [[id number]]  (Abnormal) Collected:  01/28/20 0854    Lab Status:  Final result Specimen:  Blood from Arm, Left Updated:  01/28/20 0915     Protime 14 6 seconds      INR 1 12    APTT [[id number]]  (Normal) Collected:  01/28/20 0854    Lab Status:  Final result Specimen:  Blood from Arm, Left Updated:  01/28/20 0915     PTT 32 seconds     CBC and differential [[id number]]  (Abnormal) Collected:  01/28/20 0854    Lab Status:  Final result Specimen: Blood from Arm, Left Updated:  01/28/20 0905     WBC 6 74 Thousand/uL      RBC 4 50 Million/uL      Hemoglobin 13 4 g/dL      Hematocrit 42 8 %      MCV 95 fL      MCH 29 8 pg      MCHC 31 3 g/dL      RDW 13 8 %      MPV 9 9 fL      Platelets 187 Thousands/uL      nRBC 0 /100 WBCs      Neutrophils Relative 74 %      Immat GRANS % 0 %      Lymphocytes Relative 17 %      Monocytes Relative 7 %      Eosinophils Relative 2 %      Basophils Relative 0 %      Neutrophils Absolute 4 94 Thousands/µL      Immature Grans Absolute 0 02 Thousand/uL      Lymphocytes Absolute 1 16 Thousands/µL      Monocytes Absolute 0 48 Thousand/µL      Eosinophils Absolute 0 12 Thousand/µL      Basophils Absolute 0 02 Thousands/µL                  XR chest 2 views   ED Interpretation by Britney Crisostomo DO (01/28 1031)   No acute disease      Final Result by Eufemia Palma MD (01/28 1059)      No acute cardiopulmonary disease  Workstation performed: [id number]         CT head without contrast   Final Result by Chidi Arce DO (01/28 1009)      Stable primarily chronic but also late subacute ischemic change within the right hemisphere primarily involving the frontal lobe and parietal lobe extending inferiorly to the operculum  No mass effect or hemorrhagic transformation  Stable opacification of the paranasal sinuses and nasal cavity in this patient with prior endoscopic sinus surgery  Findings are suggestive of sinonasal polyposis                    Workstation performed: [id number]                    Procedures  Procedures   ECG 12 Lead Documentation  Date/Time: today/date: 1/28/2020  Performed by: Elizabeth Henry    ECG reviewed by me, the ED Provider: yes    Patient location:  ED   Previous ECG:  Compared to current, no change   Rate:  61  ECG rate assessment: normal    Rhythm: sinus bradycardia    Ectopy:  Frequent PACs    QRS axis:  Normal  Intervals: normal   Q waves: None   ST segments:  Nonspecific changes  T waves: normal      Impression:  Sinus bradycardia, with frequent PACs and nonspecific ST changes          ED Course  ED Course as of Jan 28 1402   Tue Jan 28, 2020   6309 Labs within normal limits      1029 CTH with stable findings      1051 Patient and wife updated on results  Will proceed with admission for further evaluation of fatigue and syncope  MDM      Disposition  Final diagnoses:   Syncope   Fatigue     Time reflects when diagnosis was documented in both MDM as applicable and the Disposition within this note     Time User Action Codes Description Comment    1/28/2020 11:06 AM Huey Gutierrez Cool Add [R55] Syncope     1/28/2020 11:06 AM Orion Fair Add [R53 83] Fatigue     1/28/2020 11:08 AM Lorie Hamilton Add [I63 411] Cerebrovascular accident (CVA) due to embolism of right middle cerebral artery ([facility] [address] )     1/28/2020 11:09 AM Lorie Hamilton Add [G81 94] Left hemiplegia ([facility] )     1/28/2020 11:09 AM Lorie Hamilton Add [G40 909] Seizure disorder [facility])       ED Disposition     ED Disposition Condition Date/Time Comment    Admit Stable Tue Jan 28, 2020 11:06 AM Case was discussed with RAFFI and the patient's admission status was agreed to be Admission Status: inpatient status to the service of Dr Vianney Maloney   Follow-up Information    None         Patient's Medications   Discharge Prescriptions    No medications on file     No discharge procedures on file      ED Provider  Electronically Signed by           Lady Liu DO  01/28/20 4357

## 2020-01-28 NOTE — H&P
H&P- Kimmy Constable 1950, 71 y o  male MRN: [id number]    Unit/Bed#: E4 -01 Encounter: [id number]    Primary Care Provider: Joey Ruvalcaba MD   Date and time admitted to hospital: 1/28/2020  8:25 AM        * Syncope  Assessment & Plan  This is a 22-year-old male with history of hypertension, hyperlipidemia, paroxysmal atrial fibrillation on Xarelto, history of CVA with residual left-sided weakness presenting with episode of unresponsiveness at home     -as per wife patient had associated dizziness, diaphoresis, dyspnea and right hand tremor  -unclear etiology, rule out cardiogenic syncope versus seizure  -CT head reveals stable chronic but also late subacute ischemic changes within right hemisphere involving frontal lobe and parietal lobe  -neurology consultation appreciated  -cardiology consultation appreciated  -plan to interrogate loop recorder  -will trend troponin, monitor on telemetry    Seizure disorder [facility])  Assessment & Plan  Continue with Depakote taper, 250 mg a m  And 500 mg p m      Paroxysmal A-fib (HCC)  Assessment & Plan  Will hold beta-blocker in light of recent fatigue  Continue Xarelto    Chronic diastolic CHF (congestive heart failure) (HCC)  Assessment & Plan  Wt Readings from Last 3 Encounters:   01/28/20 135 kg (297 lb)   12/26/19 89 7 kg (197 lb 11 2 oz)   11/18/19 94 8 kg (209 lb)       Patient euvolemic      CVA (cerebral vascular accident) ([facility] )  Assessment & Plan  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    -status post thrombectomy and tPA  -residual left-sided hemiplegia, aphasia and dysphagia    Hyperlipidemia  Assessment & Plan  Continue statin    Essential hypertension  Assessment & Plan  Metoprolol on hold, monitor blood pressure          VTE Prophylaxis: Rivaroxaban (Xarelto)  / sequential compression device   Code Status: FULL  POLST: There is no POLST form on file for this patient (pre-hospital)    Anticipated Length of Stay:  Patient will be admitted on an Inpatient basis with an anticipated length of stay of  greater than 2 midnights  Justification for Hospital Stay:  Syncope    Total Time for Visit, including Counseling / Coordination of Care: 30 minutes  Greater than 50% of this total time spent on direct patient counseling and coordination of care  Chief Complaint:   Syncope    History of Present Illness:    Antonietta Ring is a 71 y o  male who presents with syncope  Patient history of CVA with residual left-sided weakness and aphasia, diastolic CHF, diabetes mellitus, hypertension, AFib on Xarelto, depression  Patient states he was in the shower with assistance of visiting nurse when he became unresponsive and felt very dizzy  Patient's wife and visiting nurse brought patient to the room where he had episode of unresponsiveness noted to have shaking of his right arm  Patient was diaphoretic  According to wife, patient was unconscious for several minutes  Denies any chest pain, palpitations, dyspnea  No fever, chills, abdominal pain, nausea, vomiting  Patient was admitted in December 2019 for left lower extremity shaking following episode of loss of consciousness and diaphoresis that lasted several minutes  MRI showed possible new stroke in same distribution as his prior strokes  Patient at that point was started on Keppra for possible seizures  EEG on 01/03/2020 did not show evidence of seizures  Review of Systems:    Review of Systems   Constitutional: Positive for diaphoresis  HENT: Negative  Eyes: Negative  Respiratory: Negative  Cardiovascular: Negative  Gastrointestinal: Negative  Endocrine: Negative  Genitourinary: Negative  Musculoskeletal: Negative  Skin: Negative  Allergic/Immunologic: Negative  Neurological: Positive for dizziness and syncope  Hematological: Negative  Psychiatric/Behavioral: Negative          Past Medical and Surgical History:     Past Medical History:   Diagnosis Date  Arthritis     BL knees    Atrial fibrillation (HCC)     CHF (congestive heart failure) (HCC)     Colon polyp     [facility]A (cerebral [facility] accident) ([facility] ) 4/27/2019    NIHSS 26 on presentation    Depression     Diabetes mellitus ([facility] )     Hypertension     Irregular heart beat     Afib    [facility]roke [facility])     Urinary retention        Past Surgical History:   Procedure Laterality Date    COLONOSCOPY      IR STROKE ALERT  4/27/2019    MENISCECTOMY Right        Meds/Allergies:    Prior to Admission medications    Medication Sig Start Date End Date Taking?  Authorizing Provider   Coenzyme Q10 (CO Q 10) 10 MG CAPS Take by mouth daily   Yes Historical Provider, MD   cyanocobalamin (VITAMIN B-12) 100 mcg tablet Take by mouth daily   Yes Historical Provider, MD   divalproex sodium (DEPAKOTE) 500 mg EC tablet Take 1 tablet (500 mg total) by mouth every 12 (twelve) hours  Patient taking differently: Take 500 mg by mouth every 12 (twelve) hours  1/3/20  Yes Storm Loera MD   FLUoxetine (PROzac) 20 mg capsule Take 1 capsule (20 mg total) by mouth daily 12/30/19  Yes Maco Ballard DO   hydrocortisone 1 % cream Apply topically 2 (two) times a day as needed    Yes Historical Provider, MD   pantoprazole (PROTONIX) 40 mg tablet Take 1 tablet (40 mg total) by mouth 2 (two) times a day before meals 5/31/19  Yes Rafat Velasquez MD   pravastatin (PRAVACHOL) 20 mg tablet Take 1 tablet (20 mg total) by mouth daily 12/29/19  Yes Maco Ballard DO   rivaroxaban (XARELTO) 20 mg tablet Take 1 tablet (20 mg total) by mouth daily with breakfast 12/29/19  Yes Maco Ballard DO   tamsulosin (FLOMAX) 0 4 mg Take 0 8 mg by mouth daily at bedtime    Yes Historical Provider, MD   metoprolol succinate (TOPROL-XL) 25 mg 24 hr tablet Take 0 5 tablets (12 5 mg total) by mouth daily  Patient not taking: Reported on 1/28/2020 12/29/19 1/28/20  Maco Ballard DO   baclofen 10 mg tablet Take 1 tablet (10 mg total) by mouth 2 (two) times a day 12/29/19 1/28/20  Tosha Martin DO     I have reviewed home medications with patient personally  Allergies: Allergies   Allergen Reactions    Cephalexin Throat Swelling     Pt reported throat swelling after taking antibiotic     Penicillins Other (See Comments)       Social History:     Social History     Substance and Sexual Activity   Alcohol Use Not Currently    Frequency: 2-4 times a month    Comment: social     Social History     Tobacco Use   Smoking Status Never Smoker   Smokeless Tobacco Never Used     Social History     Substance and Sexual Activity   Drug Use Never       Family History:    History reviewed  No pertinent family history  Physical Exam:     Vitals:   Blood Pressure: 134/75 (01/28/20 1616)  Pulse: 55 (01/28/20 1616)  Temperature: 97 8 °F (36 6 °C) (01/28/20 1616)  Temp Source: Temporal (01/28/20 1616)  Respirations: 18 (01/28/20 1616)  Height: 6' (182 9 cm) (01/28/20 1122)  Weight - Scale: 135 kg (297 lb) (01/28/20 1122)  SpO2: 100 % (01/28/20 1616)    Constitutional: Patient is oriented to person, place and time, no acute distress  HEENT:  Normocephalic, atraumatic  Cardiovascular: Normal S1S2, RRR, No murmurs/rubs/gallops appreciated  Pulmonary:  Bilateral air entry, No rhonchi/rales/wheezing appreciated  Abdominal: Soft, Bowel sounds present, Non-tender, Non-distended  Extremities:  No cyanosis, clubbing or edema  Neurological:  Right upper and lower extremity motor strength 5/5, left upper and left lower extremity motor strength 2/5, aphasia    Additional Data:     Lab Results: I have personally reviewed pertinent reports        Results from last 7 days   Lab Units 01/28/20  0854   WBC Thousand/uL 6 74   HEMOGLOBIN g/dL 13 4   HEMATOCRIT % 42 8   PLATELETS Thousands/uL 117*   NEUTROS PCT % 74   LYMPHS PCT % 17   MONOS PCT % 7   EOS PCT % 2     Results from last 7 days   Lab Units 01/28/20  0854   POTASSIUM mmol/L 3 8   CHLORIDE mmol/L 107   CO2 mmol/L 28   BUN mg/dL 19 CREATININE mg/dL 0 93   CALCIUM mg/dL 9 0   ALK PHOS U/L 72   ALT U/L 10*   AST U/L 11     Results from last 7 days   Lab Units 01/28/20  0854   INR  1 12       Imaging: I have personally reviewed pertinent reports  Xr Chest 2 Views    Result Date: 1/28/2020  Narrative: CHEST INDICATION:   Chest Pain  COMPARISON:  12/26/2019 EXAM PERFORMED/VIEWS:  XR CHEST PA & LATERAL FINDINGS:  Cardiac loop recorder again noted  Cardiomediastinal silhouette appears unremarkable  The lungs are clear  No pneumothorax or pleural effusion  Osseous structures appear within normal limits for patient age  Impression: No acute cardiopulmonary disease  Workstation performed: [id number]     Ct Head Without Contrast    Result Date: 1/28/2020  Narrative: CT BRAIN - WITHOUT CONTRAST INDICATION:   Syncope, recurrent  COMPARISON:  Multiple previous CT and MRI examinations  Most recent CT is dated 12/27/2019  TECHNIQUE:  CT examination of the brain was performed  In addition to axial images, coronal 2D reformatted images were created and submitted for interpretation  Radiation dose length product (DLP) for this visit:  911 mGy-cm   This examination, like all CT scans performed in the [facility], was performed utilizing techniques to minimize radiation dose exposure, including the use of iterative reconstruction and automated exposure control  IMAGE QUALITY:  Diagnostic  FINDINGS: PARENCHYMA:  Large area of encephalomalacia involving right frontal and parietal lobe extending inferiorly to the insula  This is related to large old infarct  There appears to be additional areas of late subacute ischemia intermixed within this chronic infarct when comparing to prior MRI examinations  No hemorrhagic transformation  No mass effect or midline shift  Stable left hemisphere, basal ganglia  Stable brainstem and cerebellum with volume loss of the right middle cerebral peduncle, extending into the luz    Stable cerebellar hemispheres  VENTRICLES AND EXTRA-AXIAL SPACES:  No obstructive hydrocephalus  Mild ex vacuo dilatation of the right lateral ventricle  VISUALIZED ORBITS AND PARANASAL SINUSES:  Unremarkable orbits  Nodular opacification of the paranasal sinuses including the frontal sinuses, left greater than right and ethmoid air cells  This extends inferiorly into the superior aspect of the nasal cavity, suggestive of sinonasal polyposis  Findings are unchanged compared to the prior examination  Previous endoscopic sinus surgery  CALVARIUM AND EXTRACRANIAL SOFT TISSUES:  No acute osseous abnormality  Impression: Stable primarily chronic but also late subacute ischemic change within the right hemisphere primarily involving the frontal lobe and parietal lobe extending inferiorly to the operculum  No mass effect or hemorrhagic transformation  Stable opacification of the paranasal sinuses and nasal cavity in this patient with prior endoscopic sinus surgery  Findings are suggestive of sinonasal polyposis  Workstation performed: [id number]       EKG, Pathology, and Other Studies Reviewed on Admission:   · EKG:  AFib rate controlled    Allscripts / Epic Records Reviewed: Yes     ** Please Note: This note has been constructed using a voice recognition system   **

## 2020-01-28 NOTE — ED NOTES
Patient transported to [address] [place] [address], [address] [place] [address]  01/28/20 8962

## 2020-01-28 NOTE — ASSESSMENT & PLAN NOTE
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    -status post thrombectomy and tPA  -residual left-sided hemiplegia, aphasia and dysphagia

## 2020-01-29 LAB
ALBUMIN SERPL BCP-MCNC: 2.6 G/DL (ref 3.5–5)
ALP SERPL-CCNC: 69 U/L (ref 46–116)
ALT SERPL W P-5'-P-CCNC: 9 U/L (ref 12–78)
ANION GAP SERPL CALCULATED.3IONS-SCNC: 7 MMOL/L (ref 4–13)
AST SERPL W P-5'-P-CCNC: 9 U/L (ref 5–45)
ATRIAL RATE: 144 BPM
ATRIAL RATE: 61 BPM
BACTERIA UR QL AUTO: ABNORMAL /HPF
BASOPHILS # BLD AUTO: 0.01 THOUSANDS/ΜL (ref 0–0.1)
BASOPHILS NFR BLD AUTO: 0 % (ref 0–1)
BILIRUB SERPL-MCNC: 0.32 MG/DL (ref 0.2–1)
BILIRUB UR QL STRIP: NEGATIVE
BUN SERPL-MCNC: 20 MG/DL (ref 5–25)
CALCIUM SERPL-MCNC: 8.4 MG/DL (ref 8.3–10.1)
CHLORIDE SERPL-SCNC: 107 MMOL/L (ref 100–108)
CLARITY UR: CLEAR
CO2 SERPL-SCNC: 29 MMOL/L (ref 21–32)
COLOR UR: YELLOW
CREAT SERPL-MCNC: 0.82 MG/DL (ref 0.6–1.3)
EOSINOPHIL # BLD AUTO: 0.09 THOUSAND/ΜL (ref 0–0.61)
EOSINOPHIL NFR BLD AUTO: 2 % (ref 0–6)
ERYTHROCYTE [DISTWIDTH] IN BLOOD BY AUTOMATED COUNT: 13.8 % (ref 11.6–15.1)
GFR SERPL CREATININE-BSD FRML MDRD: 90 ML/MIN/1.73SQ M
GLUCOSE SERPL-MCNC: 101 MG/DL (ref 65–140)
GLUCOSE UR STRIP-MCNC: NEGATIVE MG/DL
HCT VFR BLD AUTO: 38.9 % (ref 36.5–49.3)
HGB BLD-MCNC: 12.6 G/DL (ref 12–17)
HGB UR QL STRIP.AUTO: ABNORMAL
IMM GRANULOCYTES # BLD AUTO: 0.01 THOUSAND/UL (ref 0–0.2)
IMM GRANULOCYTES NFR BLD AUTO: 0 % (ref 0–2)
KETONES UR STRIP-MCNC: NEGATIVE MG/DL
LEUKOCYTE ESTERASE UR QL STRIP: ABNORMAL
LYMPHOCYTES # BLD AUTO: 1.34 THOUSANDS/ΜL (ref 0.6–4.47)
LYMPHOCYTES NFR BLD AUTO: 34 % (ref 14–44)
MCH RBC QN AUTO: 30.4 PG (ref 26.8–34.3)
MCHC RBC AUTO-ENTMCNC: 32.4 G/DL (ref 31.4–37.4)
MCV RBC AUTO: 94 FL (ref 82–98)
MONOCYTES # BLD AUTO: 0.37 THOUSAND/ΜL (ref 0.17–1.22)
MONOCYTES NFR BLD AUTO: 9 % (ref 4–12)
NEUTROPHILS # BLD AUTO: 2.17 THOUSANDS/ΜL (ref 1.85–7.62)
NEUTS SEG NFR BLD AUTO: 55 % (ref 43–75)
NITRITE UR QL STRIP: POSITIVE
NON-SQ EPI CELLS URNS QL MICRO: ABNORMAL /HPF
NRBC BLD AUTO-RTO: 0 /100 WBCS
P AXIS: 27 DEGREES
PH UR STRIP.AUTO: 7 [PH]
PLATELET # BLD AUTO: 107 THOUSANDS/UL (ref 149–390)
PMV BLD AUTO: 9.9 FL (ref 8.9–12.7)
POTASSIUM SERPL-SCNC: 3.6 MMOL/L (ref 3.5–5.3)
PR INTERVAL: 138 MS
PROT SERPL-MCNC: 5.6 G/DL (ref 6.4–8.2)
PROT UR STRIP-MCNC: NEGATIVE MG/DL
QRS AXIS: -10 DEGREES
QRS AXIS: 41 DEGREES
QRSD INTERVAL: 84 MS
QRSD INTERVAL: 88 MS
QT INTERVAL: 410 MS
QT INTERVAL: 418 MS
QTC INTERVAL: 412 MS
QTC INTERVAL: 420 MS
RBC # BLD AUTO: 4.15 MILLION/UL (ref 3.88–5.62)
RBC #/AREA URNS AUTO: ABNORMAL /HPF
SODIUM SERPL-SCNC: 143 MMOL/L (ref 136–145)
SP GR UR STRIP.AUTO: 1.01 (ref 1–1.03)
T WAVE AXIS: 48 DEGREES
T WAVE AXIS: 65 DEGREES
UROBILINOGEN UR QL STRIP.AUTO: 0.2 E.U./DL
VENTRICULAR RATE: 61 BPM
VENTRICULAR RATE: 61 BPM
WBC # BLD AUTO: 3.99 THOUSAND/UL (ref 4.31–10.16)
WBC #/AREA URNS AUTO: ABNORMAL /HPF

## 2020-01-29 PROCEDURE — 80053 COMPREHEN METABOLIC PANEL: CPT | Performed by: INTERNAL MEDICINE

## 2020-01-29 PROCEDURE — 81001 URINALYSIS AUTO W/SCOPE: CPT | Performed by: INTERNAL MEDICINE

## 2020-01-29 PROCEDURE — 99232 SBSQ HOSP IP/OBS MODERATE 35: CPT | Performed by: INTERNAL MEDICINE

## 2020-01-29 PROCEDURE — 93010 ELECTROCARDIOGRAM REPORT: CPT | Performed by: INTERNAL MEDICINE

## 2020-01-29 PROCEDURE — 93291 INTERROG DEV EVAL SCRMS IP: CPT | Performed by: INTERNAL MEDICINE

## 2020-01-29 PROCEDURE — 93005 ELECTROCARDIOGRAM TRACING: CPT

## 2020-01-29 PROCEDURE — 85025 COMPLETE CBC W/AUTO DIFF WBC: CPT | Performed by: INTERNAL MEDICINE

## 2020-01-29 PROCEDURE — NC001 PR NO CHARGE: Performed by: INTERNAL MEDICINE

## 2020-01-29 RX ADMIN — TAMSULOSIN HYDROCHLORIDE 0.8 MG: 0.4 CAPSULE ORAL at 21:56

## 2020-01-29 RX ADMIN — PANTOPRAZOLE SODIUM 40 MG: 40 TABLET, DELAYED RELEASE ORAL at 17:21

## 2020-01-29 RX ADMIN — PANTOPRAZOLE SODIUM 40 MG: 40 TABLET, DELAYED RELEASE ORAL at 05:51

## 2020-01-29 RX ADMIN — FLUOXETINE 20 MG: 20 CAPSULE ORAL at 10:06

## 2020-01-29 RX ADMIN — PRAVASTATIN SODIUM 20 MG: 10 TABLET ORAL at 17:21

## 2020-01-29 RX ADMIN — DIVALPROEX SODIUM 250 MG: 250 TABLET, DELAYED RELEASE ORAL at 10:06

## 2020-01-29 RX ADMIN — RIVAROXABAN 20 MG: 20 TABLET, FILM COATED ORAL at 10:06

## 2020-01-29 NOTE — PLAN OF CARE
Problem: Potential for Falls  Goal: Patient will remain free of falls  Description  INTERVENTIONS:  - Assess patient frequently for physical needs  -  Identify cognitive and physical deficits and behaviors that affect risk of falls    -  [place] fall precautions as indicated by assessment   - Educate patient/family on patient safety including physical limitations  - Instruct patient to call for assistance with activity based on assessment  - Modify environment to reduce risk of injury  - Consider OT/PT consult to assist with strengthening/mobility  Outcome: Progressing     Problem: Prexisting or High Potential for Compromised Skin Integrity  Goal: Skin integrity is maintained or improved  Description  INTERVENTIONS:  - Identify patients at risk for skin breakdown  - Assess and monitor skin integrity  - Assess and monitor nutrition and hydration status  - Monitor labs   - Assess for incontinence   - Turn and reposition patient  - Assist with mobility/ambulation  - Relieve pressure over bony prominences  - Avoid friction and shearing  - Provide appropriate hygiene as needed including keeping skin clean and dry  - Evaluate need for skin moisturizer/barrier cream  - Collaborate with interdisciplinary team   - Patient/family teaching  - Consider wound care consult   Outcome: Progressing     Problem: PAIN - ADULT  Goal: Verbalizes/displays adequate comfort level or baseline comfort level  Description  Interventions:  - Encourage patient to monitor pain and request assistance  - Assess pain using appropriate pain scale  - Administer analgesics based on type and severity of pain and evaluate response  - Implement non-pharmacological measures as appropriate and evaluate response  - Consider cultural and social influences on pain and pain management  - Notify physician/advanced practitioner if interventions unsuccessful or patient reports new pain  Outcome: Progressing     Problem: INFECTION - ADULT  Goal: Absence or prevention of progression during hospitalization  Description  INTERVENTIONS:  - Assess and monitor for signs and symptoms of infection  - Monitor lab/diagnostic results  - Monitor all insertion sites, i e  indwelling lines, tubes, and drains  - Monitor endotracheal if appropriate and nasal secretions for changes in amount and color  - [place] appropriate cooling/warming therapies per order  - Administer medications as ordered  - Instruct and encourage patient and family to use good hand hygiene technique  - Identify and instruct in appropriate isolation precautions for identified infection/condition  Outcome: Progressing  Goal: Absence of fever/infection during neutropenic period  Description  INTERVENTIONS:  - Monitor WBC    Outcome: Progressing     Problem: SAFETY ADULT  Goal: Maintain or return to baseline ADL function  Description  INTERVENTIONS:  -  Assess patient's ability to carry out ADLs; assess patient's baseline for ADL function and identify physical deficits which impact ability to perform ADLs (bathing, care of mouth/teeth, toileting, grooming, dressing, etc )  - Assess/evaluate cause of self-care deficits   - Assess range of motion  - Assess patient's mobility; develop plan if impaired  - Assess patient's need for assistive devices and provide as appropriate  - Encourage maximum independence but intervene and supervise when necessary  - Involve family in performance of ADLs  - Assess for home care needs following discharge   - Consider OT consult to assist with ADL evaluation and planning for discharge  - Provide patient education as appropriate  Outcome: Progressing  Goal: Maintain or return mobility status to optimal level  Description  INTERVENTIONS:  - Assess patient's baseline mobility status (ambulation, transfers, stairs, etc )    - Identify cognitive and physical deficits and behaviors that affect mobility  - Identify mobility aids required to assist with transfers and/or ambulation (gait belt, sit-to-stand, lift, walker, cane, etc )  - [place] fall precautions as indicated by assessment  - Record patient progress and toleration of activity level on Mobility SBAR; progress patient to next Phase/Stage  - Instruct patient to call for assistance with activity based on assessment  - Consider rehabilitation consult to assist with strengthening/weightbearing, etc   Outcome: Progressing     Problem: DISCHARGE PLANNING  Goal: Discharge to home or other facility with appropriate resources  Description  INTERVENTIONS:  - Identify barriers to discharge w/patient and caregiver  - Arrange for needed discharge resources and transportation as appropriate  - Identify discharge learning needs (meds, wound care, etc )  - Arrange for interpretive services to assist at discharge as needed  - Refer to Case Management Department for coordinating discharge planning if the patient needs post-hospital services based on physician/advanced practitioner order or complex needs related to functional status, cognitive ability, or social support system  Outcome: Progressing     Problem: Knowledge Deficit  Goal: Patient/family/caregiver demonstrates understanding of disease process, treatment plan, medications, and discharge instructions  Description  Complete learning assessment and assess knowledge base    Interventions:  - Provide teaching at level of understanding  - Provide teaching via preferred learning methods  Outcome: Progressing     Problem: NEUROSENSORY - ADULT  Goal: Achieves stable or improved neurological status  Description  INTERVENTIONS  - Monitor and report changes in neurological status  - Monitor vital signs such as temperature, blood pressure, glucose, and any other labs ordered   - Initiate measures to prevent increased intracranial pressure  - Monitor for seizure activity and implement precautions if appropriate      Outcome: Progressing  Goal: Remains free of injury related to seizures activity  Description  INTERVENTIONS  - Maintain airway, patient safety  and administer oxygen as ordered  - Monitor patient for seizure activity, document and report duration and description of seizure to physician/advanced practitioner  - If seizure occurs,  ensure patient safety during seizure  - Reorient patient post seizure  - Seizure pads on all 4 side rails  - Instruct patient/family to notify RN of any seizure activity including if an aura is experienced  - Instruct patient/family to call for assistance with activity based on nursing assessment  - Administer anti-seizure medications if ordered    Outcome: Progressing  Goal: Achieves maximal functionality and self care  Description  INTERVENTIONS  - Monitor swallowing and airway patency with patient fatigue and changes in neurological status  - Encourage and assist patient to increase activity and self care     - Encourage visually impaired, hearing impaired and aphasic patients to use assistive/communication devices  Outcome: Progressing     Problem: CARDIOVASCULAR - ADULT  Goal: Maintains optimal cardiac output and hemodynamic stability  Description  INTERVENTIONS:  - Monitor I/O, vital signs and rhythm  - Monitor for S/S and trends of decreased cardiac output  - Administer and titrate ordered vasoactive medications to optimize hemodynamic stability  - Assess quality of pulses, skin color and temperature  - Assess for signs of decreased coronary artery perfusion  - Instruct patient to report change in severity of symptoms  Outcome: Progressing  Goal: Absence of cardiac dysrhythmias or at baseline rhythm  Description  INTERVENTIONS:  - Continuous cardiac monitoring, vital signs, obtain 12 lead EKG if ordered  - Administer antiarrhythmic and heart rate control medications as ordered  - Monitor electrolytes and administer replacement therapy as ordered  Outcome: Progressing

## 2020-01-29 NOTE — PROCEDURES
Evri revealed loop recorder implanted May 3, 2019  Interrogation shows good battery status and 1 tachy events and 2 atrial fibrillation events with total AT/AF burden of less than 0 1%  Review of AFib strips indicates sinus rhythm with artifact  Review of single tachy event which occurred January 28, 2020 at 5:47 a m  Indicates it is nice rather than true atrial fibrillation  Patient is noted to have noise intermittently on the loop recorder suggesting possible and device movement within the pocket  There is no definite atrial fibrillation episode noted  There are pause or significant bradycardia events noted  Conclusion:  Loop recorder interrogation does not reveal significant tachycardia or bradycardia episodes associated with syncope  Sensitivity of the findings is reduced due to artifact noted on telemetry  We are increasing the detection rate for bradycardia episode to 40 beats per minute in order to improve his sensitivity of detecting bradycardia  An outpatient external event monitor or reimplantation of loop recorder may be considered in the future

## 2020-01-29 NOTE — PROGRESS NOTES
Progress Note - Abdoul Kaufman 1950, 71 y o  male MRN: [id number]    Unit/Bed#: E4 -01 Encounter: [id number]    Primary Care Provider: Kathlee Mcburney, MD   Date and time admitted to hospital: 1/28/2020  8:25 AM        * Syncope  Assessment & Plan  This is a 60-year-old male with history of hypertension, hyperlipidemia, paroxysmal atrial fibrillation on Xarelto, history of CVA with residual left-sided weakness presenting with episode of unresponsiveness at home     -as per wife patient had associated dizziness, diaphoresis, dyspnea and right hand tremor  -unclear etiology, rule out cardiogenic syncope versus seizure  -CT head reveals stable chronic but also late subacute ischemic changes within right hemisphere involving frontal lobe and parietal lobe  -neurology consultation appreciated  -cardiology consultation appreciated, plan for device interrogation    Seizure disorder [facility])  Assessment & Plan  Continue with Depakote taper, 250 mg p m  Paroxysmal A-fib (HCC)  Assessment & Plan  Will hold beta-blocker in light of recent fatigue  Continue Xarelto    Chronic diastolic CHF (congestive heart failure) (HCC)  Assessment & Plan  Wt Readings from Last 3 Encounters:   01/29/20 89 4 kg (197 lb)   12/26/19 89 7 kg (197 lb 11 2 oz)   11/18/19 94 8 kg (209 lb)       Patient euvolemic      CVA (cerebral vascular accident) ([facility] [address] )  Assessment & Plan  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    -status post thrombectomy and tPA  -residual left-sided hemiplegia, aphasia and dysphagia    Hyperlipidemia  Assessment & Plan  Continue statin    Essential hypertension  Assessment & Plan  Metoprolol on hold, monitor blood pressure        VTE Pharmacologic Prophylaxis:   Pharmacologic: xarelto    Patient Centered Rounds: I have performed bedside rounds with nursing staff today  Education and Discussions with Family / Patient: wife at bedside    Time Spent for Care: 20 minutes    More than 50% of total time spent on counseling and coordination of care as described above  Current Length of Stay: 1 day(s)    Current Patient Status: Inpatient   Certification Statement: The patient will continue to require additional inpatient hospital stay due to Syncope    Discharge Plan / Estimated Discharge Date:  24-48 hours    Code Status: Level 1 - Full Code      Subjective:   Patient seen and examined at bedside, denies further episodes of dizziness however has not gotten out of bed today  Objective:     Vitals:   Temp (24hrs), Av 2 °F (36 8 °C), Min:97 3 °F (36 3 °C), Max:99 7 °F (37 6 °C)    Temp:  [97 3 °F (36 3 °C)-99 7 °F (37 6 °C)] 99 7 °F (37 6 °C)  HR:  [58-91] 91  Resp:  [18] 18  BP: (120-132)/(63-87) 130/87  SpO2:  [96 %-98 %] 96 %  Body mass index is 26 72 kg/m²  Input and Output Summary (last 24 hours): Intake/Output Summary (Last 24 hours) at 2020 1726  Last data filed at 2020 0740  Gross per 24 hour   Intake 150 ml   Output 360 ml   Net -210 ml       Physical Exam:    Constitutional: Patient in no acute distress  HEENT:  Normocephalic, atraumatic  Cardiovascular: Normal S1S2, RRR, No murmurs/rubs/gallops appreciated  Pulmonary:  Bilateral air entry, No rhonchi/rales/wheezing appreciated  Abdominal: Soft, Bowel sounds present, Non-tender, Non-distended  Extremities:  No cyanosis, clubbing or edema     Neurological: right upper and lower extremity motor strength 5/5, left upper and left lower extremity motor strength 2/5, aphasia    Additional Data:     Labs:    Results from last 7 days   Lab Units 20  0545   WBC Thousand/uL 3 99*   HEMOGLOBIN g/dL 12 6   HEMATOCRIT % 38 9   PLATELETS Thousands/uL 107*   NEUTROS PCT % 55   LYMPHS PCT % 34   MONOS PCT % 9   EOS PCT % 2     Results from last 7 days   Lab Units 20  0545   POTASSIUM mmol/L 3 6   CHLORIDE mmol/L 107   CO2 mmol/L 29   BUN mg/dL 20   CREATININE mg/dL 0 82   CALCIUM mg/dL 8 4   ALK PHOS U/L 69   ALT U/L 9*   AST U/L 9 Results from last 7 days   Lab Units 01/28/20  0854   INR  1 12        I Have Reviewed All Lab Data Listed Above          Recent Cultures (last 7 days):           Last 24 Hours Medication List:     Current Facility-Administered Medications:  acetaminophen 650 mg Oral Q6H PRN Chicho Scott MD   divalproex sodium 250 mg Oral Q12H [facility] Julius Thomas MD   FLUoxetine 20 mg Oral Daily Chicho Scott MD   pantoprazole 40 mg Oral BID AC Chicho Scott MD   pravastatin 20 mg Oral Before Alessandro Genao MD   rivaroxaban 20 mg Oral Daily With Breakfast Chicho Scott MD   tamsulosin 0 8 mg Oral HS Chicho Scott MD        Today, Patient Was Seen By: Chicho Scott MD

## 2020-01-29 NOTE — PLAN OF CARE
Problem: Potential for Falls  Goal: Patient will remain free of falls  Description  INTERVENTIONS:  - Assess patient frequently for physical needs  -  Identify cognitive and physical deficits and behaviors that affect risk of falls    -  [place] fall precautions as indicated by assessment   - Educate patient/family on patient safety including physical limitations  - Instruct patient to call for assistance with activity based on assessment  - Modify environment to reduce risk of injury  - Consider OT/PT consult to assist with strengthening/mobility  Outcome: Progressing     Problem: Prexisting or High Potential for Compromised Skin Integrity  Goal: Skin integrity is maintained or improved  Description  INTERVENTIONS:  - Identify patients at risk for skin breakdown  - Assess and monitor skin integrity  - Assess and monitor nutrition and hydration status  - Monitor labs   - Assess for incontinence   - Turn and reposition patient  - Assist with mobility/ambulation  - Relieve pressure over bony prominences  - Avoid friction and shearing  - Provide appropriate hygiene as needed including keeping skin clean and dry  - Evaluate need for skin moisturizer/barrier cream  - Collaborate with interdisciplinary team   - Patient/family teaching  - Consider wound care consult   Outcome: Progressing     Problem: PAIN - ADULT  Goal: Verbalizes/displays adequate comfort level or baseline comfort level  Description  Interventions:  - Encourage patient to monitor pain and request assistance  - Assess pain using appropriate pain scale  - Administer analgesics based on type and severity of pain and evaluate response  - Implement non-pharmacological measures as appropriate and evaluate response  - Consider cultural and social influences on pain and pain management  - Notify physician/advanced practitioner if interventions unsuccessful or patient reports new pain  Outcome: Progressing     Problem: INFECTION - ADULT  Goal: Absence or prevention of progression during hospitalization  Description  INTERVENTIONS:  - Assess and monitor for signs and symptoms of infection  - Monitor lab/diagnostic results  - Monitor all insertion sites, i e  indwelling lines, tubes, and drains  - Monitor endotracheal if appropriate and nasal secretions for changes in amount and color  - [place] appropriate cooling/warming therapies per order  - Administer medications as ordered  - Instruct and encourage patient and family to use good hand hygiene technique  - Identify and instruct in appropriate isolation precautions for identified infection/condition  Outcome: Progressing  Goal: Absence of fever/infection during neutropenic period  Description  INTERVENTIONS:  - Monitor WBC    Outcome: Progressing     Problem: SAFETY ADULT  Goal: Maintain or return to baseline ADL function  Description  INTERVENTIONS:  -  Assess patient's ability to carry out ADLs; assess patient's baseline for ADL function and identify physical deficits which impact ability to perform ADLs (bathing, care of mouth/teeth, toileting, grooming, dressing, etc )  - Assess/evaluate cause of self-care deficits   - Assess range of motion  - Assess patient's mobility; develop plan if impaired  - Assess patient's need for assistive devices and provide as appropriate  - Encourage maximum independence but intervene and supervise when necessary  - Involve family in performance of ADLs  - Assess for home care needs following discharge   - Consider OT consult to assist with ADL evaluation and planning for discharge  - Provide patient education as appropriate  Outcome: Progressing  Goal: Maintain or return mobility status to optimal level  Description  INTERVENTIONS:  - Assess patient's baseline mobility status (ambulation, transfers, stairs, etc )    - Identify cognitive and physical deficits and behaviors that affect mobility  - Identify mobility aids required to assist with transfers and/or ambulation (gait belt, sit-to-stand, lift, walker, cane, etc )  - [place] fall precautions as indicated by assessment  - Record patient progress and toleration of activity level on Mobility SBAR; progress patient to next Phase/Stage  - Instruct patient to call for assistance with activity based on assessment  - Consider rehabilitation consult to assist with strengthening/weightbearing, etc   Outcome: Progressing     Problem: DISCHARGE PLANNING  Goal: Discharge to home or other facility with appropriate resources  Description  INTERVENTIONS:  - Identify barriers to discharge w/patient and caregiver  - Arrange for needed discharge resources and transportation as appropriate  - Identify discharge learning needs (meds, wound care, etc )  - Arrange for interpretive services to assist at discharge as needed  - Refer to Case Management Department for coordinating discharge planning if the patient needs post-hospital services based on physician/advanced practitioner order or complex needs related to functional status, cognitive ability, or social support system  Outcome: Progressing     Problem: Knowledge Deficit  Goal: Patient/family/caregiver demonstrates understanding of disease process, treatment plan, medications, and discharge instructions  Description  Complete learning assessment and assess knowledge base    Interventions:  - Provide teaching at level of understanding  - Provide teaching via preferred learning methods  Outcome: Progressing     Problem: NEUROSENSORY - ADULT  Goal: Achieves stable or improved neurological status  Description  INTERVENTIONS  - Monitor and report changes in neurological status  - Monitor vital signs such as temperature, blood pressure, glucose, and any other labs ordered   - Initiate measures to prevent increased intracranial pressure  - Monitor for seizure activity and implement precautions if appropriate      Outcome: Progressing  Goal: Remains free of injury related to seizures activity  Description  INTERVENTIONS  - Maintain airway, patient safety  and administer oxygen as ordered  - Monitor patient for seizure activity, document and report duration and description of seizure to physician/advanced practitioner  - If seizure occurs,  ensure patient safety during seizure  - Reorient patient post seizure  - Seizure pads on all 4 side rails  - Instruct patient/family to notify RN of any seizure activity including if an aura is experienced  - Instruct patient/family to call for assistance with activity based on nursing assessment  - Administer anti-seizure medications if ordered    Outcome: Progressing  Goal: Achieves maximal functionality and self care  Description  INTERVENTIONS  - Monitor swallowing and airway patency with patient fatigue and changes in neurological status  - Encourage and assist patient to increase activity and self care     - Encourage visually impaired, hearing impaired and aphasic patients to use assistive/communication devices  Outcome: Progressing     Problem: CARDIOVASCULAR - ADULT  Goal: Maintains optimal cardiac output and hemodynamic stability  Description  INTERVENTIONS:  - Monitor I/O, vital signs and rhythm  - Monitor for S/S and trends of decreased cardiac output  - Administer and titrate ordered vasoactive medications to optimize hemodynamic stability  - Assess quality of pulses, skin color and temperature  - Assess for signs of decreased coronary artery perfusion  - Instruct patient to report change in severity of symptoms  Outcome: Progressing  Goal: Absence of cardiac dysrhythmias or at baseline rhythm  Description  INTERVENTIONS:  - Continuous cardiac monitoring, vital signs, obtain 12 lead EKG if ordered  - Administer antiarrhythmic and heart rate control medications as ordered  - Monitor electrolytes and administer replacement therapy as ordered  Outcome: Progressing

## 2020-01-29 NOTE — ASSESSMENT & PLAN NOTE
Wt Readings from Last 3 Encounters:   01/29/20 89 4 kg (197 lb)   12/26/19 89 7 kg (197 lb 11 2 oz)   11/18/19 94 8 kg (209 lb)       Patient euvolemic

## 2020-01-29 NOTE — PROGRESS NOTES
CARDIOLOGY INPATIENT FOLLOW-UP PROGRESS NOTE    ENCOUNTER DATE: 01/29/20 10:10 AM  PATIENT NAME: Inder Harvey   1950    [id number]  Age: 71 y o  Sex: male  AUTHOR: Steven Davis MD  PRIMARYCARE PHYSICIAN: Meredith Senior MD  PRIMARY INPATIENT PHYSICIAN: Marlo Tidwell MD  *-*-*-*-*-*-*-*-*-*-*-*-*-*-*-*-*-*-*-*-*-*-*-*-*-*-*-*-*-*-*-*-*-*-*-*-*-*-*-*-*-*-*-*-*-*-*-*-*-*-*-*-*-*-  FOLLOW-UP FOR:  1  Syncope  2  Severe fatigue  3  History of CVA  4  Status post loop recorder implantation     Device interrogation was performed however could not be completed because of nonfunctioning pain on the   I have called out to [employer] representative and he will becoming and interrogating the device with a different   On the screen rhythm appears to be sinus with a P-wave activity sensing  There is mention of 2 AF episodes and 1 tachy episodes however we are unable to analyze these further at this time  In the observation section no new observation is reported  CARDIOLOGY PLAN:   - will arrange for limited transthoracic echocardiogram to reassess cardiac structure and function given the significant fatigue reported even though there is no clinical evidence of low output state     - continue anticoagulation with Xarelto for now  - request to check orthostatic blood pressures lying down and sitting   - will repeat ECG as there is significant artifact noted on telemetry and P-waves are difficult to identify  *-*-*-*-*-*-*-*-*-*-*-*-*-*-*-*-*-*-*-*-*-*-*-*-*-*-*-*-*-*-*-*-*-*-*-*-*-*-*-*-*-*-*-*-*-*-*-*-*-*-*-*-*-*-  INTERVAL CHANGES / HISTORY OF PRESENT ILLNESS:  No acute events overnight  Patient reports continued fatigue  Neurologic evaluation yesterday suggests that event was not likely the seizure  REVIEW OF SYMPTOMS:    Positive for:  Severe fatigue , witnessed syncopal event , speech abnormality, motor weakness in left upper and lower extremity  Negative for:  All remaining as reviewed below and in HPI  SYSTEM SYMPTOMS REVIEWED:  Generalweight change, fever, night sweats  Respiratoryl Wheezing, shortness of breath, cough, URI symptoms, sputum, blood  Cardiovascularchest pain, syncope, dyspnea on exertion, edema, decline in exercise tolerance, claudication   Gastrointestinalpersistent vomiting, diarrhea, abdominal distention, blood in stool   Muscular or skeletaljoint pain or swelling   Neurologicheadaches, syncope, abnormal movement  Hematologichistory of easy bruising and bleeding   Endocrinethyroid enlargement, heat or cold intolerance, polyuria   Psychiatricanxiety, depression   *-*-*-*-*-*-*-*-*-*-*-*-*-*-*-*-*-*-*-*-*-*-*-*-*-*-*-*-*-*-*-*-*-*-*-*-*-*-*-*-*-*-*-*-*-*-*-*-*-*-*-*-*-*-    VITAL SIGNS:  Vitals:    20 1616 20 1917 20 2340 20 0740   BP: 134/75 120/65 127/63 132/64   BP Location: Right arm Right arm Right arm Right arm   Pulse: 55 65 61 58   Resp: 18 18 18 18   Temp: 97 8 °F (36 6 °C) 98 5 °F (36 9 °C) 98 1 °F (36 7 °C) (!) 97 4 °F (36 3 °C)   TempSrc: Temporal Tympanic Tympanic Tympanic   SpO2: 100% 96% 96% 96%   Weight:       Height:          Temp (24hrs), Av 9 °F (36 6 °C), Min:97 4 °F (36 3 °C), Max:98 5 °F (36 9 °C)  Current: Temperature: (!) 97 4 °F (36 3 °C)      Intake/Output Summary (Last 24 hours) at 2020 1010  Last data filed at 2020 0740  Gross per 24 hour   Intake 150 ml   Output 560 ml   Net -410 ml      Weight (last 2 days)     Date/Time   Weight    20 1122   135 (297)    20 0827   98 8 (217 81)           ,   Wt Readings from Last 3 Encounters:   20 135 kg (297 lb)   19 89 7 kg (197 lb 11 2 oz)   19 94 8 kg (209 lb)    , Body mass index is 40 28 kg/m²      *-*-*-*-*-*-*-*-*-*-*-*-*-*-*-*-*-*-*-*-*-*-*-*-*-*-*-*-*-*-*-*-*-*-*-*-*-*-*-*-*-*-*-*-*-*-*-*-*-*-*-*-*-*-  PHYSICAL EXAM:  General Appearance:    Alert, cooperative, no distress, appears stated age,    Head, Eyes, ENT: facial asymmetry, motor or if a see a   Neck:   Supple, no carotid bruit or JVD   Back:     Symmetric, no curvature  Lungs:     Respirations unlabored  Clear to auscultation bilaterally,    Chest wall:    No tenderness or deformity   Heart:    Regular rhythm interrupted with premature beats   Abdomen:     Soft, non-tender, No obvious masses, or organomegaly   Extremities: Severe weakness of left and right lower extremities   Skin:   Skin color, texture, turgor normal, no rashes or lesions   *-*-*-*-*-*-*-*-*-*-*-*-*-*-*-*-*-*-*-*-*-*-*-*-*-*-*-*-*-*-*-*-*-*-*-*-*-*-*-*-*-*-*-*-*-*-*-*-*-*-*-*-*-*-    Telemetry reviewed    Significant baseline tremor artifact  Rhythm is sinus with premature atrial contractions  Last ECG     Results for orders placed or performed during the hospital encounter of 01/28/20   ECG 12 lead   Result Value    Ventricular Rate 61    Atrial Rate 144    ME Interval     QRSD Interval 84    QT Interval 418    QTC Interval 420    P Axis     QRS Axis 41    T Wave Axis 48    Narrative    Atrial fibrillation  Nonspecific T wave abnormality , probably digitalis effect  Abnormal ECG  When compared with ECG of 26-DEC-2019 07:55,  Atrial fibrillation has replaced Sinus rhythm  Nonspecific T wave abnormality now evident in Lateral leads  Confirmed by Sera Philip ([id number]) on 1/28/2020 1:51:44 PM     Although the ECG suggest atrial fibrillation rhythm  Review of rhythm strip suggests that it is actually sinus with frequent premature atrial contractions  Medications reviewed         Current Facility-Administered Medications:     acetaminophen (TYLENOL) tablet 650 mg, 650 mg, Oral, Q6H PRN, Shivam Nicole MD    divalproex sodium (DEPAKOTE) EC tablet 250 mg, 250 mg, Oral, Q12H [address], Malvin Shipley MD, 250 mg at 01/29/20 1006    FLUoxetine (PROzac) capsule 20 mg, 20 mg, Oral, Daily, Shivam Nicole MD, 20 mg at 01/29/20 1006    pantoprazole (PROTONIX) EC tablet 40 mg, 40 mg, Oral, BID AC, Eulalio Dominguez MD, 40 mg at 01/29/20 0551    pravastatin (PRAVACHOL) tablet 20 mg, 20 mg, Oral, Before Dinner, Eulalio Dominguez MD, 20 mg at 01/28/20 1811    rivaroxaban (XARELTO) tablet 20 mg, 20 mg, Oral, Daily With Breakfast, Eulalio Dominguez MD, 20 mg at 01/29/20 1006    tamsulosin (FLOMAX) capsule 0 8 mg, 0 8 mg, Oral, HS, Eulalio Dominguez MD, 0 8 mg at 01/28/20 2120    Imaging studies results reviewed    No procedure found  *-*-*-*-*-*-*-*-*-*-*-*-*-*-*-*-*-*-*-*-*-*-*-*-*-*-*-*-*-*-*-*-*-*-*-*-*-*-*-*-*-*-*-*-*-*-*-*-*-*-*-*-*-*-    LABORATORY DATA:  I have personally reviewed pertinent labs  CMP:   Results from last 7 days   Lab Units 01/29/20  0545 01/28/20  0854   POTASSIUM mmol/L 3 6 3 8   CHLORIDE mmol/L 107 107   CO2 mmol/L 29 28   BUN mg/dL 20 19   CREATININE mg/dL 0 82 0 93   CALCIUM mg/dL 8 4 9 0   ALK PHOS U/L 69 72   ALT U/L 9* 10*   AST U/L 9 11       Cardiac Profile:   Troponin I   Date Value Ref Range Status   01/28/2020 <0 02 <=0 04 ng/mL Final     Comment:     3Autovalidation override  [employer] Chemistry analyzer 99% cutoff is > 0 04 ng/mL in network labs     o cTnI 99% cutoff is useful only when applied to patients in the clinical setting of myocardial ischemia   o cTnI 99% cutoff should be interpreted in the context of clinical history, ECG findings and possibly cardiac imaging to establish correct diagnosis  o cTnI 99% cutoff may be suggestive but clearly not indicative of a coronary event without the clinical setting of myocardial ischemia      01/28/2020 <0 02 <=0 04 ng/mL Final     Comment:     3Autovalidation override  [employer] Chemistry analyzer 99% cutoff is > 0 04 ng/mL in network labs     o cTnI 99% cutoff is useful only when applied to patients in the clinical setting of myocardial ischemia   o cTnI 99% cutoff should be interpreted in the context of clinical history, ECG findings and possibly cardiac imaging to establish correct diagnosis     o cTnI 99% cutoff may be suggestive but clearly not indicative of a coronary event without the clinical setting of myocardial ischemia      2020 <0 02 <=0 04 ng/mL Final     Comment:     3Autovalidation override  Siemens Chemistry analyzer 99% cutoff is > 0 04 ng/mL in network labs     o cTnI 99% cutoff is useful only when applied to patients in the clinical setting of myocardial ischemia   o cTnI 99% cutoff should be interpreted in the context of clinical history, ECG findings and possibly cardiac imaging to establish correct diagnosis  o cTnI 99% cutoff may be suggestive but clearly not indicative of a coronary event without the clinical setting of myocardial ischemia  NT-proBNP   Date Value Ref Range Status   2019 226 (H) <125 pg/mL Final   2018 1,143 (H) <125 pg/mL Final       CBC:   Results from last 7 days   Lab Units 20  0545 20  0854   WBC Thousand/uL 3 99* 6 74   HEMOGLOBIN g/dL 12 6 13 4   HEMATOCRIT % 38 9 42 8   PLATELETS Thousands/uL 107* 117*       PT/INR: No results found for: PT, INR,     Magnesium:       Phosphorous:       Microbiology:              *-*-*-*-*-*-*-*-*-*-*-*-*-*-*-*-*-*-*-*-*-*-*-*-*-*-*-*-*-*-*-*-*-*-*-*-*-*-*-*-*-*-*-*-*-*-*-*-*-*-*-*-*-*-  ECHOCARDIOGRAM AND OTHER CARDIOLOGY RESULTS:  Results for orders placed during the hospital encounter of 18   Echo complete with contrast if indicated    Narrative Rose   [facility]    [place], [address]  [phone]    Transthoracic Echocardiogram  2D, M-mode, Doppler, and Color Doppler    Study date:  15-Nov-2018    Patient: Genia Fisher  MR number: [id number]  Account number: [de-identified]  : 1950  Age: 76 years  Gender: Male  Status: Outpatient  Location: Bedside  Height: 70 in  Weight: 240 lb  BP: 141/ 77 mmHg    Indications: heart failure    Diagnoses: I50 9 - Heart failure, unspecified    Sonographer:  GLENIS Reddy  Primary Physician:  Marielle Stevens MD  Referring Physician: Jay Ramirez MD  Group:  [facility]  Interpreting Physician:  Ritchie Rodriguez MD    SUMMARY    LEFT VENTRICLE:  The ventricle was mildly dilated  Systolic function was normal  Ejection fraction was estimated to be 60 %  Although no diagnostic regional wall motion abnormality was identified, this possibility cannot be completely excluded on the basis of this study  Features were consistent with a pseudonormal left ventricular filling pattern, with concomitant abnormal relaxation and increased filling pressure (grade 2 diastolic dysfunction)  LEFT ATRIUM:  The atrium was mildly to moderately dilated  RIGHT ATRIUM:  The atrium was mildly dilated  MITRAL VALVE:  There was trace to mild regurgitation  AORTA:  The aortic root is not well visualized but appears mildly dilated  HISTORY: PRIOR HISTORY: HTN  Chol  CHF  PROCEDURE: The procedure was performed at the bedside  This was a routine study  The transthoracic approach was used  The study included complete 2D imaging, M-mode, complete spectral Doppler, and color Doppler  The heart rate was 70 bpm,  at the start of the study  Images were obtained from the parasternal, apical, subcostal, and suprasternal notch acoustic windows  Image quality was adequate  LEFT VENTRICLE: The ventricle was mildly dilated  Systolic function was normal  Ejection fraction was estimated to be 60 %  Although no diagnostic regional wall motion abnormality was identified, this possibility cannot be completely  excluded on the basis of this study  Wall thickness was normal  No evidence of apical thrombus  DOPPLER: Features were consistent with a pseudonormal left ventricular filling pattern, with concomitant abnormal relaxation and increased  filling pressure (grade 2 diastolic dysfunction)      RIGHT VENTRICLE: The size was normal  Systolic function was normal  Wall thickness was normal     LEFT ATRIUM: The atrium was mildly to moderately dilated  RIGHT ATRIUM: The atrium was mildly dilated  MITRAL VALVE: Valve structure was normal  There was normal leaflet separation  DOPPLER: The transmitral velocity was within the normal range  There was no evidence for stenosis  There was trace to mild regurgitation  AORTIC VALVE: The valve was probably trileaflet  Leaflets exhibited normal thickness, mild calcification, and normal cuspal separation  DOPPLER: Transaortic velocity was within the normal range  There was no evidence for stenosis  There  was no significant regurgitation  TRICUSPID VALVE: The valve structure was normal  There was normal leaflet separation  DOPPLER: The transtricuspid velocity was within the normal range  There was no evidence for stenosis  There was no significant regurgitation  PULMONIC VALVE: Not well visualized  DOPPLER: The transpulmonic velocity was within the normal range  There was no significant regurgitation  PERICARDIUM: There was no pericardial effusion  The pericardium was normal in appearance  AORTA: The aortic root is not well visualized but appears mildly dilated  SYSTEMIC VEINS: IVC: The inferior vena cava was normal in size      SYSTEM MEASUREMENT TABLES    2D  %FS: 31 1 %  Ao Diam: 2 8 cm  EDV(Teich): 157 4 ml  EF(Teich): 58 2 %  ESV(Teich): 65 9 ml  IVSd: 1 cm  LA Area: 26 cm2  LA Diam: 4 6 cm  LVEDV MOD A4C: 142 2 ml  LVEF MOD A4C: 73 4 %  LVESV MOD A4C: 37 8 ml  LVIDd: 5 7 cm  LVIDs: 3 9 cm  LVLd A4C: 8 9 cm  LVLs A4C: 6 8 cm  LVPWd: 0 9 cm  RA Area: 20 2 cm2  RVIDd: 3 6 cm  SV MOD A4C: 104 4 ml  SV(Teich): 91 6 ml    MM  TAPSE: 2 6 cm    PW  E': 0 1 m/s  MV A Larry: 0 m/s  MV E Larry: 0 8 m/s  MV E/A Ratio: 186 1    [employer] Accredited Echocardiography Laboratory    Prepared and electronically signed by    Cecil Hand MD  Signed [id number] 14:40:07       Results for orders placed during the hospital encounter of 04/27/19   ALYX    Narrative Mike Tran MD     5/2/2019 4:02 PM  Patient tolerated procedure well    Sedation was Versed 4mg, Fentanyl 50mcg  Probe passed once  No blood on return  No desaturations or hemodynamic changes  LVEF normal  Mild mitral regurgitation  Mild aortic regurgitation  Mild left atrial dilation  No valvular masses  No BAKARI thrombus  No PFO  No obvious cardioembolic source  Consider Loop recorder  No results found for this or any previous visit  No results found for this or any previous visit        *-*-*-*-*-*-*-*-*-*-*-*-*-*-*-*-*-*-*-*-*-*-*-*-*-*-*-*-*-*-*-*-*-*-*-*-*-*-*-*-*-*-*-*-*-*-*-*-*-*-*-*-*-*-  ALLERGIES:  Allergies   Allergen Reactions    Cephalexin Throat Swelling     Pt reported throat swelling after taking antibiotic     Penicillins Other (See Comments)       *-*-*-*-*-*-*-*-*-*-*-*-*-*-*-*-*-*-*-*-*-*-*-*-*-*-*-*-*-*-*-*-*-*-*-*-*-*-*-*-*-*-*-*-*-*-*-*-*-*-*-*-*-*-    SIGNATURES:   @HQY@   Steven Davis MD

## 2020-01-29 NOTE — ASSESSMENT & PLAN NOTE
This is a 70-year-old male with history of hypertension, hyperlipidemia, paroxysmal atrial fibrillation on Xarelto, history of CVA with residual left-sided weakness presenting with episode of unresponsiveness at home     -as per wife patient had associated dizziness, diaphoresis, dyspnea and right hand tremor  -unclear etiology, rule out cardiogenic syncope versus seizure  -CT head reveals stable chronic but also late subacute ischemic changes within right hemisphere involving frontal lobe and parietal lobe  -neurology consultation appreciated  -cardiology consultation appreciated, plan for device interrogation

## 2020-01-30 LAB
ANION GAP SERPL CALCULATED.3IONS-SCNC: 6 MMOL/L (ref 4–13)
BASOPHILS # BLD AUTO: 0.02 THOUSANDS/ΜL (ref 0–0.1)
BASOPHILS NFR BLD AUTO: 1 % (ref 0–1)
BUN SERPL-MCNC: 17 MG/DL (ref 5–25)
CALCIUM SERPL-MCNC: 8.6 MG/DL (ref 8.3–10.1)
CHLORIDE SERPL-SCNC: 105 MMOL/L (ref 100–108)
CO2 SERPL-SCNC: 29 MMOL/L (ref 21–32)
CREAT SERPL-MCNC: 0.81 MG/DL (ref 0.6–1.3)
EOSINOPHIL # BLD AUTO: 0.11 THOUSAND/ΜL (ref 0–0.61)
EOSINOPHIL NFR BLD AUTO: 3 % (ref 0–6)
ERYTHROCYTE [DISTWIDTH] IN BLOOD BY AUTOMATED COUNT: 13.8 % (ref 11.6–15.1)
GFR SERPL CREATININE-BSD FRML MDRD: 91 ML/MIN/1.73SQ M
GLUCOSE SERPL-MCNC: 103 MG/DL (ref 65–140)
HCT VFR BLD AUTO: 39.4 % (ref 36.5–49.3)
HGB BLD-MCNC: 12.6 G/DL (ref 12–17)
IMM GRANULOCYTES # BLD AUTO: 0.01 THOUSAND/UL (ref 0–0.2)
IMM GRANULOCYTES NFR BLD AUTO: 0 % (ref 0–2)
LYMPHOCYTES # BLD AUTO: 1.34 THOUSANDS/ΜL (ref 0.6–4.47)
LYMPHOCYTES NFR BLD AUTO: 34 % (ref 14–44)
MCH RBC QN AUTO: 30.1 PG (ref 26.8–34.3)
MCHC RBC AUTO-ENTMCNC: 32 G/DL (ref 31.4–37.4)
MCV RBC AUTO: 94 FL (ref 82–98)
MONOCYTES # BLD AUTO: 0.39 THOUSAND/ΜL (ref 0.17–1.22)
MONOCYTES NFR BLD AUTO: 10 % (ref 4–12)
NEUTROPHILS # BLD AUTO: 2.04 THOUSANDS/ΜL (ref 1.85–7.62)
NEUTS SEG NFR BLD AUTO: 52 % (ref 43–75)
NRBC BLD AUTO-RTO: 0 /100 WBCS
PLATELET # BLD AUTO: 116 THOUSANDS/UL (ref 149–390)
PMV BLD AUTO: 10.1 FL (ref 8.9–12.7)
POTASSIUM SERPL-SCNC: 3.7 MMOL/L (ref 3.5–5.3)
RBC # BLD AUTO: 4.19 MILLION/UL (ref 3.88–5.62)
SODIUM SERPL-SCNC: 140 MMOL/L (ref 136–145)
WBC # BLD AUTO: 3.91 THOUSAND/UL (ref 4.31–10.16)

## 2020-01-30 PROCEDURE — 97167 OT EVAL HIGH COMPLEX 60 MIN: CPT

## 2020-01-30 PROCEDURE — 99232 SBSQ HOSP IP/OBS MODERATE 35: CPT | Performed by: INTERNAL MEDICINE

## 2020-01-30 PROCEDURE — 97163 PT EVAL HIGH COMPLEX 45 MIN: CPT | Performed by: PHYSICAL THERAPIST

## 2020-01-30 PROCEDURE — 80048 BASIC METABOLIC PNL TOTAL CA: CPT | Performed by: INTERNAL MEDICINE

## 2020-01-30 PROCEDURE — 85025 COMPLETE CBC W/AUTO DIFF WBC: CPT | Performed by: INTERNAL MEDICINE

## 2020-01-30 RX ORDER — ACETAMINOPHEN 325 MG/1
650 TABLET ORAL EVERY 6 HOURS PRN
Status: DISCONTINUED | OUTPATIENT
Start: 2020-01-30 | End: 2020-01-31 | Stop reason: HOSPADM

## 2020-01-30 RX ADMIN — TAMSULOSIN HYDROCHLORIDE 0.8 MG: 0.4 CAPSULE ORAL at 21:17

## 2020-01-30 RX ADMIN — ACETAMINOPHEN 650 MG: 325 TABLET ORAL at 13:34

## 2020-01-30 RX ADMIN — PANTOPRAZOLE SODIUM 40 MG: 40 TABLET, DELAYED RELEASE ORAL at 17:18

## 2020-01-30 RX ADMIN — PRAVASTATIN SODIUM 20 MG: 10 TABLET ORAL at 17:18

## 2020-01-30 RX ADMIN — RIVAROXABAN 20 MG: 20 TABLET, FILM COATED ORAL at 08:14

## 2020-01-30 RX ADMIN — PANTOPRAZOLE SODIUM 40 MG: 40 TABLET, DELAYED RELEASE ORAL at 05:18

## 2020-01-30 RX ADMIN — FLUOXETINE 20 MG: 20 CAPSULE ORAL at 08:14

## 2020-01-30 NOTE — PLAN OF CARE
Problem: Potential for Falls  Goal: Patient will remain free of falls  Description  INTERVENTIONS:  - Assess patient frequently for physical needs  -  Identify cognitive and physical deficits and behaviors that affect risk of falls    -  [place] fall precautions as indicated by assessment   - Educate patient/family on patient safety including physical limitations  - Instruct patient to call for assistance with activity based on assessment  - Modify environment to reduce risk of injury  - Consider OT/PT consult to assist with strengthening/mobility  Outcome: Progressing     Problem: Prexisting or High Potential for Compromised Skin Integrity  Goal: Skin integrity is maintained or improved  Description  INTERVENTIONS:  - Identify patients at risk for skin breakdown  - Assess and monitor skin integrity  - Assess and monitor nutrition and hydration status  - Monitor labs   - Assess for incontinence   - Turn and reposition patient  - Assist with mobility/ambulation  - Relieve pressure over bony prominences  - Avoid friction and shearing  - Provide appropriate hygiene as needed including keeping skin clean and dry  - Evaluate need for skin moisturizer/barrier cream  - Collaborate with interdisciplinary team   - Patient/family teaching  - Consider wound care consult   Outcome: Progressing     Problem: PAIN - ADULT  Goal: Verbalizes/displays adequate comfort level or baseline comfort level  Description  Interventions:  - Encourage patient to monitor pain and request assistance  - Assess pain using appropriate pain scale  - Administer analgesics based on type and severity of pain and evaluate response  - Implement non-pharmacological measures as appropriate and evaluate response  - Consider cultural and social influences on pain and pain management  - Notify physician/advanced practitioner if interventions unsuccessful or patient reports new pain  Outcome: Progressing     Problem: INFECTION - ADULT  Goal: Absence or prevention of progression during hospitalization  Description  INTERVENTIONS:  - Assess and monitor for signs and symptoms of infection  - Monitor lab/diagnostic results  - Monitor all insertion sites, i e  indwelling lines, tubes, and drains  - Monitor endotracheal if appropriate and nasal secretions for changes in amount and color  - [place] appropriate cooling/warming therapies per order  - Administer medications as ordered  - Instruct and encourage patient and family to use good hand hygiene technique  - Identify and instruct in appropriate isolation precautions for identified infection/condition  Outcome: Progressing  Goal: Absence of fever/infection during neutropenic period  Description  INTERVENTIONS:  - Monitor WBC    Outcome: Progressing     Problem: SAFETY ADULT  Goal: Maintain or return to baseline ADL function  Description  INTERVENTIONS:  -  Assess patient's ability to carry out ADLs; assess patient's baseline for ADL function and identify physical deficits which impact ability to perform ADLs (bathing, care of mouth/teeth, toileting, grooming, dressing, etc )  - Assess/evaluate cause of self-care deficits   - Assess range of motion  - Assess patient's mobility; develop plan if impaired  - Assess patient's need for assistive devices and provide as appropriate  - Encourage maximum independence but intervene and supervise when necessary  - Involve family in performance of ADLs  - Assess for home care needs following discharge   - Consider OT consult to assist with ADL evaluation and planning for discharge  - Provide patient education as appropriate  Outcome: Progressing  Goal: Maintain or return mobility status to optimal level  Description  INTERVENTIONS:  - Assess patient's baseline mobility status (ambulation, transfers, stairs, etc )    - Identify cognitive and physical deficits and behaviors that affect mobility  - Identify mobility aids required to assist with transfers and/or ambulation (gait belt, sit-to-stand, lift, walker, cane, etc )  - [place] fall precautions as indicated by assessment  - Record patient progress and toleration of activity level on Mobility SBAR; progress patient to next Phase/Stage  - Instruct patient to call for assistance with activity based on assessment  - Consider rehabilitation consult to assist with strengthening/weightbearing, etc   Outcome: Progressing     Problem: DISCHARGE PLANNING  Goal: Discharge to home or other facility with appropriate resources  Description  INTERVENTIONS:  - Identify barriers to discharge w/patient and caregiver  - Arrange for needed discharge resources and transportation as appropriate  - Identify discharge learning needs (meds, wound care, etc )  - Arrange for interpretive services to assist at discharge as needed  - Refer to Case Management Department for coordinating discharge planning if the patient needs post-hospital services based on physician/advanced practitioner order or complex needs related to functional status, cognitive ability, or social support system  Outcome: Progressing     Problem: Knowledge Deficit  Goal: Patient/family/caregiver demonstrates understanding of disease process, treatment plan, medications, and discharge instructions  Description  Complete learning assessment and assess knowledge base    Interventions:  - Provide teaching at level of understanding  - Provide teaching via preferred learning methods  Outcome: Progressing     Problem: NEUROSENSORY - ADULT  Goal: Achieves stable or improved neurological status  Description  INTERVENTIONS  - Monitor and report changes in neurological status  - Monitor vital signs such as temperature, blood pressure, glucose, and any other labs ordered   - Initiate measures to prevent increased intracranial pressure  - Monitor for seizure activity and implement precautions if appropriate      Outcome: Progressing  Goal: Remains free of injury related to seizures activity  Description  INTERVENTIONS  - Maintain airway, patient safety  and administer oxygen as ordered  - Monitor patient for seizure activity, document and report duration and description of seizure to physician/advanced practitioner  - If seizure occurs,  ensure patient safety during seizure  - Reorient patient post seizure  - Seizure pads on all 4 side rails  - Instruct patient/family to notify RN of any seizure activity including if an aura is experienced  - Instruct patient/family to call for assistance with activity based on nursing assessment  - Administer anti-seizure medications if ordered    Outcome: Progressing  Goal: Achieves maximal functionality and self care  Description  INTERVENTIONS  - Monitor swallowing and airway patency with patient fatigue and changes in neurological status  - Encourage and assist patient to increase activity and self care     - Encourage visually impaired, hearing impaired and aphasic patients to use assistive/communication devices  Outcome: Progressing     Problem: CARDIOVASCULAR - ADULT  Goal: Maintains optimal cardiac output and hemodynamic stability  Description  INTERVENTIONS:  - Monitor I/O, vital signs and rhythm  - Monitor for S/S and trends of decreased cardiac output  - Administer and titrate ordered vasoactive medications to optimize hemodynamic stability  - Assess quality of pulses, skin color and temperature  - Assess for signs of decreased coronary artery perfusion  - Instruct patient to report change in severity of symptoms  Outcome: Progressing  Goal: Absence of cardiac dysrhythmias or at baseline rhythm  Description  INTERVENTIONS:  - Continuous cardiac monitoring, vital signs, obtain 12 lead EKG if ordered  - Administer antiarrhythmic and heart rate control medications as ordered  - Monitor electrolytes and administer replacement therapy as ordered  Outcome: Progressing

## 2020-01-30 NOTE — OCCUPATIONAL THERAPY NOTE
[address] Evaluation     Patient Name: Unknown Cameron  WVWVA'X Date: 1/30/2020  Problem List  Principal Problem:    Syncope  Active Problems:    Essential hypertension    Hyperlipidemia    CVA (cerebral vascular accident) ([facility] [address] )    Chronic diastolic CHF (congestive heart failure) (HCC)    Paroxysmal A-fib (HCC)    Seizure disorder [facility])    Past Medical History  Past Medical History:   Diagnosis Date    Arthritis     BL knees    Atrial fibrillation ([facility] )     CHF (congestive heart failure) ([facility] )     Colon polyp     CVA (cerebral vascular accident) ([facility] ) 4/27/2019    NIHSS 26 on presentation    Depression     Diabetes mellitus ([place] [address] )     Hypertension     Irregular heart beat     Afib    Stroke [facility])     Urinary retention      Past Surgical History  Past Surgical History:   Procedure Laterality Date    COLONOSCOPY      IR STROKE ALERT  4/27/2019    MENISCECTOMY Right              01/30/20 1007   Note Type   Note type Eval/Treat   Restrictions/Precautions   Braces or Orthoses   (resting hand splint 2hrs/time wearing cycle, LLE brace/boot)   Other Precautions Chair Alarm; Bed Alarm; Fall Risk;Telemetry  (expressive aphasia)   Pain Assessment   Pain Assessment No/denies pain   Pain Score No Pain   Home Living   Type of Home House   Home Layout Two level;Performs ADLs on one level; Able to live on main level with bedroom/bathroom  (2 [address])   Bathroom Shower/Tub Tub/shower unit   Bathroom Toilet Standard   Bathroom Equipment Tub transfer bench;Grab bars in shower;Grab bars around toilet   [facility]; Wheelchair-manual;Hospital bed   Additional Comments pt lives w/ spouse and has home aide 6 5 hours at a time; wife assists w/ transfers   Prior Function   Level of [place] Needs assistance with IADLs; Needs assistance with ADLs and functional mobility   Lives With Spouse; Family  (daughter RN on 3rd floor)   Receives Help From Home health  (6 5 hours Mon-Friday)   ADL Assistance Needs assistance   IADLs Needs assistance   Falls in the last 6 months 0   Vocational Retired   Comments pt attends Outpt OT/PT/Speech at [facility] 3x/wk; pt only ambulates at outpatient PT w/ RW and braces, w/c mobility and able to complete w/ supervision; and wife provided history as pt w/ expressive aphasia; pt can answer some questions    Lifestyle   Autonomy per pt family assist w/ ADLs, setup self-feeding, assist x1-2 w/ functional transfers and mobility w/ RW and w/c, assist w/ IADLs, assist transportation   Reciprocal Relationships spouse and home aide; daughter   Service to Others retired [employer]   Intrinsic Gratification walking   ADL   Where Assessed Chair   Eating Assistance 4  [address] [facility] 3  Moderate Assistance   [address] 3  Moderate Assistance   LB [address] 2  [address] [facility] 3  Moderate Assistance   [address] [facility] 2  [facility]  2  Maximal Assistance   Bed Mobility   Rolling R 2  Maximal assistance   Additional items Assist x 1; Increased time required;Verbal cues;LE management; Bedrails;HOB elevated   Supine to Sit 2  Maximal assistance   Additional items Assist x 1; Increased time required;LE management;Verbal cues;HOB elevated; Bedrails   Additional Comments increased time to complete and cues for safety   Transfers   Sit to Stand 3  Moderate assistance   Additional items Assist x 2; Increased time required;Verbal cues   Stand to Sit 3  Moderate assistance   Additional items Assist x 2; Increased time required;Verbal cues   Stand pivot 2  Maximal assistance   Additional items Assist x 2; Increased time required;Verbal cues;Armrests  (left knee hyperextended during transfers & physical A to mov)   Additional Comments cues for safety and positioning, hand held assists   Balance   Static Sitting Fair +   Dynamic Sitting Fair   Static Standing Poor +   Dynamic Standing Poor -   Activity Tolerance   Activity Tolerance Patient tolerated treatment well;Patient limited by fatigue   Nurse Made Aware appropriate to see per RNHailee   RUSTACIA Assessment   RUE Assessment WFL  (4-/5,  4/5)   LUE Assessment   LUE Assessment   (hyperonia, flexor contracture at elbow, splint baseline)   Hand Function   Gross Motor Coordination   (impaired LUE, WFL R UE)   Fine Motor Coordination   (impaired L hand, R UE WFL)   Sensation   Light Touch Partial deficits in the LUE   Proprioception   Proprioception No apparent deficits   Vision-Basic Assessment   Current Vision Wears glasses only for reading   Patient Visual Report   (appears w/ L neglect)   Perception   Inattention/Neglect Cues to attend to left side of body   Cognition   Overall Cognitive Status [facility]   Arousal/Participation Cooperative; Alert   Attention Attends with cues to redirect   Orientation Level Oriented to person;Oriented to place;Oriented to time   Memory Unable to assess  (expressive aphasia noted)   Following Commands Follows one step commands with increased time or repetition   Comments pt w/ expressive aphasia from past CVA, able to state the date   Assessment   Limitation Decreased ADL status; Decreased UE strength;Decreased Safe judgement during ADL;Decreased endurance;Decreased cognition;Decreased high-level ADLs; Decreased self-care trans;Decreased sensation;Visual deficit; Decreased UE ROM; Non-func L UE   Prognosis Fair   Assessment Pt is a 71 y o  male seen for OT evaluation s/p admit to [facility] on 1/28/2020 w/ Syncope  Comorbidities affecting pt's functional performance at time of assessment include: CVA w/ residual L sided weakness and expressive aphasia, CHF, DM, HTN, a-fib, depression  Personal factors affecting pt at time of IE include: pt e/ expressive aphasia wife and caregiver able to provide history   Prior to admission, pt was living w/ spouse and family and reports assist x1-2 transfers and mobility w/ RW and w/c, assist UB ADLs, assist LB ADLs, min assist grooming and self-feeding, MAX assist toileting  Upon evaluation: Pt requires MAX assist supine>sit bed mobility w/ increased time to complete, MOD assist x2 sit<>stand w/ VCs for hand placement and positioning, MAX assist x2 SPT to chair, MOD-MAX UB ADLs, MAX assist LB ADLs 2* the following deficits impacting occupational performance: decreased strength and endurance, impaired balance, expressive aphasia, impaired activity tolerance, L UE flexor contracture, LLE brace  Pt to benefit from continued skilled OT tx while in the hospital to address deficits as defined above and maximize level of functional independence w ADL's and functional mobility  Occupational Performance areas to address include: eating, grooming, bathing/shower, toilet hygiene, dressing, health maintenance and functional mobility, UE exercises and stretching program  From OT standpoint, recommendation at time of d/c would be return to home w/ continued family support and outpatient OT services  Goals   Patient Goals "go home"   LTG Time Frame 10-14   Long Term Goal please see below goals   Plan   Treatment Interventions ADL retraining; Endurance training;UE strengthening/ROM; Functional transfer training;Patient/family training;Cognitive reorientation;Equipment evaluation/education; Compensatory technique education; Energy conservation; Activityengagement   Goal Expiration Date 02/13/20   OT Frequency 3-5x/wk   Recommendation   OT Discharge Recommendation Outpatient OT   OT - OK to Discharge   (when medically stable)   Barthel Index   Feeding 5   Bathing 0   Grooming Score 0   Dressing Score 0   Bladder Score 10   Bowels Score 10   Toilet Use Score 5   Transfers (Bed/Chair) Score 5   Mobility (Level Surface) Score 0   Stairs Score 0   Barthel Index Score 35   Modified Terrell Scale   Modified Terrell Scale 4     Occupational Therapy Goals to be met in 10-14 days:  1) Pt will improve activity tolerance to G for 30 min txment sessions to enhance ADLs  2) Pt will complete UB ADLs/self care w/ mod assist  3) Pt will complete toileting w/ mod assist w/ G hygiene/thoroughness using DME PRN  4) Pt will improve functional transfers on/off all surfaces using DME PRN w/ G balance/safety including toileting w/ mod assist  5) Pt will improve fx'l mobility during I/ADl/leisure tasks using w/c PRN w/ g balance/safety w/ supervision  6) Pt will engage in ongoing cognitive assessment w/ G participation to A w/ safe d/c planning/recommendations  7) Pt will demonstrate G carryover of pt/caregiver education and training as appropriate w/ mod I  w/ G tolerance  8) Pt will engage in depression screen/leisure interest checklist w/ G participation to monitor s/s depression and ID 3 positive coping strategies to A w/ emotional regulation and management  9) Pt will demonstrate 100% carryover of E C  techniques w/ mod I t/o fx'l I/ADL/leisure tasks w/o cues s/p skilled education  10) Pt will tolerate bed mobility and EOB seated tasks w/ min A for 20 mins to engage in fx'l I/ADL/leisure tasks w/ min A w/ min cues  11) Pt will demonstrate improved R UE strength by 1 MMT grade and demonstrate L UE AAROM/PROM program w/ supervision  to enhance ADLS and functional transfers    Documentation completed by: Amanda Davis MS, OTR/L

## 2020-01-30 NOTE — PLAN OF CARE
Problem: PHYSICAL THERAPY ADULT  Goal: Performs mobility at highest level of function for planned discharge setting  See evaluation for individualized goals  Description  Treatment/Interventions: Functional transfer training, LE strengthening/ROM, Therapeutic exercise, Endurance training, Patient/family training, Equipment eval/education, Bed mobility, Spoke to nursing, Family, OT  Equipment Recommended: Jacqueline Chavez, Wheelchair       See flowsheet documentation for full assessment, interventions and recommendations  Note:   Prognosis: Fair  Problem List: Decreased strength, Decreased range of motion, Decreased endurance, Impaired balance, Decreased mobility, Decreased coordination, Impaired judgement, Decreased safety awareness, Impaired vision, Impaired sensation, Impaired tone  Assessment: pt admitted with syncope, recent episodes of bradycardia, now referred to PT  pt was needing mod assist PTA, mostly mobile in w/c and needing asisst for all adl's  pt was [place]  has home health aides 5 times per week and has a daughter who is a nurse  pt demonstrated severe functional limitations due to chronic debility from cva  pt was able to mobilize OOB to chair with max assist of 2 persons due to lack of pt's own equipment to facilitate the transfer  pt has deficits in roml hemibody, strength, tone, coordination  locomotion, balance, self care  pt will need skilled PT to prevent decline in function and to minimize caregiver burden  pt 's wife will bring in his boot and has brought in his L hand splint  pt will be able to return home with family support  continue OPPT as previosuly  pt did not have any light headedness during the session  Barriers to Discharge: None     Recommendation: Home with family support, Outpatient PT(continue home health aides)     PT - OK to Discharge: Yes    See flowsheet documentation for full assessment

## 2020-01-30 NOTE — PLAN OF CARE
Problem: OCCUPATIONAL THERAPY ADULT  Goal: Performs self-care activities at highest level of function for planned discharge setting  See evaluation for individualized goals  Description  Treatment Interventions: ADL retraining, Endurance training, UE strengthening/ROM, Functional transfer training, Patient/family training, Cognitive reorientation, Equipment evaluation/education, Compensatory technique education, Energy conservation, Activityengagement          See flowsheet documentation for full assessment, interventions and recommendations  Note:   Limitation: Decreased ADL status, Decreased UE strength, Decreased Safe judgement during ADL, Decreased endurance, Decreased cognition, Decreased high-level ADLs, Decreased self-care trans, Decreased sensation, Visual deficit, Decreased UE ROM, Non-func L UE  Prognosis: Fair  Assessment: Pt is a 71 y o  male seen for OT evaluation s/p admit to [facility] on 1/28/2020 w/ Syncope  Comorbidities affecting pt's functional performance at time of assessment include: CVA w/ residual L sided weakness and expressive aphasia, CHF, DM, HTN, a-fib, depression  Personal factors affecting pt at time of IE include: pt e/ expressive aphasia wife and caregiver able to provide history  Prior to admission, pt was living w/ spouse and family and reports assist x1-2 transfers and mobility w/ RW and w/c, assist UB ADLs, assist LB ADLs, min assist grooming and self-feeding, MAX assist toileting  Upon evaluation: Pt requires MAX assist supine>sit bed mobility w/ increased time to complete, MOD assist x2 sit<>stand w/ VCs for hand placement and positioning, MAX assist x2 SPT to chair, MOD-MAX UB ADLs, MAX assist LB ADLs 2* the following deficits impacting occupational performance: decreased strength and endurance, impaired balance, expressive aphasia, impaired activity tolerance, L UE flexor contracture, LLE brace   Pt to benefit from continued skilled OT tx while in the hospital to address deficits as defined above and maximize level of functional independence w ADL's and functional mobility  Occupational Performance areas to address include: eating, grooming, bathing/shower, toilet hygiene, dressing, health maintenance and functional mobility, UE exercises and stretching program  From OT standpoint, recommendation at time of d/c would be return to home w/ continued family support and outpatient OT services       OT Discharge Recommendation: Outpatient OT  OT - OK to Discharge: (when medically stable)

## 2020-01-30 NOTE — PROGRESS NOTES
CARDIOLOGY INPATIENT FOLLOW-UP PROGRESS NOTE    ENCOUNTER DATE: 01/30/20 10:01 AM  PATIENT NAME: Kathy Barnes   1950    [id number]  Age: 71 y o  Sex: male  AUTHOR: Steven Davis MD  PRIMARYCARE PHYSICIAN: Daril Homans, MD  PRIMARY INPATIENT PHYSICIAN: Yenifer Schafer MD  *-*-*-*-*-*-*-*-*-*-*-*-*-*-*-*-*-*-*-*-*-*-*-*-*-*-*-*-*-*-*-*-*-*-*-*-*-*-*-*-*-*-*-*-*-*-*-*-*-*-*-*-*-*-  FOLLOW-UP FOR:  1  Syncope, likely vasovagal  2  Severe fatigue  3  History of severe  4  Status post loop recorder implantation  5  Bradycardia   6  Nonsustained VT       CARDIOLOGY PLAN:  - would continue current medical therapy with Xarelto and pravastatin  - request to check orthostatic heart rate and blood pressure  - continue telemetry till tomorrow  If he has any more NSVT events then will consider adding low-dose beta-blocker therapy  - consideration for external event monitor versus reimplantation of loop recorder after re-evaluation in the outpatient setting    *-*-*-*-*-*-*-*-*-*-*-*-*-*-*-*-*-*-*-*-*-*-*-*-*-*-*-*-*-*-*-*-*-*-*-*-*-*-*-*-*-*-*-*-*-*-*-*-*-*-*-*-*-*-  INTERVAL CHANGES / HISTORY OF PRESENT ILLNESS:  Patient feeling better today with improved status of his fatigue  Has had no recurrence of syncope or near-syncope  REVIEW OF SYMPTOMS:    Positive for: Resolve syncope  Negative for: All remaining as reviewed below and in HPI      SYSTEM SYMPTOMS REVIEWED:  Generalweight change, fever, night sweats  Respiratoryl Wheezing, shortness of breath, cough, URI symptoms, sputum, blood  Cardiovascularchest pain, syncope, dyspnea on exertion, edema, decline in exercise tolerance, claudication   Gastrointestinalpersistent vomiting, diarrhea, abdominal distention, blood in stool   Muscular or skeletaljoint pain or swelling   Neurologicheadaches, syncope, abnormal movement  Hematologichistory of easy bruising and bleeding   Endocrinethyroid enlargement, heat or cold intolerance, polyuria Psychiatricanxiety, depression   *-*-*-*-*-*-*-*-*-*-*-*-*-*-*-*-*-*-*-*-*-*-*-*-*-*-*-*-*-*-*-*-*-*-*-*-*-*-*-*-*-*-*-*-*-*-*-*-*-*-*-*-*-*-    VITAL SIGNS:  Vitals:    20 2300 20 0300 20 0700   BP: 119/75 119/60 137/77 124/69   BP Location: Right arm Right arm Left arm Left arm   Pulse: 62 60 66 68   Resp: 19 20 18    Temp: (!) 97 4 °F (36 3 °C) 97 7 °F (36 5 °C) 97 7 °F (36 5 °C) (!) 96 5 °F (35 8 °C)   TempSrc: Temporal Tympanic Temporal Tympanic   SpO2: 97% 96% 100% 96%   Weight:       Height:          Temp (24hrs), Av 7 °F (36 5 °C), Min:96 5 °F (35 8 °C), Max:99 7 °F (37 6 °C)  Current: Temperature: (!) 96 5 °F (35 8 °C)      Intake/Output Summary (Last 24 hours) at 2020 1001  Last data filed at 2020 0000  Gross per 24 hour   Intake 290 ml   Output 270 ml   Net 20 ml      Weight (last 2 days)     Date/Time   Weight    20 1103   89 4 (197) stated    Weight: stated at 20 1103    20 1122   135 (297)    20 0827   98 8 (217 81)           ,   Wt Readings from Last 3 Encounters:   20 89 4 kg (197 lb)   19 89 7 kg (197 lb 11 2 oz)   19 94 8 kg (209 lb)    , Body mass index is 26 72 kg/m²  *-*-*-*-*-*-*-*-*-*-*-*-*-*-*-*-*-*-*-*-*-*-*-*-*-*-*-*-*-*-*-*-*-*-*-*-*-*-*-*-*-*-*-*-*-*-*-*-*-*-*-*-*-*-  PHYSICAL EXAM:  General Appearance:    Alert, cooperative, no distress, appears stated age   Head, Eyes, ENT:    No obvious abnormality, moist mucous mebranes  Neck:   Supple, no carotid bruit or JVD   Back:     Symmetric, no curvature  Lungs:     Respirations unlabored  Clear to auscultation bilaterally,    Chest wall:    No tenderness or deformity   Heart:    Regular rate and rhythm, S1 and S2 normal, no murmur, rub  or gallop  Abdomen:     Soft, non-tender, No obvious masses, or organomegaly   Extremities:  Left upper and lower extremity weakness  No pedal edema     Skin:   Skin color, texture, turgor normal, no rashes or lesions   *-*-*-*-*-*-*-*-*-*-*-*-*-*-*-*-*-*-*-*-*-*-*-*-*-*-*-*-*-*-*-*-*-*-*-*-*-*-*-*-*-*-*-*-*-*-*-*-*-*-*-*-*-*-    Telemetry reviewed    Sinus rhythm with periods of sinus bradycardia  5 beat run of nonsustained VT yesterday  Last ECG     Results for orders placed or performed during the hospital encounter of 01/28/20   ECG 12 lead   Result Value    Ventricular Rate 61    Atrial Rate 61    NV Interval 138    QRSD Interval 88    QT Interval 410    QTC Interval 412    P Axis 27    QRS Axis -10    T Wave Axis 65    Narrative    Normal sinus rhythm with sinus arrhythmia  Nonspecific T wave abnormality  Abnormal ECG  When compared with ECG of 28-JAN-2020 08:38,  No significant change was found  Confirmed by Burns Curling ([id number]) on 1/29/2020 6:09:00 PM       Medications reviewed    Current Facility-Administered Medications:     acetaminophen (TYLENOL) tablet 650 mg, 650 mg, Oral, Q6H PRN, Joselyn Galaviz MD    FLUoxetine (PROzac) capsule 20 mg, 20 mg, Oral, Daily, Joselyn Galaviz MD, 20 mg at 01/30/20 0814    pantoprazole (PROTONIX) EC tablet 40 mg, 40 mg, Oral, BID AC, Joselyn Galaviz MD, 40 mg at 01/30/20 0518    pravastatin (PRAVACHOL) tablet 20 mg, 20 mg, Oral, Before Dinner, Joselyn Galaviz MD, 20 mg at 01/29/20 1721    rivaroxaban (XARELTO) tablet 20 mg, 20 mg, Oral, Daily With Breakfast, Joselyn Galaviz MD, 20 mg at 01/30/20 0814    tamsulosin (FLOMAX) capsule 0 8 mg, 0 8 mg, Oral, HS, Joselyn Galaviz MD, 0 8 mg at 01/29/20 2156    Imaging studies results reviewed    No procedure found  *-*-*-*-*-*-*-*-*-*-*-*-*-*-*-*-*-*-*-*-*-*-*-*-*-*-*-*-*-*-*-*-*-*-*-*-*-*-*-*-*-*-*-*-*-*-*-*-*-*-*-*-*-*-    LABORATORY DATA:  I have personally reviewed pertinent labs      CMP:   Results from last 7 days   Lab Units 01/30/20  0512 01/29/20  0545 01/28/20  0854   POTASSIUM mmol/L 3 7 3 6 3 8   CHLORIDE mmol/L 105 107 107   CO2 mmol/L 29 29 28   BUN mg/dL 17 20 19   CREATININE mg/dL 0 81 0 82 0 93 CALCIUM mg/dL 8 6 8 4 9 0   ALK PHOS U/L  --  69 72   ALT U/L  --  9* 10*   AST U/L  --  9 11       Cardiac Profile:   Troponin I   Date Value Ref Range Status   01/28/2020 <0 02 <=0 04 ng/mL Final     Comment:     3Autovalidation override  [employer] Chemistry analyzer 99% cutoff is > 0 04 ng/mL in network labs     o cTnI 99% cutoff is useful only when applied to patients in the clinical setting of myocardial ischemia   o cTnI 99% cutoff should be interpreted in the context of clinical history, ECG findings and possibly cardiac imaging to establish correct diagnosis  o cTnI 99% cutoff may be suggestive but clearly not indicative of a coronary event without the clinical setting of myocardial ischemia      01/28/2020 <0 02 <=0 04 ng/mL Final     Comment:     3Autovalidation override  S[employer]ens Chemistry analyzer 99% cutoff is > 0 04 ng/mL in network labs     o cTnI 99% cutoff is useful only when applied to patients in the clinical setting of myocardial ischemia   o cTnI 99% cutoff should be interpreted in the context of clinical history, ECG findings and possibly cardiac imaging to establish correct diagnosis  o cTnI 99% cutoff may be suggestive but clearly not indicative of a coronary event without the clinical setting of myocardial ischemia      01/28/2020 <0 02 <=0 04 ng/mL Final     Comment:     3Autovalidation override  S[employer]ens Chemistry analyzer 99% cutoff is > 0 04 ng/mL in network labs     o cTnI 99% cutoff is useful only when applied to patients in the clinical setting of myocardial ischemia   o cTnI 99% cutoff should be interpreted in the context of clinical history, ECG findings and possibly cardiac imaging to establish correct diagnosis  o cTnI 99% cutoff may be suggestive but clearly not indicative of a coronary event without the clinical setting of myocardial ischemia         NT-proBNP   Date Value Ref Range Status   06/02/2019 226 (H) <125 pg/mL Final   11/14/2018 1,143 (H) <125 pg/mL Final CBC:   Results from last 7 days   Lab Units 20  0512 20  0545 20  0854   WBC Thousand/uL 3 91* 3 99* 6 74   HEMOGLOBIN g/dL 12 6 12 6 13 4   HEMATOCRIT % 39 4 38 9 42 8   PLATELETS Thousands/uL 116* 107* 117*       PT/INR: No results found for: PT, INR,     Magnesium:       Phosphorous:       Microbiology:              *-*-*-*-*-*-*-*-*-*-*-*-*-*-*-*-*-*-*-*-*-*-*-*-*-*-*-*-*-*-*-*-*-*-*-*-*-*-*-*-*-*-*-*-*-*-*-*-*-*-*-*-*-*-  ECHOCARDIOGRAM AND OTHER CARDIOLOGY RESULTS:  Results for orders placed during the hospital encounter of 18   Echo complete with contrast if indicated    Narrative [address]  [facility]  [facility], [address]  [phone]    Transthoracic Echocardiogram  2D, M-mode, Doppler, and Color Doppler    Study date:  15-Nov-2018    Patient: Phi Dorsey  MR number: [id number]  Account number: [de-identified]  : 1950  Age: 76 years  Gender: Male  Status: Outpatient  Location: Bedside  Height: 70 in  Weight: 240 lb  BP: 141/ 77 mmHg    Indications: heart failure    Diagnoses: I50 9 - Heart failure, unspecified    Sonographer:  GLENIS Mullen  Primary Physician:  Ted Perez MD  Referring Physician:  Claudene Can, MD  Group:  [facility]  Interpreting Physician:  Henry Quinteros MD    SUMMARY    LEFT VENTRICLE:  The ventricle was mildly dilated  Systolic function was normal  Ejection fraction was estimated to be 60 %  Although no diagnostic regional wall motion abnormality was identified, this possibility cannot be completely excluded on the basis of this study  Features were consistent with a pseudonormal left ventricular filling pattern, with concomitant abnormal relaxation and increased filling pressure (grade 2 diastolic dysfunction)  LEFT ATRIUM:  The atrium was mildly to moderately dilated  RIGHT ATRIUM:  The atrium was mildly dilated      MITRAL VALVE:  There was trace to mild regurgitation  AORTA:  The aortic root is not well visualized but appears mildly dilated  HISTORY: PRIOR HISTORY: HTN  Chol  CHF  PROCEDURE: The procedure was performed at the bedside  This was a routine study  The transthoracic approach was used  The study included complete 2D imaging, M-mode, complete spectral Doppler, and color Doppler  The heart rate was 70 bpm,  at the start of the study  Images were obtained from the parasternal, apical, subcostal, and suprasternal notch acoustic windows  Image quality was adequate  LEFT VENTRICLE: The ventricle was mildly dilated  Systolic function was normal  Ejection fraction was estimated to be 60 %  Although no diagnostic regional wall motion abnormality was identified, this possibility cannot be completely  excluded on the basis of this study  Wall thickness was normal  No evidence of apical thrombus  DOPPLER: Features were consistent with a pseudonormal left ventricular filling pattern, with concomitant abnormal relaxation and increased  filling pressure (grade 2 diastolic dysfunction)  RIGHT VENTRICLE: The size was normal  Systolic function was normal  Wall thickness was normal     LEFT ATRIUM: The atrium was mildly to moderately dilated  RIGHT ATRIUM: The atrium was mildly dilated  MITRAL VALVE: Valve structure was normal  There was normal leaflet separation  DOPPLER: The transmitral velocity was within the normal range  There was no evidence for stenosis  There was trace to mild regurgitation  AORTIC VALVE: The valve was probably trileaflet  Leaflets exhibited normal thickness, mild calcification, and normal cuspal separation  DOPPLER: Transaortic velocity was within the normal range  There was no evidence for stenosis  There  was no significant regurgitation  TRICUSPID VALVE: The valve structure was normal  There was normal leaflet separation  DOPPLER: The transtricuspid velocity was within the normal range   There was no evidence for stenosis  There was no significant regurgitation  PULMONIC VALVE: Not well visualized  DOPPLER: The transpulmonic velocity was within the normal range  There was no significant regurgitation  PERICARDIUM: There was no pericardial effusion  The pericardium was normal in appearance  AORTA: The aortic root is not well visualized but appears mildly dilated  SYSTEMIC VEINS: IVC: The inferior vena cava was normal in size  SYSTEM MEASUREMENT TABLES    2D  %FS: 31 1 %  Ao Diam: 2 8 cm  EDV(Teich): 157 4 ml  EF(Teich): 58 2 %  ESV(Teich): 65 9 ml  IVSd: 1 cm  LA Area: 26 cm2  LA Diam: 4 6 cm  LVEDV MOD A4C: 142 2 ml  LVEF MOD A4C: 73 4 %  LVESV MOD A4C: 37 8 ml  LVIDd: 5 7 cm  LVIDs: 3 9 cm  LVLd A4C: 8 9 cm  LVLs A4C: 6 8 cm  LVPWd: 0 9 cm  RA Area: 20 2 cm2  RVIDd: 3 6 cm  SV MOD A4C: 104 4 ml  SV(Teich): 91 6 ml    MM  TAPSE: 2 6 cm    PW  E': 0 1 m/s  MV A Larry: 0 m/s  MV E Larry: 0 8 m/s  MV E/A Ratio: 186 1    [facility]    Prepared and electronically signed by    Leanna Gracia MD  Signed [id number] 14:40:07       Results for orders placed during the hospital encounter of 04/27/19   ALYX    Narrative Marilin Walden MD     5/2/2019  4:02 PM  Patient tolerated procedure well    Sedation was Versed 4mg, Fentanyl 50mcg  Probe passed once  No blood on return  No desaturations or hemodynamic changes  LVEF normal  Mild mitral regurgitation  Mild aortic regurgitation  Mild left atrial dilation  No valvular masses  No BAKARI thrombus  No PFO  No obvious cardioembolic source  Consider Loop recorder  No results found for this or any previous visit  No results found for this or any previous visit        *-*-*-*-*-*-*-*-*-*-*-*-*-*-*-*-*-*-*-*-*-*-*-*-*-*-*-*-*-*-*-*-*-*-*-*-*-*-*-*-*-*-*-*-*-*-*-*-*-*-*-*-*-*-  ALLERGIES:  Allergies   Allergen Reactions    Cephalexin Throat Swelling     Pt reported throat swelling after taking antibiotic     Penicillins Other (See Comments)       *-*-*-*-*-*-*-*-*-*-*-*-*-*-*-*-*-*-*-*-*-*-*-*-*-*-*-*-*-*-*-*-*-*-*-*-*-*-*-*-*-*-*-*-*-*-*-*-*-*-*-*-*-*-    SIGNATURES:   @EXP@   Steven Davis MD

## 2020-01-30 NOTE — PHYSICAL THERAPY NOTE
Physical Therapy Evaluation      Patient Active Problem List   Diagnosis    Essential hypertension    Hypokalemia    Elevated brain natriuretic peptide (BNP) level    Hx of right knee surgery    Hyperlipidemia    Prediabetes    [facility]cere[facility] vascular accident) [facility]    Encephalopathy acute    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 [facility]    Left hemiplegia (HCC)    Dysphagia    Paroxysmal atrial fibrillation (HCC)    Hypernatremia    Acute encephalopathy    Acute blood loss anemia    Acute kidney injury ([facility]    Chronic diastolic CHF (congestive heart failure) ([facility]    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([facility] )    Urinary retention    Acute leg pain, right    History of stroke    Fatigue    Paroxysmal A-fib (HCC)    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) ([facility]    Seizure disorder ([facility])    Nonintractable epilepsy without status epilepticus ([facility] )    Bradycardia    Syncope       Past Medical History:   Diagnosis Date    Arthritis     BL knees    Atrial fibrillation (HCC)     CHF (congestive heart failure) (HCC)     Colon polyp     CVA (cerebral vascular accident) ([facility] ) 4/27/2019    NIHSS 26 on presentation    Depression     Diabetes mellitus ([facility]     Hypertension     Irregular heart beat     Afib    Stroke [facility])     Urinary retention        Past Surgical History:   Procedure Laterality Date    COLONOSCOPY      IR STROKE ALERT  4/27/2019    MENISCECTOMY Right       01/30/20 1008   Note Type   Note type Eval only   Pain Assessment   Pain Assessment No/denies pain   Home Living   Type of [address] Two level;Performs ADLs on one level;Stairs to enter with rails  (2 stairs to enter)   [address]; Wheelchair-manual;Hospital bed   Additional Comments special boot for lle   Prior Function   Level of [place] Needs assistance with IADLs; Needs assistance with ADLs and functional mobility   Lives With Spouse; Family Receives Help From Home health  (HHA 6 5 hours, M-F)   ADL Assistance Needs assistance   IADLs Needs assistance   Falls in the last 6 months 0   Comments pt getting OPPT at [facility]  pt needs assist all adl's  does not amb except short distance at OPPT  pt has boot for lle that is not present  pt 's wife will bring it in  pt uses w/c for mobility, with assist  pt has aphasia but is able to answer yes, no questions  pt has supportive family and daughter is a nurse  Restrictions/Precautions   Other Precautions Fall Risk;Visual impairment   General   Family/Caregiver Present Yes   Cognition   Overall Cognitive Status WFL   Orientation Level Oriented X4   RUE Assessment   RUE Assessment WFL   LUE Assessment   LUE Assessment X  (high tone, no active movement, contractures)   RLE Assessment   RLE Assessment WNL   LLE Assessment   LLE Assessment X  (strength 3/5, except foot 0/5 with clonus)   Coordination   Movements are Fluid and Coordinated 0   Coordination and Movement Description spasticity lue, lle   Sensation X  (lue and ? lle)   Light Touch   RLE Light Touch Grossly intact   LLE Light Touch Impaired   Proprioception   RLE Proprioception Grossly intact   LLE Proprioception Impaired   Bed Mobility   Rolling R 2  Maximal assistance   Additional items Assist x 1; Increased time required;Verbal cues;LE management   Supine to Sit 2  Maximal assistance   Additional items Assist x 1; Increased time required;Verbal cues;LE management   Transfers   Sit to Stand 3  Moderate assistance   Additional items Assist x 2; Increased time required;Verbal cues   Stand to Sit 3  Moderate assistance   Additional items Assist x 2; Increased time required;Verbal cues   Stand pivot 2  Maximal assistance   Additional items Assist x 2; Increased time required; Impulsive;Verbal cues  (l knee hyperextended, unable to advance forward)   Ambulation/Elevation   Gait pattern Not tested  (unable, boot not available)   Balance   Static Sitting Good Dynamic Sitting Fair -   Static Standing Poor +   Dynamic Standing Poor -   Endurance Deficit   Endurance Deficit No  (BP sitting after standing 116/73, spO2 98%, 81 bpm, )   Activity Tolerance   Activity Tolerance Patient tolerated treatment well   Medical Staff Made Aware OT   Nurse Made Aware yes   Assessment   Prognosis Fair   Problem List Decreased strength;Decreased range of motion;Decreased endurance; Impaired balance;Decreased mobility; Decreased coordination; Impaired judgement;Decreased safety awareness; Impaired vision; Impaired sensation; Impaired tone   Assessment pt admitted with syncope, recent episodes of bradycardia, now referred to PT  pt was needing mod assist PTA, mostly mobile in w/c and needing asisst for all adl's  pt was gettin [facility]  has home health aides 5 times per week and has a daughter who is a nurse  pt demonstrated severe functional limitations due to chronic debility from cva  pt was able to mobilize OOB to chair with max assist of 2 persons due to lack of pt's own equipment to facilitate the transfer  pt has deficits in roml hemibody, strength, tone, coordination  locomotion, balance, self care  pt will need skilled PT to prevent decline in function and to minimize caregiver burden  pt 's wife will bring in his boot and has brought in his L hand splint  pt will be able to return home with family support  continue OPPT as previosuly  pt did not have any light headedness during the session   Barriers to Discharge None   Goals   Patient Goals go home   STG Expiration Date 02/06/20   Short Term Goal #1 bed mobility with mod assist of 1  transfers with mod assist of 2  improve stending tolerance to 5 minutes  improve balance by 1/2 grade  activity tolerance 45 minutes  Plan   Treatment/Interventions Functional transfer training;LE strengthening/ROM; Therapeutic exercise; Endurance training;Patient/family training;Equipment eval/education; Bed mobility;Spoke to nursing;Family;OT   PT Frequency 2-3x/wk   Recommendation   Recommendation Home with family support; Outpatient PT  (continue home health aides)   Equipment Recommended Mat Prader; Wheelchair   PT - OK to Discharge Yes   Modified Carrabelle Scale   Modified Michelle Scale 4   Barthel Index   Feeding 5   Bathing 0   Grooming Score 0   Dressing Score 0   Bladder Score 10   Bowels Score 10   Toilet Use Score 5   Transfers (Bed/Chair) Score 5   Mobility (Level Surface) Score 0   Stairs Score 0   Barthel Index Score 35   History: co - morbidities, fall risk, use of assistive device, assist for adl's,  Exam: impairments in locomotion, musculoskeletal, balance,posture, joint integrity,  cardiacClinical: unstable/unpredictable  Complexity:high        Gita Steiner, PT

## 2020-01-30 NOTE — PROGRESS NOTES
Progress Note - Mikaela Strickland 1950, 71 y o  male MRN: [id number]    Unit/Bed#: E4 -01 Encounter: [id number]    Primary Care Provider: Paul Menon MD   Date and time admitted to hospital: 1/28/2020  8:25 AM        * Syncope  Assessment & Plan  This is a 49-year-old male with history of hypertension, hyperlipidemia, paroxysmal atrial fibrillation on Xarelto, history of CVA with residual left-sided weakness presenting with episode of unresponsiveness at home     -as per wife patient had associated dizziness, diaphoresis, dyspnea and right hand tremor  -unclear etiology, rule out cardiogenic syncope versus seizure  -CT head reveals stable chronic but also late subacute ischemic changes within right hemisphere involving frontal lobe and parietal lobe  -neurology consultation appreciated, Depakote discontinued  -cardiology consultation appreciated, status post device interrogation  Cardiology recommend to continue to monitor on telemetry for 24 more hours    Seizure disorder ([facility] [address] )  Assessment & Plan  Continue with Depakote taper, 250 mg p m  Paroxysmal A-fib (HCC)  Assessment & Plan  Will hold beta-blocker in light of recent fatigue  Continue Xarelto    Chronic diastolic CHF (congestive heart failure) (HCC)  Assessment & Plan  Wt Readings from Last 3 Encounters:   01/29/20 89 4 kg (197 lb)   12/26/19 89 7 kg (197 lb 11 2 oz)   11/18/19 94 8 kg (209 lb)       Patient euvolemic      CVA (cerebral vascular accident) ([facility] [address] )  Assessment & Plan  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    -status post thrombectomy and tPA  -residual left-sided hemiplegia, aphasia and dysphagia    Hyperlipidemia  Assessment & Plan  Continue statin    Essential hypertension  Assessment & Plan  Metoprolol on hold, monitor blood pressure      VTE Pharmacologic Prophylaxis:   Pharmacologic: xarelto    Patient Centered Rounds: I have performed bedside rounds with nursing staff today      Discussions with Specialists or Other Care Team Provider: neurology    Education and Discussions with Family / Patient: wife and daughter at bedside    Time Spent for Care: 20 minutes  More than 50% of total time spent on counseling and coordination of care as described above  Current Length of Stay: 2 day(s)    Current Patient Status: Inpatient   Certification Statement: The patient will continue to require additional inpatient hospital stay due to syncope    Discharge Plan / Estimated Discharge Date: 24-48 hours    Code Status: Level 1 - Full Code      Subjective:   Patient seen and examined at bedside, currently denies any complaints    Objective:     Vitals:   Temp (24hrs), Av 4 °F (36 3 °C), Min:96 5 °F (35 8 °C), Max:98 1 °F (36 7 °C)    Temp:  [96 5 °F (35 8 °C)-98 1 °F (36 7 °C)] 98 1 °F (36 7 °C)  HR:  [60-75] 60  Resp:  [18-20] 18  BP: (108-137)/(60-77) 131/73  SpO2:  [94 %-100 %] 94 %  Body mass index is 26 72 kg/m²  Input and Output Summary (last 24 hours): Intake/Output Summary (Last 24 hours) at 2020 1745  Last data filed at 2020 0000  Gross per 24 hour   Intake 290 ml   Output 270 ml   Net 20 ml       Physical Exam:    Constitutional: Patient is in no acute distress  HEENT:  Normocephalic, atraumatic  Cardiovascular: Normal S1S2, RRR, No murmurs/rubs/gallops appreciated  Pulmonary:  Bilateral air entry, No rhonchi/rales/wheezing appreciated  Abdominal: Soft, Bowel sounds present, Non-tender, Non-distended  Extremities:  No cyanosis, clubbing or edema     Neurological: RUE and RLE motor strength 5/5, LUE and LLE 2/5, aphasia    Additional Data:     Labs:    Results from last 7 days   Lab Units 20  0512   WBC Thousand/uL 3 91*   HEMOGLOBIN g/dL 12 6   HEMATOCRIT % 39 4   PLATELETS Thousands/uL 116*   NEUTROS PCT % 52   LYMPHS PCT % 34   MONOS PCT % 10   EOS PCT % 3     Results from last 7 days   Lab Units 20  0512 20  0545   POTASSIUM mmol/L 3 7 3 6   CHLORIDE mmol/L 105 107   CO2 mmol/L 29 29   BUN mg/dL 17 20   CREATININE mg/dL 0 81 0 82   CALCIUM mg/dL 8 6 8 4   ALK PHOS U/L  --  69   ALT U/L  --  9*   AST U/L  --  9     Results from last 7 days   Lab Units 01/28/20  0854   INR  1 12        I Have Reviewed All Lab Data Listed Above          Recent Cultures (last 7 days):           Last 24 Hours Medication List:     Current Facility-Administered Medications:  acetaminophen 650 mg Oral Q6H PRN Babita Padilla MD   FLUoxetine 20 mg Oral Daily Babita Padilla MD   pantoprazole 40 mg Oral BID AC Babita Padilla MD   pravastatin 20 mg Oral Before Koko Champion MD   rivaroxaban 20 mg Oral Daily With Breakfast Babita Padilla MD   tamsulosin 0 8 mg Oral HS Babita Padilla MD        Today, Patient Was Seen By: Babita Padilla MD

## 2020-01-30 NOTE — PLAN OF CARE
Problem: Potential for Falls  Goal: Patient will remain free of falls  Description  INTERVENTIONS:  - Assess patient frequently for physical needs  -  Identify cognitive and physical deficits and behaviors that affect risk of falls    -  [place] fall precautions as indicated by assessment   - Educate patient/family on patient safety including physical limitations  - Instruct patient to call for assistance with activity based on assessment  - Modify environment to reduce risk of injury  - Consider OT/PT consult to assist with strengthening/mobility  Outcome: Progressing     Problem: Prexisting or High Potential for Compromised Skin Integrity  Goal: Skin integrity is maintained or improved  Description  INTERVENTIONS:  - Identify patients at risk for skin breakdown  - Assess and monitor skin integrity  - Assess and monitor nutrition and hydration status  - Monitor labs   - Assess for incontinence   - Turn and reposition patient  - Assist with mobility/ambulation  - Relieve pressure over bony prominences  - Avoid friction and shearing  - Provide appropriate hygiene as needed including keeping skin clean and dry  - Evaluate need for skin moisturizer/barrier cream  - Collaborate with interdisciplinary team   - Patient/family teaching  - Consider wound care consult   Outcome: Progressing     Problem: PAIN - ADULT  Goal: Verbalizes/displays adequate comfort level or baseline comfort level  Description  Interventions:  - Encourage patient to monitor pain and request assistance  - Assess pain using appropriate pain scale  - Administer analgesics based on type and severity of pain and evaluate response  - Implement non-pharmacological measures as appropriate and evaluate response  - Consider cultural and social influences on pain and pain management  - Notify physician/advanced practitioner if interventions unsuccessful or patient reports new pain  Outcome: Progressing     Problem: INFECTION - ADULT  Goal: Absence or prevention of progression during hospitalization  Description  INTERVENTIONS:  - Assess and monitor for signs and symptoms of infection  - Monitor lab/diagnostic results  - Monitor all insertion sites, i e  indwelling lines, tubes, and drains  - Monitor endotracheal if appropriate and nasal secretions for changes in amount and color  - [place] appropriate cooling/warming therapies per order  - Administer medications as ordered  - Instruct and encourage patient and family to use good hand hygiene technique  - Identify and instruct in appropriate isolation precautions for identified infection/condition  Outcome: Progressing  Goal: Absence of fever/infection during neutropenic period  Description  INTERVENTIONS:  - Monitor WBC    Outcome: Progressing     Problem: SAFETY ADULT  Goal: Maintain or return to baseline ADL function  Description  INTERVENTIONS:  -  Assess patient's ability to carry out ADLs; assess patient's baseline for ADL function and identify physical deficits which impact ability to perform ADLs (bathing, care of mouth/teeth, toileting, grooming, dressing, etc )  - Assess/evaluate cause of self-care deficits   - Assess range of motion  - Assess patient's mobility; develop plan if impaired  - Assess patient's need for assistive devices and provide as appropriate  - Encourage maximum independence but intervene and supervise when necessary  - Involve family in performance of ADLs  - Assess for home care needs following discharge   - Consider OT consult to assist with ADL evaluation and planning for discharge  - Provide patient education as appropriate  Outcome: Progressing  Goal: Maintain or return mobility status to optimal level  Description  INTERVENTIONS:  - Assess patient's baseline mobility status (ambulation, transfers, stairs, etc )    - Identify cognitive and physical deficits and behaviors that affect mobility  - Identify mobility aids required to assist with transfers and/or ambulation (gait belt, sit-to-stand, lift, walker, cane, etc )  - [place] fall precautions as indicated by assessment  - Record patient progress and toleration of activity level on Mobility SBAR; progress patient to next Phase/Stage  - Instruct patient to call for assistance with activity based on assessment  - Consider rehabilitation consult to assist with strengthening/weightbearing, etc   Outcome: Progressing     Problem: DISCHARGE PLANNING  Goal: Discharge to home or other facility with appropriate resources  Description  INTERVENTIONS:  - Identify barriers to discharge w/patient and caregiver  - Arrange for needed discharge resources and transportation as appropriate  - Identify discharge learning needs (meds, wound care, etc )  - Arrange for interpretive services to assist at discharge as needed  - Refer to Case Management Department for coordinating discharge planning if the patient needs post-hospital services based on physician/advanced practitioner order or complex needs related to functional status, cognitive ability, or social support system  Outcome: Progressing     Problem: Knowledge Deficit  Goal: Patient/family/caregiver demonstrates understanding of disease process, treatment plan, medications, and discharge instructions  Description  Complete learning assessment and assess knowledge base    Interventions:  - Provide teaching at level of understanding  - Provide teaching via preferred learning methods  Outcome: Progressing     Problem: NEUROSENSORY - ADULT  Goal: Achieves stable or improved neurological status  Description  INTERVENTIONS  - Monitor and report changes in neurological status  - Monitor vital signs such as temperature, blood pressure, glucose, and any other labs ordered   - Initiate measures to prevent increased intracranial pressure  - Monitor for seizure activity and implement precautions if appropriate      Outcome: Progressing  Goal: Remains free of injury related to seizures activity  Description  INTERVENTIONS  - Maintain airway, patient safety  and administer oxygen as ordered  - Monitor patient for seizure activity, document and report duration and description of seizure to physician/advanced practitioner  - If seizure occurs,  ensure patient safety during seizure  - Reorient patient post seizure  - Seizure pads on all 4 side rails  - Instruct patient/family to notify RN of any seizure activity including if an aura is experienced  - Instruct patient/family to call for assistance with activity based on nursing assessment  - Administer anti-seizure medications if ordered    Outcome: Progressing  Goal: Achieves maximal functionality and self care  Description  INTERVENTIONS  - Monitor swallowing and airway patency with patient fatigue and changes in neurological status  - Encourage and assist patient to increase activity and self care     - Encourage visually impaired, hearing impaired and aphasic patients to use assistive/communication devices  Outcome: Progressing     Problem: CARDIOVASCULAR - ADULT  Goal: Maintains optimal cardiac output and hemodynamic stability  Description  INTERVENTIONS:  - Monitor I/O, vital signs and rhythm  - Monitor for S/S and trends of decreased cardiac output  - Administer and titrate ordered vasoactive medications to optimize hemodynamic stability  - Assess quality of pulses, skin color and temperature  - Assess for signs of decreased coronary artery perfusion  - Instruct patient to report change in severity of symptoms  Outcome: Progressing  Goal: Absence of cardiac dysrhythmias or at baseline rhythm  Description  INTERVENTIONS:  - Continuous cardiac monitoring, vital signs, obtain 12 lead EKG if ordered  - Administer antiarrhythmic and heart rate control medications as ordered  - Monitor electrolytes and administer replacement therapy as ordered  Outcome: Progressing

## 2020-01-30 NOTE — ASSESSMENT & PLAN NOTE
This is a 35-year-old male with history of hypertension, hyperlipidemia, paroxysmal atrial fibrillation on Xarelto, history of CVA with residual left-sided weakness presenting with episode of unresponsiveness at home     -as per wife patient had associated dizziness, diaphoresis, dyspnea and right hand tremor  -unclear etiology, rule out cardiogenic syncope versus seizure  -CT head reveals stable chronic but also late subacute ischemic changes within right hemisphere involving frontal lobe and parietal lobe  -neurology consultation appreciated, Depakote discontinued  -cardiology consultation appreciated, status post device interrogation  Cardiology recommend to continue to monitor on telemetry for 24 more hours

## 2020-01-31 VITALS
HEART RATE: 66 BPM | HEIGHT: 72 IN | SYSTOLIC BLOOD PRESSURE: 126 MMHG | TEMPERATURE: 97.5 F | BODY MASS INDEX: 26.68 KG/M2 | RESPIRATION RATE: 18 BRPM | WEIGHT: 197 LBS | DIASTOLIC BLOOD PRESSURE: 65 MMHG | OXYGEN SATURATION: 96 %

## 2020-01-31 LAB
ANION GAP SERPL CALCULATED.3IONS-SCNC: 5 MMOL/L (ref 4–13)
BASOPHILS # BLD AUTO: 0.02 THOUSANDS/ΜL (ref 0–0.1)
BASOPHILS NFR BLD AUTO: 1 % (ref 0–1)
BUN SERPL-MCNC: 16 MG/DL (ref 5–25)
CALCIUM SERPL-MCNC: 8.7 MG/DL (ref 8.3–10.1)
CHLORIDE SERPL-SCNC: 106 MMOL/L (ref 100–108)
CO2 SERPL-SCNC: 30 MMOL/L (ref 21–32)
CREAT SERPL-MCNC: 0.78 MG/DL (ref 0.6–1.3)
EOSINOPHIL # BLD AUTO: 0.15 THOUSAND/ΜL (ref 0–0.61)
EOSINOPHIL NFR BLD AUTO: 4 % (ref 0–6)
ERYTHROCYTE [DISTWIDTH] IN BLOOD BY AUTOMATED COUNT: 13.6 % (ref 11.6–15.1)
GFR SERPL CREATININE-BSD FRML MDRD: 92 ML/MIN/1.73SQ M
GLUCOSE SERPL-MCNC: 101 MG/DL (ref 65–140)
HCT VFR BLD AUTO: 40 % (ref 36.5–49.3)
HGB BLD-MCNC: 12.8 G/DL (ref 12–17)
IMM GRANULOCYTES # BLD AUTO: 0 THOUSAND/UL (ref 0–0.2)
IMM GRANULOCYTES NFR BLD AUTO: 0 % (ref 0–2)
LYMPHOCYTES # BLD AUTO: 1.27 THOUSANDS/ΜL (ref 0.6–4.47)
LYMPHOCYTES NFR BLD AUTO: 36 % (ref 14–44)
MCH RBC QN AUTO: 30 PG (ref 26.8–34.3)
MCHC RBC AUTO-ENTMCNC: 32 G/DL (ref 31.4–37.4)
MCV RBC AUTO: 94 FL (ref 82–98)
MONOCYTES # BLD AUTO: 0.41 THOUSAND/ΜL (ref 0.17–1.22)
MONOCYTES NFR BLD AUTO: 12 % (ref 4–12)
NEUTROPHILS # BLD AUTO: 1.67 THOUSANDS/ΜL (ref 1.85–7.62)
NEUTS SEG NFR BLD AUTO: 47 % (ref 43–75)
NRBC BLD AUTO-RTO: 0 /100 WBCS
PLATELET # BLD AUTO: 113 THOUSANDS/UL (ref 149–390)
PMV BLD AUTO: 10.5 FL (ref 8.9–12.7)
POTASSIUM SERPL-SCNC: 3.4 MMOL/L (ref 3.5–5.3)
RBC # BLD AUTO: 4.27 MILLION/UL (ref 3.88–5.62)
SODIUM SERPL-SCNC: 141 MMOL/L (ref 136–145)
WBC # BLD AUTO: 3.52 THOUSAND/UL (ref 4.31–10.16)

## 2020-01-31 PROCEDURE — 85025 COMPLETE CBC W/AUTO DIFF WBC: CPT | Performed by: INTERNAL MEDICINE

## 2020-01-31 PROCEDURE — 99232 SBSQ HOSP IP/OBS MODERATE 35: CPT | Performed by: INTERNAL MEDICINE

## 2020-01-31 PROCEDURE — 99239 HOSP IP/OBS DSCHRG MGMT >30: CPT | Performed by: INTERNAL MEDICINE

## 2020-01-31 PROCEDURE — 80048 BASIC METABOLIC PNL TOTAL CA: CPT | Performed by: INTERNAL MEDICINE

## 2020-01-31 RX ORDER — CIPROFLOXACIN 500 MG/1
500 TABLET, FILM COATED ORAL EVERY 12 HOURS SCHEDULED
Qty: 6 TABLET | Refills: 0 | Status: SHIPPED | OUTPATIENT
Start: 2020-01-31 | End: 2020-02-03

## 2020-01-31 RX ADMIN — PANTOPRAZOLE SODIUM 40 MG: 40 TABLET, DELAYED RELEASE ORAL at 16:41

## 2020-01-31 RX ADMIN — FLUOXETINE 20 MG: 20 CAPSULE ORAL at 09:25

## 2020-01-31 RX ADMIN — PANTOPRAZOLE SODIUM 40 MG: 40 TABLET, DELAYED RELEASE ORAL at 05:44

## 2020-01-31 RX ADMIN — RIVAROXABAN 20 MG: 20 TABLET, FILM COATED ORAL at 09:25

## 2020-01-31 RX ADMIN — PRAVASTATIN SODIUM 20 MG: 10 TABLET ORAL at 16:41

## 2020-01-31 NOTE — PLAN OF CARE
Problem: Potential for Falls  Goal: Patient will remain free of falls  Description  INTERVENTIONS:  - Assess patient frequently for physical needs  -  Identify cognitive and physical deficits and behaviors that affect risk of falls    -  [place] fall precautions as indicated by assessment   - Educate patient/family on patient safety including physical limitations  - Instruct patient to call for assistance with activity based on assessment  - Modify environment to reduce risk of injury  - Consider OT/PT consult to assist with strengthening/mobility  Outcome: Progressing     Problem: Prexisting or High Potential for Compromised Skin Integrity  Goal: Skin integrity is maintained or improved  Description  INTERVENTIONS:  - Identify patients at risk for skin breakdown  - Assess and monitor skin integrity  - Assess and monitor nutrition and hydration status  - Monitor labs   - Assess for incontinence   - Turn and reposition patient  - Assist with mobility/ambulation  - Relieve pressure over bony prominences  - Avoid friction and shearing  - Provide appropriate hygiene as needed including keeping skin clean and dry  - Evaluate need for skin moisturizer/barrier cream  - Collaborate with interdisciplinary team   - Patient/family teaching  - Consider wound care consult   Outcome: Progressing     Problem: PAIN - ADULT  Goal: Verbalizes/displays adequate comfort level or baseline comfort level  Description  Interventions:  - Encourage patient to monitor pain and request assistance  - Assess pain using appropriate pain scale  - Administer analgesics based on type and severity of pain and evaluate response  - Implement non-pharmacological measures as appropriate and evaluate response  - Consider cultural and social influences on pain and pain management  - Notify physician/advanced practitioner if interventions unsuccessful or patient reports new pain  Outcome: Progressing     Problem: INFECTION - ADULT  Goal: Absence or prevention of progression during hospitalization  Description  INTERVENTIONS:  - Assess and monitor for signs and symptoms of infection  - Monitor lab/diagnostic results  - Monitor all insertion sites, i e  indwelling lines, tubes, and drains  - Monitor endotracheal if appropriate and nasal secretions for changes in amount and color  - [place] appropriate cooling/warming therapies per order  - Administer medications as ordered  - Instruct and encourage patient and family to use good hand hygiene technique  - Identify and instruct in appropriate isolation precautions for identified infection/condition  Outcome: Progressing  Goal: Absence of fever/infection during neutropenic period  Description  INTERVENTIONS:  - Monitor WBC    Outcome: Progressing     Problem: SAFETY ADULT  Goal: Maintain or return to baseline ADL function  Description  INTERVENTIONS:  -  Assess patient's ability to carry out ADLs; assess patient's baseline for ADL function and identify physical deficits which impact ability to perform ADLs (bathing, care of mouth/teeth, toileting, grooming, dressing, etc )  - Assess/evaluate cause of self-care deficits   - Assess range of motion  - Assess patient's mobility; develop plan if impaired  - Assess patient's need for assistive devices and provide as appropriate  - Encourage maximum independence but intervene and supervise when necessary  - Involve family in performance of ADLs  - Assess for home care needs following discharge   - Consider OT consult to assist with ADL evaluation and planning for discharge  - Provide patient education as appropriate  Outcome: Progressing  Goal: Maintain or return mobility status to optimal level  Description  INTERVENTIONS:  - Assess patient's baseline mobility status (ambulation, transfers, stairs, etc )    - Identify cognitive and physical deficits and behaviors that affect mobility  - Identify mobility aids required to assist with transfers and/or ambulation (gait belt, sit-to-stand, lift, walker, cane, etc )  - [place] fall precautions as indicated by assessment  - Record patient progress and toleration of activity level on Mobility SBAR; progress patient to next Phase/Stage  - Instruct patient to call for assistance with activity based on assessment  - Consider rehabilitation consult to assist with strengthening/weightbearing, etc   Outcome: Progressing     Problem: DISCHARGE PLANNING  Goal: Discharge to home or other facility with appropriate resources  Description  INTERVENTIONS:  - Identify barriers to discharge w/patient and caregiver  - Arrange for needed discharge resources and transportation as appropriate  - Identify discharge learning needs (meds, wound care, etc )  - Arrange for interpretive services to assist at discharge as needed  - Refer to Case Management Department for coordinating discharge planning if the patient needs post-hospital services based on physician/advanced practitioner order or complex needs related to functional status, cognitive ability, or social support system  Outcome: Progressing     Problem: Knowledge Deficit  Goal: Patient/family/caregiver demonstrates understanding of disease process, treatment plan, medications, and discharge instructions  Description  Complete learning assessment and assess knowledge base    Interventions:  - Provide teaching at level of understanding  - Provide teaching via preferred learning methods  Outcome: Progressing     Problem: NEUROSENSORY - ADULT  Goal: Achieves stable or improved neurological status  Description  INTERVENTIONS  - Monitor and report changes in neurological status  - Monitor vital signs such as temperature, blood pressure, glucose, and any other labs ordered   - Initiate measures to prevent increased intracranial pressure  - Monitor for seizure activity and implement precautions if appropriate      Outcome: Progressing  Goal: Remains free of injury related to seizures activity  Description  INTERVENTIONS  - Maintain airway, patient safety  and administer oxygen as ordered  - Monitor patient for seizure activity, document and report duration and description of seizure to physician/advanced practitioner  - If seizure occurs,  ensure patient safety during seizure  - Reorient patient post seizure  - Seizure pads on all 4 side rails  - Instruct patient/family to notify RN of any seizure activity including if an aura is experienced  - Instruct patient/family to call for assistance with activity based on nursing assessment  - Administer anti-seizure medications if ordered    Outcome: Progressing  Goal: Achieves maximal functionality and self care  Description  INTERVENTIONS  - Monitor swallowing and airway patency with patient fatigue and changes in neurological status  - Encourage and assist patient to increase activity and self care     - Encourage visually impaired, hearing impaired and aphasic patients to use assistive/communication devices  Outcome: Progressing     Problem: CARDIOVASCULAR - ADULT  Goal: Maintains optimal cardiac output and hemodynamic stability  Description  INTERVENTIONS:  - Monitor I/O, vital signs and rhythm  - Monitor for S/S and trends of decreased cardiac output  - Administer and titrate ordered vasoactive medications to optimize hemodynamic stability  - Assess quality of pulses, skin color and temperature  - Assess for signs of decreased coronary artery perfusion  - Instruct patient to report change in severity of symptoms  Outcome: Progressing  Goal: Absence of cardiac dysrhythmias or at baseline rhythm  Description  INTERVENTIONS:  - Continuous cardiac monitoring, vital signs, obtain 12 lead EKG if ordered  - Administer antiarrhythmic and heart rate control medications as ordered  - Monitor electrolytes and administer replacement therapy as ordered  Outcome: Progressing

## 2020-01-31 NOTE — ASSESSMENT & PLAN NOTE
This is a 60-year-old male with history of hypertension, hyperlipidemia, paroxysmal atrial fibrillation on Xarelto, history of CVA with residual left-sided weakness presenting with episode of unresponsiveness at home     -as per wife patient had associated dizziness, diaphoresis, dyspnea and right hand tremor  -unclear etiology, rule out cardiogenic syncope versus seizure  -CT head reveals stable chronic but also late subacute ischemic changes within right hemisphere involving frontal lobe and parietal lobe  -neurology consultation appreciated, Depakote discontinued  -cardiology consultation appreciated, status post device interrogation  Cardiology recommend to continue to monitor on telemetry for 24 more hours

## 2020-01-31 NOTE — PLAN OF CARE
Problem: Potential for Falls  Goal: Patient will remain free of falls  Description  INTERVENTIONS:  - Assess patient frequently for physical needs  -  Identify cognitive and physical deficits and behaviors that affect risk of falls    -  [place] fall precautions as indicated by assessment   - Educate patient/family on patient safety including physical limitations  - Instruct patient to call for assistance with activity based on assessment  - Modify environment to reduce risk of injury  - Consider OT/PT consult to assist with strengthening/mobility  Outcome: Progressing     Problem: Prexisting or High Potential for Compromised Skin Integrity  Goal: Skin integrity is maintained or improved  Description  INTERVENTIONS:  - Identify patients at risk for skin breakdown  - Assess and monitor skin integrity  - Assess and monitor nutrition and hydration status  - Monitor labs   - Assess for incontinence   - Turn and reposition patient  - Assist with mobility/ambulation  - Relieve pressure over bony prominences  - Avoid friction and shearing  - Provide appropriate hygiene as needed including keeping skin clean and dry  - Evaluate need for skin moisturizer/barrier cream  - Collaborate with interdisciplinary team   - Patient/family teaching  - Consider wound care consult   Outcome: Progressing     Problem: PAIN - ADULT  Goal: Verbalizes/displays adequate comfort level or baseline comfort level  Description  Interventions:  - Encourage patient to monitor pain and request assistance  - Assess pain using appropriate pain scale  - Administer analgesics based on type and severity of pain and evaluate response  - Implement non-pharmacological measures as appropriate and evaluate response  - Consider cultural and social influences on pain and pain management  - Notify physician/advanced practitioner if interventions unsuccessful or patient reports new pain  Outcome: Progressing     Problem: INFECTION - ADULT  Goal: Absence or prevention of progression during hospitalization  Description  INTERVENTIONS:  - Assess and monitor for signs and symptoms of infection  - Monitor lab/diagnostic results  - Monitor all insertion sites, i e  indwelling lines, tubes, and drains  - Monitor endotracheal if appropriate and nasal secretions for changes in amount and color  - [place] appropriate cooling/warming therapies per order  - Administer medications as ordered  - Instruct and encourage patient and family to use good hand hygiene technique  - Identify and instruct in appropriate isolation precautions for identified infection/condition  Outcome: Progressing  Goal: Absence of fever/infection during neutropenic period  Description  INTERVENTIONS:  - Monitor WBC    Outcome: Progressing     Problem: SAFETY ADULT  Goal: Maintain or return to baseline ADL function  Description  INTERVENTIONS:  -  Assess patient's ability to carry out ADLs; assess patient's baseline for ADL function and identify physical deficits which impact ability to perform ADLs (bathing, care of mouth/teeth, toileting, grooming, dressing, etc )  - Assess/evaluate cause of self-care deficits   - Assess range of motion  - Assess patient's mobility; develop plan if impaired  - Assess patient's need for assistive devices and provide as appropriate  - Encourage maximum independence but intervene and supervise when necessary  - Involve family in performance of ADLs  - Assess for home care needs following discharge   - Consider OT consult to assist with ADL evaluation and planning for discharge  - Provide patient education as appropriate  Outcome: Progressing  Goal: Maintain or return mobility status to optimal level  Description  INTERVENTIONS:  - Assess patient's baseline mobility status (ambulation, transfers, stairs, etc )    - Identify cognitive and physical deficits and behaviors that affect mobility  - Identify mobility aids required to assist with transfers and/or ambulation (gait belt, sit-to-stand, lift, walker, cane, etc )  - [place] fall precautions as indicated by assessment  - Record patient progress and toleration of activity level on Mobility SBAR; progress patient to next Phase/Stage  - Instruct patient to call for assistance with activity based on assessment  - Consider rehabilitation consult to assist with strengthening/weightbearing, etc   Outcome: Progressing     Problem: DISCHARGE PLANNING  Goal: Discharge to home or other facility with appropriate resources  Description  INTERVENTIONS:  - Identify barriers to discharge w/patient and caregiver  - Arrange for needed discharge resources and transportation as appropriate  - Identify discharge learning needs (meds, wound care, etc )  - Arrange for interpretive services to assist at discharge as needed  - Refer to Case Management Department for coordinating discharge planning if the patient needs post-hospital services based on physician/advanced practitioner order or complex needs related to functional status, cognitive ability, or social support system  Outcome: Progressing     Problem: Knowledge Deficit  Goal: Patient/family/caregiver demonstrates understanding of disease process, treatment plan, medications, and discharge instructions  Description  Complete learning assessment and assess knowledge base    Interventions:  - Provide teaching at level of understanding  - Provide teaching via preferred learning methods  Outcome: Progressing     Problem: NEUROSENSORY - ADULT  Goal: Achieves stable or improved neurological status  Description  INTERVENTIONS  - Monitor and report changes in neurological status  - Monitor vital signs such as temperature, blood pressure, glucose, and any other labs ordered   - Initiate measures to prevent increased intracranial pressure  - Monitor for seizure activity and implement precautions if appropriate      Outcome: Progressing  Goal: Remains free of injury related to seizures activity  Description  INTERVENTIONS  - Maintain airway, patient safety  and administer oxygen as ordered  - Monitor patient for seizure activity, document and report duration and description of seizure to physician/advanced practitioner  - If seizure occurs,  ensure patient safety during seizure  - Reorient patient post seizure  - Seizure pads on all 4 side rails  - Instruct patient/family to notify RN of any seizure activity including if an aura is experienced  - Instruct patient/family to call for assistance with activity based on nursing assessment  - Administer anti-seizure medications if ordered    Outcome: Progressing  Goal: Achieves maximal functionality and self care  Description  INTERVENTIONS  - Monitor swallowing and airway patency with patient fatigue and changes in neurological status  - Encourage and assist patient to increase activity and self care     - Encourage visually impaired, hearing impaired and aphasic patients to use assistive/communication devices  Outcome: Progressing     Problem: CARDIOVASCULAR - ADULT  Goal: Maintains optimal cardiac output and hemodynamic stability  Description  INTERVENTIONS:  - Monitor I/O, vital signs and rhythm  - Monitor for S/S and trends of decreased cardiac output  - Administer and titrate ordered vasoactive medications to optimize hemodynamic stability  - Assess quality of pulses, skin color and temperature  - Assess for signs of decreased coronary artery perfusion  - Instruct patient to report change in severity of symptoms  Outcome: Progressing  Goal: Absence of cardiac dysrhythmias or at baseline rhythm  Description  INTERVENTIONS:  - Continuous cardiac monitoring, vital signs, obtain 12 lead EKG if ordered  - Administer antiarrhythmic and heart rate control medications as ordered  - Monitor electrolytes and administer replacement therapy as ordered  Outcome: Progressing

## 2020-01-31 NOTE — PROGRESS NOTES
Progress Note - Cardiology   Doreatha Leader 71 y o  male MRN: [id number]  Unit/Bed#: E4 -01 Encounter: [id number]        Problem List:  Principal Problem:    Syncope  Active Problems:    Essential hypertension    Hyperlipidemia    CVA (cerebral vascular accident) ([facility] )    Chronic diastolic CHF (congestive heart failure) ([facility])    Paroxysmal A-fib ([facility])    Seizure disorder ([facility] )      Asessment:  1  Syncope, likely vasovagal but etiology unclear  2  Severe fatigue  3  Asymptomatic bradycardia  4  History of nonsustained VT   5  History of paroxysmal Afib  6  Prior GI bleed on Eliquis    Plan/ Discussion:  Etiology of his episode of unresponsiveness remains unclear, loop recorder showed "atrial fibrillation" but on further review of actual tele strips appears to be a lot of artifact and sinus with PACs  Telemetry unremarkable thus far, orthostatics negative    1  Recommend physical therapy consult  2  Will d/w attending for consideration of 30 day event monitor and eventual reimplantation of loop recorder  3  Okay to keep up beta-blocker  4  Will arrange follow-up in our office   5  Continue anticoagulation with Xarelto    Subjective:  Overall feels okay    Anxious to leave soon    Vitals:  Vitals:    01/28/20 1122 01/29/20 1103   Weight: 135 kg (297 lb) 89 4 kg (197 lb)   ,  Vitals:    01/30/20 2300 01/31/20 0300 01/31/20 0749 01/31/20 1100   BP: 128/76 122/65 141/82 123/67   BP Location: Right arm Right arm Right arm Right arm   Pulse: 62 56 58 57   Resp: 18 16 18 18   Temp: 97 8 °F (36 6 °C) 98 4 °F (36 9 °C) 97 6 °F (36 4 °C) 98 7 °F (37 1 °C)   TempSrc: Tympanic Tympanic Temporal Temporal   SpO2: 98% 96% 100% 96%   Weight:       Height:           Exam:  General: Alert awake and oriented, no acute distress    Heart:  Bradycardia, no murmurs   Respiratory effort:  Breathing comfortably on room air   Lungs:  Clear bilaterally without wheezing, rales, crackles   Lower Limbs:  No edema           Telemetry: bradycardic, Heart Rate 50's    Medications:    Current Facility-Administered Medications:     acetaminophen (TYLENOL) tablet 650 mg, 650 mg, Oral, Q6H PRN, Isaac Flow, MD, 650 mg at 01/30/20 1334    FLUoxetine (PROzac) capsule 20 mg, 20 mg, Oral, Daily, Isaac Flow, MD, 20 mg at 01/31/20 0925    pantoprazole (PROTONIX) EC tablet 40 mg, 40 mg, Oral, BID AC, Isaac Flow, MD, 40 mg at 01/31/20 0544    pravastatin (PRAVACHOL) tablet 20 mg, 20 mg, Oral, Before Dinner, Isaac Flow, MD, 20 mg at 01/30/20 1718    rivaroxaban (XARELTO) tablet 20 mg, 20 mg, Oral, Daily With Breakfast, Isaac Flow, MD, 20 mg at 01/31/20 0925    tamsulosin (FLOMAX) capsule 0 8 mg, 0 8 mg, Oral, HS, Isaac Flow, MD, 0 8 mg at 01/30/20 2117      Labs/Data:        Results from last 7 days   Lab Units 01/31/20  0542 01/30/20  0512 01/29/20  0545   WBC Thousand/uL 3 52* 3 91* 3 99*   HEMOGLOBIN g/dL 12 8 12 6 12 6   HEMATOCRIT % 40 0 39 4 38 9   PLATELETS Thousands/uL 113* 116* 107*     Results from last 7 days   Lab Units 01/31/20  0542 01/30/20  0512 01/29/20  0545 01/28/20  1752 01/28/20  1537 01/28/20  0854   POTASSIUM mmol/L 3 4* 3 7 3 6  --   --  3 8   CHLORIDE mmol/L 106 105 107  --   --  107   CO2 mmol/L 30 29 29  --   --  28   BUN mg/dL 16 17 20  --   --  19   TROPONIN I ng/mL  --   --   --  <0 02 <0 02 <0 02

## 2020-01-31 NOTE — DISCHARGE SUMMARY
Discharge- Kandi Umanzor 1950, 71 y o  male MRN: [id number]    Unit/Bed#: E4 -01 Encounter: [id number]    Primary Care Provider: Charito Solis MD   Date and time admitted to hospital: 1/28/2020  8:25 AM        * Syncope  Assessment & Plan  This is a 63-year-old male with history of hypertension, hyperlipidemia, paroxysmal atrial fibrillation on Xarelto, history of CVA with residual left-sided weakness presenting with episode of unresponsiveness at home     -at this point discussed with Cardiology and Neurology etiology remains likely secondary to vasovagal episode and weakness and deconditioning from his recent stroke    -CT head reveals stable chronic but also late subacute ischemic changes within right hemisphere involving frontal lobe and parietal lobe  -neurology consultation appreciated, Depakote discontinued  -cardiology consultation appreciated, status post device interrogation  -no episodes of any arrhythmias  -will continue with outpatient therapy    Seizure disorder [facility])  Assessment & Plan  Continue with Depakote taper, 250 mg p m      Paroxysmal A-fib (HCC)  Assessment & Plan  Continue to hold beta-blocker as per cardiologist  Continue Xarelto    Chronic diastolic CHF (congestive heart failure) ([facility] )  Assessment & Plan  Wt Readings from Last 3 Encounters:   01/29/20 89 4 kg (197 lb)   12/26/19 89 7 kg (197 lb 11 2 oz)   11/18/19 94 8 kg (209 lb)       Patient euvolemic      CVA (cerebral vascular accident) ([facility] )  Assessment & Plan  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    -status post thrombectomy and tPA  -residual left-sided hemiplegia, aphasia and dysphagia    Hyperlipidemia  Assessment & Plan  Continue statin    Essential hypertension  Assessment & Plan  Continue to hold beta-blocker upon discharge as per cardiologist          Transition of Care Discharge Summary - [facility]    Patient Information: Kandi Umanzor 71 y o  male MRN: [id number]  Unit/Bed#: E4 -01 Encounter: [id number]    Discharging Physician / Practitioner: Jetta Cogan, MD  PCP: Ted Perez MD  Admission Date: 1/28/2020  Discharge Date: 01/31/20    Disposition:      Other: home    For Discharges to [facility] SNF:   · Not Applicable to this Patient - Not Applicable to this Patient    Reason for Admission:  Syncope    Discharge Diagnoses:     Principal Problem:    Syncope  Active Problems:    Essential hypertension    Hyperlipidemia    CVA (cerebral vascular accident) ([facility] )    Chronic diastolic CHF (congestive heart failure) ([facility])    Paroxysmal A-fib ([facility] )    Seizure disorder ([place] [id number] )  Resolved Problems:    * No resolved hospital problems  *      Consultations During Hospital Stay:  · Cardiology  · Neurology    Medication Adjustments and Discharge Medications:  · Medication Dosing Tapers - Please refer to Discharge Medication List for details on any medication dosing tapers (if applicable to patient)  · Discharge Medication List: See after visit summary for reconciled discharge medications  Wound Care Recommendations:  When applicable, please see wound care section of After Visit Summary  Diet Recommendations at Discharge:  Diet -        Diet Orders   (From admission, onward)             Start     Ordered    01/28/20 1620  Diet Cardiovascular; Cardiac  Diet effective now     Question Answer Comment   Diet Type Cardiovascular    Cardiac Cardiac    RD to adjust diet per protocol? Yes        01/28/20 1619              Fluid Restriction - No Fluid Restriction at Discharge  Significant Findings / Test Results:     CT head:Stable primarily chronic but also late subacute ischemic change within the right hemisphere primarily involving the frontal lobe and parietal lobe extending inferiorly to the operculum    No mass effect or hemorrhagic transformation    Stable opacification of the paranasal sinuses and nasal cavity in this patient with prior endoscopic sinus surgery  Findings are suggestive of sinonasal polyposis  · Chest x-ray:  No acute cardiopulmonary disease      Hospital Course:     Raine Cedeño is a 71 y o  male patient who originally presented to the hospital on 1/28/2020 due to syncope  Patient has history of CVA with residual left-sided weakness and aphasia, diastolic CHF, diabetes mellitus, hypertension, AFib on Xarelto, depression  Patient was in the shower with assistance of visiting nurse when he became unresponsive and felt very dizzy patient then had episode of unresponsiveness  Cardiology neurology consulted patient underwent CT head and interrogation of his loop recorder without any evidence of any arrhythmias  Patient did have premature atrial contractions, there has been no episode of near-syncope or syncope since he has been in the hospital     Patient was noted to blood pressure that was low during his episode of syncope at home, he has not had any positive orthostatic vitals while in hospital   PT evaluated patient who are recommending home therapy and continue with home health aide services  Myself and cardiologist, Dr Elke Clarke discussed with patient wife at bedside given his dense motor deficit on left side we advised patient needs more health services at home  However patient's wife does not prefer home services and is comfortable with her current arrangement  Patient is otherwise stable and will be discharged home with outpatient cardiology and neurology follow-up  Please see above problem list for further details      Condition at Discharge: good     Discharge Day Visit / Exam:     Subjective:  Patient seen examined at bedside, denies any complaints    Vitals: Blood Pressure: 126/65 (01/31/20 1537)  Pulse: 66 (01/31/20 1537)  Temperature: 97 5 °F (36 4 °C) (01/31/20 1537)  Temp Source: Temporal (01/31/20 1537)  Respirations: 18 (01/31/20 1100)  Height: 6' (182 9 cm) (01/28/20 1122)  Weight - Scale: 89 4 kg (197 lb)(stated) (01/29/20 1103)  SpO2: 96 % (01/31/20 1537)    Physical Exam:    Constitutional: Patient is in no acute distress  HEENT:  Normocephalic, atraumatic  Cardiovascular: Normal S1S2, RRR, No murmurs/rubs/gallops appreciated  Pulmonary:  Bilateral air entry, No rhonchi/rales/wheezing appreciated  Abdominal: Soft, Bowel sounds present, Non-tender, Non-distended  Extremities:  No cyanosis, clubbing or edema  Neurological:  Right upper and lower extremity motor strength 5/5, left upper and left lower extremity motor strength 2/5,     Discharge instructions/Information to patient and family:   See after visit summary section titled Discharge Instructions for information provided to patient and family  Planned Readmission: no     Discharge Statement:  I spent 35 minutes discharging the patient  This time was spent on the day of discharge  I had direct contact with the patient on the day of discharge  Greater than 50% of the total time was spent examining patient, answering all patient questions, arranging and discussing plan of care with patient as well as directly providing post-discharge instructions  Additional time then spent on discharge activities      ** Please Note: This note has been constructed using a voice recognition system **

## 2020-01-31 NOTE — SOCIAL WORK
MD planning on discharging pt home today  MD reports spouse had questions  PT is recommending home with family support and OP PT  [facility] and spouse wants to continue this service  Met with spouse and offer VNA, but spouse is more interested in [facility]  Pt currently has HHA which they pay private, but she is looking for HHA in evening  Spouse was given HHA list and [facility]

## 2020-02-05 ENCOUNTER — OFFICE VISIT (OUTPATIENT)
Dept: CARDIOLOGY CLINIC | Facility: CLINIC | Age: 70
End: 2020-02-05
Payer: MEDICARE

## 2020-02-05 VITALS
HEART RATE: 55 BPM | DIASTOLIC BLOOD PRESSURE: 62 MMHG | SYSTOLIC BLOOD PRESSURE: 100 MMHG | BODY MASS INDEX: 26.72 KG/M2 | HEIGHT: 72 IN

## 2020-02-05 DIAGNOSIS — R55 VASOVAGAL SYNCOPE: Primary | ICD-10-CM

## 2020-02-05 PROCEDURE — 99214 OFFICE O/P EST MOD 30 MIN: CPT | Performed by: INTERNAL MEDICINE

## 2020-02-05 NOTE — PROGRESS NOTES
Cardiology Follow Up        [address] [facility]      1950      [id number]      Discussion/Summary:    1  Paroxysmal atrial fibrillation, loop recorder in place  2  Right ROB/MCA territory embolic CVA 31/5886--[id number] aphasia  3  Syncope 1/2020, suspect vasovagal  4  Chronic hypotension    · Reviewed hospital records, discussed case with Dr Collins Garzon who feels this was likely vasovagal episode  I agree, as patient did have a number of prodromal symptoms, gradual syncope, subsequent exertion/fatigue  He has chronic hypotension which likely contributes  I have reviewed the pathophysiology of vasovagal syncope in detail with the patient and his family, as well as treatment strategies, maneuvers to potentially abort if symptoms arise, and preventive techniques  I encouraged increased sodium and fluid intake  In light of his low blood pressure readings, I will start midodrine 2 5 mg 3 times daily  · Personally reviewed loop recorder rhythm strips that were scanned into the last cardiology hospital noted  I agree there is significant amounts of no is  There is no tachycardia or bradycardia or significant pauses that could explain his syncopal symptoms  However, in light of significant noise, atrial fibrillation could not be completely ruled out  The patient and his family are agreeable for a 14 day patch Holter monitor (ZIO) to be comprehensive and rule out any intermittent dysrhythmia which could be contributing      Follow-up in 6 weeks to discuss results of heart monitor and response to monitor        Interval History: This is a 77-year-old male admitted to [facility] 04/2019 with acute CVA, and subsequent event right MCA and ROB thrombectomy and tPA infusion  He residual aphasia and dysphasia  Subsequent ALYX revealed no atrial septal interruption, no evidence of thrombus  Subsequently, he underwent loop recorder placement    He then presents to the hospital 05/13/2019 after a routine ECG revealed rapid atrial fibrillation  Loop recorder interrogation had revealed new onset atrial fibrillation ongoing since 05/11/2019  He was then initiated on Eliquis and metoprolol, and a rate control strategy was pursued      After discharge, he has started rehabilitation  His wife called 09/03/2019, stating that he was quite lethargic with low blood pressure readings  He went to the ER, where metoprolol was decreased to 12 5 mg b i d   Although he felt better, he still continued to have lethargy, after which metoprolol was completed held 09/04/2019  During a prior visit 09/06/2019, at which time metoprolol was restarted at a low dose secondary to his paroxysmal atrial fibrillation  Amiodarone was considered and he was evaluated by Dr Denise Sandy 10/25/2019, and was deemed that his device was over estimating the burden of atrial fibrillation and he mostly had sinus rhythm with PACs  Low-dose metoprolol at 12 5 mg b i d  Was continued  He was hospitalized 01/2020 after he discontinue metoprolol for about 3 weeks, and early 730 in the morning transferred into the shower and was getting washed he became lightheaded and dizzy  He had an episode of unresponsiveness was head rolled forward, lasting 1-5 minutes  Review of loop recorder showed to atrial fibrillation events and 1 tachycardia event at 353 beats per minute  Review of rhythm strips however revealed only noise artifact  There was no definitive atrial fibrillation, no evidence of pauses or significant tachycardia or bradycardia associated with his episodes  He had a 4 beat run of nonsustained ventricular tachycardia on telemetry  The patient was diagnosed with vasovagal syncope and discharged  He presents today for follow-up  His daughter and wife are with him  He has had no major episodes of lightheadedness, presyncope or syncope since discharge  He rarely gets palpitations  Vitals:  Vitals:    02/05/20 1337   Height: 6' (1 829 m)         Past Medical History:   Diagnosis Date    Arthritis     BL knees    Atrial fibrillation (HCC)     CHF (congestive heart failure) (HCC)     Colon polyp     CVA (cerebral vascular accident) ([facility] ) 4/27/2019    NIHSS 26 on presentation    Depression     Diabetes mellitus ([facility] )     Hypertension     Irregular heart beat     Afib    Stroke ([place] [address] )     Urinary retention      Social History     Socioeconomic History    Marital status: /Civil Union     Spouse name: Not on file    Number of children: Not on file    Years of education: Not on file    Highest education level: Not on file   Occupational History    Not on file   Social Needs    Financial resource strain: Not on file    Food insecurity:     Worry: Not on file     Inability: Not on file    Transportation needs:     Medical: Not on file     Non-medical: Not on file   Tobacco Use    Smoking status: Never Smoker    Smokeless tobacco: Never Used   Substance and Sexual Activity    Alcohol use: Not Currently     Frequency: 2-4 times a month     Comment: social    Drug use: Never    Sexual activity: Not on file   Lifestyle    Physical activity:     Days per week: Not on file     Minutes per session: Not on file    Stress: Not on file   Relationships    Social connections:     Talks on phone: Not on file     Gets together: Not on file     Attends Religion service: Not on file     Active member of club or organization: Not on file     Attends meetings of clubs or organizations: Not on file     Relationship status: Not on file    Intimate partner violence:     Fear of current or ex partner: Not on file     Emotionally abused: Not on file     Physically abused: Not on file     Forced sexual activity: Not on file   Other Topics Concern    Not on file   Social History Narrative    Not on file      No family history on file    Past Surgical History:   Procedure Laterality Date    COLONOSCOPY      IR STROKE ALERT  4/27/2019    MENISCECTOMY Right        Current Outpatient Medications:     Coenzyme Q10 (CO Q 10) 10 MG CAPS, Take by mouth daily, Disp: , Rfl:     cyanocobalamin (VITAMIN B-12) 100 mcg tablet, Take by mouth daily, Disp: , Rfl:     FLUoxetine (PROzac) 20 mg capsule, Take 1 capsule (20 mg total) by mouth daily, Disp: 30 capsule, Rfl: 0    hydrocortisone 1 % cream, Apply topically 2 (two) times a day as needed , Disp: , Rfl:     pantoprazole (PROTONIX) 40 mg tablet, Take 1 tablet (40 mg total) by mouth 2 (two) times a day before meals, Disp: 120 tablet, Rfl: 0    pravastatin (PRAVACHOL) 20 mg tablet, Take 1 tablet (20 mg total) by mouth daily, Disp: 30 tablet, Rfl: 0    rivaroxaban (XARELTO) 20 mg tablet, Take 1 tablet (20 mg total) by mouth daily with breakfast, Disp: 30 tablet, Rfl: 0    tamsulosin (FLOMAX) 0 4 mg, Take 0 8 mg by mouth daily at bedtime , Disp: , Rfl:         Review of Systems:  Rare palpitations  No chest pain, SOB  Otherwise review of systems quite limited secondary to patient's severe aphasia    Physical Exam:  Physical Exam   Constitutional: He is oriented to person, place, and time  He appears well-developed and well-nourished  No distress  HENT:   Head: Normocephalic and atraumatic  Eyes: Pupils are equal, round, and reactive to light  EOM are normal  Right eye exhibits no discharge  No scleral icterus  Neck: Normal range of motion  Neck supple  No thyromegaly present  Cardiovascular: Normal rate, regular rhythm and normal heart sounds  Exam reveals no gallop and no friction rub  No murmur heard  Pulmonary/Chest: Effort normal and breath sounds normal    Abdominal: He exhibits no distension  There is no tenderness  There is no rebound and no guarding  Musculoskeletal: Normal range of motion  He exhibits no edema  Neurological: He is alert and oriented to person, place, and time  Coordination normal    Aphasia    Patient wheelchair-bound   Skin: Skin is warm and dry  No rash noted  He is not diaphoretic  No erythema  No pallor  Psychiatric: He has a normal mood and affect  His behavior is normal  Judgment and thought content normal        This note was completed in part utilizing M-Modal Fluency Direct Software  Grammatical errors, random word insertions, spelling mistakes, and incomplete sentences can be an occasional consequence of this system secondary to software limitations, ambient noise, and hardware issues  If you have any questions or concerns about the content, text, or information contained within the body of this dictation, please contact the provider for clarification

## 2020-02-05 NOTE — PATIENT INSTRUCTIONS
Midodrine 2 5 mg three times daily----hold if systolic blood pressure > 150 mmHg  2 week heart monitor

## 2020-02-06 RX ORDER — MIDODRINE HYDROCHLORIDE 2.5 MG/1
2.5 TABLET ORAL 3 TIMES DAILY
Qty: 90 TABLET | Refills: 11 | Status: SHIPPED | OUTPATIENT
Start: 2020-02-06 | End: 2020-02-28

## 2020-02-19 ENCOUNTER — REMOTE DEVICE CLINIC VISIT (OUTPATIENT)
Dept: CARDIOLOGY CLINIC | Facility: CLINIC | Age: 70
End: 2020-02-19
Payer: MEDICARE

## 2020-02-19 DIAGNOSIS — Z95.818 PRESENCE OF OTHER CARDIAC IMPLANTS AND GRAFTS: Primary | ICD-10-CM

## 2020-02-19 PROCEDURE — G2066 INTER DEVC REMOTE 30D: HCPCS | Performed by: INTERNAL MEDICINE

## 2020-02-19 PROCEDURE — 93298 REM INTERROG DEV EVAL SCRMS: CPT | Performed by: INTERNAL MEDICINE

## 2020-02-19 NOTE — PROGRESS NOTES
MDT LOOP RECORDER  CARELINK TRANSMISSION: LOOP RECORDER  PRESENTING RHYTHM OVERSENSING  BATTERY STATUS "OK"  1 TACHY EPISODE W/ EGRAM SHOWING OVERSENSING  2 AF EPISODES W/ EGRAMS SHOWING SR W/ PACs, PVCs AND OVERSENSING  NO PATIENT ACTIVATED EPISODES  NORMAL DEVICE FUNCTION   DL

## 2020-02-25 ENCOUNTER — TELEPHONE (OUTPATIENT)
Dept: NEUROLOGY | Facility: CLINIC | Age: 70
End: 2020-02-25

## 2020-02-28 ENCOUNTER — OFFICE VISIT (OUTPATIENT)
Dept: NEUROLOGY | Facility: CLINIC | Age: 70
End: 2020-02-28
Payer: MEDICARE

## 2020-02-28 VITALS
BODY MASS INDEX: 26.72 KG/M2 | HEIGHT: 72 IN | HEART RATE: 66 BPM | SYSTOLIC BLOOD PRESSURE: 112 MMHG | DIASTOLIC BLOOD PRESSURE: 70 MMHG

## 2020-02-28 DIAGNOSIS — E78.00 PURE HYPERCHOLESTEROLEMIA: ICD-10-CM

## 2020-02-28 DIAGNOSIS — R47.01 APHASIA DUE TO ACUTE STROKE (HCC): Primary | ICD-10-CM

## 2020-02-28 DIAGNOSIS — I69.398 SEIZURE AS LATE EFFECT OF CEREBROVASCULAR ACCIDENT (CVA) (HCC): ICD-10-CM

## 2020-02-28 DIAGNOSIS — N21.0 BLADDER STONE: ICD-10-CM

## 2020-02-28 DIAGNOSIS — R73.03 PREDIABETES: ICD-10-CM

## 2020-02-28 DIAGNOSIS — I63.9 APHASIA DUE TO ACUTE STROKE (HCC): Primary | ICD-10-CM

## 2020-02-28 DIAGNOSIS — G81.94 LEFT HEMIPLEGIA (HCC): ICD-10-CM

## 2020-02-28 DIAGNOSIS — I63.9 DEPRESSION DUE TO ACUTE STROKE (HCC): ICD-10-CM

## 2020-02-28 DIAGNOSIS — I10 ESSENTIAL HYPERTENSION: ICD-10-CM

## 2020-02-28 DIAGNOSIS — F06.31 DEPRESSION DUE TO ACUTE STROKE (HCC): ICD-10-CM

## 2020-02-28 DIAGNOSIS — I48.0 PAROXYSMAL A-FIB (HCC): ICD-10-CM

## 2020-02-28 DIAGNOSIS — R56.9 SEIZURE AS LATE EFFECT OF CEREBROVASCULAR ACCIDENT (CVA) (HCC): ICD-10-CM

## 2020-02-28 DIAGNOSIS — G40.909 NONINTRACTABLE EPILEPSY WITHOUT STATUS EPILEPTICUS, UNSPECIFIED EPILEPSY TYPE (HCC): ICD-10-CM

## 2020-02-28 DIAGNOSIS — Z86.73 HISTORY OF STROKE: ICD-10-CM

## 2020-02-28 PROCEDURE — 99215 OFFICE O/P EST HI 40 MIN: CPT | Performed by: PSYCHIATRY & NEUROLOGY

## 2020-02-28 NOTE — PATIENT INSTRUCTIONS
Stroke:  Clarisa Redd presents for a follow-up evaluation with regard to his prior stroke  He was recently hospitalized at [facility] with syncope, but he has not had any new stroke-like symptoms  He takes his medications as prescribed  They did not endorse any bleeding or bruising issues  He has not had any recent falls  I reviewed his extensive blood pressure log in the office today and most his blood pressures are just in the right range  He has had a few episodes of orthostasis that are exquisitely positionally responsive  During these times he has been symptomaticaly   Hypotensive and was better after sitting /laying both in terms his blood pressure and in terms of the symptoms  - for ongoing stroke prevention he should continue his current combination of Xarelto, statin, and appropriate blood pressure and glycemic control  -we will defer to the good judgment of his primary care team for monitoring of his cholesterol panel and blood sugar numbers   - I agree that ongoing physical therapy is medically necessary and very helpful for him     -I agree that he should not be getting up and around without supervision just yet as we would like to limit his falls risk, but clearly his independence is improving   -with regard to his recent syncope, I would suggest that his symptoms are more suggestive of orthostasis as opposed to a pure vagal event    In any case, as an initial treatment I would suggest that on days when he is going to be working with physical therapy and walking more he take special care to hydrate adequately, and that he should consider having a salty snack just prior to working with the team   It would also be reasonable to use compression stockings that go up to the thighs during those times     -if he continues to have symptomatic orthostasis in spite of the above measures then it may be reasonable to consider fludrocortisone which I would consider to be superior to midodrine in this case as it may cause less supine hypertension     - even on days when he is not working with physical therapy I would like him to increase his electrolyte intake and consume 36-48 oz of non caffeinated beverages per day  This should be front loaded in the morning   -he is not in need of additional imaging from my standpoint   -He should continue to work with the PM&R team and I agree that botox for spasticity is reasonable  seizure disorder:  He has had an episode of seizure affecting the left hand side which is potentially related to his prior strokes however he did have some new ischemia in the setting that seizure  He has been tried on Keppra and Depakote and at this point both medications caused significant tiredness and were unacceptable to him  His EEG did not show any epileptiform abnormalities  We will continue with watchful waiting for the time being     -he should observe seizure precautions, particularly he should avoid baths or swimming without adequate supervision, he should ensure that he is getting adequate rest, and maintaining appropriate hydration     - for the time being we are not going to begin an antiseizure medication, however if he has any more seizure like activity we have a very low threshold to initiate treatment  From my standpoint he is making excellent progress  I will plan for him to follow-up in the office in 6 months time but we would be happy to see him sooner if the need should arise  If he has any stroke-like symptoms including new sudden loss of vision or double vision, difficulty speaking or swallowing which is different, new weakness/numbness affecting 1 side of the face or body, or new vertigo/room spinning that does not quickly resolve he should proceed by ambulance to the nearest emergency room immediately    Because he takes Xarelto, if he were to fall and strike his head and have any residual symptoms whatsoever or to developed a sudden extremely severe very unusual headache he should also be seen at the nearest emergency room

## 2020-02-28 NOTE — PROGRESS NOTES
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[facility])    Essential hypertension    Paroxysmal A-fib (HCC)    Left hemiplegia ([facility] )    Nonintractable epilepsy without status epilepticus, unspecified epilepsy type ([facility] )    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) ([facility] )    History of stroke    Pure hypercholesterolemia    Prediabetes    Bladder stone  -     Ambulatory referral to Urology; Future       Patient Instructions   Stroke:  Carleen Wheeler presents for a follow-up evaluation with regard to his prior stroke  He was recently hospitalized at [facility] with syncope, but he has not had any new stroke-like symptoms  He takes his medications as prescribed  They did not endorse any bleeding or bruising issues  He has not had any recent falls  I reviewed his extensive blood pressure log in the office today and most his blood pressures are just in the right range  He has had a few episodes of orthostasis that are exquisitely positionally responsive  During these times he has been symptomaticaly   Hypotensive and was better after sitting /laying both in terms his blood pressure and in terms of the symptoms      - for ongoing stroke prevention he should continue his current combination of Xarelto, statin, and appropriate blood pressure and glycemic control  -we will defer to the good judgment of his primary care team for monitoring of his cholesterol panel and blood sugar numbers   - I agree that ongoing physical therapy is medically necessary and very helpful for him     -I agree that he should not be getting up and around without supervision just yet as we would like to limit his falls risk, but clearly his independence is improving   -with regard to his recent syncope, I would suggest that his symptoms are more suggestive of orthostasis as opposed to a pure vagal event  In any case, as an initial treatment I would suggest that on days when he is going to be working with physical therapy and walking more he take special care to hydrate adequately, and that he should consider having a salty snack just prior to working with the team   It would also be reasonable to use compression stockings that go up to the thighs during those times     -if he continues to have symptomatic orthostasis in spite of the above measures then it may be reasonable to consider fludrocortisone which I would consider to be superior to midodrine in this case as it may cause less supine hypertension     - even on days when he is not working with physical therapy I would like him to increase his electrolyte intake and consume 36-48 oz of non caffeinated beverages per day  This should be front loaded in the morning   -he is not in need of additional imaging from my standpoint   -He should continue to work with the PM&R team and I agree that botox for spasticity is reasonable  seizure disorder:  He has had an episode of seizure affecting the left hand side which is potentially related to his prior strokes however he did have some new ischemia in the setting that seizure  He has been tried on Keppra and Depakote and at this point both medications caused significant tiredness and were unacceptable to him  His EEG did not show any epileptiform abnormalities  We will continue with watchful waiting for the time being     -he should observe seizure precautions, particularly he should avoid baths or swimming without adequate supervision, he should ensure that he is getting adequate rest, and maintaining appropriate hydration     - for the time being we are not going to begin an antiseizure medication, however if he has any more seizure like activity we have a very low threshold to initiate treatment        From my standpoint he is making excellent progress  I will plan for him to follow-up in the office in 6 months time but we would be happy to see him sooner if the need should arise  If he has any stroke-like symptoms including new sudden loss of vision or double vision, difficulty speaking or swallowing which is different, new weakness/numbness affecting 1 side of the face or body, or new vertigo/room spinning that does not quickly resolve he should proceed by ambulance to the nearest emergency room immediately  Because he takes Xarelto, if he were to fall and strike his head and have any residual symptoms whatsoever or to developed a sudden extremely severe very unusual headache he should also be seen at the nearest emergency room  Subjective:    HPI       Alden Jacinto presents for a follow-up evaluation with regard to his prior stroke  In the interval since his last visit to the office he takes his medications as prescribed  He did not endorse any bleeding or bruising issues  He has not had any severe falls  He was hospitalized with a syncopal event which was felt to be vagal in nature however since then he has had at least 1 additional episode of what sounds like significant orthostasis  In particular, on Wednesdays he is in an [employer] skeleton at [facility] and they assist him with walking  After he was initially up and ambulating he was found to be diaphoretic and felt unwell  His blood pressure had dropped 20 points systolic  He then sat and lay down and felt better and his blood pressure rebounded, but then dropped again when he was upright  Notably, his loop recorder at the time of his recent hospitalization was not providing very good data, and it is possible that it is malposition and based on their repeat evaluation by Cardiology, particularly the 1 at [address]  They report that he was significantly tired on both Depakote and Keppra and feels better off of the medications    We discussed the fact that he may be at some ongoing risk for seizure, but considering there may been some acute stroke event at the time that his seizure occurred is not clear that he has significant long-term risk, it is reasonable to do a time frame of  Watchful waiting  They discussed the fact that he has had frequent urinary tract infections ever since leaving [facility], and he is also on Flomax which may be lowering his blood pressure  We will plan for a referral to Urology to evaluate him for a bladder stone as well as for the possibility of discontinuing tamsulosin which may be contributing to his hypotension        Past Medical History:   Diagnosis Date    Arthritis     BL knees    Atrial fibrillation (HCC)     CHF (congestive heart failure) (HCC)     Colon polyp     CVA (cerebral vascular accident) ([facility] ) 4/27/2019    NIHSS 26 on presentation    Depression     Diabetes mellitus ([place] [address] )     Hypertension     Irregular heart beat     Afib    Stroke ([place] [address] )     Urinary retention        Social History     Socioeconomic History    Marital status: /Civil Union     Spouse name: None    Number of children: None    Years of education: None    Highest education level: None   Occupational History    None   Social Needs    Financial resource strain: None    Food insecurity:     Worry: None     Inability: None    Transportation needs:     Medical: None     Non-medical: None   Tobacco Use    Smoking status: Never Smoker    Smokeless tobacco: Never Used   Substance and Sexual Activity    Alcohol use: Not Currently     Frequency: 2-4 times a month     Comment: social    Drug use: Never    Sexual activity: None   Lifestyle    Physical activity:     Days per week: None     Minutes per session: None    Stress: None   Relationships    Social connections:     Talks on phone: None     Gets together: None     Attends Judaism service: None     Active member of club or organization: None     Attends meetings of clubs or organizations: None     Relationship status: None    Intimate partner violence:     Fear of current or ex partner: None     Emotionally abused: None     Physically abused: None     Forced sexual activity: None   Other Topics Concern    None   Social History Narrative    None       History reviewed  No pertinent family history  Current Outpatient Medications:     Coenzyme Q10 (CO Q 10) 10 MG CAPS, Take by mouth daily, Disp: , Rfl:     cyanocobalamin (VITAMIN B-12) 100 mcg tablet, Take by mouth daily, Disp: , Rfl:     FLUoxetine (PROzac) 20 mg capsule, Take 1 capsule (20 mg total) by mouth daily, Disp: 30 capsule, Rfl: 0    hydrocortisone 1 % cream, Apply topically 2 (two) times a day as needed , Disp: , Rfl:     pantoprazole (PROTONIX) 40 mg tablet, Take 1 tablet (40 mg total) by mouth 2 (two) times a day before meals, Disp: 120 tablet, Rfl: 0    pravastatin (PRAVACHOL) 20 mg tablet, Take 1 tablet (20 mg total) by mouth daily, Disp: 30 tablet, Rfl: 0    rivaroxaban (XARELTO) 20 mg tablet, Take 1 tablet (20 mg total) by mouth daily with breakfast, Disp: 30 tablet, Rfl: 0    tamsulosin (FLOMAX) 0 4 mg, Take 0 4 mg by mouth 2 (two) times a day , Disp: , Rfl:     midodrine (PROAMATINE) 2 5 mg tablet, Take 1 tablet (2 5 mg total) by mouth 3 (three) times a day (Patient not taking: Reported on 2/28/2020), Disp: 90 tablet, Rfl: 11    Height 6' (1 829 m)  The following portions of the patient's history were reviewed: allergies, current medications, past family history, past medical history, past social history and problem list            Objective:    Blood pressure 112/70, pulse 66, height 6' (1 829 m)  Physical Exam    Neurological Exam      Divya Batres presented in a wheelchair  He was communicative but his voice was hypophonic and somewhat monotone and  It took him an exceedingly long time to answer questions any had a degree of expressive aphasia  Was able to follow some one-step commands    His pupils were equal round reactive to light with intact extraocular movements  He appeared to have intact sensation in the bilateral face but had difficulty with expressing himself  Strength was 5/5 in the right upper and lower extremity  His left upper extremity was spastic and 0/5   With the exception of  and release which was better than that  Notably he had a brace on the left upper extremity  The left lower extremity was at least 4-/ 5 any was able to kick on command  Deep tendon reflexes were exaggerated on the left compared with the right  We did not attempt to ambulate him today  ROS:    Review of Systems   Constitutional: Positive for fatigue  Negative for appetite change and fever  HENT: Negative  Negative for hearing loss, tinnitus, trouble swallowing and voice change  Eyes: Negative  Negative for photophobia and pain  Respiratory: Negative  Negative for shortness of breath  Cardiovascular: Negative  Negative for palpitations  Rapid/irregular heart rate   Gastrointestinal: Negative  Negative for nausea and vomiting  Endocrine: Negative  Negative for cold intolerance and heat intolerance  Genitourinary: Positive for frequency and urgency  Negative for dysuria  Musculoskeletal: Negative  Negative for myalgias and neck pain  Skin: Negative  Negative for rash  Neurological: Negative  Negative for dizziness, tremors, seizures, syncope, facial asymmetry, speech difficulty, weakness, light-headedness, numbness and headaches  Hematological: Negative  Does not bruise/bleed easily  Psychiatric/Behavioral: Negative  Negative for confusion, hallucinations and sleep disturbance  Reviewed ROS as entered by medical assistant

## 2020-03-02 RX ORDER — BETHANECHOL CHLORIDE 5 MG
TABLET ORAL
COMMUNITY
End: 2020-03-06 | Stop reason: ALTCHOICE

## 2020-03-02 RX ORDER — MIDODRINE HYDROCHLORIDE 2.5 MG/1
TABLET ORAL
COMMUNITY
End: 2020-03-06 | Stop reason: ALTCHOICE

## 2020-03-02 RX ORDER — BACLOFEN 10 MG/1
TABLET ORAL
COMMUNITY
End: 2020-03-06 | Stop reason: ALTCHOICE

## 2020-03-02 RX ORDER — DIVALPROEX SODIUM 500 MG/1
TABLET, DELAYED RELEASE ORAL EVERY 12 HOURS
COMMUNITY
End: 2020-03-06 | Stop reason: ALTCHOICE

## 2020-03-02 RX ORDER — CIPROFLOXACIN 250 MG/1
TABLET, FILM COATED ORAL
COMMUNITY
End: 2020-03-06 | Stop reason: ALTCHOICE

## 2020-03-03 ENCOUNTER — CLINICAL SUPPORT (OUTPATIENT)
Dept: CARDIOLOGY CLINIC | Facility: CLINIC | Age: 70
End: 2020-03-03
Payer: MEDICARE

## 2020-03-03 DIAGNOSIS — R55 VASOVAGAL SYNCOPE: ICD-10-CM

## 2020-03-03 PROCEDURE — 0298T PR EXT ECG > 48HR TO 21 DAY REVIEW AND INTERPRETATN: CPT | Performed by: INTERNAL MEDICINE

## 2020-03-06 ENCOUNTER — CONSULT (OUTPATIENT)
Dept: UROLOGY | Facility: MEDICAL CENTER | Age: 70
End: 2020-03-06
Payer: MEDICARE

## 2020-03-06 VITALS
BODY MASS INDEX: 26.72 KG/M2 | DIASTOLIC BLOOD PRESSURE: 80 MMHG | HEIGHT: 72 IN | HEART RATE: 60 BPM | SYSTOLIC BLOOD PRESSURE: 120 MMHG

## 2020-03-06 DIAGNOSIS — N39.0 URINARY TRACT INFECTION WITHOUT HEMATURIA, SITE UNSPECIFIED: ICD-10-CM

## 2020-03-06 DIAGNOSIS — Z12.5 PROSTATE CANCER SCREENING: ICD-10-CM

## 2020-03-06 DIAGNOSIS — N28.1 RENAL CYST: ICD-10-CM

## 2020-03-06 DIAGNOSIS — N13.8 BPH WITH OBSTRUCTION/LOWER URINARY TRACT SYMPTOMS: ICD-10-CM

## 2020-03-06 DIAGNOSIS — N21.0 BLADDER STONE: Primary | ICD-10-CM

## 2020-03-06 DIAGNOSIS — N40.1 BPH WITH OBSTRUCTION/LOWER URINARY TRACT SYMPTOMS: ICD-10-CM

## 2020-03-06 LAB
BACTERIA UR QL AUTO: ABNORMAL /HPF
BILIRUB UR QL STRIP: NEGATIVE
CLARITY UR: ABNORMAL
COLOR UR: YELLOW
GLUCOSE UR STRIP-MCNC: NEGATIVE MG/DL
HGB UR QL STRIP.AUTO: NEGATIVE
KETONES UR STRIP-MCNC: NEGATIVE MG/DL
LEUKOCYTE ESTERASE UR QL STRIP: ABNORMAL
MUCOUS THREADS UR QL AUTO: ABNORMAL
NITRITE UR QL STRIP: NEGATIVE
NON-SQ EPI CELLS URNS QL MICRO: ABNORMAL /HPF
PH UR STRIP.AUTO: 6.5 [PH]
POST-VOID RESIDUAL VOLUME, ML POC: 36 ML
PROT UR STRIP-MCNC: NEGATIVE MG/DL
RBC #/AREA URNS AUTO: ABNORMAL /HPF
SL AMB  POCT GLUCOSE, UA: NORMAL
SL AMB LEUKOCYTE ESTERASE,UA: NORMAL
SL AMB POCT BILIRUBIN,UA: NORMAL
SL AMB POCT BLOOD,UA: NORMAL
SL AMB POCT CLARITY,UA: CLEAR
SL AMB POCT COLOR,UA: YELLOW
SL AMB POCT KETONES,UA: NORMAL
SL AMB POCT NITRITE,UA: NORMAL
SL AMB POCT PH,UA: 6.5
SL AMB POCT SPECIFIC GRAVITY,UA: 1.02
SL AMB POCT URINE PROTEIN: NORMAL
SL AMB POCT UROBILINOGEN: 0.2
SP GR UR STRIP.AUTO: 1.02 (ref 1–1.03)
UROBILINOGEN UR QL STRIP.AUTO: 0.2 E.U./DL
WBC #/AREA URNS AUTO: ABNORMAL /HPF

## 2020-03-06 PROCEDURE — 87186 SC STD MICRODIL/AGAR DIL: CPT | Performed by: NURSE PRACTITIONER

## 2020-03-06 PROCEDURE — 81001 URINALYSIS AUTO W/SCOPE: CPT | Performed by: NURSE PRACTITIONER

## 2020-03-06 PROCEDURE — 51798 US URINE CAPACITY MEASURE: CPT | Performed by: NURSE PRACTITIONER

## 2020-03-06 PROCEDURE — 81003 URINALYSIS AUTO W/O SCOPE: CPT | Performed by: NURSE PRACTITIONER

## 2020-03-06 PROCEDURE — 99203 OFFICE O/P NEW LOW 30 MIN: CPT | Performed by: NURSE PRACTITIONER

## 2020-03-06 PROCEDURE — 87086 URINE CULTURE/COLONY COUNT: CPT | Performed by: NURSE PRACTITIONER

## 2020-03-06 PROCEDURE — 87147 CULTURE TYPE IMMUNOLOGIC: CPT | Performed by: NURSE PRACTITIONER

## 2020-03-06 NOTE — PROGRESS NOTES
3/6/2020      Chief Complaint   Patient presents with    Nephrolithiasis     Assessment and Plan    71 y o  male managed by NEW PATIENT  1  Bladder calculi  · Ultrasound of kidney and bladder performed 02/26/2020 reveals a 7 mm right-sided bladder calculi  · Maintain adequate hydration  · Behavior in dietary modifications to assist in reduction of future stone formation  · Schedule cystoscopy    2  Renal cyst  · Ultrasound performed 02/26/2029 is 1 2 cm right renal parapelvic cyst  · CT renal protocol for 6 months   · BMP ordered in 6  months     3  Benign prostatic hyperplasia  · Urine dipReveals small leukocytes, positive blood otherwise  · Bladder scan PVR 36 mL  · Most recent PSA performed 04/19/2019 is 2 87  · DURGA to be performed at time of cystoscopy due to patient immobility  · Repeat PSA now and DURGA and in 1 year  · Schedule cystoscopy as above    History of Present Illness  Joey Waters is a 71 y o  male here for follow up evaluation of  bladder stone and renal cyst   He had a ultrasound performed 02/26/2020 which revealed a 7 mm right sided bladder calculi and a 1 2 cm right renal parapelvic cyst   No hydronephrosis was noted on the evaluation  There is however, slightly thickened urinary bladder wall and a large postvoid residual which is related to bladder outlet obstruction in the setting of an enlarged prostate  Review of Systems   Constitutional: Negative for chills and fever  Respiratory: Negative for cough and shortness of breath  Cardiovascular: Negative for chest pain  Gastrointestinal: Negative for abdominal distention, abdominal pain, blood in stool, nausea and vomiting  Genitourinary: Negative for difficulty urinating, dysuria, enuresis, flank pain, frequency, hematuria and urgency  Musculoskeletal: Positive for back pain  Skin: Negative for rash     Neurological:        Left sided  Hemiparesis secondary to recent CVA     Past Medical History  Past Medical History: Diagnosis Date    Arthritis     BL knees    Atrial fibrillation (HCC)     CHF (congestive heart failure) (HCC)     Colon polyp     CVA (cere[facility] accident) ([facility] ) 4/27/2019    NIHSS 26 on presentation    Depression     [facility]     Hypertension     Irregular heart beat     Afib    Stroke [facility])     Urinary retention        Past Social History  Past Surgical History:   Procedure Laterality Date    COLONOSCOPY      IR STROKE ALERT  4/27/2019    MENISCECTOMY Right      Social History     Tobacco Use   Smoking Status Never Smoker   Smokeless Tobacco Never Used       Past Family History  No family history on file      Past Social history  Social History     Socioeconomic History    Marital status: /Civil Union     Spouse name: Not on file    Number of children: Not on file    Years of education: Not on file    Highest education level: Not on file   Occupational History    Not on file   Social Needs    Financial resource strain: Not on file    Food insecurity:     Worry: Not on file     Inability: Not on file    Transportation needs:     Medical: Not on file     Non-medical: Not on file   Tobacco Use    Smoking status: Never Smoker    Smokeless tobacco: Never Used   Substance and Sexual Activity    Alcohol use: Not Currently     Frequency: 2-4 times a month     Comment: social    Drug use: Never    Sexual activity: Not on file   Lifestyle    Physical activity:     Days per week: Not on file     Minutes per session: Not on file    Stress: Not on file   Relationships    Social connections:     Talks on phone: Not on file     Gets together: Not on file     Attends Caodaism service: Not on file     Active member of club or organization: Not on file     Attends meetings of clubs or organizations: Not on file     Relationship status: Not on file    Intimate partner violence:     Fear of current or ex partner: Not on file     Emotionally abused: Not on file Physically abused: Not on file     Forced sexual activity: Not on file   Other Topics Concern    Not on file   Social History Narrative    Not on file       Current Medications  Current Outpatient Medications   Medication Sig Dispense Refill    Coenzyme Q10 (CO Q 10) 10 MG CAPS Take by mouth daily      cyanocobalamin (VITAMIN B-12) 100 mcg tablet Take by mouth daily      FLUoxetine (PROzac) 20 mg capsule Take 1 capsule (20 mg total) by mouth daily 30 capsule 0    pantoprazole (PROTONIX) 40 mg tablet Take 1 tablet (40 mg total) by mouth 2 (two) times a day before meals 120 tablet 0    pravastatin (PRAVACHOL) 20 mg tablet Take 1 tablet (20 mg total) by mouth daily 30 tablet 0    tamsulosin (FLOMAX) 0 4 mg Take 0 4 mg by mouth 2 (two) times a day       hydrocortisone 1 % cream Apply topically 2 (two) times a day as needed        No current facility-administered medications for this visit  Allergies  Allergies   Allergen Reactions    Cephalexin Throat Swelling     Pt reported throat swelling after taking antibiotic     Penicillins Other (See Comments)     The following portions of the patient's history were reviewed and updated as appropriate: allergies, current medications, past medical history, past social history, past surgical history and problem list       Vitals  Vitals:    03/06/20 1311   BP: 120/80   Pulse: 60   Height: 6' (1 829 m)     Physical Exam  Physical Exam   Constitutional: He appears well-developed and well-nourished  No distress  Cardiovascular: Normal rate  Pulmonary/Chest: Effort normal and breath sounds normal  No respiratory distress  Abdominal: Soft  He exhibits no distension  There is no tenderness  Musculoskeletal:   left sided hemiparesis  Use of wheel chair for mobility    Neurological: He is alert  Mild expressive aphasia   Vitals reviewed      Results  Recent Results (from the past 1 hour(s))   POCT urine dip auto non-scope    Collection Time: 03/06/20  1:19 PM Result Value Ref Range     COLOR,UA yellow     CLARITY,UA clear     SPECIFIC GRAVITY,UA 1 020      PH,UA 6 5     LEUKOCYTE ESTERASE,UA moderate     NITRITE,UA neg     GLUCOSE, UA neg     KETONES,UA neg     BILIRUBIN,UA neg     BLOOD,UA trace-lysed     POCT URINE PROTEIN neg     SL AMB POCT UROBILINOGEN 0 2    POCT Measure PVR    Collection Time: 03/06/20  1:20 PM   Result Value Ref Range    POST-VOID RESIDUAL VOLUME, ML POC 36 mL     No results found for: PSA  Lab Results   Component Value Date    GLUCOSE 133 04/27/2019    CALCIUM 8 7 01/31/2020    K 3 4 (L) 01/31/2020    CO2 30 01/31/2020     01/31/2020    BUN 16 01/31/2020    CREATININE 0 78 01/31/2020     Lab Results   Component Value Date    WBC 3 52 (L) 01/31/2020    HGB 12 8 01/31/2020    HCT 40 0 01/31/2020    MCV 94 01/31/2020     (L) 01/31/2020     History: 66-year-old man, nocturia, urinary frequency, UTI    Technique: Sagittal and transverse images of the kidneys and urinary bladder  were obtained utilizing real-time ultrasonography  Comparison: None available    Findings:    Right kidney: The right kidney measures 11 7 cm, and is normal in size and  position  There is normal renal echotexture pattern  There is  no   hydronephrosis  There is no sonographic evidence of renal mass or calculus  Left kidney: The left kidney measures 12 5 cm, and is normal in size and  position  There is normal renal echotexture pattern  There is no   hydronephrosis  There is no sonographic evidence of renal mass or calculus  A  small lower pole parapelvic cyst measuring 0 9 x 1 1 x 1 2 cm is noted  Urinary bladder: Urinary bladder is filled with 230 2  ml calculated prevoid  volume  Bladder wall is slightly thickened  There is irregular focal wall  thickening of the right lateral urinary bladder wall without focal increased  vascularity  Bilateral ureteral jets are noted under real-time sonographic  visualization   There is no intravesicular debris   A dependent right-sided  bladder calculus is noted, measuring 7 mm  Following voiding, there is a 173 3  ml calculated post void residual volume  Other findings: The prostate gland is enlarged measuring 5 8 x 5 2 x 4 8 cm  (volume 75 6 mL), with resultant indentation and mass effect upon the posterior  aspect of urinary bladder  Impression:  1   No hydronephrosis or sonographically evident renal mass or calculus  2   1 2 cm right renal parapelvic cyst   3   7 mm right-sided bladder calculus  4   Slightly thickened urinary bladder wall and large post void residual volume,  findings which may be related to bladder outlet obstruction in the setting of  enlarged prostate gland  5   Irregular focal wall thickening of the right urinary bladder wall may be  secondary to a prominent bladder fold however mass is not definitively excluded  by this study  Correlation with cystoscopy may be obtained  Orders  Orders Placed This Encounter   Procedures    Urine culture    PSA, Total Screen     This is a patient instruction: This test is non-fasting  Please drink two glasses of water morning of bloodwork          Standing Status:   Future     Standing Expiration Date:   9/6/2021    Urinalysis with microscopic    POCT urine dip auto non-scope    POCT Measure PVR    Cystoscopy     Standing Status:   Future     Standing Expiration Date:   3/6/2021       KISHAN Orellana

## 2020-03-09 ENCOUNTER — TELEPHONE (OUTPATIENT)
Dept: NEUROLOGY | Facility: CLINIC | Age: 70
End: 2020-03-09

## 2020-03-09 LAB — BACTERIA UR CULT: ABNORMAL

## 2020-03-09 RX ORDER — NITROFURANTOIN 25; 75 MG/1; MG/1
100 CAPSULE ORAL 2 TIMES DAILY
Qty: 10 CAPSULE | Refills: 0 | Status: SHIPPED | OUTPATIENT
Start: 2020-03-09 | End: 2020-03-14

## 2020-03-09 NOTE — TELEPHONE ENCOUNTER
Chad Barros, pt's wife called and states that pt saw urology on friday, found a large stone in bladder and cyst on his kidney  she states that they want to do   cystoscopy, but this is not scheduled until 3/25 and   CT kidney in sept  she was asking if dr Kam Richmond could help get these appt's moved up   i advised her to call urology since they order the imaging and procedure

## 2020-03-10 ENCOUNTER — TELEPHONE (OUTPATIENT)
Dept: UROLOGY | Facility: MEDICAL CENTER | Age: 70
End: 2020-03-10

## 2020-03-10 NOTE — TELEPHONE ENCOUNTER
----- Message from Alison Lisa 79  sent at 3/9/2020 10:07 PM EDT -----  Prescription for nitro for 20 sent to the pharmacy on file  Please call notify patient  Thank you

## 2020-03-10 NOTE — TELEPHONE ENCOUNTER
Called patient to notify him of prescription being sent to his pharmacy per [facility]  He was also advised that if he has any other further questions or concerns he is to call the office

## 2020-03-13 ENCOUNTER — APPOINTMENT (OUTPATIENT)
Dept: LAB | Facility: MEDICAL CENTER | Age: 70
End: 2020-03-13
Payer: MEDICARE

## 2020-03-13 DIAGNOSIS — Z12.5 PROSTATE CANCER SCREENING: ICD-10-CM

## 2020-03-13 LAB — PSA SERPL-MCNC: 3.6 NG/ML (ref 0–4)

## 2020-03-13 PROCEDURE — 36415 COLL VENOUS BLD VENIPUNCTURE: CPT

## 2020-03-13 PROCEDURE — G0103 PSA SCREENING: HCPCS

## 2020-03-16 ENCOUNTER — OFFICE VISIT (OUTPATIENT)
Dept: CARDIOLOGY CLINIC | Facility: CLINIC | Age: 70
End: 2020-03-16
Payer: MEDICARE

## 2020-03-16 VITALS
HEIGHT: 72 IN | WEIGHT: 199 LBS | BODY MASS INDEX: 26.95 KG/M2 | DIASTOLIC BLOOD PRESSURE: 62 MMHG | HEART RATE: 62 BPM | OXYGEN SATURATION: 100 % | SYSTOLIC BLOOD PRESSURE: 114 MMHG

## 2020-03-16 DIAGNOSIS — I48.0 PAF (PAROXYSMAL ATRIAL FIBRILLATION) (HCC): Primary | ICD-10-CM

## 2020-03-16 DIAGNOSIS — E78.5 DYSLIPIDEMIA: ICD-10-CM

## 2020-03-16 DIAGNOSIS — R55 VASOVAGAL SYNCOPE: ICD-10-CM

## 2020-03-16 PROCEDURE — 99214 OFFICE O/P EST MOD 30 MIN: CPT | Performed by: INTERNAL MEDICINE

## 2020-03-16 NOTE — PROGRESS NOTES
Cardiology Follow Up        [address]      1950      [id number]      Assessment/Plan:    1  Paroxysmal atrial fibrillation, loop recorder in place  2  Right ROB/MCA territory embolic CVA 18/5822--[id number] aphasia  3  Syncope 1/2020, suspect vasovagal  4  Chronic hypotension    · Prior hospitalization 01/2020 likely vasovagal episode with 1 recurrent episode earlier this month, albeit milder after having a bowel movement and standing up to get in the shower  Encouraged plenty of hydration and liberal sodium intake  Can consider starting fludrocortisone or midodrine if recurrent episodes  According to patient's wife, Dr Sophia Burger did not prefer midodrine, which is also noted in his note due to worry of supine hypertension  I do not think this patient will have significant hypertension on midodrine, as his baseline blood pressures are fairly on the lower side  · Made to restart metoprolol if recurrent atrial fibrillation occurs  Amiodarone may be considered if he has recurrent episodes, as antihypertensives may worsen his vasovagal spells in the future  Metoprolol can be tried in the future 1st   · Continue device interrogations as scheduled  Patient for device interrogation tomorrow  · Continue pravastatin    Follow-up in 4 months      Interval History:      This is a 61-year-old male admitted to [facility] 04/2019 with acute CVA, and subsequent event right MCA and ROB thrombectomy and tPA infusion   He residual aphasia and dysphasia   Subsequent ALYX revealed no atrial septal interruption, no evidence of thrombus   Subsequently, he underwent loop recorder placement   He then presents to the hospital 05/13/2019 after a routine ECG revealed rapid atrial fibrillation   Loop recorder interrogation had revealed new onset atrial fibrillation ongoing since 05/11/2019  Jovany Marie was then initiated on Eliquis and metoprolol, and a rate control strategy was pursued      After discharge, he has started rehabilitation   His wife called 09/03/2019, stating that he was quite lethargic with low blood pressure readings   He went to the ER, where metoprolol was decreased to 12 5 mg b i d  Deon Kunz he felt better, he still continued to have lethargy, after which metoprolol was completed held 09/04/2019      During a prior visit 09/06/2019, at which time metoprolol was restarted at a low dose secondary to his paroxysmal atrial fibrillation  Amiodarone was considered and he was evaluated by Dr Denise Sandy 10/25/2019, and was deemed that his device was over estimating the burden of atrial fibrillation and he mostly had sinus rhythm with PACs  Low-dose metoprolol at 12 5 mg b i d  Was continued      He was hospitalized 01/2020 after he discontinue metoprolol for about 3 weeks, and early 730 in the morning transferred into the shower and was getting washed he became lightheaded and dizzy  He had an episode of unresponsiveness was head rolled forward, lasting 1-5 minutes  Review of loop recorder showed to atrial fibrillation events and 1 tachycardia event at 353 beats per minute  Review of rhythm strips however revealed only noise artifact  There was no definitive atrial fibrillation, no evidence of pauses or significant tachycardia or bradycardia associated with his episodes  He had a 4 beat run of nonsustained ventricular tachycardia on telemetry  The patient was diagnosed with vasovagal syncope and discharged  He underwent a 14 day [facility] Holter monitor revealing no evidence of atrial fibrillation in several short runs of SVT up to 11 beats and short nonsustained ventricular tachycardia  He presents today for follow-up  He had 1 episode of lightheadedness after had a bowel movement and stood up to get in the shower, but no presyncope or syncope  He denies lower extremity edema           Vitals:  Vitals:    03/16/20 1257   BP: 114/62   BP Location: Left arm   Patient Position: Sitting   Cuff Size: Large   Pulse: 62   SpO2: 100%   Weight: 90 3 kg (199 lb)   Height: 6' (1 829 m)         Past Medical History:   Diagnosis Date    Arthritis     BL knees    Atrial fibrillation (HCC)     CHF (congestive heart failure) (HCC)     Colon polyp     CVA (cerebral vascular accident) ([facility] ) 4/27/2019    NIHSS 26 on presentation    Depression     Diabetes mellitus ([place] [address] )     Hypertension     Irregular heart beat     Afib    Stroke ([place] [address] )     Urinary retention      Social History     Socioeconomic History    Marital status: /Civil Union     Spouse name: Not on file    Number of children: Not on file    Years of education: Not on file    Highest education level: Not on file   Occupational History    Not on file   Social Needs    Financial resource strain: Not on file    Food insecurity:     Worry: Not on file     Inability: Not on file    Transportation needs:     Medical: Not on file     Non-medical: Not on file   Tobacco Use    Smoking status: Never Smoker    Smokeless tobacco: Never Used   Substance and Sexual Activity    Alcohol use: Not Currently     Frequency: 2-4 times a month     Comment: social    Drug use: Never    Sexual activity: Not on file   Lifestyle    Physical activity:     Days per week: Not on file     Minutes per session: Not on file    Stress: Not on file   Relationships    Social connections:     Talks on phone: Not on file     Gets together: Not on file     Attends Congregational service: Not on file     Active member of club or organization: Not on file     Attends meetings of clubs or organizations: Not on file     Relationship status: Not on file    Intimate partner violence:     Fear of current or ex partner: Not on file     Emotionally abused: Not on file     Physically abused: Not on file     Forced sexual activity: Not on file   Other Topics Concern    Not on file   Social History Narrative    Not on file      No family history on file   Past Surgical History:   Procedure Laterality Date    COLONOSCOPY      IR STROKE ALERT  4/27/2019    MENISCECTOMY Right        Current Outpatient Medications:     Coenzyme Q10 (CO Q 10) 10 MG CAPS, Take by mouth daily, Disp: , Rfl:     cyanocobalamin (VITAMIN B-12) 100 mcg tablet, Take by mouth daily, Disp: , Rfl:     FLUoxetine (PROzac) 20 mg capsule, Take 1 capsule (20 mg total) by mouth daily, Disp: 30 capsule, Rfl: 0    hydrocortisone 1 % cream, Apply topically 2 (two) times a day as needed , Disp: , Rfl:     pantoprazole (PROTONIX) 40 mg tablet, Take 1 tablet (40 mg total) by mouth 2 (two) times a day before meals, Disp: 120 tablet, Rfl: 0    pravastatin (PRAVACHOL) 20 mg tablet, Take 1 tablet (20 mg total) by mouth daily, Disp: 30 tablet, Rfl: 0    tamsulosin (FLOMAX) 0 4 mg, Take 0 4 mg by mouth 2 (two) times a day , Disp: , Rfl:         Review of Systems:  Review of Systems   Respiratory: Negative  Cardiovascular: Negative  All other systems reviewed and are negative  Physical Exam:  Physical Exam   Constitutional: He is oriented to person, place, and time  He appears well-developed and well-nourished  No distress  HENT:   Head: Normocephalic and atraumatic  Eyes: Pupils are equal, round, and reactive to light  EOM are normal  Right eye exhibits no discharge  No scleral icterus  Neck: Normal range of motion  Neck supple  No thyromegaly present  Cardiovascular: Normal rate, regular rhythm and normal heart sounds  Exam reveals no gallop and no friction rub  No murmur heard  Pulmonary/Chest: Effort normal and breath sounds normal    Abdominal: He exhibits no distension  There is no tenderness  There is no rebound and no guarding  Musculoskeletal: Normal range of motion  He exhibits no edema  Neurological: He is alert and oriented to person, place, and time  Coordination normal    Skin: Skin is warm and dry  No rash noted  He is not diaphoretic  No erythema   No shola  Psychiatric: He has a normal mood and affect  His behavior is normal  Judgment and thought content normal        This note was completed in part utilizing M-[employer] Flu[employer] Direct Software  Grammatical errors, random word insertions, spelling mistakes, and incomplete sentences can be an occasional consequence of this system secondary to software limitations, ambient noise, and hardware issues  If you have any questions or concerns about the content, text, or information contained within the body of this dictation, please contact the provider for clarification

## 2020-03-17 ENCOUNTER — IN-CLINIC DEVICE VISIT (OUTPATIENT)
Dept: CARDIOLOGY CLINIC | Facility: CLINIC | Age: 70
End: 2020-03-17

## 2020-03-17 DIAGNOSIS — Z95.818 PRESENCE OF CARDIAC DEVICE: Primary | ICD-10-CM

## 2020-03-17 PROCEDURE — RECHECK: Performed by: INTERNAL MEDICINE

## 2020-03-17 NOTE — PROGRESS NOTES
Results for orders placed or performed in visit on 03/17/20   Cardiac EP device report    Narrative    MELINDA Crespo INTERROGATED IN THE [place] OFFICE TO [address]  NOISE REPRODUCIBLE BY TOUCH AND PT'S MOVEMENT & BREATHING  REVIEWED WITH DR Candelaria Gillespie  DISCUSSION WILL BE MADE WITH DR Ge Mckoy TO DECIDE IF LOOP NEEDS TO BE REPLACED OR AVANDONED  PT'S WIFE QUESTIONS IF DEVICE FOLLOW [employer] NEED TO BE DONE OR IF SHE CAN JUST RETURN THE MONITOR @ HOME?  PL

## 2020-04-06 ENCOUNTER — TELEPHONE (OUTPATIENT)
Dept: UROLOGY | Facility: MEDICAL CENTER | Age: 70
End: 2020-04-06

## 2020-04-06 DIAGNOSIS — R30.0 DYSURIA: Primary | ICD-10-CM

## 2020-04-07 ENCOUNTER — APPOINTMENT (OUTPATIENT)
Dept: LAB | Facility: MEDICAL CENTER | Age: 70
End: 2020-04-07
Payer: MEDICARE

## 2020-04-07 DIAGNOSIS — R30.0 DYSURIA: ICD-10-CM

## 2020-04-07 LAB
BACTERIA UR QL AUTO: ABNORMAL /HPF
BILIRUB UR QL STRIP: NEGATIVE
CAOX CRY URNS QL MICRO: ABNORMAL /HPF
CLARITY UR: ABNORMAL
COLOR UR: YELLOW
GLUCOSE UR STRIP-MCNC: NEGATIVE MG/DL
HGB UR QL STRIP.AUTO: NEGATIVE
KETONES UR STRIP-MCNC: NEGATIVE MG/DL
LEUKOCYTE ESTERASE UR QL STRIP: NEGATIVE
NITRITE UR QL STRIP: NEGATIVE
NON-SQ EPI CELLS URNS QL MICRO: ABNORMAL /HPF
PH UR STRIP.AUTO: 6.5 [PH]
PROT UR STRIP-MCNC: NEGATIVE MG/DL
RBC #/AREA URNS AUTO: ABNORMAL /HPF
SP GR UR STRIP.AUTO: 1.01 (ref 1–1.03)
UROBILINOGEN UR QL STRIP.AUTO: 1 E.U./DL
WBC #/AREA URNS AUTO: ABNORMAL /HPF

## 2020-04-07 PROCEDURE — 81001 URINALYSIS AUTO W/SCOPE: CPT

## 2020-04-07 PROCEDURE — 87086 URINE CULTURE/COLONY COUNT: CPT

## 2020-04-07 PROCEDURE — 87147 CULTURE TYPE IMMUNOLOGIC: CPT

## 2020-04-08 ENCOUNTER — TELEPHONE (OUTPATIENT)
Dept: UROLOGY | Facility: MEDICAL CENTER | Age: 70
End: 2020-04-08

## 2020-04-08 DIAGNOSIS — N39.0 URINARY TRACT INFECTION WITHOUT HEMATURIA, SITE UNSPECIFIED: Primary | ICD-10-CM

## 2020-04-08 RX ORDER — NITROFURANTOIN 25; 75 MG/1; MG/1
100 CAPSULE ORAL 2 TIMES DAILY
Qty: 14 CAPSULE | Refills: 0 | Status: SHIPPED | OUTPATIENT
Start: 2020-04-08 | End: 2020-04-15

## 2020-04-09 LAB — BACTERIA UR CULT: NORMAL

## 2020-04-10 ENCOUNTER — TELEPHONE (OUTPATIENT)
Dept: UROLOGY | Facility: AMBULATORY SURGERY CENTER | Age: 70
End: 2020-04-10

## 2020-04-22 ENCOUNTER — TELEMEDICINE (OUTPATIENT)
Dept: UROLOGY | Facility: AMBULATORY SURGERY CENTER | Age: 70
End: 2020-04-22
Payer: MEDICARE

## 2020-04-22 DIAGNOSIS — N39.0 URINARY TRACT INFECTION WITHOUT HEMATURIA, SITE UNSPECIFIED: Primary | ICD-10-CM

## 2020-04-22 PROCEDURE — 99214 OFFICE O/P EST MOD 30 MIN: CPT | Performed by: NURSE PRACTITIONER

## 2020-04-22 RX ORDER — NITROFURANTOIN 25; 75 MG/1; MG/1
100 CAPSULE ORAL 2 TIMES DAILY
Qty: 10 CAPSULE | Refills: 0 | Status: SHIPPED | OUTPATIENT
Start: 2020-04-22 | End: 2020-04-27

## 2020-04-22 RX ORDER — NITROFURANTOIN MACROCRYSTALS 50 MG/1
50 CAPSULE ORAL DAILY
Qty: 30 CAPSULE | Refills: 1 | Status: SHIPPED | OUTPATIENT
Start: 2020-04-22 | End: 2020-11-03

## 2020-05-05 ENCOUNTER — TELEPHONE (OUTPATIENT)
Dept: UROLOGY | Facility: MEDICAL CENTER | Age: 70
End: 2020-05-05

## 2020-05-14 ENCOUNTER — TELEPHONE (OUTPATIENT)
Dept: UROLOGY | Facility: MEDICAL CENTER | Age: 70
End: 2020-05-14

## 2020-05-14 ENCOUNTER — PROCEDURE VISIT (OUTPATIENT)
Dept: UROLOGY | Facility: MEDICAL CENTER | Age: 70
End: 2020-05-14
Payer: MEDICARE

## 2020-05-14 VITALS — DIASTOLIC BLOOD PRESSURE: 78 MMHG | SYSTOLIC BLOOD PRESSURE: 136 MMHG

## 2020-05-14 DIAGNOSIS — N13.8 BPH WITH OBSTRUCTION/LOWER URINARY TRACT SYMPTOMS: ICD-10-CM

## 2020-05-14 DIAGNOSIS — N40.1 BPH WITH OBSTRUCTION/LOWER URINARY TRACT SYMPTOMS: ICD-10-CM

## 2020-05-14 DIAGNOSIS — N21.0 BLADDER STONE: Primary | ICD-10-CM

## 2020-05-14 DIAGNOSIS — R31.0 GROSS HEMATURIA: ICD-10-CM

## 2020-05-14 DIAGNOSIS — Z87.898 HISTORY OF URINARY RETENTION: ICD-10-CM

## 2020-05-14 DIAGNOSIS — Z87.448 HISTORY OF GROSS HEMATURIA: ICD-10-CM

## 2020-05-14 DIAGNOSIS — N28.1 RENAL CYST: Primary | ICD-10-CM

## 2020-05-14 DIAGNOSIS — Z87.440 HISTORY OF ACUTE CYSTITIS: ICD-10-CM

## 2020-05-14 DIAGNOSIS — R35.1 NOCTURIA: ICD-10-CM

## 2020-05-14 DIAGNOSIS — N31.1 REFLEX NEUROGENIC BLADDER: ICD-10-CM

## 2020-05-14 PROCEDURE — 52000 CYSTOURETHROSCOPY: CPT | Performed by: UROLOGY

## 2020-05-14 PROCEDURE — 99214 OFFICE O/P EST MOD 30 MIN: CPT | Performed by: UROLOGY

## 2020-05-14 RX ORDER — SULFAMETHOXAZOLE AND TRIMETHOPRIM 800; 160 MG/1; MG/1
1 TABLET ORAL EVERY 12 HOURS SCHEDULED
Qty: 4 TABLET | Refills: 0 | Status: SHIPPED | OUTPATIENT
Start: 2020-05-14 | End: 2020-05-14 | Stop reason: CLARIF

## 2020-05-14 RX ORDER — NITROFURANTOIN 25; 75 MG/1; MG/1
100 CAPSULE ORAL 2 TIMES DAILY
Qty: 14 CAPSULE | Refills: 0 | Status: SHIPPED | OUTPATIENT
Start: 2020-05-14 | End: 2020-05-21

## 2020-05-14 RX ORDER — RIVAROXABAN 20 MG/1
20 TABLET, FILM COATED ORAL
COMMUNITY
Start: 2020-04-22

## 2020-05-27 ENCOUNTER — APPOINTMENT (OUTPATIENT)
Dept: LAB | Facility: MEDICAL CENTER | Age: 70
End: 2020-05-27
Payer: MEDICARE

## 2020-05-27 ENCOUNTER — TRANSCRIBE ORDERS (OUTPATIENT)
Dept: ADMINISTRATIVE | Facility: HOSPITAL | Age: 70
End: 2020-05-27

## 2020-05-27 DIAGNOSIS — I48.0 PAROXYSMAL ATRIAL FIBRILLATION (HCC): ICD-10-CM

## 2020-05-27 DIAGNOSIS — E11.9 TYPE 2 DIABETES MELLITUS WITHOUT COMPLICATION, UNSPECIFIED WHETHER LONG TERM INSULIN USE (HCC): Primary | ICD-10-CM

## 2020-05-27 DIAGNOSIS — N28.1 RENAL CYST: ICD-10-CM

## 2020-05-27 DIAGNOSIS — E11.9 TYPE 2 DIABETES MELLITUS WITHOUT COMPLICATION, UNSPECIFIED WHETHER LONG TERM INSULIN USE (HCC): ICD-10-CM

## 2020-05-27 DIAGNOSIS — R31.0 GROSS HEMATURIA: ICD-10-CM

## 2020-05-27 LAB
ALBUMIN SERPL BCP-MCNC: 3.4 G/DL (ref 3.5–5)
ALP SERPL-CCNC: 106 U/L (ref 46–116)
ALT SERPL W P-5'-P-CCNC: 16 U/L (ref 12–78)
ANION GAP SERPL CALCULATED.3IONS-SCNC: 2 MMOL/L (ref 4–13)
AST SERPL W P-5'-P-CCNC: 9 U/L (ref 5–45)
BACTERIA UR QL AUTO: ABNORMAL /HPF
BASOPHILS # BLD AUTO: 0.02 THOUSANDS/ΜL (ref 0–0.1)
BASOPHILS NFR BLD AUTO: 0 % (ref 0–1)
BILIRUB SERPL-MCNC: 0.59 MG/DL (ref 0.2–1)
BILIRUB UR QL STRIP: NEGATIVE
BUN SERPL-MCNC: 15 MG/DL (ref 5–25)
CALCIUM SERPL-MCNC: 9.4 MG/DL (ref 8.3–10.1)
CAOX CRY URNS QL MICRO: ABNORMAL /HPF
CHLORIDE SERPL-SCNC: 108 MMOL/L (ref 100–108)
CLARITY UR: CLEAR
CO2 SERPL-SCNC: 28 MMOL/L (ref 21–32)
COLOR UR: ABNORMAL
CREAT SERPL-MCNC: 0.98 MG/DL (ref 0.6–1.3)
EOSINOPHIL # BLD AUTO: 0.45 THOUSAND/ΜL (ref 0–0.61)
EOSINOPHIL NFR BLD AUTO: 9 % (ref 0–6)
ERYTHROCYTE [DISTWIDTH] IN BLOOD BY AUTOMATED COUNT: 13.2 % (ref 11.6–15.1)
GFR SERPL CREATININE-BSD FRML MDRD: 78 ML/MIN/1.73SQ M
GLUCOSE SERPL-MCNC: 98 MG/DL (ref 65–140)
GLUCOSE UR STRIP-MCNC: NEGATIVE MG/DL
HCT VFR BLD AUTO: 41.2 % (ref 36.5–49.3)
HGB BLD-MCNC: 13.2 G/DL (ref 12–17)
HGB UR QL STRIP.AUTO: NEGATIVE
IMM GRANULOCYTES # BLD AUTO: 0.01 THOUSAND/UL (ref 0–0.2)
IMM GRANULOCYTES NFR BLD AUTO: 0 % (ref 0–2)
KETONES UR STRIP-MCNC: NEGATIVE MG/DL
LEUKOCYTE ESTERASE UR QL STRIP: NEGATIVE
LYMPHOCYTES # BLD AUTO: 1.36 THOUSANDS/ΜL (ref 0.6–4.47)
LYMPHOCYTES NFR BLD AUTO: 28 % (ref 14–44)
MCH RBC QN AUTO: 30.3 PG (ref 26.8–34.3)
MCHC RBC AUTO-ENTMCNC: 32 G/DL (ref 31.4–37.4)
MCV RBC AUTO: 95 FL (ref 82–98)
MONOCYTES # BLD AUTO: 0.38 THOUSAND/ΜL (ref 0.17–1.22)
MONOCYTES NFR BLD AUTO: 8 % (ref 4–12)
NEUTROPHILS # BLD AUTO: 2.68 THOUSANDS/ΜL (ref 1.85–7.62)
NEUTS SEG NFR BLD AUTO: 55 % (ref 43–75)
NITRITE UR QL STRIP: NEGATIVE
NON-SQ EPI CELLS URNS QL MICRO: ABNORMAL /HPF
NRBC BLD AUTO-RTO: 0 /100 WBCS
PH UR STRIP.AUTO: 6 [PH]
PLATELET # BLD AUTO: 156 THOUSANDS/UL (ref 149–390)
PMV BLD AUTO: 10 FL (ref 8.9–12.7)
POTASSIUM SERPL-SCNC: 3.7 MMOL/L (ref 3.5–5.3)
PROT SERPL-MCNC: 6.9 G/DL (ref 6.4–8.2)
PROT UR STRIP-MCNC: NEGATIVE MG/DL
RBC # BLD AUTO: 4.36 MILLION/UL (ref 3.88–5.62)
RBC #/AREA URNS AUTO: ABNORMAL /HPF
SODIUM SERPL-SCNC: 138 MMOL/L (ref 136–145)
SP GR UR STRIP.AUTO: 1.02 (ref 1–1.03)
UROBILINOGEN UR QL STRIP.AUTO: 1 E.U./DL
WBC # BLD AUTO: 4.9 THOUSAND/UL (ref 4.31–10.16)
WBC #/AREA URNS AUTO: ABNORMAL /HPF

## 2020-05-27 PROCEDURE — 80053 COMPREHEN METABOLIC PANEL: CPT

## 2020-05-27 PROCEDURE — 36415 COLL VENOUS BLD VENIPUNCTURE: CPT

## 2020-05-27 PROCEDURE — 87086 URINE CULTURE/COLONY COUNT: CPT

## 2020-05-27 PROCEDURE — 81001 URINALYSIS AUTO W/SCOPE: CPT | Performed by: FAMILY MEDICINE

## 2020-05-27 PROCEDURE — 85025 COMPLETE CBC W/AUTO DIFF WBC: CPT

## 2020-05-28 LAB — BACTERIA UR CULT: NORMAL

## 2020-06-09 ENCOUNTER — TELEPHONE (OUTPATIENT)
Dept: CARDIOLOGY CLINIC | Facility: CLINIC | Age: 70
End: 2020-06-09

## 2020-06-15 ENCOUNTER — OFFICE VISIT (OUTPATIENT)
Dept: CARDIOLOGY CLINIC | Facility: CLINIC | Age: 70
End: 2020-06-15
Payer: MEDICARE

## 2020-06-15 VITALS
HEART RATE: 64 BPM | RESPIRATION RATE: 16 BRPM | WEIGHT: 198 LBS | BODY MASS INDEX: 26.82 KG/M2 | DIASTOLIC BLOOD PRESSURE: 78 MMHG | HEIGHT: 72 IN | SYSTOLIC BLOOD PRESSURE: 110 MMHG | TEMPERATURE: 98.3 F

## 2020-06-15 DIAGNOSIS — I48.0 PAROXYSMAL ATRIAL FIBRILLATION (HCC): ICD-10-CM

## 2020-06-15 DIAGNOSIS — I10 ESSENTIAL HYPERTENSION: Primary | ICD-10-CM

## 2020-06-15 DIAGNOSIS — I50.32 CHRONIC DIASTOLIC CHF (CONGESTIVE HEART FAILURE) (HCC): ICD-10-CM

## 2020-06-15 DIAGNOSIS — I63.411 CEREBROVASCULAR ACCIDENT (CVA) DUE TO EMBOLISM OF RIGHT MIDDLE CEREBRAL ARTERY (HCC): ICD-10-CM

## 2020-06-15 PROCEDURE — 99214 OFFICE O/P EST MOD 30 MIN: CPT | Performed by: INTERNAL MEDICINE

## 2020-06-15 RX ORDER — FINASTERIDE 5 MG/1
5 TABLET, FILM COATED ORAL
COMMUNITY

## 2020-06-15 RX ORDER — TAMSULOSIN HYDROCHLORIDE 0.4 MG/1
0.4 CAPSULE ORAL
COMMUNITY
End: 2022-02-03 | Stop reason: HOSPADM

## 2020-06-15 NOTE — H&P (VIEW-ONLY)
Cardiology   Marilynn Abbey, MD Pamala Fallen, MD Ather Mansoor, MD Darryll Mcardle, DO, Christina Brandon DO, [facility]  -------------------------------------------------------------------  [facility]  [address] [place], [place] [address]  [place]  [address] [facility]                   1950                   [id number]          Assessment/Plan:    1  Paroxysmal atrial fibrillation, loop recorder in place  2  Right ROB/MCA territory embolic CVA 66/9479--[id number] aphasia  3  Syncope 1/2020, suspect vasovagal  4  Chronic hypotension    · Loop recorder not functioning well, with significant amounts of noise  It has been disconnected, and will be explanted in the near future  Dr Merly Lee and I have touched base with Dr Michelle Cortez for his approval to d/c the loop recorder and await his response  I do not think there is significant benefit and reimplantation, as we do risk recurrent noise, and this will likely not change this patient's management, as atrial fibrillation has been established any needs chronic anticoagulation anyway  · Home blood pressure readings reviewed, very well controlled  Keep off AV node blocking agents and antihypertensives for now  May consider restarting metoprolol if recurrent atrial fibrillation or consider amiodarone as well  · Continue pravastatin      Follow-up in 6 months      Interval History:      This is a 51-year-old male admitted to [facility] 04/2019 with acute CVA, and subsequent event right MCA and ROB thrombectomy and tPA infusion   He residual aphasia and dysphasia   Subsequent ALYX revealed no atrial septal interruption, no evidence of thrombus   Subsequently, he underwent loop recorder placement   He then presents to the hospital 05/13/2019 after a routine ECG revealed rapid atrial fibrillation   Loop recorder interrogation had revealed new onset atrial fibrillation ongoing since 05/11/2019  Chey Ortiz was then initiated on Eliquis and metoprolol, and a rate control strategy was pursued      After discharge, he has started rehabilitation   His wife called 09/03/2019, stating that he was quite lethargic with low blood pressure readings   He went to the ER, where metoprolol was decreased to 12 5 mg b i d  [place] he felt better, he still continued to have lethargy, after which metoprolol was completed held 09/04/2019      During a prior visit 09/06/2019, at which time metoprolol was restarted at a low dose secondary to his paroxysmal atrial fibrillation   Amiodarone was considered and he was evaluated by Dr Asmita Chavez 10/25/2019, and was deemed that his device was over estimating the burden of atrial fibrillation and he mostly had sinus rhythm with PACs   Low-dose metoprolol at 12 5 mg b i d  Was continued      He was hospitalized 01/2020 after he discontinue metoprolol for about 3 weeks, and early 730 in the morning transferred into the shower and was getting washed he became lightheaded and dizzy  Chey Ortiz had an episode of unresponsiveness was head rolled forward, lasting 1-5 minutes   Review of loop recorder showed to atrial fibrillation events and 1 tachycardia event at 353 beats per minute   Review of rhythm strips however revealed only noise artifact   There was no definitive atrial fibrillation, no evidence of pauses or significant tachycardia or bradycardia associated with his episodes   He had a 4 beat run of nonsustained ventricular tachycardia on telemetry  The patient was diagnosed with vasovagal syncope and discharged      He underwent a 14 day o Holter monitor revealing no evidence of atrial fibrillation in several short runs of SVT up to 11 beats and short nonsustained ventricular tachycardia  He presents today for follow-up with no complaints    He is feeling very well from a cardiovascular symptomatic standpoint without chest pain, shortness of breath, dizziness, palpitations  He was lower extremity compression stockings and his edema has been very well controlled  He is ambulating with assistance these days        Vitals:  Vitals:    06/15/20 1117   BP: 110/78   Pulse: 64   Resp: 16   Temp: 98 3 °F (36 8 °C)   Weight: 89 8 kg (198 lb)   Height: 6' (1 829 m)         Past Medical History:   Diagnosis Date    Arthritis     BL knees    Atrial fibrillation (HCC)     CHF (congestive heart failure) (HCC)     Colon polyp     CVA (cerebral vascular accident) ([facility] ) 4/27/2019    NIHSS 26 on presentation    Depression     Diabetes mellitus ([facility] )     Hypertension     Irregular heart beat     Afib    Stroke ([place] [address] )     Urinary retention      Social History     Socioeconomic History    Marital status: /Civil Union     Spouse name: Not on file    Number of children: Not on file    Years of education: Not on file    Highest education level: Not on file   Occupational History    Not on file   Social Needs    Financial resource strain: Not on file    Food insecurity:     Worry: Not on file     Inability: Not on file    Transportation needs:     Medical: Not on file     Non-medical: Not on file   Tobacco Use    Smoking status: Never Smoker    Smokeless tobacco: Never Used   Substance and Sexual Activity    Alcohol use: Not Currently     Frequency: 2-4 times a month     Comment: social    Drug use: Never    Sexual activity: Not on file   Lifestyle    Physical activity:     Days per week: Not on file     Minutes per session: Not on file    Stress: Not on file   Relationships    Social connections:     Talks on phone: Not on file     Gets together: Not on file     Attends Islam service: Not on file     Active member of club or organization: Not on file     Attends meetings of clubs or organizations: Not on file     Relationship status: Not on file    Intimate partner violence:     Fear of current or ex partner: Not on file     Emotionally abused: Not on file Physically abused: Not on file     Forced sexual activity: Not on file   Other Topics Concern    Not on file   Social History Narrative    Not on file      No family history on file  Past Surgical History:   Procedure Laterality Date    COLONOSCOPY      IR STROKE ALERT  4/27/2019    MENISCECTOMY Right        Current Outpatient Medications:     Coenzyme Q10 (CO Q 10) 10 MG CAPS, Take by mouth daily, Disp: , Rfl:     cyanocobalamin (VITAMIN B-12) 100 mcg tablet, Take by mouth daily, Disp: , Rfl:     finasteride (PROSCAR) 5 mg tablet, Take 5 mg by mouth daily, Disp: , Rfl:     FLUoxetine (PROzac) 20 mg capsule, Take 1 capsule (20 mg total) by mouth daily, Disp: 30 capsule, Rfl: 0    hydrocortisone 1 % cream, Apply topically 2 (two) times a day as needed , Disp: , Rfl:     nitrofurantoin (MACRODANTIN) 50 mg capsule, Take 1 capsule (50 mg total) by mouth daily (Patient taking differently: Take 100 mg by mouth daily ), Disp: 30 capsule, Rfl: 1    pantoprazole (PROTONIX) 40 mg tablet, Take 1 tablet (40 mg total) by mouth 2 (two) times a day before meals, Disp: 120 tablet, Rfl: 0    pravastatin (PRAVACHOL) 20 mg tablet, Take 1 tablet (20 mg total) by mouth daily, Disp: 30 tablet, Rfl: 0    rivaroxaban (Xarelto) 20 mg tablet, , Disp: , Rfl:     tamsulosin (FLOMAX) 0 4 mg, Take 0 4 mg by mouth daily with dinner, Disp: , Rfl:         Review of Systems:  Review of Systems   Respiratory: Negative  Cardiovascular: Negative  All other systems reviewed and are negative  Physical Exam:  Physical Exam   Constitutional: He is oriented to person, place, and time  He appears well-developed and well-nourished  No distress  HENT:   Head: Normocephalic and atraumatic  Eyes: Pupils are equal, round, and reactive to light  EOM are normal  Right eye exhibits no discharge  No scleral icterus  Neck: Normal range of motion  Neck supple  No thyromegaly present     Cardiovascular: Normal rate, regular rhythm and normal heart sounds  Exam reveals no gallop and no friction rub  No murmur heard  Pulmonary/Chest: Effort normal and breath sounds normal    Abdominal: He exhibits no distension  There is no tenderness  There is no rebound and no guarding  Musculoskeletal: Normal range of motion  He exhibits no edema  Very mild lower extremity and sacral edema   Neurological: He is alert and oriented to person, place, and time  Coordination normal    Skin: Skin is warm and dry  No rash noted  He is not diaphoretic  No erythema  No pallor  Psychiatric: He has a normal mood and affect  His behavior is normal  Judgment and thought content normal        This note was completed in part utilizing [employer] Direct Software  Grammatical errors, random word insertions, spelling mistakes, and incomplete sentences can be an occasional consequence of this system secondary to software limitations, ambient noise, and hardware issues  If you have any questions or concerns about the content, text, or information contained within the body of this dictation, please contact the provider for clarification

## 2020-06-18 ENCOUNTER — TELEPHONE (OUTPATIENT)
Dept: CARDIOLOGY CLINIC | Facility: CLINIC | Age: 70
End: 2020-06-18

## 2020-07-09 ENCOUNTER — TELEPHONE (OUTPATIENT)
Dept: INPATIENT UNIT | Facility: HOSPITAL | Age: 70
End: 2020-07-09

## 2020-07-10 ENCOUNTER — HOSPITAL ENCOUNTER (OUTPATIENT)
Dept: NON INVASIVE DIAGNOSTICS | Facility: HOSPITAL | Age: 70
Discharge: HOME | End: 2020-07-10
Attending: INTERNAL MEDICINE | Admitting: INTERNAL MEDICINE
Payer: MEDICARE

## 2020-07-10 VITALS
WEIGHT: 195 LBS | HEART RATE: 58 BPM | OXYGEN SATURATION: 98 % | HEIGHT: 72 IN | SYSTOLIC BLOOD PRESSURE: 126 MMHG | RESPIRATION RATE: 17 BRPM | DIASTOLIC BLOOD PRESSURE: 64 MMHG | TEMPERATURE: 97.7 F | BODY MASS INDEX: 26.41 KG/M2

## 2020-07-10 DIAGNOSIS — I10 HTN (HYPERTENSION): ICD-10-CM

## 2020-07-10 PROCEDURE — 33286 RMVL SUBQ CAR RHYTHM MNTR: CPT

## 2020-07-10 PROCEDURE — 33286 RMVL SUBQ CAR RHYTHM MNTR: CPT | Performed by: INTERNAL MEDICINE

## 2020-07-10 RX ORDER — LIDOCAINE HYDROCHLORIDE 10 MG/ML
INJECTION, SOLUTION EPIDURAL; INFILTRATION; INTRACAUDAL; PERINEURAL CODE/TRAUMA/SEDATION MEDICATION
Status: COMPLETED | OUTPATIENT
Start: 2020-07-10 | End: 2020-07-10

## 2020-07-10 RX ADMIN — LIDOCAINE HYDROCHLORIDE 5 ML: 10 INJECTION, SOLUTION EPIDURAL; INFILTRATION; INTRACAUDAL; PERINEURAL at 08:19

## 2020-07-13 ENCOUNTER — TELEPHONE (OUTPATIENT)
Dept: CARDIOLOGY CLINIC | Facility: CLINIC | Age: 70
End: 2020-07-13

## 2020-07-13 NOTE — TELEPHONE ENCOUNTER
Patient had loop recorder extracted on 7/10 and now he had "an episod of almost fainting, where we had to lay him down "  He was just stepping into the shower but water was not on yet  He was diaphoretic and has been very tired the past two days  Wife admits that he did not drink much yesterday at all  His BP's today were concerning to his wife that they are "fluctuating too much", but they were 110/66 when he got up today, 126/73, 121/72,  147/78,  but after the fainting spell, he was 102/60  She knows this is the vasovagal syndrome Dr Harish Ha explained about, but she was wondering if the loop extract caused it?    I told her most likely not  She will call if this type of thing happens again  She said he has not had this since Dec and thought it was too much of a coincidence that he just had it removed and this happened  No need to call back, they will monitor

## 2020-07-13 NOTE — TELEPHONE ENCOUNTER
Agree, doubt due to loop extraction unless he was having acute pain at the incision site or something  No changes in recommendations  Continue to encouraged fluid intake  Thank you      RP

## 2020-07-24 ENCOUNTER — IN-CLINIC DEVICE VISIT (OUTPATIENT)
Dept: CARDIOLOGY CLINIC | Facility: CLINIC | Age: 70
End: 2020-07-24
Payer: MEDICARE

## 2020-07-24 DIAGNOSIS — Z95.818 PRESENCE OF CARDIAC DEVICE: Primary | ICD-10-CM

## 2020-07-24 PROCEDURE — 99211 OFF/OP EST MAY X REQ PHY/QHP: CPT

## 2020-07-24 NOTE — PROGRESS NOTES
Results for orders placed or performed in visit on 07/24/20   Cardiac EP device report    Narrative    MDT LOOP RECORDER - EXPLANTED  7/10/2020  WOUND CHECK (EXPLANT LOOP MONITOR): INCISION CLEAN AND DRY WITH EDGES APPROXIMATED; SUTURES REMOVED; WOUND CARE AND RESTRICTIONS REVIEWED WITH PATIENT  NO FURTHER F/U AT THIS TIME  PATIENT DISCHARGED FROM [facility]     EB

## 2020-07-27 ENCOUNTER — TELEPHONE (OUTPATIENT)
Dept: NEUROLOGY | Facility: CLINIC | Age: 70
End: 2020-07-27

## 2020-07-27 NOTE — TELEPHONE ENCOUNTER
1st attempt to reach patient to inform him of location change with Viviane Saavedra to [facility]  Patient confirmed

## 2020-08-24 ENCOUNTER — OFFICE VISIT (OUTPATIENT)
Dept: NEUROLOGY | Facility: CLINIC | Age: 70
End: 2020-08-24
Payer: MEDICARE

## 2020-08-24 VITALS
BODY MASS INDEX: 26.45 KG/M2 | HEART RATE: 60 BPM | DIASTOLIC BLOOD PRESSURE: 70 MMHG | SYSTOLIC BLOOD PRESSURE: 118 MMHG | TEMPERATURE: 97.4 F | HEIGHT: 72 IN

## 2020-08-24 DIAGNOSIS — R47.01 APHASIA DUE TO ACUTE STROKE (HCC): ICD-10-CM

## 2020-08-24 DIAGNOSIS — R73.03 PREDIABETES: ICD-10-CM

## 2020-08-24 DIAGNOSIS — E78.00 PURE HYPERCHOLESTEROLEMIA: ICD-10-CM

## 2020-08-24 DIAGNOSIS — G40.909 NONINTRACTABLE EPILEPSY WITHOUT STATUS EPILEPTICUS, UNSPECIFIED EPILEPSY TYPE (HCC): ICD-10-CM

## 2020-08-24 DIAGNOSIS — I48.0 PAROXYSMAL A-FIB (HCC): ICD-10-CM

## 2020-08-24 DIAGNOSIS — I63.9 APHASIA DUE TO ACUTE STROKE (HCC): ICD-10-CM

## 2020-08-24 DIAGNOSIS — Z86.73 HISTORY OF STROKE: Primary | ICD-10-CM

## 2020-08-24 DIAGNOSIS — I65.23 ASYMPTOMATIC BILATERAL CAROTID ARTERY STENOSIS: ICD-10-CM

## 2020-08-24 DIAGNOSIS — I10 ESSENTIAL HYPERTENSION: ICD-10-CM

## 2020-08-24 DIAGNOSIS — G81.94 LEFT HEMIPLEGIA (HCC): ICD-10-CM

## 2020-08-24 PROBLEM — R56.9 SEIZURE AS LATE EFFECT OF CEREBROVASCULAR ACCIDENT (CVA) (HCC): Status: RESOLVED | Noted: 2019-12-26 | Resolved: 2020-08-24

## 2020-08-24 PROBLEM — I69.398 SEIZURE AS LATE EFFECT OF CEREBROVASCULAR ACCIDENT (CVA) (HCC): Status: RESOLVED | Noted: 2019-12-26 | Resolved: 2020-08-24

## 2020-08-24 PROCEDURE — 99215 OFFICE O/P EST HI 40 MIN: CPT | Performed by: PSYCHIATRY & NEUROLOGY

## 2020-08-24 NOTE — PATIENT INSTRUCTIONS
Stroke: Annabel Rivera presents for a follow-up evaluation with regard to his prior stroke  He reports no new symptoms concerning for recurrent TIA or stroke  He is taking his medication as prescribed  He continues to receive therapy services  He has not had any recent falls and does not endorse any bleeding or bruising complications  He is having difficulty with sleeping as result of nocturia related to prostatic hypertrophy   -for ongoing stroke prevention should continue his current combination of Xarelto, pravastatin, and appropriate blood pressure and glycemic control   -I reviewed his blood pressure log in the office today and it appears that his pressures in an excellent range  He should continue to monitor his pressure and to ensure that he is well hydrated, particularly while working with therapy   -I will defer to the good judgment of his primary care team for monitoring of his cholesterol panel and blood sugar numbers but his most recent labs from this past December are in a good range   -he does have mild bilateral carotid artery stenosis which was detected at the time of his last CT angiogram   It would be reasonable to evaluate this 1 year from that prior study which would be December of 2020  I will provide him with that prescription  We will monitor this with a carotid Doppler ultrasound   -from my standpoint he should continue to receive therapy services and is clearly receiving significant benefit  I agree with deferring Botox for his left upper extremity for the time being however if he has the emergence of pain, difficulty with cleaning/manipulating the arm, or other significant symptoms in the left upper extremity Botox would be a reasonable consideration in the future  -from a neurologic standpoint he would be cleared to hold Xarelto if he were to need prostate surgery  -at this point he has not had any breakthrough seizure events    We will continue to monitor him clinically without antiseizure medication  I will plan for him to return to the office in 6 months time but I would be happy to see him sooner if the need should arise  If he has any symptoms concerning for TIA or stroke including sudden painless loss of vision or double vision, difficulty speaking or swallowing, vertigo/room spinning that does not quickly resolve, or weakness/numbness affecting 1 side of the face or body which is new he should proceed by ambulance to the nearest emergency room immediately  If he were to fall and strike his head and have any residual symptoms or to developed a sudden extremely severe very unusual headache he should also be seen at the nearest emergency room

## 2020-08-24 NOTE — PROGRESS NOTES
Patient ID: Cecily Vargas is a 79 y o  male  Assessment/Plan:    No problem-specific Assessment & Plan notes found for this encounter  Diagnoses and all orders for this visit:    History of stroke    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 [facility])    Essential hypertension    Paroxysmal A-fib (HCC)    Left hemiplegia ([facility] )    Nonintractable epilepsy without status epilepticus, unspecified epilepsy type ([facility] )    Prediabetes    Pure hypercholesterolemia    Asymptomatic bilateral carotid artery stenosis  -     VAS carotid complete study; Future       Patient Instructions   Stroke: Luis Arana presents for a follow-up evaluation with regard to his prior stroke  He reports no new symptoms concerning for recurrent TIA or stroke  He is taking his medication as prescribed  He continues to receive therapy services  He has not had any recent falls and does not endorse any bleeding or bruising complications  He is having difficulty with sleeping as result of nocturia related to prostatic hypertrophy   -for ongoing stroke prevention should continue his current combination of Xarelto, pravastatin, and appropriate blood pressure and glycemic control   -I reviewed his blood pressure log in the office today and it appears that his pressures in an excellent range  He should continue to monitor his pressure and to ensure that he is well hydrated, particularly while working with therapy   -I will defer to the good judgment of his primary care team for monitoring of his cholesterol panel and blood sugar numbers but his most recent labs from this past December are in a good range   -he does have mild bilateral carotid artery stenosis which was detected at the time of his last CT angiogram   It would be reasonable to evaluate this 1 year from that prior study which would be December of 2020  I will provide him with that prescription    We will monitor this with a carotid Doppler ultrasound   -from my standpoint he should continue to receive therapy services and is clearly receiving significant benefit  I agree with deferring Botox for his left upper extremity for the time being however if he has the emergence of pain, difficulty with cleaning/manipulating the arm, or other significant symptoms in the left upper extremity Botox would be a reasonable consideration in the future  -from a neurologic standpoint he would be cleared to hold Xarelto if he were to need prostate surgery  -at this point he has not had any breakthrough seizure events  We will continue to monitor him clinically without antiseizure medication  I will plan for him to return to the office in 6 months time but I would be happy to see him sooner if the need should arise  If he has any symptoms concerning for TIA or stroke including sudden painless loss of vision or double vision, difficulty speaking or swallowing, vertigo/room spinning that does not quickly resolve, or weakness/numbness affecting 1 side of the face or body which is new he should proceed by ambulance to the nearest emergency room immediately  If he were to fall and strike his head and have any residual symptoms or to developed a sudden extremely severe very unusual headache he should also be seen at the nearest emergency room  Subjective:    SOLOMON Yang presents for a follow-up evaluation with regard to his prior stroke  He reports no new events concerning for recurrent TIA or stroke  He takes his medication as prescribed  He reports some easy bruising  He has not had any recent falls or recurrent syncopal events  He has not had episodes of breakthrough seizure  He continues to receive therapy services and is now ambulating with supervision rather than requiring assistance occasionally at therapy, but not at home yet  He does have a brace for his left leg  He presents today in a wheelchair      I reviewed his blood pressure log in the office today and it shows that his blood pressure is in a good range   They report that his aphasia is improved  He denies any pain in his left upper extremity  He has significant difficulty with sleeping as result of frequent nocturia and has been diagnosed with prostatic hypertrophy  He reports that his mood is okay, he describes it as antsy pantsy" and ultimately he seems to be most concerned with being engaged with trying to recover 100% or as much as possible  I reviewed the video of him ambulating  He does have mild bilateral carotid artery stenosis  I reviewed his most recent labs and his cholesterol panel and blood sugar numbers are in the desired range              Past Medical History:   Diagnosis Date    Arthritis     BL knees    Atrial fibrillation (HCC)     CHF (congestive heart failure) (HCC)     Colon polyp     CV[facility]cere[facility]) ([facility] 4/27/2019    NIHSS 26 on presentation    Depression     Diabetes mellitus [facility]     Hypertension     Irregular heart beat     Afib    Stroke [facility])     Urinary retention        Past Surgical History:   Procedure Laterality Date    COLONOSCOPY      IR STROKE ALERT  4/27/2019    MENISCECTOMY Right        Social History     Socioeconomic History    Marital status: /Civil Union     Spouse name: None    Number of children: None    Years of education: None    Highest education level: None   Occupational History    None   Social Needs    Financial resource strain: None    Food insecurity     Worry: None     Inability: None    Transportation needs     Medical: None     Non-medical: None   Tobacco Use    Smoking status: Never Smoker    Smokeless tobacco: Never Used   Substance and Sexual Activity    Alcohol use: Not Currently     Frequency: 2-4 times a month     Comment: social    Drug use: Never    Sexual activity: None   Lifestyle    Physical activity     Days per week: None     Minutes per session: None    Stress: None   Relationships    Social connections     Talks on phone: None     Gets together: None     Attends Latter-day service: None     Active member of club or organization: None     Attends meetings of clubs or organizations: None     Relationship status: None    Intimate partner violence     Fear of current or ex partner: None     Emotionally abused: None     Physically abused: None     Forced sexual activity: None   Other Topics Concern    None   Social History Narrative    None       History reviewed  No pertinent family history  Current Outpatient Medications:     Coenzyme Q10 (CO Q 10) 10 MG CAPS, Take by mouth daily, Disp: , Rfl:     cyanocobalamin (VITAMIN B-12) 100 mcg tablet, Take by mouth daily, Disp: , Rfl:     finasteride (PROSCAR) 5 mg tablet, Take 5 mg by mouth daily, Disp: , Rfl:     hydrocortisone 1 % cream, Apply topically 2 (two) times a day as needed , Disp: , Rfl:     nitrofurantoin (MACRODANTIN) 50 mg capsule, Take 1 capsule (50 mg total) by mouth daily (Patient taking differently: Take 100 mg by mouth daily ), Disp: 30 capsule, Rfl: 1    pantoprazole (PROTONIX) 40 mg tablet, Take 1 tablet (40 mg total) by mouth 2 (two) times a day before meals, Disp: 120 tablet, Rfl: 0    pravastatin (PRAVACHOL) 20 mg tablet, Take 1 tablet (20 mg total) by mouth daily, Disp: 30 tablet, Rfl: 0    rivaroxaban (Xarelto) 20 mg tablet, , Disp: , Rfl:     tamsulosin (FLOMAX) 0 4 mg, Take 0 4 mg by mouth daily with dinner 2 tabs a day, Disp: , Rfl:     FLUoxetine (PROzac) 20 mg capsule, Take 1 capsule (20 mg total) by mouth daily, Disp: 30 capsule, Rfl: 0    Allergies   Allergen Reactions    Cephalexin Throat Swelling     Pt reported throat swelling after taking antibiotic     Penicillins Other (See Comments)        Blood pressure 118/70, pulse 60, temperature (!) 97 4 °F (36 3 °C), height 6' (1 829 m)       The following portions of the patient's history were reviewed: allergies, current medications, past family history, past medical history, past surgical history, past social history and problem list        Objective:    Blood pressure 118/70, pulse 60, temperature (!) 97 4 °F (36 3 °C), height 6' (1 829 m)  Physical Exam    Neurological Exam    At the time of my evaluation he was awake, alert, and in no distress  He continues to have mild-to-moderate dysarthria and expressive aphasia  He was able to answer questions reasonably quickly and his answers were generally contextual E appropriate although he clearly had difficulty with expressing any kind of complex thoughts  Cranial nerves 2-12 were symmetric bilaterally  Strength was 5/5 in the right upper lower extremity  There was spastic weakness of the left upper and lower extremity  He was able to rise with minimal assistance from his wheelchair and then required a mod assist of 1 while ambulating a short distance  He has significant spasticity of the left leg with his forward step assisted by elevation of the hip  His knee remains locked in the has a heel strike in front of him  Ultimately this places some stress on his knee but at this time he did not endorse any specific knee pain  His reflexes are spastic/exaggerated in the left upper lower extremity compared with the right  Sensation is diminished to temperature in the left upper and probably left lower extremity compared with the right but vibration sense seems to be more symmetrically intact  His respirations were nonlabored  His abdomen was nondistended  There is no significant peripheral edema  ROS:    Review of Systems   Constitutional: Negative  Negative for appetite change and fever  HENT: Negative  Negative for hearing loss, tinnitus, trouble swallowing and voice change  Eyes: Negative  Negative for photophobia and pain  Respiratory: Negative  Negative for shortness of breath  Cardiovascular: Negative  Negative for palpitations  Gastrointestinal: Negative  Negative for nausea and vomiting  Endocrine: Negative  Negative for cold intolerance  Genitourinary: Positive for frequency (during the night is the worst)  Negative for dysuria and urgency  Musculoskeletal: Positive for gait problem  Negative for myalgias and neck pain  Skin: Negative  Negative for rash  Allergic/Immunologic: Negative  Neurological: Positive for speech difficulty (working with speech therapy and has improved tremendously) and weakness (Left side)  Negative for dizziness, tremors, seizures, syncope, facial asymmetry, light-headedness, numbness and headaches  Hematological: Bruises/bleeds easily  Psychiatric/Behavioral: Negative  Negative for confusion, hallucinations and sleep disturbance  All other systems reviewed and are negative  Reviewed ROS as entered by medical assistant

## 2020-11-02 DIAGNOSIS — N39.0 URINARY TRACT INFECTION WITHOUT HEMATURIA, SITE UNSPECIFIED: ICD-10-CM

## 2020-11-03 RX ORDER — NITROFURANTOIN MACROCRYSTALS 50 MG/1
100 CAPSULE ORAL DAILY
Qty: 60 CAPSULE | Refills: 4 | Status: SHIPPED | OUTPATIENT
Start: 2020-11-03 | End: 2021-10-12

## 2020-11-27 ENCOUNTER — TELEPHONE (OUTPATIENT)
Dept: NEUROLOGY | Facility: CLINIC | Age: 70
End: 2020-11-27

## 2020-12-01 ENCOUNTER — TELEPHONE (OUTPATIENT)
Dept: NEUROLOGY | Facility: CLINIC | Age: 70
End: 2020-12-01

## 2020-12-28 ENCOUNTER — TELEPHONE (OUTPATIENT)
Dept: NEUROLOGY | Facility: CLINIC | Age: 70
End: 2020-12-28

## 2021-01-25 ENCOUNTER — OFFICE VISIT (OUTPATIENT)
Dept: CARDIOLOGY CLINIC | Facility: CLINIC | Age: 71
End: 2021-01-25
Payer: MEDICARE

## 2021-01-25 VITALS
HEART RATE: 60 BPM | HEIGHT: 72 IN | SYSTOLIC BLOOD PRESSURE: 108 MMHG | BODY MASS INDEX: 28.31 KG/M2 | DIASTOLIC BLOOD PRESSURE: 64 MMHG | TEMPERATURE: 97.3 F | WEIGHT: 209 LBS

## 2021-01-25 DIAGNOSIS — I48.0 PAROXYSMAL ATRIAL FIBRILLATION (HCC): Primary | ICD-10-CM

## 2021-01-25 PROCEDURE — 99214 OFFICE O/P EST MOD 30 MIN: CPT | Performed by: INTERNAL MEDICINE

## 2021-01-25 PROCEDURE — 93000 ELECTROCARDIOGRAM COMPLETE: CPT | Performed by: INTERNAL MEDICINE

## 2021-01-25 NOTE — PROGRESS NOTES
Cardiology   Basil Brazier, MD Lavetta Opitz, MD Ather Mansoor, MD Keri Nimrod, DO, Muriel Borrero DO, [facility] [place]  -------------------------------------------------------------------  [facility]  [address], [place] [address]  [place]  [address] [place]                     1950                     [id number]          Assessment/Plan:    1  Paroxysmal atrial fibrillation  2  Right ROB/MCA territory embolic CVA 14/0221--[id number] aphasia  3  Syncope 1/2020, suspect vasovagal  4  Chronic hypotension      · Sinus rhythm and PACs on ECG today, otherwise normal   Continue Xarelto anticoagulation which is tolerating well  Okay to remain off AV node blocking agents in light of marginally low blood pressures  · Home blood pressure log reviewed, the most part well controlled with systolic readings in the low 100s to 120s  Can consider using midodrine in the future if blood pressure remains on the lower side  Have encouraged adequate fluid and salt intake in the past         Follow-up in 6 months        Interval History: This is a 25-year-old male admitted to [facility] 04/2019 with acute CVA, and subsequent event right MCA and ROB thrombectomy and tPA infusion   He residual aphasia and dysphasia  Subsequent ALYX revealed no atrial septal interruption, no evidence of thrombus   Subsequently, he underwent loop recorder placement   He then presents to the hospital 05/13/2019 after a routine ECG revealed rapid atrial fibrillation   Loop recorder interrogation had revealed new onset atrial fibrillation ongoing since 05/11/2019  Brisa Powers was then initiated on Eliquis and metoprolol, and a rate control strategy was pursued      After discharge, he has started rehabilitation   His wife called 09/03/2019, stating that he was quite lethargic with low blood pressure readings  Brisa Powers went to the ER, where metoprolol was decreased to 12 5 mg b i d  Robert Ott he felt better, he still continued to have lethargy, after which metoprolol was completed held 09/04/2019      During a prior visit 09/06/2019, at which time metoprolol was restarted at a low dose secondary to his paroxysmal atrial fibrillation   Amiodarone was considered and he was evaluated by Dr Jerry Samano 10/25/2019, and was deemed that his device was over estimating the burden of atrial fibrillation and he mostly had sinus rhythm with PACs   Low-dose metoprolol at 12 5 mg b i d  Was continued      He was hospitalized 01/2020 after he discontinue metoprolol for about 3 weeks, and early 730 in the morning transferred into the shower and was getting washed he became lightheaded and dizzy  Marliss Vania had an episode of unresponsiveness was head rolled forward, lasting 1-5 minutes   Review of loop recorder showed to atrial fibrillation events and 1 tachycardia event at 353 beats per minute   Review of rhythm strips however revealed only noise artifact   There was no definitive atrial fibrillation, no evidence of pauses or significant tachycardia or bradycardia associated with his episodes   He had a 4 beat run of nonsustained ventricular tachycardia on telemetry  The patient was diagnosed with vasovagal syncope and discharged      He underwent a 14 day o Holter monitor revealing no evidence of atrial fibrillation in several short runs of SVT up to 11 beats and short nonsustained ventricular tachycardia  Subsequently, he underwent explantation of his loop recorder 06/2020  In 07/2020 he had recurrent vasovagal episode after he got up to walk in the shower  Since July 2020, he has had no further episodes of lightheadedness dizziness, or vasovagal syncope  He denies lower extremity edema, chest pain, shortness of breath               Vitals:  Vitals:    01/25/21 0933   BP: 108/64   BP Location: Right arm   Patient Position: Sitting   Cuff Size: Adult   Pulse: 60   Temp: (!) 97 3 °F (36 3 °C)   Weight: 94 8 kg (209 lb)   Height: 6' (1 829 m)         Past Medical History:   Diagnosis Date    Arthritis     BL knees    Atrial fibrillation (HCC)     CHF (congestive heart failure) (HCC)     Colon polyp     CVA (cerebral vascular accident) ([facility] ) 4/27/2019    NIHSS 26 on presentation    Depression     Diabetes mellitus ([facility] )     Hypertension     Irregular heart beat     Afib    Stroke ([place] [id number] )     Urinary retention      Social History     Socioeconomic History    Marital status: /Civil Union     Spouse name: Not on file    Number of children: Not on file    Years of education: Not on file    Highest education level: Not on file   Occupational History    Not on file   Social Needs    Financial resource strain: Not on file    Food insecurity     Worry: Not on file     Inability: Not on file   [employer] needs     Medical: Not on file     Non-medical: Not on file   Tobacco Use    Smoking status: Never Smoker    Smokeless tobacco: Never Used   Substance and Sexual Activity    Alcohol use: Not Currently     Frequency: 2-4 times a month     Comment: social    Drug use: Never    Sexual activity: Not on file   Lifestyle    Physical activity     Days per week: Not on file     Minutes per session: Not on file    Stress: Not on file   Relationships    Social connections     Talks on phone: Not on file     Gets together: Not on file     Attends Confucianism service: Not on file     Active member of club or organization: Not on file     Attends meetings of clubs or organizations: Not on file     Relationship status: Not on file    Intimate partner violence     Fear of current or ex partner: Not on file     Emotionally abused: Not on file     Physically abused: Not on file     Forced sexual activity: Not on file   Other Topics Concern    Not on file   Social History Narrative    Not on file      No family history on file    Past Surgical History:   Procedure Laterality Date    COLONOSCOPY      IR STROKE ALERT  4/27/2019    MENISCECTOMY Right        Current Outpatient Medications:     Coenzyme Q10 (CO Q 10) 10 MG CAPS, Take by mouth daily, Disp: , Rfl:     cyanocobalamin (VITAMIN B-12) 100 mcg tablet, Take by mouth daily, Disp: , Rfl:     finasteride (PROSCAR) 5 mg tablet, Take 5 mg by mouth daily, Disp: , Rfl:     hydrocortisone 1 % cream, Apply topically 2 (two) times a day as needed , Disp: , Rfl:     nitrofurantoin (MACRODANTIN) 50 mg capsule, Take 2 capsules (100 mg total) by mouth daily, Disp: 60 capsule, Rfl: 4    pantoprazole (PROTONIX) 40 mg tablet, Take 1 tablet (40 mg total) by mouth 2 (two) times a day before meals, Disp: 120 tablet, Rfl: 0    pravastatin (PRAVACHOL) 20 mg tablet, Take 1 tablet (20 mg total) by mouth daily, Disp: 30 tablet, Rfl: 0    rivaroxaban (Xarelto) 20 mg tablet, Take 20 mg by mouth daily with breakfast , Disp: , Rfl:     tamsulosin (FLOMAX) 0 4 mg, Take 0 4 mg by mouth daily with dinner 2 tabs a day, Disp: , Rfl:     FLUoxetine (PROzac) 20 mg capsule, Take 1 capsule (20 mg total) by mouth daily (Patient not taking: Reported on 1/25/2021), Disp: 30 capsule, Rfl: 0        Review of Systems:  Review of Systems   Respiratory: Negative  Cardiovascular: Negative  All other systems reviewed and are negative  Physical Exam:  Physical Exam  Constitutional:       General: He is not in acute distress  Appearance: He is well-developed  He is not diaphoretic  HENT:      Head: Normocephalic and atraumatic  Eyes:      General: No scleral icterus  Right eye: No discharge  Pupils: Pupils are equal, round, and reactive to light  Neck:      Musculoskeletal: Normal range of motion and neck supple  Thyroid: No thyromegaly  Cardiovascular:      Rate and Rhythm: Normal rate and regular rhythm  Heart sounds: Normal heart sounds  No murmur  No friction rub  No gallop      Pulmonary:      Effort: Pulmonary effort is normal       Breath sounds: Normal breath sounds  Abdominal:      General: There is no distension  Tenderness: There is no abdominal tenderness  There is no guarding or rebound  Musculoskeletal: Normal range of motion  Skin:     General: Skin is warm and dry  Coloration: Skin is not pale  Findings: No erythema or rash  Neurological:      Mental Status: He is alert and oriented to person, place, and time  Coordination: Coordination normal    Psychiatric:         Behavior: Behavior normal          Thought Content: Thought content normal          Judgment: Judgment normal          This note was completed in part utilizing M-Modal Fluency Direct Software  Grammatical errors, random word insertions, spelling mistakes, and incomplete sentences can be an occasional consequence of this system secondary to software limitations, ambient noise, and hardware issues  If you have any questions or concerns about the content, text, or information contained within the body of this dictation, please contact the provider for clarification

## 2021-02-06 ENCOUNTER — IMMUNIZATIONS (OUTPATIENT)
Dept: FAMILY MEDICINE CLINIC | Facility: HOSPITAL | Age: 71
End: 2021-02-06

## 2021-02-06 DIAGNOSIS — Z23 ENCOUNTER FOR IMMUNIZATION: Primary | ICD-10-CM

## 2021-02-06 PROCEDURE — 0011A SARS-COV-2 / COVID-19 MRNA VACCINE (MODERNA) 100 MCG: CPT

## 2021-02-06 PROCEDURE — 91301 SARS-COV-2 / COVID-19 MRNA VACCINE (MODERNA) 100 MCG: CPT

## 2021-02-08 ENCOUNTER — HOSPITAL ENCOUNTER (OUTPATIENT)
Dept: NON INVASIVE DIAGNOSTICS | Facility: CLINIC | Age: 71
Discharge: HOME | End: 2021-02-08
Payer: MEDICARE

## 2021-02-08 DIAGNOSIS — I65.23 ASYMPTOMATIC BILATERAL CAROTID ARTERY STENOSIS: ICD-10-CM

## 2021-02-08 PROCEDURE — 93880 EXTRACRANIAL BILAT STUDY: CPT

## 2021-02-08 PROCEDURE — 93880 EXTRACRANIAL BILAT STUDY: CPT | Performed by: SURGERY

## 2021-02-09 ENCOUNTER — PATIENT MESSAGE (OUTPATIENT)
Dept: NEUROLOGY | Facility: CLINIC | Age: 71
End: 2021-02-09

## 2021-02-09 ENCOUNTER — TELEPHONE (OUTPATIENT)
Dept: NEUROLOGY | Facility: CLINIC | Age: 71
End: 2021-02-09

## 2021-02-09 NOTE — TELEPHONE ENCOUNTER
Dr Edouard Muller, received [employer] Message from patient regarding the vascular study  Patient not d/t be seen until April  Can you please review study? We can respond to patient in [employer] message  TY!

## 2021-02-09 NOTE — TELEPHONE ENCOUNTER
----- Message from Raymundo Malone sent at 2/9/2021  4:52 PM EST -----  Regarding: Non-Urgent Medical Question  Contact: [phone]  Dr Hyacinth Rinne:  since [employer] does not see you until April 30th- in non medical terminology- are there any concerns regarding the vascular study done on Feb 8th? Thank you for your reply  [employer]

## 2021-02-10 NOTE — TELEPHONE ENCOUNTER
I reviewed the results  They look good, only very mild plaque in the carotid arteries, nothing to worry about

## 2021-03-08 ENCOUNTER — IMMUNIZATIONS (OUTPATIENT)
Dept: FAMILY MEDICINE CLINIC | Facility: HOSPITAL | Age: 71
End: 2021-03-08

## 2021-03-08 DIAGNOSIS — Z23 ENCOUNTER FOR IMMUNIZATION: Primary | ICD-10-CM

## 2021-03-08 PROCEDURE — 91301 SARS-COV-2 / COVID-19 MRNA VACCINE (MODERNA) 100 MCG: CPT

## 2021-03-08 PROCEDURE — 0012A SARS-COV-2 / COVID-19 MRNA VACCINE (MODERNA) 100 MCG: CPT

## 2021-04-30 ENCOUNTER — OFFICE VISIT (OUTPATIENT)
Dept: NEUROLOGY | Facility: CLINIC | Age: 71
End: 2021-04-30
Payer: MEDICARE

## 2021-04-30 VITALS
SYSTOLIC BLOOD PRESSURE: 116 MMHG | DIASTOLIC BLOOD PRESSURE: 72 MMHG | HEART RATE: 44 BPM | HEIGHT: 72 IN | WEIGHT: 215 LBS | BODY MASS INDEX: 29.12 KG/M2

## 2021-04-30 DIAGNOSIS — I63.9 APHASIA DUE TO ACUTE STROKE (HCC): ICD-10-CM

## 2021-04-30 DIAGNOSIS — E78.00 PURE HYPERCHOLESTEROLEMIA: ICD-10-CM

## 2021-04-30 DIAGNOSIS — G40.909 NONINTRACTABLE EPILEPSY WITHOUT STATUS EPILEPTICUS, UNSPECIFIED EPILEPSY TYPE (HCC): ICD-10-CM

## 2021-04-30 DIAGNOSIS — I65.23 BILATERAL CAROTID ARTERY STENOSIS: ICD-10-CM

## 2021-04-30 DIAGNOSIS — I10 ESSENTIAL HYPERTENSION: ICD-10-CM

## 2021-04-30 DIAGNOSIS — I48.0 PAROXYSMAL ATRIAL FIBRILLATION (HCC): ICD-10-CM

## 2021-04-30 DIAGNOSIS — Z86.73 HISTORY OF STROKE: Primary | ICD-10-CM

## 2021-04-30 DIAGNOSIS — R47.01 APHASIA DUE TO ACUTE STROKE (HCC): ICD-10-CM

## 2021-04-30 DIAGNOSIS — R73.03 PREDIABETES: ICD-10-CM

## 2021-04-30 DIAGNOSIS — G81.94 LEFT HEMIPLEGIA (HCC): ICD-10-CM

## 2021-04-30 PROCEDURE — 99214 OFFICE O/P EST MOD 30 MIN: CPT | Performed by: PSYCHIATRY & NEUROLOGY

## 2021-04-30 NOTE — PROGRESS NOTES
Patient ID: Cecily Vargas is a 79 y o  male  Assessment/Plan:    Stroke: Luis Arana today is doing very well  His left upper extremity has been improving with botox injections  Continue PT, OT and speech  Continue Xarelto    Knee stiffness: Recommend you to follow up with primary care physician and consider topical treatments  Try avoiding NSAIDs to decrease risk of bleeding     Carotid stenosis: Carotid ultrasound showed less than 50% stenosis  We will repeat this in one year  Cholesterol: Goal LDL is less than 70  Last LDL on 4/27/21 was 93  We discussed today increasing the pravastatin to reach our goal  We agreed we can revisit this at our next visit  Continue regular dose of pravastatin     Ambulation: Goal is to walk unassisted however patient reminded that walking unassisted does not mean walking unsupervised  Also that having a fall while taking Xarelto may increase risk of bleeding  Continue working with physical therapy     Low heart rate: As long as remains asymptomatic especially with changes in position, no workup  Follow up with PCP if becomes symptomatic  Diagnoses and all orders for this visit:    History of stroke    Paroxysmal atrial fibrillation (HCC)    Essential hypertension    Left hemiplegia (HCC)    Nonintractable epilepsy without status epilepticus, unspecified epilepsy type ([facility] )    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 ([facility] )    Pure hypercholesterolemia    Prediabetes    Bilateral carotid artery stenosis         Subjective:    SOLOMON Vaughan is a 79year old male with history of R ROB and MCA stroke in 2019, hyperlipidemia, and hypertrophic hypertrophy who presents as a stroke follow up  He is accompanied by his wife and caregiver today  Today they are concerned about his blood pressure and his heart rate  Wife takes his blood pressure daily and records it  She has noticed a few instances of high blood pressure and low pulse that does not happen very often   During one of the days he ate many chocolates and had 2 beers  She denies him being symptomatic from any the hemodynamic changes  They deny any stroke-like or TIA symptoms  Patient follows with Dr Michelle Bentiez in Cardiology who is aware of these concerns  From a stroke perspective,  Patient continues to be on the Xarelto and pravastatin  On 04/27/2021 he had an LDL of 93 and a cholesterol of 161  His HbA1c was 5 7  The endorse that ever since seen Neurology and started on the tamsulosin patient is sleeping better and not waking up as much  He still  Continues to retain urine however frequency is improving  And he is not waking up as much at night  Patient endorses that he hydrates well  Patient is currently assisting PT, OT and speech 3 times a week at [facility]  He had Botox injections in his left arm in March with Dr Katey Call at [facility]  They endorse that there has been an improvement in his ROM  Wife is concerned of stiffening of his knees with difficulty standing up and sitting down  Symptoms improve with walking  The following portions of the patient's history were reviewed and updated as appropriate: allergies, current medications, past family history, past medical history, past social history, past surgical history and problem list and ros  Objective:    Blood pressure 116/72, pulse (!) 44, height 6' (1 829 m), weight 97 5 kg (215 lb)  Physical Exam  Constitutional:       General: He is awake  Eyes:      General: Lids are normal       Extraocular Movements: Extraocular movements intact  Neurological:      Deep Tendon Reflexes:      Reflex Scores:       Bicep reflexes are 2+ on the right side and 3+ on the left side  Brachioradialis reflexes are 2+ on the right side and 3+ on the left side  Patellar reflexes are 2+ on the right side and 3+ on the left side  Neurological Exam  Mental Status  Awake  Delayed speech   Word finding difficulties      Cranial Nerves  CN III, IV, VI: Extraocular movements intact bilaterally  Normal lids and orbits bilaterally  CN VIII: Hearing is normal     Motor                                               Right                     Left  Deltoid                                   5                          2   Biceps                                   5                          1   Triceps                                  5                          1   Iliopsoas                               5                          2   Quadriceps                           5                          2    Reflexes                                           Right                      Left  Brachioradialis                    2+                         3+  Biceps                                 2+                         3+  Patellar                                2+                         3+    Gait  Walking with assistance of a walker and boot  Hemiplegic gait with L foot everted   Steppage height of affected leg diminishes as we walk for a longer perio of time   ROS:    Review of Systems   Constitutional: Negative  Negative for appetite change and fever  HENT: Negative  Negative for hearing loss, tinnitus, trouble swallowing and voice change  Eyes: Negative  Negative for photophobia and pain  Respiratory: Negative  Negative for shortness of breath  Cardiovascular: Negative  Negative for palpitations  Gastrointestinal: Negative  Negative for nausea and vomiting  Endocrine: Negative  Negative for cold intolerance  Genitourinary: Negative  Negative for dysuria, frequency and urgency  Musculoskeletal: Negative  Negative for myalgias and neck pain  Skin: Negative  Negative for rash  Allergic/Immunologic: Negative  Neurological: Negative  Negative for dizziness, tremors, seizures, syncope, facial asymmetry, speech difficulty, weakness, light-headedness, numbness and headaches  Hematological: Negative  Does not bruise/bleed easily  Psychiatric/Behavioral: Negative  Negative for confusion, hallucinations and sleep disturbance  All other systems reviewed and are negative  Past Medical History:   Diagnosis Date    Arthritis     BL knees    Atrial fibrillation (HCC)     CHF (congestive heart failure) (HCC)     Colon polyp     [facility] ) 4/27/2019    NIHSS 26 on presentation    Depression     Diabetes mellitus ([facility] )     Hypertension     Irregular heart beat     Afib    Stroke [facility])     Urinary retention        Past Surgical History:   Procedure Laterality Date    COLONOSCOPY      IR STROKE ALERT  4/27/2019    MENISCECTOMY Right        Social History     Socioeconomic History    Marital status: /Civil Union     Spouse name: None    Number of children: None    Years of education: None    Highest education level: None   Occupational History    None   Social Needs    Financial resource strain: None    Food insecurity     Worry: None     Inability: None    Transportation needs     Medical: None     Non-medical: None   Tobacco Use    Smoking status: Never Smoker    Smokeless tobacco: Never Used   Substance and Sexual Activity    Alcohol use: Not Currently     Frequency: 2-4 times a month     Comment: social    Drug use: Never    Sexual activity: None   Lifestyle    Physical activity     Days per week: None     Minutes per session: None    Stress: None   Relationships    Social connections     Talks on phone: None     Gets together: None     Attends Faith service: None     Active member of club or organization: None     Attends meetings of clubs or organizations: None     Relationship status: None    Intimate partner violence     Fear of current or ex partner: None     Emotionally abused: None     Physically abused: None     Forced sexual activity: None   Other Topics Concern    None   Social History Narrative    None       History reviewed   No pertinent family history  Current Outpatient Medications:     Coenzyme Q10 (CO Q 10) 10 MG CAPS, Take by mouth daily, Disp: , Rfl:     cyanocobalamin (VITAMIN B-12) 100 mcg tablet, Take by mouth daily, Disp: , Rfl:     finasteride (PROSCAR) 5 mg tablet, Take 5 mg by mouth daily, Disp: , Rfl:     hydrocortisone 1 % cream, Apply topically 2 (two) times a day as needed , Disp: , Rfl:     nitrofurantoin (MACRODANTIN) 50 mg capsule, Take 2 capsules (100 mg total) by mouth daily, Disp: 60 capsule, Rfl: 4    pantoprazole (PROTONIX) 40 mg tablet, Take 1 tablet (40 mg total) by mouth 2 (two) times a day before meals, Disp: 120 tablet, Rfl: 0    pravastatin (PRAVACHOL) 20 mg tablet, Take 1 tablet (20 mg total) by mouth daily, Disp: 30 tablet, Rfl: 0    rivaroxaban (Xarelto) 20 mg tablet, Take 20 mg by mouth daily with breakfast , Disp: , Rfl:     tamsulosin (FLOMAX) 0 4 mg, Take 0 4 mg by mouth daily with dinner 2 tabs a day, Disp: , Rfl:     Allergies   Allergen Reactions    Cephalexin Throat Swelling     Pt reported throat swelling after taking antibiotic     Penicillins Other (See Comments)        Blood pressure 116/72, pulse (!) 44, height 6' (1 829 m), weight 97 5 kg (215 lb)

## 2021-07-28 ENCOUNTER — OFFICE VISIT (OUTPATIENT)
Dept: CARDIOLOGY CLINIC | Facility: CLINIC | Age: 71
End: 2021-07-28
Payer: MEDICARE

## 2021-07-28 VITALS
HEART RATE: 57 BPM | WEIGHT: 203.6 LBS | OXYGEN SATURATION: 99 % | SYSTOLIC BLOOD PRESSURE: 108 MMHG | BODY MASS INDEX: 27.58 KG/M2 | DIASTOLIC BLOOD PRESSURE: 70 MMHG | HEIGHT: 72 IN

## 2021-07-28 DIAGNOSIS — R55 VASOVAGAL SYNCOPE: ICD-10-CM

## 2021-07-28 DIAGNOSIS — I48.0 PAROXYSMAL ATRIAL FIBRILLATION (HCC): ICD-10-CM

## 2021-07-28 DIAGNOSIS — I50.32 CHRONIC DIASTOLIC CHF (CONGESTIVE HEART FAILURE) (HCC): Primary | ICD-10-CM

## 2021-07-28 PROCEDURE — 99213 OFFICE O/P EST LOW 20 MIN: CPT | Performed by: INTERNAL MEDICINE

## 2021-07-28 RX ORDER — KETOCONAZOLE 20 MG/ML
SHAMPOO TOPICAL
COMMUNITY
Start: 2021-07-16

## 2021-07-28 NOTE — PROGRESS NOTES
Cardiology   MD Angelo Muller MD Ather Mansoor, MD Wheeler Quitter, DO, Devan Figueroa DO, Mesfin Burton DO, [facility] [place]  -------------------------------------------------------------------  [facility] [address] [address]  [place]  [phone]                        Emily Vasile                     1950                     [id number]          Assessment/Plan:     1  Paroxysmal atrial fibrillation  2  Right ROB/MCA territory embolic CVA 62/2200--[id number] aphasia  3  Syncope 1/2020, suspect vasovagal  4  Chronic hypotension        · Regular rhythm on examination, remain off AV node blockers in light of marginally low blood pressures  Continue Xarelto anticoagulation which she is tolerating well   · Blood pressure well controlled today  · No further episodes of vasovagal syncope  · In the past he has been encouraged to take in adequate amounts of fluid and liberal salt intake       Follow-up in one year           Interval History:      This is a 70-year-old male admitted to [facility] 04/2019 with acute CVA, and subsequent event right MCA and ROB thrombectomy and tPA infusion   He residual aphasia and dysphasia  Subsequent ALYX revealed no atrial septal interruption, no evidence of thrombus   Subsequently, he underwent loop recorder placement  He then presents to the hospital 05/13/2019 after a routine ECG revealed rapid atrial fibrillation   Loop recorder interrogation had revealed new onset atrial fibrillation ongoing since 05/11/2019  Bismark Galloway was then initiated on Eliquis and metoprolol, and a rate control strategy was pursued      After discharge, he has started rehabilitation   His wife called 09/03/2019, stating that he was quite lethargic with low blood pressure readings   He went to the ER, where metoprolol was decreased to 12 5 mg b i d  Marybelle Exon he felt better, he still continued to have lethargy, after which metoprolol was completed held 09/04/2019      During a prior visit 09/06/2019, at which time metoprolol was restarted at a low dose secondary to his paroxysmal atrial fibrillation   Amiodarone was considered and he was evaluated by Dr Max Jackson 10/25/2019, and was deemed that his device was over estimating the burden of atrial fibrillation and he mostly had sinus rhythm with PACs   Low-dose metoprolol at 12 5 mg b i d  Was continued      He was hospitalized 01/2020 after he discontinue metoprolol for about 3 weeks, and early 730 in the morning transferred into the shower and was getting washed he became lightheaded and dizzy  Veronica Cruel had an episode of unresponsiveness was head rolled forward, lasting 1-5 minutes   Review of loop recorder showed to atrial fibrillation events and 1 tachycardia event at 353 beats per minute   Review of rhythm strips however revealed only noise artifact   There was no definitive atrial fibrillation, no evidence of pauses or significant tachycardia or bradycardia associated with his episodes   He had a 4 beat run of nonsustained ventricular tachycardia on telemetry  The patient was diagnosed with vasovagal syncope and discharged      He underwent a 14 day [facility] Holter monitor revealing no evidence of atrial fibrillation in several short runs of SVT up to 11 beats and short nonsustained ventricular tachycardia  Subsequently, he underwent explantation of his loop recorder 06/2020  In 07/2020 he had recurrent vasovagal episode after he got up to walk in the shower  He presents today for follow-up with his wife, with no complaints  Specifically, he denies exertional chest pain, shortness of breath, dizziness, palpitations, lower extremity edema  He denies any further episodes of presyncope or syncope  He is tolerating Xarelto well without abnormal bleeding or heavy bruising               Vitals:  Vitals:    07/28/21 1020   BP: 108/70   Pulse: 57   SpO2: 99%   Weight: 92 4 kg (203 lb 9 6 oz)   Height: 6' (1 829 m)         Past Medical History:   Diagnosis Date    Arthritis     BL knees    Atrial fibrillation (HCC)     CHF (congestive heart failure) (HCC)     Colon polyp     [facility] ([facility] ) 4/27/2019    NIHSS 26 on presentation    Depression     Diabetes mellitus ([place] [address] )     Hypertension     Irregular heart beat     Afib    Stroke ([place] [address] )     Urinary retention      Social History     Socioeconomic History    Marital status: /Civil Union     Spouse name: Not on file    Number of children: Not on file    Years of education: Not on file    Highest education level: Not on file   Occupational History    Not on file   Tobacco Use    Smoking status: Never Smoker    Smokeless tobacco: Never Used   Vaping Use    Vaping Use: Never used   Substance and Sexual Activity    Alcohol use: Not Currently     Comment: social    Drug use: Never    Sexual activity: Not on file   Other Topics Concern    Not on file   Social History Narrative    Not on file     Social Determinants of Health     Financial Resource Strain:     Difficulty of Paying Living Expenses:    Food Insecurity:     Worried About Running Out of Food in the Last Year:     [address] in the Last Year:    Transportation Needs:     Lack of Transportation (Medical):      Lack of Transportation (Non-Medical):    Physical Activity:     Days of Exercise per Week:     Minutes of Exercise per Session:    Stress:     Feeling of Stress :    Social Connections:     Frequency of Communication with Friends and Family:     Frequency of Social Gatherings with Friends and Family:     Attends Synagogue Services:     Active Member of Clubs or Organizations:     Attends Club or Organization Meetings:     Marital Status:    Intimate Partner Violence:     Fear of Current or Ex-Partner:     Emotionally Abused:     Physically Abused:     Sexually Abused:       No family history on file   Past Surgical History:   Procedure Laterality Date    COLONOSCOPY      IR STROKE ALERT  4/27/2019    MENISCECTOMY Right        Current Outpatient Medications:     Coenzyme Q10 (CO Q 10) 10 MG CAPS, Take by mouth daily, Disp: , Rfl:     cyanocobalamin (VITAMIN B-12) 100 mcg tablet, Take by mouth daily, Disp: , Rfl:     finasteride (PROSCAR) 5 mg tablet, Take 5 mg by mouth daily, Disp: , Rfl:     hydrocortisone 1 % cream, Apply topically 2 (two) times a day as needed , Disp: , Rfl:     ketoconazole (NIZORAL) 2 % shampoo, SHAMPOO 3 TIMES A WEEK AS A SCALP TREATMENT, LEAVE ON 5-10 MINUTES AND RINSE, Disp: , Rfl:     nitrofurantoin (MACRODANTIN) 50 mg capsule, Take 2 capsules (100 mg total) by mouth daily, Disp: 60 capsule, Rfl: 4    pantoprazole (PROTONIX) 40 mg tablet, Take 1 tablet (40 mg total) by mouth 2 (two) times a day before meals, Disp: 120 tablet, Rfl: 0    pravastatin (PRAVACHOL) 20 mg tablet, Take 1 tablet (20 mg total) by mouth daily (Patient taking differently: Take 40 mg by mouth daily ), Disp: 30 tablet, Rfl: 0    rivaroxaban (Xarelto) 20 mg tablet, Take 20 mg by mouth daily with breakfast , Disp: , Rfl:     tamsulosin (FLOMAX) 0 4 mg, Take 0 4 mg by mouth daily with dinner 2 tabs a day, Disp: , Rfl:         Review of Systems:  Review of Systems   Respiratory: Negative  Cardiovascular: Negative  All other systems reviewed and are negative  Physical Exam:  Physical Exam  Constitutional:       General: He is not in acute distress  Appearance: He is well-developed  He is not diaphoretic  HENT:      Head: Normocephalic and atraumatic  Eyes:      General: No scleral icterus  Right eye: No discharge  Pupils: Pupils are equal, round, and reactive to light  Neck:      Thyroid: No thyromegaly  Cardiovascular:      Rate and Rhythm: Normal rate and regular rhythm  Heart sounds: Normal heart sounds  No murmur heard  No friction rub  No gallop  Pulmonary:      Effort: Pulmonary effort is normal       Breath sounds: Normal breath sounds  Abdominal:      General: There is no distension  Tenderness: There is no abdominal tenderness  There is no guarding or rebound  Musculoskeletal:         General: Normal range of motion  Cervical back: Normal range of motion and neck supple  Skin:     General: Skin is warm and dry  Coloration: Skin is not pale  Findings: No erythema or rash  Neurological:      Mental Status: He is alert and oriented to person, place, and time  Coordination: Coordination normal    Psychiatric:         Behavior: Behavior normal          Thought Content: Thought content normal          Judgment: Judgment normal          This note was completed in part utilizing M-Modal Fluency Direct Software  Grammatical errors, random word insertions, spelling mistakes, and incomplete sentences can be an occasional consequence of this system secondary to software limitations, ambient noise, and hardware issues  If you have any questions or concerns about the content, text, or information contained within the body of this dictation, please contact the provider for clarification

## 2021-08-12 ENCOUNTER — TELEPHONE (OUTPATIENT)
Dept: NEUROLOGY | Facility: CLINIC | Age: 71
End: 2021-08-12

## 2021-08-12 NOTE — TELEPHONE ENCOUNTER
Message left for patient to return call to office regarding appointment on 8/17 at 8:30 am needing to be changed to a virtual visit as provider is only seeing patients virtually this day  Please assist with change to virtual visit upon call back

## 2021-08-17 ENCOUNTER — TELEMEDICINE (OUTPATIENT)
Dept: NEUROLOGY | Facility: CLINIC | Age: 71
End: 2021-08-17
Payer: MEDICARE

## 2021-08-17 ENCOUNTER — TELEPHONE (OUTPATIENT)
Dept: UROLOGY | Facility: AMBULATORY SURGERY CENTER | Age: 71
End: 2021-08-17

## 2021-08-17 DIAGNOSIS — R47.01 APHASIA DUE TO ACUTE STROKE (HCC): ICD-10-CM

## 2021-08-17 DIAGNOSIS — I48.0 PAROXYSMAL ATRIAL FIBRILLATION (HCC): ICD-10-CM

## 2021-08-17 DIAGNOSIS — R33.9 URINARY RETENTION: ICD-10-CM

## 2021-08-17 DIAGNOSIS — I10 ESSENTIAL HYPERTENSION: ICD-10-CM

## 2021-08-17 DIAGNOSIS — G40.909 NONINTRACTABLE EPILEPSY WITHOUT STATUS EPILEPTICUS, UNSPECIFIED EPILEPSY TYPE (HCC): ICD-10-CM

## 2021-08-17 DIAGNOSIS — Z86.73 HISTORY OF STROKE: Primary | ICD-10-CM

## 2021-08-17 DIAGNOSIS — I65.23 BILATERAL CAROTID ARTERY STENOSIS: ICD-10-CM

## 2021-08-17 DIAGNOSIS — I63.9 APHASIA DUE TO ACUTE STROKE (HCC): ICD-10-CM

## 2021-08-17 DIAGNOSIS — G81.94 LEFT HEMIPLEGIA (HCC): ICD-10-CM

## 2021-08-17 DIAGNOSIS — I63.9 DEPRESSION DUE TO ACUTE STROKE (HCC): ICD-10-CM

## 2021-08-17 DIAGNOSIS — E78.00 PURE HYPERCHOLESTEROLEMIA: ICD-10-CM

## 2021-08-17 DIAGNOSIS — F06.31 DEPRESSION DUE TO ACUTE STROKE (HCC): ICD-10-CM

## 2021-08-17 PROBLEM — G93.40 ACUTE ENCEPHALOPATHY: Status: RESOLVED | Noted: 2019-05-27 | Resolved: 2021-08-17

## 2021-08-17 PROBLEM — G93.40 ENCEPHALOPATHY ACUTE: Status: RESOLVED | Noted: 2019-04-28 | Resolved: 2021-08-17

## 2021-08-17 PROCEDURE — 99214 OFFICE O/P EST MOD 30 MIN: CPT | Performed by: PSYCHIATRY & NEUROLOGY

## 2021-08-17 NOTE — PATIENT INSTRUCTIONS
Stroke: Omid Redd presents for a follow-up evaluation with regard to his prior stroke  He reports no new symptoms concerning for recurrent TIA or stroke  He is taking his medication as prescribed  He continues to receive therapy services  He has not had any recent falls and does not endorse any bleeding or bruising complications  He is having difficulty with sleeping as result of nocturia related to prostatic hypertrophy   -for ongoing stroke prevention should continue his current combination of Xarelto, pravastatin, and appropriate blood pressure and glycemic control   -His most recent LDL cholesterol is 102  Ideally we will target LDL <70  He has already increased the pravastatin to 40 mg and will continue to work with his primary care team in this regard     -I will defer to the good judgment of his primary care team for monitoring of his cholesterol panel and blood sugar numbers but his most recent labs from this past December are in a good range   -His recent carotid doppler shows only minimal stenosis  We can repeat that in 18 months  -from my standpoint he should continue to work with speech therapy and to utilize the gym for additional physical exercise  He is continuing to work to strengthen his left lower extremity to hopefully improve his ability to ambulate     -from a neurologic standpoint he would be cleared to hold Xarelto if he were to need prostate surgery  -at this point he has not had any breakthrough seizure events  We will continue to monitor him clinically without antiseizure medication      I will plan for him to return to the office in 12 months time but I would be happy to see him sooner if the need should arise    If he has any symptoms concerning for TIA or stroke including sudden painless loss of vision or double vision, difficulty speaking or swallowing, vertigo/room spinning that does not quickly resolve, or weakness/numbness affecting 1 side of the face or body which is new he should proceed by ambulance to the nearest emergency room immediately  If he were to fall and strike his head and have any residual symptoms or to developed a sudden extremely severe very unusual headache he should also be seen at the nearest emergency room

## 2021-08-17 NOTE — TELEPHONE ENCOUNTER
Patient last seen Dr Estephania Angulo ([phone]) then patient went to [facility] urology  Patients wife Eduin Manzano called in stating Patient's neuro Dr Candida Rebolledo referred patient to Dr Gideon Henriquez to discuss TURP procedure   Eduin Manzano can be reached at [phone]

## 2021-08-17 NOTE — PROGRESS NOTES
Virtual Regular Visit    Assessment/Plan:   Patient Instructions   Stroke: Neo Rutledge presents for a follow-up evaluation with regard to his prior stroke  He reports no new symptoms concerning for recurrent TIA or stroke  He is taking his medication as prescribed  He continues to receive therapy services  He has not had any recent falls and does not endorse any bleeding or bruising complications  He is having difficulty with sleeping as result of nocturia related to prostatic hypertrophy   -for ongoing stroke prevention should continue his current combination of Xarelto, pravastatin, and appropriate blood pressure and glycemic control   -His most recent LDL cholesterol is 102  Ideally we will target LDL <70  Considering that he has no side effects on Pravastatin 40mg I will have him increase to 60mg every evening for 2 weeks and if no change will increase to 80mg  -I will defer to the good judgment of his primary care team for monitoring of his cholesterol panel and blood sugar numbers but his most recent labs from this past December are in a good range   -His recent carotid doppler shows only minimal stenosis  We can repeat that in 18 months  -from my standpoint he should continue to receive therapy services and is clearly receiving significant benefit  -I agree with deferring Botox for his left upper extremity for the time being however if he has the emergence of pain, difficulty with cleaning/manipulating the arm, or other significant symptoms in the left upper extremity Botox would be a reasonable consideration in the future  -from a neurologic standpoint he would be cleared to hold Xarelto if he were to need prostate surgery  -at this point he has not had any breakthrough seizure events  We will continue to monitor him clinically without antiseizure medication      I will plan for him to return to the office in 12 months time but I would be happy to see him sooner if the need should arise    If he has any symptoms concerning for TIA or stroke including sudden painless loss of vision or double vision, difficulty speaking or swallowing, vertigo/room spinning that does not quickly resolve, or weakness/numbness affecting 1 side of the face or body which is new he should proceed by ambulance to the nearest emergency room immediately  If he were to fall and strike his head and have any residual symptoms or to developed a sudden extremely severe very unusual headache he should also be seen at the nearest emergency room  Problem List Items Addressed This Visit     None               Reason for visit is   Chief Complaint   Patient presents with    Virtual Regular Visit        Encounter provider Irving Nicole MD    Provider located at [address] [place] [address] [address]  [facility] [address]  [phone]      Recent Visits  Date Type Provider Dept   08/12/21 Telephone Irving Nicole MD [facility] recent visits within past 7 days and meeting all other requirements  Future Appointments  No visits were found meeting these conditions  Showing future appointments within next 150 days and meeting all other requirements       The patient was identified by name and date of birth  Maddie Canales was informed that this is a telemedicine visit and that the visit is being conducted through [address] [place] [address] Now and patient was informed that this is a secure, HIPAA-compliant platform  He agrees to proceed     My office door was closed  The following individuals were present in the room Dr Edmar Martin  He acknowledged consent and understanding of privacy and security of the video platform  The patient has agreed to participate and understands they can discontinue the visit at any time        Note that originally a video connection was attempted however they did not receive the invitation and as result we connected by telephone    Patient is aware this is a billable service  Elizabeth Osborn is a 70 y o  male who presents for follow up in regard to his prior stroke  They report no new symptoms concerning for recurrent TIA or stroke  He takes his medications as prescribed  He did not endorse any significant bleeding or bruising issues  He does confirm the takes his Xarelto with food  They report that he has transition from follow-up physical therapy team using the gymnasium and is doing very well in speech therapy  He does not have sleep issues reported that were related to his stroke per se but is having significant sleep issues related to his urinary retention  Recently he found that his urologist is no longer practicing with [facility]  I provided him with a referral to see [facility] of the [facility] urologists for a likely TURP procedure  He reports that his mood is the same and did not endorse any significant anxiety / depression  He is not having issues with appetite  I reviewed his most recent cholesterol panel which is slightly outside of the desired range  His most recent carotid Doppler ultrasound showed only minimal bilateral stenosis          Past Medical History:   Diagnosis Date    Arthritis     BL knees    Atrial fibrillation (HCC)     CHF (congestive heart failure) (HCC)     Colon polyp     CVA [facility]cerebral vascular accident) ([facility] [address] ) 4/27/2019    NIHSS 26 on presentation    Depression     Diabetes mellitus ([facility] [address] )     Hypertension     Irregular heart beat     Afib    Stroke [facility])     Urinary retention        Past Surgical History:   Procedure Laterality Date    COLONOSCOPY      IR STROKE ALERT  4/27/2019    MENISCECTOMY Right        Current Outpatient Medications   Medication Sig Dispense Refill    Coenzyme Q10 (CO Q 10) 10 MG CAPS Take by mouth daily      cyanocobalamin (VITAMIN B-12) 100 mcg tablet Take by mouth daily      finasteride (PROSCAR) 5 mg tablet Take 5 mg by mouth daily      hydrocortisone 1 % cream Apply topically 2 (two) times a day as needed       ketoconazole (NIZORAL) 2 % shampoo SHAMPOO 3 TIMES A WEEK AS A SCALP TREATMENT, LEAVE ON 5-10 MINUTES AND RINSE      nitrofurantoin (MACRODANTIN) 50 mg capsule Take 2 capsules (100 mg total) by mouth daily 60 capsule 4    pantoprazole (PROTONIX) 40 mg tablet Take 1 tablet (40 mg total) by mouth 2 (two) times a day before meals 120 tablet 0    pravastatin (PRAVACHOL) 20 mg tablet Take 1 tablet (20 mg total) by mouth daily (Patient taking differently: Take 40 mg by mouth daily ) 30 tablet 0    rivaroxaban (Xarelto) 20 mg tablet Take 20 mg by mouth daily with breakfast       tamsulosin (FLOMAX) 0 4 mg Take 0 4 mg by mouth daily with dinner 2 tabs a day       No current facility-administered medications for this visit  Allergies   Allergen Reactions    Cephalexin Throat Swelling     Pt reported throat swelling after taking antibiotic     Penicillins Other (See Comments)       Review of Systems   Constitutional: Negative  Negative for appetite change and fever  HENT: Negative  Negative for hearing loss, tinnitus, trouble swallowing and voice change  Eyes: Negative  Negative for photophobia and pain  Respiratory: Negative  Negative for shortness of breath  Cardiovascular: Negative  Negative for palpitations  Gastrointestinal: Negative  Negative for nausea and vomiting  Endocrine: Negative  Negative for cold intolerance  Genitourinary: Negative  Negative for dysuria, frequency and urgency  Spoke with you about finding a urologist?   Musculoskeletal: Negative  Negative for myalgias and neck pain  Skin: Negative  Negative for rash  Neurological: Negative  Negative for dizziness, tremors, seizures, syncope, facial asymmetry, speech difficulty, weakness, light-headedness, numbness and headaches  Hematological: Negative  Does not bruise/bleed easily  Psychiatric/Behavioral: Negative    Negative for confusion, hallucinations and sleep disturbance  All other systems reviewed and are negative  Reviewed ROS as entered by medical assistant  Video Exam    There were no vitals filed for this visit  Physical Exam     It was my intent to perform this visit via video technology but the patient was not able to do a video connection so the visit was completed via audio telephone only  note that during our telephone discussion he was able to answer questions reasonably well with mild dysarthria  I have spent 30 minutes with Patient and family today including counseling/coordination of care regarding Risks and benefits of tx options, Intructions for management, Patient and family education and Importance of tx compliance as well as on chart review, communication, and documentation  VIRTUAL VISIT DISCLAIMER      Juan M Atkins verbally agrees to participate in [employer]  Pt is aware that [employer] could be limited without vital signs or the ability to perform a full hands-on physical exam  Les Larson understands he or the provider may request at any time to terminate the video visit and request the patient to seek care or treatment in person

## 2021-08-17 NOTE — TELEPHONE ENCOUNTER
Patient completed cystoscopy in May 2020 with Dr Ramiro Fitzgerald  He was advised he would be a candidate for TURP or UroLift at that time however surgery was put on hold due to patient being on anticoagulation for recent stroke  Unsure if Dr Edison Stark would require patient to have a repeat cystoscopy and TRUS evaluation before scheduling for a TURP or UroLift

## 2021-08-19 NOTE — TELEPHONE ENCOUNTER
Patients wife Samina Boswell called back to schedule   Samina Boswell can be reached at [phone]

## 2021-10-05 ENCOUNTER — PATIENT MESSAGE (OUTPATIENT)
Dept: NEUROLOGY | Facility: CLINIC | Age: 71
End: 2021-10-05

## 2021-10-11 ENCOUNTER — TELEPHONE (OUTPATIENT)
Dept: NEUROLOGY | Facility: CLINIC | Age: 71
End: 2021-10-11

## 2021-10-11 DIAGNOSIS — Z86.73 HISTORY OF STROKE: ICD-10-CM

## 2021-10-11 DIAGNOSIS — G81.94 LEFT HEMIPLEGIA (HCC): Primary | ICD-10-CM

## 2021-10-12 ENCOUNTER — PROCEDURE VISIT (OUTPATIENT)
Dept: UROLOGY | Facility: CLINIC | Age: 71
End: 2021-10-12
Payer: MEDICARE

## 2021-10-12 VITALS
DIASTOLIC BLOOD PRESSURE: 72 MMHG | WEIGHT: 210 LBS | SYSTOLIC BLOOD PRESSURE: 122 MMHG | HEIGHT: 72 IN | BODY MASS INDEX: 28.44 KG/M2

## 2021-10-12 DIAGNOSIS — Z87.448 HISTORY OF GROSS HEMATURIA: Primary | ICD-10-CM

## 2021-10-12 DIAGNOSIS — N40.1 BPH WITH OBSTRUCTION/LOWER URINARY TRACT SYMPTOMS: ICD-10-CM

## 2021-10-12 DIAGNOSIS — N21.0 BLADDER STONE: ICD-10-CM

## 2021-10-12 DIAGNOSIS — N13.8 BPH WITH OBSTRUCTION/LOWER URINARY TRACT SYMPTOMS: ICD-10-CM

## 2021-10-12 DIAGNOSIS — N39.0 URINARY TRACT INFECTION WITHOUT HEMATURIA, SITE UNSPECIFIED: ICD-10-CM

## 2021-10-12 PROBLEM — Z87.898 HISTORY OF GROSS HEMATURIA: Status: ACTIVE | Noted: 2021-10-12

## 2021-10-12 PROCEDURE — 99215 OFFICE O/P EST HI 40 MIN: CPT | Performed by: UROLOGY

## 2021-10-12 PROCEDURE — 87086 URINE CULTURE/COLONY COUNT: CPT | Performed by: UROLOGY

## 2021-10-12 PROCEDURE — 52000 CYSTOURETHROSCOPY: CPT | Performed by: UROLOGY

## 2021-10-12 RX ORDER — CIPROFLOXACIN 2 MG/ML
400 INJECTION, SOLUTION INTRAVENOUS ONCE
Status: CANCELLED | OUTPATIENT
Start: 2021-10-12 | End: 2021-10-12

## 2021-10-12 RX ORDER — NITROFURANTOIN MACROCRYSTALS 50 MG/1
50 CAPSULE ORAL
Qty: 30 CAPSULE | Refills: 1 | Status: SHIPPED | OUTPATIENT
Start: 2021-10-12 | End: 2021-11-28

## 2021-10-13 ENCOUNTER — TELEPHONE (OUTPATIENT)
Dept: UROLOGY | Facility: CLINIC | Age: 71
End: 2021-10-13

## 2021-10-13 ENCOUNTER — TELEPHONE (OUTPATIENT)
Dept: NEUROLOGY | Facility: CLINIC | Age: 71
End: 2021-10-13

## 2021-10-13 ENCOUNTER — PREP FOR PROCEDURE (OUTPATIENT)
Dept: UROLOGY | Facility: CLINIC | Age: 71
End: 2021-10-13

## 2021-10-13 DIAGNOSIS — Z87.448 HISTORY OF GROSS HEMATURIA: Primary | ICD-10-CM

## 2021-10-13 DIAGNOSIS — R39.89 SUSPECTED UTI: ICD-10-CM

## 2021-10-13 DIAGNOSIS — Z79.01 ANTICOAGULATED: ICD-10-CM

## 2021-10-13 LAB — BACTERIA UR CULT: NORMAL

## 2021-10-18 ENCOUNTER — CLINICAL SUPPORT (OUTPATIENT)
Dept: URGENT CARE | Facility: MEDICAL CENTER | Age: 71
End: 2021-10-18
Payer: MEDICARE

## 2021-10-18 DIAGNOSIS — Z87.448 HISTORY OF GROSS HEMATURIA: ICD-10-CM

## 2021-10-18 PROCEDURE — 93005 ELECTROCARDIOGRAM TRACING: CPT

## 2021-10-19 LAB
ATRIAL RATE: 64 BPM
P AXIS: 78 DEGREES
PR INTERVAL: 178 MS
QRS AXIS: -19 DEGREES
QRSD INTERVAL: 88 MS
QT INTERVAL: 418 MS
QTC INTERVAL: 431 MS
T WAVE AXIS: 44 DEGREES
VENTRICULAR RATE: 64 BPM

## 2021-10-19 PROCEDURE — 93010 ELECTROCARDIOGRAM REPORT: CPT | Performed by: INTERNAL MEDICINE

## 2021-10-22 ENCOUNTER — APPOINTMENT (OUTPATIENT)
Dept: LAB | Facility: MEDICAL CENTER | Age: 71
End: 2021-10-22
Payer: MEDICARE

## 2021-10-22 DIAGNOSIS — R39.89 SUSPECTED UTI: ICD-10-CM

## 2021-10-22 DIAGNOSIS — Z79.01 ANTICOAGULATED: ICD-10-CM

## 2021-10-22 DIAGNOSIS — Z87.448 HISTORY OF GROSS HEMATURIA: ICD-10-CM

## 2021-10-22 LAB
ANION GAP SERPL CALCULATED.3IONS-SCNC: 5 MMOL/L (ref 4–13)
APTT PPP: 50 SECONDS (ref 23–37)
BASOPHILS # BLD AUTO: 0.02 THOUSANDS/ΜL (ref 0–0.1)
BASOPHILS NFR BLD AUTO: 0 % (ref 0–1)
BUN SERPL-MCNC: 15 MG/DL (ref 5–25)
CALCIUM SERPL-MCNC: 9.5 MG/DL (ref 8.3–10.1)
CHLORIDE SERPL-SCNC: 108 MMOL/L (ref 100–108)
CO2 SERPL-SCNC: 25 MMOL/L (ref 21–32)
CREAT SERPL-MCNC: 0.84 MG/DL (ref 0.6–1.3)
EOSINOPHIL # BLD AUTO: 0.18 THOUSAND/ΜL (ref 0–0.61)
EOSINOPHIL NFR BLD AUTO: 3 % (ref 0–6)
ERYTHROCYTE [DISTWIDTH] IN BLOOD BY AUTOMATED COUNT: 12.9 % (ref 11.6–15.1)
GFR SERPL CREATININE-BSD FRML MDRD: 88 ML/MIN/1.73SQ M
GLUCOSE P FAST SERPL-MCNC: 113 MG/DL (ref 65–99)
HCT VFR BLD AUTO: 48.9 % (ref 36.5–49.3)
HGB BLD-MCNC: 16 G/DL (ref 12–17)
IMM GRANULOCYTES # BLD AUTO: 0.01 THOUSAND/UL (ref 0–0.2)
IMM GRANULOCYTES NFR BLD AUTO: 0 % (ref 0–2)
INR PPP: 1.37 (ref 0.84–1.19)
LYMPHOCYTES # BLD AUTO: 1.36 THOUSANDS/ΜL (ref 0.6–4.47)
LYMPHOCYTES NFR BLD AUTO: 25 % (ref 14–44)
MCH RBC QN AUTO: 31.3 PG (ref 26.8–34.3)
MCHC RBC AUTO-ENTMCNC: 32.7 G/DL (ref 31.4–37.4)
MCV RBC AUTO: 96 FL (ref 82–98)
MONOCYTES # BLD AUTO: 0.31 THOUSAND/ΜL (ref 0.17–1.22)
MONOCYTES NFR BLD AUTO: 6 % (ref 4–12)
NEUTROPHILS # BLD AUTO: 3.59 THOUSANDS/ΜL (ref 1.85–7.62)
NEUTS SEG NFR BLD AUTO: 66 % (ref 43–75)
NRBC BLD AUTO-RTO: 0 /100 WBCS
PLATELET # BLD AUTO: 154 THOUSANDS/UL (ref 149–390)
PMV BLD AUTO: 10 FL (ref 8.9–12.7)
POTASSIUM SERPL-SCNC: 3.8 MMOL/L (ref 3.5–5.3)
PROTHROMBIN TIME: 16.3 SECONDS (ref 11.6–14.5)
RBC # BLD AUTO: 5.12 MILLION/UL (ref 3.88–5.62)
SODIUM SERPL-SCNC: 138 MMOL/L (ref 136–145)
WBC # BLD AUTO: 5.47 THOUSAND/UL (ref 4.31–10.16)

## 2021-10-22 PROCEDURE — 85025 COMPLETE CBC W/AUTO DIFF WBC: CPT

## 2021-10-22 PROCEDURE — 85730 THROMBOPLASTIN TIME PARTIAL: CPT

## 2021-10-22 PROCEDURE — 36415 COLL VENOUS BLD VENIPUNCTURE: CPT

## 2021-10-22 PROCEDURE — 85610 PROTHROMBIN TIME: CPT

## 2021-10-22 PROCEDURE — 80048 BASIC METABOLIC PNL TOTAL CA: CPT

## 2021-11-05 ENCOUNTER — APPOINTMENT (OUTPATIENT)
Dept: LAB | Facility: MEDICAL CENTER | Age: 71
End: 2021-11-05
Payer: MEDICARE

## 2021-11-05 PROCEDURE — 87086 URINE CULTURE/COLONY COUNT: CPT

## 2021-11-06 LAB — BACTERIA UR CULT: NORMAL

## 2021-11-18 ENCOUNTER — ANESTHESIA EVENT (OUTPATIENT)
Dept: PERIOP | Facility: HOSPITAL | Age: 71
End: 2021-11-18
Payer: MEDICARE

## 2021-11-19 ENCOUNTER — TELEPHONE (OUTPATIENT)
Dept: UROLOGY | Facility: CLINIC | Age: 71
End: 2021-11-19

## 2021-11-19 ENCOUNTER — HOSPITAL ENCOUNTER (OUTPATIENT)
Facility: HOSPITAL | Age: 71
Setting detail: OUTPATIENT SURGERY
Discharge: HOME | End: 2021-11-20
Attending: UROLOGY | Admitting: UROLOGY
Payer: MEDICARE

## 2021-11-19 ENCOUNTER — ANESTHESIA (OUTPATIENT)
Dept: PERIOP | Facility: HOSPITAL | Age: 71
End: 2021-11-19
Payer: MEDICARE

## 2021-11-19 DIAGNOSIS — N40.1 BPH WITH OBSTRUCTION/LOWER URINARY TRACT SYMPTOMS: Primary | ICD-10-CM

## 2021-11-19 DIAGNOSIS — N13.8 BPH WITH OBSTRUCTION/LOWER URINARY TRACT SYMPTOMS: Primary | ICD-10-CM

## 2021-11-19 LAB
GLUCOSE SERPL-MCNC: 109 MG/DL (ref 65–140)
GLUCOSE SERPL-MCNC: 163 MG/DL (ref 65–140)
GLUCOSE SERPL-MCNC: 170 MG/DL (ref 65–140)
PLATELET # BLD AUTO: 143 THOUSANDS/UL (ref 149–390)
PMV BLD AUTO: 9.8 FL (ref 8.9–12.7)

## 2021-11-19 PROCEDURE — 52441 CYSTO INSJ TRNSPRSTC 1 IMPLT: CPT | Performed by: UROLOGY

## 2021-11-19 PROCEDURE — NC001 PR NO CHARGE: Performed by: UROLOGY

## 2021-11-19 PROCEDURE — L8699 PROSTHETIC IMPLANT NOS: HCPCS | Performed by: UROLOGY

## 2021-11-19 PROCEDURE — 99024 POSTOP FOLLOW-UP VISIT: CPT | Performed by: UROLOGY

## 2021-11-19 PROCEDURE — 85049 AUTOMATED PLATELET COUNT: CPT | Performed by: UROLOGY

## 2021-11-19 PROCEDURE — 52442 CYSTO INS TRNSPRSTC IMPLT EA: CPT | Performed by: UROLOGY

## 2021-11-19 PROCEDURE — 82948 REAGENT STRIP/BLOOD GLUCOSE: CPT

## 2021-11-19 DEVICE — IMPLANT URO PROSTATE UROLIFT: Type: IMPLANTABLE DEVICE | Site: URETHRA | Status: FUNCTIONAL

## 2021-11-19 RX ORDER — ONDANSETRON 2 MG/ML
4 INJECTION INTRAMUSCULAR; INTRAVENOUS ONCE AS NEEDED
Status: DISCONTINUED | OUTPATIENT
Start: 2021-11-19 | End: 2021-11-19 | Stop reason: HOSPADM

## 2021-11-19 RX ORDER — PROPOFOL 10 MG/ML
INJECTION, EMULSION INTRAVENOUS AS NEEDED
Status: DISCONTINUED | OUTPATIENT
Start: 2021-11-19 | End: 2021-11-19

## 2021-11-19 RX ORDER — CIPROFLOXACIN 2 MG/ML
INJECTION, SOLUTION INTRAVENOUS CONTINUOUS PRN
Status: DISCONTINUED | OUTPATIENT
Start: 2021-11-19 | End: 2021-11-19

## 2021-11-19 RX ORDER — LIDOCAINE HYDROCHLORIDE 10 MG/ML
INJECTION, SOLUTION EPIDURAL; INFILTRATION; INTRACAUDAL; PERINEURAL AS NEEDED
Status: DISCONTINUED | OUTPATIENT
Start: 2021-11-19 | End: 2021-11-19

## 2021-11-19 RX ORDER — OXYCODONE HYDROCHLORIDE 5 MG/1
5 TABLET ORAL EVERY 4 HOURS PRN
Status: DISCONTINUED | OUTPATIENT
Start: 2021-11-19 | End: 2021-11-20 | Stop reason: HOSPADM

## 2021-11-19 RX ORDER — ONDANSETRON 2 MG/ML
INJECTION INTRAMUSCULAR; INTRAVENOUS AS NEEDED
Status: DISCONTINUED | OUTPATIENT
Start: 2021-11-19 | End: 2021-11-19

## 2021-11-19 RX ORDER — FINASTERIDE 5 MG/1
5 TABLET, FILM COATED ORAL
Status: DISCONTINUED | OUTPATIENT
Start: 2021-11-19 | End: 2021-11-20 | Stop reason: SDUPTHER

## 2021-11-19 RX ORDER — SODIUM CHLORIDE, SODIUM LACTATE, POTASSIUM CHLORIDE, CALCIUM CHLORIDE 600; 310; 30; 20 MG/100ML; MG/100ML; MG/100ML; MG/100ML
100 INJECTION, SOLUTION INTRAVENOUS CONTINUOUS
Status: DISPENSED | OUTPATIENT
Start: 2021-11-19 | End: 2021-11-20

## 2021-11-19 RX ORDER — DOCUSATE SODIUM 100 MG/1
100 CAPSULE, LIQUID FILLED ORAL 2 TIMES DAILY
Qty: 30 CAPSULE | Refills: 0 | Status: SHIPPED | OUTPATIENT
Start: 2021-11-19 | End: 2021-12-04

## 2021-11-19 RX ORDER — FENTANYL CITRATE 50 UG/ML
INJECTION, SOLUTION INTRAMUSCULAR; INTRAVENOUS AS NEEDED
Status: DISCONTINUED | OUTPATIENT
Start: 2021-11-19 | End: 2021-11-19

## 2021-11-19 RX ORDER — DEXAMETHASONE SODIUM PHOSPHATE 10 MG/ML
INJECTION, SOLUTION INTRAMUSCULAR; INTRAVENOUS AS NEEDED
Status: DISCONTINUED | OUTPATIENT
Start: 2021-11-19 | End: 2021-11-19

## 2021-11-19 RX ORDER — TAMSULOSIN HYDROCHLORIDE 0.4 MG/1
0.4 CAPSULE ORAL
Status: DISCONTINUED | OUTPATIENT
Start: 2021-11-19 | End: 2021-11-20 | Stop reason: SDUPTHER

## 2021-11-19 RX ORDER — SODIUM CHLORIDE, SODIUM LACTATE, POTASSIUM CHLORIDE, CALCIUM CHLORIDE 600; 310; 30; 20 MG/100ML; MG/100ML; MG/100ML; MG/100ML
50 INJECTION, SOLUTION INTRAVENOUS CONTINUOUS
Status: DISCONTINUED | OUTPATIENT
Start: 2021-11-19 | End: 2021-11-20 | Stop reason: HOSPADM

## 2021-11-19 RX ORDER — HYDROMORPHONE HCL/PF 1 MG/ML
0.5 SYRINGE (ML) INJECTION
Status: DISCONTINUED | OUTPATIENT
Start: 2021-11-19 | End: 2021-11-19 | Stop reason: HOSPADM

## 2021-11-19 RX ORDER — SULFAMETHOXAZOLE AND TRIMETHOPRIM 800; 160 MG/1; MG/1
1 TABLET ORAL EVERY 12 HOURS SCHEDULED
Qty: 4 TABLET | Refills: 0 | Status: SHIPPED | OUTPATIENT
Start: 2021-11-19 | End: 2021-11-22

## 2021-11-19 RX ORDER — CIPROFLOXACIN 2 MG/ML
400 INJECTION, SOLUTION INTRAVENOUS ONCE
Status: DISCONTINUED | OUTPATIENT
Start: 2021-11-19 | End: 2021-11-19 | Stop reason: HOSPADM

## 2021-11-19 RX ORDER — MAGNESIUM HYDROXIDE 1200 MG/15ML
LIQUID ORAL AS NEEDED
Status: DISCONTINUED | OUTPATIENT
Start: 2021-11-19 | End: 2021-11-19 | Stop reason: HOSPADM

## 2021-11-19 RX ORDER — PANTOPRAZOLE SODIUM 40 MG/1
40 TABLET, DELAYED RELEASE ORAL
Status: DISCONTINUED | OUTPATIENT
Start: 2021-11-19 | End: 2021-11-20 | Stop reason: HOSPADM

## 2021-11-19 RX ORDER — ACETAMINOPHEN 325 MG/1
650 TABLET ORAL EVERY 6 HOURS SCHEDULED
Status: DISCONTINUED | OUTPATIENT
Start: 2021-11-19 | End: 2021-11-20 | Stop reason: HOSPADM

## 2021-11-19 RX ORDER — OXYCODONE HYDROCHLORIDE 5 MG/1
2.5 TABLET ORAL EVERY 4 HOURS PRN
Status: DISCONTINUED | OUTPATIENT
Start: 2021-11-19 | End: 2021-11-20 | Stop reason: HOSPADM

## 2021-11-19 RX ORDER — ONDANSETRON 2 MG/ML
4 INJECTION INTRAMUSCULAR; INTRAVENOUS EVERY 6 HOURS PRN
Status: DISCONTINUED | OUTPATIENT
Start: 2021-11-19 | End: 2021-11-20 | Stop reason: HOSPADM

## 2021-11-19 RX ORDER — ACETAMINOPHEN 325 MG/1
650 TABLET ORAL EVERY 4 HOURS PRN
Qty: 30 TABLET | Refills: 0
Start: 2021-11-19

## 2021-11-19 RX ORDER — SODIUM CHLORIDE, SODIUM LACTATE, POTASSIUM CHLORIDE, CALCIUM CHLORIDE 600; 310; 30; 20 MG/100ML; MG/100ML; MG/100ML; MG/100ML
INJECTION, SOLUTION INTRAVENOUS CONTINUOUS PRN
Status: DISCONTINUED | OUTPATIENT
Start: 2021-11-19 | End: 2021-11-19

## 2021-11-19 RX ADMIN — FENTANYL CITRATE 25 MCG: 50 INJECTION, SOLUTION INTRAMUSCULAR; INTRAVENOUS at 07:51

## 2021-11-19 RX ADMIN — SODIUM CHLORIDE, SODIUM LACTATE, POTASSIUM CHLORIDE, AND CALCIUM CHLORIDE 100 ML/HR: .6; .31; .03; .02 INJECTION, SOLUTION INTRAVENOUS at 10:00

## 2021-11-19 RX ADMIN — PANTOPRAZOLE SODIUM 40 MG: 40 TABLET, DELAYED RELEASE ORAL at 16:24

## 2021-11-19 RX ADMIN — PROPOFOL 130 MG: 10 INJECTION, EMULSION INTRAVENOUS at 07:31

## 2021-11-19 RX ADMIN — FENTANYL CITRATE 25 MCG: 50 INJECTION, SOLUTION INTRAMUSCULAR; INTRAVENOUS at 07:59

## 2021-11-19 RX ADMIN — ENOXAPARIN SODIUM 40 MG: 40 INJECTION SUBCUTANEOUS at 12:48

## 2021-11-19 RX ADMIN — LIDOCAINE HYDROCHLORIDE 50 MG: 10 INJECTION, SOLUTION EPIDURAL; INFILTRATION; INTRACAUDAL at 07:31

## 2021-11-19 RX ADMIN — SODIUM CHLORIDE, SODIUM LACTATE, POTASSIUM CHLORIDE, AND CALCIUM CHLORIDE 100 ML/HR: .6; .31; .03; .02 INJECTION, SOLUTION INTRAVENOUS at 21:07

## 2021-11-19 RX ADMIN — ACETAMINOPHEN 650 MG: 325 TABLET, FILM COATED ORAL at 12:49

## 2021-11-19 RX ADMIN — Medication 30 MG: at 16:25

## 2021-11-19 RX ADMIN — FENTANYL CITRATE 25 MCG: 50 INJECTION, SOLUTION INTRAMUSCULAR; INTRAVENOUS at 07:34

## 2021-11-19 RX ADMIN — FENTANYL CITRATE 25 MCG: 50 INJECTION, SOLUTION INTRAMUSCULAR; INTRAVENOUS at 07:40

## 2021-11-19 RX ADMIN — DEXAMETHASONE SODIUM PHOSPHATE 4 MG: 10 INJECTION, SOLUTION INTRAMUSCULAR; INTRAVENOUS at 07:44

## 2021-11-19 RX ADMIN — SODIUM CHLORIDE, SODIUM LACTATE, POTASSIUM CHLORIDE, AND CALCIUM CHLORIDE: .6; .31; .03; .02 INJECTION, SOLUTION INTRAVENOUS at 07:26

## 2021-11-19 RX ADMIN — ACETAMINOPHEN 650 MG: 325 TABLET, FILM COATED ORAL at 17:55

## 2021-11-19 RX ADMIN — ONDANSETRON 4 MG: 2 INJECTION INTRAMUSCULAR; INTRAVENOUS at 07:44

## 2021-11-19 RX ADMIN — FINASTERIDE 5 MG: 5 TABLET, FILM COATED ORAL at 21:24

## 2021-11-19 RX ADMIN — TAMSULOSIN HYDROCHLORIDE 0.4 MG: 0.4 CAPSULE ORAL at 21:24

## 2021-11-19 RX ADMIN — CIPROFLOXACIN: 2 INJECTION, SOLUTION INTRAVENOUS at 07:36

## 2021-11-19 RX ADMIN — CYANOCOBALAMIN TAB 500 MCG 250 MCG: 500 TAB at 16:25

## 2021-11-20 VITALS
BODY MASS INDEX: 28.44 KG/M2 | HEART RATE: 60 BPM | SYSTOLIC BLOOD PRESSURE: 116 MMHG | RESPIRATION RATE: 18 BRPM | HEIGHT: 72 IN | WEIGHT: 210 LBS | OXYGEN SATURATION: 94 % | TEMPERATURE: 97.9 F | DIASTOLIC BLOOD PRESSURE: 57 MMHG

## 2021-11-20 PROCEDURE — 99225 PR SBSQ OBSERVATION CARE/DAY 25 MINUTES: CPT | Performed by: UROLOGY

## 2021-11-20 PROCEDURE — NC001 PR NO CHARGE: Performed by: UROLOGY

## 2021-11-20 RX ORDER — TAMSULOSIN HYDROCHLORIDE 0.4 MG/1
0.4 CAPSULE ORAL
Status: DISCONTINUED | OUTPATIENT
Start: 2021-11-20 | End: 2021-11-20 | Stop reason: HOSPADM

## 2021-11-20 RX ORDER — FINASTERIDE 5 MG/1
5 TABLET, FILM COATED ORAL
Status: DISCONTINUED | OUTPATIENT
Start: 2021-11-20 | End: 2021-11-20 | Stop reason: HOSPADM

## 2021-11-20 RX ORDER — NITROFURANTOIN MACROCRYSTALS 50 MG/1
50 CAPSULE ORAL
Status: DISCONTINUED | OUTPATIENT
Start: 2021-11-20 | End: 2021-11-20 | Stop reason: HOSPADM

## 2021-11-20 RX ORDER — PRAVASTATIN SODIUM 40 MG
40 TABLET ORAL
Status: DISCONTINUED | OUTPATIENT
Start: 2021-11-20 | End: 2021-11-20 | Stop reason: HOSPADM

## 2021-11-20 RX ADMIN — ACETAMINOPHEN 650 MG: 325 TABLET, FILM COATED ORAL at 06:48

## 2021-11-20 RX ADMIN — CYANOCOBALAMIN TAB 500 MCG 250 MCG: 500 TAB at 08:09

## 2021-11-20 RX ADMIN — ENOXAPARIN SODIUM 40 MG: 40 INJECTION SUBCUTANEOUS at 10:35

## 2021-11-20 RX ADMIN — ACETAMINOPHEN 650 MG: 325 TABLET, FILM COATED ORAL at 10:35

## 2021-11-20 RX ADMIN — Medication 30 MG: at 08:09

## 2021-11-20 RX ADMIN — PANTOPRAZOLE SODIUM 40 MG: 40 TABLET, DELAYED RELEASE ORAL at 06:48

## 2021-11-20 RX ADMIN — ACETAMINOPHEN 650 MG: 325 TABLET, FILM COATED ORAL at 00:18

## 2021-11-22 ENCOUNTER — TELEPHONE (OUTPATIENT)
Dept: UROLOGY | Facility: CLINIC | Age: 71
End: 2021-11-22

## 2021-11-22 ENCOUNTER — PROCEDURE VISIT (OUTPATIENT)
Dept: UROLOGY | Facility: CLINIC | Age: 71
End: 2021-11-22
Payer: MEDICARE

## 2021-11-22 VITALS
BODY MASS INDEX: 28.48 KG/M2 | HEART RATE: 64 BPM | DIASTOLIC BLOOD PRESSURE: 70 MMHG | RESPIRATION RATE: 20 BRPM | WEIGHT: 210 LBS | SYSTOLIC BLOOD PRESSURE: 120 MMHG

## 2021-11-22 DIAGNOSIS — N40.1 BPH WITH OBSTRUCTION/LOWER URINARY TRACT SYMPTOMS: Primary | ICD-10-CM

## 2021-11-22 DIAGNOSIS — N13.8 BPH WITH OBSTRUCTION/LOWER URINARY TRACT SYMPTOMS: Primary | ICD-10-CM

## 2021-11-22 PROCEDURE — 1124F ACP DISCUSS-NO DSCNMKR DOCD: CPT

## 2021-11-22 PROCEDURE — 99211 OFF/OP EST MAY X REQ PHY/QHP: CPT

## 2021-11-27 DIAGNOSIS — N39.0 URINARY TRACT INFECTION WITHOUT HEMATURIA, SITE UNSPECIFIED: ICD-10-CM

## 2021-11-28 RX ORDER — NITROFURANTOIN MACROCRYSTALS 50 MG/1
CAPSULE ORAL
Qty: 30 CAPSULE | Refills: 1 | Status: SHIPPED | OUTPATIENT
Start: 2021-11-28 | End: 2022-02-03 | Stop reason: HOSPADM

## 2021-12-27 ENCOUNTER — TELEPHONE (OUTPATIENT)
Dept: UROLOGY | Facility: AMBULATORY SURGERY CENTER | Age: 71
End: 2021-12-27

## 2021-12-27 DIAGNOSIS — R39.9 UTI SYMPTOMS: Primary | ICD-10-CM

## 2021-12-28 ENCOUNTER — OFFICE VISIT (OUTPATIENT)
Dept: UROLOGY | Facility: CLINIC | Age: 71
End: 2021-12-28
Payer: MEDICARE

## 2021-12-28 VITALS
SYSTOLIC BLOOD PRESSURE: 118 MMHG | BODY MASS INDEX: 27.12 KG/M2 | DIASTOLIC BLOOD PRESSURE: 74 MMHG | HEART RATE: 64 BPM | WEIGHT: 200 LBS

## 2021-12-28 DIAGNOSIS — N40.1 BENIGN PROSTATIC HYPERPLASIA WITH URINARY FREQUENCY: Primary | ICD-10-CM

## 2021-12-28 DIAGNOSIS — R35.0 BENIGN PROSTATIC HYPERPLASIA WITH URINARY FREQUENCY: Primary | ICD-10-CM

## 2021-12-28 PROCEDURE — 99214 OFFICE O/P EST MOD 30 MIN: CPT | Performed by: PHYSICIAN ASSISTANT

## 2022-01-13 ENCOUNTER — HOSPITAL ENCOUNTER (OUTPATIENT)
Dept: NON INVASIVE DIAGNOSTICS | Facility: HOSPITAL | Age: 72
Discharge: HOME | End: 2022-01-13
Payer: MEDICARE

## 2022-01-13 DIAGNOSIS — I48.0 PAROXYSMAL ATRIAL FIBRILLATION (HCC): ICD-10-CM

## 2022-01-13 PROCEDURE — 93225 XTRNL ECG REC<48 HRS REC: CPT

## 2022-01-13 PROCEDURE — 93226 XTRNL ECG REC<48 HR SCAN A/R: CPT

## 2022-01-19 PROCEDURE — 93227 XTRNL ECG REC<48 HR R&I: CPT | Performed by: INTERNAL MEDICINE

## 2022-02-02 ENCOUNTER — HOSPITAL ENCOUNTER (OUTPATIENT)
Facility: HOSPITAL | Age: 72
Setting detail: OBSERVATION
Discharge: HOME | End: 2022-02-03
Attending: EMERGENCY MEDICINE | Admitting: INTERNAL MEDICINE
Payer: MEDICARE

## 2022-02-02 ENCOUNTER — APPOINTMENT (OUTPATIENT)
Dept: CT IMAGING | Facility: HOSPITAL | Age: 72
End: 2022-02-02
Payer: MEDICARE

## 2022-02-02 DIAGNOSIS — G81.94 LEFT HEMIPLEGIA (HCC): ICD-10-CM

## 2022-02-02 DIAGNOSIS — R55 SYNCOPE: Primary | ICD-10-CM

## 2022-02-02 DIAGNOSIS — Z86.73 HISTORY OF STROKE: ICD-10-CM

## 2022-02-02 LAB
2HR DELTA HS TROPONIN: 2 NG/L
4HR DELTA HS TROPONIN: 3 NG/L
ALBUMIN SERPL BCP-MCNC: 3.1 G/DL (ref 3.5–5)
ALP SERPL-CCNC: 64 U/L (ref 46–116)
ALT SERPL W P-5'-P-CCNC: 18 U/L (ref 12–78)
ANION GAP SERPL CALCULATED.3IONS-SCNC: 6 MMOL/L (ref 4–13)
APTT PPP: 35 SECONDS (ref 23–37)
AST SERPL W P-5'-P-CCNC: 14 U/L (ref 5–45)
ATRIAL RATE: 394 BPM
ATRIAL RATE: 60 BPM
BASOPHILS # BLD AUTO: 0.02 THOUSANDS/ΜL (ref 0–0.1)
BASOPHILS NFR BLD AUTO: 0 % (ref 0–1)
BILIRUB SERPL-MCNC: 0.48 MG/DL (ref 0.2–1)
BUN SERPL-MCNC: 17 MG/DL (ref 5–25)
CALCIUM ALBUM COR SERPL-MCNC: 9.5 MG/DL (ref 8.3–10.1)
CALCIUM SERPL-MCNC: 8.8 MG/DL (ref 8.3–10.1)
CARDIAC TROPONIN I PNL SERPL HS: 10 NG/L
CARDIAC TROPONIN I PNL SERPL HS: 11 NG/L
CARDIAC TROPONIN I PNL SERPL HS: 8 NG/L
CHLORIDE SERPL-SCNC: 107 MMOL/L (ref 100–108)
CO2 SERPL-SCNC: 27 MMOL/L (ref 21–32)
CREAT SERPL-MCNC: 1.01 MG/DL (ref 0.6–1.3)
EOSINOPHIL # BLD AUTO: 0.07 THOUSAND/ΜL (ref 0–0.61)
EOSINOPHIL NFR BLD AUTO: 1 % (ref 0–6)
ERYTHROCYTE [DISTWIDTH] IN BLOOD BY AUTOMATED COUNT: 13.2 % (ref 11.6–15.1)
FLUAV RNA RESP QL NAA+PROBE: NEGATIVE
FLUBV RNA RESP QL NAA+PROBE: NEGATIVE
GFR SERPL CREATININE-BSD FRML MDRD: 74 ML/MIN/1.73SQ M
GLUCOSE SERPL-MCNC: 120 MG/DL (ref 65–140)
HCT VFR BLD AUTO: 42.1 % (ref 36.5–49.3)
HGB BLD-MCNC: 14 G/DL (ref 12–17)
IMM GRANULOCYTES # BLD AUTO: 0.02 THOUSAND/UL (ref 0–0.2)
IMM GRANULOCYTES NFR BLD AUTO: 0 % (ref 0–2)
INR PPP: 1.25 (ref 0.84–1.19)
LYMPHOCYTES # BLD AUTO: 1.05 THOUSANDS/ΜL (ref 0.6–4.47)
LYMPHOCYTES NFR BLD AUTO: 17 % (ref 14–44)
MCH RBC QN AUTO: 31.7 PG (ref 26.8–34.3)
MCHC RBC AUTO-ENTMCNC: 33.3 G/DL (ref 31.4–37.4)
MCV RBC AUTO: 96 FL (ref 82–98)
MONOCYTES # BLD AUTO: 0.37 THOUSAND/ΜL (ref 0.17–1.22)
MONOCYTES NFR BLD AUTO: 6 % (ref 4–12)
NEUTROPHILS # BLD AUTO: 4.75 THOUSANDS/ΜL (ref 1.85–7.62)
NEUTS SEG NFR BLD AUTO: 76 % (ref 43–75)
NRBC BLD AUTO-RTO: 0 /100 WBCS
NT-PROBNP SERPL-MCNC: 189 PG/ML
P AXIS: 55 DEGREES
PLATELET # BLD AUTO: 138 THOUSANDS/UL (ref 149–390)
PMV BLD AUTO: 9.3 FL (ref 8.9–12.7)
POTASSIUM SERPL-SCNC: 3.5 MMOL/L (ref 3.5–5.3)
PR INTERVAL: 174 MS
PROT SERPL-MCNC: 5.8 G/DL (ref 6.4–8.2)
PROTHROMBIN TIME: 15.4 SECONDS (ref 11.6–14.5)
QRS AXIS: 19 DEGREES
QRS AXIS: 51 DEGREES
QRSD INTERVAL: 90 MS
QRSD INTERVAL: 98 MS
QT INTERVAL: 398 MS
QT INTERVAL: 412 MS
QTC INTERVAL: 412 MS
QTC INTERVAL: 413 MS
RBC # BLD AUTO: 4.41 MILLION/UL (ref 3.88–5.62)
RSV RNA RESP QL NAA+PROBE: NEGATIVE
SARS-COV-2 RNA RESP QL NAA+PROBE: NEGATIVE
SODIUM SERPL-SCNC: 140 MMOL/L (ref 136–145)
T WAVE AXIS: 37 DEGREES
T WAVE AXIS: 56 DEGREES
TSH SERPL DL<=0.05 MIU/L-ACNC: 2.57 UIU/ML (ref 0.36–3.74)
VENTRICULAR RATE: 60 BPM
VENTRICULAR RATE: 65 BPM
WBC # BLD AUTO: 6.28 THOUSAND/UL (ref 4.31–10.16)

## 2022-02-02 PROCEDURE — 84443 ASSAY THYROID STIM HORMONE: CPT | Performed by: PHYSICIAN ASSISTANT

## 2022-02-02 PROCEDURE — 99285 EMERGENCY DEPT VISIT HI MDM: CPT | Performed by: PHYSICIAN ASSISTANT

## 2022-02-02 PROCEDURE — G1004 CDSM NDSC: HCPCS

## 2022-02-02 PROCEDURE — 70450 CT HEAD/BRAIN W/O DYE: CPT

## 2022-02-02 PROCEDURE — 93010 ELECTROCARDIOGRAM REPORT: CPT | Performed by: INTERNAL MEDICINE

## 2022-02-02 PROCEDURE — 85730 THROMBOPLASTIN TIME PARTIAL: CPT | Performed by: PHYSICIAN ASSISTANT

## 2022-02-02 PROCEDURE — 85610 PROTHROMBIN TIME: CPT | Performed by: PHYSICIAN ASSISTANT

## 2022-02-02 PROCEDURE — 93005 ELECTROCARDIOGRAM TRACING: CPT

## 2022-02-02 PROCEDURE — 85025 COMPLETE CBC W/AUTO DIFF WBC: CPT | Performed by: PHYSICIAN ASSISTANT

## 2022-02-02 PROCEDURE — 99285 EMERGENCY DEPT VISIT HI MDM: CPT

## 2022-02-02 PROCEDURE — 83880 ASSAY OF NATRIURETIC PEPTIDE: CPT | Performed by: PHYSICIAN ASSISTANT

## 2022-02-02 PROCEDURE — 99220 PR INITIAL OBSERVATION CARE/DAY 70 MINUTES: CPT | Performed by: INTERNAL MEDICINE

## 2022-02-02 PROCEDURE — 36415 COLL VENOUS BLD VENIPUNCTURE: CPT | Performed by: PHYSICIAN ASSISTANT

## 2022-02-02 PROCEDURE — 84484 ASSAY OF TROPONIN QUANT: CPT | Performed by: PHYSICIAN ASSISTANT

## 2022-02-02 PROCEDURE — 80053 COMPREHEN METABOLIC PANEL: CPT | Performed by: PHYSICIAN ASSISTANT

## 2022-02-02 PROCEDURE — 0241U HB NFCT DS VIR RESP RNA 4 TRGT: CPT | Performed by: INTERNAL MEDICINE

## 2022-02-02 RX ORDER — FINASTERIDE 5 MG/1
5 TABLET, FILM COATED ORAL
Status: DISCONTINUED | OUTPATIENT
Start: 2022-02-02 | End: 2022-02-03 | Stop reason: HOSPADM

## 2022-02-02 RX ORDER — MAGNESIUM HYDROXIDE/ALUMINUM HYDROXICE/SIMETHICONE 120; 1200; 1200 MG/30ML; MG/30ML; MG/30ML
30 SUSPENSION ORAL EVERY 6 HOURS PRN
Status: DISCONTINUED | OUTPATIENT
Start: 2022-02-02 | End: 2022-02-03 | Stop reason: HOSPADM

## 2022-02-02 RX ORDER — ACETAMINOPHEN 325 MG/1
650 TABLET ORAL EVERY 6 HOURS PRN
Status: DISCONTINUED | OUTPATIENT
Start: 2022-02-02 | End: 2022-02-03 | Stop reason: HOSPADM

## 2022-02-02 RX ORDER — SIMETHICONE 80 MG
80 TABLET,CHEWABLE ORAL 4 TIMES DAILY PRN
Status: DISCONTINUED | OUTPATIENT
Start: 2022-02-02 | End: 2022-02-03 | Stop reason: HOSPADM

## 2022-02-02 RX ORDER — ONDANSETRON 2 MG/ML
4 INJECTION INTRAMUSCULAR; INTRAVENOUS EVERY 6 HOURS PRN
Status: DISCONTINUED | OUTPATIENT
Start: 2022-02-02 | End: 2022-02-03 | Stop reason: HOSPADM

## 2022-02-02 RX ORDER — TAMSULOSIN HYDROCHLORIDE 0.4 MG/1
0.4 CAPSULE ORAL
Status: DISCONTINUED | OUTPATIENT
Start: 2022-02-02 | End: 2022-02-03

## 2022-02-02 RX ORDER — CALCIUM CARBONATE 200(500)MG
1000 TABLET,CHEWABLE ORAL DAILY PRN
Status: DISCONTINUED | OUTPATIENT
Start: 2022-02-02 | End: 2022-02-03 | Stop reason: HOSPADM

## 2022-02-02 RX ORDER — DOCUSATE SODIUM 100 MG/1
100 CAPSULE, LIQUID FILLED ORAL 2 TIMES DAILY
Status: DISCONTINUED | OUTPATIENT
Start: 2022-02-02 | End: 2022-02-03 | Stop reason: HOSPADM

## 2022-02-02 RX ORDER — PANTOPRAZOLE SODIUM 40 MG/1
40 TABLET, DELAYED RELEASE ORAL
Status: DISCONTINUED | OUTPATIENT
Start: 2022-02-02 | End: 2022-02-03 | Stop reason: HOSPADM

## 2022-02-02 RX ORDER — PRAVASTATIN SODIUM 10 MG
20 TABLET ORAL
Status: DISCONTINUED | OUTPATIENT
Start: 2022-02-02 | End: 2022-02-03

## 2022-02-02 RX ADMIN — RIVAROXABAN 20 MG: 20 TABLET, FILM COATED ORAL at 16:04

## 2022-02-02 RX ADMIN — FINASTERIDE 5 MG: 5 TABLET, FILM COATED ORAL at 21:00

## 2022-02-02 RX ADMIN — SODIUM CHLORIDE 500 ML: 0.9 INJECTION, SOLUTION INTRAVENOUS at 20:59

## 2022-02-02 RX ADMIN — TAMSULOSIN HYDROCHLORIDE 0.4 MG: 0.4 CAPSULE ORAL at 21:00

## 2022-02-02 RX ADMIN — PRAVASTATIN SODIUM 20 MG: 10 TABLET ORAL at 16:04

## 2022-02-02 RX ADMIN — PANTOPRAZOLE SODIUM 40 MG: 40 TABLET, DELAYED RELEASE ORAL at 16:04

## 2022-02-02 NOTE — ASSESSMENT & PLAN NOTE
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

## 2022-02-02 NOTE — ASSESSMENT & PLAN NOTE
Previous history of stroke with resulting aphasia as well as left-sided weakness, followed by Dr Davina Boyer

## 2022-02-02 NOTE — ASSESSMENT & PLAN NOTE
Wt Readings from Last 3 Encounters:   12/28/21 90 7 kg (200 lb)   11/22/21 95 3 kg (210 lb)   11/12/21 95 3 kg (210 lb)   Weights appear stable, update echocardiogram

## 2022-02-02 NOTE — ED PROVIDER NOTES
History  Chief Complaint   Patient presents with    Syncope     patient had a near syncopal episode at [facility] while receiving rehab  EMS reports patient expressed some dizziness, denies actual LOC and [facility]OS[facility] denies as well  [facility] reported hypotension  Patient is a 69 y/o male hx of CVA (left sided weakness and aphasia - 3 years ago), CHF, HLD, paroxysmal AFIB, DM, depression presenting to the ED for evaluation of syncope  Pt was at rehab today, walking up flight of steps with assistance, when he got to the top, wife states patient became diaphoretic, lightheaded, was sat down in a chair and lost consciousness  No head injury  When patient came to he was noted to be hypotensive and doctor who saw him said he may be in Afib  EMS was called and brought patient to ED, 1L IVF given en route  Pt states he feels improved at this time  Wife notes he had no changes in his chronic deficits, did not note any worsening speech, no facial asymmetry  Pt without fever, chest pain, SOB, dizziness, headache, vision changes, leg pain/swelling, abdominal pain, N/V  Prior to Admission Medications   Prescriptions Last Dose Informant Patient Reported? Taking?    Coenzyme Q10 (CO Q 10) 10 MG CAPS 2/2/2022 at Unknown time Spouse/Significant Other Yes Yes   Sig: Take by mouth daily   acetaminophen (TYLENOL) 325 mg tablet   No No   Sig: Take 2 tablets (650 mg total) by mouth every 4 (four) hours as needed for mild pain   Patient not taking: Reported on 12/28/2021    cyanocobalamin (VITAMIN B-12) 100 mcg tablet 2/2/2022 at Unknown time Spouse/Significant Other Yes Yes   Sig: Take by mouth daily   docusate sodium (COLACE) 100 mg capsule   No No   Sig: Take 1 capsule (100 mg total) by mouth 2 (two) times a day for 15 days   finasteride (PROSCAR) 5 mg tablet 2/1/2022 at Unknown time Spouse/Significant Other Yes Yes   Sig: Take 5 mg by mouth daily at bedtime     hydrocortisone 1 % cream Past Month at Unknown time Spouse/Significant Other Yes Yes   Sig: Apply topically 2 (two) times a day as needed    ketoconazole (NIZORAL) 2 % shampoo Past Month at Unknown time Spouse/Significant Other Yes Yes   Sig: SHAMPOO 3 TIMES A WEEK AS A SCALP TREATMENT, LEAVE ON 5-10 MINUTES AND RINSE   nitrofurantoin (MACRODANTIN) 50 mg capsule   No No   Sig: TAKE 1 CAPSULE BY MOUTH AT BEDTIME   Patient not taking: Reported on 12/28/2021   pantoprazole (PROTONIX) 40 mg tablet 2/2/2022 at Unknown time Spouse/Significant Other No Yes   Sig: Take 1 tablet (40 mg total) by mouth 2 (two) times a day before meals   pravastatin (PRAVACHOL) 20 mg tablet 2/1/2022 at Unknown time Spouse/Significant Other No Yes   Sig: Take 1 tablet (20 mg total) by mouth daily   Patient taking differently: Take 40 mg by mouth daily at bedtime     rivaroxaban (Xarelto) 20 mg tablet 2/1/2022 at Unknown time Spouse/Significant Other Yes Yes   Sig: Take 20 mg by mouth daily with dinner    tamsulosin (FLOMAX) 0 4 mg 2/1/2022 at Unknown time Spouse/Significant Other Yes Yes   Sig: Take 0 4 mg by mouth daily at bedtime 2 tabs a day       Facility-Administered Medications: None       Past Medical History:   Diagnosis Date    Acute encephalopathy 5/27/2019    Arthritis     BL knees    Atrial fibrillation (HCC)     CHF (congestive heart failure) (HCC)     Colon polyp     CVA (cerebral vascular accident) ([facility] [address] ) 4/27/2019    NIHSS 26 on presentation    Depression     Diabetes mellitus ([facility] [address] )     borderline    Encephalopathy acute 4/28/2019    History of transfusion 04/2019    Hyperlipidemia     Irregular heart beat     Afib    Stroke ([facility] [address] ) 04/27/2019    Left sided weakness-aphasia    Urinary retention        Past Surgical History:   Procedure Laterality Date    COLONOSCOPY      IR STROKE ALERT  4/27/2019    MENISCECTOMY Right     [place] CYSTOURETHRO W/IMPLANT N/A 11/19/2021    Procedure: CYSTOSCOPY WITH INSERTION Ericka Izquierdo;  Surgeon: Saintclair Lah, MD;  Location: [facility];  Service: Urology History reviewed  No pertinent family history  I have reviewed and agree with the history as documented  E-Cigarette/Vaping    E-Cigarette Use Never User      E-Cigarette/Vaping Substances    Nicotine No     THC No     CBD No     Flavoring No     Other No     Unknown No      Social History     Tobacco Use    Smoking status: Never Smoker    Smokeless tobacco: Never Used   Vaping Use    Vaping Use: Never used   Substance Use Topics    Alcohol use: Not Currently     Comment: social    Drug use: Never       Review of Systems   Constitutional: Negative for chills and fever  HENT: Negative for congestion, ear pain and sore throat  Eyes: Negative for visual disturbance  Respiratory: Negative for cough and shortness of breath  Cardiovascular: Negative for chest pain  Gastrointestinal: Negative for abdominal pain, diarrhea, nausea and vomiting  Genitourinary: Negative for dysuria and hematuria  Musculoskeletal: Negative for back pain and neck pain  Skin: Negative for rash  Neurological: Positive for syncope, speech difficulty (chronic aphasia), weakness (chronic left sided) and light-headedness  Negative for facial asymmetry and headaches  Psychiatric/Behavioral: Negative for confusion  Physical Exam  Physical Exam  Constitutional:       General: He is not in acute distress  Appearance: He is not ill-appearing, toxic-appearing or diaphoretic  HENT:      Head: Normocephalic and atraumatic  Right Ear: External ear normal       Left Ear: External ear normal       Nose: Nose normal       Mouth/Throat:      Lips: Pink  Mouth: Mucous membranes are moist    Eyes:      Extraocular Movements: Extraocular movements intact  Conjunctiva/sclera: Conjunctivae normal    Cardiovascular:      Rate and Rhythm: Normal rate and regular rhythm  Pulmonary:      Effort: Pulmonary effort is normal  No tachypnea or respiratory distress  Breath sounds: Normal breath sounds  No decreased breath sounds, wheezing, rhonchi or rales  Musculoskeletal:      Cervical back: Normal range of motion and neck supple  Right lower leg: Normal  No swelling, tenderness or bony tenderness  No edema  Left lower leg: Normal  No swelling, tenderness or bony tenderness  No edema  Skin:     General: Skin is warm and dry  Capillary Refill: Capillary refill takes less than 2 seconds  Neurological:      Mental Status: He is alert and oriented to person, place, and time  GCS: GCS eye subscore is 4  GCS verbal subscore is 5  GCS motor subscore is 6  Motor: Weakness present        Comments: Chronic left sided weakness upper and lower extremity  Aphasia    Psychiatric:         Mood and Affect: Mood and affect normal          Speech: Speech normal          Vital Signs  ED Triage Vitals [02/02/22 1225]   Temperature Pulse Respirations Blood Pressure SpO2   (!) 97 4 °F (36 3 °C) 58 18 156/74 94 %      Temp Source Heart Rate Source Patient Position - Orthostatic VS BP Location FiO2 (%)   Oral Monitor Lying Right arm --      Pain Score       No Pain           Vitals:    02/03/22 1032 02/03/22 1033 02/03/22 1034 02/03/22 1106   BP: 147/78 150/82 165/81 146/76   Pulse:   77 72   Patient Position - Orthostatic VS: Lying - Orthostatic VS Sitting - Orthostatic VS Standing - Orthostatic VS Sitting         Visual Acuity      ED Medications  Medications   sodium chloride 0 9 % bolus 500 mL (500 mL Intravenous New Bag 2/2/22 2059)       Diagnostic Studies  Results Reviewed     Procedure Component Value Units Date/Time    HS Troponin I 4hr [[id number]]  (Normal) Collected: 02/02/22 1732    Lab Status: Final result Specimen: Blood from Hand, Left Updated: 02/02/22 1806     hs TnI 4hr 11 ng/L      Delta 4hr hsTnI 3 ng/L     HS Troponin I 2hr [[id number]]  (Normal) Collected: 02/02/22 1502    Lab Status: Final result Specimen: Blood from Hand, Left Updated: 02/02/22 1624     hs TnI 2hr 10 ng/L      Delta 2hr hsTnI 2 ng/L     COVID/FLU/RSV [[id number]]  (Normal) Collected: 02/02/22 1502    Lab Status: Final result Specimen: Nares from Nasopharyngeal Swab Updated: 02/02/22 9879     SARS-CoV-2 Negative     INFLUENZA A PCR Negative     INFLUENZA B PCR Negative     RSV PCR Negative    Narrative:      FOR PEDIATRIC PATIENTS - copy/paste COVID Guidelines URL to browser: [email or web address]  [employer]    SARS-CoV-2 assay is a Nucleic Acid Amplification assay intended for the  qualitative detection of nucleic acid from SARS-CoV-2 in nasopharyngeal  swabs  Results are for the presumptive identification of SARS-CoV-2 RNA  Positive results are indicative of infection with SARS-CoV-2, the virus  causing COVID-19, but do not rule out bacterial infection or co-infection  with other viruses  Laboratories within the [place] and its  territories are required to report all positive results to the appropriate  public health authorities  Negative results do not preclude SARS-CoV-2  infection and should not be used as the sole basis for treatment or other  patient management decisions  Negative results must be combined with  clinical observations, patient history, and epidemiological information  This test has not been FDA cleared or approved  This test has been authorized by FDA under an Emergency Use Authorization  (EUA)  This test is only authorized for the duration of time the  declaration that circumstances exist justifying the authorization of the  emergency use of an in vitro diagnostic tests for detection of SARS-CoV-2  virus and/or diagnosis of COVID-19 infection under section 564(b)(1) of  the Act, 21 U  S C  208NGT-8(A)(0), unless the authorization is terminated  or revoked sooner  The test has been validated but independent review by FDA  and CLIA is pending  Test performed using [employer]: This RT-PCR assay targets N2,  a region unique to SARS-CoV-2   A conserved region in the E-gene was chosen  for pan-Sarbecovirus detection which includes SARS-CoV-2  TSH [[id number]]  (Normal) Collected: 02/02/22 1308    Lab Status: Final result Specimen: Blood from Hand, Left Updated: 02/02/22 1524     TSH 3RD GENERATON 2 571 uIU/mL     Narrative:      Patients undergoing fluorescein dye angiography may retain small amounts of fluorescein in the body for 48-72 hours post procedure  Samples containing fluorescein can produce falsely depressed TSH values  If the patient had this procedure,a specimen should be resubmitted post fluorescein clearance        NT-BNP PRO [[id number]]  (Abnormal) Collected: 02/02/22 1308    Lab Status: Final result Specimen: Blood from Hand, Left Updated: 02/02/22 1457     NT-proBNP 189 pg/mL     HS Troponin 0hr (reflex protocol) [[id number]]  (Normal) Collected: 02/02/22 1308    Lab Status: Final result Specimen: Blood from Hand, Left Updated: 02/02/22 1344     hs TnI 0hr 8 ng/L     Comprehensive metabolic panel [[id number]]  (Abnormal) Collected: 02/02/22 1308    Lab Status: Final result Specimen: Blood from Hand, Left Updated: 02/02/22 1342     Sodium 140 mmol/L      Potassium 3 5 mmol/L      Chloride 107 mmol/L      CO2 27 mmol/L      ANION GAP 6 mmol/L      BUN 17 mg/dL      Creatinine 1 01 mg/dL      Glucose 120 mg/dL      Calcium 8 8 mg/dL      Corrected Calcium 9 5 mg/dL      AST 14 U/L      ALT 18 U/L      Alkaline Phosphatase 64 U/L      Total Protein 5 8 g/dL      Albumin 3 1 g/dL      Total Bilirubin 0 48 mg/dL      eGFR 74 ml/min/1 73sq m     Narrative:      [employer] guidelines for Chronic Kidney Disease (CKD):     Stage 1 with normal or high GFR (GFR > 90 mL/min/1 73 square meters)    Stage 2 Mild CKD (GFR = 60-89 mL/min/1 73 square meters)    Stage 3A Moderate CKD (GFR = 45-59 mL/min/1 73 square meters)    Stage 3B Moderate CKD (GFR = 30-44 mL/min/1 73 square meters)    Stage 4 Severe CKD (GFR = 15-29 mL/min/1 73 square meters)    Stage 5 End Stage CKD (GFR <15 mL/min/1 73 square meters)  Note: GFR calculation is accurate only with a steady state creatinine    APTT [[id number]]  (Normal) Collected: 02/02/22 1308    Lab Status: Final result Specimen: Blood from Hand, Left Updated: 02/02/22 1339     PTT 35 seconds     Protime-INR [[id number]]  (Abnormal) Collected: 02/02/22 1308    Lab Status: Final result Specimen: Blood from Hand, Left Updated: 02/02/22 1339     Protime 15 4 seconds      INR 1 25    CBC and differential [[id number]]  (Abnormal) Collected: 02/02/22 1308    Lab Status: Final result Specimen: Blood from Hand, Left Updated: 02/02/22 1318     WBC 6 28 Thousand/uL      RBC 4 41 Million/uL      Hemoglobin 14 0 g/dL      Hematocrit 42 1 %      MCV 96 fL      MCH 31 7 pg      MCHC 33 3 g/dL      RDW 13 2 %      MPV 9 3 fL      Platelets 126 Thousands/uL      nRBC 0 /100 WBCs      Neutrophils Relative 76 %      Immat GRANS % 0 %      Lymphocytes Relative 17 %      Monocytes Relative 6 %      Eosinophils Relative 1 %      Basophils Relative 0 %      Neutrophils Absolute 4 75 Thousands/µL      Immature Grans Absolute 0 02 Thousand/uL      Lymphocytes Absolute 1 05 Thousands/µL      Monocytes Absolute 0 37 Thousand/µL      Eosinophils Absolute 0 07 Thousand/µL      Basophils Absolute 0 02 Thousands/µL                  CT head wo contrast   Final Result by Mayra Oliva MD (02/02 9914)      No acute intracranial abnormality  Stable chronic ischemic changes in the right hemisphere involving the frontal and parietal lobes  Worsening left maxillary sinus disease with internal hyperdensity  Otherwise stable mucosal thickening and sinonasal polyposis                               Workstation performed: [id number]                    Procedures  ECG 12 Lead Documentation Only    Date/Time: 2/2/2022 12:57 PM  Performed by: Harriett Miles PA-C  Authorized by: Harriett Miles PA-C     Indications / Diagnosis: Syncope  ECG reviewed by me, the ED Provider: yes    Patient location:  ED  Previous ECG:     Previous ECG:  Compared to current    Comparison ECG info:  18-oct-2021    Similarity:  No change    Comparison to cardiac monitor: Yes    Interpretation:     Interpretation: abnormal    Quality:     Tracing quality:  Limited by artifact  Rate:     ECG rate:  60    ECG rate assessment: normal    Rhythm:     Rhythm: sinus rhythm    Ectopy:     Ectopy: PVCs    QRS:     QRS axis:  Normal    QRS intervals:  Normal  Conduction:     Conduction: normal    ST segments:     ST segments:  Normal  T waves:     T waves: normal    Comments:      QT/QTc: 412/412  No STEMI             ED Course  ED Course as of 02/04/22 0717   Wed Feb 02, 2022   1420 Discussed with SLIM  We had a detailed discussion of the patient's condition and case, including need for admission   Accepts to their service   Bed request/bridging orders placed  SBIRT 20yo+      Most Recent Value   SBIRT (22 yo +)    In order to provide better care to our patients, we are screening all of our patients for alcohol and drug use  Would it be okay to ask you these screening questions? Unable to answer at this time Filed at: 02/02/2022 1229                    MDM  Number of Diagnoses or Management Options  History of stroke: established and worsening  Left hemiplegia ([address] ): established and worsening  Syncope: new and requires workup  Diagnosis management comments: Patient is a 69 y/o male hx of CVA (left sided weakness and aphasia - 3 years ago), CHF, HLD, paroxysmal AFIB, DM, depression presenting to the ED for evaluation of syncope  Will obtain cardiac workup including EKG, troponin and place patient on telemetry   EKG showing NSR w/PVCs  No new deficits from baseline   Patient was supposed to have Cardiology appt  Today  Dr Marin Almaguer was aware patient was here  Discussed with RAFFI   We had a detailed discussion of the patient's condition and case, including need for admission   Accepts to their service   Bed request/bridging orders placed  Disposition  Final diagnoses:   Syncope   History of stroke   Left hemiplegia ([address] )     Time reflects when diagnosis was documented in both MDM as applicable and the Disposition within this note     Time User Action Codes Description Comment    2/2/2022  2:20 PM Zachary Coffey Add [R55] Syncope     2/2/2022  2:20 PM Zachary Coffey Add [Z86 73] History of stroke     2/2/2022  2:20 PM Zachary Coffey Add [G81 [id number]] Left hemiplegia [facility])       ED Disposition     ED Disposition Condition Date/Time Comment    Admit Stable Wed Feb 2, 2022  2:20 PM Case was discussed with RAFFI and the patient's admission status was agreed to be Admission Status: observation status to the service of Dr Traci Jones           Follow-up Information    None         Discharge Medication List as of 2/3/2022  3:07 PM      CONTINUE these medications which have NOT CHANGED    Details   Coenzyme Q10 (CO Q 10) 10 MG CAPS Take by mouth daily, Historical Med      cyanocobalamin (VITAMIN B-12) 100 mcg tablet Take by mouth daily, Historical Med      finasteride (PROSCAR) 5 mg tablet Take 5 mg by mouth daily at bedtime  , Historical Med      hydrocortisone 1 % cream Apply topically 2 (two) times a day as needed , Historical Med      ketoconazole (NIZORAL) 2 % shampoo SHAMPOO 3 TIMES A WEEK AS A SCALP TREATMENT, LEAVE ON 5-10 MINUTES AND RINSE, Historical Med      pantoprazole (PROTONIX) 40 mg tablet Take 1 tablet (40 mg total) by mouth 2 (two) times a day before meals, Starting Fri 5/31/2019, No Print      pravastatin (PRAVACHOL) 20 mg tablet Take 1 tablet (20 mg total) by mouth daily, Starting Sun 12/29/2019, Normal      rivaroxaban (Xarelto) 20 mg tablet Take 20 mg by mouth daily with dinner , Starting Wed 4/22/2020, Historical Med      acetaminophen (TYLENOL) 325 mg tablet Take 2 tablets (650 mg total) by mouth every 4 (four) hours as needed for mild pain, Starting Fri 11/19/2021, No Print      docusate sodium (COLACE) 100 mg capsule Take 1 capsule (100 mg total) by mouth 2 (two) times a day for 15 days, Starting Fri 11/19/2021, Until Sat 12/4/2021, Normal         STOP taking these medications       tamsulosin (FLOMAX) 0 4 mg Comments:   Reason for Stopping:         nitrofurantoin (MACRODANTIN) 50 mg capsule Comments:   Reason for Stopping:               Outpatient Discharge Orders   Discharge Diet     Activity as tolerated     Call provider for:  persistent nausea or vomiting     Call provider for:  severe uncontrolled pain     Call provider for: active or persistent bleeding     Call provider for:  difficulty breathing, headache or visual disturbances     Call provider for:  hives     Call provider for:  persistent dizziness or light-headedness     Call provider for:  extreme fatigue       PDMP Review     None          ED Provider  Electronically Signed by           Daryl Johns PA-C  02/02/22 119 Countess Kelli PA-C  02/04/22 1154

## 2022-02-02 NOTE — H&P
[facility]  H&P- Destiny Pour 1950, 70 y o  male MRN: [id number]  Unit/Bed#: ED 16 Encounter: [id number]  Primary Care Provider: Wendy Mccullough MD   Date and time admitted to hospital: 2/2/2022 12:19 PM    Assessment and Plan  * Syncope  Assessment & Plan  Patient presenting with syncopal episode while at rehab  History of CVA from Right ROB/MCA territory embolic CVA 87/7043--HYKPLBTOOSH aphasia  History of atrial fibrillation, does have loop recorder with results in the medical record  Fully anticoagulated with Xarelto, the likelihood of pulmonary embolism is extremely low  Check cardiac enzymes- Previous Vasovagal syncope episode from 1/2020  Will give fluid bolus and check orthostatic vital  No recent echocardiogram, Will order to rule out valvular disease  Monitor on telemetry    History of stroke  Assessment & Plan  Previous history of stroke with resulting aphasia as well as left-sided weakness, followed by Dr Ladonna Alex Cystoscopy with UroLift implant from 11/2021- Doing well and on Proscar and finasteride    Chronic diastolic CHF (congestive heart failure) ([facility] )  Assessment & Plan  Wt Readings from Last 3 Encounters:   12/28/21 90 7 kg (200 lb)   11/22/21 95 3 kg (210 lb)   11/12/21 95 3 kg (210 lb)   Weights appear stable, update echocardiogram          Paroxysmal atrial fibrillation (HCC)  Assessment & Plan  Ventricular rate control  Continue Xarelto    Prediabetes  Assessment & Plan  Last A1c of 5 5, not on medications an outpatient diet controlled        Code Status:  Full code    VTE Prophylaxis: Rivaroxaban (Xarelto)  / sequential compression device     POLST: There is no POLST form on file for this patient (pre-hospital)  Discussion with family:  Wife at bedside    Anticipated Length of Stay:  Patient will be admitted on an Observation basis with an anticipated length of stay of  less than 2 midnights  Justification for Hospital Stay: Syncope , while at rehab    Total Time for Visit, including Counseling / Coordination of Care: 45 minutes  Greater than 50% of this total time spent on direct patient counseling and coordination of care  Chief Complaint:     Chief Complaint   Patient presents with    Syncope     patient had a near syncopal episode at [facility] while receiving rehab  EMS reports patient expressed some dizziness, denies actual LOC and GOSH denies as well  GOSH reported hypotension  History of Present Illness:    Parish Vallejo is a 70 y o  male who presents with near syncopal episode while at [facility]  There was no actual loss of consciousness, he was noted to be hypotensive prior to arrival and also to have elevated heart rate  The patient's past medical history is significant for right ROB/MCA territory embolic stroke from April 2019  This resulted in left-sided weakness as well as resultant aphasia which had improved with physical therapy  He has no recent medication changes, does have a history of pre diabetes which is well controlled     At the time exam he denies any chest pain, no shortness of breath difficulty breathing  He has no new weakness  He did have a previous syncopal episode which was similar in January 2020  He is no longer on Keppra, and has good follow-up with Cardiology as well as Neurology  Denies any nausea vomiting, no recent travel or trip history  He is vaccinated for coronavirus x3 with booster     Review of Systems:    A complete and comprehensive 14 point organ system review was performed and all other systems are negative other than stated above in the HPI    Past Medical and Surgical History:     Past Medical History:   Diagnosis Date    Acute encephalopathy 5/27/2019    Arthritis     BL knees    Atrial fibrillation (HCC)     CHF (congestive heart failure) (HCC)     Colon polyp     [facility]re[facility] 4/27/2019 NIHSS 26 on presentation    Depression     Diabetes mellitus ([facility] [address] )     borderline    Encephalopathy acute 4/28/2019    History of transfusion 04/2019    Hyperlipidemia     Irregular heart beat     Afib    Stroke ([facility] ) 04/27/2019    Left sided weakness-aphasia    Urinary retention        Past Surgical History:   Procedure Laterality Date    COLONOSCOPY      IR STROKE ALERT  4/27/2019    MENISCECTOMY Right     IA CYSTOURETHRO W/IMPLANT N/A 11/19/2021    Procedure: CYSTOSCOPY WITH INSERTION Cortez Freeze;  Surgeon: Janet Keenan MD;  Location: [facility];  Service: Urology       Meds/Allergies:    Prior to Admission medications    Medication Sig Start Date End Date Taking?  Authorizing Provider   Coenzyme Q10 (CO Q 10) 10 MG CAPS Take by mouth daily   Yes Historical Provider, MD   cyanocobalamin (VITAMIN B-12) 100 mcg tablet Take by mouth daily   Yes Historical Provider, MD   finasteride (PROSCAR) 5 mg tablet Take 5 mg by mouth daily at bedtime     Yes Historical Provider, MD   hydrocortisone 1 % cream Apply topically 2 (two) times a day as needed    Yes Historical Provider, MD   ketoconazole (NIZORAL) 2 % shampoo SHAMPOO 3 TIMES A WEEK AS A SCALP TREATMENT, LEAVE ON 5-10 MINUTES AND RINSE 7/16/21  Yes Historical Provider, MD   pantoprazole (PROTONIX) 40 mg tablet Take 1 tablet (40 mg total) by mouth 2 (two) times a day before meals 5/31/19  Yes Omayra Cates MD   pravastatin (PRAVACHOL) 20 mg tablet Take 1 tablet (20 mg total) by mouth daily  Patient taking differently: Take 40 mg by mouth daily at bedtime   12/29/19  Yes Maco Ballard,    rivaroxaban (Xarelto) 20 mg tablet Take 20 mg by mouth daily with dinner  4/22/20  Yes Historical Provider, MD   tamsulosin (FLOMAX) 0 4 mg Take 0 4 mg by mouth daily at bedtime 2 tabs a day    Yes Historical Provider, MD   acetaminophen (TYLENOL) 325 mg tablet Take 2 tablets (650 mg total) by mouth every 4 (four) hours as needed for mild pain  Patient not taking: Reported on 12/28/2021 11/19/21   Jonas Melton MD   docusate sodium (COLACE) 100 mg capsule Take 1 capsule (100 mg total) by mouth 2 (two) times a day for 15 days 11/19/21 12/4/21  Jonas Melton MD   nitrofurantoin (MACRODANTIN) 50 mg capsule TAKE 1 CAPSULE BY MOUTH AT BEDTIME  Patient not taking: Reported on 12/28/2021 11/28/21   Jonas Melton MD     I have reviewed home medications with patient personally  Allergies: Allergies   Allergen Reactions    Cephalexin Throat Swelling     Pt reported throat swelling after taking antibiotic     Penicillins Other (See Comments)       Social History:     Marital Status: /Civil Union   Occupation:  Retired    Substance Use History:   Social History     Substance and Sexual Activity   Alcohol Use Not Currently    Comment: social     Social History     Tobacco Use   Smoking Status Never Smoker   Smokeless Tobacco Never Used     Social History     Substance and Sexual Activity   Drug Use Never       Family History:    History reviewed  No pertinent family history      Physical Exam:     Vitals:   Blood Pressure: 156/74 (02/02/22 1225)  Pulse: 58 (02/02/22 1225)  Temperature: (!) 97 4 °F (36 3 °C) (02/02/22 1225)  Temp Source: Oral (02/02/22 1225)  Respirations: 18 (02/02/22 1225)  SpO2: 94 % (02/02/22 1225)      General: well appearing, no acute distress  HEENT: atraumatic, PERRLA, moist mucosa, normal pharynx, normal tonsils and adenoids, normal tongue, no fluid in sinuses  Neck: Trachea midline, no carotid bruit, no masses  Respiratory: normal chest wall expansion, CTA B, no r/r/w, no rubs  Cardiovascular:  Irregularly irregular, murmur present  Abdomen: Soft, non-tender, non-distended, normal bowel sounds in all quadrants, no hepatosplenomegaly, no tympany  Rectal: deferred  Musculoskeletal:  Left-sided weakness  Integumentary: warm, dry, and pink, with no rash, purpura, or petechia  Heme/Lymph: no lymphadenopathy, no bruises  Neurological: Cranial Nerves II-XII   Psychiatric: cooperative    Additional Data:     Lab Results: I have personally reviewed pertinent reports  Results from last 7 days   Lab Units 02/02/22  1308   WBC Thousand/uL 6 28   HEMOGLOBIN g/dL 14 0   HEMATOCRIT % 42 1   PLATELETS Thousands/uL 138*   NEUTROS PCT % 76*   LYMPHS PCT % 17   MONOS PCT % 6   EOS PCT % 1     Results from last 7 days   Lab Units 02/02/22  1308   SODIUM mmol/L 140   POTASSIUM mmol/L 3 5   CHLORIDE mmol/L 107   CO2 mmol/L 27   BUN mg/dL 17   CREATININE mg/dL 1 01   ANION GAP mmol/L 6   CALCIUM mg/dL 8 8   ALBUMIN g/dL 3 1*   TOTAL BILIRUBIN mg/dL 0 48   ALK PHOS U/L 64   ALT U/L 18   AST U/L 14   GLUCOSE RANDOM mg/dL 120     Results from last 7 days   Lab Units 02/02/22  1308   INR  1 25*                 Results from last 7 days   Lab Units 02/02/22  1308   HS TNI 0HR ng/L 8       Imaging: I have personally reviewed pertinent reports  CT head wo contrast    (Results Pending)       EKG, Pathology, and Other Studies Reviewed on Admission:   · Atrial fibrillation rate of 60, no acute ST segment elevations or depressions    Allscripts / Epic Records Reviewed: Yes     ** Please Note: This note was completed in part utilizing M-Modal Fluency Direct Software  Grammatical errors, random word insertions, spelling mistakes, and incomplete sentences may be an occasional consequence of this system secondary to software limitations, ambient noise, and hardware issues  If you have any questions or concerns about the content, text, or information contained within the body of this dictation, please contact the provider for clarification  **

## 2022-02-03 ENCOUNTER — APPOINTMENT (OUTPATIENT)
Dept: NON INVASIVE DIAGNOSTICS | Facility: HOSPITAL | Age: 72
End: 2022-02-03
Payer: MEDICARE

## 2022-02-03 VITALS
DIASTOLIC BLOOD PRESSURE: 76 MMHG | HEART RATE: 72 BPM | OXYGEN SATURATION: 99 % | RESPIRATION RATE: 18 BRPM | BODY MASS INDEX: 27.09 KG/M2 | SYSTOLIC BLOOD PRESSURE: 146 MMHG | WEIGHT: 200 LBS | TEMPERATURE: 97.7 F | HEIGHT: 72 IN

## 2022-02-03 LAB
ANION GAP SERPL CALCULATED.3IONS-SCNC: 8 MMOL/L (ref 4–13)
AORTIC ROOT: 3.8 CM
AORTIC VALVE MEAN VELOCITY: 8.8 M/S
APICAL FOUR CHAMBER EJECTION FRACTION: 68 %
ASCENDING AORTA: 4.1 CM (ref 2.11–3.15)
AV LVOT MEAN GRADIENT: 2 MMHG
AV LVOT PEAK GRADIENT: 2 MMHG
AV MEAN GRADIENT: 4 MMHG
AV PEAK GRADIENT: 7 MMHG
AV VELOCITY RATIO: 0.58
BUN SERPL-MCNC: 15 MG/DL (ref 5–25)
CALCIUM SERPL-MCNC: 9 MG/DL (ref 8.3–10.1)
CHLORIDE SERPL-SCNC: 107 MMOL/L (ref 100–108)
CO2 SERPL-SCNC: 27 MMOL/L (ref 21–32)
CREAT SERPL-MCNC: 0.9 MG/DL (ref 0.6–1.3)
DOP CALC AO PEAK VEL: 1.34 M/S
DOP CALC AO VTI: 29.91 CM
DOP CALC LVOT PEAK VEL VTI: 19.66 CM
DOP CALC LVOT PEAK VEL: 0.78 M/S
E WAVE DECELERATION TIME: 266 MS
ERYTHROCYTE [DISTWIDTH] IN BLOOD BY AUTOMATED COUNT: 13.4 % (ref 11.6–15.1)
FRACTIONAL SHORTENING: 32 % (ref 28–44)
GFR SERPL CREATININE-BSD FRML MDRD: 85 ML/MIN/1.73SQ M
GLUCOSE P FAST SERPL-MCNC: 112 MG/DL (ref 65–99)
GLUCOSE SERPL-MCNC: 112 MG/DL (ref 65–140)
HCT VFR BLD AUTO: 41.4 % (ref 36.5–49.3)
HGB BLD-MCNC: 13.5 G/DL (ref 12–17)
INTERVENTRICULAR SEPTUM IN DIASTOLE (PARASTERNAL SHORT AXIS VIEW): 1.5 CM (ref 0.55–1.03)
LAAS-AP2: 21.3 CM2
LAAS-AP4: 28 CM2
LEFT ATRIUM SIZE: 4.1 CM
LEFT INTERNAL DIMENSION IN SYSTOLE: 3.2 CM (ref 2.1–4)
LEFT VENTRICULAR INTERNAL DIMENSION IN DIASTOLE: 4.7 CM (ref 5.8–8.64)
LEFT VENTRICULAR POSTERIOR WALL IN END DIASTOLE: 1.2 CM (ref 0.54–1.02)
LEFT VENTRICULAR STROKE VOLUME: 64 ML
MAGNESIUM SERPL-MCNC: 1.7 MG/DL (ref 1.6–2.6)
MCH RBC QN AUTO: 31.1 PG (ref 26.8–34.3)
MCHC RBC AUTO-ENTMCNC: 32.6 G/DL (ref 31.4–37.4)
MCV RBC AUTO: 95 FL (ref 82–98)
MV E'TISSUE VEL-SEP: 7 CM/S
MV PEAK A VEL: 0.63 M/S
MV PEAK E VEL: 63 CM/S
PLATELET # BLD AUTO: 151 THOUSANDS/UL (ref 149–390)
PMV BLD AUTO: 9.2 FL (ref 8.9–12.7)
POTASSIUM SERPL-SCNC: 3.8 MMOL/L (ref 3.5–5.3)
RBC # BLD AUTO: 4.34 MILLION/UL (ref 3.88–5.62)
RIGHT ATRIAL 2D VOLUME: 61 ML
RIGHT ATRIUM AREA SYSTOLE A4C: 20 CM2
RIGHT VENTRICLE ID DIMENSION: 3.8 CM
SL CV LEFT ATRIUM LENGTH A2C: 5.6 CM
SL CV LV EF: 68
SL CV PED ECHO LEFT VENTRICLE DIASTOLIC VOLUME (MOD BIPLANE) 2D: 104 ML
SL CV PED ECHO LEFT VENTRICLE SYSTOLIC VOLUME (MOD BIPLANE) 2D: 40 ML
SODIUM SERPL-SCNC: 142 MMOL/L (ref 136–145)
TRICUSPID ANNULAR PLANE SYSTOLIC EXCURSION: 3 CM
WBC # BLD AUTO: 4.83 THOUSAND/UL (ref 4.31–10.16)
Z-SCORE OF ASCENDING AORTA: 5.55
Z-SCORE OF INTERVENTRICULAR SEPTUM IN END DIASTOLE: 5.8
Z-SCORE OF LEFT VENTRICULAR DIMENSION IN END SYSTOLE: -4.09
Z-SCORE OF LEFT VENTRICULAR POSTERIOR WALL IN END DIASTOLE: 3.47

## 2022-02-03 PROCEDURE — 99215 OFFICE O/P EST HI 40 MIN: CPT | Performed by: INTERNAL MEDICINE

## 2022-02-03 PROCEDURE — 93306 TTE W/DOPPLER COMPLETE: CPT

## 2022-02-03 PROCEDURE — 85027 COMPLETE CBC AUTOMATED: CPT | Performed by: INTERNAL MEDICINE

## 2022-02-03 PROCEDURE — 83735 ASSAY OF MAGNESIUM: CPT | Performed by: PHYSICIAN ASSISTANT

## 2022-02-03 PROCEDURE — 93306 TTE W/DOPPLER COMPLETE: CPT | Performed by: INTERNAL MEDICINE

## 2022-02-03 PROCEDURE — 80048 BASIC METABOLIC PNL TOTAL CA: CPT | Performed by: INTERNAL MEDICINE

## 2022-02-03 PROCEDURE — 99217 PR OBSERVATION CARE DISCHARGE MANAGEMENT: CPT | Performed by: INTERNAL MEDICINE

## 2022-02-03 RX ORDER — PRAVASTATIN SODIUM 40 MG
40 TABLET ORAL
Status: DISCONTINUED | OUTPATIENT
Start: 2022-02-03 | End: 2022-02-03 | Stop reason: HOSPADM

## 2022-02-03 RX ADMIN — PANTOPRAZOLE SODIUM 40 MG: 40 TABLET, DELAYED RELEASE ORAL at 06:04

## 2022-02-03 RX ADMIN — CYANOCOBALAMIN TAB 500 MCG 500 MCG: 500 TAB at 08:39

## 2022-02-03 RX ADMIN — DOCUSATE SODIUM 100 MG: 100 CAPSULE ORAL at 08:39

## 2022-02-03 NOTE — DISCHARGE SUMMARY
[facility]  Discharge- Prateek Palmer 1950, 70 y o  male MRN: [id number]  Unit/Bed#: E4 -01 Encounter: [id number]  Primary Care Provider: Darian Shoemaker MD   Date and time admitted to hospital: 2/2/2022 12:19 PM    * Pre-syncope  Assessment & Plan  Patient presenting with pre-syncopal episode while at rehab   History of CVA from Right ROB/MCA territory embolic CVA 60/4422--[id number] aphasia  · History of atrial fibrillation, does have loop recorder with results in the medical record  · Fully anticoagulated with Xarelto, the likelihood of pulmonary embolism is extremely low  · Cardiac enzymes negative  · With previous history of vasovagal syncope in January of 2020  · Patient was administered fluid bolus in the ED  · Orthostatics vital signs obtained, negative  · No recent echocardiogram, echocardiogram obtained,, stable from previous  · Monitor on telemetry-patient with 6 beat run of V-tach overnight  · Cardiology evaluated the patient, suspect possibly exacerbated by use of finasteride, discontinue  · Follow-up with cardiology as an outpatient    History of stroke  Assessment & Plan  Previous history of stroke with resulting aphasia as well as left-sided weakness, followed by Dr Perez Signs Cystoscopy with UroLift implant from 11/2021- Doing well and on Proscar and finasteride    Chronic diastolic CHF (congestive heart failure) ([facility] [address] )  Assessment & Plan  Wt Readings from Last 3 Encounters:   02/03/22 90 7 kg (200 lb)   12/28/21 90 7 kg (200 lb)   11/22/21 95 3 kg (210 lb)   Weights appear stable, update echocardiogram          Paroxysmal atrial fibrillation (HCC)  Assessment & Plan  Ventricular rate control  · Continue Xarelto    Prediabetes  Assessment & Plan  Last A1c of 5 5, not on medications an outpatient diet controlled      Medical Problems             Resolved Problems  Date Reviewed: 2/3/2022    None Discharging Physician / Practitioner: Eh Fang PA-C  PCP: Mack Sewell MD  Admission Date:   Admission Orders (From admission, onward)     Ordered        02/02/22 1420  Place in Observation  Once                      Discharge Date: 02/03/22    Consultations During Hospital Stay:  · Cardiology    Procedures Performed:   CT BRAIN - WITHOUT CONTRAST     INDICATION:   Syncope, recurrent  syncope      COMPARISON:  CT scan 1/28/2020      TECHNIQUE:  CT examination of the brain was performed  In addition to axial images, coronal 2D reformatted images were created and submitted for interpretation        Radiation dose length product (DLP) for this visit:  924 mGy-cm   This examination, like all CT scans performed in the [facility], was performed utilizing techniques to minimize radiation dose exposure, including the use of iterative   reconstruction and automated exposure control        IMAGE QUALITY:  Diagnostic      FINDINGS:     PARENCHYMA:  Stable large area of encephalomalacia involving right frontal and parietal lobe extending inferiorly to the insula  This is related to large old infarct  No intracranial hemorrhage  No mass effect or midline shift      Stable left hemisphere and basal ganglia ganglia  Stable brainstem and cerebellum       VENTRICLES AND EXTRA-AXIAL SPACES:  No obstructive hydrocephalus  Mild ex vacuo dilatation of the right lateral ventricle      VISUALIZED ORBITS AND PARANASAL SINUSES:  Unremarkable orbits  Worsening left maxillary sinus disease with internal hyperdensity  Otherwise stable mucosal thickening and sinonasal polyposis  Previous endoscopic sinus surgery      CALVARIUM AND EXTRACRANIAL SOFT TISSUES:  No acute osseous abnormality      IMPRESSION:     No acute intracranial abnormality  Stable chronic ischemic changes in the right hemisphere involving the frontal and parietal lobes      Worsening left maxillary sinus disease with internal hyperdensity  Otherwise stable mucosal thickening and sinonasal polyposis  Significant Findings / Test Results:   · None    Incidental Findings:   · None     Test Results Pending at Discharge (will require follow up): · None     Outpatient Tests Requested:  · None    Complications:  None    Reason for Admission:  [address] Course:   Cecily Vargas is a 70 y o  male patient with past medical history of right ROB/MCA territory embolic stroke in April 0897, atrial fibrillation who originally presented to the hospital on 2/2/2022 due to pre syncopal event while at [facility] outpatient physical therapy  Of note, the patient does have residual left-sided weakness and aphasia secondary to embolic stroke  The patient reports that he felt lightheaded and it was noted that he was hypotensive prior to arrival     While admitted to the hospital, Cardiology evaluated the patient due to an episode of nonsustained 6 beat run of V-tach overnight  Cardiology suspect that the patient's episode of lightheadedness/presyncope was likely exacerbated by the patient's use of Flomax  Recommend discontinuing Flomax close follow-up with cardiology as an outpatient  Please see above list of diagnoses and related plan for additional information  Condition at Discharge: stable    Discharge Day Visit / Exam:   Subjective: The patient is seen resting comfortably in bed  He denies any episodes of lightheadedness or dizziness since being admitted  Denies any chest pain or shortness of breath  Vitals: Blood Pressure: 146/76 (02/03/22 1106)  Pulse: 72 (02/03/22 1106)  Temperature: 97 7 °F (36 5 °C) (02/03/22 1106)  Temp Source: Temporal (02/03/22 1106)  Respirations: 18 (02/03/22 1106)  Height: 6' (182 9 cm) (02/03/22 1034)  Weight - Scale: 90 7 kg (200 lb) (02/03/22 1034)  SpO2: 99 % (02/03/22 1106)  Exam:   Physical Exam  Vitals and nursing note reviewed  Constitutional:       General: He is not in acute distress  Appearance: Normal appearance  He is not ill-appearing, toxic-appearing or diaphoretic  Eyes:      General: No scleral icterus  Conjunctiva/sclera: Conjunctivae normal    Cardiovascular:      Rate and Rhythm: Normal rate and regular rhythm  Heart sounds: No murmur heard  No friction rub  No gallop  Pulmonary:      Effort: Pulmonary effort is normal       Breath sounds: Normal breath sounds  No stridor  No wheezing, rhonchi or rales  Chest:      Chest wall: No tenderness  Musculoskeletal:      Cervical back: Normal range of motion  Right lower leg: No edema  Left lower leg: No edema  Skin:     General: Skin is warm and dry  Capillary Refill: Capillary refill takes less than 2 seconds  Coloration: Skin is not jaundiced or pale  Neurological:      Mental Status: He is alert and oriented to person, place, and time  Mental status is at baseline  Discussion with Family: Updated  (wife) at bedside  Discharge instructions/Information to patient and family:   See after visit summary for information provided to patient and family  Provisions for Follow-Up Care:  See after visit summary for information related to follow-up care and any pertinent home health orders  Disposition:   Home    Planned Readmission: n/a     Discharge Statement:  I spent 35 minutes discharging the patient  This time was spent on the day of discharge  I had direct contact with the patient on the day of discharge  Greater than 50% of the total time was spent examining patient, answering all patient questions, arranging and discussing plan of care with patient as well as directly providing post-discharge instructions  Additional time then spent on discharge activities  Discharge Medications:  See after visit summary for reconciled discharge medications provided to patient and/or family        **Please Note: This note may have been constructed using a voice recognition system**

## 2022-02-03 NOTE — ASSESSMENT & PLAN NOTE
Previous history of stroke with resulting aphasia as well as left-sided weakness, followed by Dr Trinity Berry

## 2022-02-03 NOTE — CONSULTS
Consult - Cardiology   [facility] Staff Kira 70 y o  male MRN: [id number]  Unit/Bed#: E4 -01 Encounter: [id number]        Reason For Consult:  Near Syncope               ASSESSMENT:  Near syncope   - with history of syncope January 2020, suspected vasovagal    Paroxysmal atrial fibrillation   - OP AV arielle blocker, none  - anticoagulation with Xarelto 20 mg daily  - history of loop recorder, explanted on 7/10/2020     - 24 hr holter monitor completed on 1/13/22:     - HR varied 43- 140 bpm  1 0% ventricular ectopic activity  9 0% supraventricular ectopy  3 supraventricular runs with the longest run being 3 beats and the fastest run being 145 bpm  288 atrial pairs  339 episodes of atrial bigeminy and 404 episodes of atrial trigeminy  Chronic hypotension   - per prior office note review, however patient with elevated blood pressures thus far  Dyslipidemia   - Rx, pravastatin 40 mg daily  - most recent lipid panel 7/12/21: Cholesterol 169, triglycerides 120, HDL 43,   Other:  - 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, with significant aphasia  - urinary retention, S/P cystoscopy with UroLift implant in November 2021  - pre diabetes    PLAN/ DISCUSSION:     · S/P normal saline 500 mL bolus upon arrival  Negative orthostatic vital signs  · Repeat echocardiogram ordered to assess cardiac structure and function  · Continue to monitor on telemetry for dysrhythmias  · Continue anticoagulation with Xarelto 20 mg daily  · Continue statin with pravastatin 40 mg daily  · Consider discontinuing tamsulosin if able as this may be contributory to orthostatic hypotension  · Additionally, recommend to ensure patient has adequate oral intake  · Monitor volume status with strict intake/output, daily weight  · Monitor renal function and electrolytes closely; maintain potassium > 4, magnesium > 2       History Of Present Illness:  51-year-old male presented to [facility] post near syncopal episode while at rehabilitation  Per documentation, patient did not have a loss of consciousness, however he was noted to be hypotensive prior to arrival with an elevated heart rate  Upon assessment and evaluation, patient is resting comfortably in his chair  Given aphasia, difficult to obtain review of systems with the patient  Patient does state that he became lightheaded yesterday  He denies any chest pain, shortness of breath at present  Please see significant cardiac history and problems enumerated above  Patient follows with Dr Giuseppe Ellington with Cardiology as an outpatient with most recent visit on 7/28/21  During visit, blood pressure was well controlled and patient had a regular rhythm upon examination  He had remained off AV node blockers in light of marginally low blood pressures prior  He did not have any further episodes of vasovagal syncope      Past Medical History:        Past Medical History:   Diagnosis Date    Acute encephalopathy 5/27/2019    Arthritis     BL knees    Atrial fibrillation (HCC)     CHF (congestive heart failure) (HCC)     Colon polyp     CVA [facility]cere[facility] vascular accident) ([facility] [address] ) 4/27/2019    NIHSS 26 on presentation    Depression     Diabetes mellitus ([facility] [address] )     borderline    Encephalopathy acute 4/28/2019    History of transfusion 04/2019    Hyperlipidemia     Irregular heart beat     Afib    Stroke ([facility] [address] ) 04/27/2019    Left sided weakness-aphasia    Urinary retention       Past Surgical History:   Procedure Laterality Date    COLONOSCOPY      IR STROKE ALERT  4/27/2019    MENISCECTOMY Right     MO CYSTOURETHRO W/IMPLANT N/A 11/19/2021    Procedure: CYSTOSCOPY WITH INSERTION [place];  Surgeon: Nasir Howard MD;  Location[facility];  Service: Urology        Allergy:        Allergies   Allergen Reactions    Cephalexin Throat Swelling     Pt reported throat swelling after taking antibiotic     Penicillins Other (See Comments)       Medications: Prior to Admission medications    Medication Sig Start Date End Date Taking? Authorizing Provider   Coenzyme Q10 (CO Q 10) 10 MG CAPS Take by mouth daily   Yes Historical Provider, MD   cyanocobalamin (VITAMIN B-12) 100 mcg tablet Take by mouth daily   Yes Historical Provider, MD   finasteride (PROSCAR) 5 mg tablet Take 5 mg by mouth daily at bedtime     Yes Historical Provider, MD   hydrocortisone 1 % cream Apply topically 2 (two) times a day as needed    Yes Historical Provider, MD   ketoconazole (NIZORAL) 2 % shampoo SHAMPOO 3 TIMES A WEEK AS A SCALP TREATMENT, LEAVE ON 5-10 MINUTES AND RINSE 7/16/21  Yes Historical Provider, MD   pantoprazole (PROTONIX) 40 mg tablet Take 1 tablet (40 mg total) by mouth 2 (two) times a day before meals 5/31/19  Yes Nisha Parker MD   pravastatin (PRAVACHOL) 20 mg tablet Take 1 tablet (20 mg total) by mouth daily  Patient taking differently: Take 40 mg by mouth daily at bedtime   12/29/19  Yes Maco Ballard,    rivaroxaban (Xarelto) 20 mg tablet Take 20 mg by mouth daily with dinner  4/22/20  Yes Historical Provider, MD   tamsulosin (FLOMAX) 0 4 mg Take 0 4 mg by mouth daily at bedtime 2 tabs a day    Yes Historical Provider, MD   acetaminophen (TYLENOL) 325 mg tablet Take 2 tablets (650 mg total) by mouth every 4 (four) hours as needed for mild pain  Patient not taking: Reported on 12/28/2021 11/19/21   Darwin Eugene MD   docusate sodium (COLACE) 100 mg capsule Take 1 capsule (100 mg total) by mouth 2 (two) times a day for 15 days 11/19/21 12/4/21  Darwin Eugene MD   nitrofurantoin (MACRODANTIN) 50 mg capsule TAKE 1 CAPSULE BY MOUTH AT BEDTIME  Patient not taking: Reported on 12/28/2021 11/28/21   Darwin Eugene MD       Family History:     History reviewed  No pertinent family history       Social History:       Social History     Socioeconomic History    Marital status: /Civil Union     Spouse name: None    Number of children: None    Years of education: None    Highest education level: None   Occupational History    None   Tobacco Use    Smoking status: Never Smoker    Smokeless tobacco: Never Used   Vaping Use    Vaping Use: Never used   Substance and Sexual Activity    Alcohol use: Not Currently     Comment: social    Drug use: Never    Sexual activity: None   Other Topics Concern    None   Social History Narrative    None     Social Determinants of Health     Financial Resource Strain: Not on file   Food Insecurity: Not on file   Transportation Needs: Not on file   Physical Activity: Not on file   Stress: Not on file   Social Connections: Not on file   Intimate Partner Violence: Not on file   Housing Stability: Not on file       ROS:  Negative except otherwise aforementioned above  Remainder review of systems is negative    Exam:  General:  alert, oriented and in no distress, cooperative, aphasic  Head: Normocephalic, atraumatic  Eyes:  EOMI  Pupils - equal, round, reactive to accomodation  No icterus  Normal Conjunctiva  Oropharynx: moist and normal-appearing mucosa  Neck: supple, symmetrical, trachea midline   Heart: regular rate, regular rhythm   Respiratory effort / Chest Inspection: unlabored  Lungs:  normal air entry, lungs clear to auscultation and no rales, rhonchi or wheezing   Abdomen: flat, normal findings: bowel sounds normal and soft, non-tender  Lower Limbs:  No overt pitting edema    DATA:      ECG:                       Telemetry: NSR, PACs           Echocardiogram completed on 4/28/19:         LEFT VENTRICLE:  Systolic function was normal  Ejection fraction was estimated to be 60 %  There were no regional wall motion abnormalities      HISTORY: PRIOR HISTORY: Hypertension, Hyperlipidemia      PROCEDURE: The procedure was performed at the bedside  This was a routine study  The transthoracic approach was used  The study included limited 2D imaging, M-mode, limited spectral Doppler, and color Doppler   The heart rate was 80 bpm, at  the start of the study  Images were obtained from the parasternal, apical, and subcostal acoustic windows  Image quality was adequate      LEFT VENTRICLE: Size was normal  Systolic function was normal  Ejection fraction was estimated to be 60 %  There were no regional wall motion abnormalities  Wall thickness was normal  No evidence of apical thrombus  DOPPLER: Left  ventricular diastolic function parameters were normal      RIGHT VENTRICLE: The size was normal  Systolic function was normal  Wall thickness was normal      LEFT ATRIUM: Size was normal      RIGHT ATRIUM: Size was normal      MITRAL VALVE: Valve structure was normal  There was normal leaflet separation  DOPPLER: The transmitral velocity was within the normal range  There was no evidence for stenosis  There was no significant regurgitation      AORTIC VALVE: The valve was trileaflet  Leaflets exhibited normal thickness and normal cuspal separation  DOPPLER: Transaortic velocity was within the normal range  There was no evidence for stenosis  There was no significant  regurgitation      TRICUSPID VALVE: The valve structure was normal  There was normal leaflet separation  DOPPLER: The transtricuspid velocity was within the normal range  There was no evidence for stenosis  There was no significant regurgitation      PULMONIC VALVE: Leaflets exhibited normal thickness, no calcification, and normal cuspal separation  DOPPLER: The transpulmonic velocity was within the normal range  There was no significant regurgitation      PERICARDIUM: There was no pericardial effusion  The pericardium was normal in appearance      AORTA: The root exhibited normal size      SYSTEMIC VEINS: IVC: The inferior vena cava was normal in size  Weights: Wt Readings from Last 3 Encounters:   02/03/22 90 7 kg (200 lb)   12/28/21 90 7 kg (200 lb)   11/22/21 95 3 kg (210 lb)   , Body mass index is 27 12 kg/m²           Lab Studies:             Results from last 7 days   Lab Units 02/03/22  0553 02/02/22  1308   WBC Thousand/uL 4 83 6 28   HEMOGLOBIN g/dL 13 5 14 0   HEMATOCRIT % 41 4 42 1   PLATELETS Thousands/uL 151 138*   ,   Results from last 7 days   Lab Units 02/03/22  0553 02/02/22  1308   POTASSIUM mmol/L 3 8 3 5   CHLORIDE mmol/L 107 107   CO2 mmol/L 27 27   BUN mg/dL 15 17   CREATININE mg/dL 0 90 1 01   CALCIUM mg/dL 9 0 8 8   ALK PHOS U/L  --  64   ALT U/L  --  18   AST U/L  --  14

## 2022-02-03 NOTE — ASSESSMENT & PLAN NOTE
Wt Readings from Last 3 Encounters:   02/03/22 90 7 kg (200 lb)   12/28/21 90 7 kg (200 lb)   11/22/21 95 3 kg (210 lb)   Weights appear stable, update echocardiogram

## 2022-02-03 NOTE — ASSESSMENT & PLAN NOTE
Patient presenting with pre-syncopal episode while at rehab   History of CVA from Right ROB/MCA territory embolic CVA 58/4365--[id number] aphasia  · History of atrial fibrillation, does have loop recorder with results in the medical record  · Fully anticoagulated with Xarelto, the likelihood of pulmonary embolism is extremely low  · Cardiac enzymes negative  · With previous history of vasovagal syncope in January of 2020  · Patient was administered fluid bolus in the ED  · Orthostatics vital signs obtained, negative  · No recent echocardiogram, echocardiogram obtained,, stable from previous  · Monitor on telemetry-patient with 6 beat run of V-tach overnight  · Cardiology evaluated the patient, suspect possibly exacerbated by use of finasteride, discontinue  · Follow-up with cardiology as an outpatient

## 2022-03-08 ENCOUNTER — OFFICE VISIT (OUTPATIENT)
Dept: UROLOGY | Facility: CLINIC | Age: 72
End: 2022-03-08
Payer: MEDICARE

## 2022-03-08 VITALS
SYSTOLIC BLOOD PRESSURE: 120 MMHG | BODY MASS INDEX: 28.85 KG/M2 | HEART RATE: 68 BPM | HEIGHT: 72 IN | WEIGHT: 213 LBS | DIASTOLIC BLOOD PRESSURE: 80 MMHG

## 2022-03-08 DIAGNOSIS — N40.1 BENIGN PROSTATIC HYPERPLASIA WITH URINARY FREQUENCY: Primary | ICD-10-CM

## 2022-03-08 DIAGNOSIS — R35.0 BENIGN PROSTATIC HYPERPLASIA WITH URINARY FREQUENCY: Primary | ICD-10-CM

## 2022-03-08 LAB — POST-VOID RESIDUAL VOLUME, ML POC: 163 ML

## 2022-03-08 PROCEDURE — 99214 OFFICE O/P EST MOD 30 MIN: CPT | Performed by: PHYSICIAN ASSISTANT

## 2022-03-08 PROCEDURE — 51798 US URINE CAPACITY MEASURE: CPT | Performed by: PHYSICIAN ASSISTANT

## 2022-03-08 NOTE — PROGRESS NOTES
3/8/2022      Chief Complaint   Patient presents with    Follow-up         Assessment and Plan    70 y o  male managed by Dr Beryle [place]    1  BPH with incomplete bladder emptying  - s/p cystoscopy Urolift November 2021  - excellent symptom improvement (today AUA ss 12, qol 2)  - pvr today 163ml, last void one hour ago  - discontinued nitrofurantoin, tamsulosin over last few months  - remains on finasteride 5mg daily and will continue    Ten Krueger is doing well  He and his family are pleased with results  He is continent  He is emptying better  He only will stay on the finasteride at this point  Return in 1 year for visit  Consider psa/sandra intermittently or discontinue screening at his age >70 yrs with stable findings psa 2 8-3 6 in recent years  History of Present Illness  German Green is a 70 y o  male here for evaluation of three-month follow-up BPH after cystoscopy with insertion of Uro lift in November 2021 by Dr Beryle [place]  Pre treatment had urinary frequency, nocturia, incomplete bladder emptying with residuals urine 400 mL as well as recurrent UTIs  Since his surgery his symptoms have dramatically improved  He has excellent urinary control wears 0 pads  He feels he is emptying his bladder much better  He has excellent flow, he has not had hematuria since day one  His wife and his caregiver accompanying him today and are all pleased with his results  He has not had UTI  He is no longer taking the suppression Macrobid he was on the year prior  He does take the finasteride, his Flomax was stopped during admission last month for presyncope  The plan was to stop this by today's visit anyway  He will remain on the finasteride  He is doing a lot of physical therapy and working with cardiology and internal medicine teams regarding his blood pressures   Physical therapy tends to send him home if his BP is over 938 systolic which is actually pretty reasonable for him, I would suspect a systolic BP 130 or even 140 would be reasonable, and certainly preferred over a BP of 80 or 90 systolic  Amelia Ing says cardiology said the same thing  Today BP is 120/80 in office  Prior CVA with left side hemiparesis  Walking well today with standing walker  Review of Systems   Constitutional: Negative for activity change, appetite change, chills, fatigue, fever and unexpected weight change  HENT: Negative  Respiratory: Negative  Negative for shortness of breath  Cardiovascular: Negative  Negative for chest pain  Gastrointestinal: Negative for abdominal pain, diarrhea, nausea and vomiting  Endocrine: Negative  Genitourinary: Positive for urgency  Negative for decreased urine volume, difficulty urinating, dysuria, flank pain, frequency, hematuria and testicular pain  Musculoskeletal: Positive for gait problem  Negative for back pain  Skin: Negative  Allergic/Immunologic: Negative  Neurological: Positive for weakness (left arm/leg)  Hematological: Negative for adenopathy  Does not bruise/bleed easily     Psychiatric/Behavioral:        Flat           AUA SYMPTOM SCORE      Most Recent Value   AUA SYMPTOM SCORE    How often have you had a sensation of not emptying your bladder completely after you finished urinating? 1 (P)     How often have you had to urinate again less than two hours after you finished urinating? 2 (P)     How often have you found you stopped and started again several times when you urinate? 3 (P)     How often have you found it difficult to postpone urination? 4 (P)     How often have you had a weak urinary stream? 4 (P)     How often have you had to push or strain to begin urination? 3 (P)     How many times did you most typically get up to urinate from the time you went to bed at night until the time you got up in the morning? 2 (P)     Quality of Life: If you were to spend the rest of your life with your urinary condition just the way it is now, how would you feel about that? 2 (P)     AUA SYMPTOM SCORE 19 (P)            Vitals  Vitals:    03/08/22 0925   BP: 120/80   Pulse: 68   Weight: 96 6 kg (213 lb)   Height: 6' (1 829 m)       Physical Exam      Past History  Past Medical History:   Diagnosis Date    Acute encephalopathy 5/27/2019    Arthritis     BL knees    Atrial fibrillation (HCC)     CHF (congestive heart failure) (HCC)     Colon polyp     CVA [facility]cere[facility] vascular accident) ([facility] ) 4/27/2019    NIHSS 26 on presentation    Depression     Diabetes mellitus ([facility] )     borderline    Encephalopathy acute 4/28/2019    History of transfusion 04/2019    Hyperlipidemia     Irregular heart beat     Afib    Stroke ([facility] ) 04/27/2019    Left sided weakness-aphasia    Urinary retention      Social History     Socioeconomic History    Marital status: /Civil Union     Spouse name: None    Number of children: None    Years of education: None    Highest education level: None   Occupational History    None   Tobacco Use    Smoking status: Never Smoker    Smokeless tobacco: Never Used   Vaping Use    Vaping Use: Never used   Substance and Sexual Activity    Alcohol use: Not Currently     Comment: social    Drug use: Never    Sexual activity: None   Other Topics Concern    None   Social History Narrative    None     Social Determinants of Health     Financial Resource Strain: Not on file   Food Insecurity: Not on file   Transportation Needs: Not on file   Physical Activity: Not on file   Stress: Not on file   Social Connections: Not on file   Intimate Partner Violence: Not on file   Housing Stability: Not on file     Social History     Tobacco Use   Smoking Status Never Smoker   Smokeless Tobacco Never Used     History reviewed  No pertinent family history      The following portions of the patient's history were reviewed and updated as appropriate: allergies, current medications, past medical history, past social history, past surgical history and problem list     Results  Recent Results (from the past 1 hour(s))   POCT Measure PVR    Collection Time: 03/08/22 11:26 AM   Result Value Ref Range    POST-VOID RESIDUAL VOLUME, ML  mL   ]  Lab Results   Component Value Date    PSA 3 6 03/13/2020     Lab Results   Component Value Date    GLUCOSE 133 04/27/2019    CALCIUM 9 0 02/03/2022    K 3 8 02/03/2022    CO2 27 02/03/2022     02/03/2022    BUN 15 02/03/2022    CREATININE 0 90 02/03/2022     Lab Results   Component Value Date    WBC 4 83 02/03/2022    HGB 13 5 02/03/2022    HCT 41 4 02/03/2022    MCV 95 02/03/2022     02/03/2022

## 2022-05-27 NOTE — PROGRESS NOTES
Department of Anesthesiology  Postprocedure Note    Patient: Nimco Byers  MRN: [id number]  YOB: 1972  Date of evaluation: 5/29/2022  Time:  6:56 AM     Procedure Summary     Date: 05/27/22 Room / Location: [facility] OR  / [facility]    Anesthesia Start: 7084 Anesthesia Stop:     Procedure: LEFT L4-L5 HEMILAMINECTOMY AND DISCECTOMY - C ARM, CODY TABLE (Left ) Diagnosis: (LEFT L4-L5 HERNIATED DISC)    Surgeons: Kam Santos MD Responsible Provider: Aura Art MD    Anesthesia Type: general ASA Status: 3          Anesthesia Type: No value filed. Reyna Phase I: Reyna Score: 10    Reyna Phase II:      Last vitals: Reviewed and per EMR flowsheets.        Anesthesia Post Evaluation    Patient location during evaluation: PACU  Patient participation: complete - patient participated  Level of consciousness: awake  Pain score: 3  Airway patency: patent  Nausea & Vomiting: no nausea and no vomiting  Complications: no  Cardiovascular status: blood pressure returned to baseline  Respiratory status: acceptable  Hydration status: euvolemic Patient ID: Yusef Rangel is a 71 y o  male  Assessment/Plan: This is a 72 y/o M who is here as a follow up for history of right MCA stroke with left-sided weakness, hospital follow-up for recent seizure-like activity  Patient has been extremely lethargic and somnolent for the past 3-4 days since hospital discharge  Etiology of this could be secondary to since recently starting Keppra versus bradycardia because his heart rates been running in the 40s versus polypharmacy vs subclinical status (although less likely)  PLAN:      Problem List Items Addressed This Visit        Cardiovascular and Mediastinum    CVA (cerebral vascular accident) ([facility] )     -c/w with combination of xarelto, pravastatin for secondary stroke prevention  -BP goal < 130/80, BP is at goal currently  -Defer to PCP regarding BP and bradycardia management  -does not smoke at this time   -denies any history of snoring    -Continue with PT/OT and Speech therapy  -continue with botox for LLE spasticity     -I advised patient to avoid using NSAIDs for headaches or other pain  -Recommend mediterranean diet & regular exercise regimen atleast 4-5 times a week for 20-30 minutes  -I educated patient/family regarding medication compliance           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 ([facility] )    Paroxysmal atrial fibrillation (HCC)       Nervous and Auditory    Left hemiplegia (HCC)     -Continue with Botox injections every 3 months for left lower extremity spasticity  -patient weaning off baclofen             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) ([facility] )     Patient could not tolerate Keppra made him extremely lethargic and somnolent  -will obtain routine EEG  -will switch him to Depakote 500 mg twice a day side effects reviewed  -okay to stop Keppra from this evening  -check CBC CMP as a baseline before starting Depakote           Relevant Medications    divalproex sodium (DEPAKOTE) 500 mg EC tablet    Other Relevant Orders    CBC and differential    EEG Routine and awake    Seizure disorder (HCC) - Primary    Relevant Medications    divalproex sodium (DEPAKOTE) 500 mg EC tablet    Other Relevant Orders    CBC and differential    EEG Routine and awake    Comprehensive metabolic panel    Nonintractable epilepsy without status epilepticus (HCC)    Relevant Medications    divalproex sodium (DEPAKOTE) 500 mg EC tablet    Other Relevant Orders    Comprehensive metabolic panel       Other    Bradycardia     -patient has been having low HR mid 40s consistently, which could be contributing to his lethargy  -patients PCP not in town till Monday, so I advised the wife to check his HR prior to giving his metoprolol and if it is less than 55, to hold metoprolol, and do the same Sunday and then to call PCP on Monday                I would like to follow up in 6 months  I would be happy to see the patient sooner if any new questions/concerns arise  Patient/Guardian was advised to the call the office if they have any questions and concerns in the meantime  Patient/Guardian does understand that if they have any new stroke like symptoms such as facial droop on one side, weakness/paralysis on either side, speech trouble, numbness on one side, balance issues, any vision changes, extreme dizziness or any new headache, to call 9-1-1 immediately or to proceed to the nearest ER immediately  Total combined time spent 45 minutes today  Greater than 50% of total time was spent with the patient and or family counseling and or coordination of care  A brief description of coordination of care: stroke education risk, medication compliance, reviewing EMR, medication management, seizure precautions    Subjective:    HPI    This is a 70 y/o M who is here as a follow up for history of R MCA stroke April 2019  He came from the hospital on 12/29/2019  He was admitted for possible seizure-like activity    He did have an MRI brain which I personally the images of which stated that he had some enhancement in the right MCA territory where he previously had a stroke  He was started on Keppra 750 mg b i d  And was discharged  Since this started medications he has been extremely lethargic  They called Dr Kiara Cabrera and the dosage was decreased to keppra 500mg PO BID  This change did not make a huge difference as far as lethargy is concerned, he was taken to [facility] and then he was sleeping and Dr Rayne Dudley advised to reduce the dose of Keppra 250mg PO BID  He could not use the restroom, he was unable to walk, sit up and could not eat at times, and he was slumped over  Wife states that he slept for two days straight  He was only given keppra 250mg PO once at night last night and he got 250mg PO BID and after he received the dose today he took a 2 hour nap and woke up and remains lethargic  He had his first botox injections 12/19/19  He is scheduled to get the next one in 3 months  He is doing well overall  He is complaint with his medications he states  He is being weaned off baclofen, and then plan is to wean off prozac as well  The following portions of the patient's history were reviewed and updated as appropriate:   He  has a past medical history of Arthritis, Atrial fibrillation ([facility] ), CHF (congestive heart failure) ([facility] ), Colon polyp, CVA (cerebral vascular accident) ([facility] ) (4/27/2019), Depression, Diabetes mellitus ([facility] ), Hypertension, Irregular heart beat, Stroke ([facility] ), and Urinary retention    He   Patient Active Problem List    Diagnosis Date Noted    Seizure disorder ([facility] ) 01/03/2020    Nonintractable epilepsy without status epilepticus ([facility] ) 01/03/2020    Bradycardia 01/03/2020    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) ([facility] ) 12/26/2019    Fatigue 10/25/2019    Paroxysmal A-fib ([facility] ) 10/25/2019    History of stroke 08/09/2019    Acute leg pain, right 06/08/2019    Urinary retention 06/04/2019    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([facility] ) 06/03/2019    Hypernatremia 05/27/2019    Acute encephalopathy 05/27/2019    Acute blood loss anemia 05/27/2019    Acute kidney injury ([place] [address] ) 05/27/2019    Chronic diastolic CHF (congestive heart failure) ([place] [address] ) 05/27/2019    Paroxysmal atrial fibrillation ([place] [address] ) 05/13/2019    Dysphagia 05/04/2019    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 ([place] [address] ) 05/02/2019    Left hemiplegia ([place] [address] ) 05/02/2019    Encephalopathy acute 04/28/2019    CVA (cerebral vascular accident) ([place] [address] ) 04/27/2019    Prediabetes 11/15/2018    Essential hypertension 11/14/2018    Hypokalemia 11/14/2018    Elevated brain natriuretic peptide (BNP) level 11/14/2018    Hx of right knee surgery 11/14/2018    Hyperlipidemia 11/14/2018     He  has a past surgical history that includes IR stroke alert (4/27/2019); Colonoscopy; and Meniscectomy (Right)  His family history is not on file  He  reports that he has never smoked  He has never used smokeless tobacco  He reports that he drank alcohol  He reports that he does not use drugs    Current Outpatient Medications   Medication Sig Dispense Refill    baclofen 10 mg tablet Take 1 tablet (10 mg total) by mouth 2 (two) times a day 30 tablet 0    Coenzyme Q10 (CO Q 10) 10 MG CAPS Take by mouth daily      cyanocobalamin (VITAMIN B-12) 100 mcg tablet Take by mouth daily      FLUoxetine (PROzac) 20 mg capsule Take 1 capsule (20 mg total) by mouth daily 30 capsule 0    hydrocortisone 1 % cream Apply topically 2 (two) times a day as needed       metoprolol succinate (TOPROL-XL) 25 mg 24 hr tablet Take 0 5 tablets (12 5 mg total) by mouth daily 30 tablet 0    pantoprazole (PROTONIX) 40 mg tablet Take 1 tablet (40 mg total) by mouth 2 (two) times a day before meals 120 tablet 0    pravastatin (PRAVACHOL) 20 mg tablet Take 1 tablet (20 mg total) by mouth daily 30 tablet 0    rivaroxaban (XARELTO) 20 mg tablet Take 1 tablet (20 mg total) by mouth daily with breakfast 30 tablet 0    tamsulosin (FLOMAX) 0 4 mg Take 0 8 mg by mouth daily at bedtime       divalproex sodium (DEPAKOTE) 500 mg EC tablet Take 1 tablet (500 mg total) by mouth every 12 (twelve) hours 60 tablet 3     No current facility-administered medications for this visit  Current Outpatient Medications on File Prior to Visit   Medication Sig    baclofen 10 mg tablet Take 1 tablet (10 mg total) by mouth 2 (two) times a day    Coenzyme Q10 (CO Q 10) 10 MG CAPS Take by mouth daily    cyanocobalamin (VITAMIN B-12) 100 mcg tablet Take by mouth daily    FLUoxetine (PROzac) 20 mg capsule Take 1 capsule (20 mg total) by mouth daily    hydrocortisone 1 % cream Apply topically 2 (two) times a day as needed     metoprolol succinate (TOPROL-XL) 25 mg 24 hr tablet Take 0 5 tablets (12 5 mg total) by mouth daily    pantoprazole (PROTONIX) 40 mg tablet Take 1 tablet (40 mg total) by mouth 2 (two) times a day before meals    pravastatin (PRAVACHOL) 20 mg tablet Take 1 tablet (20 mg total) by mouth daily    rivaroxaban (XARELTO) 20 mg tablet Take 1 tablet (20 mg total) by mouth daily with breakfast    tamsulosin (FLOMAX) 0 4 mg Take 0 8 mg by mouth daily at bedtime     [DISCONTINUED] levETIRAcetam (KEPPRA) 500 mg tablet Take 1 tablet (500 mg total) by mouth every 12 (twelve) hours    [DISCONTINUED] aspirin 81 mg chewable tablet Chew 1 tablet (81 mg total) daily (Patient not taking: Reported on 1/3/2020)     No current facility-administered medications on file prior to visit  He is allergic to cephalexin and penicillins            Objective:    Blood pressure 100/60, pulse (!) 52, height 6' (1 829 m)  Physical Exam  General - Patient is alert, awake, follows commands  Speech - mild dysarthria noted, fluency is somewhat intact, comprehension intact  Neuro:   Cranial nerves: PERRL, EOMI, L facial droop noted, facial sensation intact, tongue midline  Motor: LLE spasticity, but weakness noted 4-/5, LUE 4-/5     Sensory - intact to soft touch throughout  Reflexes - 3+ reflexes on the L side, 2+ on the R    Gait - ambulates in a wheelchair    ROS:  I personally reviewed ROS   Review of Systems   Constitutional: Positive for fatigue  Negative for appetite change and fever  HENT: Negative  Negative for hearing loss, tinnitus, trouble swallowing and voice change  Eyes: Negative  Negative for photophobia and pain  Respiratory: Negative  Negative for shortness of breath  Cardiovascular: Negative  Negative for palpitations  Gastrointestinal: Negative  Negative for nausea and vomiting  Endocrine: Negative  Negative for cold intolerance and heat intolerance  Genitourinary: Negative  Negative for dysuria, frequency and urgency  Musculoskeletal: Positive for gait problem  Negative for myalgias and neck pain  Patient wife stated that patient balance is off  Skin: Negative  Negative for rash  Neurological: Positive for weakness  Negative for dizziness, tremors, seizures, syncope, facial asymmetry, speech difficulty, light-headedness, numbness and headaches  Patient wife stated that patient has whole body weakness  Hematological: Negative  Does not bruise/bleed easily  Psychiatric/Behavioral: Negative  Negative for confusion, hallucinations and sleep disturbance

## 2022-06-21 ENCOUNTER — TELEPHONE (OUTPATIENT)
Dept: VASCULAR SURGERY | Facility: CLINIC | Age: 72
End: 2022-06-21

## 2022-06-21 NOTE — TELEPHONE ENCOUNTER
Call and speak to the patient's wife, Maria Luisa Dent  According to Dr Milagros Encinas recommended a second opinion after the patient had syncope in February  The patient went  to [facility] for a second opinion  Hawa Etienne The patient's wife wants Dr Antwon Crowley to review the patient's documentation from [facility] and decide if the patient needs follow-up at [facility]

## 2022-12-15 ENCOUNTER — TELEPHONE (OUTPATIENT)
Dept: NEUROLOGY | Facility: CLINIC | Age: 72
End: 2022-12-15

## 2022-12-15 ENCOUNTER — TELEMEDICINE (OUTPATIENT)
Dept: NEUROLOGY | Facility: CLINIC | Age: 72
End: 2022-12-15

## 2022-12-15 VITALS — BODY MASS INDEX: 28.85 KG/M2 | WEIGHT: 213 LBS | HEIGHT: 72 IN

## 2022-12-15 DIAGNOSIS — G81.94 LEFT HEMIPLEGIA (HCC): ICD-10-CM

## 2022-12-15 DIAGNOSIS — R73.03 PREDIABETES: ICD-10-CM

## 2022-12-15 DIAGNOSIS — R13.12 OROPHARYNGEAL DYSPHAGIA: ICD-10-CM

## 2022-12-15 DIAGNOSIS — R47.01 APHASIA DUE TO ACUTE STROKE (HCC): ICD-10-CM

## 2022-12-15 DIAGNOSIS — I65.23 BILATERAL CAROTID ARTERY STENOSIS: ICD-10-CM

## 2022-12-15 DIAGNOSIS — I48.0 PAROXYSMAL ATRIAL FIBRILLATION (HCC): ICD-10-CM

## 2022-12-15 DIAGNOSIS — I10 ESSENTIAL HYPERTENSION: ICD-10-CM

## 2022-12-15 DIAGNOSIS — Z86.73 HISTORY OF STROKE: Primary | ICD-10-CM

## 2022-12-15 DIAGNOSIS — I63.9 APHASIA DUE TO ACUTE STROKE (HCC): ICD-10-CM

## 2022-12-15 DIAGNOSIS — I63.9 DEPRESSION DUE TO ACUTE STROKE (HCC): ICD-10-CM

## 2022-12-15 DIAGNOSIS — G40.909 NONINTRACTABLE EPILEPSY WITHOUT STATUS EPILEPTICUS, UNSPECIFIED EPILEPSY TYPE (HCC): ICD-10-CM

## 2022-12-15 DIAGNOSIS — E78.00 PURE HYPERCHOLESTEROLEMIA: ICD-10-CM

## 2022-12-15 DIAGNOSIS — F06.31 DEPRESSION DUE TO ACUTE STROKE (HCC): ICD-10-CM

## 2022-12-15 RX ORDER — PRAVASTATIN SODIUM 40 MG
40 TABLET ORAL DAILY
COMMUNITY
Start: 2022-10-24

## 2022-12-15 NOTE — PATIENT INSTRUCTIONS
Stroke: Krish Gustafson and his wife present for a follow-up with regard to his prior stroke  He is doing reasonably well from a stroke standpoint  His walking continues to improve but there is a suggestion that he may have plateaued with regard to occupational therapy and that spasticity may be hindering his improvement to a degree  He does not report new strokelike symptoms and is taking his medication as prescribed  He is using a brace for subluxation of his left shoulder   -For ongoing stroke prevention he should continue his combination of Xarelto, statin, and appropriate blood pressure and glycemic control  -We will continue to defer to his primary care team for monitoring of his cholesterol panel and blood sugar numbers and his most recent LDL cholesterol is clearly improved  -He is due to have a carotid Doppler ultrasound in February 2023 and I will provide that prescription   -He will clearly benefit from ongoing therapy and exercise through the [facility]   I will provide for him a referral for second opinion through the [facility]'s physical medicine and rehab group for treatment of spasticity of the left upper extremity   -He should remain physically active and is much as he feels capable of doing so in remaining mentally active including regular verbal communication is very important for him    I will plan for him to return to the office in 12 months time to see me directly but would be happy to see him sooner if the need should arise  If he has any symptoms concerning for TIA or stroke including sudden painless loss of vision or double vision, difficulty speaking or swallowing, vertigo/room spinning that does not quickly resolve, or weakness/numbness/loss of coordination affecting 1 side of the face or body he should proceed by ambulance to the nearest emergency room immediately     If he were to fall and strike his head and have any residual symptoms whatsoever or to developed a sudden extremely severe very unusual headache he should also be seen in the nearest emergency room immediately

## 2022-12-15 NOTE — TELEPHONE ENCOUNTER
Pts wife called to r/s appt  [place] with doing virtual due to weather     Changed appt to virtual through [employer] added phone number,

## 2022-12-15 NOTE — PROGRESS NOTES
Virtual Regular Visit    Verification of patient location:    Patient is located in the following state in which I hold an active license [place]      Assessment/Plan:   Patient Instructions   Stroke: Claudia Fu and his wife present for a follow-up with regard to his prior stroke  He is doing reasonably well from a stroke standpoint  His walking continues to improve but there is a suggestion that he may have plateaued with regard to occupational therapy and that spasticity may be hindering his improvement to a degree  He does not report new strokelike symptoms and is taking his medication as prescribed  He is using a brace for subluxation of his left shoulder   -For ongoing stroke prevention he should continue his combination of Xarelto, statin, and appropriate blood pressure and glycemic control  -We will continue to defer to his primary care team for monitoring of his cholesterol panel and blood sugar numbers and his most recent LDL cholesterol is clearly improved  -He is due to have a carotid Doppler ultrasound in February 2023 and I will provide that prescription   -He will clearly benefit from ongoing therapy and exercise through the [facility]   I will provide for him a referral for second opinion through the [facility] re[facility] group for treatment of spasticity of the left upper extremity   -He should remain physically active and is much as he feels capable of doing so in remaining mentally active including regular verbal communication is very important for him    I will plan for him to return to the office in 12 months time to see me directly but would be happy to see him sooner if the need should arise    If he has any symptoms concerning for TIA or stroke including sudden painless loss of vision or double vision, difficulty speaking or swallowing, vertigo/room spinning that does not quickly resolve, or weakness/numbness/loss of coordination affecting 1 side of the face or body he should proceed by ambulance to the nearest emergency room immediately  If he were to fall and strike his head and have any residual symptoms whatsoever or to developed a sudden extremely severe very unusual headache he should also be seen in the nearest emergency room immediately  Problem List Items Addressed This Visit        Digestive    Dysphagia       Cardiovascular and Mediastinum    Essential hypertension    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)    Paroxysmal atrial fibrillation (HCC)    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([facility] [address] )    Bilateral carotid artery stenosis    Relevant Orders    VAS carotid complete study       Nervous and Auditory    Left hemiplegia ([facility] [address] )    Relevant Orders    Ambulatory Referral to Physiatry    Nonintractable epilepsy without status epilepticus ([facility] [address] )       Other    Hyperlipidemia    Relevant Medications    pravastatin (PRAVACHOL) 40 mg tablet    Prediabetes    History of stroke - Primary    Relevant Orders    Ambulatory Referral to Physiatry            Reason for visit is   Chief Complaint   Patient presents with   • Virtual Regular Visit        Encounter provider Elayne Webb MD    Provider located at [address] [place] [address] [address]  [phone]      Recent Visits  No visits were found meeting these conditions  Showing recent visits within past 7 days and meeting all other requirements  Today's Visits  Date Type Provider Dept   12/15/22 Telephone Elayne Webb MD [facility]   12/15/22 Telemedicine Elayne Webb [address] [place] [address] today's visits and meeting all other requirements  Future Appointments  No visits were found meeting these conditions  Showing future appointments within next 150 days and meeting all other requirements       The patient was identified by name and date of birth   Adriana Mustafa was informed that this is a telemedicine visit and that the visit is being conducted through the [facility]  He agrees to proceed     My office door was closed  No one else was in the room  He acknowledged consent and understanding of privacy and security of the video platform  The patient has agreed to participate and understands they can discontinue the visit at any time  Patient is aware this is a billable service  Subjective    HPI    Have you had any new stroke like symptoms that were not previously present (Sudden loss of vision, difficulty speaking or swallowing,  vertigo/room spinning that did not quickly resolve, weakness or numbness affecting one side of the body)? no    Are you taking all of your medications as prescribed? Yes    Any side effects including bleeding/bruising? no    Carin Palacios and his wife present for a follow-up with regard to his prior stroke  They report that he is doing quite well  He has not had any falls  He is taking his medication with no bleeding or bruising issues  He continues to receive regular therapy at [facility] but reportedly he has plateaued with regard to occupational therapy  He is now able to ambulate at [facility] without assistance from a device, and otherwise is using a cane more than a walker  He has a brace being used to treat shoulder subluxation on the left-hand side but as result he does have some stiffness on the left side and there is interest in potentially using Botox  The last time he received Botox he had excessive loss of tone in the left arm which was a problem  When asked about his mood he reports that he wants to be walking better  He denies difficulties with sleep or appetite  He does have ongoing aphasia and potentially dysphagia but reports that he is able to tolerate a normal diet  He reports no seizure-like episodes    I reviewed the record and he has had a few hospitalizations since his last visit with us including potentially a planned prostate intervention, lower urinary tract obstruction, and presyncope (with no recurrent events)  He is concerned/trepidations with regard to Botox and they are interested in a second opinion so we will [facility]  He is due to have his carotid arteries checked via ultrasound      Past Medical History:   Diagnosis Date   • Acute encephalopathy 5/27/2019   • Arthritis     BL knees   • Atrial fibrillation (HCC)    • CHF (congestive heart failure) ([facility])    • Colon polyp    • CVA (cerebral vascular accident) ([place] [address] ) 4/27/2019    NIHSS 26 on presentation   • Depression    • Diabetes mellitus ([place] [address] )     borderline   • Encephalopathy acute 4/28/2019   • History of transfusion 04/2019   • Hyperlipidemia    • Irregular heart beat     Afib   • Stroke ([place] [address] ) 04/27/2019    Left sided weakness-aphasia   • Urinary retention        Social History     Socioeconomic History   • Marital status: /Civil Union     Spouse name: None   • Number of children: None   • Years of education: None   • Highest education level: None   Occupational History   • None   Tobacco Use   • Smoking status: Never   • Smokeless tobacco: Never   Vaping Use   • Vaping Use: Never used   Substance and Sexual Activity   • Alcohol use: Not Currently     Comment: social   • Drug use: Never   • Sexual activity: None   Other Topics Concern   • None   Social History Narrative   • None     Social Determinants of Health     Financial Resource Strain: Not on file   Food Insecurity: Not on file   Transportation Needs: Not on file   Physical Activity: Not on file   Stress: Not on file   Social Connections: Not on file   Intimate Partner Violence: Not on file   Housing Stability: Not on file         Current Outpatient Medications:   •  acetaminophen (TYLENOL) 325 mg tablet, Take 2 tablets (650 mg total) by mouth every 4 (four) hours as needed for mild pain, Disp: 30 tablet, Rfl: 0  •  Coenzyme Q10 (CO Q 10) 10 MG CAPS, Take by mouth daily, Disp: , Rfl:   •  cyanocobalamin (VITAMIN B-12) 100 mcg tablet, Take by mouth daily, Disp: , Rfl:   •  finasteride (PROSCAR) 5 mg tablet, Take 5 mg by mouth daily at bedtime  , Disp: , Rfl:   •  hydrocortisone 1 % cream, Apply topically 2 (two) times a day as needed , Disp: , Rfl:   •  ketoconazole (NIZORAL) 2 % shampoo, SHAMPOO 3 TIMES A WEEK AS A SCALP TREATMENT, LEAVE ON 5-10 MINUTES AND RINSE, Disp: , Rfl:   •  pantoprazole (PROTONIX) 40 mg tablet, Take 1 tablet (40 mg total) by mouth 2 (two) times a day before meals, Disp: 120 tablet, Rfl: 0  •  pravastatin (PRAVACHOL) 40 mg tablet, Take 40 mg by mouth daily, Disp: , Rfl:   •  rivaroxaban (Xarelto) 20 mg tablet, Take 20 mg by mouth daily with dinner  , Disp: , Rfl:   •  docusate sodium (COLACE) 100 mg capsule, Take 1 capsule (100 mg total) by mouth 2 (two) times a day for 15 days (Patient not taking: Reported on 12/15/2022), Disp: 30 capsule, Rfl: 0  •  pravastatin (PRAVACHOL) 20 mg tablet, Take 1 tablet (20 mg total) by mouth daily (Patient not taking: Reported on 12/15/2022), Disp: 30 tablet, Rfl: 0    Allergies   Allergen Reactions   • Cephalexin Throat Swelling     Pt reported throat swelling after taking antibiotic    • Penicillins Other (See Comments)        Allergies   Allergen Reactions   • Cephalexin Throat Swelling     Pt reported throat swelling after taking antibiotic    • Penicillins Other (See Comments)       Review of Systems   Constitutional: Negative  Negative for appetite change and fever  HENT: Negative  Negative for hearing loss, tinnitus, trouble swallowing and voice change  Eyes: Negative  Negative for photophobia, pain and visual disturbance  Respiratory: Negative  Negative for shortness of breath  Cardiovascular: Negative  Negative for palpitations  Gastrointestinal: Negative  Negative for nausea and vomiting  Endocrine: Negative  Negative for cold intolerance  Genitourinary: Negative    Negative for dysuria, frequency and urgency  Musculoskeletal: Negative  Negative for gait problem, myalgias and neck pain  Skin: Negative  Negative for rash  Allergic/Immunologic: Negative  Neurological: Negative  Negative for dizziness, tremors, seizures, syncope, facial asymmetry, speech difficulty, weakness, light-headedness, numbness and headaches  Hematological: Negative  Does not bruise/bleed easily  Psychiatric/Behavioral: Negative  Negative for confusion, hallucinations and sleep disturbance  Reviewed ROS as entered by medical assistant  Video Exam    Vitals:    12/15/22 1318   Weight: 96 6 kg (213 lb)   Height: 6' (1 829 m)       Physical Exam     At the time of my evaluation he was awake, alert, and seated in the chair  There were no obvious new cranial neuropathies or lateralizing signs  He continues to have mild to moderate dysarthria and expressive aphasia  I have spent 32 minutes with Patient and family today including counseling/coordination of care regarding Risks and benefits of tx options, Intructions for management, Patient and family education, Importance of tx compliance and Risk factor reductions as well as on chart review, communication, and documentation

## 2023-01-11 ENCOUNTER — HOSPITAL ENCOUNTER (OUTPATIENT)
Dept: NON INVASIVE DIAGNOSTICS | Facility: CLINIC | Age: 73
Discharge: HOME | End: 2023-01-11

## 2023-01-11 DIAGNOSIS — I65.23 BILATERAL CAROTID ARTERY STENOSIS: ICD-10-CM

## 2023-05-25 ENCOUNTER — OFFICE VISIT (OUTPATIENT)
Dept: UROLOGY | Facility: CLINIC | Age: 73
End: 2023-05-25

## 2023-05-25 VITALS — OXYGEN SATURATION: 99 % | WEIGHT: 210 LBS | HEIGHT: 72 IN | HEART RATE: 62 BPM | BODY MASS INDEX: 28.44 KG/M2

## 2023-05-25 DIAGNOSIS — Z12.5 PROSTATE CANCER SCREENING: ICD-10-CM

## 2023-05-25 DIAGNOSIS — N40.1 BENIGN PROSTATIC HYPERPLASIA WITH URINARY FREQUENCY: ICD-10-CM

## 2023-05-25 DIAGNOSIS — R35.0 BENIGN PROSTATIC HYPERPLASIA WITH URINARY FREQUENCY: ICD-10-CM

## 2023-05-25 DIAGNOSIS — N40.1 BPH WITH OBSTRUCTION/LOWER URINARY TRACT SYMPTOMS: Primary | ICD-10-CM

## 2023-05-25 DIAGNOSIS — R39.9 UTI SYMPTOMS: ICD-10-CM

## 2023-05-25 DIAGNOSIS — N13.8 BPH WITH OBSTRUCTION/LOWER URINARY TRACT SYMPTOMS: Primary | ICD-10-CM

## 2023-05-25 PROBLEM — R33.9 URINARY RETENTION: Status: RESOLVED | Noted: 2019-06-04 | Resolved: 2023-05-25

## 2023-05-25 LAB
POST-VOID RESIDUAL VOLUME, ML POC: 31 ML
SL AMB  POCT GLUCOSE, UA: ABNORMAL
SL AMB LEUKOCYTE ESTERASE,UA: ABNORMAL
SL AMB POCT BILIRUBIN,UA: ABNORMAL
SL AMB POCT BLOOD,UA: ABNORMAL
SL AMB POCT CLARITY,UA: CLEAR
SL AMB POCT COLOR,UA: YELLOW
SL AMB POCT KETONES,UA: ABNORMAL
SL AMB POCT NITRITE,UA: ABNORMAL
SL AMB POCT PH,UA: 5
SL AMB POCT SPECIFIC GRAVITY,UA: 1.02
SL AMB POCT URINE PROTEIN: ABNORMAL
SL AMB POCT UROBILINOGEN: 0.2

## 2023-05-25 NOTE — PATIENT INSTRUCTIONS
BLADDER HEALTH    WHAT IS CONSIDERED NORMAL? The average bladder can hold about 2 cups of urine before it needs to be emptied  The normal range of voiding urine is 6 to 8 times during a 24 hour period  As we get older, our bladder capacity can get smaller and we may need to pass urine more frequently but usually not more than every 2 hours  Urine should flow easily without discomfort in a good, steady stream until the bladder is empty  No pushing or straining is necessary to empty the bladder  An urge is a signal that you feel as the bladder stretches to fill with urine  Urges can be felt even if the bladder is not full  Urges are not commands to go to the toilet, merely a signal and can be controlled  WHAT ARE GOOD BLADDER HABITS? Take your time when emptying your bladder  Don’t strain or push to empty your bladder  Make sure you empty your bladder completely each time you pass urine  Do not rush the process  Consistently ignoring the urge to go (waiting more than 4 hours between toileting) or urinating too infrequently may be convenient but not healthy for your bladder  Avoid going to the toilet “just in case” or more often than every 2 hours  It is usually not necessary to go when you feel the first urge  Try to go only when your bladder is full  Urgency and frequency of urination can be improved by retraining the bladder and spacing your fluid intake throughout the day  Practice good toilet habits  Don’t let your bladder control your life  TIPS TO MAINTAIN GOOD BLADDER HABITS  Maintain a good fluid intake  Depending on your body size and environment, drink 6 -8 cups (8 oz each) of fluid per day unless otherwise advised by your doctor  Not enough fluid creates a foul odor and dark color of the urine  Limit the amount of caffeine (coffee, cola, chocolate or tea) and citrus foods that you consume as these foods can be associated with increased sensation of urinary urgency and frequency  "    Limit the amount of alcohol you drink  Alcohol increases urine production and makes it difficult for the brain to coordinate bladder control  Avoid constipation by maintaining a balanced diet of dietary fiber  Cigarette smoking is also irritating to the bladder surface and is associated with bladder cancer  In addition, the coughing associated with smoking may lead to increased incontinent episodes because of the increased pressure  HOW DIET CAN AFFECT YOUR BLADDER  Although there is no particular \"diet\" that can cure bladder control, there are certain dietary suggestions you can use to help control the problem  There are 2 points to consider when evaluating how your habits and diet may affect your bladder:    Foods and fluids can irritate the bladder  Some foods and beverages are thought to contribute to bladder leakage and irritability  However their effect on the bladder is not completely understood and you may want to see if eliminating one or all of these items improves your bladder control  If you are unable to give them up completely, it is recommended that you use the following items in moderation:  Acidic beverages and foods (orange juice, grapefruit juice, lemonade etc)  Alcoholic beverages  Vinegar  Coffee (regular and decaf)  Tea (regular and decaf)  Caffeinated beverages  Carbonated beverages          Drinking enough and the right kinds of fluids  Many people with bladder control issues decrease their intake of liquids in hope that they will need to urinate less frequently or have less urinary leakage  You should not restrict fluids to control your bladder  While a decrease in liquid intake does result in a decrease in the volume of urine, the smaller amount of urine may be more highly concentrated  Highly concentrated, dark yellow urine is irritating to the bladder surface and may actually cause you to go to the bathroom more frequently        It also encourages the growth " of bacteria, which may lead to infections resulting in incontinence  Substitutions for Bladder Irritants: water is always the best beverage choice  Grape and apple juice are thirst quenchers are good selections and are not as irritating to the bladder    Low acid fruits:  Pears, apricots, papaya, watermelon  For coffee drinkers: KAVA®, Postum®, Kaila®, Kaffree Jennifer®  For tea drinkers:  non-citrus or herbal and sun brewed tea

## 2023-05-25 NOTE — PROGRESS NOTES
"  Assessment and plan:     BPH with obstruction/lower urinary tract symptoms  · S/p UroLift November 2021  · PVR 31 mL  · Continue finasteride 5 mg daily  · Send urine microscopic  · Follow-up 1 year    Prostate cancer screening  · PSA ordered  · Follow-up 1 year       KISHAN Jo    History of Present Illness     Malon Friday is a 68 y o  male presents for 1 year follow-up he is status post UroLift insertion November 2021 by Dr Pierre Langston  He is very happy with the results of this  He wakes up once a night to urinate  He has excellent urinary control wears 0 pads  He feels he is emptying his bladder much better  He has excellent flow, he has not had hematuria since day one  His wife and his caregiver accompanying him today and are all pleased with his results  He has not had UTI  He is no longer taking the suppression Macrobid he was on the year prior  He does take the finasteride, his Flomax was stopped during admission last month for presyncope      His las psa was 2 83 in January 2021    Laboratory     Lab Results   Component Value Date    BUN 15 02/03/2022    CREATININE 0 90 02/03/2022       No components found for: \"GFR\"    Lab Results   Component Value Date    CALCIUM 9 0 02/03/2022     02/03/2022    CO2 27 02/03/2022    GLUCOSE 133 04/27/2019    K 3 8 02/03/2022       Lab Results   Component Value Date    HCT 41 4 02/03/2022    HGB 13 5 02/03/2022    MCV 95 02/03/2022     02/03/2022    WBC 4 83 02/03/2022       Lab Results   Component Value Date    PSA 3 6 03/13/2020       Recent Results (from the past 1 hour(s))   POCT urine dip    Collection Time: 05/25/23  1:32 PM   Result Value Ref Range    LEUKOCYTE ESTERASE,UA trace     NITRITE,UA -     SL AMB POCT UROBILINOGEN 0 2     POCT URINE PROTEIN trace      PH,UA 5 0     BLOOD,UA small     SPECIFIC GRAVITY,UA 1 025     KETONES,UA small     BILIRUBIN,UA -     GLUCOSE, UA -      COLOR,UA yellow     CLARITY,UA clear    POCT Measure " PVR    Collection Time: 05/25/23  1:35 PM   Result Value Ref Range    POST-VOID RESIDUAL VOLUME, ML POC 31 mL       @RESULT(URINEMICROSCOPIC)@    @RESULT(URINECULTURE)@    Radiology       Review of Systems     Review of Systems   Constitutional: Negative for activity change, appetite change, chills, fatigue, fever and unexpected weight change  HENT: Negative for facial swelling  Eyes: Negative for discharge  Respiratory: Negative  Negative for cough and shortness of breath  Cardiovascular: Negative for chest pain and leg swelling  Gastrointestinal: Negative  Negative for abdominal distention, abdominal pain, constipation, diarrhea, nausea and vomiting  Endocrine: Negative  Genitourinary: Negative  Negative for decreased urine volume, difficulty urinating, dysuria, enuresis, flank pain, frequency, genital sores, hematuria and urgency  Musculoskeletal: Negative for back pain and myalgias  Skin: Negative for pallor and rash  Allergic/Immunologic: Negative  Negative for immunocompromised state  Neurological: Negative for facial asymmetry and speech difficulty  Psychiatric/Behavioral: Negative for agitation and confusion           AUA SYMPTOM SCORE    Flowsheet Row Most Recent Value   AUA SYMPTOM SCORE    How often have you had a sensation of not emptying your bladder completely after you finished urinating? 1 (P)     How often have you had to urinate again less than two hours after you finished urinating? 2 (P)     How often have you found you stopped and started again several times when you urinate? 2 (P)     How often have you found it difficult to postpone urination? 2 (P)     How often have you had a weak urinary stream? 2 (P)     How often have you had to push or strain to begin urination? 2 (P)     How many times did you most typically get up to urinate from the time you went to bed at night until the time you got up in the morning? 1 (P)     Quality of Life: If you were to spend the rest of your life with your urinary condition just the way it is now, how would you feel about that? 3 (P)     AUA SYMPTOM SCORE 12 (P)                 Allergies     Allergies   Allergen Reactions   • Cephalexin Throat Swelling     Pt reported throat swelling after taking antibiotic    • Penicillins Other (See Comments)       Physical Exam     Physical Exam  Vitals reviewed  Constitutional:       General: He is not in acute distress  Appearance: Normal appearance  He is normal weight  He is not ill-appearing, toxic-appearing or diaphoretic  HENT:      Head: Normocephalic and atraumatic  Eyes:      General: No scleral icterus  Cardiovascular:      Rate and Rhythm: Normal rate  Pulmonary:      Effort: Pulmonary effort is normal  No respiratory distress  Abdominal:      General: Abdomen is flat  There is no distension  Palpations: Abdomen is soft  Tenderness: There is no abdominal tenderness  Genitourinary:     Comments: Prostate smooth nontender without appreciable nodules or induration  Musculoskeletal:         General: No swelling  Cervical back: Normal range of motion  Skin:     General: Skin is warm and dry  Coloration: Skin is not jaundiced or pale  Findings: No rash  Neurological:      General: No focal deficit present  Mental Status: He is alert and oriented to person, place, and time  Gait: Gait abnormal       Comments: Ambulates with cane   Psychiatric:         Mood and Affect: Mood normal          Behavior: Behavior normal          Thought Content:  Thought content normal          Judgment: Judgment normal          Vital Signs     Vitals:    05/25/23 1328   Pulse: 62   SpO2: 99%   Weight: 95 3 kg (210 lb)   Height: 6' (1 829 m)       Current Medications       Current Outpatient Medications:   •  acetaminophen (TYLENOL) 325 mg tablet, Take 2 tablets (650 mg total) by mouth every 4 (four) hours as needed for mild pain, Disp: 30 tablet, Rfl: 0  •  Coenzyme Q10 (CO Q 10) 10 MG CAPS, Take by mouth daily, Disp: , Rfl:   •  cyanocobalamin (VITAMIN B-12) 100 mcg tablet, Take by mouth daily, Disp: , Rfl:   •  finasteride (PROSCAR) 5 mg tablet, Take 5 mg by mouth daily at bedtime  , Disp: , Rfl:   •  hydrocortisone 1 % cream, Apply topically 2 (two) times a day as needed , Disp: , Rfl:   •  ketoconazole (NIZORAL) 2 % shampoo, SHAMPOO 3 TIMES A WEEK AS A SCALP TREATMENT, LEAVE ON 5-10 MINUTES AND RINSE, Disp: , Rfl:   •  pantoprazole (PROTONIX) 40 mg tablet, Take 1 tablet (40 mg total) by mouth 2 (two) times a day before meals, Disp: 120 tablet, Rfl: 0  •  pravastatin (PRAVACHOL) 40 mg tablet, Take 40 mg by mouth daily, Disp: , Rfl:   •  rivaroxaban (Xarelto) 20 mg tablet, Take 20 mg by mouth daily with dinner  , Disp: , Rfl:   •  docusate sodium (COLACE) 100 mg capsule, Take 1 capsule (100 mg total) by mouth 2 (two) times a day for 15 days (Patient not taking: Reported on 12/15/2022), Disp: 30 capsule, Rfl: 0    Active Problems     Patient Active Problem List   Diagnosis   • Essential hypertension   • Hypokalemia   • Elevated brain natriuretic peptide (BNP) level   • Hx of right knee surgery   • Hyperlipidemia   • Prediabetes   • 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 ([facility])   • Left hemiplegia ([facility])   • Dysphagia   • Paroxysmal atrial fibrillation (HCC)   • Hypernatremia   • Acute blood loss anemia   • Acute kidney injury (HCC)   • Chronic diastolic CHF (congestive heart failure) ([facility])   • 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[facility])   • Acute leg pain, right   • History of stroke   • Fatigue   • Nonintractable epilepsy without status epilepticus (HCC)   • Bradycardia   • Pre-syncope   • Bilateral carotid artery stenosis   • History of gross hematuria   • BPH with obstruction/lower urinary tract symptoms   • Bladder stone   • Prostate cancer screening       Past Medical History     Past Medical History:   Diagnosis Date   • Acute encephalopathy 5/27/2019   • Arthritis     BL knees   • Atrial fibrillation ([facility] )    • CHF (congestive heart failure) (HCC)    • Colon polyp    • CVA (cerebral vascular accident) ([facility] ) 4/27/2019    NIHSS 26 on presentation   • Depression    • Diabetes mellitus ([place] [id number] )     borderline   • Encephalopathy acute 4/28/2019   • History of transfusion 04/2019   • Hyperlipidemia    • Irregular heart beat     Afib   • Stroke ([facility] ) 04/27/2019    Left sided weakness-aphasia   • Urinary retention    • Urinary retention 6/4/2019       Surgical History     Past Surgical History:   Procedure Laterality Date   • COLONOSCOPY     • IR STROKE ALERT  4/27/2019   • MENISCECTOMY Right    • [place] CYSTO INSERTION TRANSPROSTATIC IMPLANT SINGLE N/A 11/19/2021    Procedure: CYSTOSCOPY WITH INSERTION Smiley Revesae;  Surgeon: Sravan Arndt MD;  [facility];  Service: Urology       Family History     History reviewed  No pertinent family history  Social History     Social History     Social History     Tobacco Use   Smoking Status Never   Smokeless Tobacco Never       Past Surgical History:   Procedure Laterality Date   • COLONOSCOPY     • IR STROKE ALERT  4/27/2019   • MENISCECTOMY Right    • [place] CYSTO INSERTION TRANSPROSTATIC IMPLANT SINGLE N/A 11/19/2021    Procedure: CYSTOSCOPY WITH INSERTION Smiley Revels;  Surgeon: Sravan Arndt MD;  [facility];  Service: Urology         The following portions of the patient's history were reviewed and updated as appropriate: allergies, current medications, past family history, past medical history, past social history, past surgical history and problem list    Please note :  Voice dictation software has been used to create this document  There may be inadvertent transcription errors      [id number] [address]

## 2023-05-25 NOTE — ASSESSMENT & PLAN NOTE
· S/p UroLift November 2021  · PVR 31 mL  · Continue finasteride 5 mg daily  · Send urine microscopic  · Follow-up 1 year

## 2023-05-26 ENCOUNTER — NURSE TRIAGE (OUTPATIENT)
Dept: OTHER | Facility: OTHER | Age: 73
End: 2023-05-26

## 2023-05-26 ENCOUNTER — TELEPHONE (OUTPATIENT)
Dept: OTHER | Facility: OTHER | Age: 73
End: 2023-05-26

## 2023-05-26 DIAGNOSIS — R31.29 MICROHEMATURIA: Primary | ICD-10-CM

## 2023-05-26 DIAGNOSIS — R35.0 URINE FREQUENCY: Primary | ICD-10-CM

## 2023-05-26 NOTE — TELEPHONE ENCOUNTER
"had a urinalysis obtained today  Spouse is concerned because there was blood noted in the specimen  Also notes patient continues with urinary frequency- using the bathroom every 2 hours  Patient denies any pain or fever  Should a urine culture be ordered? On call provider notified    Reason for Disposition  • [1] Caller has URGENT question (includes prescribed medication questions) AND [2] triager unable to answer question    Answer Assessment - Initial Assessment Questions  1  MAIN CONCERN OR SYMPTOM:  \"What is your main concern right now? \" \"What question do you have? \" \"What's the main symptom you're worried about? \" (e g , breathing difficulty, cough, fever  pain)      Blood in urine  2  ONSET: \"When did the  Blood  start? \"      Several days ago  3  BETTER-SAME-WORSE: \"Are you getting better, staying the same, or getting worse compared to how you felt at your last visit to the doctor (most recent medical visit)? \"      same  10  FEVER: \"Do you have a fever? \" If Yes, ask: \"What is it, how was it measured  and when did it start? \"        denies  11  OTHER SYMPTOMS: \"Do you have any other symptoms? \"        Urinary frequency    Protocols used: RECENT MEDICAL VISIT FOR ILLNESS FOLLOW-UP CALL-ADULT-      "

## 2023-05-26 NOTE — TELEPHONE ENCOUNTER
Pt's wife states that she saw pt's Urine results on Mychart and is worried about the Trace blood in his Urine  She would like to know if [facility] is going to treat this is this ok  Please advise

## 2023-05-26 NOTE — TELEPHONE ENCOUNTER
Voicemail left for patient/wife to please call the office to discuss results  Patient needs to obtain formal urinalysis at the lab

## 2023-05-26 NOTE — TELEPHONE ENCOUNTER
"Regarding: UTI  ----- Message from Vance Sanchez sent at 5/26/2023  4:56 PM EDT -----  \"I think my spouse has a UTI \"    "

## 2023-05-26 NOTE — TELEPHONE ENCOUNTER
Per on call-  Order for urine culture can be placed  If symptoms worsen or persist patient should be evaluated by PCP or urgent care  Patient advised of the above and verbalized understanding

## 2023-05-27 ENCOUNTER — APPOINTMENT (OUTPATIENT)
Dept: LAB | Facility: MEDICAL CENTER | Age: 73
End: 2023-05-27

## 2023-05-27 DIAGNOSIS — R31.29 MICROHEMATURIA: ICD-10-CM

## 2023-05-27 DIAGNOSIS — R35.0 URINE FREQUENCY: ICD-10-CM

## 2023-05-27 LAB
BACTERIA UR QL AUTO: NORMAL /HPF
BILIRUB UR QL STRIP: NEGATIVE
CLARITY UR: CLEAR
COLOR UR: NORMAL
GLUCOSE UR STRIP-MCNC: NEGATIVE MG/DL
HGB UR QL STRIP.AUTO: NEGATIVE
KETONES UR STRIP-MCNC: NEGATIVE MG/DL
LEUKOCYTE ESTERASE UR QL STRIP: NEGATIVE
NITRITE UR QL STRIP: NEGATIVE
NON-SQ EPI CELLS URNS QL MICRO: NORMAL /HPF
PH UR STRIP.AUTO: 6 [PH]
PROT UR STRIP-MCNC: NEGATIVE MG/DL
RBC #/AREA URNS AUTO: NORMAL /HPF
SP GR UR STRIP.AUTO: 1.02 (ref 1–1.03)
UROBILINOGEN UR STRIP-ACNC: <2 MG/DL
WBC #/AREA URNS AUTO: NORMAL /HPF

## 2023-05-28 LAB — BACTERIA UR CULT: NORMAL

## 2023-05-30 ENCOUNTER — TELEPHONE (OUTPATIENT)
Dept: NEUROLOGY | Facility: CLINIC | Age: 73
End: 2023-05-30

## 2023-05-30 NOTE — TELEPHONE ENCOUNTER
LVM for pt to call the office back regarding his UC results: If patient calls back can be provided this information  Urine culture negative for infection  Mixed contaminants  No infection identified

## 2023-05-30 NOTE — TELEPHONE ENCOUNTER
Patient's wife called stating she was returning the call  As per encounter note  Urine culture negative   No infection identified  She stated thank you for the information  She verbalized understanding   No further action needed

## 2023-05-30 NOTE — TELEPHONE ENCOUNTER
Duplicate encounter  Wife called today and was given results of urinalysis and culture  Both tests negative

## 2023-05-30 NOTE — TELEPHONE ENCOUNTER
Patient wife called stating she received a letter advising her to schedule her  yearly follow up      I advised her that Dr Kiara Chirinos schedule is not open for Nov or Dec yet so I offered her 11-15-23 at 245 pm with [facility] Fox in [place] and added the patient to Dr Kiara Chirinos wait list and she accepted

## 2023-06-16 ENCOUNTER — HOSPITAL ENCOUNTER (INPATIENT)
Facility: HOSPITAL | Age: 73
LOS: 3 days | DRG: 243 | End: 2023-06-20
Attending: EMERGENCY MEDICINE | Admitting: INTERNAL MEDICINE
Payer: MEDICARE

## 2023-06-16 ENCOUNTER — APPOINTMENT (EMERGENCY)
Dept: CT IMAGING | Facility: HOSPITAL | Age: 73
DRG: 243 | End: 2023-06-16
Payer: MEDICARE

## 2023-06-16 DIAGNOSIS — K55.1 SUPERIOR MESENTERIC ARTERY STENOSIS (HCC): ICD-10-CM

## 2023-06-16 DIAGNOSIS — R10.9 ABDOMINAL PAIN: ICD-10-CM

## 2023-06-16 DIAGNOSIS — I48.91 ATRIAL FIBRILLATION WITH SLOW VENTRICULAR RESPONSE (HCC): Primary | ICD-10-CM

## 2023-06-16 LAB
ALBUMIN SERPL BCP-MCNC: 4.7 G/DL (ref 3.5–5)
ALP SERPL-CCNC: 49 U/L (ref 34–104)
ALT SERPL W P-5'-P-CCNC: 24 U/L (ref 7–52)
ANION GAP SERPL CALCULATED.3IONS-SCNC: 15 MMOL/L (ref 4–13)
AST SERPL W P-5'-P-CCNC: 38 U/L (ref 13–39)
ATRIAL RATE: 79 BPM
BILIRUB SERPL-MCNC: 0.79 MG/DL (ref 0.2–1)
BUN SERPL-MCNC: 35 MG/DL (ref 5–25)
CALCIUM SERPL-MCNC: 9.4 MG/DL (ref 8.4–10.2)
CARDIAC TROPONIN I PNL SERPL HS: 5 NG/L
CHLORIDE SERPL-SCNC: 89 MMOL/L (ref 96–108)
CO2 SERPL-SCNC: 29 MMOL/L (ref 21–32)
CREAT SERPL-MCNC: 1.64 MG/DL (ref 0.6–1.3)
ERYTHROCYTE [DISTWIDTH] IN BLOOD BY AUTOMATED COUNT: 13.3 % (ref 11.6–15.1)
GFR SERPL CREATININE-BSD FRML MDRD: 40 ML/MIN/1.73SQ M
GLUCOSE SERPL-MCNC: 171 MG/DL (ref 65–140)
HCT VFR BLD AUTO: 52 % (ref 36.5–49.3)
HGB BLD-MCNC: 17.1 G/DL (ref 12–17)
LACTATE SERPL-SCNC: 3.1 MMOL/L (ref 0.5–2)
LIPASE SERPL-CCNC: 14 U/L (ref 11–82)
MCH RBC QN AUTO: 31.4 PG (ref 26.8–34.3)
MCHC RBC AUTO-ENTMCNC: 32.9 G/DL (ref 31.4–37.4)
MCV RBC AUTO: 95 FL (ref 82–98)
PLATELET # BLD AUTO: 198 THOUSANDS/UL (ref 149–390)
PMV BLD AUTO: 9.3 FL (ref 8.9–12.7)
POTASSIUM SERPL-SCNC: 5 MMOL/L (ref 3.5–5.3)
POTASSIUM SERPL-SCNC: 5.6 MMOL/L (ref 3.5–5.3)
PROT SERPL-MCNC: 7.8 G/DL (ref 6.4–8.4)
QRS AXIS: 17 DEGREES
QRSD INTERVAL: 88 MS
QT INTERVAL: 434 MS
QTC INTERVAL: 395 MS
RBC # BLD AUTO: 5.45 MILLION/UL (ref 3.88–5.62)
SODIUM SERPL-SCNC: 133 MMOL/L (ref 135–147)
T WAVE AXIS: 50 DEGREES
VENTRICULAR RATE: 50 BPM
WBC # BLD AUTO: 14.02 THOUSAND/UL (ref 4.31–10.16)

## 2023-06-16 PROCEDURE — 93005 ELECTROCARDIOGRAM TRACING: CPT

## 2023-06-16 PROCEDURE — 74174 CTA ABD&PLVS W/CONTRAST: CPT

## 2023-06-16 PROCEDURE — 84484 ASSAY OF TROPONIN QUANT: CPT

## 2023-06-16 PROCEDURE — 83690 ASSAY OF LIPASE: CPT

## 2023-06-16 PROCEDURE — 99285 EMERGENCY DEPT VISIT HI MDM: CPT

## 2023-06-16 PROCEDURE — G1004 CDSM NDSC: HCPCS

## 2023-06-16 PROCEDURE — 85025 COMPLETE CBC W/AUTO DIFF WBC: CPT

## 2023-06-16 PROCEDURE — 93010 ELECTROCARDIOGRAM REPORT: CPT | Performed by: STUDENT IN AN ORGANIZED HEALTH CARE EDUCATION/TRAINING PROGRAM

## 2023-06-16 PROCEDURE — 80053 COMPREHEN METABOLIC PANEL: CPT

## 2023-06-16 PROCEDURE — 96361 HYDRATE IV INFUSION ADD-ON: CPT

## 2023-06-16 PROCEDURE — 36415 COLL VENOUS BLD VENIPUNCTURE: CPT

## 2023-06-16 PROCEDURE — 83605 ASSAY OF LACTIC ACID: CPT

## 2023-06-16 PROCEDURE — 96374 THER/PROPH/DIAG INJ IV PUSH: CPT

## 2023-06-16 PROCEDURE — 84132 ASSAY OF SERUM POTASSIUM: CPT

## 2023-06-16 RX ORDER — MORPHINE SULFATE 4 MG/ML
4 INJECTION, SOLUTION INTRAMUSCULAR; INTRAVENOUS ONCE
Status: COMPLETED | OUTPATIENT
Start: 2023-06-16 | End: 2023-06-16

## 2023-06-16 RX ADMIN — IOHEXOL 100 ML: 350 INJECTION, SOLUTION INTRAVENOUS at 23:46

## 2023-06-16 RX ADMIN — SODIUM CHLORIDE 1000 ML: 0.9 INJECTION, SOLUTION INTRAVENOUS at 23:08

## 2023-06-16 RX ADMIN — MORPHINE SULFATE 4 MG: 4 INJECTION INTRAVENOUS at 23:09

## 2023-06-17 PROBLEM — I48.91 ATRIAL FIBRILLATION WITH SLOW VENTRICULAR RESPONSE (HCC): Status: ACTIVE | Noted: 2023-06-17

## 2023-06-17 PROBLEM — K55.1 SUPERIOR MESENTERIC ARTERY STENOSIS (HCC): Status: ACTIVE | Noted: 2023-06-17

## 2023-06-17 PROBLEM — R10.9 ABDOMINAL PAIN: Status: ACTIVE | Noted: 2023-06-17

## 2023-06-17 LAB
2HR DELTA HS TROPONIN: 6 NG/L
4HR DELTA HS TROPONIN: -1 NG/L
ALBUMIN SERPL BCP-MCNC: 3.8 G/DL (ref 3.5–5)
ALP SERPL-CCNC: 47 U/L (ref 34–104)
ALT SERPL W P-5'-P-CCNC: 16 U/L (ref 7–52)
ANION GAP SERPL CALCULATED.3IONS-SCNC: 9 MMOL/L (ref 4–13)
AST SERPL W P-5'-P-CCNC: 11 U/L (ref 13–39)
ATRIAL RATE: 312 BPM
ATRIAL RATE: 93 BPM
BASOPHILS # BLD AUTO: 0.01 THOUSANDS/ÂΜL (ref 0–0.1)
BASOPHILS NFR BLD AUTO: 0 % (ref 0–1)
BILIRUB SERPL-MCNC: 0.66 MG/DL (ref 0.2–1)
BUN SERPL-MCNC: 21 MG/DL (ref 5–25)
CALCIUM SERPL-MCNC: 9.4 MG/DL (ref 8.4–10.2)
CARDIAC TROPONIN I PNL SERPL HS: 11 NG/L
CARDIAC TROPONIN I PNL SERPL HS: 4 NG/L
CHLORIDE SERPL-SCNC: 104 MMOL/L (ref 96–108)
CO2 SERPL-SCNC: 26 MMOL/L (ref 21–32)
CREAT SERPL-MCNC: 0.86 MG/DL (ref 0.6–1.3)
EOSINOPHIL # BLD AUTO: 0 THOUSAND/ÂΜL (ref 0–0.61)
EOSINOPHIL NFR BLD AUTO: 0 % (ref 0–6)
ERYTHROCYTE [DISTWIDTH] IN BLOOD BY AUTOMATED COUNT: 13.2 % (ref 11.6–15.1)
GFR SERPL CREATININE-BSD FRML MDRD: 86 ML/MIN/1.73SQ M
GLUCOSE P FAST SERPL-MCNC: 143 MG/DL (ref 65–99)
GLUCOSE SERPL-MCNC: 143 MG/DL (ref 65–140)
HCT VFR BLD AUTO: 47.7 % (ref 36.5–49.3)
HGB BLD-MCNC: 15.8 G/DL (ref 12–17)
IMM GRANULOCYTES # BLD AUTO: 0.06 THOUSAND/UL (ref 0–0.2)
IMM GRANULOCYTES NFR BLD AUTO: 1 % (ref 0–2)
LACTATE SERPL-SCNC: 2 MMOL/L (ref 0.5–2)
LIPASE SERPL-CCNC: 8 U/L (ref 11–82)
LYMPHOCYTES # BLD AUTO: 0.96 THOUSANDS/ÂΜL (ref 0.6–4.47)
LYMPHOCYTES NFR BLD AUTO: 8 % (ref 14–44)
MCH RBC QN AUTO: 31.1 PG (ref 26.8–34.3)
MCHC RBC AUTO-ENTMCNC: 33.1 G/DL (ref 31.4–37.4)
MCV RBC AUTO: 94 FL (ref 82–98)
MONOCYTES # BLD AUTO: 0.52 THOUSAND/ÂΜL (ref 0.17–1.22)
MONOCYTES NFR BLD AUTO: 4 % (ref 4–12)
NEUTROPHILS # BLD AUTO: 10.21 THOUSANDS/ÂΜL (ref 1.85–7.62)
NEUTS SEG NFR BLD AUTO: 87 % (ref 43–75)
NRBC BLD AUTO-RTO: 0 /100 WBCS
PLATELET # BLD AUTO: 177 THOUSANDS/UL (ref 149–390)
PMV BLD AUTO: 9.1 FL (ref 8.9–12.7)
POTASSIUM SERPL-SCNC: 4.2 MMOL/L (ref 3.5–5.3)
PROT SERPL-MCNC: 6 G/DL (ref 6.4–8.4)
QRS AXIS: 30 DEGREES
QRS AXIS: 32 DEGREES
QRSD INTERVAL: 92 MS
QRSD INTERVAL: 96 MS
QT INTERVAL: 444 MS
QT INTERVAL: 454 MS
QTC INTERVAL: 365 MS
QTC INTERVAL: 454 MS
RBC # BLD AUTO: 5.08 MILLION/UL (ref 3.88–5.62)
SODIUM SERPL-SCNC: 139 MMOL/L (ref 135–147)
T WAVE AXIS: -72 DEGREES
T WAVE AXIS: 88 DEGREES
VENTRICULAR RATE: 39 BPM
VENTRICULAR RATE: 63 BPM
WBC # BLD AUTO: 11.76 THOUSAND/UL (ref 4.31–10.16)

## 2023-06-17 PROCEDURE — 36415 COLL VENOUS BLD VENIPUNCTURE: CPT

## 2023-06-17 PROCEDURE — 99223 1ST HOSP IP/OBS HIGH 75: CPT | Performed by: INTERNAL MEDICINE

## 2023-06-17 PROCEDURE — 96361 HYDRATE IV INFUSION ADD-ON: CPT

## 2023-06-17 PROCEDURE — 84484 ASSAY OF TROPONIN QUANT: CPT

## 2023-06-17 PROCEDURE — 85025 COMPLETE CBC W/AUTO DIFF WBC: CPT | Performed by: PHYSICIAN ASSISTANT

## 2023-06-17 PROCEDURE — 83690 ASSAY OF LIPASE: CPT | Performed by: PHYSICIAN ASSISTANT

## 2023-06-17 PROCEDURE — 93010 ELECTROCARDIOGRAM REPORT: CPT | Performed by: STUDENT IN AN ORGANIZED HEALTH CARE EDUCATION/TRAINING PROGRAM

## 2023-06-17 PROCEDURE — 99223 1ST HOSP IP/OBS HIGH 75: CPT | Performed by: STUDENT IN AN ORGANIZED HEALTH CARE EDUCATION/TRAINING PROGRAM

## 2023-06-17 PROCEDURE — 83605 ASSAY OF LACTIC ACID: CPT

## 2023-06-17 PROCEDURE — 80053 COMPREHEN METABOLIC PANEL: CPT | Performed by: PHYSICIAN ASSISTANT

## 2023-06-17 PROCEDURE — 99222 1ST HOSP IP/OBS MODERATE 55: CPT | Performed by: INTERNAL MEDICINE

## 2023-06-17 PROCEDURE — C9113 INJ PANTOPRAZOLE SODIUM, VIA: HCPCS | Performed by: PHYSICIAN ASSISTANT

## 2023-06-17 PROCEDURE — 93005 ELECTROCARDIOGRAM TRACING: CPT

## 2023-06-17 RX ORDER — FAMOTIDINE 20 MG/1
20 TABLET, FILM COATED ORAL 2 TIMES DAILY
Status: DISCONTINUED | OUTPATIENT
Start: 2023-06-17 | End: 2023-06-17

## 2023-06-17 RX ORDER — PANTOPRAZOLE SODIUM 40 MG/10ML
40 INJECTION, POWDER, LYOPHILIZED, FOR SOLUTION INTRAVENOUS EVERY 12 HOURS SCHEDULED
Status: DISCONTINUED | OUTPATIENT
Start: 2023-06-17 | End: 2023-06-17

## 2023-06-17 RX ORDER — FINASTERIDE 5 MG/1
5 TABLET, FILM COATED ORAL
Status: CANCELLED | OUTPATIENT
Start: 2023-06-17

## 2023-06-17 RX ORDER — HEPARIN SODIUM 5000 [USP'U]/ML
5000 INJECTION, SOLUTION INTRAVENOUS; SUBCUTANEOUS EVERY 8 HOURS SCHEDULED
Status: DISCONTINUED | OUTPATIENT
Start: 2023-06-17 | End: 2023-06-17

## 2023-06-17 RX ORDER — AMOXICILLIN 250 MG
1 CAPSULE ORAL 2 TIMES DAILY
Status: CANCELLED | OUTPATIENT
Start: 2023-06-18

## 2023-06-17 RX ORDER — SUCRALFATE 1 G/1
1 TABLET ORAL
Status: DISCONTINUED | OUTPATIENT
Start: 2023-06-17 | End: 2023-06-20 | Stop reason: HOSPADM

## 2023-06-17 RX ORDER — POLYETHYLENE GLYCOL 3350 17 G/17G
17 POWDER, FOR SOLUTION ORAL 2 TIMES DAILY
Status: DISCONTINUED | OUTPATIENT
Start: 2023-06-17 | End: 2023-06-20 | Stop reason: HOSPADM

## 2023-06-17 RX ORDER — PANTOPRAZOLE SODIUM 40 MG/1
40 TABLET, DELAYED RELEASE ORAL
Status: CANCELLED | OUTPATIENT
Start: 2023-06-18

## 2023-06-17 RX ORDER — FINASTERIDE 5 MG/1
5 TABLET, FILM COATED ORAL
Status: DISCONTINUED | OUTPATIENT
Start: 2023-06-17 | End: 2023-06-20 | Stop reason: HOSPADM

## 2023-06-17 RX ORDER — PRAVASTATIN SODIUM 40 MG
40 TABLET ORAL DAILY
Status: DISCONTINUED | OUTPATIENT
Start: 2023-06-17 | End: 2023-06-20 | Stop reason: HOSPADM

## 2023-06-17 RX ORDER — ONDANSETRON 2 MG/ML
4 INJECTION INTRAMUSCULAR; INTRAVENOUS EVERY 6 HOURS PRN
Status: CANCELLED | OUTPATIENT
Start: 2023-06-17

## 2023-06-17 RX ORDER — SODIUM CHLORIDE, SODIUM GLUCONATE, SODIUM ACETATE, POTASSIUM CHLORIDE, MAGNESIUM CHLORIDE, SODIUM PHOSPHATE, DIBASIC, AND POTASSIUM PHOSPHATE .53; .5; .37; .037; .03; .012; .00082 G/100ML; G/100ML; G/100ML; G/100ML; G/100ML; G/100ML; G/100ML
75 INJECTION, SOLUTION INTRAVENOUS CONTINUOUS
Status: DISCONTINUED | OUTPATIENT
Start: 2023-06-17 | End: 2023-06-17

## 2023-06-17 RX ORDER — PRAVASTATIN SODIUM 40 MG
40 TABLET ORAL DAILY
Status: CANCELLED | OUTPATIENT
Start: 2023-06-18

## 2023-06-17 RX ORDER — ONDANSETRON 2 MG/ML
4 INJECTION INTRAMUSCULAR; INTRAVENOUS EVERY 6 HOURS PRN
Status: DISCONTINUED | OUTPATIENT
Start: 2023-06-17 | End: 2023-06-20 | Stop reason: HOSPADM

## 2023-06-17 RX ORDER — PANTOPRAZOLE SODIUM 40 MG/1
40 TABLET, DELAYED RELEASE ORAL
Status: DISCONTINUED | OUTPATIENT
Start: 2023-06-17 | End: 2023-06-20 | Stop reason: HOSPADM

## 2023-06-17 RX ORDER — ATROPINE SULFATE 1 MG/ML
0.4 INJECTION, SOLUTION INTRAVENOUS ONCE AS NEEDED
Status: CANCELLED | OUTPATIENT
Start: 2023-06-17

## 2023-06-17 RX ORDER — POLYETHYLENE GLYCOL 3350 17 G/17G
17 POWDER, FOR SOLUTION ORAL 2 TIMES DAILY
Status: CANCELLED | OUTPATIENT
Start: 2023-06-18

## 2023-06-17 RX ORDER — ATROPINE SULFATE 1 MG/ML
0.4 INJECTION, SOLUTION INTRAVENOUS ONCE AS NEEDED
Status: DISCONTINUED | OUTPATIENT
Start: 2023-06-17 | End: 2023-06-20 | Stop reason: HOSPADM

## 2023-06-17 RX ORDER — SUCRALFATE 1 G/1
1 TABLET ORAL
Status: CANCELLED | OUTPATIENT
Start: 2023-06-17

## 2023-06-17 RX ORDER — AMOXICILLIN 250 MG
1 CAPSULE ORAL 2 TIMES DAILY
Status: DISCONTINUED | OUTPATIENT
Start: 2023-06-17 | End: 2023-06-20 | Stop reason: HOSPADM

## 2023-06-17 RX ADMIN — FAMOTIDINE 20 MG: 20 TABLET ORAL at 13:52

## 2023-06-17 RX ADMIN — SODIUM CHLORIDE, SODIUM GLUCONATE, SODIUM ACETATE, POTASSIUM CHLORIDE, MAGNESIUM CHLORIDE, SODIUM PHOSPHATE, DIBASIC, AND POTASSIUM PHOSPHATE 75 ML/HR: .53; .5; .37; .037; .03; .012; .00082 INJECTION, SOLUTION INTRAVENOUS at 06:16

## 2023-06-17 RX ADMIN — RIVAROXABAN 20 MG: 20 TABLET, FILM COATED ORAL at 16:09

## 2023-06-17 RX ADMIN — PANTOPRAZOLE SODIUM 40 MG: 40 INJECTION, POWDER, FOR SOLUTION INTRAVENOUS at 09:04

## 2023-06-17 RX ADMIN — MORPHINE SULFATE 2 MG: 2 INJECTION, SOLUTION INTRAMUSCULAR; INTRAVENOUS at 06:49

## 2023-06-17 RX ADMIN — SUCRALFATE 1 G: 1 TABLET ORAL at 22:05

## 2023-06-17 RX ADMIN — POLYETHYLENE GLYCOL 3350 17 G: 17 POWDER, FOR SOLUTION ORAL at 13:52

## 2023-06-17 RX ADMIN — SENNOSIDES AND DOCUSATE SODIUM 1 TABLET: 8.6; 5 TABLET ORAL at 17:55

## 2023-06-17 RX ADMIN — PRAVASTATIN SODIUM 40 MG: 40 TABLET ORAL at 16:09

## 2023-06-17 RX ADMIN — FINASTERIDE 5 MG: 5 TABLET, FILM COATED ORAL at 22:06

## 2023-06-17 RX ADMIN — SUCRALFATE 1 G: 1 TABLET ORAL at 16:09

## 2023-06-17 RX ADMIN — PANTOPRAZOLE SODIUM 40 MG: 40 TABLET, DELAYED RELEASE ORAL at 16:09

## 2023-06-17 RX ADMIN — POLYETHYLENE GLYCOL 3350 17 G: 17 POWDER, FOR SOLUTION ORAL at 17:55

## 2023-06-17 NOTE — CONSULTS
Consult - Cardiology   Diaz Malone 68 y o  male MRN: [id number]  Unit/Bed#: E4 -01 Encounter: [id number]        Reason For Consult: Atrial fibrillation with slow ventricular response               ASSESSMENT:  · Paroxysmal atrial fibrillation with slow ventricular response   - anticoagulation with Xarelto 20 mg daily  - outpatient AV blocker Rx, none  - holter monitor 1/13/22: Predominantly sinus rhythm; heart rate varied from 43 bpm 240 bpm with an average heart rate of 64 bpm   9% supraventricular ectopy  3 supraventricular runs with the longest one being 3 beats with the fastest run being 145 bpm  Short run of atrial fibrillation for approximately 19 beats with a heart rate of 140 bpm  While at therapy, patient was sinus tachycardia with frequent single PVCs and episode of ventricular bigeminy  · Abdominal pain  · Superior mesenteric artery stenosis  · Chronic diastolic heart failure   - TTE 2/3/22: LVEF 68%, mild to moderate left atrial dilatation, mild right atrial dilatation, mild MR, aortic root mildly dilated, ascending aorta mildly dilated  · Recurrent vasovagal syncope  · History of NSVT   · Dyslipidemia  · History of CVA with residual left-sided deficits    PLAN/ DISCUSSION:     · Given current atrial fibrillation with slow ventricular response and history of NSVT/SVT in addition to > 2 second pauses noted via outpatient monitor at [facility], case discussed with the EP team with plan to transfer to [facility] for probable device placement  We will discuss this with the patient, family and primary team   · Currently on statin therapy with pravastatin 40 mg daily, anticoagulation with Xarelto 20 mg daily  · Echocardiogram ordered to assess cardiac structure and function  · Monitor volume status closely with strict intake/output, daily weight  · Monitor renal function and electrolytes; maintain potassium > 4, magnesium > 2      History Of Present Illness:  70-year-old male presented to [facility] with abdominal pain including nausea and vomiting  In addition, patient had noted some aphasia and left-sided weakness at baseline as he was undergoing therapy for a remote stroke  In light of abdominal pain with nausea, vomiting, chills, sweats, clamminess, patient sought medical evaluation in the emergency department  In the emergency department, patient received narcotics with a heart rate noted to be in the 20-30 range  Upon assessment and evaluation, patient resting in bed comfortably with daughter at the bedside  At this time, patient denies chest pain, shortness of breath, dizziness, lightheadedness, palpitations  Please see significant cardiac history and problems enumerated above  Patient follows with [facility] cardiology with most recent office visit documented in November of last year      Past Medical History:        Past Medical History:   Diagnosis Date   • Acute encephalopathy 5/27/2019   • Arthritis     BL knees   • Atrial fibrillation (HCC)    • CHF (congestive heart failure) (HCC)    • Colon polyp    • CVA (cerebral vascular accident) ([facility] ) 4/27/2019    NIHSS 26 on presentation   • Depression    • Diabetes mellitus ([facility] )     borderline   • Encephalopathy acute 4/28/2019   • History of transfusion 04/2019   • Hyperlipidemia    • Irregular heart beat     Afib   • Stroke ([facility] ) 04/27/2019    Left sided weakness-aphasia   • Urinary retention    • Urinary retention 6/4/2019      Past Surgical History:   Procedure Laterality Date   • COLONOSCOPY     • IR STROKE ALERT  4/27/2019   • MENISCECTOMY Right    • [place] CYSTO INSERTION TRANSPROSTATIC IMPLANT SINGLE N/A 11/19/2021    Procedure: CYSTOSCOPY WITH INSERTION [place] November;  Surgeon: Chinita Simmonds, MD;  [facility];  Service: Urology        Allergy:        Allergies   Allergen Reactions   • Cephalexin Throat Swelling     Pt reported throat swelling after taking antibiotic    • Penicillins Other (See Comments)       Medications:       Prior to Admission medications    Medication Sig Start Date End Date Taking? Authorizing Provider   acetaminophen (TYLENOL) 325 mg tablet Take 2 tablets (650 mg total) by mouth every 4 (four) hours as needed for mild pain 11/19/21  Yes Azalia Forman MD   Coenzyme Q10 (CO Q 10) 10 MG CAPS Take by mouth daily   Yes Historical Provider, MD   cyanocobalamin (VITAMIN B-12) 100 mcg tablet Take by mouth daily   Yes Historical Provider, MD   finasteride (PROSCAR) 5 mg tablet Take 5 mg by mouth daily at bedtime     Yes Historical Provider, MD   hydrocortisone 1 % cream Apply topically 2 (two) times a day as needed    Yes Historical Provider, MD   pantoprazole (PROTONIX) 40 mg tablet Take 1 tablet (40 mg total) by mouth 2 (two) times a day before meals 5/31/19  Yes Alexandr Anaya MD   rivaroxaban (Xarelto) 20 mg tablet Take 20 mg by mouth daily with dinner   4/22/20  Yes Historical Provider, MD   docusate sodium (COLACE) 100 mg capsule Take 1 capsule (100 mg total) by mouth 2 (two) times a day for 15 days  Patient not taking: Reported on 12/15/2022 11/19/21 12/4/21  Azalia Forman MD   ketoconazole (NIZORAL) 2 % shampoo SHAMPOO 3 TIMES A WEEK AS A SCALP TREATMENT, LEAVE ON 5-10 MINUTES AND RINSE 7/16/21   Historical Provider, MD   pravastatin (PRAVACHOL) 40 mg tablet Take 40 mg by mouth daily 10/24/22   Historical Provider, MD       Family History:     History reviewed  No pertinent family history       Social History:       Social History     Socioeconomic History   • Marital status: /Civil Union     Spouse name: None   • Number of children: None   • Years of education: None   • Highest education level: None   Occupational History   • None   Tobacco Use   • Smoking status: Never   • Smokeless tobacco: Never   Vaping Use   • Vaping Use: Never used   Substance and Sexual Activity   • Alcohol use: Never     Comment: social   • Drug use: Never   • Sexual activity: None   Other Topics Concern   • None   Social History Narrative   • None     Social Determinants of Health     Financial Resource Strain: Not on file   Food Insecurity: Not on file   Transportation Needs: Not on file   Physical Activity: Not on file   Stress: Not on file   Social Connections: Not on file   Intimate Partner Violence: Not on file   Housing Stability: Not on file       ROS:  Negative except otherwise after mentioned above  Remainder review of systems is negative    Exam:  General:  alert, oriented and in no distress, cooperative  Head: Normocephalic, atraumatic  Eyes:  EOMI  Pupils - equal, round, reactive to accomodation  No icterus  Normal Conjunctiva  Oropharynx: moist and normal-appearing mucosa  Neck: supple, symmetrical, trachea midline and no JVD  Heart: Irregularly irregular  Respiratory effort / Chest Inspection: unlabored  Lungs:  normal air entry, lungs clear to auscultation and no rales, rhonchi or wheezing   Abdomen: flat, normal findings: bowel sounds normal and soft, non-tender  Lower Limbs:  no pitting edema    DATA:      ECG:                       Telemetry: Atrial fibrillation with slow ventricular response          Echocardiogram completed on 2/3/22:         •  Left Ventricle: Left ventricular cavity size is normal  Wall thickness is normal  The left ventricular ejection fraction is 68%  Systolic function is normal  Wall motion is normal  Patient in sinus rhythm with history of atrial fibrillation  Diastolic function is normal   Left atrial filling pressure is normal   •  Left Atrium: The atrium is mild to moderately dilated  •  Right Atrium: The atrium is mildly dilated  •  Mitral Valve: There is mild regurgitation  •  Aorta: The aortic root is mildly dilated  The ascending aorta is mildly dilated  Weights: Wt Readings from Last 3 Encounters:   06/17/23 88 5 kg (195 lb 1 7 oz)   05/25/23 95 3 kg (210 lb)   12/15/22 96 6 kg (213 lb)   , Body mass index is 26 46 kg/m²           Lab Studies:             Results from last 7 days   Lab Units 06/17/23  0631 06/16/23  2246   WBC Thousand/uL 11 76* 14 02*   HEMOGLOBIN g/dL 15 8 17 1*   HEMATOCRIT % 47 7 52 0*   PLATELETS Thousands/uL 177 198   ,   Results from last 7 days   Lab Units 06/17/23  0631 06/16/23  2331 06/16/23  2246   POTASSIUM mmol/L 4 2 5 0 5 6*   CHLORIDE mmol/L 104  --  89*   CO2 mmol/L 26  --  29   BUN mg/dL 21  --  35*   CREATININE mg/dL 0 86  --  1 64*   CALCIUM mg/dL 9 4  --  9 4   ALK PHOS U/L 47  --  49   ALT U/L 16  --  24   AST U/L 11*  --  38

## 2023-06-17 NOTE — ASSESSMENT & PLAN NOTE
Asymptomatic bradycardia w/afib w/slow ventricular response   -Was in 40s-60s which is near baseline (50s-60s) in setting of recurrent vasovagal syncope and likely decreased vagal tone off of any chronotropic agents then worsened to 20s s/p morphine administration  Still in 45s now   -admit to observation, check tsh although doubtful myxedema picture   Consult cardiology given afib w/normo to slow normo ventricular response at baseline if persists further for consideration of further diagnostics/interventions

## 2023-06-17 NOTE — CONSULTS
Consultation - [facility]  Elizabeth Kraus Vikasjuly 68 y o  male MRN: [id number]  Unit/Bed#: E4 -01 Encounter: [id number]        Inpatient consult to gastroenterology  Consult performed by: Kasey Tran PA-C  Consult ordered by: Herminia Donohue PA-C          ASSESSMENT and PLAN:      77-year-old male with history of CVA and residual left hemiplegia, chronic diastolic CHF, atrial fibrillation on Xarelto presenting to the hospital with abdominal pain, nausea, vomiting  1) Lower abdominal pain  2) Nausea and vomiting    Patient presented with acute lower abdominal pain, nausea, and nonbloody emesis after eating steak sandwich and french fries for dinner last night  On admission, his lactic acid was elevated 3 1 and WBC count elevated 14 02  LFTs and lipase normal   CTA shows mild to moderate narrowing at the origin of the superior mesenteric artery with otherwise normal visceral arterial supply  No evidence for mesenteric ischemia  No evidence of pancreatitis, cholecystitis, bowel wall thickening or intestinal obstruction  There is evidence of significant constipation  His repeat lactic acid came down to normal 2 0 and WBC count 11 76  He continues to report significant lower abdominal pain  Differential includes peptic ulcer disease, constipation, less likely mesenteric ischemia     -Agree with vascular surgery consult to comment on CTA findings  -Continue PPI twice daily  -Add Pepcid and Carafate  -Start MiraLAX twice daily  -Antiemetics as needed  -Once cardiac evaluation is complete, if patient is still having abdominal pain, we can plan for EGD  -Okay to start liquid diet    3) History of bleeding duodenal ulcer    He was found to have bleeding duodenal ulcer in 2019 requiring clips for hemostasis  H  pylori stool antigen was negative  No current signs of overt bleeding and hemoglobin is stable  Continue PPI twice daily      4) SMA stenosis    CTA shows incidental finding of "mild-moderate SMA stenosis without celiac artery or HONG stenosis  No evidence for mesenteric ischemia  We recommend vascular surgery consult to comment on the CTA findings given his significant abdominal pain  5) Bradycardia    Appreciate cardiology recommendations  We will hold off on endoscopic evaluation until cardiology evaluation is complete  Patient was seen and examined by Dr Beth Ro  All mir medical decisions were made by Dr Beth Ro  Thank you for allowing us to participate in the care of this present patient  We will follow-up with you closely  Reason for Consult / Principal Problem: Abdominal pain, nausea, vomiting    HPI: 77-year-old male with history of CVA and residual left hemiplegia, chronic diastolic CHF, atrial fibrillation on Xarelto presenting to the hospital with abdominal pain  Patient was in his usual state of health  He and his wife ate steak sandwiches with tomato sauce and [employer] fries last night for dinner  Immediately after eating, he developed \"excruciating\" lower abdominal pain which was initially fluctuating in intensity but became severe and constant, so he came to the emergency room  He also had 3 episodes of vomiting  No blood in the vomit  He has never experienced pain like this in the past   Currently his pain is 8 out of 10  There does not appear to be radiation of pain  He states his bowel movements are normal   He denies any black or bloody stools  He was sweaty and clammy, but he did not have a fever  He no complaints of chest pain or shortness of breath  He was seen by GI in 2019 due to bleeding duodenal ulcer requiring hemoclips for hemostasis  H  pylori stool antigen was negative at that time  He takes pantoprazole 40 mg twice daily at home  He had a colonoscopy years ago and more recently has been getting Cologuard which has been negative  REVIEW OF SYSTEMS:    CONSTITUTIONAL: Denies any fever, chills   Good appetite, and no recent weight " loss   HEENT: No earache or tinnitus  Denies hearing loss or visual disturbances  CARDIOVASCULAR: No chest pain  RESPIRATORY: Denies any cough and shortness of breath  GASTROINTESTINAL: As noted in the History of Present Illness  GENITOURINARY: No problems with urination  Denies any hematuria or dysuria  NEUROLOGIC: No dizziness or vertigo, denies headaches  + Word finding difficulty  MUSCULOSKELETAL: Denies any muscle or joint pain  SKIN: Denies skin rashes or itching  ENDOCRINE: Denies excessive thirst  Denies intolerance to heat or cold  PSYCHOSOCIAL: Denies depression or anxiety  Denies any recent memory loss  Historical Information   Past Medical History:   Diagnosis Date   • Acute encephalopathy 5/27/2019   • Arthritis     BL knees   • Atrial fibrillation (HCC)    • CHF (congestive heart failure) (HCC)    • Colon polyp    • CVA (cerebral vascular accident) ([facility] ) 4/27/2019    NIHSS 26 on presentation   • Depression    • Diabetes mellitus ([facility] )     borderline   • Encephalopathy acute 4/28/2019   • History of transfusion 04/2019   • Hyperlipidemia    • Irregular heart beat     Afib   • Stroke ([facility] ) 04/27/2019    Left sided weakness-aphasia   • Urinary retention    • Urinary retention 6/4/2019     Past Surgical History:   Procedure Laterality Date   • COLONOSCOPY     • IR STROKE ALERT  4/27/2019   • MENISCECTOMY Right    • NC CYSTO INSERTION TRANSPROSTATIC IMPLANT SINGLE N/A 11/19/2021    Procedure: CYSTOSCOPY WITH INSERTION Franco Alpha;  Surgeon: Prateek Ray MD;  Location: [facility];  Service: Urology     Social History   Social History     Substance and Sexual Activity   Alcohol Use Never    Comment: social     Social History     Substance and Sexual Activity   Drug Use Never     Social History     Tobacco Use   Smoking Status Never   Smokeless Tobacco Never     History reviewed  No pertinent family history      Meds/Allergies     Medications Prior to Admission   Medication   • acetaminophen (TYLENOL) 325 mg tablet   • Coenzyme Q10 (CO Q 10) 10 MG CAPS   • cyanocobalamin (VITAMIN B-12) 100 mcg tablet   • finasteride (PROSCAR) 5 mg tablet   • hydrocortisone 1 % cream   • pantoprazole (PROTONIX) 40 mg tablet   • rivaroxaban (Xarelto) 20 mg tablet   • docusate sodium (COLACE) 100 mg capsule   • ketoconazole (NIZORAL) 2 % shampoo   • pravastatin (PRAVACHOL) 40 mg tablet     Current Facility-Administered Medications   Medication Dose Route Frequency   • finasteride (PROSCAR) tablet 5 mg  5 mg Oral HS   • multi-electrolyte (PLASMALYTE-A/ISOLYTE-S PH 7 4) IV solution  75 mL/hr Intravenous Continuous   • ondansetron (ZOFRAN) injection 4 mg  4 mg Intravenous Q6H PRN   • pantoprazole (PROTONIX) injection 40 mg  40 mg Intravenous Q12H [address]   • pravastatin (PRAVACHOL) tablet 40 mg  40 mg Oral Daily   • rivaroxaban (XARELTO) tablet 20 mg  20 mg Oral Daily With Dinner       Allergies   Allergen Reactions   • Cephalexin Throat Swelling     Pt reported throat swelling after taking antibiotic    • Penicillins Other (See Comments)           Objective     Blood pressure 139/69, pulse 59, temperature 97 7 °F (36 5 °C), temperature source Temporal, resp  rate 20, height 6' (1 829 m), weight 88 5 kg (195 lb 1 7 oz), SpO2 98 %  Intake/Output Summary (Last 24 hours) at 6/17/2023 1015  Last data filed at 6/17/2023 0616  Gross per 24 hour   Intake 1000 ml   Output --   Net 1000 ml         PHYSICAL EXAM:      General Appearance:   Alert male, diaphoretic, appears uncomfortable, no acute distress   HEENT:   Normocephalic, atraumatic, anicteric      Neck:  Supple, symmetrical, trachea midline, no adenopathy   Lungs:   Clear to auscultation bilaterally   Heart[de-identified]   Bradycardia, irregular rhythm   Abdomen:   Nondistended  Normal bowel sounds  Soft  Generalized tenderness to palpation, mild guarding     Genitalia:   Deferred    Rectal:   Deferred    Extremities:  No cyanosis, clubbing or edema    Pulses:  2+ and symmetric all extremities    Skin:  No jaundice or rashes   Lymph nodes:  No palpable cervical lymphadenopathy        Lab Results:   Results from last 7 days   Lab Units 06/17/23  0631   WBC Thousand/uL 11 76*   HEMOGLOBIN g/dL 15 8   HEMATOCRIT % 47 7   PLATELETS Thousands/uL 177   NEUTROS PCT % 87*   LYMPHS PCT % 8*   MONOS PCT % 4   EOS PCT % 0     Results from last 7 days   Lab Units 06/17/23  0631   POTASSIUM mmol/L 4 2   CHLORIDE mmol/L 104   CO2 mmol/L 26   BUN mg/dL 21   CREATININE mg/dL 0 86   CALCIUM mg/dL 9 4   ALK PHOS U/L 47   ALT U/L 16   AST U/L 11*         Results from last 7 days   Lab Units 06/17/23  0631   LIPASE u/L 8*       Imaging Studies: I have personally reviewed pertinent imaging studies  CTA abdomen pelvis w wo contrast    Result Date: 6/17/2023  Impression: 1  Mild-moderate narrowing at the origin of the superior mesenteric artery with otherwise normal visceral arterial supply  No evidence for mesenteric ischemia  2  Nonemergent findings as noted  The findings concur with the preliminary report provided by Dr Jorge Lynne of [address] [place] [address]   Workstation performed: [id number]

## 2023-06-17 NOTE — ASSESSMENT & PLAN NOTE
From prior cva  Wife to bring in brace for left leg/arm  Undergoing op pt for last 4 years  At baseline

## 2023-06-17 NOTE — ASSESSMENT & PLAN NOTE
Unclear picture  Possibly gastritis/duodenitis in pt w/hx of ulcers vs other    pt w/acute onset abd pain <30 min worse in epigastrum/ruq over luq abd w/n/v x3  Last bm day prior w/stool burden noted on ct a/p vrads but no distention or signs of sbo/ileus  -npo, ivf, cautious use of analgesics in light of bradycardia  Will trial protonix/iv pepcid and gi cocktail tentatively as well  -consult gi    Given +antoine's sign although nonspecific will check us ruq as well

## 2023-06-17 NOTE — PLAN OF CARE
Problem: PAIN - ADULT  Goal: Verbalizes/displays adequate comfort level or baseline comfort level  Description: Interventions:  - Encourage patient to monitor pain and request assistance  - Assess pain using appropriate pain scale  - Administer analgesics based on type and severity of pain and evaluate response  - Implement non-pharmacological measures as appropriate and evaluate response  - Consider cultural and social influences on pain and pain management  - Notify physician/advanced practitioner if interventions unsuccessful or patient reports new pain  Outcome: Progressing     Problem: INFECTION - ADULT  Goal: Absence or prevention of progression during hospitalization  Description: INTERVENTIONS:  - Assess and monitor for signs and symptoms of infection  - Monitor lab/diagnostic results  - Monitor all insertion sites, i e  indwelling lines, tubes, and drains  - [place] appropriate cooling/warming therapies per order  - Administer medications as ordered  - Instruct and encourage patient and family to use good hand hygiene technique    Outcome: Progressing     Problem: SAFETY ADULT  Goal: Patient will remain free of falls  Description: INTERVENTIONS:  - Educate patient/family on patient safety including physical limitations  - Instruct patient to call for assistance with activity   - Consult OT/PT to assist with strengthening/mobility   - Keep Call bell within reach  - Keep bed low and locked with side rails adjusted as appropriate  - Keep care items and personal belongings within reach  - Initiate and maintain comfort rounds  - Make Fall Risk Sign visible to staff  - Offer Toileting every 2 Hours, in advance of need  - Initiate/Maintain bed alarm  - Obtain necessary fall risk management equipment  - Apply yellow socks and bracelet for high fall risk patients  - Consider moving patient to room near nurses station  Outcome: Progressing  Goal: Maintain or return to baseline ADL function  Description: INTERVENTIONS:  -  Assess patient's ability to carry out ADLs; assess patient's baseline for ADL function and identify physical deficits which impact ability to perform ADLs (bathing, care of mouth/teeth, toileting, grooming, dressing, etc )  - Assess/evaluate cause of self-care deficits   - Assess range of motion  - Assess patient's mobility; develop plan if impaired  - Assess patient's need for assistive devices and provide as appropriate  - Encourage maximum independence but intervene and supervise when necessary  - Involve family in performance of ADLs  - Assess for home care needs following discharge   - Consider OT consult to assist with ADL evaluation and planning for discharge  - Provide patient education as appropriate  Outcome: Progressing     Problem: DISCHARGE PLANNING  Goal: Discharge to home or other facility with appropriate resources  Description: INTERVENTIONS:  - Identify barriers to discharge w/patient and caregiver  - Arrange for needed discharge resources and transportation as appropriate  - Identify discharge learning needs (meds, wound care, etc )  - Refer to Case Management Department for coordinating discharge planning if the patient needs post-hospital services based on physician/advanced practitioner order or complex needs related to functional status, cognitive ability, or social support system  Outcome: Progressing     Problem: Knowledge Deficit  Goal: Patient/family/caregiver demonstrates understanding of disease process, treatment plan, medications, and discharge instructions  Description: Complete learning assessment and assess knowledge base    Interventions:  - Provide teaching at level of understanding  - Provide teaching via preferred learning methods  Outcome: Progressing     Problem: CARDIOVASCULAR - ADULT  Goal: Maintains optimal cardiac output and hemodynamic stability  Description: INTERVENTIONS:  - Monitor I/O, vital signs and rhythm  - Monitor for S/S and trends of decreased cardiac output  - Administer and titrate ordered vasoactive medications to optimize hemodynamic stability  - Assess quality of pulses, skin color and temperature  - Assess for signs of decreased coronary artery perfusion  - Instruct patient to report change in severity of symptoms  Outcome: Progressing  Goal: Absence of cardiac dysrhythmias or at baseline rhythm  Description: INTERVENTIONS:  - Continuous cardiac monitoring, vital signs, obtain 12 lead EKG if ordered  - Administer antiarrhythmic and heart rate control medications as ordered  - Monitor electrolytes and administer replacement therapy as ordered  Outcome: Progressing     Problem: METABOLIC, FLUID AND ELECTROLYTES - ADULT  Goal: Electrolytes maintained within normal limits  Description: INTERVENTIONS:  - Monitor labs and assess patient for signs and symptoms of electrolyte imbalances  - Administer electrolyte replacement as ordered  - Monitor response to electrolyte replacements, including repeat lab results as appropriate  - Instruct patient on fluid and nutrition as appropriate  Outcome: Progressing  Goal: Fluid balance maintained  Description: INTERVENTIONS:  - Monitor labs   - Monitor I/O and WT  - Instruct patient on fluid and nutrition as appropriate  - Assess for signs & symptoms of volume excess or deficit  Outcome: Progressing  Goal: Glucose maintained within target range  Description: INTERVENTIONS:  - Monitor Blood Glucose as ordered  - Assess for signs and symptoms of hyperglycemia and hypoglycemia  - Administer ordered medications to maintain glucose within target range  - Assess nutritional intake and initiate nutrition service referral as needed  Outcome: Progressing     Problem: SKIN/TISSUE INTEGRITY - ADULT  Goal: Skin Integrity remains intact(Skin Breakdown Prevention)  Description: Assess:  -Perform Walter assessment every shift  -Clean and moisturize skin every day  -Inspect skin when repositioning, toileting, and assisting with ADLS  -Assess under medical devices such as brace every shift  -Assess extremities for adequate circulation and sensation     Bed Management:  -Have minimal linens on bed & keep smooth, unwrinkled  -Change linens as needed when moist or perspiring  -Avoid sitting or lying in one position for more than 2 hours while in bed  -Keep HOB at 30 degrees     Toileting:  -Offer bedside commode      Activity:  -Mobilize patient 2 times a day  -Encourage activity and walks on unit  -Encourage or provide ROM exercises   -Turn and reposition patient every 2 Hours  -Use appropriate equipment to lift or move patient in bed  -Instruct/ Assist with weight shifting every hour when out of bed in chair  -Consider limitation of chair time 3 hour intervals    Skin Care:  -Avoid use of baby powder, tape, friction and shearing, hot water or constrictive clothing  -Relieve pressure over bony prominences using pillows  -Do not massage red bony areas    Outcome: Progressing

## 2023-06-17 NOTE — ASSESSMENT & PLAN NOTE
Incidental finding of moderate SMA stenosis w/o celiac artery or jacque stenosis    Likely incidental finding  Will hold statin for now in setting of acute illness but can continue xarelto statin upon d/c  Ambulatory referral to vascular surgery

## 2023-06-17 NOTE — ASSESSMENT & PLAN NOTE
Wt Readings from Last 3 Encounters:   06/17/23 88 5 kg (195 lb 1 7 oz)   05/25/23 95 3 kg (210 lb)   12/15/22 96 6 kg (213 lb)     Euvolemic off diuretics  Daily weights, I/os

## 2023-06-17 NOTE — ED PROVIDER NOTES
"History  Chief Complaint   Patient presents with   • Abdominal Pain     Pt reports sudden abd pain around 6pm  Per pt's wife Marycruz Nettles had a well visit at his PCP today and everything was fine, then the pain started and his BP shot up  \" Pt denies N/V at this time  Hx stroke, L sided deficits  The patient is a 77-year-old male with history of atrial fibrillation on Xarelto, diabetes mellitus, CVA with residual left-sided weakness/aphasia who presents to the ED for evaluation of abdominal pain that began suddenly around 6 PM   The patient had eaten dinner just prior to pain onset  He reports that when pain began, he began feeling nauseous, became diaphoretic  He denies that the pain radiates into his back, chest, or groin/testicles  He describes it as a sharp stabbing pain  He otherwise denies fever, chills, diarrhea, constipation, melena, hematochezia, hematuria, dysuria, dyspnea  Prior to Admission Medications   Prescriptions Last Dose Informant Patient Reported? Taking?    Coenzyme Q10 (CO Q 10) 10 MG CAPS  Spouse/Significant Other Yes Yes   Sig: Take by mouth daily   acetaminophen (TYLENOL) 325 mg tablet  Spouse/Significant Other No Yes   Sig: Take 2 tablets (650 mg total) by mouth every 4 (four) hours as needed for mild pain   cyanocobalamin (VITAMIN B-12) 100 mcg tablet  Spouse/Significant Other Yes Yes   Sig: Take by mouth daily   docusate sodium (COLACE) 100 mg capsule   No No   Sig: Take 1 capsule (100 mg total) by mouth 2 (two) times a day for 15 days   Patient not taking: Reported on 12/15/2022   finasteride (PROSCAR) 5 mg tablet  Spouse/Significant Other Yes Yes   Sig: Take 5 mg by mouth daily at bedtime     hydrocortisone 1 % cream  Spouse/Significant Other Yes Yes   Sig: Apply topically 2 (two) times a day as needed    ketoconazole (NIZORAL) 2 % shampoo  Spouse/Significant Other Yes No   Sig: SHAMPOO 3 TIMES A WEEK AS A SCALP TREATMENT, LEAVE ON 5-10 MINUTES AND RINSE   pantoprazole (PROTONIX) " 40 mg tablet  Spouse/Significant Other No Yes   Sig: Take 1 tablet (40 mg total) by mouth 2 (two) times a day before meals   pravastatin (PRAVACHOL) 40 mg tablet  Spouse/Significant Other Yes No   Sig: Take 40 mg by mouth daily   rivaroxaban (Xarelto) 20 mg tablet  Spouse/Significant Other Yes Yes   Sig: Take 20 mg by mouth daily with dinner        Facility-Administered Medications: None       Past Medical History:   Diagnosis Date   • Acute encephalopathy 5/27/2019   • Arthritis     BL knees   • Atrial fibrillation (HCC)    • CHF (congestive heart failure) (HCC)    • Colon polyp    • CVA (cere[facility] vascular accident) ([facility] ) 4/27/2019    NIHSS 26 on presentation   • Depression    • Diabetes mellitus ([facility] )     borderline   • Encephalopathy acute 4/28/2019   • History of transfusion 04/2019   • Hyperlipidemia    • Irregular heart beat     Afib   • Stroke ([facility] ) 04/27/2019    Left sided weakness-aphasia   • Urinary retention    • Urinary retention 6/4/2019       Past Surgical History:   Procedure Laterality Date   • COLONOSCOPY     • IR STROKE ALERT  4/27/2019   • MENISCECTOMY Right    • WI CYSTO INSERTION TRANSPROSTATIC IMPLANT SINGLE N/A 11/19/2021    Procedure: CYSTOSCOPY WITH INSERTION UROLIFT;  Surgeon: Manfred Guy MD;  Location: [facility];  Service: Urology       History reviewed  No pertinent family history  I have reviewed and agree with the history as documented  E-Cigarette/Vaping   • E-Cigarette Use Never User      E-Cigarette/Vaping Substances   • Nicotine No    • THC No    • CBD No    • Flavoring No    • Other No    • Unknown No      Social History     Tobacco Use   • Smoking status: Never   • Smokeless tobacco: Never   Vaping Use   • Vaping Use: Never used   Substance Use Topics   • Alcohol use: Not Currently     Comment: social   • Drug use: Never       Review of Systems   Constitutional: Positive for diaphoresis  Negative for chills and fever  HENT: Negative for congestion and rhinorrhea  Respiratory: Negative for cough and shortness of breath  Cardiovascular: Negative for chest pain and leg swelling  Gastrointestinal: Positive for abdominal pain and nausea  Negative for blood in stool, constipation, diarrhea and vomiting  Genitourinary: Negative for dysuria, flank pain, penile pain, scrotal swelling and testicular pain  Musculoskeletal: Negative for arthralgias and myalgias  Skin: Negative for rash and wound  Neurological: Negative for dizziness, weakness, numbness and headaches  Psychiatric/Behavioral: Negative for behavioral problems  Physical Exam  Physical Exam  Vitals and nursing note reviewed  Constitutional:       General: He is not in acute distress  Appearance: He is well-developed  He is not ill-appearing or toxic-appearing  HENT:      Head: Normocephalic and atraumatic  Mouth/Throat:      Mouth: Mucous membranes are moist    Eyes:      Conjunctiva/sclera: Conjunctivae normal    Cardiovascular:      Rate and Rhythm: Regular rhythm  Bradycardia present  Heart sounds: No murmur heard  Pulmonary:      Effort: Pulmonary effort is normal  No respiratory distress  Breath sounds: Normal breath sounds  Abdominal:      General: There is no distension  Palpations: Abdomen is soft  Tenderness: There is generalized abdominal tenderness  There is guarding  There is no rebound  Musculoskeletal:         General: No swelling  Cervical back: Neck supple  Skin:     General: Skin is warm and dry  Capillary Refill: Capillary refill takes less than 2 seconds  Neurological:      Mental Status: He is alert     Psychiatric:         Mood and Affect: Mood normal          Vital Signs  ED Triage Vitals   Temperature Pulse Respirations Blood Pressure SpO2   06/16/23 2218 06/16/23 2215 06/16/23 2215 06/16/23 2215 06/16/23 2215   97 8 °F (36 6 °C) 61 20 (!) 191/93 97 %      Temp Source Heart Rate Source Patient Position - Orthostatic VS BP Location FiO2 (%)   06/16/23 2218 06/16/23 2215 06/16/23 2215 06/16/23 2215 --   Oral Monitor Lying Right arm       Pain Score       06/16/23 2215       10 - Worst Possible Pain           Vitals:    06/17/23 0100 06/17/23 0200 06/17/23 0300 06/17/23 0400   BP: 161/74 155/71 157/67 141/68   Pulse: 55 55 (!) 52 (!) 49   Patient Position - Orthostatic VS: Lying Lying Lying Lying         Visual Acuity      ED Medications  Medications   morphine injection 4 mg (4 mg Intravenous Given 6/16/23 2309)   sodium chloride 0 9 % bolus 1,000 mL (0 mL Intravenous Stopped 6/17/23 0118)   iohexol (OMNIPAQUE) 350 MG/ML injection (SINGLE-DOSE) 100 mL (100 mL Intravenous Given 6/16/23 2346)       Diagnostic Studies  Results Reviewed     Procedure Component Value Units Date/Time    HS Troponin I 4hr [[id number]]  (Normal) Collected: 06/17/23 0311    Lab Status: Final result Specimen: Blood from Arm, Right Updated: 06/17/23 0342     hs TnI 4hr 4 ng/L      Delta 4hr hsTnI -1 ng/L     HS Troponin I 2hr [[id number]]  (Normal) Collected: 06/17/23 0118    Lab Status: Final result Specimen: Blood from Arm, Right Updated: 06/17/23 0147     hs TnI 2hr 11 ng/L      Delta 2hr hsTnI 6 ng/L     Lactic acid 2 Hours [[id number]]  (Normal) Collected: 06/17/23 0118    Lab Status: Final result Specimen: Blood from Arm, Right Updated: 06/17/23 0138     LACTIC ACID 2 0 mmol/L     Narrative:      Result may be elevated if tourniquet was used during collection      Potassium [[id number]]  (Normal) Collected: 06/16/23 2331    Lab Status: Final result Specimen: Blood from Hand, Right Updated: 06/16/23 2353     Potassium 5 0 mmol/L     HS Troponin 0hr (reflex protocol) [[id number]]  (Normal) Collected: 06/16/23 2308    Lab Status: Final result Specimen: Blood from Arm, Right Updated: 06/16/23 2351     hs TnI 0hr 5 ng/L     Comprehensive metabolic panel [[id number]]  (Abnormal) Collected: 06/16/23 3439    Lab Status: Final result Specimen: Blood from Arm, Left Updated: 06/16/23 2324     Sodium 133 mmol/L      Potassium 5 6 mmol/L      Chloride 89 mmol/L      CO2 29 mmol/L      ANION GAP 15 mmol/L      BUN 35 mg/dL      Creatinine 1 64 mg/dL      Glucose 171 mg/dL      Calcium 9 4 mg/dL      AST 38 U/L      ALT 24 U/L      Alkaline Phosphatase 49 U/L      Total Protein 7 8 g/dL      Albumin 4 7 g/dL      Total Bilirubin 0 79 mg/dL      eGFR 40 ml/min/1 73sq m     Narrative:      [place] guidelines for Chronic Kidney Disease (CKD):   •  Stage 1 with normal or high GFR (GFR > 90 mL/min/1 73 square meters)  •  Stage 2 Mild CKD (GFR = 60-89 mL/min/1 73 square meters)  •  Stage 3A Moderate CKD (GFR = 45-59 mL/min/1 73 square meters)  •  Stage 3B Moderate CKD (GFR = 30-44 mL/min/1 73 square meters)  •  Stage 4 Severe CKD (GFR = 15-29 mL/min/1 73 square meters)  •  Stage 5 End Stage CKD (GFR <15 mL/min/1 73 square meters)  Note: GFR calculation is accurate only with a steady state creatinine    Lipase [[id number]]  (Normal) Collected: 06/16/23 2246    Lab Status: Final result Specimen: Blood from Arm, Left Updated: 06/16/23 2324     Lipase 14 u/L     CBC and differential [[id number]]  (Abnormal) Collected: 06/16/23 2246    Lab Status: Final result Specimen: Blood from Arm, Left Updated: 06/16/23 2318     WBC 14 02 Thousand/uL      RBC 5 45 Million/uL      Hemoglobin 17 1 g/dL      Hematocrit 52 0 %      MCV 95 fL      MCH 31 4 pg      MCHC 32 9 g/dL      RDW 13 3 %      MPV 9 3 fL      Platelets 298 Thousands/uL     Lactic acid, plasma (w/reflex if result > 2 0) [[id number]]  (Abnormal) Collected: 06/16/23 2246    Lab Status: Final result Specimen: Blood from Arm, Left Updated: 06/16/23 2318     LACTIC ACID 3 1 mmol/L     Narrative:      Result may be elevated if tourniquet was used during collection                   CTA abdomen pelvis w wo contrast    (Results Pending)              Procedures  Procedures         ED Course  ED Course as of 06/17/23 0447 Fri Jun 16, 2023 2256 WBC(!): 14 02   2256 Hemoglobin(!): 17 1   2256 HCT(!): 52 0   2300 Initial EKG: Atrial fibrillation at 50 BPM, QTc 395, no ST elevation or depression as interpreted by me    2326 Sodium(!): 133   2326 Anion Gap(!): 15  Suspect 2/2 lactic acidosis    2348 WBC(!): 14 02   Sat Jun 17, 2023   0130 REPEAT EKG: Atrial fibrillation at 63 BPM, Qtc 454, no acute ischemic changes, as interpreted by me    0158 LACTIC ACID: 2 0   0302 CTA abdomen pelvis w wo contrast  FINDINGS: Lungs: Hypoventilatory changes of the lung bases  Heart: The heart demonstrates moderate diffuse enlargement  Diaphragm: There is a small hiatal hernia  Aorta: No aortic aneurysm  No aortic dissection  The vasculature demonstrates diffuse mild atherosclerotic calcification  Celiac trunk and mesenteric arteries: Moderate stenosis of the origin of the superior mesenteric artery  Otherwise, no occlusion  Renal arteries: No occlusion or significant stenosis  Right iliac arteries: No occlusion or significant stenosis  Left iliac arteries: No occlusion or significant stenosis  Liver: Normal liver morphology  No masses  Gallbladder and bile ducts: Normal gallbladder morphology  Normal caliber intrahepatic and common bile ducts  Pancreas: Normal pancreatic morphology  No ductal dilation or masses  No peripancreatic fluid  Spleen: Normal spleen size       0304 CTA abdomen pelvis w wo contrast  Adrenal glands: Normal adrenal morphology  No masses  Kidneys and ureters: Normal renal size and morphology  No solid masses  No urolithiasis or hydronephrosis  Stomach and bowel: There is no evidence of intestinal perforation or obstruction  There is moderately excessive colonic stool content  Mild diverticulosis is present in the distal colon  Appendix: A normal appendix is identified  There is no evidence of distention or periappendiceal inflammation to suggest appendicitis  Intraperitoneal space: No ascites  No pneumoperitoneum   Lymph nodes: No adenopathy  Urinary bladder: Normal bladder wall thickness and enhancement  Reproductive: There are multiple hyperdense radiation seeds within the prostate parenchyma  Bones/joints: The ribs are normal  The spine demonstrates moderate degenerative changes at multiple levels  Soft tissues: There is a nonobstructing right inguinal hernia  There is a fat-containing umbilical hernia  IMPRESSION: 1  Moderate stenosis of the origin of the superior mesenteric artery secondary to noncalcified atherosclerotic plaques  Otherwise, no occlusion  2  No CT evidence of bowel wall ischemia  3  Excessive colonic stool content  4  Diverticulosis without evidence of diverticulitis  Thank you for allowing us to participate in the care of your patient  Dictated and Authenticated by: Tyra nKight MD 06/17/2023 1:58 AM Charleen Dials Time ([facility] [place] [phone] Repeat EKG: Atrial fibrillation at 39 BPM, Qtc 365, no acute ischemic changes as interpreted by me    0347 Delta 4hr hsTnI: -1       SBIRT 20yo+    Flowsheet Row Most Recent Value   Initial Alcohol Screen: US AUDIT-C     1  How often do you have a drink containing alcohol? 0 Filed at: 06/16/2023 2218   2  How many drinks containing alcohol do you have on a typical day you are drinking? 0 Filed at: 06/16/2023 2218   3b  FEMALE Any Age, or MALE 65+: How often do you have 4 or more drinks on one occassion? 0 Filed at: 06/16/2023 2218   Audit-C Score 0 Filed at: 06/16/2023 2218   JANETTE: How many times in the past year have you    Used an illegal drug or used a prescription medication for non-medical reasons?  Never Filed at: 06/16/2023 2218        Medical Decision Making  DDx including but not limited to: mesenteric ischemia, appendicitis, gastroenteritis, gastritis, PUD, GERD, gastroparesis, hepatitis, pancreatitis, colitis, enteritis, food poisoning, mesenteric adenitis, IBD, IBS, ileus, bowel obstruction, intussusception, volvulus, cholecystitis, biliary colic, choledocholithiasis, perforated viscus, splenic etiology, constipation, pelvic pathology, renal colic, pyelonephritis, UTI  We will obtain CBC, CMP, lactic acid, troponin, and EKG  On exam, patient is bradycardic in the 40s  Patient's wife at bedside reports that his heart rate is usually in the 62s or 50s  Will monitor  Lactic acid elevated  Patient with leukocytosis, hyponatremia, and elevated hemoglobin/hematocrit  IV fluids given  CTA reveals SMA stenosis, however no acute abdominal abnormality  Large stool burden  During ED stay, patient's heart rate remained bradycardic, often in the low 30s and occasionally in the high 20s  Patient's blood pressure remained stable  He remains without syncopal episode, chest pain  EKG is without ischemic changes  Patient remains in A-fib  Patient continues to report abdominal pain  Suspect ischemia versus constipation  Given ongoing pain, as well as significant bradycardia, will admit patient for observation  At the time of admission, the patient is in no acute distress  I discussed with the patient and family the diagnosis, treatment plan, and plan for admission; they were given the opportunity to ask questions and verbalized understanding  They agree with plan  Abdominal pain: acute illness or injury  Atrial fibrillation with slow ventricular response 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): chronic illness or injury  Amount and/or Complexity of Data Reviewed  External Data Reviewed: labs, radiology, ECG and notes  Labs: ordered  Decision-making details documented in ED Course  Radiology: ordered  Decision-making details documented in ED Course  ECG/medicine tests: ordered and independent interpretation performed  Decision-making details documented in ED Course  Risk  Prescription drug management  Decision regarding hospitalization            Disposition  Final diagnoses:   Atrial fibrillation with slow ventricular response [facility])   Abdominal pain   Superior mesenteric artery stenosis ([facility] )     Time reflects when diagnosis was documented in both MDM as applicable and the Disposition within this note     Time User Action Codes Description Comment    6/17/2023  4:33 AM Cunningham Jorgensen Add [I48 91] Atrial fibrillation with slow ventricular response ([facility] )     6/17/2023  4:33 AM Cunningham Jorgensen Add [R10 9] Abdominal pain     6/17/2023  4:34 AM Cunningham Jorgensen Add [K55 1] Superior mesenteric artery stenosis [facility])       ED Disposition     ED Disposition   Admit    Condition   Stable    Date/Time   Sat Jun 17, 2023  4:34 AM    Comment   Case was discussed with RAFFI and the patient's admission status was agreed to be Admission Status: observation status to the service of Dr Amarilis Pena   Follow-up Information    None         Patient's Medications   Discharge Prescriptions    No medications on file       No discharge procedures on file      PDMP Review     None          ED Provider  Electronically Signed by           Coty Mckeon PA-C  06/17/23 8215

## 2023-06-17 NOTE — PLAN OF CARE
Problem: PAIN - ADULT  Goal: Verbalizes/displays adequate comfort level or baseline comfort level  Description: Interventions:  - Encourage patient to monitor pain and request assistance  - Assess pain using appropriate pain scale  - Administer analgesics based on type and severity of pain and evaluate response  - Implement non-pharmacological measures as appropriate and evaluate response  - Consider cultural and social influences on pain and pain management  - Notify physician/advanced practitioner if interventions unsuccessful or patient reports new pain  Outcome: Progressing     Problem: INFECTION - ADULT  Goal: Absence or prevention of progression during hospitalization  Description: INTERVENTIONS:  - Assess and monitor for signs and symptoms of infection  - Monitor lab/diagnostic results  - Monitor all insertion sites, i e  indwelling lines, tubes, and drains  - [place] appropriate cooling/warming therapies per order  - Administer medications as ordered  - Instruct and encourage patient and family to use good hand hygiene technique    Outcome: Progressing     Problem: SAFETY ADULT  Goal: Patient will remain free of falls  Description: INTERVENTIONS:  - Educate patient/family on patient safety including physical limitations  - Instruct patient to call for assistance with activity   - Consult OT/PT to assist with strengthening/mobility   - Keep Call bell within reach  - Keep bed low and locked with side rails adjusted as appropriate  - Keep care items and personal belongings within reach  - Initiate and maintain comfort rounds  - Make Fall Risk Sign visible to staff  - Offer Toileting every 2 Hours, in advance of need  - Initiate/Maintain bed alarm  - Obtain necessary fall risk management equipment  - Apply yellow socks and bracelet for high fall risk patients  - Consider moving patient to room near nurses station  Outcome: Progressing  Goal: Maintain or return to baseline ADL function  Description: INTERVENTIONS:  -  Assess patient's ability to carry out ADLs; assess patient's baseline for ADL function and identify physical deficits which impact ability to perform ADLs (bathing, care of mouth/teeth, toileting, grooming, dressing, etc )  - Assess/evaluate cause of self-care deficits   - Assess range of motion  - Assess patient's mobility; develop plan if impaired  - Assess patient's need for assistive devices and provide as appropriate  - Encourage maximum independence but intervene and supervise when necessary  - Involve family in performance of ADLs  - Assess for home care needs following discharge   - Consider OT consult to assist with ADL evaluation and planning for discharge  - Provide patient education as appropriate  Outcome: Progressing     Problem: DISCHARGE PLANNING  Goal: Discharge to home or other facility with appropriate resources  Description: INTERVENTIONS:  - Identify barriers to discharge w/patient and caregiver  - Arrange for needed discharge resources and transportation as appropriate  - Identify discharge learning needs (meds, wound care, etc )  - Refer to Case Management Department for coordinating discharge planning if the patient needs post-hospital services based on physician/advanced practitioner order or complex needs related to functional status, cognitive ability, or social support system  Outcome: Progressing     Problem: Knowledge Deficit  Goal: Patient/family/caregiver demonstrates understanding of disease process, treatment plan, medications, and discharge instructions  Description: Complete learning assessment and assess knowledge base    Interventions:  - Provide teaching at level of understanding  - Provide teaching via preferred learning methods  Outcome: Progressing     Problem: CARDIOVASCULAR - ADULT  Goal: Maintains optimal cardiac output and hemodynamic stability  Description: INTERVENTIONS:  - Monitor I/O, vital signs and rhythm  - Monitor for S/S and trends of decreased cardiac output  - Administer and titrate ordered vasoactive medications to optimize hemodynamic stability  - Assess quality of pulses, skin color and temperature  - Assess for signs of decreased coronary artery perfusion  - Instruct patient to report change in severity of symptoms  Outcome: Progressing  Goal: Absence of cardiac dysrhythmias or at baseline rhythm  Description: INTERVENTIONS:  - Continuous cardiac monitoring, vital signs, obtain 12 lead EKG if ordered  - Administer antiarrhythmic and heart rate control medications as ordered  - Monitor electrolytes and administer replacement therapy as ordered  Outcome: Progressing     Problem: METABOLIC, FLUID AND ELECTROLYTES - ADULT  Goal: Electrolytes maintained within normal limits  Description: INTERVENTIONS:  - Monitor labs and assess patient for signs and symptoms of electrolyte imbalances  - Administer electrolyte replacement as ordered  - Monitor response to electrolyte replacements, including repeat lab results as appropriate  - Instruct patient on fluid and nutrition as appropriate  Outcome: Progressing  Goal: Fluid balance maintained  Description: INTERVENTIONS:  - Monitor labs   - Monitor I/O and WT  - Instruct patient on fluid and nutrition as appropriate  - Assess for signs & symptoms of volume excess or deficit  Outcome: Progressing  Goal: Glucose maintained within target range  Description: INTERVENTIONS:  - Monitor Blood Glucose as ordered  - Assess for signs and symptoms of hyperglycemia and hypoglycemia  - Administer ordered medications to maintain glucose within target range  - Assess nutritional intake and initiate nutrition service referral as needed  Outcome: Progressing     Problem: SKIN/TISSUE INTEGRITY - ADULT  Goal: Skin Integrity remains intact(Skin Breakdown Prevention)  Description: Assess:  -Perform Walter assessment every shift  -Clean and moisturize skin every day  -Inspect skin when repositioning, toileting, and assisting with ADLS  -Assess under medical devices such as brace every shift  -Assess extremities for adequate circulation and sensation     Bed Management:  -Have minimal linens on bed & keep smooth, unwrinkled  -Change linens as needed when moist or perspiring  -Avoid sitting or lying in one position for more than 2 hours while in bed  -Keep HOB at 30 degrees     Toileting:  -Offer bedside commode      Activity:  -Mobilize patient 2 times a day  -Encourage activity and walks on unit  -Encourage or provide ROM exercises   -Turn and reposition patient every 2 Hours  -Use appropriate equipment to lift or move patient in bed  -Instruct/ Assist with weight shifting every hour when out of bed in chair  -Consider limitation of chair time 3 hour intervals    Skin Care:  -Avoid use of baby powder, tape, friction and shearing, hot water or constrictive clothing  -Relieve pressure over bony prominences using pillows  -Do not massage red bony areas    Outcome: Progressing     Problem: MOBILITY - ADULT  Goal: Maintain or return to baseline ADL function  Description: INTERVENTIONS:  -  Assess patient's ability to carry out ADLs; assess patient's baseline for ADL function and identify physical deficits which impact ability to perform ADLs (bathing, care of mouth/teeth, toileting, grooming, dressing, etc )  - Assess/evaluate cause of self-care deficits   - Assess range of motion  - Assess patient's mobility; develop plan if impaired  - Assess patient's need for assistive devices and provide as appropriate  - Encourage maximum independence but intervene and supervise when necessary  - Involve family in performance of ADLs  - Assess for home care needs following discharge   - Consider OT consult to assist with ADL evaluation and planning for discharge  - Provide patient education as appropriate  Outcome: Progressing  Goal: Maintains/Returns to pre admission functional level  Description: INTERVENTIONS:  - Perform BMAT or MOVE assessment daily  - Set and communicate daily mobility goal to care team and patient/family/caregiver  - Collaborate with rehabilitation services on mobility goals if consulted  - Perform Range of Motion 3 times a day  - Reposition patient every 2 hours    - Dangle patient 3 times a day  - Stand patient 3 times a day  - Ambulate patient 3 times a day  - Out of bed to chair 3 times a day   - Out of bed for meals 3 times a day  - Out of bed for toileting  - Record patient progress and toleration of activity level   Outcome: Progressing     Problem: Prexisting or High Potential for Compromised Skin Integrity  Goal: Skin integrity is maintained or improved  Description: INTERVENTIONS:  - Identify patients at risk for skin breakdown  - Assess and monitor skin integrity  - Assess and monitor nutrition and hydration status  - Monitor labs   - Assess for incontinence   - Turn and reposition patient  - Assist with mobility/ambulation  - Relieve pressure over bony prominences  - Avoid friction and shearing  - Provide appropriate hygiene as needed including keeping skin clean and dry  - Evaluate need for skin moisturizer/barrier cream  - Collaborate with interdisciplinary team   - Patient/family teaching  - Consider wound care consult   Outcome: Progressing

## 2023-06-17 NOTE — H&P
[facility]  H&P  Name: Tom Lock 68 y o  male I MRN: [id number]  Unit/Bed#: E4 -01 I Date of Admission: 6/16/2023   Date of Service: 6/17/2023 I Hospital Day: 0      Assessment/Plan   * Bradycardia  Assessment & Plan  Asymptomatic bradycardia w/afib w/slow ventricular response   -Was in 40s-60s which is near baseline (50s-60s) in setting of recurrent vasovagal syncope and likely decreased vagal tone off of any chronotropic agents then worsened to 20s s/p morphine administration  Still in 45s now   -admit to observation, check tsh although doubtful myxedema picture  Consult cardiology given afib w/normo to slow normo ventricular response at baseline if persists further for consideration of further diagnostics/interventions    Superior mesenteric artery stenosis [facility])  Assessment & Plan  Incidental finding of moderate SMA stenosis w/o celiac artery or jacque stenosis  Likely incidental finding  Will hold statin for now in setting of acute illness but can continue xarelto statin upon d/c  Ambulatory referral to vascular surgery    Abdominal pain  Assessment & Plan  Unclear picture  Possibly gastritis/duodenitis in pt w/hx of ulcers vs other    pt w/acute onset abd pain <30 min worse in epigastrum/ruq over luq abd w/n/v x3  Last bm day prior w/stool burden noted on ct a/p vrads but no distention or signs of sbo/ileus  -npo, ivf, cautious use of analgesics in light of bradycardia  Will trial protonix/iv pepcid and gi cocktail tentatively as well  -consult gi    Given +antoine's sign although nonspecific will check us ruq as well    Atrial fibrillation with slow ventricular response ([address] )  Assessment & Plan  On xarelto at baseline will continue    Chronic diastolic CHF (congestive heart failure) (HCC)  Assessment & Plan  Wt Readings from Last 3 Encounters:   06/17/23 88 5 kg (195 lb 1 7 oz)   05/25/23 95 3 kg (210 lb)   12/15/22 96 6 kg (213 lb)     Euvolemic off diuretics  Daily weights, I/os        Left hemiplegia ([facility] )  Assessment & Plan  From prior cva  Wife to bring in brace for left leg/arm  Undergoing op pt for last 4 years  At baseline  VTE Pharmacologic Prophylaxis: VTE Score: 4 Moderate Risk (Score 3-4) - Pharmacological DVT Prophylaxis Ordered: rivaroxaban (Xarelto)  Code Status: FC  Discussion with family: Updated  (wife) at bedside  Anticipated Length of Stay: Patient will be admitted on an observation basis with an anticipated length of stay of less than 2 midnights secondary to bradycardia  Total Time Spent on Date of Encounter in care of patient: 40 minutes This time was spent on one or more of the following: performing physical exam; counseling and coordination of care; obtaining or reviewing history; documenting in the medical record; reviewing/ordering tests, medications or procedures; communicating with other healthcare professionals and discussing with patient's family/caregivers  Chief Complaint: abd pain n/v after eating steak sandwich    History of Present Illness:  Tez Zaragoza is a 68 y o  male with a PMH of cva w/residual left sided deficits, afib, recurrent vasovagal syncope, chronic diastolic chf,  Niddm aphasia who presents with abd pain n/v   Last bm was day pta and was not loose but not 'hard or straining' per pt  Pt went to therapy earlier in day and then pcp for office visit and was doing well  It was a normal day although pt does have some aphasia and left sided weakness at baseline and is still undergoing therapy for a remote stroke  For dinner they had steak sandwiches from the farmer's market with steak sauce  He and his wife had this together  Not 30 minutes after eating pt had severe abdominal pain w/n/v  Also had chills/sweats/clammy due to pain and came in for evaluation    In ed post administration of narcotics had significant bradycardia down to 20s-30s intermittently w/junctional escape vs afib w/slow ventricular response but was asymptomatic  Case was d/w vascular regarding incidental finding of sma stenosis which was felt to be noncontributory to current pain  We will admit for ongoing abd pain and bradycardia  Review of Systems:  Review of Systems   Constitutional: Positive for chills and diaphoresis  Negative for fever  Respiratory: Negative for shortness of breath  Cardiovascular: Negative for chest pain  Gastrointestinal: Positive for abdominal pain, nausea and vomiting  All other systems reviewed and are negative  Past Medical and Surgical History:   Past Medical History:   Diagnosis Date   • Acute encephalopathy 5/27/2019   • Arthritis     BL knees   • Atrial fibrillation (HCC)    • CHF (congestive heart failure) (HCC)    • Colon polyp    • CVA (cerebral vascular accident) ([facility] ) 4/27/2019    NIHSS 26 on presentation   • Depression    • Diabetes mellitus ([facility] )     borderline   • Encephalopathy acute 4/28/2019   • History of transfusion 04/2019   • Hyperlipidemia    • Irregular heart beat     Afib   • Stroke ([facility] ) 04/27/2019    Left sided weakness-aphasia   • Urinary retention    • Urinary retention 6/4/2019       Past Surgical History:   Procedure Laterality Date   • COLONOSCOPY     • IR STROKE ALERT  4/27/2019   • MENISCECTOMY Right    • MD CYSTO INSERTION TRANSPROSTATIC IMPLANT SINGLE N/A 11/19/2021    Procedure: CYSTOSCOPY WITH INSERTION Jemma Smith;  Surgeon: Antonio Baltazar MD;  Location: [facility];  Service: Urology       Meds/Allergies:  Prior to Admission medications    Medication Sig Start Date End Date Taking?  Authorizing Provider   acetaminophen (TYLENOL) 325 mg tablet Take 2 tablets (650 mg total) by mouth every 4 (four) hours as needed for mild pain 11/19/21  Yes Antonio Baltazar MD   Coenzyme Q10 (CO Q 10) 10 MG CAPS Take by mouth daily   Yes Historical Provider, MD   cyanocobalamin (VITAMIN B-12) 100 mcg tablet Take by mouth daily   Yes Historical Provider, MD finasteride (PROSCAR) 5 mg tablet Take 5 mg by mouth daily at bedtime     Yes Historical Provider, MD   hydrocortisone 1 % cream Apply topically 2 (two) times a day as needed    Yes Historical Provider, MD   pantoprazole (PROTONIX) 40 mg tablet Take 1 tablet (40 mg total) by mouth 2 (two) times a day before meals 5/31/19  Yes Erick Garibay MD   rivaroxaban (Xarelto) 20 mg tablet Take 20 mg by mouth daily with dinner   4/22/20  Yes Historical Provider, MD   docusate sodium (COLACE) 100 mg capsule Take 1 capsule (100 mg total) by mouth 2 (two) times a day for 15 days  Patient not taking: Reported on 12/15/2022 11/19/21 12/4/21  Camilo Hodgson MD   ketoconazole (NIZORAL) 2 % shampoo SHAMPOO 3 TIMES A WEEK AS A SCALP TREATMENT, LEAVE ON 5-10 MINUTES AND RINSE 7/16/21   Historical Provider, MD   pravastatin (PRAVACHOL) 40 mg tablet Take 40 mg by mouth daily 10/24/22   Historical Provider, MD         Allergies: Allergies   Allergen Reactions   • Cephalexin Throat Swelling     Pt reported throat swelling after taking antibiotic    • Penicillins Other (See Comments)       Social History:  Marital Status: /Civil Union   Occupation:   Patient Pre-hospital Living Situation:   Patient Pre-hospital Level of Mobility: walks with walker  Patient Pre-hospital Diet Restrictions:   Substance Use History:   Social History     Substance and Sexual Activity   Alcohol Use Never    Comment: social     Social History     Tobacco Use   Smoking Status Never   Smokeless Tobacco Never     Social History     Substance and Sexual Activity   Drug Use Never       Family History:  History reviewed  No pertinent family history      Physical Exam:     Vitals:   Blood Pressure: 141/67 (06/17/23 0555)  Pulse: (!) 50 (06/17/23 0555)  Temperature: 97 8 °F (36 6 °C) (06/17/23 0555)  Temp Source: Temporal (06/17/23 0555)  Respirations: 20 (06/17/23 0555)  Height: 6' (182 9 cm) (06/17/23 0555)  Weight - Scale: 88 5 kg (195 lb 1 7 oz) (06/17/23 0555)  SpO2: 97 % (06/17/23 0555)    Physical Exam  Vitals reviewed  Constitutional:       General: He is not in acute distress  Appearance: He is ill-appearing and diaphoretic  He is not toxic-appearing  HENT:      Head: Normocephalic and atraumatic  Right Ear: External ear normal       Left Ear: External ear normal       Nose: Nose normal    Eyes:      Extraocular Movements: Extraocular movements intact  Cardiovascular:      Rate and Rhythm: Bradycardia present  Rhythm irregular  Heart sounds: No murmur heard  No friction rub  Pulmonary:      Effort: No respiratory distress  Breath sounds: No wheezing, rhonchi or rales  Abdominal:      General: There is no distension  Palpations: Abdomen is soft  There is no mass  Tenderness: There is abdominal tenderness ( guarding in upper abd b/l w/antoine's)  There is guarding  There is no rebound  Hernia: No hernia is present  Comments: Hypoactive bs x4   Musculoskeletal:      Cervical back: Normal range of motion  Right lower leg: No edema  Left lower leg: No edema  Skin:     General: Skin is warm  Neurological:      Mental Status: He is alert  Mental status is at baseline     Psychiatric:         Mood and Affect: Mood normal           Additional Data:     Lab Results:  Results from last 7 days   Lab Units 06/16/23  2246   WBC Thousand/uL 14 02*   HEMOGLOBIN g/dL 17 1*   HEMATOCRIT % 52 0*   PLATELETS Thousands/uL 198     Results from last 7 days   Lab Units 06/16/23  2331 06/16/23  2246   SODIUM mmol/L  --  133*   POTASSIUM mmol/L 5 0 5 6*   CHLORIDE mmol/L  --  89*   CO2 mmol/L  --  29   BUN mg/dL  --  35*   CREATININE mg/dL  --  1 64*   ANION GAP mmol/L  --  15*   CALCIUM mg/dL  --  9 4   ALBUMIN g/dL  --  4 7   TOTAL BILIRUBIN mg/dL  --  0 79   ALK PHOS U/L  --  49   ALT U/L  --  24   AST U/L  --  38   GLUCOSE RANDOM mg/dL  --  171*                 Results from last 7 days   Lab Units 06/17/23  0118 06/16/23  2246   LACTIC ACID mmol/L 2 0 3 1*       Lines/Drains:  Invasive Devices     Peripheral Intravenous Line  Duration           Peripheral IV 06/16/23 Right Antecubital <1 day    Peripheral IV 06/16/23 Right Hand <1 day                    Imaging: Reviewed radiology reports from this admission including: abdominal/pelvic CT  CTA abdomen pelvis w wo contrast    (Results Pending)   US right upper quadrant    (Results Pending)       EKG and Other Studies Reviewed on Admission:   · EKG:     ** Please Note: This note has been constructed using a voice recognition system   **

## 2023-06-17 NOTE — QUICK NOTE
Quick Note - Vascular Surgery   Guillerminaconner Malone 68 y o  male MRN: [id number]  Unit/Bed#: E4 -01 Encounter: [id number]    CTA imaging reviewed  Shows mild-to-moderate short segment narrowing of SMA near origin  Remainder of SMA as well as celiac axis and HONG all appear patent  There is no radiographic evidence to suggest acute mesenteric ischemia  Given these CTA findings, there is very low suspicion for SMA stenosis as the cause of his sudden-onset abdominal pain  Would recommend EGD once cleared by cardiology to r/o other etiologies, especially given his history of PUD  Imaging was reviewed with Dr Miguel Sanchez (vascular surgery attending)  Full consult note to follow        Alysia Mello MD   PGY4, General Surgery

## 2023-06-18 ENCOUNTER — APPOINTMENT (INPATIENT)
Dept: ULTRASOUND IMAGING | Facility: HOSPITAL | Age: 73
DRG: 243 | End: 2023-06-18
Payer: MEDICARE

## 2023-06-18 LAB
ANION GAP SERPL CALCULATED.3IONS-SCNC: 6 MMOL/L (ref 4–13)
BASOPHILS # BLD AUTO: 0 THOUSANDS/ÂΜL (ref 0–0.1)
BASOPHILS NFR BLD AUTO: 0 % (ref 0–1)
BUN SERPL-MCNC: 20 MG/DL (ref 5–25)
CALCIUM SERPL-MCNC: 9 MG/DL (ref 8.4–10.2)
CHLORIDE SERPL-SCNC: 104 MMOL/L (ref 96–108)
CO2 SERPL-SCNC: 28 MMOL/L (ref 21–32)
CREAT SERPL-MCNC: 0.85 MG/DL (ref 0.6–1.3)
EOSINOPHIL # BLD AUTO: 0 THOUSAND/ÂΜL (ref 0–0.61)
EOSINOPHIL NFR BLD AUTO: 0 % (ref 0–6)
ERYTHROCYTE [DISTWIDTH] IN BLOOD BY AUTOMATED COUNT: 13 % (ref 11.6–15.1)
GFR SERPL CREATININE-BSD FRML MDRD: 86 ML/MIN/1.73SQ M
GLUCOSE SERPL-MCNC: 133 MG/DL (ref 65–140)
HCT VFR BLD AUTO: 45.4 % (ref 36.5–49.3)
HGB BLD-MCNC: 14.9 G/DL (ref 12–17)
IMM GRANULOCYTES # BLD AUTO: 0.03 THOUSAND/UL (ref 0–0.2)
IMM GRANULOCYTES NFR BLD AUTO: 0 % (ref 0–2)
LYMPHOCYTES # BLD AUTO: 1 THOUSANDS/ÂΜL (ref 0.6–4.47)
LYMPHOCYTES NFR BLD AUTO: 12 % (ref 14–44)
MCH RBC QN AUTO: 31.2 PG (ref 26.8–34.3)
MCHC RBC AUTO-ENTMCNC: 32.8 G/DL (ref 31.4–37.4)
MCV RBC AUTO: 95 FL (ref 82–98)
MONOCYTES # BLD AUTO: 0.54 THOUSAND/ÂΜL (ref 0.17–1.22)
MONOCYTES NFR BLD AUTO: 7 % (ref 4–12)
NEUTROPHILS # BLD AUTO: 6.73 THOUSANDS/ÂΜL (ref 1.85–7.62)
NEUTS SEG NFR BLD AUTO: 81 % (ref 43–75)
NRBC BLD AUTO-RTO: 0 /100 WBCS
PLATELET # BLD AUTO: 160 THOUSANDS/UL (ref 149–390)
PMV BLD AUTO: 9.7 FL (ref 8.9–12.7)
POTASSIUM SERPL-SCNC: 4.2 MMOL/L (ref 3.5–5.3)
RBC # BLD AUTO: 4.78 MILLION/UL (ref 3.88–5.62)
SODIUM SERPL-SCNC: 138 MMOL/L (ref 135–147)
WBC # BLD AUTO: 8.3 THOUSAND/UL (ref 4.31–10.16)

## 2023-06-18 PROCEDURE — 99222 1ST HOSP IP/OBS MODERATE 55: CPT | Performed by: SURGERY

## 2023-06-18 PROCEDURE — 76705 ECHO EXAM OF ABDOMEN: CPT

## 2023-06-18 PROCEDURE — 80048 BASIC METABOLIC PNL TOTAL CA: CPT | Performed by: PHYSICIAN ASSISTANT

## 2023-06-18 PROCEDURE — 99232 SBSQ HOSP IP/OBS MODERATE 35: CPT | Performed by: STUDENT IN AN ORGANIZED HEALTH CARE EDUCATION/TRAINING PROGRAM

## 2023-06-18 PROCEDURE — 99239 HOSP IP/OBS DSCHRG MGMT >30: CPT | Performed by: HOSPITALIST

## 2023-06-18 PROCEDURE — 85025 COMPLETE CBC W/AUTO DIFF WBC: CPT | Performed by: PHYSICIAN ASSISTANT

## 2023-06-18 RX ORDER — MINERAL OIL 100 G/100G
1 OIL RECTAL ONCE
Status: COMPLETED | OUTPATIENT
Start: 2023-06-18 | End: 2023-06-18

## 2023-06-18 RX ADMIN — PANTOPRAZOLE SODIUM 40 MG: 40 TABLET, DELAYED RELEASE ORAL at 16:48

## 2023-06-18 RX ADMIN — SUCRALFATE 1 G: 1 TABLET ORAL at 11:46

## 2023-06-18 RX ADMIN — MINERAL OIL 1 ENEMA: 100 ENEMA RECTAL at 14:46

## 2023-06-18 RX ADMIN — SENNOSIDES AND DOCUSATE SODIUM 1 TABLET: 8.6; 5 TABLET ORAL at 09:51

## 2023-06-18 RX ADMIN — PRAVASTATIN SODIUM 40 MG: 40 TABLET ORAL at 16:48

## 2023-06-18 RX ADMIN — FINASTERIDE 5 MG: 5 TABLET, FILM COATED ORAL at 21:33

## 2023-06-18 RX ADMIN — SUCRALFATE 1 G: 1 TABLET ORAL at 21:33

## 2023-06-18 RX ADMIN — SENNOSIDES AND DOCUSATE SODIUM 1 TABLET: 8.6; 5 TABLET ORAL at 16:48

## 2023-06-18 RX ADMIN — POLYETHYLENE GLYCOL 3350 17 G: 17 POWDER, FOR SOLUTION ORAL at 16:48

## 2023-06-18 RX ADMIN — SUCRALFATE 1 G: 1 TABLET ORAL at 05:44

## 2023-06-18 RX ADMIN — PANTOPRAZOLE SODIUM 40 MG: 40 TABLET, DELAYED RELEASE ORAL at 05:44

## 2023-06-18 RX ADMIN — POLYETHYLENE GLYCOL 3350 17 G: 17 POWDER, FOR SOLUTION ORAL at 09:51

## 2023-06-18 RX ADMIN — SUCRALFATE 1 G: 1 TABLET ORAL at 16:48

## 2023-06-18 RX ADMIN — RIVAROXABAN 20 MG: 20 TABLET, FILM COATED ORAL at 16:48

## 2023-06-18 NOTE — CONSULTS
Consultation - Vascular Surgery   Isaac Hewitt 68 y o  male MRN: [id number]  Unit/Bed#: E4 -01 Encounter: [id number]      Assessment/Plan      Assessment:  Mild-to-moderate SMA stenosis  Abdominal pain of unclear etiology  Atrial fibrillation with slow ventricular response  CHF  CVA c/b L hemiplegia  H/o PUD with bleeding duodenal ulcer    Plan:  · Low suspicion for SMA stenosis as the etiology of abdominal pain, as remainder of SMA is widely patent, as are celiac axis and HONG  · Recommend continued workup to identify other possible causes  · Patient is pending transfer to [facility] for PPM placement    History of Present Illness   Physician Requesting Consult: Ayaz Paez DO  Reason for Consult / Principal Problem: SMA stenosis    HPI: Isaac Hewitt is a 68y o  year old male who presented with sudden-onset abdominal pain  He also had associated nausea and vomiting  He underwent CTA which showed mild-to-moderate short segment narrowing of SMA near origin  The remainder of SMA as well as celiac axis and HONG all appeared patent without any radiographic evidence to suggest acute mesenteric ischemia  He does have a history of atrial fibrillation and was noted to have a mildly elevated lactate to 3 1 while in [facility] ED; however, this promptly cleared  GI was consulted given the unclear etiology of his abdominal pain  He was also noted to be in atrial fibrillation with slow ventricular response, for which cardiology was consulted  Currently, he states his abdominal pain has completely resolved, although he notes it had been a 10/10 intensity when he initially presented  Inpatient consult to Vascular Surgery  Consult performed by: Rafaela Rader MD  Consult ordered by: Ayaz Paez DO          Review of Systems   All other systems reviewed and are negative        Historical Information   Past Medical History:   Diagnosis Date   • Acute encephalopathy 5/27/2019   • Arthritis     BL knees   • Atrial fibrillation (HCC)    • CHF (congestive heart failure) (HCC)    • Colon polyp    • CVA (cerebral vascular accident) ([facility] [address] ) 4/27/2019    NIHSS 26 on presentation   • Depression    • Diabetes mellitus ([facility] )     borderline   • Encephalopathy acute 4/28/2019   • History of transfusion 04/2019   • Hyperlipidemia    • Irregular heart beat     Afib   • Stroke ([facility] ) 04/27/2019    Left sided weakness-aphasia   • Urinary retention    • Urinary retention 6/4/2019     Past Surgical History:   Procedure Laterality Date   • COLONOSCOPY     • IR STROKE ALERT  4/27/2019   • MENISCECTOMY Right    • MO CYSTO INSERTION TRANSPROSTATIC IMPLANT SINGLE N/A 11/19/2021    Procedure: CYSTOSCOPY WITH INSERTION Azra Dumont;  Surgeon: Hyacinth Sam MD;  Location: [facility];  Service: Urology     Social History   Social History     Substance and Sexual Activity   Alcohol Use Never    Comment: social     Social History     Substance and Sexual Activity   Drug Use Never     E-Cigarette/Vaping   • E-Cigarette Use Never User      E-Cigarette/Vaping Substances   • Nicotine No    • THC No    • CBD No    • Flavoring No    • Other No    • Unknown No      Social History     Tobacco Use   Smoking Status Never   Smokeless Tobacco Never     Family History: History reviewed  No pertinent family history  }    Meds/Allergies   all current active meds have been reviewed  Allergies   Allergen Reactions   • Cephalexin Throat Swelling     Pt reported throat swelling after taking antibiotic    • Penicillins Other (See Comments)       Objective   Vitals: Blood pressure 118/60, pulse (!) 35, temperature 98 7 °F (37 1 °C), temperature source Temporal, resp  rate 16, height 6' (1 829 m), weight 88 5 kg (195 lb 1 7 oz), SpO2 96 %  ,Body mass index is 26 46 kg/m²      Intake/Output Summary (Last 24 hours) at 6/18/2023 0634  Last data filed at 6/17/2023 2201  Gross per 24 hour   Intake 820 ml   Output 1550 ml   Net -730 ml     Invasive Devices     Peripheral Intravenous "Line  Duration           Peripheral IV 06/16/23 Right Antecubital 1 day    Peripheral IV 06/16/23 Right Hand 1 day                Physical Exam   Gen:  NAD  HENT: MMM  CV:  RRR  Lungs: nl effort  Abd:  soft, non-tender, non-distended   Ext:  no CCE  Skin:  no rashes  Neuro: A&Ox3     Lab Results:   I have personally reviewed pertinent reports  , CBC with diff:   Lab Results   Component Value Date    WBC 8 30 06/18/2023    HGB 14 9 06/18/2023    HCT 45 4 06/18/2023    MCV 95 06/18/2023     06/18/2023    RBC 4 78 06/18/2023    MCH 31 2 06/18/2023    MCHC 32 8 06/18/2023    RDW 13 0 06/18/2023    MPV 9 7 06/18/2023    NRBC 0 06/18/2023   , BMP/CMP:   Lab Results   Component Value Date    SODIUM 138 06/18/2023    K 4 2 06/18/2023     06/18/2023    CO2 28 06/18/2023    BUN 20 06/18/2023    CREATININE 0 85 06/18/2023    CALCIUM 9 0 06/18/2023    EGFR 86 06/18/2023   , Coags: No results found for: \"PT\", \"PTT\", \"INR\"  Imaging Studies: I have personally reviewed pertinent reports  and I have personally reviewed pertinent films in PACS  EKG, Pathology, and Other Studies: I have personally reviewed pertinent reports      VTE Prophylaxis: rivaroxaban (Xarelto)     Code Status: Level 1 - Full Code  Advance Directive and Living Will:      Power of :    POLST:      "

## 2023-06-18 NOTE — ASSESSMENT & PLAN NOTE
Incidental finding of moderate SMA stenosis w/o celiac artery or jacque stenosis  Likely incidental finding      Seen by vascular surgery     Not thought to be causing his abdominal pain  No intervention necessary right now

## 2023-06-18 NOTE — DISCHARGE SUMMARY
[address] [facility]  Discharge- Mita Bell 1950, 68 y o  male MRN: [id number]  Unit/Bed#: E4 -01 Encounter: [id number]  Primary Care Provider: Gil Dent MD   Date and time admitted to hospital: 6/16/2023 10:10 PM        Patient doing well  No abdominal pain  Did have heart rate overnight in the 20s particularly when he was sleeping  Waiting on transfer to [facility]      * Bradycardia  Assessment & Plan  Asymptomatic bradycardia w/afib w/slow ventricular response   -Was in 40s-60s which is near baseline (50s-60s) in setting of recurrent vasovagal syncope and likely decreased vagal tone off of any chronotropic agents but continues to worsen to 20s-30s particularly when asleep even after being off morphine/opioid analgesia  Given frequency of ventricular ectopy (bigeminy, frequent pvcs and hx of nsvt) cardiology is recommending transfer to [facility] for further evaluation for PPM as pt ideally should be on beta blockade for suppression of ventricular ectopy  Case was d/w dr Erika Lombardi of EP by [facility] Cardiology   -atropine 0 4mg x1 prn  Abdominal pain  Assessment & Plan  Gi following possible pud vs constipation vs less likely mesenteric ischemia from moderate SMA stenosis w/only single vessel disease  Supportive care pending eval for bradycardia by electrophysiology possible EGD later this admission    Superior mesenteric artery stenosis [facility])  Assessment & Plan  Incidental finding of moderate SMA stenosis w/o celiac artery or jacque stenosis    Likely incidental finding  Will hold statin for now in setting of acute illness but can continue xarelto statin upon d/c  Vascular sx was consulted felt unlikely to be etiology #2 formal consult pending/    Atrial fibrillation with slow ventricular response ([address] )  Assessment & Plan  On xarelto at baseline will continue    Chronic diastolic CHF (congestive heart failure) (HCC)  Assessment & Plan  Wt Readings from Last 3 Encounters: 06/17/23 88 5 kg (195 lb 1 7 oz)   05/25/23 95 3 kg (210 lb)   12/15/22 96 6 kg (213 lb)     Euvolemic off diuretics  Daily weights, I/os        Left hemiplegia ([address] )  Assessment & Plan  From prior cva  Wife to bring in brace for left leg/arm  Undergoing op pt for last 4 years  At baseline  Discharging Physician / Practitioner: Jerson Hightower PA-C  PCP: Lion Gan MD  Admission Date:   Admission Orders (From admission, onward)     Ordered        06/17/23 1502  Inpatient Admission  Once            06/17/23 0434  Place in Observation  Once                      Discharge Date: 06/17/23    Medical Problems     Resolved Problems  Date Reviewed: 6/17/2023   None         Consultations During Hospital Stay:  · Cardiology  · Gastroenterology  · Vascular sx    Procedures Performed:   ·     Significant Findings / Test Results:   · Telemetry w/afib w/slow ventricular response off narcotics/chronotropic agents  · CTA a/p w/moderate SMA stenosis    Incidental Findings:   ·      Test Results Pending at Discharge (will require follow up):   ·      Outpatient Tests Requested:  ·     Complications:      Reason for Admission:     Hospital Course:     Sara Gleason is a 68 y o  male patient who originally presented to the hospital on 6/16/2023 due to postprandial abd pain  Pt was evaluated in ed for postprandial n/v and pain out of proportion to exam w/ct a/p demonstrating moderate SMA stenosis  Pt was given morphine for pain but found to have a fib w/slow ventricular response down to 20s-30s but normotensive and asymptomatic  Pt was admitted for further monitoring given persistence of bradycardia  Prior 24 holter demonstrates frequent single pvcs and ventricular bigeminy and pt has hx of nsvt as well prompting d/w EP for possible PPM as pt would benefit from beta blockade for his ventricular ectopy  Case was d/w Dr Gutierrez Packer and accepted by [facility]  Accepting provider requested ms/tele          Please see above list of diagnoses and related plan for additional information  Condition at Discharge: good     Discharge Day Visit / Exam:     * Please refer to separate progress note for these details *    Discussion with Family:         Discharge instructions/Information to patient and family:   See after visit summary for information provided to patient and family  Provisions for Follow-Up Care:  See after visit summary for information related to follow-up care and any pertinent home health orders  Disposition:     [address] Transfer to [address] [place] [address] to   [address] SNF:   · Not Applicable to this Patient -     Planned Readmission: yes to [facility]     Discharge Statement:  I spent  minutes discharging the patient  This time was spent on the day of discharge  I had direct contact with the patient on the day of discharge  Greater than 50% of the total time was spent examining patient, answering all patient questions, arranging and discussing plan of care with patient as well as directly providing post-discharge instructions  Additional time then spent on discharge activities  Discharge Medications:  See after visit summary for reconciled discharge medications provided to patient and family        ** Please Note: This note has been constructed using a voice recognition system **

## 2023-06-18 NOTE — DISCHARGE SUMMARY
[address] [facility]  Discharge- Taniya Rogers 1950, 68 y o  male MRN: [id number]  Unit/Bed#: E4 -01 Encounter: [id number]  Primary Care Provider: John Troncoso MD   Date and time admitted to hospital: 6/16/2023 10:10 PM    * Bradycardia  Assessment & Plan  Asymptomatic bradycardia w/afib w/slow ventricular response   -Was in 40s-60s which is near baseline (50s-60s) in setting of recurrent vasovagal syncope and likely decreased vagal tone off of any chronotropic agents but continues to worsen to 20s-30s particularly when asleep even after being off morphine/opioid analgesia  Given frequency of ventricular ectopy (bigeminy, frequent pvcs and hx of nsvt) cardiology is recommending transfer to -[facility] for further evaluation for PPM as pt ideally should be on beta blockade for suppression of ventricular ectopy  Case was d/w dr Vane Minor of EP by [facility] Cardiology   -atropine 0 4mg x1 prn  Patient is awaiting transfer  No symptoms today  No light headedness    Abdominal pain  Assessment & Plan  Presented with abdominal pain after eating  Seen by GI  May be gastritis  Symptoms have resolved on their own    Not thought to be related to SMA stenosis per vascular surgery  No intervention needed for SMA stenosis right now  Superior mesenteric artery stenosis [facility])  Assessment & Plan  Incidental finding of moderate SMA stenosis w/o celiac artery or jacque stenosis  Likely incidental finding      Seen by vascular surgery  Not thought to be causing his abdominal pain  No intervention necessary right now    Atrial fibrillation with slow ventricular response (HCC)  Assessment & Plan  Chronically on xarelto    Left hemiplegia [facility])  Assessment & Plan  Due to previous stroke  Follows with outpatient PT for the last 4 years            Subjective:   Doing well this morning  His abdominal pain that brought him in has completely resolved on its own  He is awaiting transfer to [facility] for a pacemaker  Overnight while he was sleeping his heart rate fell into the 20s        Objective:     Vitals:   Temp (24hrs), Av 8 °F (36 6 °C), Min:97 2 °F (36 2 °C), Max:98 7 °F (37 1 °C)    Temp:  [97 2 °F (36 2 °C)-98 7 °F (37 1 °C)] 97 7 °F (36 5 °C)  HR:  [29-47] 35  Resp:  [16-18] 16  BP: (118-136)/(60-78) 134/64  SpO2:  [94 %-99 %] 99 %  Body mass index is 26 46 kg/m²  Input and Output Summary (last 24 hours): Intake/Output Summary (Last 24 hours) at 2023 1119  Last data filed at 2023 2201  Gross per 24 hour   Intake 820 ml   Output 1550 ml   Net -730 ml       Physical Exam:     Physical Exam  Vitals and nursing note reviewed  HENT:      Head: Normocephalic and atraumatic  Eyes:      Pupils: Pupils are equal, round, and reactive to light  Cardiovascular:      Rate and Rhythm: Normal rate and regular rhythm  Heart sounds: No murmur heard  No friction rub  No gallop  Pulmonary:      Effort: Pulmonary effort is normal       Breath sounds: Normal breath sounds  No wheezing or rales  Abdominal:      General: Bowel sounds are normal       Palpations: Abdomen is soft  Tenderness: There is no abdominal tenderness  Musculoskeletal:      Right lower leg: No edema  Left lower leg: No edema                 Additional Data:     Labs:    Results from last 7 days   Lab Units 23  0450   WBC Thousand/uL 8 30   HEMOGLOBIN g/dL 14 9   HEMATOCRIT % 45 4   PLATELETS Thousands/uL 160   NEUTROS PCT % 81*   LYMPHS PCT % 12*   MONOS PCT % 7   EOS PCT % 0     Results from last 7 days   Lab Units 23  0450 23  0631   POTASSIUM mmol/L 4 2 4 2   CHLORIDE mmol/L 104 104   CO2 mmol/L 28 26   BUN mg/dL 20 21   CREATININE mg/dL 0 85 0 86   CALCIUM mg/dL 9 0 9 4   ALK PHOS U/L  --  47   ALT U/L  --  16   AST U/L  --  11*                       * I Have Reviewed All Lab Data     Recent Cultures (last 7 days):             Last 24 Hours Medication List:   Current Facility-Administered Medications   Medication Dose Route Frequency Provider Last Rate   • atropine  0 4 mg Intravenous Once PRN Gretta Rosa PA-C     • finasteride  5 mg Oral HS Gretta Rosa PA-C     • morphine injection  2 mg Intravenous Q4H PRN Ashley Hill DO     • ondansetron  4 mg Intravenous Q6H PRN Gretta Rosa PA-C     • pantoprazole  40 mg Oral BID AC Ashley Hill DO     • polyethylene glycol  17 g Oral BID Helen Sarkar PA-C     • pravastatin  40 mg Oral Daily Gretta Rosa PA-C     • rivaroxaban  20 mg Oral Daily With Lauren Gillespie PA-C     • senna-docusate sodium  1 tablet Oral BID Ashley Hill DO     • sucralfate  1 g Oral 4x Daily (AC & HS) Juan Hansen PA-C           VTE Pharmacologic Prophylaxis:   Pharmacologic: Rivaroxaban (Xarelto)      Current Length of Stay: 1 day(s)    Current Patient Status: Inpatient       Discharge Plan: waiting on transfer to [place]    Code Status: Level 1 - Full Code           Today, Patient Was Seen By: Ambar Stout DO    ** Please Note: Dictation voice to text software may have been used in the creation of this document   **

## 2023-06-18 NOTE — PLAN OF CARE
Problem: PAIN - ADULT  Goal: Verbalizes/displays adequate comfort level or baseline comfort level  Description: Interventions:  - Encourage patient to monitor pain and request assistance  - Assess pain using appropriate pain scale  - Administer analgesics based on type and severity of pain and evaluate response  - Implement non-pharmacological measures as appropriate and evaluate response  - Consider cultural and social influences on pain and pain management  - Notify physician/advanced practitioner if interventions unsuccessful or patient reports new pain  Outcome: Progressing     Problem: INFECTION - ADULT  Goal: Absence or prevention of progression during hospitalization  Description: INTERVENTIONS:  - Assess and monitor for signs and symptoms of infection  - Monitor lab/diagnostic results  - Monitor all insertion sites, i e  indwelling lines, tubes, and drains  - [place] appropriate cooling/warming therapies per order  - Administer medications as ordered  - Instruct and encourage patient and family to use good hand hygiene technique    Outcome: Progressing     Problem: SAFETY ADULT  Goal: Patient will remain free of falls  Description: INTERVENTIONS:  - Educate patient/family on patient safety including physical limitations  - Instruct patient to call for assistance with activity   - Consult OT/PT to assist with strengthening/mobility   - Keep Call bell within reach  - Keep bed low and locked with side rails adjusted as appropriate  - Keep care items and personal belongings within reach  - Initiate and maintain comfort rounds  - Make Fall Risk Sign visible to staff  - Offer Toileting every 2 Hours, in advance of need  - Initiate/Maintain bed alarm  - Obtain necessary fall risk management equipment  - Apply yellow socks and bracelet for high fall risk patients  - Consider moving patient to room near nurses station  Outcome: Progressing  Goal: Maintain or return to baseline ADL function  Description: INTERVENTIONS:  -  Assess patient's ability to carry out ADLs; assess patient's baseline for ADL function and identify physical deficits which impact ability to perform ADLs (bathing, care of mouth/teeth, toileting, grooming, dressing, etc )  - Assess/evaluate cause of self-care deficits   - Assess range of motion  - Assess patient's mobility; develop plan if impaired  - Assess patient's need for assistive devices and provide as appropriate  - Encourage maximum independence but intervene and supervise when necessary  - Involve family in performance of ADLs  - Assess for home care needs following discharge   - Consider OT consult to assist with ADL evaluation and planning for discharge  - Provide patient education as appropriate  Outcome: Progressing     Problem: DISCHARGE PLANNING  Goal: Discharge to home or other facility with appropriate resources  Description: INTERVENTIONS:  - Identify barriers to discharge w/patient and caregiver  - Arrange for needed discharge resources and transportation as appropriate  - Identify discharge learning needs (meds, wound care, etc )  - Refer to Case Management Department for coordinating discharge planning if the patient needs post-hospital services based on physician/advanced practitioner order or complex needs related to functional status, cognitive ability, or social support system  Outcome: Progressing     Problem: Knowledge Deficit  Goal: Patient/family/caregiver demonstrates understanding of disease process, treatment plan, medications, and discharge instructions  Description: Complete learning assessment and assess knowledge base    Interventions:  - Provide teaching at level of understanding  - Provide teaching via preferred learning methods  Outcome: Progressing     Problem: CARDIOVASCULAR - ADULT  Goal: Maintains optimal cardiac output and hemodynamic stability  Description: INTERVENTIONS:  - Monitor I/O, vital signs and rhythm  - Monitor for S/S and trends of decreased cardiac output  - Administer and titrate ordered vasoactive medications to optimize hemodynamic stability  - Assess quality of pulses, skin color and temperature  - Assess for signs of decreased coronary artery perfusion  - Instruct patient to report change in severity of symptoms  Outcome: Progressing  Goal: Absence of cardiac dysrhythmias or at baseline rhythm  Description: INTERVENTIONS:  - Continuous cardiac monitoring, vital signs, obtain 12 lead EKG if ordered  - Administer antiarrhythmic and heart rate control medications as ordered  - Monitor electrolytes and administer replacement therapy as ordered  Outcome: Progressing     Problem: METABOLIC, FLUID AND ELECTROLYTES - ADULT  Goal: Electrolytes maintained within normal limits  Description: INTERVENTIONS:  - Monitor labs and assess patient for signs and symptoms of electrolyte imbalances  - Administer electrolyte replacement as ordered  - Monitor response to electrolyte replacements, including repeat lab results as appropriate  - Instruct patient on fluid and nutrition as appropriate  Outcome: Progressing  Goal: Fluid balance maintained  Description: INTERVENTIONS:  - Monitor labs   - Monitor I/O and WT  - Instruct patient on fluid and nutrition as appropriate  - Assess for signs & symptoms of volume excess or deficit  Outcome: Progressing  Goal: Glucose maintained within target range  Description: INTERVENTIONS:  - Monitor Blood Glucose as ordered  - Assess for signs and symptoms of hyperglycemia and hypoglycemia  - Administer ordered medications to maintain glucose within target range  - Assess nutritional intake and initiate nutrition service referral as needed  Outcome: Progressing     Problem: SKIN/TISSUE INTEGRITY - ADULT  Goal: Skin Integrity remains intact(Skin Breakdown Prevention)  Description: Assess:  -Perform Walter assessment every shift  -Clean and moisturize skin every day  -Inspect skin when repositioning, toileting, and assisting with ADLS  -Assess under medical devices such as brace every shift  -Assess extremities for adequate circulation and sensation     Bed Management:  -Have minimal linens on bed & keep smooth, unwrinkled  -Change linens as needed when moist or perspiring  -Avoid sitting or lying in one position for more than 2 hours while in bed  -Keep HOB at 30 degrees     Toileting:  -Offer bedside commode      Activity:  -Mobilize patient 2 times a day  -Encourage activity and walks on unit  -Encourage or provide ROM exercises   -Turn and reposition patient every 2 Hours  -Use appropriate equipment to lift or move patient in bed  -Instruct/ Assist with weight shifting every hour when out of bed in chair  -Consider limitation of chair time 3 hour intervals    Skin Care:  -Avoid use of baby powder, tape, friction and shearing, hot water or constrictive clothing  -Relieve pressure over bony prominences using pillows  -Do not massage red bony areas    Outcome: Progressing     Problem: MOBILITY - ADULT  Goal: Maintain or return to baseline ADL function  Description: INTERVENTIONS:  -  Assess patient's ability to carry out ADLs; assess patient's baseline for ADL function and identify physical deficits which impact ability to perform ADLs (bathing, care of mouth/teeth, toileting, grooming, dressing, etc )  - Assess/evaluate cause of self-care deficits   - Assess range of motion  - Assess patient's mobility; develop plan if impaired  - Assess patient's need for assistive devices and provide as appropriate  - Encourage maximum independence but intervene and supervise when necessary  - Involve family in performance of ADLs  - Assess for home care needs following discharge   - Consider OT consult to assist with ADL evaluation and planning for discharge  - Provide patient education as appropriate  Outcome: Progressing  Goal: Maintains/Returns to pre admission functional level  Description: INTERVENTIONS:  - Perform BMAT or MOVE assessment daily  - Set and communicate daily mobility goal to care team and patient/family/caregiver  - Collaborate with rehabilitation services on mobility goals if consulted  - Perform Range of Motion 3 times a day  - Reposition patient every 2 hours    - Dangle patient 3 times a day  - Stand patient 3 times a day  - Ambulate patient 3 times a day  - Out of bed to chair 3 times a day   - Out of bed for meals 3 times a day  - Out of bed for toileting  - Record patient progress and toleration of activity level   Outcome: Progressing     Problem: Prexisting or High Potential for Compromised Skin Integrity  Goal: Skin integrity is maintained or improved  Description: INTERVENTIONS:  - Identify patients at risk for skin breakdown  - Assess and monitor skin integrity  - Assess and monitor nutrition and hydration status  - Monitor labs   - Assess for incontinence   - Turn and reposition patient  - Assist with mobility/ambulation  - Relieve pressure over bony prominences  - Avoid friction and shearing  - Provide appropriate hygiene as needed including keeping skin clean and dry  - Evaluate need for skin moisturizer/barrier cream  - Collaborate with interdisciplinary team   - Patient/family teaching  - Consider wound care consult   Outcome: Progressing

## 2023-06-18 NOTE — PROGRESS NOTES
Cardiology Progress Note - Mumtaz Maurer 68 y o  male MRN: [id number]    Unit/Bed#: E4 -01 Encounter: [id number]      Assessment & Plan:    Paroxysmal atrial fibrillation with slow ventricular response  - Anticoagulated on Xarelto 20 mg daily   - not on any AV arielle blocking agents  - 24-hour Holter monitor 1/13/2022 showed heart rate varied between 43 to 140 bpm, 9% SVE, 1% VE, frequent single PVCs and an episode of ventricular bigeminy  - Patient asymptomatic with heart rate in the 40s    Chronic diastolic CHF (congestive heart failure) (HCC)  - TTE 2/3/2022 showed EF 60%, mild to moderate LA, mild RA, mild MR, mildly dilated aortic root and mildly dilated ascending aorta  -Not on diuretics prior to admission  - Appears euvolemic currently    Abdominal pain  - GI following    Superior mesenteric artery stenosis (HCC)    History of NSVT  - 4 beat run of NSVT noted on telemetry overnight, no other significant arrhythmias    History of CVA  - With residual left-sided hemiplegia     Summary:  - Given patient's history of ventricular ectopy, he requires beta-blockers for suppression  - Given his bradycardia and need for beta-blockers, he will require transfer to [facility] for PPM placement, patient agreeable for transfer  - Case discussed with EP, Dr Reece Martin, in [place] [id number] to monitor on telemetry  - Currently pending bed availability in [facility]    Subjective:   No significant events overnight  Patient's wife was present at bedside  Overall he reports feeling well today and has no complaints  Denies chest pain, shortness of breath, abdominal pain, nausea, vomiting, fever, chills, headache, dizziness or palpitations  Objective:     Vitals: Blood pressure 134/64, pulse (!) 35, temperature 97 7 °F (36 5 °C), temperature source Temporal, resp  rate 16, height 6' (1 829 m), weight 88 5 kg (195 lb 1 7 oz), SpO2 99 %  , Body mass index is 26 46 kg/m² ,   Orthostatic Blood Pressures    Flowsheet Row Most Recent Value   Blood Pressure 134/64 filed at 06/18/2023 0815   Patient Position - Orthostatic VS Lying filed at 06/18/2023 0815            Intake/Output Summary (Last 24 hours) at 6/18/2023 1115  Last data filed at 6/17/2023 2201  Gross per 24 hour   Intake 820 ml   Output 1550 ml   Net -730 ml           Physical Exam:    GEN: Justine Abdi appears well, alert and oriented x 3, pleasant and cooperative   HEENT: Mucous membranes moist, no scleral icterus, no conjunctival pallor  NECK: No elevated JVD  HEART: Bradycardic, irregularly irregular, no significant audible murmurs  LUNGS: clear to auscultation bilaterally; no wheezes, rales, or rhonchi   ABDOMEN: normal bowel sounds, soft, no tenderness, no distention  EXTREMITIES: peripheral pulses normal; no lower extremity edema   NEURO: Left-sided hemiplegia, no other focal deficits  SKIN: No lesions or rashes on exposed skin        Current Facility-Administered Medications:   •  Atropine Sulfate injection 0 4 mg, 0 4 mg, Intravenous, Once PRN, Gretta Rosa PA-C  •  finasteride (PROSCAR) tablet 5 mg, 5 mg, Oral, HS, Gretta Rosa PA-C, 5 mg at 06/17/23 2206  •  morphine injection 2 mg, 2 mg, Intravenous, Q4H PRN, Yanique Augustine DO  •  ondansetron (ZOFRAN) injection 4 mg, 4 mg, Intravenous, Q6H PRN, Gretta Rosa PA-C  •  pantoprazole (PROTONIX) EC tablet 40 mg, 40 mg, Oral, BID AC, Chris Alexander DO, 40 mg at 06/18/23 0544  •  polyethylene glycol (MIRALAX) packet 17 g, 17 g, Oral, BID, Helen Sarkar PA-C, 17 g at 06/18/23 0951  •  pravastatin (PRAVACHOL) tablet 40 mg, 40 mg, Oral, Daily, Gretta Rosa PA-C, 40 mg at 06/17/23 1609  •  rivaroxaban (XARELTO) tablet 20 mg, 20 mg, Oral, Daily With Cassidy Rosa PA-C, 20 mg at 06/17/23 1609  •  senna-docusate sodium (SENOKOT S) 8 6-50 mg per tablet 1 tablet, 1 tablet, Oral, BID, Yanique Augustine, DO, 1 tablet at 06/18/23 0951  •  sucralfate (CARAFATE) tablet 1 g, 1 g, Oral, 4x "Daily (AC & HS), Helen Sarkar PA-C, 1 g at 06/18/23 0544    Labs & Results:    Lab Results   Component Value Date    TROPONINI <0 02 01/28/2020    TROPONINI <0 02 01/28/2020    TROPONINI <0 02 01/28/2020       Lab Results   Component Value Date    GLUCOSE 133 04/27/2019    CALCIUM 9 0 06/18/2023    K 4 2 06/18/2023    CO2 28 06/18/2023     06/18/2023    BUN 20 06/18/2023    CREATININE 0 85 06/18/2023       Lab Results   Component Value Date    WBC 8 30 06/18/2023    HGB 14 9 06/18/2023    HCT 45 4 06/18/2023    MCV 95 06/18/2023     06/18/2023           No results found for: \"CHOL\"  Lab Results   Component Value Date    HDL 33 (L) 12/27/2019    HDL 42 09/18/2019     Lab Results   Component Value Date    LDLCALC 83 12/27/2019    LDLCALC 77 09/18/2019     Lab Results   Component Value Date    TRIG 73 12/27/2019    TRIG 75 09/18/2019       Lab Results   Component Value Date    ALT 16 06/17/2023    AST 11 (L) 06/17/2023    ALKPHOS 47 06/17/2023         EKG personally reviewed by )Víctor Shannon MD  No acute changes   TELE: No significant arrhythmias seen on telemetry review            "

## 2023-06-18 NOTE — ASSESSMENT & PLAN NOTE
Asymptomatic bradycardia w/afib w/slow ventricular response   -Was in 40s-60s which is near baseline (50s-60s) in setting of recurrent vasovagal syncope and likely decreased vagal tone off of any chronotropic agents but continues to worsen to 20s-30s particularly when asleep even after being off morphine/opioid analgesia  Given frequency of ventricular ectopy (bigeminy, frequent pvcs and hx of nsvt) cardiology is recommending transfer to -[facility] for further evaluation for PPM as pt ideally should be on beta blockade for suppression of ventricular ectopy  Case was d/w dr Nicci Hernandez of EP by [facility] Cardiology   -atropine 0 4mg x1 prn        Patient is awaiting transfer  No symptoms today  No light headedness

## 2023-06-18 NOTE — PLAN OF CARE
Problem: PAIN - ADULT  Goal: Verbalizes/displays adequate comfort level or baseline comfort level  Description: Interventions:  - Encourage patient to monitor pain and request assistance  - Assess pain using appropriate pain scale  - Administer analgesics based on type and severity of pain and evaluate response  - Implement non-pharmacological measures as appropriate and evaluate response  - Consider cultural and social influences on pain and pain management  - Notify physician/advanced practitioner if interventions unsuccessful or patient reports new pain  Outcome: Progressing     Problem: INFECTION - ADULT  Goal: Absence or prevention of progression during hospitalization  Description: INTERVENTIONS:  - Assess and monitor for signs and symptoms of infection  - Monitor lab/diagnostic results  - Monitor all insertion sites, i e  indwelling lines, tubes, and drains  - [place] appropriate cooling/warming therapies per order  - Administer medications as ordered  - Instruct and encourage patient and family to use good hand hygiene technique    Outcome: Progressing     Problem: SAFETY ADULT  Goal: Patient will remain free of falls  Description: INTERVENTIONS:  - Educate patient/family on patient safety including physical limitations  - Instruct patient to call for assistance with activity   - Consult OT/PT to assist with strengthening/mobility   - Keep Call bell within reach  - Keep bed low and locked with side rails adjusted as appropriate  - Keep care items and personal belongings within reach  - Initiate and maintain comfort rounds  - Make Fall Risk Sign visible to staff  - Offer Toileting every 2 Hours, in advance of need  - Initiate/Maintain bed alarm  - Obtain necessary fall risk management equipment  - Apply yellow socks and bracelet for high fall risk patients  - Consider moving patient to room near nurses station  Outcome: Progressing  Goal: Maintain or return to baseline ADL function  Description: INTERVENTIONS:  -  Assess patient's ability to carry out ADLs; assess patient's baseline for ADL function and identify physical deficits which impact ability to perform ADLs (bathing, care of mouth/teeth, toileting, grooming, dressing, etc )  - Assess/evaluate cause of self-care deficits   - Assess range of motion  - Assess patient's mobility; develop plan if impaired  - Assess patient's need for assistive devices and provide as appropriate  - Encourage maximum independence but intervene and supervise when necessary  - Involve family in performance of ADLs  - Assess for home care needs following discharge   - Consider OT consult to assist with ADL evaluation and planning for discharge  - Provide patient education as appropriate  Outcome: Progressing     Problem: DISCHARGE PLANNING  Goal: Discharge to home or other facility with appropriate resources  Description: INTERVENTIONS:  - Identify barriers to discharge w/patient and caregiver  - Arrange for needed discharge resources and transportation as appropriate  - Identify discharge learning needs (meds, wound care, etc )  - Refer to Case Management Department for coordinating discharge planning if the patient needs post-hospital services based on physician/advanced practitioner order or complex needs related to functional status, cognitive ability, or social support system  Outcome: Progressing     Problem: Knowledge Deficit  Goal: Patient/family/caregiver demonstrates understanding of disease process, treatment plan, medications, and discharge instructions  Description: Complete learning assessment and assess knowledge base    Interventions:  - Provide teaching at level of understanding  - Provide teaching via preferred learning methods  Outcome: Progressing     Problem: CARDIOVASCULAR - ADULT  Goal: Maintains optimal cardiac output and hemodynamic stability  Description: INTERVENTIONS:  - Monitor I/O, vital signs and rhythm  - Monitor for S/S and trends of decreased cardiac output  - Administer and titrate ordered vasoactive medications to optimize hemodynamic stability  - Assess quality of pulses, skin color and temperature  - Assess for signs of decreased coronary artery perfusion  - Instruct patient to report change in severity of symptoms  Outcome: Progressing  Goal: Absence of cardiac dysrhythmias or at baseline rhythm  Description: INTERVENTIONS:  - Continuous cardiac monitoring, vital signs, obtain 12 lead EKG if ordered  - Administer antiarrhythmic and heart rate control medications as ordered  - Monitor electrolytes and administer replacement therapy as ordered  Outcome: Progressing     Problem: METABOLIC, FLUID AND ELECTROLYTES - ADULT  Goal: Electrolytes maintained within normal limits  Description: INTERVENTIONS:  - Monitor labs and assess patient for signs and symptoms of electrolyte imbalances  - Administer electrolyte replacement as ordered  - Monitor response to electrolyte replacements, including repeat lab results as appropriate  - Instruct patient on fluid and nutrition as appropriate  Outcome: Progressing  Goal: Fluid balance maintained  Description: INTERVENTIONS:  - Monitor labs   - Monitor I/O and WT  - Instruct patient on fluid and nutrition as appropriate  - Assess for signs & symptoms of volume excess or deficit  Outcome: Progressing  Goal: Glucose maintained within target range  Description: INTERVENTIONS:  - Monitor Blood Glucose as ordered  - Assess for signs and symptoms of hyperglycemia and hypoglycemia  - Administer ordered medications to maintain glucose within target range  - Assess nutritional intake and initiate nutrition service referral as needed  Outcome: Progressing     Problem: SKIN/TISSUE INTEGRITY - ADULT  Goal: Skin Integrity remains intact(Skin Breakdown Prevention)  Description: Assess:  -Perform Walter assessment every shift  -Clean and moisturize skin every day  -Inspect skin when repositioning, toileting, and assisting with ADLS  -Assess under medical devices such as brace every shift  -Assess extremities for adequate circulation and sensation     Bed Management:  -Have minimal linens on bed & keep smooth, unwrinkled  -Change linens as needed when moist or perspiring  -Avoid sitting or lying in one position for more than 2 hours while in bed  -Keep HOB at 30 degrees     Toileting:  -Offer bedside commode      Activity:  -Mobilize patient 2 times a day  -Encourage activity and walks on unit  -Encourage or provide ROM exercises   -Turn and reposition patient every 2 Hours  -Use appropriate equipment to lift or move patient in bed  -Instruct/ Assist with weight shifting every hour when out of bed in chair  -Consider limitation of chair time 3 hour intervals    Skin Care:  -Avoid use of baby powder, tape, friction and shearing, hot water or constrictive clothing  -Relieve pressure over bony prominences using pillows  -Do not massage red bony areas    Outcome: Progressing     Problem: MOBILITY - ADULT  Goal: Maintain or return to baseline ADL function  Description: INTERVENTIONS:  -  Assess patient's ability to carry out ADLs; assess patient's baseline for ADL function and identify physical deficits which impact ability to perform ADLs (bathing, care of mouth/teeth, toileting, grooming, dressing, etc )  - Assess/evaluate cause of self-care deficits   - Assess range of motion  - Assess patient's mobility; develop plan if impaired  - Assess patient's need for assistive devices and provide as appropriate  - Encourage maximum independence but intervene and supervise when necessary  - Involve family in performance of ADLs  - Assess for home care needs following discharge   - Consider OT consult to assist with ADL evaluation and planning for discharge  - Provide patient education as appropriate  Outcome: Progressing  Goal: Maintains/Returns to pre admission functional level  Description: INTERVENTIONS:  - Perform BMAT or MOVE assessment daily  - Set and communicate daily mobility goal to care team and patient/family/caregiver  - Collaborate with rehabilitation services on mobility goals if consulted  - Perform Range of Motion times a day  - Reposition patient every  hours    - Dangle patient  times a day  - Stand patient  times a day  - Ambulate patient  times a day  - Out of bed to chair  times a day   - Out of bed for meals times a day  - Out of bed for toileting  - Record patient progress and toleration of activity level   Outcome: Progressing     Problem: Prexisting or High Potential for Compromised Skin Integrity  Goal: Skin integrity is maintained or improved  Description: INTERVENTIONS:  - Identify patients at risk for skin breakdown  - Assess and monitor skin integrity  - Assess and monitor nutrition and hydration status  - Monitor labs   - Assess for incontinence   - Turn and reposition patient  - Assist with mobility/ambulation  - Relieve pressure over bony prominences  - Avoid friction and shearing  - Provide appropriate hygiene as needed including keeping skin clean and dry  - Evaluate need for skin moisturizer/barrier cream  - Collaborate with interdisciplinary team   - Patient/family teaching  - Consider wound care consult   Outcome: Progressing

## 2023-06-18 NOTE — ASSESSMENT & PLAN NOTE
Presented with abdominal pain after eating  Seen by GI  May be gastritis  Symptoms have resolved on their own    Not thought to be related to SMA stenosis per vascular surgery  No intervention needed for SMA stenosis right now

## 2023-06-18 NOTE — ASSESSMENT & PLAN NOTE
Asymptomatic bradycardia w/afib w/slow ventricular response   -Was in 40s-60s which is near baseline (50s-60s) in setting of recurrent vasovagal syncope and likely decreased vagal tone off of any chronotropic agents but continues to worsen to 20s-30s particularly when asleep even after being off morphine/opioid analgesia  Given frequency of ventricular ectopy (bigeminy, frequent pvcs and hx of nsvt) cardiology is recommending transfer to -[facility] for further evaluation for PPM as pt ideally should be on beta blockade for suppression of ventricular ectopy  Case was d/w dr Robles [place] of EP by [facility] Cardiology   -atropine 0 4mg x1 prn        Patient is awaiting transfer  No symptoms today  No light headedness

## 2023-06-18 NOTE — PLAN OF CARE
Problem: PAIN - ADULT  Goal: Verbalizes/displays adequate comfort level or baseline comfort level  Description: Interventions:  - Encourage patient to monitor pain and request assistance  - Assess pain using appropriate pain scale  - Administer analgesics based on type and severity of pain and evaluate response  - Implement non-pharmacological measures as appropriate and evaluate response  - Consider cultural and social influences on pain and pain management  - Notify physician/advanced practitioner if interventions unsuccessful or patient reports new pain  Outcome: Progressing     Problem: INFECTION - ADULT  Goal: Absence or prevention of progression during hospitalization  Description: INTERVENTIONS:  - Assess and monitor for signs and symptoms of infection  - Monitor lab/diagnostic results  - Monitor all insertion sites, i e  indwelling lines, tubes, and drains  - [place] appropriate cooling/warming therapies per order  - Administer medications as ordered  - Instruct and encourage patient and family to use good hand hygiene technique    Outcome: Progressing     Problem: SAFETY ADULT  Goal: Patient will remain free of falls  Description: INTERVENTIONS:  - Educate patient/family on patient safety including physical limitations  - Instruct patient to call for assistance with activity   - Consult OT/PT to assist with strengthening/mobility   - Keep Call bell within reach  - Keep bed low and locked with side rails adjusted as appropriate  - Keep care items and personal belongings within reach  - Initiate and maintain comfort rounds  - Make Fall Risk Sign visible to staff  - Offer Toileting every 2 Hours, in advance of need  - Initiate/Maintain bed alarm  - Obtain necessary fall risk management equipment  - Apply yellow socks and bracelet for high fall risk patients  - Consider moving patient to room near nurses station  Outcome: Progressing  Goal: Maintain or return to baseline ADL function  Description: INTERVENTIONS:  -  Assess patient's ability to carry out ADLs; assess patient's baseline for ADL function and identify physical deficits which impact ability to perform ADLs (bathing, care of mouth/teeth, toileting, grooming, dressing, etc )  - Assess/evaluate cause of self-care deficits   - Assess range of motion  - Assess patient's mobility; develop plan if impaired  - Assess patient's need for assistive devices and provide as appropriate  - Encourage maximum independence but intervene and supervise when necessary  - Involve family in performance of ADLs  - Assess for home care needs following discharge   - Consider OT consult to assist with ADL evaluation and planning for discharge  - Provide patient education as appropriate  Outcome: Progressing     Problem: DISCHARGE PLANNING  Goal: Discharge to home or other facility with appropriate resources  Description: INTERVENTIONS:  - Identify barriers to discharge w/patient and caregiver  - Arrange for needed discharge resources and transportation as appropriate  - Identify discharge learning needs (meds, wound care, etc )  - Refer to Case Management Department for coordinating discharge planning if the patient needs post-hospital services based on physician/advanced practitioner order or complex needs related to functional status, cognitive ability, or social support system  Outcome: Progressing     Problem: Knowledge Deficit  Goal: Patient/family/caregiver demonstrates understanding of disease process, treatment plan, medications, and discharge instructions  Description: Complete learning assessment and assess knowledge base    Interventions:  - Provide teaching at level of understanding  - Provide teaching via preferred learning methods  Outcome: Progressing     Problem: CARDIOVASCULAR - ADULT  Goal: Maintains optimal cardiac output and hemodynamic stability  Description: INTERVENTIONS:  - Monitor I/O, vital signs and rhythm  - Monitor for S/S and trends of decreased cardiac output  - Administer and titrate ordered vasoactive medications to optimize hemodynamic stability  - Assess quality of pulses, skin color and temperature  - Assess for signs of decreased coronary artery perfusion  - Instruct patient to report change in severity of symptoms  Outcome: Progressing  Goal: Absence of cardiac dysrhythmias or at baseline rhythm  Description: INTERVENTIONS:  - Continuous cardiac monitoring, vital signs, obtain 12 lead EKG if ordered  - Administer antiarrhythmic and heart rate control medications as ordered  - Monitor electrolytes and administer replacement therapy as ordered  Outcome: Progressing     Problem: METABOLIC, FLUID AND ELECTROLYTES - ADULT  Goal: Electrolytes maintained within normal limits  Description: INTERVENTIONS:  - Monitor labs and assess patient for signs and symptoms of electrolyte imbalances  - Administer electrolyte replacement as ordered  - Monitor response to electrolyte replacements, including repeat lab results as appropriate  - Instruct patient on fluid and nutrition as appropriate  Outcome: Progressing  Goal: Fluid balance maintained  Description: INTERVENTIONS:  - Monitor labs   - Monitor I/O and WT  - Instruct patient on fluid and nutrition as appropriate  - Assess for signs & symptoms of volume excess or deficit  Outcome: Progressing  Goal: Glucose maintained within target range  Description: INTERVENTIONS:  - Monitor Blood Glucose as ordered  - Assess for signs and symptoms of hyperglycemia and hypoglycemia  - Administer ordered medications to maintain glucose within target range  - Assess nutritional intake and initiate nutrition service referral as needed  Outcome: Progressing     Problem: SKIN/TISSUE INTEGRITY - ADULT  Goal: Skin Integrity remains intact(Skin Breakdown Prevention)  Description: Assess:  -Perform Walter assessment every shift  -Clean and moisturize skin every day  -Inspect skin when repositioning, toileting, and assisting with ADLS  -Assess under medical devices such as brace every shift  -Assess extremities for adequate circulation and sensation     Bed Management:  -Have minimal linens on bed & keep smooth, unwrinkled  -Change linens as needed when moist or perspiring  -Avoid sitting or lying in one position for more than 2 hours while in bed  -Keep HOB at 30 degrees     Toileting:  -Offer bedside commode      Activity:  -Mobilize patient 2 times a day  -Encourage activity and walks on unit  -Encourage or provide ROM exercises   -Turn and reposition patient every 2 Hours  -Use appropriate equipment to lift or move patient in bed  -Instruct/ Assist with weight shifting every hour when out of bed in chair  -Consider limitation of chair time 3 hour intervals    Skin Care:  -Avoid use of baby powder, tape, friction and shearing, hot water or constrictive clothing  -Relieve pressure over bony prominences using pillows  -Do not massage red bony areas    Outcome: Progressing     Problem: MOBILITY - ADULT  Goal: Maintain or return to baseline ADL function  Description: INTERVENTIONS:  -  Assess patient's ability to carry out ADLs; assess patient's baseline for ADL function and identify physical deficits which impact ability to perform ADLs (bathing, care of mouth/teeth, toileting, grooming, dressing, etc )  - Assess/evaluate cause of self-care deficits   - Assess range of motion  - Assess patient's mobility; develop plan if impaired  - Assess patient's need for assistive devices and provide as appropriate  - Encourage maximum independence but intervene and supervise when necessary  - Involve family in performance of ADLs  - Assess for home care needs following discharge   - Consider OT consult to assist with ADL evaluation and planning for discharge  - Provide patient education as appropriate  Outcome: Progressing  Goal: Maintains/Returns to pre admission functional level  Description: INTERVENTIONS:  - Perform BMAT or MOVE assessment daily  - Set and communicate daily mobility goal to care team and patient/family/caregiver  - Collaborate with rehabilitation services on mobility goals if consulted  - Perform Range of Motion 3 times a day  - Reposition patient every 2 hours    - Dangle patient 3 times a day  - Stand patient 3 times a day  - Ambulate patient 3 times a day  - Out of bed to chair 3 times a day   - Out of bed for meals 3 times a day  - Out of bed for toileting  - Record patient progress and toleration of activity level   Outcome: Progressing     Problem: Prexisting or High Potential for Compromised Skin Integrity  Goal: Skin integrity is maintained or improved  Description: INTERVENTIONS:  - Identify patients at risk for skin breakdown  - Assess and monitor skin integrity  - Assess and monitor nutrition and hydration status  - Monitor labs   - Assess for incontinence   - Turn and reposition patient  - Assist with mobility/ambulation  - Relieve pressure over bony prominences  - Avoid friction and shearing  - Provide appropriate hygiene as needed including keeping skin clean and dry  - Evaluate need for skin moisturizer/barrier cream  - Collaborate with interdisciplinary team   - Patient/family teaching  - Consider wound care consult   Outcome: Progressing

## 2023-06-19 ENCOUNTER — APPOINTMENT (INPATIENT)
Dept: NON INVASIVE DIAGNOSTICS | Facility: HOSPITAL | Age: 73
DRG: 243 | End: 2023-06-19
Payer: MEDICARE

## 2023-06-19 LAB
AORTIC ROOT: 2.7 CM
AORTIC VALVE MEAN VELOCITY: 8.6 M/S
AV LVOT MEAN GRADIENT: 1 MMHG
AV LVOT PEAK GRADIENT: 2 MMHG
AV MEAN GRADIENT: 3 MMHG
AV PEAK GRADIENT: 5 MMHG
AV VELOCITY RATIO: 0.58
DOP CALC AO PEAK VEL: 1.17 M/S
DOP CALC AO VTI: 24.31 CM
DOP CALC LVOT PEAK VEL VTI: 14.57 CM
DOP CALC LVOT PEAK VEL: 0.68 M/S
E WAVE DECELERATION TIME: 210 MS
FRACTIONAL SHORTENING: 38 % (ref 28–44)
INTERVENTRICULAR SEPTUM IN DIASTOLE (PARASTERNAL SHORT AXIS VIEW): 1.4 CM
INTERVENTRICULAR SEPTUM: 1.4 CM (ref 0.6–1.1)
LAAS-AP2: 21.1 CM2
LAAS-AP4: 23.6 CM2
LEFT ATRIUM SIZE: 4.3 CM
LEFT INTERNAL DIMENSION IN SYSTOLE: 3 CM (ref 2.1–4)
LEFT VENTRICULAR INTERNAL DIMENSION IN DIASTOLE: 4.8 CM (ref 3.5–6)
LEFT VENTRICULAR POSTERIOR WALL IN END DIASTOLE: 1.3 CM
LEFT VENTRICULAR STROKE VOLUME: 70 ML
LVSV (TEICH): 70 ML
MV E'TISSUE VEL-LAT: 12 CM/S
MV E'TISSUE VEL-SEP: 9 CM/S
MV PEAK E VEL: 68 CM/S
MV STENOSIS PRESSURE HALF TIME: 61 MS
MV VALVE AREA P 1/2 METHOD: 3.61 CM2
RIGHT ATRIAL 2D VOLUME: 79 ML
RIGHT ATRIUM AREA SYSTOLE A4C: 23.9 CM2
RIGHT VENTRICLE ID DIMENSION: 4.5 CM
SL CV LEFT ATRIUM LENGTH A2C: 5.3 CM
SL CV LV EF: 65
SL CV PED ECHO LEFT VENTRICLE DIASTOLIC VOLUME (MOD BIPLANE) 2D: 106 ML
SL CV PED ECHO LEFT VENTRICLE SYSTOLIC VOLUME (MOD BIPLANE) 2D: 36 ML
TRICUSPID ANNULAR PLANE SYSTOLIC EXCURSION: 1.9 CM

## 2023-06-19 PROCEDURE — 82360 CALCULUS ASSAY QUANT: CPT | Performed by: INTERNAL MEDICINE

## 2023-06-19 PROCEDURE — 99233 SBSQ HOSP IP/OBS HIGH 50: CPT | Performed by: INTERNAL MEDICINE

## 2023-06-19 PROCEDURE — 99232 SBSQ HOSP IP/OBS MODERATE 35: CPT | Performed by: STUDENT IN AN ORGANIZED HEALTH CARE EDUCATION/TRAINING PROGRAM

## 2023-06-19 PROCEDURE — 93306 TTE W/DOPPLER COMPLETE: CPT | Performed by: STUDENT IN AN ORGANIZED HEALTH CARE EDUCATION/TRAINING PROGRAM

## 2023-06-19 PROCEDURE — 93306 TTE W/DOPPLER COMPLETE: CPT

## 2023-06-19 RX ORDER — BISACODYL 10 MG
10 SUPPOSITORY, RECTAL RECTAL ONCE
Status: COMPLETED | OUTPATIENT
Start: 2023-06-19 | End: 2023-06-19

## 2023-06-19 RX ADMIN — BISACODYL 10 MG: 10 SUPPOSITORY RECTAL at 12:41

## 2023-06-19 RX ADMIN — SUCRALFATE 1 G: 1 TABLET ORAL at 17:00

## 2023-06-19 RX ADMIN — PANTOPRAZOLE SODIUM 40 MG: 40 TABLET, DELAYED RELEASE ORAL at 08:35

## 2023-06-19 RX ADMIN — POLYETHYLENE GLYCOL 3350 17 G: 17 POWDER, FOR SOLUTION ORAL at 08:35

## 2023-06-19 RX ADMIN — PANTOPRAZOLE SODIUM 40 MG: 40 TABLET, DELAYED RELEASE ORAL at 17:00

## 2023-06-19 RX ADMIN — SENNOSIDES AND DOCUSATE SODIUM 1 TABLET: 8.6; 5 TABLET ORAL at 17:00

## 2023-06-19 RX ADMIN — SUCRALFATE 1 G: 1 TABLET ORAL at 21:47

## 2023-06-19 RX ADMIN — RIVAROXABAN 20 MG: 20 TABLET, FILM COATED ORAL at 17:00

## 2023-06-19 RX ADMIN — SUCRALFATE 1 G: 1 TABLET ORAL at 12:41

## 2023-06-19 RX ADMIN — SENNOSIDES AND DOCUSATE SODIUM 1 TABLET: 8.6; 5 TABLET ORAL at 08:35

## 2023-06-19 RX ADMIN — SUCRALFATE 1 G: 1 TABLET ORAL at 08:35

## 2023-06-19 RX ADMIN — FINASTERIDE 5 MG: 5 TABLET, FILM COATED ORAL at 21:47

## 2023-06-19 RX ADMIN — POLYETHYLENE GLYCOL 3350 17 G: 17 POWDER, FOR SOLUTION ORAL at 17:00

## 2023-06-19 RX ADMIN — PRAVASTATIN SODIUM 40 MG: 40 TABLET ORAL at 17:00

## 2023-06-19 NOTE — PHYSICAL THERAPY NOTE
PHYSICAL THERAPY NOTE          Patient Name: Ralph Rome  EVKWL'Z Date: 6/19/2023 06/19/23 0956   PT Last Visit   PT Visit Date 06/19/23   Note Type   Note type Cancelled Session; Evaluation   Cancel Reasons Other   Additional Comments PT consult received  Chart reviewed  Pt pending transfer to [facility] for PPM evaluation  Defer PT eval at this time  Will continue to follow as appropriate       Jing Nichole

## 2023-06-19 NOTE — ASSESSMENT & PLAN NOTE
Incidental finding of moderate SMA stenosis w/o celiac artery or jacque stenosis    Likely incidental finding

## 2023-06-19 NOTE — ASSESSMENT & PLAN NOTE
Asymptomatic bradycardia w/afib w/slow ventricular response   -Was in 40s-60s which is near baseline (50s-60s) in setting of recurrent vasovagal syncope and likely decreased vagal tone off of any chronotropic agents but continues to worsen to 20s-30s particularly when asleep even after being off morphine/opioid analgesia  Given frequency of ventricular ectopy (bigeminy, frequent pvcs and hx of nsvt) cardiology is recommending transfer to -[facility] for further evaluation for PPM as pt ideally should be on beta blockade for suppression of ventricular ectopy  Case was d/w dr Aleks Faria of EP by [facility] Cardiology   -atropine 0 4mg x1 prn        Patient is awaiting transfer  No symptoms today  No light headedness

## 2023-06-19 NOTE — PROGRESS NOTES
[facility]  Progress Note  Name: Fatmata Anaya  MRN: [id number]  Unit/Bed#: E4 -01 I Date of Admission: 6/16/2023   Date of Service: 6/19/2023 I Hospital Day: 2       Addendum: Patient passed kidney stone, likely cause of abdominal pain  Stone sent for analysis, does have prior history of nephrolithiasis    Assessment/Plan   * Bradycardia  Assessment & Plan  Asymptomatic bradycardia w/afib w/slow ventricular response   -Was in 40s-60s which is near baseline (50s-60s) in setting of recurrent vasovagal syncope and likely decreased vagal tone off of any chronotropic agents but continues to worsen to 20s-30s particularly when asleep even after being off morphine/opioid analgesia  Given frequency of ventricular ectopy (bigeminy, frequent pvcs and hx of nsvt) cardiology is recommending transfer to -[facility] for further evaluation for PPM as pt ideally should be on beta blockade for suppression of ventricular ectopy  Case was d/w dr Vane Minor of EP by [facility] Cardiology   -atropine 0 4mg x1 prn  Patient is awaiting transfer  No symptoms today  No light headedness    Superior mesenteric artery stenosis [facility])  Assessment & Plan  Incidental finding of moderate SMA stenosis w/o celiac artery or jacque stenosis  Likely incidental finding    Abdominal pain  Assessment & Plan  Presented with abdominal pain after eating  Seen by GI  May be gastritis  Symptoms have resolved on their own    Not thought to be related to SMA stenosis per vascular surgery  No intervention needed for SMA stenosis right now      Atrial fibrillation with slow ventricular response (HCC)  Assessment & Plan  Chronically on xarelto    Chronic diastolic CHF (congestive heart failure) (HCC)  Assessment & Plan  Wt Readings from Last 3 Encounters:   06/19/23 88 5 kg (195 lb)   05/25/23 95 3 kg (210 lb)   12/15/22 96 6 kg (213 lb)     Euvolemic off diuretics  Daily weights, I/os        Left hemiplegia (HCC)  Assessment & Plan  Due to previous stroke  Follows with outpatient PT for the last 4 years  [facility] [address] Internal Medicine Progress Note  Patient: Sara Gleason 68 y o  male   MRN: [id number]  PCP: Lion Gan MD  Unit/Bed#: E4 -01 Encounter: [id number]  Date Of Visit: 06/19/23        Hospital Course:     66-year-old male patient who presented with abdominal pain, found to have slow atrial fibrillation with concern for sick sinus syndrome  Patient's past medical history is notable for prior stroke resulting in hemiparesis  He was found to have superior mesenteric artery stenosis, he has notable left-sided hemiplegia  Patient was transferred to [facility] [address] in [place], unfortunately due to bed availability remains in the hospital     Assessment:      Principal Problem:    Bradycardia  Active Problems:    Left hemiplegia (HCC)    Chronic diastolic CHF (congestive heart failure) (HCC)    Atrial fibrillation with slow ventricular response (HCC)    Abdominal pain    Superior mesenteric artery stenosis (HCC)      Plan:    · Patient is discharged [place] [facility] [address] in [place]       VTE Pharmacologic Prophylaxis:   Pharmacologic: Rivaroxaban (Xarelto)  Mechanical VTE Prophylaxis in Place: Yes    Patient Centered Rounds: I have performed bedside rounds with nursing staff today  Discussions with Specialists or Other Care Team Provider: Cardiology    Education and Discussions with Family / Patient: Patient family at bedside    Time Spent for Care: 1 hour  More than 50% of total time spent on counseling and coordination of care as described above      Current Length of Stay: 2 day(s)    Current Patient Status: Inpatient   Certification Statement: The patient will continue to require additional inpatient hospital stay due to Bradycardia and permanent pacemaker    Discharge Plan / Estimated Discharge Date: Patient is scheduled for discharge,    Code Status: Level 1 - Full Code      Subjective:   Seen and examined, no acute complaints    A complete and comprehensive 14 point organ system review has been performed and all other systems are negative other than stated above  Objective:     Vitals:   Temp (24hrs), Av 8 °F (36 6 °C), Min:97 3 °F (36 3 °C), Max:98 5 °F (36 9 °C)    Temp:  [97 3 °F (36 3 °C)-98 5 °F (36 9 °C)] 97 3 °F (36 3 °C)  HR:  [47-64] 47  Resp:  [16-18] 18  BP: (126-155)/(50-96) 150/96  SpO2:  [93 %-98 %] 95 %  Body mass index is 26 45 kg/m²  Input and Output Summary (last 24 hours):        Intake/Output Summary (Last 24 hours) at 2023 1437  Last data filed at 2023 1205  Gross per 24 hour   Intake 480 ml   Output 1550 ml   Net -1070 ml       Physical Exam:     General: well appearing, no acute distress  HEENT: atraumatic, PERRLA, moist mucosa, normal pharynx, normal tonsils and adenoids, normal tongue, no fluid in sinuses  Neck: Trachea midline, no carotid bruit, no masses  Respiratory: normal chest wall expansion, CTA B, no r/r/w, no rubs  Cardiovascular: RRR, no m/r/g, Normal S1 and S2  Abdomen: Soft, non-tender, non-distended, normal bowel sounds in all quadrants, no hepatosplenomegaly, no tympany  Rectal: deferred  Musculoskeletal: Left hemiparesis  Integumentary: warm, dry, and pink, with no rash, purpura, or petechia  Heme/Lymph: no lymphadenopathy, no bruises  Neurological: Cranial Nerves II-XII grossly intact  Psychiatric: cooperative with normal mood, affect, and cognition      Additional Data:     Labs:    Results from last 7 days   Lab Units 23  0450   WBC Thousand/uL 8 30   HEMOGLOBIN g/dL 14 9   HEMATOCRIT % 45 4   PLATELETS Thousands/uL 160   NEUTROS PCT % 81*   LYMPHS PCT % 12*   MONOS PCT % 7   EOS PCT % 0     Results from last 7 days   Lab Units 23  0450 23  0631   POTASSIUM mmol/L 4 2 4 2   CHLORIDE mmol/L 104 104   CO2 mmol/L 28 26   BUN mg/dL 20 21   CREATININE mg/dL 0 85 0 86   CALCIUM mg/dL 9 0 9 4   ALK PHOS U/L  --  47   ALT U/L  --  16   AST U/L  -- 11*           * I Have Reviewed All Lab Data Listed Above  * Additional Pertinent Lab Tests Reviewed: Rachel 66 Admission Reviewed    Imaging:    Imaging Reports Reviewed Today Include: None  Imaging Personally Reviewed by Myself Includes: None    Recent Cultures (last 7 days):           Last 24 Hours Medication List:   Current Facility-Administered Medications   Medication Dose Route Frequency Provider Last Rate   • atropine  0 4 mg Intravenous Once PRN Gretta Rosa PA-C     • finasteride  5 mg Oral HS Gretta Rosa PA-C     • morphine injection  2 mg Intravenous Q4H PRN Ewa Buoy, DO     • ondansetron  4 mg Intravenous Q6H PRN Gretta Rosa PA-C     • pantoprazole  40 mg Oral BID AC [place] Buoy, DO     • polyethylene glycol  17 g Oral BID Helen Sarkar PA-C     • pravastatin  40 mg Oral Daily Gretta Rosa PA-C     • rivaroxaban  20 mg Oral Daily With María Raymonders PHIL Rosa     • senna-docusate sodium  1 tablet Oral BID [place] Buoy, DO     • sucralfate  1 g Oral 4x Daily (AC & HS) Poncho Tong PA-C          Today, Patient Was Seen By: Tana Estrada DO    ** Please Note: This note was completed in part utilizing [employer] Software  Grammatical errors, random word insertions, spelling mistakes, and incomplete sentences may be an occasional consequence of this system secondary to software limitations, ambient noise, and hardware issues  If you have any questions or concerns about the content, text, or information contained within the body of this dictation, please contact the provider for clarification   **

## 2023-06-19 NOTE — PLAN OF CARE
Problem: PAIN - ADULT  Goal: Verbalizes/displays adequate comfort level or baseline comfort level  Description: Interventions:  - Encourage patient to monitor pain and request assistance  - Assess pain using appropriate pain scale  - Administer analgesics based on type and severity of pain and evaluate response  - Implement non-pharmacological measures as appropriate and evaluate response  - Consider cultural and social influences on pain and pain management  - Notify physician/advanced practitioner if interventions unsuccessful or patient reports new pain  Outcome: Progressing     Problem: INFECTION - ADULT  Goal: Absence or prevention of progression during hospitalization  Description: INTERVENTIONS:  - Assess and monitor for signs and symptoms of infection  - Monitor lab/diagnostic results  - Monitor all insertion sites, i e  indwelling lines, tubes, and drains  - [place] appropriate cooling/warming therapies per order  - Administer medications as ordered  - Instruct and encourage patient and family to use good hand hygiene technique    Outcome: Progressing     Problem: SAFETY ADULT  Goal: Patient will remain free of falls  Description: INTERVENTIONS:  - Educate patient/family on patient safety including physical limitations  - Instruct patient to call for assistance with activity   - Consult OT/PT to assist with strengthening/mobility   - Keep Call bell within reach  - Keep bed low and locked with side rails adjusted as appropriate  - Keep care items and personal belongings within reach  - Initiate and maintain comfort rounds  - Make Fall Risk Sign visible to staff  - Offer Toileting every 2 Hours, in advance of need  - Initiate/Maintain bed alarm  - Obtain necessary fall risk management equipment  - Apply yellow socks and bracelet for high fall risk patients  - Consider moving patient to room near nurses station  Outcome: Progressing  Goal: Maintain or return to baseline ADL function  Description: INTERVENTIONS:  -  Assess patient's ability to carry out ADLs; assess patient's baseline for ADL function and identify physical deficits which impact ability to perform ADLs (bathing, care of mouth/teeth, toileting, grooming, dressing, etc )  - Assess/evaluate cause of self-care deficits   - Assess range of motion  - Assess patient's mobility; develop plan if impaired  - Assess patient's need for assistive devices and provide as appropriate  - Encourage maximum independence but intervene and supervise when necessary  - Involve family in performance of ADLs  - Assess for home care needs following discharge   - Consider OT consult to assist with ADL evaluation and planning for discharge  - Provide patient education as appropriate  Outcome: Progressing     Problem: DISCHARGE PLANNING  Goal: Discharge to home or other facility with appropriate resources  Description: INTERVENTIONS:  - Identify barriers to discharge w/patient and caregiver  - Arrange for needed discharge resources and transportation as appropriate  - Identify discharge learning needs (meds, wound care, etc )  - Refer to Case Management Department for coordinating discharge planning if the patient needs post-hospital services based on physician/advanced practitioner order or complex needs related to functional status, cognitive ability, or social support system  Outcome: Progressing     Problem: Knowledge Deficit  Goal: Patient/family/caregiver demonstrates understanding of disease process, treatment plan, medications, and discharge instructions  Description: Complete learning assessment and assess knowledge base    Interventions:  - Provide teaching at level of understanding  - Provide teaching via preferred learning methods  Outcome: Progressing     Problem: CARDIOVASCULAR - ADULT  Goal: Maintains optimal cardiac output and hemodynamic stability  Description: INTERVENTIONS:  - Monitor I/O, vital signs and rhythm  - Monitor for S/S and trends of decreased cardiac output  - Administer and titrate ordered vasoactive medications to optimize hemodynamic stability  - Assess quality of pulses, skin color and temperature  - Assess for signs of decreased coronary artery perfusion  - Instruct patient to report change in severity of symptoms  Outcome: Progressing  Goal: Absence of cardiac dysrhythmias or at baseline rhythm  Description: INTERVENTIONS:  - Continuous cardiac monitoring, vital signs, obtain 12 lead EKG if ordered  - Administer antiarrhythmic and heart rate control medications as ordered  - Monitor electrolytes and administer replacement therapy as ordered  Outcome: Progressing     Problem: METABOLIC, FLUID AND ELECTROLYTES - ADULT  Goal: Electrolytes maintained within normal limits  Description: INTERVENTIONS:  - Monitor labs and assess patient for signs and symptoms of electrolyte imbalances  - Administer electrolyte replacement as ordered  - Monitor response to electrolyte replacements, including repeat lab results as appropriate  - Instruct patient on fluid and nutrition as appropriate  Outcome: Progressing  Goal: Fluid balance maintained  Description: INTERVENTIONS:  - Monitor labs   - Monitor I/O and WT  - Instruct patient on fluid and nutrition as appropriate  - Assess for signs & symptoms of volume excess or deficit  Outcome: Progressing  Goal: Glucose maintained within target range  Description: INTERVENTIONS:  - Monitor Blood Glucose as ordered  - Assess for signs and symptoms of hyperglycemia and hypoglycemia  - Administer ordered medications to maintain glucose within target range  - Assess nutritional intake and initiate nutrition service referral as needed  Outcome: Progressing     Problem: SKIN/TISSUE INTEGRITY - ADULT  Goal: Skin Integrity remains intact(Skin Breakdown Prevention)  Description: Assess:  -Perform Walter assessment every shift  -Clean and moisturize skin every day  -Inspect skin when repositioning, toileting, and assisting with ADLS  -Assess under medical devices such as brace every shift  -Assess extremities for adequate circulation and sensation     Bed Management:  -Have minimal linens on bed & keep smooth, unwrinkled  -Change linens as needed when moist or perspiring  -Avoid sitting or lying in one position for more than 2 hours while in bed  -Keep HOB at 30 degrees     Toileting:  -Offer bedside commode      Activity:  -Mobilize patient 2 times a day  -Encourage activity and walks on unit  -Encourage or provide ROM exercises   -Turn and reposition patient every 2 Hours  -Use appropriate equipment to lift or move patient in bed  -Instruct/ Assist with weight shifting every hour when out of bed in chair  -Consider limitation of chair time 3 hour intervals    Skin Care:  -Avoid use of baby powder, tape, friction and shearing, hot water or constrictive clothing  -Relieve pressure over bony prominences using pillows  -Do not massage red bony areas    Outcome: Progressing     Problem: MOBILITY - ADULT  Goal: Maintain or return to baseline ADL function  Description: INTERVENTIONS:  -  Assess patient's ability to carry out ADLs; assess patient's baseline for ADL function and identify physical deficits which impact ability to perform ADLs (bathing, care of mouth/teeth, toileting, grooming, dressing, etc )  - Assess/evaluate cause of self-care deficits   - Assess range of motion  - Assess patient's mobility; develop plan if impaired  - Assess patient's need for assistive devices and provide as appropriate  - Encourage maximum independence but intervene and supervise when necessary  - Involve family in performance of ADLs  - Assess for home care needs following discharge   - Consider OT consult to assist with ADL evaluation and planning for discharge  - Provide patient education as appropriate  Outcome: Progressing  Goal: Maintains/Returns to pre admission functional level  Description: INTERVENTIONS:  - Perform BMAT or MOVE assessment daily  - Set and communicate daily mobility goal to care team and patient/family/caregiver  - Collaborate with rehabilitation services on mobility goals if consulted  - Perform Range of Motion 3 times a day  - Reposition patient every 2 hours    - Dangle patient 3 times a day  - Stand patient 3 times a day  - Ambulate patient 3 times a day  - Out of bed to chair 3 times a day   - Out of bed for meals 3 times a day  - Out of bed for toileting  - Record patient progress and toleration of activity level   Outcome: Progressing     Problem: Prexisting or High Potential for Compromised Skin Integrity  Goal: Skin integrity is maintained or improved  Description: INTERVENTIONS:  - Identify patients at risk for skin breakdown  - Assess and monitor skin integrity  - Assess and monitor nutrition and hydration status  - Monitor labs   - Assess for incontinence   - Turn and reposition patient  - Assist with mobility/ambulation  - Relieve pressure over bony prominences  - Avoid friction and shearing  - Provide appropriate hygiene as needed including keeping skin clean and dry  - Evaluate need for skin moisturizer/barrier cream  - Collaborate with interdisciplinary team   - Patient/family teaching  - Consider wound care consult   Outcome: Progressing

## 2023-06-19 NOTE — CASE MANAGEMENT
Case Management Progress Note    Patient name Courtney Lu  [facility] /E[facility] -* MRN [id number]  : 1950 Date 2023       LOS (days): 2  Geometric Mean LOS (GMLOS) (days):   Days to [address]:        OBJECTIVE:        Current admission status: Inpatient  Preferred Pharmacy:   [address] [facility], [facility] [address]  Phone: [phone] Fax: [phone]    Primary Care Provider: Donn Espinoza MD    Primary Insurance: MEDICARE  Secondary Insurance: BLUE CROSS    PROGRESS NOTE:    Patient pending transfer to hospitals for EP/PPM evaluation  CMN in chart for transport       SANTOS Cárdenas, NICOLÁS, TEMITOPE, [facility]  23 8:18 AM

## 2023-06-19 NOTE — PROGRESS NOTES
Cardiology Progress Note - Taniya Rogers 68 y o  male MRN: [id number]    Unit/Bed#: E4 -01 Encounter: [id number]      Assessment & Plan:    Paroxysmal atrial fibrillation with slow ventricular response  - Anticoagulated on Xarelto 20 mg daily   - not on any AV arielle blocking agents  - 24-hour Holter monitor 1/13/2022 showed heart rate varied between 43 to 140 bpm, 9% SVE, 1% VE, frequent single PVCs and an episode of ventricular bigeminy  - Patient asymptomatic with heart rate in the 40s    Chronic diastolic CHF (congestive heart failure) (HCC)  - TTE 2/3/2022 showed EF 60%, mild to moderate LA, mild RA, mild MR, mildly dilated aortic root and mildly dilated ascending aorta  -Not on diuretics prior to admission  - Appears euvolemic currently    Abdominal pain  - GI following    Superior mesenteric artery stenosis (HCC)    History of NSVT    History of CVA  - With residual left-sided hemiplegia     Summary:  - Given patient's history of ventricular ectopy, he requires beta-blockers for suppression  - Given his bradycardia and need for beta-blockers, he will require transfer to [place] for PPM placement, patient agreeable for transfer  - Case discussed with EP, Dr Jan Rocha, in [place] [id number] to monitor on telemetry  - Currently pending bed availability in [place]    Subjective:   No significant events overnight  Patient's wife was present at bedside  He reports feeling well again today and has no complaints  Denies chest pain, shortness of breath, abdominal pain, nausea, vomiting, fever, chills, headache, dizziness or palpitations  Objective:     Vitals: Blood pressure 155/77, pulse (!) 51, temperature 98 1 °F (36 7 °C), temperature source Temporal, resp  rate 18, height 6' (1 829 m), weight 88 5 kg (195 lb 1 7 oz), SpO2 94 %  , Body mass index is 26 46 kg/m² ,   Orthostatic Blood Pressures    Flowsheet Row Most Recent Value   Blood Pressure 155/77 filed at 06/19/2023 0719   Patient Position - Orthostatic VS Lying filed at 06/19/2023 0719            Intake/Output Summary (Last 24 hours) at 6/19/2023 1051  Last data filed at 6/19/2023 0901  Gross per 24 hour   Intake --   Output 950 ml   Net -950 ml           Physical Exam:    GEN: Anny Estrada appears well, alert and oriented x 3, pleasant and cooperative   HEENT: Mucous membranes moist, no scleral icterus, no conjunctival pallor  NECK: No elevated JVD  HEART: Bradycardic, irregularly irregular, no significant audible murmurs  LUNGS: clear to auscultation bilaterally; no wheezes, rales, or rhonchi   ABDOMEN: normal bowel sounds, soft, no tenderness, no distention  EXTREMITIES: peripheral pulses normal; no lower extremity edema   NEURO: Left-sided hemiplegia, no other focal deficits  SKIN: No lesions or rashes on exposed skin        Current Facility-Administered Medications:   •  Atropine Sulfate injection 0 4 mg, 0 4 mg, Intravenous, Once PRN, Gretta Rosa PA-C  •  finasteride (PROSCAR) tablet 5 mg, 5 mg, Oral, HS, Gretta Rosa PA-C, 5 mg at 06/18/23 2133  •  morphine injection 2 mg, 2 mg, Intravenous, Q4H PRN, Kalie Ricardo DO  •  ondansetron (ZOFRAN) injection 4 mg, 4 mg, Intravenous, Q6H PRN, Gretta Rosa PA-C  •  pantoprazole (PROTONIX) EC tablet 40 mg, 40 mg, Oral, BID AC, Chris Alexander DO, 40 mg at 06/19/23 7677  •  polyethylene glycol (MIRALAX) packet 17 g, 17 g, Oral, BID, Helen Sarkar PA-C, 17 g at 06/19/23 0835  •  pravastatin (PRAVACHOL) tablet 40 mg, 40 mg, Oral, Daily, Gretta Rosa PA-C, 40 mg at 06/18/23 1648  •  rivaroxaban (XARELTO) tablet 20 mg, 20 mg, Oral, Daily With Doir Rosa PA-C, 20 mg at 06/18/23 1648  •  senna-docusate sodium (SENOKOT S) 8 6-50 mg per tablet 1 tablet, 1 tablet, Oral, BID, Kalie Ricardo DO, 1 tablet at 06/19/23 0835  •  sucralfate (CARAFATE) tablet 1 g, 1 g, Oral, 4x Daily (AC & HS), Helen Sarkar PA-C, 1 g at 06/19/23 0835    Labs & Results:    Lab Results "  Component Value Date    TROPONINI <0 02 01/28/2020    TROPONINI <0 02 01/28/2020    TROPONINI <0 02 01/28/2020       Lab Results   Component Value Date    GLUCOSE 133 04/27/2019    CALCIUM 9 0 06/18/2023    K 4 2 06/18/2023    CO2 28 06/18/2023     06/18/2023    BUN 20 06/18/2023    CREATININE 0 85 06/18/2023       Lab Results   Component Value Date    WBC 8 30 06/18/2023    HGB 14 9 06/18/2023    HCT 45 4 06/18/2023    MCV 95 06/18/2023     06/18/2023           No results found for: \"CHOL\"  Lab Results   Component Value Date    HDL 33 (L) 12/27/2019    HDL 42 09/18/2019     Lab Results   Component Value Date    LDLCALC 83 12/27/2019    LDLCALC 77 09/18/2019     Lab Results   Component Value Date    TRIG 73 12/27/2019    TRIG 75 09/18/2019       Lab Results   Component Value Date    ALT 16 06/17/2023    AST 11 (L) 06/17/2023    ALKPHOS 47 06/17/2023         EKG personally reviewed by )Luis Felipe Sandhu MD  No acute changes   TELE: No significant arrhythmias seen on telemetry review            "

## 2023-06-19 NOTE — ASSESSMENT & PLAN NOTE
Wt Readings from Last 3 Encounters:   06/19/23 88 5 kg (195 lb)   05/25/23 95 3 kg (210 lb)   12/15/22 96 6 kg (213 lb)     Euvolemic off diuretics  Daily weights, I/os

## 2023-06-20 ENCOUNTER — HOSPITAL ENCOUNTER (INPATIENT)
Facility: HOSPITAL | Age: 73
LOS: 1 days | Discharge: HOME | DRG: 243 | End: 2023-06-21
Attending: STUDENT IN AN ORGANIZED HEALTH CARE EDUCATION/TRAINING PROGRAM | Admitting: HOSPITALIST
Payer: MEDICARE

## 2023-06-20 ENCOUNTER — APPOINTMENT (OUTPATIENT)
Dept: RADIOLOGY | Facility: HOSPITAL | Age: 73
DRG: 243 | End: 2023-06-20
Payer: MEDICARE

## 2023-06-20 ENCOUNTER — ANESTHESIA EVENT (INPATIENT)
Dept: NON INVASIVE DIAGNOSTICS | Facility: HOSPITAL | Age: 73
DRG: 243 | End: 2023-06-20
Payer: MEDICARE

## 2023-06-20 VITALS
HEIGHT: 72 IN | BODY MASS INDEX: 26.41 KG/M2 | WEIGHT: 195 LBS | TEMPERATURE: 97.4 F | SYSTOLIC BLOOD PRESSURE: 149 MMHG | DIASTOLIC BLOOD PRESSURE: 91 MMHG | HEART RATE: 70 BPM | RESPIRATION RATE: 18 BRPM | OXYGEN SATURATION: 96 %

## 2023-06-20 DIAGNOSIS — K27.9 PUD (PEPTIC ULCER DISEASE): Primary | ICD-10-CM

## 2023-06-20 DIAGNOSIS — I48.91 ATRIAL FIBRILLATION WITH SLOW VENTRICULAR RESPONSE (HCC): ICD-10-CM

## 2023-06-20 DIAGNOSIS — K55.1 SUPERIOR MESENTERIC ARTERY STENOSIS (HCC): ICD-10-CM

## 2023-06-20 PROCEDURE — 99239 HOSP IP/OBS DSCHRG MGMT >30: CPT | Performed by: INTERNAL MEDICINE

## 2023-06-20 PROCEDURE — C1892 INTRO/SHEATH,FIXED,PEEL-AWAY: HCPCS | Performed by: INTERNAL MEDICINE

## 2023-06-20 PROCEDURE — 71045 X-RAY EXAM CHEST 1 VIEW: CPT

## 2023-06-20 PROCEDURE — 93005 ELECTROCARDIOGRAM TRACING: CPT

## 2023-06-20 PROCEDURE — 99223 1ST HOSP IP/OBS HIGH 75: CPT | Performed by: HOSPITALIST

## 2023-06-20 PROCEDURE — C1769 GUIDE WIRE: HCPCS | Performed by: INTERNAL MEDICINE

## 2023-06-20 PROCEDURE — C1785 PMKR, DUAL, RATE-RESP: HCPCS | Performed by: INTERNAL MEDICINE

## 2023-06-20 PROCEDURE — 02H63JZ INSERTION OF PACEMAKER LEAD INTO RIGHT ATRIUM, PERCUTANEOUS APPROACH: ICD-10-PCS | Performed by: INTERNAL MEDICINE

## 2023-06-20 PROCEDURE — 02HK3JZ INSERTION OF PACEMAKER LEAD INTO RIGHT VENTRICLE, PERCUTANEOUS APPROACH: ICD-10-PCS | Performed by: INTERNAL MEDICINE

## 2023-06-20 PROCEDURE — 0JH606Z INSERTION OF PACEMAKER, DUAL CHAMBER INTO CHEST SUBCUTANEOUS TISSUE AND FASCIA, OPEN APPROACH: ICD-10-PCS | Performed by: INTERNAL MEDICINE

## 2023-06-20 PROCEDURE — 3E0132A INTRODUCTION OF ANTI-INFECTIVE ENVELOPE INTO SUBCUTANEOUS TISSUE, PERCUTANEOUS APPROACH: ICD-10-PCS | Performed by: INTERNAL MEDICINE

## 2023-06-20 PROCEDURE — C1898 LEAD, PMKR, OTHER THAN TRANS: HCPCS | Performed by: INTERNAL MEDICINE

## 2023-06-20 PROCEDURE — 33208 INSRT HEART PM ATRIAL & VENT: CPT | Performed by: INTERNAL MEDICINE

## 2023-06-20 PROCEDURE — 99223 1ST HOSP IP/OBS HIGH 75: CPT | Performed by: INTERNAL MEDICINE

## 2023-06-20 DEVICE — IPG W1DR01 AZURE XT DR MRI USA
Type: IMPLANTABLE DEVICE | Site: CHEST  WALL | Status: FUNCTIONAL
Brand: AZURE™ XT DR MRI SURESCAN™

## 2023-06-20 DEVICE — LEAD 3830 US MKT/ 69CM MRI LBBAP
Type: IMPLANTABLE DEVICE | Site: HEART | Status: FUNCTIONAL
Brand: SELECTSECURE™ MRI SURESCAN™

## 2023-06-20 DEVICE — ENVELOPE CMRM6122 ABSORB MED MR
Type: IMPLANTABLE DEVICE | Site: CHEST  WALL | Status: FUNCTIONAL
Brand: TYRX™

## 2023-06-20 DEVICE — LEAD 457453 MRI US BI RCMCRD MVC
Type: IMPLANTABLE DEVICE | Site: HEART | Status: FUNCTIONAL
Brand: CAPSURE SENSE MRI™ SURESCAN™

## 2023-06-20 RX ORDER — PRAVASTATIN SODIUM 20 MG
40 TABLET ORAL DAILY
Status: DISCONTINUED | OUTPATIENT
Start: 2023-06-20 | End: 2023-06-21 | Stop reason: HOSPADM

## 2023-06-20 RX ORDER — GENTAMICIN SULFATE 40 MG/ML
INJECTION, SOLUTION INTRAMUSCULAR; INTRAVENOUS CODE/TRAUMA/SEDATION MEDICATION
Status: DISCONTINUED | OUTPATIENT
Start: 2023-06-20 | End: 2023-06-20 | Stop reason: HOSPADM

## 2023-06-20 RX ORDER — POLYETHYLENE GLYCOL 3350 17 G/17G
17 POWDER, FOR SOLUTION ORAL 2 TIMES DAILY
Status: DISCONTINUED | OUTPATIENT
Start: 2023-06-20 | End: 2023-06-21 | Stop reason: HOSPADM

## 2023-06-20 RX ORDER — SUCRALFATE 1 G/1
1 TABLET ORAL
Status: DISCONTINUED | OUTPATIENT
Start: 2023-06-20 | End: 2023-06-21 | Stop reason: HOSPADM

## 2023-06-20 RX ORDER — FENTANYL CITRATE/PF 50 MCG/ML
25 SYRINGE (ML) INJECTION
Status: CANCELLED | OUTPATIENT
Start: 2023-06-20

## 2023-06-20 RX ORDER — SODIUM CHLORIDE 9 MG/ML
75 INJECTION, SOLUTION INTRAVENOUS CONTINUOUS
Status: DISCONTINUED | OUTPATIENT
Start: 2023-06-20 | End: 2023-06-21

## 2023-06-20 RX ORDER — ONDANSETRON 2 MG/ML
4 INJECTION INTRAMUSCULAR; INTRAVENOUS ONCE AS NEEDED
Status: CANCELLED | OUTPATIENT
Start: 2023-06-20

## 2023-06-20 RX ORDER — LIDOCAINE HYDROCHLORIDE 10 MG/ML
INJECTION, SOLUTION EPIDURAL; INFILTRATION; INTRACAUDAL; PERINEURAL AS NEEDED
Status: DISCONTINUED | OUTPATIENT
Start: 2023-06-20 | End: 2023-06-20

## 2023-06-20 RX ORDER — VANCOMYCIN HYDROCHLORIDE 1 G/200ML
1000 INJECTION, SOLUTION INTRAVENOUS ONCE
Status: COMPLETED | OUTPATIENT
Start: 2023-06-20 | End: 2023-06-20

## 2023-06-20 RX ORDER — PROPOFOL 10 MG/ML
INJECTION, EMULSION INTRAVENOUS CONTINUOUS PRN
Status: DISCONTINUED | OUTPATIENT
Start: 2023-06-20 | End: 2023-06-20

## 2023-06-20 RX ORDER — ONDANSETRON 2 MG/ML
4 INJECTION INTRAMUSCULAR; INTRAVENOUS EVERY 6 HOURS PRN
Status: DISCONTINUED | OUTPATIENT
Start: 2023-06-20 | End: 2023-06-21 | Stop reason: HOSPADM

## 2023-06-20 RX ORDER — HYDROMORPHONE HCL IN WATER/PF 6 MG/30 ML
0.2 PATIENT CONTROLLED ANALGESIA SYRINGE INTRAVENOUS
Status: CANCELLED | OUTPATIENT
Start: 2023-06-20

## 2023-06-20 RX ORDER — BISACODYL 10 MG
10 SUPPOSITORY, RECTAL RECTAL ONCE
Status: COMPLETED | OUTPATIENT
Start: 2023-06-20 | End: 2023-06-20

## 2023-06-20 RX ORDER — FINASTERIDE 5 MG/1
5 TABLET, FILM COATED ORAL
Status: DISCONTINUED | OUTPATIENT
Start: 2023-06-20 | End: 2023-06-21 | Stop reason: HOSPADM

## 2023-06-20 RX ORDER — PANTOPRAZOLE SODIUM 40 MG/1
40 TABLET, DELAYED RELEASE ORAL
Status: DISCONTINUED | OUTPATIENT
Start: 2023-06-20 | End: 2023-06-21 | Stop reason: HOSPADM

## 2023-06-20 RX ORDER — ATROPINE SULFATE 1 MG/ML
0.4 INJECTION, SOLUTION INTRAVENOUS ONCE AS NEEDED
Status: DISCONTINUED | OUTPATIENT
Start: 2023-06-20 | End: 2023-06-21 | Stop reason: HOSPADM

## 2023-06-20 RX ORDER — LACTULOSE 20 G/30ML
20 SOLUTION ORAL 2 TIMES DAILY
Status: DISCONTINUED | OUTPATIENT
Start: 2023-06-20 | End: 2023-06-21 | Stop reason: HOSPADM

## 2023-06-20 RX ORDER — AMOXICILLIN 250 MG
1 CAPSULE ORAL 2 TIMES DAILY
Status: DISCONTINUED | OUTPATIENT
Start: 2023-06-20 | End: 2023-06-21 | Stop reason: HOSPADM

## 2023-06-20 RX ORDER — ACETAMINOPHEN 325 MG/1
650 TABLET ORAL EVERY 4 HOURS PRN
Status: DISCONTINUED | OUTPATIENT
Start: 2023-06-20 | End: 2023-06-21 | Stop reason: HOSPADM

## 2023-06-20 RX ORDER — LIDOCAINE HYDROCHLORIDE 10 MG/ML
INJECTION, SOLUTION EPIDURAL; INFILTRATION; INTRACAUDAL; PERINEURAL CODE/TRAUMA/SEDATION MEDICATION
Status: DISCONTINUED | OUTPATIENT
Start: 2023-06-20 | End: 2023-06-20 | Stop reason: HOSPADM

## 2023-06-20 RX ORDER — SODIUM CHLORIDE 9 MG/ML
INJECTION, SOLUTION INTRAVENOUS CONTINUOUS PRN
Status: DISCONTINUED | OUTPATIENT
Start: 2023-06-20 | End: 2023-06-20

## 2023-06-20 RX ADMIN — RIVAROXABAN 20 MG: 20 TABLET, FILM COATED ORAL at 20:37

## 2023-06-20 RX ADMIN — PRAVASTATIN SODIUM 40 MG: 20 TABLET ORAL at 19:00

## 2023-06-20 RX ADMIN — SUCRALFATE 1 G: 1 TABLET ORAL at 18:56

## 2023-06-20 RX ADMIN — PANTOPRAZOLE SODIUM 40 MG: 40 TABLET, DELAYED RELEASE ORAL at 18:56

## 2023-06-20 RX ADMIN — POLYETHYLENE GLYCOL 3350 17 G: 17 POWDER, FOR SOLUTION ORAL at 08:39

## 2023-06-20 RX ADMIN — SUCRALFATE 1 G: 1 TABLET ORAL at 05:13

## 2023-06-20 RX ADMIN — PANTOPRAZOLE SODIUM 40 MG: 40 TABLET, DELAYED RELEASE ORAL at 05:13

## 2023-06-20 RX ADMIN — LACTULOSE 20 G: 20 SOLUTION ORAL at 20:37

## 2023-06-20 RX ADMIN — SENNOSIDES AND DOCUSATE SODIUM 1 TABLET: 8.6; 5 TABLET ORAL at 08:39

## 2023-06-20 RX ADMIN — VANCOMYCIN HYDROCHLORIDE 1000 MG: 1 INJECTION, SOLUTION INTRAVENOUS at 15:41

## 2023-06-20 RX ADMIN — LIDOCAINE HYDROCHLORIDE 50 MG: 10 INJECTION, SOLUTION EPIDURAL; INFILTRATION; INTRACAUDAL; PERINEURAL at 15:41

## 2023-06-20 RX ADMIN — SODIUM CHLORIDE: 0.9 INJECTION, SOLUTION INTRAVENOUS at 15:41

## 2023-06-20 RX ADMIN — PROPOFOL 100 MCG/KG/MIN: 10 INJECTION, EMULSION INTRAVENOUS at 15:41

## 2023-06-20 RX ADMIN — SODIUM CHLORIDE 75 ML/HR: 0.9 INJECTION, SOLUTION INTRAVENOUS at 19:01

## 2023-06-20 RX ADMIN — FINASTERIDE 5 MG: 5 TABLET, FILM COATED ORAL at 20:37

## 2023-06-20 RX ADMIN — POLYETHYLENE GLYCOL 3350 17 G: 17 POWDER, FOR SOLUTION ORAL at 19:00

## 2023-06-20 RX ADMIN — Medication 10 MG: at 20:37

## 2023-06-20 RX ADMIN — SUCRALFATE 1 G: 1 TABLET ORAL at 20:37

## 2023-06-20 RX ADMIN — SENNOSIDES AND DOCUSATE SODIUM 1 TABLET: 8.6; 5 TABLET ORAL at 19:01

## 2023-06-20 NOTE — PLAN OF CARE
Problem: PAIN - ADULT  Goal: Verbalizes/displays adequate comfort level or baseline comfort level  Description: Interventions:  - Encourage patient to monitor pain and request assistance  - Assess pain using appropriate pain scale  - Administer analgesics based on type and severity of pain and evaluate response  - Implement non-pharmacological measures as appropriate and evaluate response  - Consider cultural and social influences on pain and pain management  - Notify physician/advanced practitioner if interventions unsuccessful or patient reports new pain  Outcome: Progressing     Problem: INFECTION - ADULT  Goal: Absence or prevention of progression during hospitalization  Description: INTERVENTIONS:  - Assess and monitor for signs and symptoms of infection  - Monitor lab/diagnostic results  - Monitor all insertion sites, i e  indwelling lines, tubes, and drains  - [place] appropriate cooling/warming therapies per order  - Administer medications as ordered  - Instruct and encourage patient and family to use good hand hygiene technique    Outcome: Progressing     Problem: SAFETY ADULT  Goal: Patient will remain free of falls  Description: INTERVENTIONS:  - Educate patient/family on patient safety including physical limitations  - Instruct patient to call for assistance with activity   - Consult OT/PT to assist with strengthening/mobility   - Keep Call bell within reach  - Keep bed low and locked with side rails adjusted as appropriate  - Keep care items and personal belongings within reach  - Initiate and maintain comfort rounds  - Make Fall Risk Sign visible to staff  - Offer Toileting every 2 Hours, in advance of need  - Initiate/Maintain bed alarm  - Obtain necessary fall risk management equipment  - Apply yellow socks and bracelet for high fall risk patients  - Consider moving patient to room near nurses station  Outcome: Progressing  Goal: Maintain or return to baseline ADL function  Description: INTERVENTIONS:  -  Assess patient's ability to carry out ADLs; assess patient's baseline for ADL function and identify physical deficits which impact ability to perform ADLs (bathing, care of mouth/teeth, toileting, grooming, dressing, etc )  - Assess/evaluate cause of self-care deficits   - Assess range of motion  - Assess patient's mobility; develop plan if impaired  - Assess patient's need for assistive devices and provide as appropriate  - Encourage maximum independence but intervene and supervise when necessary  - Involve family in performance of ADLs  - Assess for home care needs following discharge   - Consider OT consult to assist with ADL evaluation and planning for discharge  - Provide patient education as appropriate  Outcome: Progressing     Problem: DISCHARGE PLANNING  Goal: Discharge to home or other facility with appropriate resources  Description: INTERVENTIONS:  - Identify barriers to discharge w/patient and caregiver  - Arrange for needed discharge resources and transportation as appropriate  - Identify discharge learning needs (meds, wound care, etc )  - Refer to Case Management Department for coordinating discharge planning if the patient needs post-hospital services based on physician/advanced practitioner order or complex needs related to functional status, cognitive ability, or social support system  Outcome: Progressing     Problem: Knowledge Deficit  Goal: Patient/family/caregiver demonstrates understanding of disease process, treatment plan, medications, and discharge instructions  Description: Complete learning assessment and assess knowledge base    Interventions:  - Provide teaching at level of understanding  - Provide teaching via preferred learning methods  Outcome: Progressing     Problem: CARDIOVASCULAR - ADULT  Goal: Maintains optimal cardiac output and hemodynamic stability  Description: INTERVENTIONS:  - Monitor I/O, vital signs and rhythm  - Monitor for S/S and trends of decreased cardiac output  - Administer and titrate ordered vasoactive medications to optimize hemodynamic stability  - Assess quality of pulses, skin color and temperature  - Assess for signs of decreased coronary artery perfusion  - Instruct patient to report change in severity of symptoms  Outcome: Progressing  Goal: Absence of cardiac dysrhythmias or at baseline rhythm  Description: INTERVENTIONS:  - Continuous cardiac monitoring, vital signs, obtain 12 lead EKG if ordered  - Administer antiarrhythmic and heart rate control medications as ordered  - Monitor electrolytes and administer replacement therapy as ordered  Outcome: Progressing     Problem: METABOLIC, FLUID AND ELECTROLYTES - ADULT  Goal: Electrolytes maintained within normal limits  Description: INTERVENTIONS:  - Monitor labs and assess patient for signs and symptoms of electrolyte imbalances  - Administer electrolyte replacement as ordered  - Monitor response to electrolyte replacements, including repeat lab results as appropriate  - Instruct patient on fluid and nutrition as appropriate  Outcome: Progressing  Goal: Fluid balance maintained  Description: INTERVENTIONS:  - Monitor labs   - Monitor I/O and WT  - Instruct patient on fluid and nutrition as appropriate  - Assess for signs & symptoms of volume excess or deficit  Outcome: Progressing  Goal: Glucose maintained within target range  Description: INTERVENTIONS:  - Monitor Blood Glucose as ordered  - Assess for signs and symptoms of hyperglycemia and hypoglycemia  - Administer ordered medications to maintain glucose within target range  - Assess nutritional intake and initiate nutrition service referral as needed  Outcome: Progressing     Problem: SKIN/TISSUE INTEGRITY - ADULT  Goal: Skin Integrity remains intact(Skin Breakdown Prevention)  Description: Assess:  -Perform Walter assessment every shift  -Clean and moisturize skin every day  -Inspect skin when repositioning, toileting, and assisting with ADLS  -Assess under medical devices such as brace every shift  -Assess extremities for adequate circulation and sensation     Bed Management:  -Have minimal linens on bed & keep smooth, unwrinkled  -Change linens as needed when moist or perspiring  -Avoid sitting or lying in one position for more than 2 hours while in bed  -Keep HOB at 30 degrees     Toileting:  -Offer bedside commode      Activity:  -Mobilize patient 2 times a day  -Encourage activity and walks on unit  -Encourage or provide ROM exercises   -Turn and reposition patient every 2 Hours  -Use appropriate equipment to lift or move patient in bed  -Instruct/ Assist with weight shifting every hour when out of bed in chair  -Consider limitation of chair time 3 hour intervals    Skin Care:  -Avoid use of baby powder, tape, friction and shearing, hot water or constrictive clothing  -Relieve pressure over bony prominences using pillows  -Do not massage red bony areas    Outcome: Progressing     Problem: MOBILITY - ADULT  Goal: Maintain or return to baseline ADL function  Description: INTERVENTIONS:  -  Assess patient's ability to carry out ADLs; assess patient's baseline for ADL function and identify physical deficits which impact ability to perform ADLs (bathing, care of mouth/teeth, toileting, grooming, dressing, etc )  - Assess/evaluate cause of self-care deficits   - Assess range of motion  - Assess patient's mobility; develop plan if impaired  - Assess patient's need for assistive devices and provide as appropriate  - Encourage maximum independence but intervene and supervise when necessary  - Involve family in performance of ADLs  - Assess for home care needs following discharge   - Consider OT consult to assist with ADL evaluation and planning for discharge  - Provide patient education as appropriate  Outcome: Progressing  Goal: Maintains/Returns to pre admission functional level  Description: INTERVENTIONS:  - Perform BMAT or MOVE assessment daily  - Set and communicate daily mobility goal to care team and patient/family/caregiver  - Collaborate with rehabilitation services on mobility goals if consulted  - Perform Range of Motion 3 times a day  - Reposition patient every 2 hours    - Dangle patient 3 times a day  - Stand patient 3 times a day  - Ambulate patient 3 times a day  - Out of bed to chair 3 times a day   - Out of bed for meals 3 times a day  - Out of bed for toileting  - Record patient progress and toleration of activity level   Outcome: Progressing     Problem: Prexisting or High Potential for Compromised Skin Integrity  Goal: Skin integrity is maintained or improved  Description: INTERVENTIONS:  - Identify patients at risk for skin breakdown  - Assess and monitor skin integrity  - Assess and monitor nutrition and hydration status  - Monitor labs   - Assess for incontinence   - Turn and reposition patient  - Assist with mobility/ambulation  - Relieve pressure over bony prominences  - Avoid friction and shearing  - Provide appropriate hygiene as needed including keeping skin clean and dry  - Evaluate need for skin moisturizer/barrier cream  - Collaborate with interdisciplinary team   - Patient/family teaching  - Consider wound care consult   Outcome: Progressing

## 2023-06-20 NOTE — PLAN OF CARE
Problem: PAIN - ADULT  Goal: Verbalizes/displays adequate comfort level or baseline comfort level  Description: Interventions:  - Encourage patient to monitor pain and request assistance  - Assess pain using appropriate pain scale  - Administer analgesics based on type and severity of pain and evaluate response  - Implement non-pharmacological measures as appropriate and evaluate response  - Consider cultural and social influences on pain and pain management  - Notify physician/advanced practitioner if interventions unsuccessful or patient reports new pain  Outcome: Progressing     Problem: INFECTION - ADULT  Goal: Absence or prevention of progression during hospitalization  Description: INTERVENTIONS:  - Assess and monitor for signs and symptoms of infection  - Monitor lab/diagnostic results  - Monitor all insertion sites, i e  indwelling lines, tubes, and drains  - [place] appropriate cooling/warming therapies per order  - Administer medications as ordered  - Instruct and encourage patient and family to use good hand hygiene technique    Outcome: Progressing     Problem: SAFETY ADULT  Goal: Patient will remain free of falls  Description: INTERVENTIONS:  - Educate patient/family on patient safety including physical limitations  - Instruct patient to call for assistance with activity   - Consult OT/PT to assist with strengthening/mobility   - Keep Call bell within reach  - Keep bed low and locked with side rails adjusted as appropriate  - Keep care items and personal belongings within reach  - Initiate and maintain comfort rounds  - Make Fall Risk Sign visible to staff  - Offer Toileting every 2 Hours, in advance of need  - Initiate/Maintain bed alarm  - Obtain necessary fall risk management equipment  - Apply yellow socks and bracelet for high fall risk patients  - Consider moving patient to room near nurses station  Outcome: Progressing  Goal: Maintain or return to baseline ADL function  Description: INTERVENTIONS:  -  Assess patient's ability to carry out ADLs; assess patient's baseline for ADL function and identify physical deficits which impact ability to perform ADLs (bathing, care of mouth/teeth, toileting, grooming, dressing, etc )  - Assess/evaluate cause of self-care deficits   - Assess range of motion  - Assess patient's mobility; develop plan if impaired  - Assess patient's need for assistive devices and provide as appropriate  - Encourage maximum independence but intervene and supervise when necessary  - Involve family in performance of ADLs  - Assess for home care needs following discharge   - Consider OT consult to assist with ADL evaluation and planning for discharge  - Provide patient education as appropriate  Outcome: Progressing     Problem: DISCHARGE PLANNING  Goal: Discharge to home or other facility with appropriate resources  Description: INTERVENTIONS:  - Identify barriers to discharge w/patient and caregiver  - Arrange for needed discharge resources and transportation as appropriate  - Identify discharge learning needs (meds, wound care, etc )  - Refer to Case Management Department for coordinating discharge planning if the patient needs post-hospital services based on physician/advanced practitioner order or complex needs related to functional status, cognitive ability, or social support system  Outcome: Progressing     Problem: Knowledge Deficit  Goal: Patient/family/caregiver demonstrates understanding of disease process, treatment plan, medications, and discharge instructions  Description: Complete learning assessment and assess knowledge base    Interventions:  - Provide teaching at level of understanding  - Provide teaching via preferred learning methods  Outcome: Progressing     Problem: CARDIOVASCULAR - ADULT  Goal: Maintains optimal cardiac output and hemodynamic stability  Description: INTERVENTIONS:  - Monitor I/O, vital signs and rhythm  - Monitor for S/S and trends of decreased cardiac output  - Administer and titrate ordered vasoactive medications to optimize hemodynamic stability  - Assess quality of pulses, skin color and temperature  - Assess for signs of decreased coronary artery perfusion  - Instruct patient to report change in severity of symptoms  Outcome: Progressing  Goal: Absence of cardiac dysrhythmias or at baseline rhythm  Description: INTERVENTIONS:  - Continuous cardiac monitoring, vital signs, obtain 12 lead EKG if ordered  - Administer antiarrhythmic and heart rate control medications as ordered  - Monitor electrolytes and administer replacement therapy as ordered  Outcome: Progressing     Problem: SKIN/TISSUE INTEGRITY - ADULT  Goal: Skin Integrity remains intact(Skin Breakdown Prevention)  Description: Assess:  -Perform Walter assessment every shift  -Clean and moisturize skin every day  -Inspect skin when repositioning, toileting, and assisting with ADLS  -Assess under medical devices such as brace every shift  -Assess extremities for adequate circulation and sensation     Bed Management:  -Have minimal linens on bed & keep smooth, unwrinkled  -Change linens as needed when moist or perspiring  -Avoid sitting or lying in one position for more than 2 hours while in bed  -Keep HOB at 30 degrees     Toileting:  -Offer bedside commode      Activity:  -Mobilize patient 2 times a day  -Encourage activity and walks on unit  -Encourage or provide ROM exercises   -Turn and reposition patient every 2 Hours  -Use appropriate equipment to lift or move patient in bed  -Instruct/ Assist with weight shifting every hour when out of bed in chair  -Consider limitation of chair time 3 hour intervals    Skin Care:  -Avoid use of baby powder, tape, friction and shearing, hot water or constrictive clothing  -Relieve pressure over bony prominences using pillows  -Do not massage red bony areas    Outcome: Progressing     Problem: MOBILITY - ADULT  Goal: Maintain or return to baseline ADL function  Description: INTERVENTIONS:  -  Assess patient's ability to carry out ADLs; assess patient's baseline for ADL function and identify physical deficits which impact ability to perform ADLs (bathing, care of mouth/teeth, toileting, grooming, dressing, etc )  - Assess/evaluate cause of self-care deficits   - Assess range of motion  - Assess patient's mobility; develop plan if impaired  - Assess patient's need for assistive devices and provide as appropriate  - Encourage maximum independence but intervene and supervise when necessary  - Involve family in performance of ADLs  - Assess for home care needs following discharge   - Consider OT consult to assist with ADL evaluation and planning for discharge  - Provide patient education as appropriate  Outcome: Progressing  Goal: Maintains/Returns to pre admission functional level  Description: INTERVENTIONS:  - Perform BMAT or MOVE assessment daily    - Set and communicate daily mobility goal to care team and patient/family/caregiver  - Collaborate with rehabilitation services on mobility goals if consulted  - Perform Range of Motion 3 times a day  - Reposition patient every 2 hours    - Dangle patient 3 times a day  - Stand patient 3 times a day  - Ambulate patient 3 times a day  - Out of bed to chair 3 times a day   - Out of bed for meals 3 times a day  - Out of bed for toileting  - Record patient progress and toleration of activity level   Outcome: Progressing     Problem: Prexisting or High Potential for Compromised Skin Integrity  Goal: Skin integrity is maintained or improved  Description: INTERVENTIONS:  - Identify patients at risk for skin breakdown  - Assess and monitor skin integrity  - Assess and monitor nutrition and hydration status  - Monitor labs   - Assess for incontinence   - Turn and reposition patient  - Assist with mobility/ambulation  - Relieve pressure over bony prominences  - Avoid friction and shearing  - Provide appropriate hygiene as needed including keeping skin clean and dry  - Evaluate need for skin moisturizer/barrier cream  - Collaborate with interdisciplinary team   - Patient/family teaching  - Consider wound care consult   Outcome: Progressing

## 2023-06-20 NOTE — ANESTHESIA PREPROCEDURE EVALUATION
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)   (+) Left hemiplegia (HCC)   (+) Nonintractable epilepsy without status epilepticus ([facility] [address] )      Normal biventricular systolic function       Anesthesia Plan  ASA Score- 4     Anesthesia Type- IV sedation with anesthesia with ASA Monitors  Additional Monitors:   Airway Plan:     Comment: IV sedation, GA back up; standard ASA monitors  Risks and benefits discussed with patient; patient consented and agrees to proceed  I saw and evaluated the patient  If seen with CRNA, we have discussed the anesthetic plan and I am in agreement that the plan is appropriate for the patient          Plan Factors-    Chart reviewed  Existing labs reviewed  Induction- intravenous  Postoperative Plan-     Informed Consent- Anesthetic plan and risks discussed with patient  I personally reviewed this patient with the CRNA  Discussed and agreed on the Anesthesia Plan with the CRNA  Jeannine Joiner

## 2023-06-20 NOTE — Clinical Note
The ANTIBIOTIC ENVELOPE TYRX AIGIS LARGE ICD device was inserted  The leads were placed into the connector and visually verified to be in correct position  Injury current obtained

## 2023-06-20 NOTE — LETTER
[facility]  [facility] [address]  Dept: [phone]    June 21, 2023     Patient: Deonte Thapa   YOB: 1950   Date of Visit: 6/20/2023       To Whom it May Concern:    Oksana Fenton is under my professional care  He was seen in the hospital from 6/20/2023 to 06/21/23  He is cleared to return to therapy with the restriction of not lifting his left arm above the level of his shoulder and weight restriction of 10 pounds until August 1 at which point he will be cleared with no restrictions  If you have any questions or concerns, please don't hesitate to call [phone]           Sincerely,          Davis Bernal PA-C

## 2023-06-20 NOTE — Clinical Note
The PACER GENERATOR EMILIA XT DR MELANY AGUILAR - [id number] device was inserted  The leads were placed into the connector and visually verified to be in correct position  Injury current obtained

## 2023-06-20 NOTE — ASSESSMENT & PLAN NOTE
· afib with slow VR with ventricular ectopy  · Asymptomatic  · He needs BB for V ectopy, but has bradycardia, hence PPM recommended  · Transferred to [facility] for EP eval for PPM  · EP consulted  · Continue tele

## 2023-06-20 NOTE — ANESTHESIA POSTPROCEDURE EVALUATION
Post-Op Assessment Note    CV Status:  Stable  Pain Score: 0    Pain management: adequate     Mental Status:  Alert and awake   Hydration Status:  Euvolemic and stable   PONV Controlled:  Controlled   Airway Patency:  Patent      Post Op Vitals Reviewed: Yes      Staff: CRNA         No notable events documented      BP   124/73   Temp      Pulse  79   Resp   15   SpO2   99% on RA

## 2023-06-20 NOTE — DISCHARGE SUMMARY
[facility]  Discharge- Edwin Ripa 1950, 68 y o  male MRN: [id number]  Unit/Bed#: E4 -01 Encounter: [id number]  Primary Care Provider: Kvng Gilliam MD   Date and time admitted to hospital: 6/16/2023 10:10 PM                * Bradycardia  Assessment & Plan  Asymptomatic bradycardia w/afib w/slow ventricular response   -Was in 40s-60s which is near baseline (50s-60s) in setting of recurrent vasovagal syncope and likely decreased vagal tone off of any chronotropic agents but continues to worsen to 20s-30s particularly when asleep even after being off morphine/opioid analgesia  Given frequency of ventricular ectopy (bigeminy, frequent pvcs and hx of nsvt) cardiology is recommending transfer to [facility] for further evaluation for PPM as pt ideally should be on beta blockade for suppression of ventricular ectopy  Case was d/w dr Taylor Lei of EP by [facility] Cardiology   -atropine 0 4mg x1 prn        Abdominal pain  Assessment & Plan  Gi following possible pud vs constipation vs less likely mesenteric ischemia from moderate SMA stenosis w/only single vessel disease  Supportive care pending eval for bradycardia by electrophysiology possible EGD later this admission     Superior mesenteric artery stenosis [facility])  Assessment & Plan  Incidental finding of moderate SMA stenosis w/o celiac artery or jacque stenosis    Likely incidental finding  Will hold statin for now in setting of acute illness but can continue xarelto statin upon d/c  Vascular sx was consulted felt unlikely to be etiology #2 formal consult pending/     Atrial fibrillation with slow ventricular response ([address] )  Assessment & Plan  On xarelto at baseline will continue     Chronic diastolic CHF (congestive heart failure) (HCC)  Assessment & Plan      Wt Readings from Last 3 Encounters:   06/17/23 88 5 kg (195 lb 1 7 oz)   05/25/23 95 3 kg (210 lb)   12/15/22 96 6 kg (213 lb)      Euvolemic off diuretics  Daily weights, I/os           Left hemiplegia ([facility] [address] )  Assessment & Plan  From prior cva  Wife to bring in brace for left leg/arm  Undergoing op pt for last 4 years  At baseline         Discharging Physician / Practitioner: Chelita Darden PA-C  PCP: Talon Srinivasan MD  Admission Date:       Admission Orders (From admission, onward)       Ordered         06/17/23 1502   Inpatient Admission  Once             06/17/23 0434   Place in Observation  Once                         Discharge Date: 06/20/23         Medical Problems      Resolved Problems  Date Reviewed: 6/17/2023   None            Consultations During Hospital Stay:  • Cardiology  • Gastroenterology  • Vascular sx     Procedures Performed:   •       Significant Findings / Test Results:   • Telemetry w/afib w/slow ventricular response off narcotics/chronotropic agents  • CTA a/p w/moderate SMA stenosis     Incidental Findings:   •       Test Results Pending at Discharge (will require follow up):   •       Outpatient Tests Requested:  •       Complications:       Reason for Admission:  Abdominal pain     Hospital Course:      Fabien Lopes is a 68 y o  male patient who originally presented to the hospital on 6/16/2023 due to postprandial abd pain  Pt was evaluated in ed for postprandial n/v and pain out of proportion to exam w/ct a/p demonstrating moderate SMA stenosis  Pt was given morphine for pain but found to have a fib w/slow ventricular response down to 20s-30s but normotensive and asymptomatic  Pt was admitted for further monitoring given persistence of bradycardia  Prior 24 holter demonstrates frequent single pvcs and ventricular bigeminy and pt has hx of nsvt as well prompting d/w EP for possible PPM as pt would benefit from beta blockade for his ventricular ectopy  Case was d/w Dr Dia Hernández and accepted by [facility]  Accepting provider requested ms/tele            Please see above list of diagnoses and related plan for additional information     Condition at Discharge: good      Discharge Day Visit / Exam:      * Please refer to separate progress note for these details *     Discussion with Family:          Discharge instructions/Information to patient and family:   See after visit summary for information provided to patient and family        Provisions for Follow-Up Care:  See after visit summary for information related to follow-up care and any pertinent home health orders        Disposition:   [facility] Transfer to [address] [place] [address] to [facility]:   • Not Applicable to this Patient -      Planned Readmission: yes to [facility]     Discharge Statement:  I spent  minutes discharging the patient  This time was spent on the day of discharge  I had direct contact with the patient on the day of discharge  Greater than 50% of the total time was spent examining patient, answering all patient questions, arranging and discussing plan of care with patient as well as directly providing post-discharge instructions    Additional time then spent on discharge activities      Discharge Medications:  See after visit summary for reconciled discharge medications provided to patient and family        ** Please Note: This note has been constructed using a voice recognition system **                   Revision History

## 2023-06-20 NOTE — ASSESSMENT & PLAN NOTE
Nothing to suggest an acute mesenteric ischemia    Highly unlikely a chronic mesenteric ischemia as the stenosis does not appear to be a significant and he has 2 other vessels widely patent

## 2023-06-20 NOTE — H&P
[facility]  H&P  Name: Carmela Carrillo 68 y o  male I MRN: [id number]  Unit/Bed#: -01 I Date of Admission: 6/20/2023   Date of Service: 6/20/2023 I Hospital Day: 0      Assessment/Plan   * Bradycardia  Assessment & Plan  · afib with slow VR with ventricular ectopy  · Asymptomatic  · He needs BB for V ectopy, but has bradycardia, hence PPM recommended  · Transferred to [facility] for EP eval for PPM  · EP consulted - d/w them, will keep NPO  · Continue tele, atropine prn    Atrial fibrillation with slow ventricular response (HCC)  Assessment & Plan  · To be seen by EP  · H/o CVA  · On xarelto    Superior mesenteric artery stenosis [facility])  Assessment & Plan  Nothing to suggest an acute mesenteric ischemia  Highly unlikely a chronic mesenteric ischemia as the stenosis does not appear to be a significant and he has 2 other vessels widely patent    Abdominal pain  Assessment & Plan  · Now resolved  · Suspect PUD vs constipation  · Seen by vascular at other campus, do not suspect SMA stenosis is significant to cause ischemia  · Cont PPI BID, carafate  · Treat constipation - no BM in 4 days, redose supp & enema, added PO meds    History of stroke  Assessment & Plan  · Has left hemiplegia, working with PT OP  · On xarelto    Chronic diastolic CHF (congestive heart failure) ([facility] [address] )  Assessment & Plan  Wt Readings from Last 3 Encounters:   06/19/23 88 5 kg (195 lb)   05/25/23 95 3 kg (210 lb)   12/15/22 96 6 kg (213 lb)        not on diuretics  Is compensated         VTE Pharmacologic Prophylaxis:   Moderate Risk (Score 3-4) - Pharmacological DVT Prophylaxis Ordered: rivaroxaban (Xarelto)  Code Status: Level 1 - Full Code   Discussion with family: Updated  (wife) at bedside  Anticipated Length of Stay: Patient will be admitted on an inpatient basis with an anticipated length of stay of greater than 2 midnights secondary to PPM by EP      Total Time Spent on Date of Encounter in care of patient: 65 minutes This time was spent on one or more of the following: performing physical exam; counseling and coordination of care; obtaining or reviewing history; documenting in the medical record; reviewing/ordering tests, medications or procedures; communicating with other healthcare professionals and discussing with patient's family/caregivers  Chief Complaint: transferred from [address] [place] [address] for EP eval    History of Present Illness:  Leticia Magaña is a 68 y o  male with a PMH of CVA, afib, recurrent vasovagal syncope, chronic dCHF who presented to [facility] with c/o abdominal pain, lower abdomen, intense - was admitted for work up for same, seen by GI & also by vascular due to concerns for SMA stenosis  Was also found to have afib with bradycardia, V ectopy hence sent here by general cardiology for EP eval  Upon m eval he is doing well, has no complains, pain has resolved  No CP or SOB or dizziness  He goes to OP PT without any issues but has had hig hBP recently with low HR    Review of Systems:  Review of Systems   Constitutional: Negative for activity change, appetite change, chills and fever  HENT: Negative for congestion and rhinorrhea  Respiratory: Negative for cough, shortness of breath and wheezing  Cardiovascular: Negative for chest pain and palpitations  Gastrointestinal: Positive for constipation  Negative for abdominal pain, diarrhea, nausea and vomiting  Genitourinary: Negative for difficulty urinating  Neurological: Negative for dizziness  All other systems reviewed and are negative        Past Medical and Surgical History:   Past Medical History:   Diagnosis Date   • Acute encephalopathy 5/27/2019   • Arthritis     BL knees   • Atrial fibrillation (HCC)    • CHF (congestive heart failure) (HCC)    • Colon polyp    • CVA (cerebral vascular accident) ([facility] ) 4/27/2019    NIHSS 26 on presentation   • Depression    • Diabetes mellitus ([facility] ) borderline   • Encephalopathy acute 4/28/2019   • History of transfusion 04/2019   • Hyperlipidemia    • Irregular heart beat     Afib   • Stroke ([facility] [address] ) 04/27/2019    Left sided weakness-aphasia   • Urinary retention    • Urinary retention 6/4/2019       Past Surgical History:   Procedure Laterality Date   • COLONOSCOPY     • IR STROKE ALERT  4/27/2019   • MENISCECTOMY Right    • ID CYSTO INSERTION TRANSPROSTATIC IMPLANT SINGLE N/A 11/19/2021    Procedure: CYSTOSCOPY WITH INSERTION UROLIFT;  Surgeon: Daphne Franks MD;  Location: [facility];  Service: Urology       Meds/Allergies:  Prior to Admission medications    Medication Sig Start Date End Date Taking?  Authorizing Provider   acetaminophen (TYLENOL) 325 mg tablet Take 2 tablets (650 mg total) by mouth every 4 (four) hours as needed for mild pain 11/19/21   Daphne Franks MD   Coenzyme Q10 (CO Q 10) 10 MG CAPS Take by mouth daily    Historical Provider, MD   cyanocobalamin (VITAMIN B-12) 100 mcg tablet Take by mouth daily    Historical Provider, MD   docusate sodium (COLACE) 100 mg capsule Take 1 capsule (100 mg total) by mouth 2 (two) times a day for 15 days  Patient not taking: Reported on 12/15/2022 11/19/21 12/4/21  Daphne Franks MD   finasteride (PROSCAR) 5 mg tablet Take 5 mg by mouth daily at bedtime      Historical Provider, MD   hydrocortisone 1 % cream Apply topically 2 (two) times a day as needed     Historical Provider, MD   ketoconazole (NIZORAL) 2 % shampoo SHAMPOO 3 TIMES A WEEK AS A SCALP TREATMENT, LEAVE ON 5-10 MINUTES AND RINSE 7/16/21   Historical Provider, MD   pantoprazole (PROTONIX) 40 mg tablet Take 1 tablet (40 mg total) by mouth 2 (two) times a day before meals 5/31/19   Griffin Has, MD   pravastatin (PRAVACHOL) 40 mg tablet Take 40 mg by mouth daily 10/24/22   Historical Provider, MD   rivaroxaban (Xarelto) 20 mg tablet Take 20 mg by mouth daily with dinner   4/22/20   Historical Provider, MD     I have reviewed home medications with a medical source (PCP, Pharmacy, other)  Allergies: Allergies   Allergen Reactions   • Cephalexin Throat Swelling     Pt reported throat swelling after taking antibiotic    • Penicillins Other (See Comments)       Social History:  Marital Status: /Civil Union   Patient Pre-hospital Living Situation: Home  Patient Pre-hospital Level of Mobility: walks with cane  Patient Pre-hospital Diet Restrictions: none  Substance Use History:   Social History     Substance and Sexual Activity   Alcohol Use Never    Comment: social     Social History     Tobacco Use   Smoking Status Never   Smokeless Tobacco Never     Social History     Substance and Sexual Activity   Drug Use Never       Family History:  No family history on file  Physical Exam:     Vitals:   Blood Pressure: 150/86 (06/20/23 1745)  Pulse: 63 (06/20/23 1745)  Temperature: 97 5 °F (36 4 °C) (06/20/23 1041)  Respirations: 20 (06/20/23 1041)  SpO2: 96 % (06/20/23 1745)    Physical Exam  Vitals reviewed  HENT:      Head: Normocephalic and atraumatic  Mouth/Throat:      Mouth: Mucous membranes are moist    Eyes:      Conjunctiva/sclera: Conjunctivae normal    Cardiovascular:      Rate and Rhythm: Normal rate and regular rhythm  Heart sounds: Normal heart sounds  Pulmonary:      Effort: Pulmonary effort is normal  No respiratory distress  Breath sounds: Normal breath sounds  No wheezing  Abdominal:      General: There is no distension  Palpations: Abdomen is soft  Tenderness: There is no abdominal tenderness  Skin:     General: Skin is warm  Neurological:      Mental Status: He is alert and oriented to person, place, and time        Comments: NISA 1-2/5  CHU 4/5          Additional Data:     Lab Results:  Results from last 7 days   Lab Units 06/18/23  0450   WBC Thousand/uL 8 30   HEMOGLOBIN g/dL 14 9   HEMATOCRIT % 45 4   PLATELETS Thousands/uL 160   NEUTROS PCT % 81*   LYMPHS PCT % 12*   MONOS PCT % 7 EOS PCT % 0     Results from last 7 days   Lab Units 06/18/23  0450 06/17/23  0631   SODIUM mmol/L 138 139   POTASSIUM mmol/L 4 2 4 2   CHLORIDE mmol/L 104 104   CO2 mmol/L 28 26   BUN mg/dL 20 21   CREATININE mg/dL 0 85 0 86   ANION GAP mmol/L 6 9   CALCIUM mg/dL 9 0 9 4   ALBUMIN g/dL  --  3 8   TOTAL BILIRUBIN mg/dL  --  0 66   ALK PHOS U/L  --  47   ALT U/L  --  16   AST U/L  --  11*   GLUCOSE RANDOM mg/dL 133 143*                 Results from last 7 days   Lab Units 06/17/23  0118 06/16/23  2246   LACTIC ACID mmol/L 2 0 3 1*       Lines/Drains:  Invasive Devices     Peripheral Intravenous Line  Duration           Peripheral IV 06/20/23 Dorsal (posterior); Right Forearm <1 day    Peripheral IV 06/20/23 Left Forearm <1 day                    Imaging: Reviewed radiology reports from this admission including: abdominal/pelvic CT  XR chest portable    (Results Pending)   XR chest 2 views    (Results Pending)       EKG and Other Studies Reviewed on Admission:   · EKG: Atrial fibrillation  HR 40-50  ** Please Note: This note has been constructed using a voice recognition system   **

## 2023-06-20 NOTE — ASSESSMENT & PLAN NOTE
· Now resolved  · Suspect PUD vs constipation  · Seen by vascular at other campus, do not suspect SMA stenosis is significant to cause ischemia  · Cont PPI BID, carafate  · Treat constipation - no BM in 4 days, redose supp & enema, added PO meds

## 2023-06-20 NOTE — ASSESSMENT & PLAN NOTE
Wt Readings from Last 3 Encounters:   06/19/23 88 5 kg (195 lb)   05/25/23 95 3 kg (210 lb)   12/15/22 96 6 kg (213 lb)        not on diuretics  Is compensated

## 2023-06-20 NOTE — CONSULTS
Consultation - Electrophysiology - Cardiology  Jama Lacy 68 y o  male MRN: [id number]  Unit/Bed#: -01 Encounter: [id number]      Inpatient consult to Electrophysiology  Consult performed by: Alysia Alexandre PA-C  Consult ordered by: Lyssa Ruff MD          History of Present Illness   Physician Requesting Consult: Lyssa Ruff MD  Reason for Consult / Principal Problem: bradycardia    Assessment/Plan   Assessment:  1  New diagnosis of atrial fibrillation this admission  - anticoagulated with Xarelto / [id number] score of 4 (age, HTN, CVA)  - EF of 65% per echo 6/2023 - mild to moderate dilation of LA and RA  - rate control: none  - antiarrhythmic therapy: none  - prior cardioversion/ablation: none  2  Prior [employer] loop recorder explanted 7/2020  - had sinus with PAC's and PVC's (artifact at baseline)  last EKG 2/2022 was in sinus  - had recent Holter monitor 1/2022 showing predominantly sinus rhythm  3  Chronic diastolic congestive heart failure  - not on diuretic as an outpatient   4  History of NSVT  5  Hypertension  6  Hyperlipidemia   7  Prior GI bleed  - noted on Eliquis per notes  8  Superior mesenteric artery stenosis  9  Right ROB/MCA territory CVA 5/2019   - ALYX and loop at that time with thrombectomy and tPA   - has residual left sided hemiplegia     Plan:  Patient was transferred for EP evaluation  Given atrial fibrillation with slow ventricular response, recommendation at this time is to undergo pacemaker implantation  Although documented in the past, patient has not had atrial fibrillation seen on loop recorder or recent Holter monitor  First EKGs are during this hospitalization  Therefore, we will proceed with dual-chamber pacemaker on the left side (does have left-sided hemiplegia)  Will discuss with attending after procedure if Xarelto can be given tonight, otherwise tomorrow morning    We discussed how we will see where his heart rate and blood pressure settle out afterwards to determine if rate control medication needs to be utilized, wife does have concerns about this given vasovagal syncope in the past     HPI: Leticia Magaña is a 68y o  year old male with paroxysmal versus persistent atrial fibrillation anticoagulated with Xarelto, no present monitor in place, chronic diastolic congestive heart failure, history of nonsustained ventricular tachycardia, hypertension, hyperlipidemia, prior GI bleed noted on Eliquis, superior mesenteric artery stenosis, and history of CVA  Patient was transferred from [facility] for EP evaluation and pacemaker consideration  Patient has extensive cardiac history  He did have a stroke in May 2019  He did receive thrombectomy and tPA  ALYX was done that ruled out PFO or significant dysfunction  He also had loop recorder placed at the time for monitoring of atrial fibrillation  It was felt that loop recorder that was placed had either atrial fibrillation or over sensing for atrial fibrillation  He was therefore seen in the EP office October 2019  He had already been maintained on Xarelto and so no other changes were made at that time  He was in the hospital for syncope in January 2020 at which point hospital rhythm strips and EKGs showed no significant bradycardia or tachycardia or atrial fibrillation  Syncope was felt to potentially be due to vasovagal   As he had lower blood pressures, metoprolol was discontinued  Loop recorder continued to show significant noise and therefore in July 2020 it was explanted  In follow-up with his general cardiologist, he was on AV arielle blocking agents and was stable  He was seen again in the hospital in February of last year for near syncope  This admission, he presented to [facility] with abdominal pain along with nausea and vomiting  Heart rates were in the 20s to 30s    Work-up with CTA showed superior mesenteric artery stenosis which was felt not to be significant and therefore vascular did not recommend any intervention  When evaluated by GI patient was placed on PPI twice daily along with Pepcid, Carafate, and MiraLAX  In discussion with the patient and his wife, he has not had issues with lightheadedness or dizziness at home but rather nausea vomiting for the past couple weeks  TELE: Atrial fibrillation with slow ventricular response    HOLTER 1/2022  IMPRESSION:  1  The patient had predominantly sinus rhythm  2  Heart rate varied from 43 bpm to 140 bpm   The patient’s average heart rate was 64 bpm     3  The patient had a holter monitor tracing done for 23 hours and 59 minutes  4  The patient had 929 or 1 0% ventricular ectopic activity  5  The patient had 8209 or 9 0% supraventricular ectopy  6  The patient had 3 supraventricular runs with the longest run being 3 beats and the fastest run being 145 bpm    7  The patient had 288 atrial pairs  8  The patient had 339 episodes of atrial bigeminy and 404 episodes of atrial trigeminy  9  The longest R/R interval was 1 9 seconds  10  At 11:41 a m , maximum heart rate at 140 bpm, patient appears to have a short run of atrial fibrillation for approximately 19 beats  11  Patient noted in the diary exercising at [facility] for therapy between 9 and 10:00 a m  He had sinus tachycardia with frequent single PVCs and an episode of ventricular bigeminy          EKG:   afib with slow ventricular response -       Historical Information   Past Medical History:   Diagnosis Date   • Acute encephalopathy 5/27/2019   • Arthritis     BL knees   • Atrial fibrillation (HCC)    • CHF (congestive heart failure) (HCC)    • Colon polyp    • [facility] ) 4/27/2019    NIHSS 26 on presentation   • Depression    • Diabetes mellitus ([facility] )     borderline   • Encephalopathy acute 4/28/2019   • History of transfusion 04/2019   • Hyperlipidemia    • Irregular heart beat     Afib   • Stroke ([facility] ) 04/27/2019    Left sided weakness-aphasia   • Urinary retention    • Urinary retention 6/4/2019     Past Surgical History:   Procedure Laterality Date   • COLONOSCOPY     • IR STROKE ALERT  4/27/2019   • MENISCECTOMY Right    • IA CYSTO INSERTION TRANSPROSTATIC IMPLANT SINGLE N/A 11/19/2021    Procedure: CYSTOSCOPY WITH INSERTION UROLIFT;  Surgeon: Hyacinth Sam MD;  Location: [facility];  Service: Urology     Social History     Substance and Sexual Activity   Alcohol Use Never    Comment: social     Social History     Substance and Sexual Activity   Drug Use Never     Social History     Tobacco Use   Smoking Status Never   Smokeless Tobacco Never     Family History: No family history on file      Meds/Allergies   Hospital Medications:   Current Facility-Administered Medications   Medication Dose Route Frequency   • Atropine Sulfate injection 0 4 mg  0 4 mg Intravenous Once PRN   • bisacodyl (DULCOLAX) rectal suppository 10 mg  10 mg Rectal Once   • finasteride (PROSCAR) tablet 5 mg  5 mg Oral HS   • lactulose oral solution 20 g  20 g Oral BID   • ondansetron (ZOFRAN) injection 4 mg  4 mg Intravenous Q6H PRN   • pantoprazole (PROTONIX) EC tablet 40 mg  40 mg Oral BID AC   • polyethylene glycol (MIRALAX) packet 17 g  17 g Oral BID   • pravastatin (PRAVACHOL) tablet 40 mg  40 mg Oral Daily   • rivaroxaban (XARELTO) tablet 20 mg  20 mg Oral Daily With Dinner   • senna-docusate sodium (SENOKOT S) 8 6-50 mg per tablet 1 tablet  1 tablet Oral BID   • sucralfate (CARAFATE) tablet 1 g  1 g Oral 4x Daily (AC & HS)     Home Medications:   Medications Prior to Admission   Medication   • acetaminophen (TYLENOL) 325 mg tablet   • Coenzyme Q10 (CO Q 10) 10 MG CAPS   • cyanocobalamin (VITAMIN B-12) 100 mcg tablet   • docusate sodium (COLACE) 100 mg capsule   • finasteride (PROSCAR) 5 mg tablet   • hydrocortisone 1 % cream   • ketoconazole (NIZORAL) 2 % shampoo   • pantoprazole (PROTONIX) 40 mg tablet   • pravastatin (PRAVACHOL) 40 mg tablet   • rivaroxaban (Xarelto) 20 mg tablet       Allergies   Allergen Reactions   • Cephalexin Throat Swelling     Pt reported throat swelling after taking antibiotic    • Penicillins Other (See Comments)       Objective   Vitals: Blood pressure 141/83, pulse 56, temperature 97 5 °F (36 4 °C), resp  rate 20, SpO2 97 %  Orthostatic Blood Pressures    Flowsheet Row Most Recent Value   Blood Pressure 141/83 filed at 06/20/2023 1041          No intake or output data in the 24 hours ending 06/20/23 1248    Invasive Devices     Peripheral Intravenous Line  Duration           Peripheral IV 06/20/23 Dorsal (posterior); Right Forearm <1 day                Review of Systems:  Review of Systems   Constitutional: Negative for chills, diaphoresis, fever and malaise/fatigue  Cardiovascular: Negative for chest pain, claudication, cyanosis, dyspnea on exertion, irregular heartbeat, leg swelling, near-syncope, orthopnea, palpitations, paroxysmal nocturnal dyspnea and syncope  Respiratory: Negative for shortness of breath and sleep disturbances due to breathing  Gastrointestinal: Positive for nausea and vomiting  Neurological: Negative for dizziness and light-headedness  All other systems reviewed and are negative  ROS as noted above, otherwise 12 point review of systems was performed and is negative  Physical Exam:   Physical Exam  Vitals and nursing note reviewed  Constitutional:       General: He is not in acute distress  Appearance: He is well-developed  He is not diaphoretic  HENT:      Head: Normocephalic and atraumatic  Eyes:      Pupils: Pupils are equal, round, and reactive to light  Neck:      Vascular: No JVD  Cardiovascular:      Rate and Rhythm: Bradycardia present  Rhythm irregular  Heart sounds: No murmur heard  No friction rub  No gallop  Pulmonary:      Effort: Pulmonary effort is normal  No respiratory distress  Breath sounds: Normal breath sounds  No wheezing     Abdominal: Palpations: Abdomen is soft  Musculoskeletal:         General: Normal range of motion  Cervical back: Normal range of motion  Skin:     General: Skin is warm and dry  Neurological:      Mental Status: He is alert and oriented to person, place, and time  Motor: Weakness present  Comments: Expressive aphasia         Lab Results: I have personally reviewed pertinent lab results  Results from last 7 days   Lab Units 23  0450 23  0631 23  2246   WBC Thousand/uL 8 30 11 76* 14 02*   HEMOGLOBIN g/dL 14 9 15 8 17 1*   HEMATOCRIT % 45 4 47 7 52 0*   PLATELETS Thousands/uL 160 177 198     Results from last 7 days   Lab Units 23  0450 23  0631 23  2331 23  2246   POTASSIUM mmol/L 4 2 4 2 5 0 5 6*   CHLORIDE mmol/L 104 104  --  89*   CO2 mmol/L 28 26  --  29   BUN mg/dL 20 21  --  35*   CREATININE mg/dL 0 85 0 86  --  1 64*   CALCIUM mg/dL 9 0 9 4  --  9 4               Imaging: I have personally reviewed pertinent reports  ECHO: Results for orders placed during the hospital encounter of 18    Echo complete with contrast if indicated    Narrative  [address] [place] [address]  [facility], [address] [facility]  [phone]    Transthoracic Echocardiogram  2D, M-mode, Doppler, and Color Doppler    Study date:  15-Nov-2018    Patient: Waqas Mcnally  MR number: [id number]  Account number: [de-identified]  : 1950  Age: 76 years  Gender: Male  Status: Outpatient  Location: Bedside  Height: 70 in  Weight: 240 lb  BP: 141/ 77 mmHg    Indications: heart failure    Diagnoses: I50 9 - Heart failure, unspecified    Sonographer:  GLENIS Schmitt  Primary Physician:  Alta Hamman, MD  Referring Physician:  Wilda Clancy MD  Group:  [facility]  Interpreting Physician:  Amada Hansen MD    SUMMARY    LEFT VENTRICLE:  The ventricle was mildly dilated    Systolic function was normal  Ejection fraction was estimated to be 60 %  Although no diagnostic regional wall motion abnormality was identified, this possibility cannot be completely excluded on the basis of this study  Features were consistent with a pseudonormal left ventricular filling pattern, with concomitant abnormal relaxation and increased filling pressure (grade 2 diastolic dysfunction)  LEFT ATRIUM:  The atrium was mildly to moderately dilated  RIGHT ATRIUM:  The atrium was mildly dilated  MITRAL VALVE:  There was trace to mild regurgitation  AORTA:  The aortic root is not well visualized but appears mildly dilated  HISTORY: PRIOR HISTORY: HTN  Chol  CHF  PROCEDURE: The procedure was performed at the bedside  This was a routine study  The transthoracic approach was used  The study included complete 2D imaging, M-mode, complete spectral Doppler, and color Doppler  The heart rate was 70 bpm,  at the start of the study  Images were obtained from the parasternal, apical, subcostal, and suprasternal notch acoustic windows  Image quality was adequate  LEFT VENTRICLE: The ventricle was mildly dilated  Systolic function was normal  Ejection fraction was estimated to be 60 %  Although no diagnostic regional wall motion abnormality was identified, this possibility cannot be completely  excluded on the basis of this study  Wall thickness was normal  No evidence of apical thrombus  DOPPLER: Features were consistent with a pseudonormal left ventricular filling pattern, with concomitant abnormal relaxation and increased  filling pressure (grade 2 diastolic dysfunction)  RIGHT VENTRICLE: The size was normal  Systolic function was normal  Wall thickness was normal     LEFT ATRIUM: The atrium was mildly to moderately dilated  RIGHT ATRIUM: The atrium was mildly dilated  MITRAL VALVE: Valve structure was normal  There was normal leaflet separation  DOPPLER: The transmitral velocity was within the normal range   There was no evidence for stenosis  There was trace to mild regurgitation  AORTIC VALVE: The valve was probably trileaflet  Leaflets exhibited normal thickness, mild calcification, and normal cuspal separation  DOPPLER: Transaortic velocity was within the normal range  There was no evidence for stenosis  There  was no significant regurgitation  TRICUSPID VALVE: The valve structure was normal  There was normal leaflet separation  DOPPLER: The transtricuspid velocity was within the normal range  There was no evidence for stenosis  There was no significant regurgitation  PULMONIC VALVE: Not well visualized  DOPPLER: The transpulmonic velocity was within the normal range  There was no significant regurgitation  PERICARDIUM: There was no pericardial effusion  The pericardium was normal in appearance  AORTA: The aortic root is not well visualized but appears mildly dilated  SYSTEMIC VEINS: IVC: The inferior vena cava was normal in size  SYSTEM MEASUREMENT TABLES    2D  %FS: 31 1 %  Ao Diam: 2 8 cm  EDV(Teich): 157 4 ml  EF(Teich): 58 2 %  ESV(Teich): 65 9 ml  IVSd: 1 cm  LA Area: 26 cm2  LA Diam: 4 6 cm  LVEDV MOD A4C: 142 2 ml  LVEF MOD A4C: 73 4 %  LVESV MOD A4C: 37 8 ml  LVIDd: 5 7 cm  LVIDs: 3 9 cm  LVLd A4C: 8 9 cm  LVLs A4C: 6 8 cm  LVPWd: 0 9 cm  RA Area: 20 2 cm2  RVIDd: 3 6 cm  SV MOD A4C: 104 4 ml  SV(Teich): 91 6 ml    MM  TAPSE: 2 6 cm    PW  E': 0 1 m/s  MV A Larry: 0 m/s  MV E Larry: 0 8 m/s  MV E/A Ratio: 186 1    [employer] Accredited Echocardiography Laboratory    Prepared and electronically signed by    Kennedy Cain MD  Signed [id number] 14:40:07       Cardiac testing:   ECHO:   Results for orders placed during the hospital encounter of 11/14/18    Echo complete with contrast if indicated    Narrative  [facility], [facility] [address], [address]  [phone]    Transthoracic Echocardiogram  2D, M-mode, Doppler, and Color Doppler    Study date: 15-Nov-2018    Patient: Krzysztof Duval  MR number: [id number]  Account number: [de-identified]  : 1950  Age: 76 years  Gender: Male  Status: Outpatient  Location: Bedside  Height: 70 in  Weight: 240 lb  BP: 141/ 77 mmHg    Indications: heart failure    Diagnoses: I50 9 - Heart failure, unspecified    Sonographer:  GLENIS Lobo  Primary Physician:  Carlos Arenas MD  Referring Physician:  Daily Peralta MD  Group:  [facility]  Interpreting Physician:  Stephanie Nissen, MD    SUMMARY    LEFT VENTRICLE:  The ventricle was mildly dilated  Systolic function was normal  Ejection fraction was estimated to be 60 %  Although no diagnostic regional wall motion abnormality was identified, this possibility cannot be completely excluded on the basis of this study  Features were consistent with a pseudonormal left ventricular filling pattern, with concomitant abnormal relaxation and increased filling pressure (grade 2 diastolic dysfunction)  LEFT ATRIUM:  The atrium was mildly to moderately dilated  RIGHT ATRIUM:  The atrium was mildly dilated  MITRAL VALVE:  There was trace to mild regurgitation  AORTA:  The aortic root is not well visualized but appears mildly dilated  HISTORY: PRIOR HISTORY: HTN  Chol  CHF  PROCEDURE: The procedure was performed at the bedside  This was a routine study  The transthoracic approach was used  The study included complete 2D imaging, M-mode, complete spectral Doppler, and color Doppler  The heart rate was 70 bpm,  at the start of the study  Images were obtained from the parasternal, apical, subcostal, and suprasternal notch acoustic windows  Image quality was adequate  LEFT VENTRICLE: The ventricle was mildly dilated  Systolic function was normal  Ejection fraction was estimated to be 60 %  Although no diagnostic regional wall motion abnormality was identified, this possibility cannot be completely  excluded on the basis of this study  Wall thickness was normal  No evidence of apical thrombus  DOPPLER: Features were consistent with a pseudonormal left ventricular filling pattern, with concomitant abnormal relaxation and increased  filling pressure (grade 2 diastolic dysfunction)  RIGHT VENTRICLE: The size was normal  Systolic function was normal  Wall thickness was normal     LEFT ATRIUM: The atrium was mildly to moderately dilated  RIGHT ATRIUM: The atrium was mildly dilated  MITRAL VALVE: Valve structure was normal  There was normal leaflet separation  DOPPLER: The transmitral velocity was within the normal range  There was no evidence for stenosis  There was trace to mild regurgitation  AORTIC VALVE: The valve was probably trileaflet  Leaflets exhibited normal thickness, mild calcification, and normal cuspal separation  DOPPLER: Transaortic velocity was within the normal range  There was no evidence for stenosis  There  was no significant regurgitation  TRICUSPID VALVE: The valve structure was normal  There was normal leaflet separation  DOPPLER: The transtricuspid velocity was within the normal range  There was no evidence for stenosis  There was no significant regurgitation  PULMONIC VALVE: Not well visualized  DOPPLER: The transpulmonic velocity was within the normal range  There was no significant regurgitation  PERICARDIUM: There was no pericardial effusion  The pericardium was normal in appearance  AORTA: The aortic root is not well visualized but appears mildly dilated  SYSTEMIC VEINS: IVC: The inferior vena cava was normal in size      SYSTEM MEASUREMENT TABLES    2D  %FS: 31 1 %  Ao Diam: 2 8 cm  EDV(Teich): 157 4 ml  EF(Teich): 58 2 %  ESV(Teich): 65 9 ml  IVSd: 1 cm  LA Area: 26 cm2  LA Diam: 4 6 cm  LVEDV MOD A4C: 142 2 ml  LVEF MOD A4C: 73 4 %  LVESV MOD A4C: 37 8 ml  LVIDd: 5 7 cm  LVIDs: 3 9 cm  LVLd A4C: 8 9 cm  LVLs A4C: 6 8 cm  LVPWd: 0 9 cm  RA Area: 20 2 cm2  RVIDd: 3 6 cm  SV MOD A4C: 104 4 ml  SV(Teich): 91 6 ml    MM  TAPSE: 2 6 cm    PW  E': 0 1 m/s  MV A Larry: 0 m/s  MV E Larry: 0 8 m/s  MV E/A Ratio: 186 1    [employer] Accredited Echocardiography Laboratory    Prepared and electronically signed by    Andra Martinez MD  Signed [id number] 14:40:07    Results for orders placed during the hospital encounter of 04/27/19    ALYX    Narrative  Patient tolerated procedure well    Sedation was Versed 4mg, Fentanyl 50mcg  Probe passed once  No blood on return  No desaturations or hemodynamic changes  LVEF normal  Mild mitral regurgitation  Mild aortic regurgitation  Mild left atrial dilation  No valvular masses  No BAKARI thrombus  No PFO  No obvious cardioembolic source  Consider Loop recorder  CATH:  No results found for this or any previous visit  STRESS TEST:  No results found for this or any previous visit        VTE Prophylaxis: Sequential compression device (Venodyne)

## 2023-06-20 NOTE — CASE MANAGEMENT
Case Management Assessment & Discharge Planning Note    Patient name Justo Morelos  Location /-73 MRN [id number]  : 1950 Date 2023       Current Admission Date: 2023  Current Admission Diagnosis:Essential hypertension   Patient Active Problem List    Diagnosis Date Noted   • Atrial fibrillation with slow ventricular response ([facility] [address] ) 2023   • Abdominal pain 2023   • Superior mesenteric artery stenosis ([facility] [address] ) 2023   • Prostate cancer screening 2023   • History of gross hematuria 10/12/2021   • BPH with obstruction/lower urinary tract symptoms 10/12/2021   • Bladder stone 10/12/2021   • Bilateral carotid artery stenosis 2021   • Pre-syncope 2020   • Nonintractable epilepsy without status epilepticus ([facility] [address] ) 2020   • Bradycardia 2020   • Fatigue 10/25/2019   • History of stroke 2019   • Acute leg pain, right 2019   • 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([facility] [address] ) 2019   • Hypernatremia 2019   • Acute blood loss anemia 2019   • Acute kidney injury ([facility] [address] ) 2019   • Chronic diastolic CHF (congestive heart failure) ([facility] [address] ) 2019   • Paroxysmal atrial fibrillation ([facility] [address] ) 2019   • Dysphagia 2019   • 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 ([facility] [address] ) 2019   • Left hemiplegia ([facility] [address] ) 2019   • Prediabetes 11/15/2018   • Essential hypertension 2018   • Hypokalemia 2018   • Elevated brain natriuretic peptide (BNP) level 2018   • Hx of right knee surgery 2018   • Hyperlipidemia 2018      LOS (days): 0  Geometric Mean LOS (GMLOS) (days):   Days to GMLOS:     OBJECTIVE:    Risk of Unplanned Readmission Score: 11 39         Current admission status: Inpatient   Preferred Pharmacy:   [address] [facility], [address] [place] [address] [facility]  Phone: [phone] Fax: [phone]    Primary Care Provider: Anibal Lange Claudia Cortés MD    Primary Insurance: MEDICARE  Secondary Insurance: BLUE CROSS    ASSESSMENT:  Active Health Care Proxies     [place], [place] - Spouse   Primary Phone: [phone] [facility]  Home Phone: [phone]               Advance Directives  Does patient have a [address]?: Yes  Does patient have Advance Directives?: Yes  Primary Contact: Kathryn-Daughter    Readmission Root Cause  30 Day Readmission: No    Patient Information  Mental Status: Alert  During Assessment patient was accompanied by: Not accompanied during assessment  Assessment information provided by[de-identified] Patient  Primary Caregiver: Self  Support Systems: Spouse/significant other, Daughter  [place] of Residence: [address] do you live in?: [address] entry access options   Select all that apply : Stairs  Number of steps to enter home : 2  Type of Current Residence: [place]  In the last 12 months, was there a time when you were not able to pay the mortgage or rent on time?: No  In the last 12 months, was there a time when you did not have a steady place to sleep or slept in a shelter (including now)?: No  Homeless/housing insecurity resource given?: N/A  Living Arrangements: Lives w/ Spouse/significant other, Lives w/ Daughter  Is patient a ?: No    Activities of Daily Living Prior to Admission  Functional Status: Independent  Completes ADLs independently?: Yes  Ambulates independently?: Yes  Does patient use assisted devices?: No  Does patient currently own DME?: No  Does patient have a history of Outpatient Therapy (PT/OT)?: No  Does the patient have a history of Short-Term Rehab?: No  Does patient have a history of HHC?: No       Patient Information Continued  Does patient have prescription coverage?: Yes  Within the past 12 months, you worried that your food would run out before you got the money to buy more : Never true  Within the past 12 months, the food you bought just didn't last and you didn't have money to get more : Never true  Does patient receive dialysis treatments?: No  Does patient have a history of substance abuse?: No  Does patient have a history of Mental Health Diagnosis?: No    Means of Transportation  Means of Transport to Appts[de-identified] Drives Self  In the past 12 months, has lack of transportation kept you from medical appointments or from getting medications?: No  In the past 12 months, has lack of transportation kept you from meetings, work, or from getting things needed for daily living?: No  Was application for public transport provided?: N/A  DISCHARGE DETAILS:    Discharge planning discussed with[de-identified] patient  Freedom of Choice: Yes  Comments - Freedom of Choice: pending any recs  CM contacted family/caregiver?: No- see comments  Were Treatment Team discharge recommendations reviewed with patient/caregiver?: Yes  Did patient/caregiver verbalize understanding of patient care needs?: Yes  Were patient/caregiver advised of the risks associated with not following Treatment Team discharge recommendations?: Yes    Contacts  Patient Contacts: Ehsan Salazar  Relationship to Patient[de-identified] Family  Contact Method: Phone  Phone Number: [phone]  Reason/Outcome: Continuity of Care, Emergency Contact, Discharge Planning

## 2023-06-21 ENCOUNTER — APPOINTMENT (OUTPATIENT)
Dept: RADIOLOGY | Facility: HOSPITAL | Age: 73
DRG: 243 | End: 2023-06-21
Payer: MEDICARE

## 2023-06-21 VITALS
OXYGEN SATURATION: 98 % | SYSTOLIC BLOOD PRESSURE: 153 MMHG | HEART RATE: 64 BPM | DIASTOLIC BLOOD PRESSURE: 89 MMHG | RESPIRATION RATE: 18 BRPM | TEMPERATURE: 97.4 F

## 2023-06-21 LAB
ANION GAP SERPL CALCULATED.3IONS-SCNC: 4 MMOL/L
ATRIAL RATE: 35 BPM
BASOPHILS # BLD AUTO: 0.01 THOUSANDS/ÂΜL (ref 0–0.1)
BASOPHILS NFR BLD AUTO: 0 % (ref 0–1)
BUN SERPL-MCNC: 23 MG/DL (ref 5–25)
CALCIUM SERPL-MCNC: 7.7 MG/DL (ref 8.3–10.1)
CHLORIDE SERPL-SCNC: 116 MMOL/L (ref 96–108)
CO2 SERPL-SCNC: 22 MMOL/L (ref 21–32)
CREAT SERPL-MCNC: 0.71 MG/DL (ref 0.6–1.3)
EOSINOPHIL # BLD AUTO: 0.27 THOUSAND/ÂΜL (ref 0–0.61)
EOSINOPHIL NFR BLD AUTO: 2 % (ref 0–6)
ERYTHROCYTE [DISTWIDTH] IN BLOOD BY AUTOMATED COUNT: 12.9 % (ref 11.6–15.1)
GFR SERPL CREATININE-BSD FRML MDRD: 93 ML/MIN/1.73SQ M
GLUCOSE SERPL-MCNC: 132 MG/DL (ref 65–140)
HCT VFR BLD AUTO: 44.3 % (ref 36.5–49.3)
HGB BLD-MCNC: 14.8 G/DL (ref 12–17)
IMM GRANULOCYTES # BLD AUTO: 0.07 THOUSAND/UL (ref 0–0.2)
IMM GRANULOCYTES NFR BLD AUTO: 1 % (ref 0–2)
LYMPHOCYTES # BLD AUTO: 1.02 THOUSANDS/ÂΜL (ref 0.6–4.47)
LYMPHOCYTES NFR BLD AUTO: 9 % (ref 14–44)
MCH RBC QN AUTO: 31.6 PG (ref 26.8–34.3)
MCHC RBC AUTO-ENTMCNC: 33.4 G/DL (ref 31.4–37.4)
MCV RBC AUTO: 95 FL (ref 82–98)
MONOCYTES # BLD AUTO: 0.51 THOUSAND/ÂΜL (ref 0.17–1.22)
MONOCYTES NFR BLD AUTO: 5 % (ref 4–12)
NEUTROPHILS # BLD AUTO: 9.23 THOUSANDS/ÂΜL (ref 1.85–7.62)
NEUTS SEG NFR BLD AUTO: 83 % (ref 43–75)
NRBC BLD AUTO-RTO: 0 /100 WBCS
PLATELET # BLD AUTO: 161 THOUSANDS/UL (ref 149–390)
PMV BLD AUTO: 9.6 FL (ref 8.9–12.7)
POTASSIUM SERPL-SCNC: 3.5 MMOL/L (ref 3.5–5.3)
QRS AXIS: 21 DEGREES
QRSD INTERVAL: 88 MS
QT INTERVAL: 398 MS
QTC INTERVAL: 417 MS
RBC # BLD AUTO: 4.68 MILLION/UL (ref 3.88–5.62)
SODIUM SERPL-SCNC: 142 MMOL/L (ref 135–147)
T WAVE AXIS: 9 DEGREES
VENTRICULAR RATE: 66 BPM
WBC # BLD AUTO: 11.11 THOUSAND/UL (ref 4.31–10.16)

## 2023-06-21 PROCEDURE — 97163 PT EVAL HIGH COMPLEX 45 MIN: CPT

## 2023-06-21 PROCEDURE — 99239 HOSP IP/OBS DSCHRG MGMT >30: CPT | Performed by: PHYSICIAN ASSISTANT

## 2023-06-21 PROCEDURE — 80048 BASIC METABOLIC PNL TOTAL CA: CPT | Performed by: HOSPITALIST

## 2023-06-21 PROCEDURE — 93010 ELECTROCARDIOGRAM REPORT: CPT | Performed by: INTERNAL MEDICINE

## 2023-06-21 PROCEDURE — 99024 POSTOP FOLLOW-UP VISIT: CPT | Performed by: PHYSICIAN ASSISTANT

## 2023-06-21 PROCEDURE — 97167 OT EVAL HIGH COMPLEX 60 MIN: CPT

## 2023-06-21 PROCEDURE — 85025 COMPLETE CBC W/AUTO DIFF WBC: CPT | Performed by: HOSPITALIST

## 2023-06-21 PROCEDURE — 71046 X-RAY EXAM CHEST 2 VIEWS: CPT

## 2023-06-21 RX ORDER — SUCRALFATE 1 G/1
1 TABLET ORAL
Qty: 84 TABLET | Refills: 0 | Status: SHIPPED | OUTPATIENT
Start: 2023-06-21 | End: 2023-07-12

## 2023-06-21 RX ORDER — FAMOTIDINE 20 MG/1
20 TABLET, FILM COATED ORAL DAILY
Qty: 30 TABLET | Refills: 0 | Status: SHIPPED | OUTPATIENT
Start: 2023-06-21

## 2023-06-21 RX ADMIN — SUCRALFATE 1 G: 1 TABLET ORAL at 12:50

## 2023-06-21 RX ADMIN — LACTULOSE 20 G: 20 SOLUTION ORAL at 09:28

## 2023-06-21 RX ADMIN — PANTOPRAZOLE SODIUM 40 MG: 40 TABLET, DELAYED RELEASE ORAL at 05:24

## 2023-06-21 RX ADMIN — SENNOSIDES AND DOCUSATE SODIUM 1 TABLET: 8.6; 5 TABLET ORAL at 09:28

## 2023-06-21 RX ADMIN — SUCRALFATE 1 G: 1 TABLET ORAL at 09:28

## 2023-06-21 RX ADMIN — POLYETHYLENE GLYCOL 3350 17 G: 17 POWDER, FOR SOLUTION ORAL at 09:28

## 2023-06-21 NOTE — ASSESSMENT & PLAN NOTE
· Nothing to suggest an acute mesenteric ischemia    Highly unlikely a chronic mesenteric ischemia as the stenosis does not appear to be a significant and he has 2 other vessels widely patent

## 2023-06-21 NOTE — DISCHARGE SUMMARY
[facility]  Discharge- Justo [place]ale 1950, 68 y o  male MRN: [id number]  Unit/Bed#: -[facility] Encounter: [id number]  Primary Care Provider: Adele Farnsworth MD   Date and time admitted to hospital: 6/20/2023 10:35 AM    * Bradycardia  Assessment & Plan  Afib with slow VR with ventricular ectopy  He needs BB for V ectopy, but has bradycardia, hence PPM recommended  Transferred to [facility] for EP eval for PPM - s/p PPM placement on 6/20, tolerated well  Cleared by EP for discharge  Pt and his wife and reluctant to go on AV arielle blockers, so will monitor HR and f/u with cardiology in office for further discussion     Superior mesenteric artery stenosis [facility])  Assessment & Plan  · Nothing to suggest an acute mesenteric ischemia    Highly unlikely a chronic mesenteric ischemia as the stenosis does not appear to be a significant and he has 2 other vessels widely patent    Abdominal pain  Assessment & Plan  · Now resolved  · Suspect PUD vs constipation  · Seen by vascular at other campus, do not suspect SMA stenosis is significant to cause ischemia  · Cont PPI BID, carafate, pepcid on discharge  · D/w GI to arrange outpt f/u     Atrial fibrillation with slow ventricular response ([facility] [address] )  Assessment & Plan  · See primary problem     History of stroke  Assessment & Plan  · Has left hemiplegia, working with PT OP  · On xarelto    Chronic diastolic CHF (congestive heart failure) ([facility] [address] )  Assessment & Plan  Wt Readings from Last 3 Encounters:   06/19/23 88 5 kg (195 lb)   05/25/23 95 3 kg (210 lb)   12/15/22 96 6 kg (213 lb)       not on diuretics  Is compensated  Outpt f/u with cardiology       Medical Problems     Resolved Problems  Date Reviewed: 6/21/2023   None       Discharging Physician / Practitioner: Jacques Britton PA-C  PCP: Adele Farnsworth MD  Admission Date:   Admission Orders (From admission, onward)     Ordered        06/20/23 1105  Inpatient Admission  Once Discharge Date: 06/21/23    Consultations During Hospital Stay:  · EP     Procedures Performed:   · PPM placement 6/20    Significant Findings / Test Results:   · CXR post PPM placement with no pneumothorax, leads intact     Incidental Findings:   · None     Test Results Pending at Discharge (will require follow up): · None     Outpatient Tests Requested:  · Device check in two weeks     Complications:  None    Reason for Admission: bradycardia     Hospital Course:   Andra Nunez is a 68 y o  male patient who originally presented to the hospital on 6/20/2023 due to bradycardia and need for PPM   He was initially seen at [facility] for abdominal pain, see discharge summary date 6/20 prior to transfer to [facility] for details regarding that  During work up of abdominal pain at [facility] he was found to have new onset a fib with bradycardia  He was transferred to [facility] for EP evaluation for PPM placement, which was recommeneded as he also has ventricular ectomy and they would recommend rate control medications  PPM was placed 6/47 without complications, he tolerated this well  He was seen by EP today and cleared for discharge  Pt and his wife wish to hold off on any rate control medications given hx of syncope in the past   They will monitor HR at home and have close f/u with cardiology on discharge for further management  His abdominal pain has completely resolved, he will be discharged on BID PPI, pepcid, carafate  He can f/u with GI on discharge  Please see above list of diagnoses and related plan for additional information  Condition at Discharge: good    Discharge Day Visit / Exam:   Subjective:  Pt has no acute complaints, feels well  Abdominal pain, n/v resolved he is tolerating diet without difficulty    No issues post PPM   Vitals: Blood Pressure: 153/89 (06/21/23 1103)  Pulse: 64 (06/21/23 1103)  Temperature: (!) 97 4 °F (36 3 °C) (06/21/23 1103)  Temp Source: Oral (06/20/23 2322)  Respirations: 18 (06/21/23 1103)  SpO2: 98 % (06/21/23 1103)  Exam:   Physical Exam  Vitals reviewed  Constitutional:       General: He is not in acute distress  Appearance: He is not toxic-appearing  HENT:      Head: Normocephalic and atraumatic  Eyes:      Extraocular Movements: Extraocular movements intact  Cardiovascular:      Rate and Rhythm: Normal rate and regular rhythm  Comments: L chest wall dressing c/d/i   Pulmonary:      Effort: Pulmonary effort is normal  No respiratory distress  Breath sounds: Normal breath sounds  Abdominal:      General: Bowel sounds are normal  There is no distension  Palpations: Abdomen is soft  Tenderness: There is no abdominal tenderness  Musculoskeletal:         General: Normal range of motion  Neurological:      General: No focal deficit present  Mental Status: He is alert and oriented to person, place, and time  Psychiatric:         Mood and Affect: Mood normal          Behavior: Behavior normal          Thought Content: Thought content normal          Judgment: Judgment normal           Discussion with Family: Updated  (wife) at bedside  Discharge instructions/Information to patient and family:   See after visit summary for information provided to patient and family  Provisions for Follow-Up Care:  See after visit summary for information related to follow-up care and any pertinent home health orders  Disposition:   Home    Planned Readmission: no     Discharge Statement:  I spent 38 minutes discharging the patient  This time was spent on the day of discharge  I had direct contact with the patient on the day of discharge  Greater than 50% of the total time was spent examining patient, answering all patient questions, arranging and discussing plan of care with patient as well as directly providing post-discharge instructions  Additional time then spent on discharge activities      Discharge Medications:  See after visit summary for reconciled discharge medications provided to patient and/or family        **Please Note: This note may have been constructed using a voice recognition system**

## 2023-06-21 NOTE — PLAN OF CARE
Problem: PHYSICAL THERAPY ADULT  Goal: Performs mobility at highest level of function for planned discharge setting  See evaluation for individualized goals  Description: Treatment/Interventions: Functional transfer training, Elevations, Therapeutic exercise, LE strengthening/ROM, Endurance training, Patient/family training, Bed mobility, Gait training, Equipment eval/education          See flowsheet documentation for full assessment, interventions and recommendations  Note: Prognosis: Fair  Problem List: Decreased strength, Decreased endurance, Impaired balance, Decreased mobility, Pain, Decreased coordination, Decreased cognition, Impaired judgement, Decreased safety awareness  Assessment: Pt seen for physical therapy evaluation, portion of which was performed as a co-evaluation w/ OT due to concerns re: medical stability  PT portion of evaluation focused on mobility assessment where OT portion of evaluation focused more on ADLs, self care  Pt is a 67 y/o male w/ history/comorbidities of CVA w/ L residual hemiparesis and aphasia, a-fib, vasovagal syncope, bradycardia, CHF who is now admitted as a transfer from [facility]  Seen there w/ abd pain, fatigue  Dx include bradycardia, a-fib, V ectopy and transferred here for EP eval  Underwent pacemaker last PM 6/20/23  Due to acute medical issues, need for transfer to higher level of care, acute procedure, pain, fall risk, note unstable clinical picture  PT consulted to assess mobility, d/c needs  Pt presents w/ decreased functional mob, standing balance, endurance, B LE strength L >> R, barriers at home  Pt will benefit from skilled PT to correct for the above problems  Anticipate dc home when stable w/ family support and home PT        PT Discharge Recommendation: Home with home health rehabilitation    See flowsheet documentation for full assessment

## 2023-06-21 NOTE — DISCHARGE INSTR - AVS FIRST PAGE
In regards to your abdominal pain, take Protonix 40 mg twice daily, Pepcid 20 mg once daily, and Carafate 4 times daily  Follow up with GI on discharge to discuss possible EGD  You were also found to have stenosis of superior mesenteric artery  Vascular surgery evaluated and do not think this was the cause of your abdominal pain or nausea/vomiting  Please refer to post pacemaker implantation discharge instructions and restrictions and your pacemaker booklet/temporary card  Keep incision dry for one week  Do not use lotions/powders/creams on incision  Leave outer bandage in place for 1 week - it is water proof, and as long as it is fully adhered to your skin you may shower with it  If it appears as though the bandage is coming off and/or there is any communication to the area of device incision, please then keep the whole area dry for the remaining week  After 1 week, please remove by pulling all edges away from the center of the bandage  No overhead reaching/pushing/pulling/lifting greater than 5-10lbs with left arm for six weeks  Please call the office if you notice redness, swelling, bleeding, or drainage from incision or if you develop fevers  AFTER PACEMAKER CARE:    If you have any questions, please call [phone] to speak with a nurse (8:30am-4pm, or [phone] after hours)  For appointments, please call [phone]  WHAT YOU SHOULD KNOW:   A pacemaker is a small, battery-powered device that is placed under your skin in your upper chest area with wires placed through a vein that lead directly into the heart  It helps regulate your heart rate and prevent your heart from beating too slowly  AFTER YOU LEAVE:     Medicines:     Pain medicine: You may need medicine to take away or decrease pain  Learn how to take your medicine  Ask what medicine and how much you should take  Be sure you know how, when, and how often to take it   Usually Over the counter pain medicine is sufficient to control pain (Acetominophen or Ibuprofen) Ask your doctor if you may take these  If this does not control your pain, narcotic pain killers may be prescribed, please call if you need prescription  Do not wait until the pain is severe before you take your medicine  Tell caregivers if your pain does not decrease  Pain medicine can make you dizzy or sleepy  Prevent falls by calling someone when you get out of bed or if you need help  Take your medicine as directed  Call your healthcare provider if you think your medicine is not helping or if you have side effects  Tell him if you are allergic to any medicine  Follow up with your cardiologist after your procedure: You will need a follow-up visit approximately 2 weeks after you leave the hospital  Your cardiologist will check your wound and make sure that your pacemaker is working correctly  Follow the instructions to check your pacemaker: Your cardiologist or primary healthcare provider will check your pacemaker and the battery regularly  He will use a computer to check your pacemaker over the telephone or wireless device which will be given to you  Pacemaker batteries usually last 8 to 10 years  The pacemaker unit will be replaced when the battery gets low  This is a simpler procedure than the original one to implant your pacemaker  Wound care:  Keep your incision dry for one week  Do not use lotions/powders/creams on incision  Leave outer bandage in place for 1 week - it is water proof, and as long as it is fully adhered to your skin you may shower with it  If it appears as though the bandage is coming off and/or there is any communication to the area of device incision, please then keep the whole area dry for the remaining week  After 1 week, please remove by pulling all edges away from the center of the bandage      Please call the office if you notice redness, swelling, bleeding, or drainage from incision or if you develop fevers  Activity:   Arm movement and lifting:  Be careful using the arm on the side of your pacemaker  Do not move your arm for the first 24 hours after your procedure  Do not  lift your arm above your shoulder or lift more than 10 pounds for one month after your procedure  Avoid pushing, pulling, or repetitive arm movements for one month  This helps the leads stay in place and helps your wound heal  Ask your caregiver when you can drive after your procedure  You may move your arm side to side without lifting above your shoulder, and do not need to wear a sling at home  Driving: you are ok to drive 48 hours after pacemaker is implanted   Sports:  Ask your caregiver when it is okay to play tennis, golf, basketball, or any sport that requires you to lift your arms  Do not play full contact sports, such as football, that could damage your pacemaker  Ask your cardiologist or primary healthcare provider how much and what kinds of physical activity are safe for you  Living with a pacemaker:   Tell all caregivers you have a pacemaker: This includes surgeons, radiologists, and medical technicians  You may want to wear a medical alert ID bracelet or necklace that states that you have a pacemaker  Carry your pacemaker ID card: Make sure you receive a pacemaker ID card  Carry it with you at all times  It lists important information about your pacemaker  Show it to airport security if you travel  Avoid electrical interference:  Avoid welding equipment and other equipment with large magnets or electric fields  These things could interfere with how your pacemaker works  Use your cell phone on the ear opposite from your pacemaker  Do not carry your cell phone in your shirt pocket over your chest      Some Pacemakers are MRI safe  Ask you doctor if it is safe to proceed with MRI and let the radiologist and staff know you have a pacemaker       Do not touch the skin around your pacemaker: This can cause damage to the lead wires or move the pacemaker unit from where it should be  Contact your cardiologist or primary healthcare provider if:   The area around your pacemaker has increasing amount of pain after surgery  The pain should improve over first few days after implantation  The skin around your stitches has increasing redness, swelling, or has drainage  This may mean that you have an infection  You have a fever  You have chills, a cough, and feel weak or achy  These are also signs of infection  Your feet or ankles are more swollen than your baseline  Your Heart rate is less than 50 beats per minute     Seek care immediately if:   Your bandage becomes soaked with blood  Your pacemaker is swelling rapidly    Your stitches open up  You feel your heart suddenly beating very slowly or quickly  You become too weak or dizzy to stand, or you pass out  Your arm or leg feels warm, tender, and painful  It may look swollen and red  You have chest pain that does not go away with rest or medicine  You feel lightheaded, short of breath, and have chest pain  You cough up blood  © 2014 [address] is for End User's use only and may not be sold, redistributed or otherwise used for commercial purposes  All illustrations and images included in CareNotes® are the copyrighted property of A D A M , Inc  or [employer]  The above information is an  only  It is not intended as medical advice for individual conditions or treatments  Talk to your doctor, nurse or pharmacist before following any medical regimen to see if it is safe and effective for you

## 2023-06-21 NOTE — ASSESSMENT & PLAN NOTE
· Now resolved  · Suspect PUD vs constipation  · Seen by vascular at other campus, do not suspect SMA stenosis is significant to cause ischemia  · Cont PPI BID, carafate, pepcid on discharge  · D/w GI to arrange outpt f/u

## 2023-06-21 NOTE — ASSESSMENT & PLAN NOTE
Wt Readings from Last 3 Encounters:   06/19/23 88 5 kg (195 lb)   05/25/23 95 3 kg (210 lb)   12/15/22 96 6 kg (213 lb)       not on diuretics  Is compensated  Outpt f/u with cardiology

## 2023-06-21 NOTE — PHYSICAL THERAPY NOTE
Physical Therapy Evaluation    Patient's Name: Andra Nunez    Admitting Diagnosis  Bradycardia [R00 1]    Problem List  Patient Active Problem List   Diagnosis    Essential hypertension    Hypokalemia    Elevated brain natriuretic peptide (BNP) level    Hx of right knee surgery    Hyperlipidemia    Prediabetes    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)    Left hemiplegia (HCC)    Dysphagia    Paroxysmal atrial fibrillation (HCC)    Hypernatremia    Acute blood loss anemia    Acute kidney injury ([facility] )    Chronic diastolic CHF (congestive heart failure) ([facility] )    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([facility] )    Acute leg pain, right    History of stroke    Fatigue    Nonintractable epilepsy without status epilepticus ([facility] )    Bradycardia    Pre-syncope    Bilateral carotid artery stenosis    History of gross hematuria    BPH with obstruction/lower urinary tract symptoms    Bladder stone    Prostate cancer screening    Atrial fibrillation with slow ventricular response ([facility] )    Abdominal pain    Superior mesenteric artery stenosis [facility])       Past Medical History  Past Medical History:   Diagnosis Date    Acute encephalopathy 5/27/2019    Arthritis     BL knees    Atrial fibrillation (HCC)     CHF (congestive heart failure) (HCC)     Colon polyp     CVA (cerebral vascular accident) ([facility] ) 4/27/2019    NIHSS 26 on presentation    Depression     Diabetes mellitus ([facility] )     borderline    Encephalopathy acute 4/28/2019    History of transfusion 04/2019    Hyperlipidemia     Irregular heart beat     Afib    Stroke ([facility] ) 04/27/2019    Left sided weakness-aphasia    Urinary retention     Urinary retention 6/4/2019       Past Surgical History  Past Surgical History:   Procedure Laterality Date    COLONOSCOPY      IR STROKE ALERT  4/27/2019    MENISCECTOMY Right     [place] CYSTO INSERTION TRANSPROSTATIC IMPLANT SINGLE N/A 11/19/2021    Procedure: CYSTOSCOPY WITH INSERTION Benjamín Hadley;  Surgeon: Andrew Calero MD;  [facility]: [facility]; Service: Urology        06/21/23 0845   PT Last Visit   PT Visit Date 06/21/23   Note Type   Note type Evaluation   Pain Assessment   Pain Assessment Tool 0-10   Pain Location/Orientation Location: Generalized;Orientation: Left; Location: Shoulder   Patient's Stated Pain Goal No pain   Hospital Pain Intervention(s) Ambulation/increased activity   Restrictions/Precautions   Weight Bearing Precautions Per Order No   Braces or Orthoses   (mafo)   Other Precautions Multiple lines;Telemetry; Fall Risk;Pain   Home Living   Type of Home House   Additional Comments Resides w/ wife in 1 story home, 2 BRITTANY  Needs ADL assist, ambulates w/ QC  May have been receiving OP PT but unclear   Prior Function   Falls in the last 6 months   (unknown)   General   Family/Caregiver Present No   Cognition   Overall Cognitive Status Impaired   Arousal/Participation Responsive   Orientation Level Oriented to person;Oriented to place   Memory Unable to assess   Following Commands Follows one step commands with increased time or repetition   Comments pt w/ baseline aphasia   Subjective   Subjective states he feels OK   willing to participate  baseline aphasia   RLE Assessment   RLE Assessment   (strength grossly 4-/5)   LLE Assessment   LLE Assessment   (baseline L hemiparesis, strength grossly 1-2/5)   Coordination   Movements are Fluid and Coordinated 0   Bed Mobility   Supine to Sit 5  Supervision  (CGA/S)   Additional items Assist x 1; Increased time required   Transfers   Sit to Stand 4  Minimal assistance   Additional items Assist x 1; Increased time required; Impulsive;Verbal cues  (did x 3 prior to attaining full stand, some posterior LOB)   Stand to Sit 4  Minimal assistance   Additional items Assist x 1; Increased time required; Impulsive;Verbal cues   Ambulation/Elevation   Gait pattern   (alaxia, L foot drag, short step length, forward flexion)   Gait Assistance 4  Minimal assist   Additional items Assist x 1   Assistive Device Large base quad cane   Distance 15'x1   Balance   Static Sitting Fair +   Dynamic Sitting Fair -   Static Standing Poor +   Dynamic Standing Poor +   Ambulatory Poor +   Endurance Deficit   Endurance Deficit Yes   Endurance Deficit Description fatigue, weakness, pain   Activity Tolerance   Activity Tolerance Patient limited by fatigue;Patient limited by pain;Treatment limited secondary to medical complications (Comment)   Nurse Made Aware yes   Assessment   Prognosis Fair   Problem List Decreased strength;Decreased endurance; Impaired balance;Decreased mobility;Pain;Decreased coordination;Decreased cognition; Impaired judgement;Decreased safety awareness   Assessment Pt seen for physical therapy evaluation, portion of which was performed as a co-evaluation w/ OT due to concerns re: medical stability  PT portion of evaluation focused on mobility assessment where OT portion of evaluation focused more on ADLs, self care  Pt is a 67 y/o male w/ history/comorbidities of CVA w/ L residual hemiparesis and aphasia, a-fib, vasovagal syncope, bradycardia, CHF who is now admitted as a transfer from [facility]  Seen there w/ abd pain, fatigue  Dx include bradycardia, a-fib, V ectopy and transferred here for EP eval  Underwent pacemaker last PM 6/20/23  Due to acute medical issues, need for transfer to higher level of care, acute procedure, pain, fall risk, note unstable clinical picture  PT consulted to assess mobility, d/c needs  Pt presents w/ decreased functional mob, standing balance, endurance, B LE strength L >> R, barriers at home  Pt will benefit from skilled PT to correct for the above problems    Anticipate dc home when stable w/ family support and home PT   Goals   Patient Goals none stated   STG Expiration Date 07/05/23   Short Term Goal #1 1-2 wks: bed mob and transfers w  indep, standing balance to fair w/ device, ambulate  ft w/ QC/MAFO and S, increase B LE strength by 1/2 -1 grade, ambulate 1-2 stairs w/ min A PT Treatment Day 0   Plan   Treatment/Interventions Functional transfer training;Elevations; Therapeutic exercise;LE strengthening/ROM; Endurance training;Patient/family training;Bed mobility;Gait training;Equipment eval/education   PT Frequency 3-5x/wk   Recommendation   PT Discharge Recommendation Home with home health rehabilitation   AM-PAC Basic Mobility Inpatient   Turning in Flat Bed Without Bedrails 4   Lying on Back to Sitting on Edge of Flat Bed Without Bedrails 3   Moving Bed to Chair 3   Standing Up From Chair Using Arms 3   Walk in Room 3   Climb 3-5 Stairs With Railing 2   Basic Mobility Inpatient Raw Score 18   Basic Mobility Standardized Score 41 05   Highest Level Of Mobility   JH-HLM Goal 6: Walk 10 steps or more   JH-HLM Achieved 6: Walk 10 steps or more           Kevin Aleman PT, DPT, CSRS

## 2023-06-21 NOTE — OCCUPATIONAL THERAPY NOTE
Occupational Therapy Evaluation     Patient Name: Sara Gleason  KXCLH'H Date: 6/21/2023  Problem List  Principal Problem:    Bradycardia  Active Problems:    Essential hypertension    Chronic diastolic CHF (congestive heart failure) (HCC)    History of stroke    Atrial fibrillation with slow ventricular response (HCC)    Abdominal pain    Superior mesenteric artery stenosis [facility])    Past Medical History  Past Medical History:   Diagnosis Date    Acute encephalopathy 5/27/2019    Arthritis     BL knees    Atrial fibrillation (HCC)     CHF (congestive heart failure) ([facility])     Colon polyp     CVA (cerebral vascular accident) ([facility] ) 4/27/2019    NIHSS 26 on presentation    Depression     Diabetes mellitus ([facility] [facility] )     borderline    Encephalopathy acute 4/28/2019    History of transfusion 04/2019    Hyperlipidemia     Irregular heart beat     Afib    Stroke ([facility] ) 04/27/2019    Left sided weakness-aphasia    Urinary retention     Urinary retention 6/4/2019     Past Surgical History  Past Surgical History:   Procedure Laterality Date    COLONOSCOPY      IR STROKE ALERT  4/27/2019    MENISCECTOMY Right     [place] CYSTO INSERTION TRANSPROSTATIC IMPLANT SINGLE N/A 11/19/2021    Procedure: CYSTOSCOPY WITH INSERTION Venkat Maza;  Surgeon: Derrick Becerra MD;  [facility];  Service: Urology         06/21/23 0834   OT Last Visit   OT Visit Date 06/21/23   Note Type   Note type Evaluation   Pain Assessment   Pain Assessment Tool 0-10   Pain Score No Pain   Restrictions/Precautions   Weight Bearing Precautions Per Order No   Braces or Orthoses   (mafo)   Other Precautions Telemetry;Multiple lines; Fall Risk;Pain   Home Living   Type of [address] [place] [address] One level   Bathroom Shower/Tub Walk-in shower   Bathroom Toilet Standard   Bathroom Equipment Grab bars in shower; Shower chair;Grab bars around toilet   [facility]; Wheelchair-manual;Hospital bed  (quad cane)   Prior "Function   Level of [place] Needs assistance with ADLs; Needs assistance with functional mobility; Needs assistance with IADLS   Lives With Spouse   Receives Help From Family   IADLs Family/Friend/Other provides transportation; Family/Friend/Other provides meals; Family/Friend/Other provides medication management   Falls in the last 6 months 0   Vocational Retired   Lifestyle   Autonomy pta, pt rq A for ADL/IADL  used quad cane for mobility, reports that he typically is able to ambualte   5 miles  Reciprocal Relationships supportive spouse  per EMR, he previously had home health aides, however pt denies currently having them   Service to Others retired   Intrinsic Gratification spending time w family and remaining as active as possible  Subjective   Subjective \"I can go there\"   ADL   Where Assessed Edge of bed   Eating Assistance 4  Minimal Assistance   Grooming Assistance 4  Minimal Assistance   UB Bathing Assistance 3  Moderate Assistance   LB Bathing Assistance 2  Maximal Assistance   UB Dressing Assistance 3  Moderate Assistance   LB Dressing Assistance 2  Maximal Assistance   Toileting Assistance  2  Maximal Assistance   Functional Assistance 3  Moderate Assistance   Bed Mobility   Supine to Sit 5  Supervision  (CGA/S)   Additional items Assist x 1; Increased time required;LE management;Verbal cues   Additional Comments uses hook mechanism with RLE to jing LLE   Transfers   Sit to Stand 4  Minimal assistance   Additional items Assist x 1; Increased time required;Verbal cues   Stand to Sit 4  Minimal assistance   Additional items Assist x 1; Increased time required;Verbal cues   Additional Comments c quad cane- initial attempt he required assistance to achievefull stance  Functional Mobility   Functional Mobility 5  Supervision   Additional Comments EOB> bedside chair     Additional items   (quad cane)   Balance   Static Sitting Fair +   Dynamic Sitting Fair   Static Standing Fair -   Dynamic Standing Poor + " Ambulatory Poor +   Activity Tolerance   Activity Tolerance Patient limited by fatigue   Medical Staff Made Aware DPT Lewis Freemna 2' pts med complexity, comorbidities and regression from baseline  Nurse Made Aware ok per RN   RUE Assessment   RUE Assessment WFL   LUE Assessment   LUE Assessment WFL   Hand Function   Gross Motor Coordination Functional   Fine Motor Coordination Functional   Psychosocial   Psychosocial (WDL) WDL   Cognition   Overall Cognitive Status Impaired   Arousal/Participation Alert; Responsive; Cooperative   Attention Attends with cues to redirect   Orientation Level Oriented to person;Oriented to place;Oriented to time;Oriented to situation   Memory Unable to assess   Following Commands Follows one step commands with increased time or repetition   Comments pt w expressive aphasia at baseline, though he is able to appropriately answer yes/no questions  Assessment   Limitation Decreased ADL status; Decreased endurance;Decreased high-level ADLs; Decreased self-care trans;Decreased Safe judgement during ADL   Prognosis Good   Assessment Pt is a 68 y o  male admitted 6/20/23 as a transfer from [facility] [place] [place] eP eval  He initially presented to [facility] [place] [place] with abdominal pain, and was found to have afib w bradycardia  Pt underwent PPM placement 6/20/23, is POD #1 w active OT evaland treat orders  PMH includes  has a past medical history of Acute encephalopathy, Arthritis, Atrial fibrillation ([facility] ), CHF (congestive heart failure) ([facility] ), Colon polyp, CVA (cerebral vascular accident) ([address] ), Depression, Diabetes mellitus ([facility] ), Encephalopathy acute, History of transfusion, Hyperlipidemia, Irregular heart beat, Stroke [facility]), Urinary retention, and Urinary retention  Pt w hx of stroke, uses MAFO on LLE and quad cane  Pt lives in a 1 SH with 0 BRITTANY, reports walk in shower with shower chair and grab bars, standard toilet with grab bars  Pta, pt rq A w/ ADL/IADL and used quad cane for functional mobility  Currently, pt is Mod Ax1 for UB ADL, Max Ax1 for LB ADL, and completed transfers/FM w Min Ax1  Pt is limited at this time 2* decreased endurance/activtiy tolerance, decreased cognition, decreased ADL/High-level ADL status, decreased self-care trans, decreased safety awareness, limited home support and is a fall risk  This impacts pt's ability to complete UB and LB dressing and bathing, toileting, transfers, functional mobility, community mobility, home and health maintenance, and safe engagement in typical daily routine  The patient's raw score on the AM-PAC Daily Activity inpatient short form is 16, standardized score is 35 96, less than 39 4  Patients at this level are likely to benefit from discharge to post-acute rehabilitation services  Please refer to the recommendation of the Occupational Therapist for safe discharge planning  Though AM-PAC reflects score indicates rehab, pt's baseline level of function is not ind and he does have adequate support at home  Therefore, at this time, OT rec is for home therapy when medically stable  Pt will benefit from continued acute OT services 2-3 x/wk for 10-14 days to meet goals  Goals   Patient Goals none shared at this time  LTG Time Frame 10-14   Long Term Goal #1 see below   Plan   Treatment Interventions ADL retraining;Functional transfer training; Endurance training;Patient/family training;Equipment evaluation/education; Compensatory technique education; Energy conservation; Activityengagement   Goal Expiration Date 07/05/23   OT Frequency 2-3x/wk   Recommendation   OT Discharge Recommendation Home with home health rehabilitation   [facility] Daily Activity Inpatient   Lower Body Dressing 2   Bathing 2   Toileting 3   Upper Body Dressing 3   Grooming 3   Eating 3   Daily Activity Raw Score 16   Daily Activity Standardized Score (Calc for Raw Score >=11) 35 96   AM-[facility] Applied Cognition Inpatient   Following a Speech/Presentation 4   Understanding Ordinary Conversation 4 Taking Medications 3   Remembering Where Things Are Placed or Put Away 2   Remembering List of 4-5 Errands 2   Taking Care of Complicated Tasks 2   Applied Cognition Raw Score 17   Applied Cognition Standardized Score 36 52   Modified Rooks Scale   Modified Rooks Scale 4   End of Consult   Education Provided Yes   Patient Position at End of Consult Bedside chair;Bed/Chair alarm activated; All needs within reach   Nurse Communication Nurse aware of consult       Pt will complete functional mobility with Mod I using appropriate DME as needed  Pt will complete UB dressing and bathing with S using appropriate DME as needed  Pt will complete LB dressing and bathing with Min A using appropriate DME as needed  Pt will complete transfers with Mod I using appropriate DME as needed  Pt will complete toileting with Mod I using appropriate DME as needed  Pt will utilize energy conservation techniques throughout functional activity/ADL s/p skilled education  Pt will demonstrate increased safety awareness during functional tasks/ADL's s/p skilled education  Pt will increase activity tolerance to 30 minutes in order to complete ADL's/ functional tasks, using appropriate DME as needed  Pt will engage in ongoing cog assessment w/ G participation to assist with safe d/c planning  Pt will inc UE ROM +10 degrees b/l in order to increase overall ability to engage in typical daily routine  Pt will inc UE strength +1 MMT in order to increase overall ability to engage in typical daily routine         Domonique Macdonald MOT, OTR/L

## 2023-06-21 NOTE — ASSESSMENT & PLAN NOTE
Afib with slow VR with ventricular ectopy  He needs BB for V ectopy, but has bradycardia, hence PPM recommended  Transferred to [facility] for EP eval for PPM - s/p PPM placement on 6/20, tolerated well  Cleared by EP for discharge    Pt and his wife and reluctant to go on AV arielle blockers, so will monitor HR and f/u with cardiology in office for further discussion

## 2023-06-21 NOTE — PLAN OF CARE
Problem: OCCUPATIONAL THERAPY ADULT  Goal: Performs self-care activities at highest level of function for planned discharge setting  See evaluation for individualized goals  Description: Treatment Interventions: ADL retraining, Functional transfer training, Endurance training, Patient/family training, Equipment evaluation/education, Compensatory technique education, Energy conservation, Activityengagement          See flowsheet documentation for full assessment, interventions and recommendations  Note: Limitation: Decreased ADL status, Decreased endurance, Decreased high-level ADLs, Decreased self-care trans, Decreased Safe judgement during ADL  Prognosis: Good  Assessment: Pt is a 68 y o  male [place] 6/20/23 [place] [facility] [place]  [place] [facility] [place], and was found to have afib w bradycardia  Pt underwent PPM placement 6/20/23, is POD #1 w active OT evaland treat orders  PMH includes  has a past medical history of Acute encephalopathy, Arthritis, Atrial fibrillation ([facility] ), CHF (congestive heart failure) ([facility] ), Colon polyp, CVA (cerebral vascular accident) ([facility] ), Depression, Diabetes mellitus ([facility] [facility] ), Encephalopathy acute, History of transfusion, Hyperlipidemia, Irregular heart beat, Stroke [facility]), Urinary retention, and Urinary retention  Pt w hx of stroke, uses MAFO on LLE and quad cane  Pt lives in a 1 SH with 0 BRITTANY, reports walk in shower with shower chair and grab bars, standard toilet with grab bars  Pta, pt rq A w/ ADL/IADL and used quad cane for functional mobility  Currently, pt is Mod Ax1 for UB ADL, Max Ax1 for LB ADL, and completed transfers/FM w Min Ax1  Pt is limited at this time 2* decreased endurance/activtiy tolerance, decreased cognition, decreased ADL/High-level ADL status, decreased self-care trans, decreased safety awareness, limited home support and is a fall risk   This impacts pt's ability to complete UB and LB dressing and bathing, toileting, transfers, functional mobility, community mobility, home and health maintenance, and safe engagement in typical daily routine  The patient's raw score on the AM-PAC Daily Activity inpatient short form is 16, standardized score is 35 96, less than 39 4  Patients at this level are likely to benefit from discharge to post-acute rehabilitation services  Please refer to the recommendation of the Occupational Therapist for safe discharge planning  Though AM-PAC reflects score indicates rehab, pt's baseline level of function is not ind and he does have adequate support at home  Therefore, at this time, OT rec is for home therapy when medically stable  Pt will benefit from continued acute OT services 2-3 x/wk for 10-14 days to meet goals       OT Discharge Recommendation: Home with home health rehabilitation

## 2023-06-21 NOTE — PLAN OF CARE
Problem: Prexisting or High Potential for Compromised Skin Integrity  Goal: Skin integrity is maintained or improved  Description: INTERVENTIONS:  - Identify patients at risk for skin breakdown  - Assess and monitor skin integrity  - Assess and monitor nutrition and hydration status  - Monitor labs   - Assess for incontinence   - Turn and reposition patient  - Assist with mobility/ambulation  - Relieve pressure over bony prominences  - Avoid friction and shearing  - Provide appropriate hygiene as needed including keeping skin clean and dry  - Evaluate need for skin moisturizer/barrier cream  - Collaborate with interdisciplinary team   - Patient/family teaching  - Consider wound care consult   Outcome: Progressing     Problem: MOBILITY - ADULT  Goal: Maintain or return to baseline ADL function  Description: INTERVENTIONS:  -  Assess patient's ability to carry out ADLs; assess patient's baseline for ADL function and identify physical deficits which impact ability to perform ADLs (bathing, care of mouth/teeth, toileting, grooming, dressing, etc )  - Assess/evaluate cause of self-care deficits   - Assess range of motion  - Assess patient's mobility; develop plan if impaired  - Assess patient's need for assistive devices and provide as appropriate  - Encourage maximum independence but intervene and supervise when necessary  - Involve family in performance of ADLs  - Assess for home care needs following discharge   - Consider OT consult to assist with ADL evaluation and planning for discharge  - Provide patient education as appropriate  Outcome: Progressing  Goal: Maintains/Returns to pre admission functional level  Description: INTERVENTIONS:  - Perform BMAT or MOVE assessment daily    - Set and communicate daily mobility goal to care team and patient/family/caregiver  - Collaborate with rehabilitation services on mobility goals if consulted  - Perform Range of Motion  times a day    - Reposition patient every hours   - Dangle patient  times a day  - Stand patient  times a day  - Ambulate patient  times a day  - Out of bed to chair  times a day   - Out of bed for meals  times a day  - Out of bed for toileting  - Record patient progress and toleration of activity level   Outcome: Progressing     Problem: PAIN - ADULT  Goal: Verbalizes/displays adequate comfort level or baseline comfort level  Description: Interventions:  - Encourage patient to monitor pain and request assistance  - Assess pain using appropriate pain scale  - Administer analgesics based on type and severity of pain and evaluate response  - Implement non-pharmacological measures as appropriate and evaluate response  - Consider cultural and social influences on pain and pain management  - Notify physician/advanced practitioner if interventions unsuccessful or patient reports new pain  Outcome: Progressing     Problem: INFECTION - ADULT  Goal: Absence or prevention of progression during hospitalization  Description: INTERVENTIONS:  - Assess and monitor for signs and symptoms of infection  - Monitor lab/diagnostic results  - Monitor all insertion sites, i e  indwelling lines, tubes, and drains  - Monitor endotracheal if appropriate and nasal secretions for changes in amount and color  - [place] appropriate cooling/warming therapies per order  - Administer medications as ordered  - Instruct and encourage patient and family to use good hand hygiene technique  - Identify and instruct in appropriate isolation precautions for identified infection/condition  Outcome: Progressing  Goal: Absence of fever/infection during neutropenic period  Description: INTERVENTIONS:  - Monitor WBC    Outcome: Progressing     Problem: SAFETY ADULT  Goal: Maintain or return to baseline ADL function  Description: INTERVENTIONS:  -  Assess patient's ability to carry out ADLs; assess patient's baseline for ADL function and identify physical deficits which impact ability to perform ADLs (bathing, care of mouth/teeth, toileting, grooming, dressing, etc )  - Assess/evaluate cause of self-care deficits   - Assess range of motion  - Assess patient's mobility; develop plan if impaired  - Assess patient's need for assistive devices and provide as appropriate  - Encourage maximum independence but intervene and supervise when necessary  - Involve family in performance of ADLs  - Assess for home care needs following discharge   - Consider OT consult to assist with ADL evaluation and planning for discharge  - Provide patient education as appropriate  Outcome: Progressing  Goal: Maintains/Returns to pre admission functional level  Description: INTERVENTIONS:  - Perform BMAT or MOVE assessment daily    - Set and communicate daily mobility goal to care team and patient/family/caregiver  - Collaborate with rehabilitation services on mobility goals if consulted  - Perform Range of Motion  times a day  - Reposition patient every  hours    - Dangle patient  times a day  - Stand patient  times a day  - Ambulate patient  times a day  - Out of bed to chair  times a day   - Out of bed for meals  times a day  - Out of bed for toileting  - Record patient progress and toleration of activity level   Outcome: Progressing  Goal: Patient will remain free of falls  Description: INTERVENTIONS:  - Educate patient/family on patient safety including physical limitations  - Instruct patient to call for assistance with activity   - Consult OT/PT to assist with strengthening/mobility   - Keep Call bell within reach  - Keep bed low and locked with side rails adjusted as appropriate  - Keep care items and personal belongings within reach  - Initiate and maintain comfort rounds  - Make Fall Risk Sign visible to staff  - Offer Toileting every  Hours, in advance of need  - Initiate/Maintain alarm  - Obtain necessary fall risk management equipment:   - Apply yellow socks and bracelet for high fall risk patients  - Consider moving patient to room near nurses station  Outcome: Progressing     Problem: DISCHARGE PLANNING  Goal: Discharge to home or other facility with appropriate resources  Description: INTERVENTIONS:  - Identify barriers to discharge w/patient and caregiver  - Arrange for needed discharge resources and transportation as appropriate  - Identify discharge learning needs (meds, wound care, etc )  - Arrange for interpretive services to assist at discharge as needed  - Refer to Case Management Department for coordinating discharge planning if the patient needs post-hospital services based on physician/advanced practitioner order or complex needs related to functional status, cognitive ability, or social support system  Outcome: Progressing     Problem: Knowledge Deficit  Goal: Patient/family/caregiver demonstrates understanding of disease process, treatment plan, medications, and discharge instructions  Description: Complete learning assessment and assess knowledge base    Interventions:  - Provide teaching at level of understanding  - Provide teaching via preferred learning methods  Outcome: Progressing

## 2023-06-21 NOTE — CASE MANAGEMENT
Case Management Discharge Planning Note    Patient name Leticia Found  Location /-15 MRN [id number]  : 1950 Date 2023       Current Admission Date: 2023  Current Admission Diagnosis:Bradycardia   Patient Active Problem List    Diagnosis Date Noted   • Atrial fibrillation with slow ventricular response ([address] ) 2023   • Abdominal pain 2023   • Superior mesenteric artery stenosis ([address] ) 2023   • Prostate cancer screening 2023   • History of gross hematuria 10/12/2021   • BPH with obstruction/lower urinary tract symptoms 10/12/2021   • Bladder stone 10/12/2021   • Bilateral carotid artery stenosis 2021   • Pre-syncope 2020   • Nonintractable epilepsy without status epilepticus (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 ) 2019   • Hypernatremia 2019   • Acute blood loss anemia 2019   • Acute kidney injury ([facility] ) 2019   • Chronic diastolic CHF (congestive heart failure) ([facility] ) 2019   • Paroxysmal atrial fibrillation (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 ) 2019   • Left hemiplegia ([facility] ) 2019   • Prediabetes 11/15/2018   • Essential hypertension 2018   • Hypokalemia 2018   • Elevated brain natriuretic peptide (BNP) level 2018   • Hx of right knee surgery 2018   • Hyperlipidemia 2018      LOS (days): 1  Geometric Mean LOS (GMLOS) (days): 2 30  Days to GMLOS:1 1     OBJECTIVE:  Risk of Unplanned Readmission Score: 13 82         Current admission status: Inpatient   Preferred Pharmacy:   [address] [facility], [facility] [address]  Phone: [phone] Fax: [phone]    Primary Care Provider: Blas Champion MD    Primary Insurance: MEDICARE  Secondary Insurance: BLUE CROSS    DISCHARGE DETAILS:    Discharge planning discussed with[de-identified] patient and wife Conner Villeda at bedside           Additional Comments: PT/OT HHC are recommended for DC  Patient's wife Conner Villeda states the patient has been getting outpatient PT/OT with [facility] and wants to continue with the outpatient services at DC  [facility]  No further CM needs anticipated

## 2023-06-21 NOTE — PROGRESS NOTES
Progress Note - Electrophysiology  Leticia Found 68 y o  male MRN: [id number]  Unit/Bed#: -01 Encounter: [id number]      Assessment:  1  Medtronic dual chamber pacemaker in situ   - implanted by Dr Luna Thurston on 6/20/2023 due to slow afib  2  New diagnosis of atrial fibrillation this admission with slow ventricular response  - anticoagulated with Xarelto / Vbuyx2Oncp score of 4 (age, HTN, CVA)  - EF of 65% per echo 6/2023 - mild to moderate dilation of LA and RA  - rate control: none prior to admissionk  - antiarrhythmic therapy: none  - prior cardioversion/ablation: none  3  Prior Medtronic loop recorder explanted 7/2020  - had sinus with PAC's and PVC's (artifact at baseline)  last EKG 2/2022 was in sinus  - had recent Holter monitor 1/2022 showing predominantly sinus rhythm  4  Chronic diastolic congestive heart failure  - not on diuretic as an outpatient   5  History of NSVT  6  Hypertension  7  Hyperlipidemia   8  Prior GI bleed  - noted on Eliquis per notes  9  Superior mesenteric artery stenosis  10  Right ROB/MCA territory CVA 5/2019   - ALYX and loop at that time with thrombectomy and tPA   - has residual left sided hemiplegia       Plan:  1  Device - Patient is doing well status post pacemaker implantation  His incision is clean, dry, and intact without evidence of hematoma or ecchymosis or signs of infection  Vital signs and labs were reviewed  Assessment of chest x-rays show proper lead placement without evidence of pneumothorax  Device interrogation showed appropriate device function with lead parameters such as sensing, threshold, and impedance being tested  2  Post care - Discharge instructions and restrictions were discussed with the patient in detail  He will also be provided with written instructions detailing what was discussed  Patient also requires a 2 week device and site check which has already been arranged and is in Epic       3  Medications - In terms of medications, discussed with patient and wife who are still hesitant to start metoprolol and medications as he has had multiple episodes of vasovagal syncope in the past  Therefore, will make sure he has closer hospital follow up with general cardiology (would like to continue to follow with Dr Rigo Guadalupe)  Will also have an appointment in the EP office as well  As this is the case, I also asked his wife to monitor and make sure that his heart rates do not creep up greater than 95 bpm constantly when at rest   This would indicate that we would need to add some sort of AV arielle blocking agent sooner than follow-up appointments  4  Patient is stable from an EP standpoint for discharge at this time - will make sure he has a note for rehab and returning with left arm restrictions  Subjective/Objective   Subjective: Loraine Hager is a 68y o  year old male with bradycardia status post pacemaker, newer onset atrial fibrillation anticoagulated with Xarelto, no present monitor in place, chronic diastolic congestive heart failure, history of nonsustained ventricular tachycardia, hypertension, hyperlipidemia, prior GI bleed noted on Eliquis, superior mesenteric artery stenosis, and history of CVA  He is hospital stay day 2 at [facility] and is status post pacemaker implantation  He reports no issues overnight, denies chest pain, shortness of breath, palpitations, lightheadedness or dizziness  Telemetry shows mostly ventricular paced rhythm at 60 beats per minute overnight with underlying atrial fibrillation - not as much pacing this morning with rates a little higher  Objective:  Vitals: /91   Pulse 60   Temp 97 5 °F (36 4 °C) (Oral)   Resp 16   SpO2 97%     There were no vitals filed for this visit    Orthostatic Blood Pressures    Flowsheet Row Most Recent Value   Blood Pressure 147/91 filed at 06/21/2023 0219   Patient Position - Orthostatic VS Lying filed at 06/20/2023 8093            Intake/Output Summary (Last 24 hours) at 6/21/2023 1011  Last data filed at 6/21/2023 0438  Gross per 24 hour   Intake --   Output 400 ml   Net -400 ml       Invasive Devices     Peripheral Intravenous Line  Duration           Peripheral IV 06/20/23 Dorsal (posterior); Right Forearm 1 day    Peripheral IV 06/20/23 Left Forearm <1 day                          Scheduled Meds:  Current Facility-Administered Medications   Medication Dose Route Frequency Provider Last Rate   • acetaminophen  650 mg Oral Q4H PRN Jahaira Arellano PA-C     • atropine  0 4 mg Intravenous Once PRN Lukasz Thomson MD     • finasteride  5 mg Oral HS Lukasz Thomson MD     • lactulose  20 g Oral BID Lukasz Thomson MD     • ondansetron  4 mg Intravenous Q6H PRN Luaksz Thomson MD     • pantoprazole  40 mg Oral BID AC Lukasz Thomson MD     • polyethylene glycol  17 g Oral BID Lukasz Thomson MD     • pravastatin  40 mg Oral Daily Zahida Tello MD     • rivaroxaban  20 mg Oral Daily With JoomePHIL     • senna-docusate sodium  1 tablet Oral BID Lukasz Thomson MD     • sodium chloride  75 mL/hr Intravenous Continuous Sophia , CRNA 75 mL/hr (06/20/23 1901)   • sucralfate  1 g Oral 4x Daily (AC & HS) Lukasz Thomson MD       Continuous Infusions:sodium chloride, 75 mL/hr, Last Rate: 75 mL/hr (06/20/23 1901)      PRN Meds: •  acetaminophen  •  atropine  •  ondansetron    Review of Systems:  ROS as noted above, otherwise 12 point review of systems was performed and is negative       Physical Exam:   GEN: NAD, alert and oriented, well appearing  SKIN: dry without significant lesions or rashes  HEENT: NCAT, PERRL, EOMs intact  NECK: No JVD or carotid bruits appreciated  CARDIOVASCULAR: RRR, normal S1, S2 without murmurs, rubs, or gallops appreciated  LUNGS: Clear to auscultation bilaterally without wheezes, rhonchi, or rales  ABDOMEN: Soft, nontender, nondistended  EXTREMITIES/VASCULAR: perfused without clubbing, cyanosis, or edema b/l  PSYCH: Normal mood and affect  NEURO: left-sided hemiplegia              Lab Results: I have personally reviewed pertinent lab results  Results from last 7 days   Lab Units 23  0437 23  0450 23  0631   WBC Thousand/uL 11 11* 8 30 11 76*   HEMOGLOBIN g/dL 14 8 14 9 15 8   HEMATOCRIT % 44 3 45 4 47 7   PLATELETS Thousands/uL 161 160 177     Results from last 7 days   Lab Units 23  0437 23  0450 23  0631   POTASSIUM mmol/L 3 5 4 2 4 2   CHLORIDE mmol/L 116* 104 104   CO2 mmol/L 22 28 26   BUN mg/dL 23 20 21   CREATININE mg/dL 0 71 0 85 0 86   CALCIUM mg/dL 7 7* 9 0 9 4               Imaging: I have personally reviewed pertinent reports  Results for orders placed during the hospital encounter of 18    Echo complete with contrast if indicated    Narrative  [address] [place] [address], [address]  [phone]    Transthoracic Echocardiogram  2D, M-mode, Doppler, and Color Doppler    Study date:  15-Nov-2018    Patient: Jana Perez  MR number: [id number]  Account number: [de-identified]  : 1950  Age: 76 years  Gender: Male  Status: Outpatient  Location: Bedside  Height: 70 in  Weight: 240 lb  BP: 141/ 77 mmHg    Indications: heart failure    Diagnoses: I50 9 - Heart failure, unspecified    Sonographer:  GLENIS Snadoval  Primary Physician:  Blas Champion MD  Referring Physician:  Reina Hung MD  Group:  Unknown [facility]  Interpreting Physician:  Genesis Brito MD    SUMMARY    LEFT VENTRICLE:  The ventricle was mildly dilated  Systolic function was normal  Ejection fraction was estimated to be 60 %  Although no diagnostic regional wall motion abnormality was identified, this possibility cannot be completely excluded on the basis of this study    Features were consistent with a pseudonormal left ventricular filling pattern, with concomitant abnormal relaxation and increased filling pressure (grade 2 diastolic dysfunction)  LEFT ATRIUM:  The atrium was mildly to moderately dilated  RIGHT ATRIUM:  The atrium was mildly dilated  MITRAL VALVE:  There was trace to mild regurgitation  AORTA:  The aortic root is not well visualized but appears mildly dilated  HISTORY: PRIOR HISTORY: HTN  Chol  CHF  PROCEDURE: The procedure was performed at the bedside  This was a routine study  The transthoracic approach was used  The study included complete 2D imaging, M-mode, complete spectral Doppler, and color Doppler  The heart rate was 70 bpm,  at the start of the study  Images were obtained from the parasternal, apical, subcostal, and suprasternal notch acoustic windows  Image quality was adequate  LEFT VENTRICLE: The ventricle was mildly dilated  Systolic function was normal  Ejection fraction was estimated to be 60 %  Although no diagnostic regional wall motion abnormality was identified, this possibility cannot be completely  excluded on the basis of this study  Wall thickness was normal  No evidence of apical thrombus  DOPPLER: Features were consistent with a pseudonormal left ventricular filling pattern, with concomitant abnormal relaxation and increased  filling pressure (grade 2 diastolic dysfunction)  RIGHT VENTRICLE: The size was normal  Systolic function was normal  Wall thickness was normal     LEFT ATRIUM: The atrium was mildly to moderately dilated  RIGHT ATRIUM: The atrium was mildly dilated  MITRAL VALVE: Valve structure was normal  There was normal leaflet separation  DOPPLER: The transmitral velocity was within the normal range  There was no evidence for stenosis  There was trace to mild regurgitation  AORTIC VALVE: The valve was probably trileaflet  Leaflets exhibited normal thickness, mild calcification, and normal cuspal separation  DOPPLER: Transaortic velocity was within the normal range  There was no evidence for stenosis   There  was no significant regurgitation  TRICUSPID VALVE: The valve structure was normal  There was normal leaflet separation  DOPPLER: The transtricuspid velocity was within the normal range  There was no evidence for stenosis  There was no significant regurgitation  PULMONIC VALVE: Not well visualized  DOPPLER: The transpulmonic velocity was within the normal range  There was no significant regurgitation  PERICARDIUM: There was no pericardial effusion  The pericardium was normal in appearance  AORTA: The aortic root is not well visualized but appears mildly dilated  SYSTEMIC VEINS: IVC: The inferior vena cava was normal in size      SYSTEM MEASUREMENT TABLES    2D  %FS: 31 1 %  Ao Diam: 2 8 cm  EDV(Teich): 157 4 ml  EF(Teich): 58 2 %  ESV(Teich): 65 9 ml  IVSd: 1 cm  LA Area: 26 cm2  LA Diam: 4 6 cm  LVEDV MOD A4C: 142 2 ml  LVEF MOD A4C: 73 4 %  LVESV MOD A4C: 37 8 ml  LVIDd: 5 7 cm  LVIDs: 3 9 cm  LVLd A4C: 8 9 cm  LVLs A4C: 6 8 cm  LVPWd: 0 9 cm  RA Area: 20 2 cm2  RVIDd: 3 6 cm  SV MOD A4C: 104 4 ml  SV(Teich): 91 6 ml    MM  TAPSE: 2 6 cm    PW  E': 0 1 m/s  MV A Larry: 0 m/s  MV E Larry: 0 8 m/s  MV E/A Ratio: 186 1    [employer] Accredited Echocardiography Laboratory    Prepared and electronically signed by    Johnnie Ovalles MD  Signed [id number] 14:40:07      VTE Pharmacologic Prophylaxis: Sequential compression device (Venodyne)   VTE Mechanical Prophylaxis: sequential compression device

## 2023-06-23 LAB
COLOR STONE: NORMAL
COMMENT-STONE3: NORMAL
COMPOSITION: NORMAL
LABORATORY COMMENT REPORT: NORMAL
PHOTO: NORMAL
SIZE STONE: NORMAL MM
SPEC SOURCE SUBJ: NORMAL
STONE ANALYSIS-IMP: NORMAL
URATE MFR STONE: 100 %
WT STONE: 26 MG

## 2023-06-26 ENCOUNTER — TELEPHONE (OUTPATIENT)
Dept: UROLOGY | Facility: AMBULATORY SURGERY CENTER | Age: 73
End: 2023-06-26

## 2023-06-26 NOTE — TELEPHONE ENCOUNTER
PT under care of: Thelma     Pt last seen: 05/25/23     PT calling today because/symptoms are: pt was in the hospital for pace maker and he had a urinalysis done and it came back abnormal and they would like someone to look at it        PT can be reached at: [phone]

## 2023-06-27 NOTE — TELEPHONE ENCOUNTER
Last UA from 5/27/23 was unremarkable for any infection or microhematuria  Urine culture also negative

## 2023-06-27 NOTE — TELEPHONE ENCOUNTER
Called to review with pt's wife juna diego reviewed she stated there was a different test she was looking for it turned out to be the stone test she was looking for  After review they would like to know who to follow up      Please advise

## 2023-06-30 ENCOUNTER — TELEPHONE (OUTPATIENT)
Dept: CARDIOLOGY CLINIC | Facility: CLINIC | Age: 73
End: 2023-06-30

## 2023-06-30 NOTE — TELEPHONE ENCOUNTER
Patient's wife called with a BP of 147/88 and 148/90, just today  His BP normally runs 130/80 and wife was concerned  I told her to keep track of BP for about 2 weeks and record log of them  If we see a consistent change in them, that at his [facility] with [facility] in two weeks, she would look at them and advise  She will bring log along with her

## 2023-07-03 ENCOUNTER — OFFICE VISIT (OUTPATIENT)
Dept: URGENT CARE | Facility: MEDICAL CENTER | Age: 73
End: 2023-07-03

## 2023-07-03 ENCOUNTER — APPOINTMENT (EMERGENCY)
Dept: RADIOLOGY | Facility: HOSPITAL | Age: 73
End: 2023-07-03
Payer: MEDICARE

## 2023-07-03 ENCOUNTER — APPOINTMENT (EMERGENCY)
Dept: CT IMAGING | Facility: HOSPITAL | Age: 73
End: 2023-07-03
Payer: MEDICARE

## 2023-07-03 ENCOUNTER — HOSPITAL ENCOUNTER (EMERGENCY)
Facility: HOSPITAL | Age: 73
Discharge: HOME | End: 2023-07-03
Attending: EMERGENCY MEDICINE
Payer: MEDICARE

## 2023-07-03 VITALS
DIASTOLIC BLOOD PRESSURE: 86 MMHG | HEART RATE: 92 BPM | OXYGEN SATURATION: 99 % | SYSTOLIC BLOOD PRESSURE: 132 MMHG | TEMPERATURE: 97.4 F | RESPIRATION RATE: 20 BRPM

## 2023-07-03 VITALS
TEMPERATURE: 97.8 F | RESPIRATION RATE: 20 BRPM | SYSTOLIC BLOOD PRESSURE: 156 MMHG | HEART RATE: 68 BPM | OXYGEN SATURATION: 99 % | DIASTOLIC BLOOD PRESSURE: 75 MMHG

## 2023-07-03 DIAGNOSIS — S51.019A SKIN TEAR OF ELBOW WITHOUT COMPLICATION: ICD-10-CM
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
## 2023-07-03 DIAGNOSIS — S00.03XA CONTUSION OF SCALP, INITIAL ENCOUNTER: Primary | ICD-10-CM

## 2023-07-03 DIAGNOSIS — S09.90XA INJURY OF HEAD, INITIAL ENCOUNTER: ICD-10-CM

## 2023-07-03 PROCEDURE — 93005 ELECTROCARDIOGRAM TRACING: CPT

## 2023-07-03 PROCEDURE — G1004 CDSM NDSC: HCPCS

## 2023-07-03 PROCEDURE — 99285 EMERGENCY DEPT VISIT HI MDM: CPT | Performed by: PHYSICIAN ASSISTANT

## 2023-07-03 PROCEDURE — 73060 X-RAY EXAM OF HUMERUS: CPT

## 2023-07-03 PROCEDURE — 70450 CT HEAD/BRAIN W/O DYE: CPT

## 2023-07-03 PROCEDURE — 73564 X-RAY EXAM KNEE 4 OR MORE: CPT

## 2023-07-03 PROCEDURE — 99213 OFFICE O/P EST LOW 20 MIN: CPT

## 2023-07-03 PROCEDURE — 99285 EMERGENCY DEPT VISIT HI MDM: CPT

## 2023-07-03 PROCEDURE — 73090 X-RAY EXAM OF FOREARM: CPT

## 2023-07-03 PROCEDURE — 90471 IMMUNIZATION ADMIN: CPT

## 2023-07-03 PROCEDURE — G0463 HOSPITAL OUTPT CLINIC VISIT: HCPCS

## 2023-07-03 PROCEDURE — 90715 TDAP VACCINE 7 YRS/> IM: CPT | Performed by: PHYSICIAN ASSISTANT

## 2023-07-03 RX ADMIN — TETANUS TOXOID, REDUCED DIPHTHERIA TOXOID AND ACELLULAR PERTUSSIS VACCINE, ADSORBED 0.5 ML: 5; 2.5; 8; 8; 2.5 SUSPENSION INTRAMUSCULAR at 16:21

## 2023-07-03 NOTE — ED CARE HANDOFF
Emergency Department Sign Out Note        Sign out and transfer of care from [facility]. See Separate Emergency Department note. The patient, Jenaro Mooney, was evaluated by the previous provider for head injury. Workup Completed:  XR knee 4+ views left injury   ED Interpretation   Degenerative changes no fracture or dislocation      Final Result      No acute osseous abnormality. Tricompartmental degenerative changes most pronounced in the medial compartment. Findings are concordant with preliminary interpretation provided in the Emergency Department. Workstation performed: [id number]         XR forearm 2 views LEFT   ED Interpretation   No acute fracture of the radius ulna or wrist/hand. 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 No acute intracranial abnormality. Workstation performed: [id number]               ED Course / Workup Pending (followup):  -Awaiting formal CT head read at time of my sign on  -Skin tears repaired by previous provider  -Plan for discharge home pending CT read                                      Procedures  Medical Decision Making  CT head with known prior infarct, no acute traumatic abnormality. Skin tears repaired by previous provider. No acute fractures per previous wet read. Stable for discharge home with wife. Amount and/or Complexity of Data Reviewed  Radiology: ordered and independent interpretation performed.       Risk  Prescription drug management. Disposition  Final diagnoses:   Fall, initial encounter   Injury of head, initial encounter   Skin tear of elbow without complication     Time reflects when diagnosis was documented in both MDM as applicable and the Disposition within this note     Time User Action Codes Description Comment    7/3/2023  6:36 PM Edith Hacker Add [G04. QIMH] Fall, initial encounter     7/3/2023  6:37 PM Edith Hacker Add [J97.53YM] Injury of head, initial encounter     7/3/2023  6:37 PM Edith Hacker Add [A84.185L] Skin tear of elbow without complication     6/1/0680  6:37 PM Edith Hacker Remove [U53.403K] Skin tear of elbow without complication     2/1/6275  6:37 PM Edith Hacker Add [S51.019A] Skin tear of elbow without complication       ED Disposition     ED Disposition   Discharge    Condition   Stable    Date/Time   Mon Jul 3, 2023  6:36 PM    [address] discharge to home/self care.                Follow-up Information     Follow up With Specialties Details Why Contact Info Additional Information    formerly [facility] Emergency Department Emergency Medicine  If symptoms worsen [address] [address]  1302 [facility] Emergency Department, [address] Jonn Wright MD Family Medicine Schedule an appointment as soon as possible for a visit   [address] [place], [place] [address] [place] [address] [facility] [place]. [place] [id number] [place] [address]           Discharge Medication List as of 7/3/2023  6:38 PM      CONTINUE these medications which have NOT CHANGED    Details   acetaminophen (TYLENOL) 325 mg tablet Take 2 tablets (650 mg total) by mouth every 4 (four) hours as needed for mild pain, Starting Fri 11/19/2021, No Print      Coenzyme Q10 (CO Q 10) 10 MG CAPS Take by mouth daily, Historical Med      cyanocobalamin (VITAMIN B-12) 100 mcg tablet Take by mouth daily, Historical Med      famotidine (PEPCID) 20 mg tablet Take 1 tablet (20 mg total) by mouth daily, Starting Wed 6/21/2023, Normal      finasteride (PROSCAR) 5 mg tablet Take 5 mg by mouth daily at bedtime  , Historical Med      hydrocortisone 1 % cream Apply topically 2 (two) times a day as needed , Historical Med      ketoconazole (NIZORAL) 2 % shampoo SHAMPOO 3 TIMES A WEEK AS A SCALP TREATMENT, LEAVE ON 5-10 MINUTES AND RINSE, Historical Med      pantoprazole (PROTONIX) 40 mg tablet Take 1 tablet (40 mg total) by mouth 2 (two) times a day before meals, Starting Fri 5/31/2019, No Print      pravastatin (PRAVACHOL) 40 mg tablet Take 40 mg by mouth daily, Starting Mon 10/24/2022, Historical Med      rivaroxaban (Xarelto) 20 mg tablet Take 20 mg by mouth daily with dinner  , Starting Wed 4/22/2020, Historical Med      sucralfate (CARAFATE) 1 g tablet Take 1 tablet (1 g total) by mouth 4 (four) times a day (before meals and at bedtime) for 21 days, Starting Wed 6/21/2023, Until Wed 7/12/2023, Normal           No discharge procedures on file.        ED Provider  Electronically Signed by     Faith Justin PA-C  07/03/23 9783

## 2023-07-03 NOTE — PROGRESS NOTES
[facility]        NAME: Tamsen Habermann is a 68 y.o. male  : 1950    MRN: [id number]  DATE: July 3, 2023  TIME: 3:10 PM    Assessment and Plan   Contusion of scalp, initial encounter [S00.03XA]  1. Contusion of scalp, initial encounter  Transfer to other facility            Patient Instructions     To ER now. I explained that since he is on a blood thinner and hit his head that he needs to have further evaluation. The abrasions on his legs were cleaned and dressed by nursing. Chief Complaint     Chief Complaint   Patient presents with   • Fall     Wife reports pt was walking, tripped and fell. Pt reports hitting his head and denies LOC. Pt taking blood thinners. History of Present Illness       Patient tripped and fell. He uses a quad cane. He is on a blood thinner and he hit the left side of his head and has a abrasion over the area. He also complains of multiple scrapes on his left lower leg. Fall  The accident occurred 1 to 3 hours ago. He fell from a height of 3 to 5 ft. He landed on concrete. There was no blood loss. The point of impact was the head and left knee. The pain is present in the head and left lower leg. The pain is mild. Review of Systems   Review of Systems   All other systems reviewed and are negative.         Current Medications       Current Outpatient Medications:   •  acetaminophen (TYLENOL) 325 mg tablet, Take 2 tablets (650 mg total) by mouth every 4 (four) hours as needed for mild pain, Disp: 30 tablet, Rfl: 0  •  Coenzyme Q10 (CO Q 10) 10 MG CAPS, Take by mouth daily, Disp: , Rfl:   •  cyanocobalamin (VITAMIN B-12) 100 mcg tablet, Take by mouth daily, Disp: , Rfl:   •  famotidine (PEPCID) 20 mg tablet, Take 1 tablet (20 mg total) by mouth daily, Disp: 30 tablet, Rfl: 0  •  finasteride (PROSCAR) 5 mg tablet, Take 5 mg by mouth daily at bedtime  , Disp: , Rfl:   •  hydrocortisone 1 % cream, Apply topically 2 (two) times a day as needed , Disp: , Rfl:   •  ketoconazole (NIZORAL) 2 % shampoo, SHAMPOO 3 TIMES A WEEK AS A SCALP TREATMENT, LEAVE ON 5-10 MINUTES AND RINSE, Disp: , Rfl:   •  pantoprazole (PROTONIX) 40 mg tablet, Take 1 tablet (40 mg total) by mouth 2 (two) times a day before meals, Disp: 120 tablet, Rfl: 0  •  pravastatin (PRAVACHOL) 40 mg tablet, Take 40 mg by mouth daily, Disp: , Rfl:   •  rivaroxaban (Xarelto) 20 mg tablet, Take 20 mg by mouth daily with dinner  , Disp: , Rfl:   •  sucralfate (CARAFATE) 1 g tablet, Take 1 tablet (1 g total) by mouth 4 (four) times a day (before meals and at bedtime) for 21 days, Disp: 84 tablet, Rfl: 0    Current Allergies     Allergies as of 07/03/2023 - Reviewed 07/03/2023   Allergen Reaction Noted   • Cephalexin Throat Swelling 11/15/2018   • Penicillins Other (See Comments) 08/02/2019            The following portions of the patient's history were reviewed and updated as appropriate: allergies, current medications, past family history, past medical history, past social history, past surgical history and problem list.     Past Medical History:   Diagnosis Date   • Acute encephalopathy 5/27/2019   • Arthritis     BL knees   • Atrial fibrillation (HCC)    • CHF (congestive heart failure) (HCC)    • Colon polyp    • CVA (cerebral vascular accident) ([address]) 4/27/2019    NIHSS 26 on presentation   • Depression    • Diabetes mellitus ([address])     borderline   • Encephalopathy acute 4/28/2019   • History of transfusion 04/2019   • Hyperlipidemia    • Irregular heart beat     Afib   • Stroke ([address]) 04/27/2019    Left sided weakness-aphasia   • Urinary retention    • Urinary retention 6/4/2019       Past Surgical History:   Procedure Laterality Date   • CARDIAC ELECTROPHYSIOLOGY PROCEDURE Left 6/20/2023    Procedure: Cardiac pacer implant;  Surgeon: Franco Jefferson DO;  [facility];   Service: Cardiology   • COLONOSCOPY     • IR STROKE ALERT  4/27/2019   • MENISCECTOMY Right    • DC CYSTO INSERTION TRANSPROSTATIC IMPLANT SINGLE N/A 11/19/2021    Procedure: CYSTOSCOPY WITH INSERTION Hawthorne Bullion;  Surgeon: Jos Kaur MD;  Location: [facility];  Service: Urology       No family history on file. Medications have been verified. Objective   /86 (BP Location: Right arm, Patient Position: Sitting, Cuff Size: Standard)   Pulse 92   Temp (!) 97.4 °F (36.3 °C) (Tympanic)   Resp 20   SpO2 99%   No LMP for male patient. Physical Exam     Physical Exam  Vitals and nursing note reviewed. Constitutional:       Appearance: Normal appearance. He is normal weight. Eyes:      Pupils: Pupils are equal, round, and reactive to light. Cardiovascular:      Rate and Rhythm: Normal rate. Pulses: Normal pulses. Pulmonary:      Effort: Pulmonary effort is normal.   Neurological:      Mental Status: He is alert. Multiple abrasions left leg.   Abrasion left forehead

## 2023-07-03 NOTE — ED PROVIDER NOTES
History  Chief Complaint   Patient presents with   • Head Injury     Accidentally fell hitting head. +thinners. Abrasions to LUE, contusion to scalp. This is a 79-year-old male patient who had a right-sided stroke back in early June he has some paraplegia on his left side. Recently had a pacemaker placed in his left chest wall. Went to urgent care today because he had a mechanical fall onto his left side with a head strike left forehead. Has multiple skin tears that bleeding is controlled on the left arm skin his left knee. There was no loss of consciousness he is on Xarelto last tetanus was greater than 5 years. He offers no complaints. He is alert nontoxic he was sent in by urgent care for CAT scan and further evaluation. Denies any headache blurred vision double vision no neck pain. He denies any left arm or left knee pain however he has decreased sensation secondary to stroke. At this time will have an x-ray of his humerus forearm knee CAT scan of his head update his tetanus all wounds will be cleaned and dressed. Prior to Admission Medications   Prescriptions Last Dose Informant Patient Reported? Taking?    Coenzyme Q10 (CO Q 10) 10 MG CAPS 7/2/2023 Spouse/Significant Other Yes Yes   Sig: Take by mouth daily   acetaminophen (TYLENOL) 325 mg tablet  Spouse/Significant Other No Yes   Sig: Take 2 tablets (650 mg total) by mouth every 4 (four) hours as needed for mild pain   cyanocobalamin (VITAMIN B-12) 100 mcg tablet 7/2/2023 Spouse/Significant Other Yes Yes   Sig: Take by mouth daily   famotidine (PEPCID) 20 mg tablet 7/2/2023  No Yes   Sig: Take 1 tablet (20 mg total) by mouth daily   finasteride (PROSCAR) 5 mg tablet 7/2/2023 Spouse/Significant Other Yes Yes   Sig: Take 5 mg by mouth daily at bedtime     hydrocortisone 1 % cream  Spouse/Significant Other Yes Yes   Sig: Apply topically 2 (two) times a day as needed    ketoconazole (NIZORAL) 2 % shampoo  Spouse/Significant Other Yes Yes Sig: SHAMPOO 3 TIMES A WEEK AS A SCALP TREATMENT, LEAVE ON 5-10 MINUTES AND RINSE   pantoprazole (PROTONIX) 40 mg tablet 7/2/2023 Spouse/Significant Other No Yes   Sig: Take 1 tablet (40 mg total) by mouth 2 (two) times a day before meals   pravastatin (PRAVACHOL) 40 mg tablet 7/2/2023 Spouse/Significant Other Yes Yes   Sig: Take 40 mg by mouth daily   rivaroxaban (Xarelto) 20 mg tablet 7/2/2023 Spouse/Significant Other Yes Yes   Sig: Take 20 mg by mouth daily with dinner     sucralfate (CARAFATE) 1 g tablet 7/2/2023  No Yes   Sig: Take 1 tablet (1 g total) by mouth 4 (four) times a day (before meals and at bedtime) for 21 days      Facility-Administered Medications: None       Past Medical History:   Diagnosis Date   • Acute encephalopathy 5/27/2019   • Arthritis     BL knees   • Atrial fibrillation (HCC)    • CHF (congestive heart failure) (HCC)    • Colon polyp    • CVA (cerebral vascular accident) ([address]) 4/27/2019    NIHSS 26 on presentation   • Depression    • Diabetes mellitus ([address])     borderline   • Encephalopathy acute 4/28/2019   • History of transfusion 04/2019   • Hyperlipidemia    • Irregular heart beat     Afib   • Stroke ([address]) 04/27/2019    Left sided weakness-aphasia   • Urinary retention    • Urinary retention 6/4/2019       Past Surgical History:   Procedure Laterality Date   • CARDIAC ELECTROPHYSIOLOGY PROCEDURE Left 6/20/2023    Procedure: Cardiac pacer implant;  Surgeon: Kit Benson DO;  [facility]; Service: Cardiology   • COLONOSCOPY     • IR STROKE ALERT  4/27/2019   • MENISCECTOMY Right    • TX CYSTO INSERTION TRANSPROSTATIC IMPLANT SINGLE N/A 11/19/2021    Procedure: Gladystine Patches WITH INSERTION UROLIFT;  Surgeon: Paul Vergara MD;  Location[facility];  Service: Urology       History reviewed. No pertinent family history. I have reviewed and agree with the history as documented.     E-Cigarette/Vaping   • E-Cigarette Use Never User      E-Cigarette/Vaping Substances   • Nicotine No    • THC No    • CBD No    • Flavoring No    • Other No    • Unknown No      Social History     Tobacco Use   • Smoking status: Never   • Smokeless tobacco: Never   Vaping Use   • Vaping Use: Never used   Substance Use Topics   • Alcohol use: Never     Comment: social   • Drug use: Never       Review of Systems   Constitutional: Negative for diaphoresis, fatigue and fever. HENT: Negative for congestion, ear pain, nosebleeds and sore throat. Eyes: Negative for photophobia, pain, discharge and visual disturbance. Respiratory: Negative for cough, choking, chest tightness, shortness of breath and wheezing. Cardiovascular: Negative for chest pain and palpitations. Gastrointestinal: Negative for abdominal distention, abdominal pain, diarrhea and vomiting. Genitourinary: Negative for dysuria, flank pain and frequency. Musculoskeletal: Positive for gait problem (chronic). Negative for back pain and joint swelling. Skin: Positive for wound. Negative for color change and rash. Neurological: Positive for weakness (left side chronic). Negative for dizziness, syncope and headaches. Psychiatric/Behavioral: Negative for behavioral problems and confusion. The patient is not nervous/anxious. All other systems reviewed and are negative. Physical Exam  Physical Exam  Vitals and nursing note reviewed. Constitutional:       General: He is not in acute distress. Appearance: Normal appearance. He is well-developed. HENT:      Head: Normocephalic. Right Ear: Tympanic membrane, ear canal and external ear normal.      Left Ear: Tympanic membrane, ear canal and external ear normal.      Nose: Nose normal.      Mouth/Throat:      Mouth: Mucous membranes are moist.   Eyes:      Conjunctiva/sclera: Conjunctivae normal.   Cardiovascular:      Rate and Rhythm: Normal rate and regular rhythm. Heart sounds: No murmur heard.   Pulmonary:      Effort: Pulmonary effort is normal. No respiratory distress. Breath sounds: Normal breath sounds. Chest:       Abdominal:      Palpations: Abdomen is soft. Tenderness: There is no abdominal tenderness. Musculoskeletal:         General: No swelling. Arms:       Cervical back: Neck supple. Legs:    Skin:     General: Skin is warm and dry. Capillary Refill: Capillary refill takes less than 2 seconds. Neurological:      Mental Status: He is alert. Psychiatric:         Mood and Affect: Mood normal.         Vital Signs  ED Triage Vitals   Temperature Pulse Respirations Blood Pressure SpO2   07/03/23 1527 07/03/23 1527 07/03/23 1527 07/03/23 1527 07/03/23 1527   97.8 °F (36.6 °C) 70 18 155/97 99 %      Temp src Heart Rate Source Patient Position - Orthostatic VS BP Location FiO2 (%)   -- 07/03/23 1837 07/03/23 1837 07/03/23 1837 --    Monitor Lying Right arm       Pain Score       07/03/23 1837       No Pain           Vitals:    07/03/23 1527 07/03/23 1837   BP: 155/97 156/75   Pulse: 70 68   Patient Position - Orthostatic VS:  Lying         Visual Acuity      ED Medications  Medications   tetanus-diphtheria-acellular pertussis (BOOSTRIX) IM injection 0.5 mL (0.5 mL Intramuscular Given 7/3/23 1621)       Diagnostic Studies  Results Reviewed     None                 XR knee 4+ views left injury   ED Interpretation by Dina Rosenberg PA-C (07/03 1713)   Degenerative changes no fracture or dislocation      Final Result by Mandi Ha MD (07/03 1804)      No acute osseous abnormality. Tricompartmental degenerative changes most pronounced in the medial compartment. Findings are concordant with preliminary interpretation provided in the Emergency Department. Workstation performed: [id number]         XR forearm 2 views LEFT   ED Interpretation by Dina Rosenberg PA-C (07/03 1714)   No acute fracture of the radius ulna or wrist/hand.   Does have what appears to be well corticated fragments at the supracondylar area there is no pain or sign of trauma      Final Result by Jimmy Cantu MD (07/04 1043)      No acute osseous abnormality. Workstation performed: [id number]         XR humerus LEFT   ED Interpretation by Michelle Bundy PA-C (07/03 1713)   No fracture or dislocation does have what appears to well-corticated previous injury at the distal supracondylar area patient has no pain to palpation over this area or sign of trauma besides small skin tear      Final Result by Jimmy Cantu MD (07/04 1045)      No acute osseous abnormality. 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 No acute intracranial abnormality. Workstation performed: [id number]                    Procedures  Universal Protocol:  Risks and benefits: risks, benefits and alternatives were discussed  Consent given by: patient  Patient understanding: patient states understanding of the procedure being performed  Patient identity confirmed: verbally with patient    Laceration repair    Date/Time: 7/3/2023 5:15 PM    Performed by: Michelle Bundy PA-C  Authorized by: Michelle Bundy PA-C  Location: Actually 5 skin tears left humerus down to forearm. Wound length (cm): For the 5 or 1 cm. One of the skin tear is approximately 3 cm mid forearm. Tendon involvement: none  Nerve involvement: none  Vascular damage: no    Sedation:  Patient sedated: no        Procedure Details:  Preparation: Patient was prepped and draped in the usual sterile fashion.   Irrigation solution: tap water  Irrigation method: tap  Debridement: none  Degree of undermining: none  Skin closure: Steri-Strips  Approximation: loose  Approximation difficulty: simple  Dressing: 4x4 sterile gauze and gauze roll  Patient tolerance: patient tolerated the procedure well with no immediate complications               ED Course  ED Course as of 07/06/23 0905   Mon Jul 03, 2023   1731 Distal EKG interpreted by me patient has A-fib with some paced complexes with a rate of 69 bpm normal axis QTc 415 similar to previous of June 2023   1744 Signed out to [employer]                               SBIRT 22yo+    [employer] Most Recent Value   Initial Alcohol Screen: US AUDIT-C     1. How often do you have a drink containing alcohol? 0 Filed at: 07/03/2023 1625   2. How many drinks containing alcohol do you have on a typical day you are drinking? 0 Filed at: 07/03/2023 1625   3b. FEMALE Any Age, or MALE 65+: How often do you have 4 or more drinks on one occassion? 0 Filed at: 07/03/2023 1625   Audit-C Score 0 Filed at: 07/03/2023 1625   JANETTE: How many times in the past year have you. .. Used an illegal drug or used a prescription medication for non-medical reasons? Never Filed at: 07/03/2023 1625                    MDM    Disposition  Final diagnoses:   Fall, initial encounter   Injury of head, initial encounter   Skin tear of elbow without complication     Time reflects when diagnosis was documented in both MDM as applicable and the Disposition within this note     Time User Action Codes Description Comment    7/3/2023  6:36 PM Latoya Hickeyue Add [M93. CLBY] Fall, initial encounter     7/3/2023  6:37 PM Latoya Rogue Add [I03.08MX] Injury of head, initial encounter     7/3/2023  6:37 PM Latoya Rogue Add [E16.223P] Skin tear of elbow without complication     5/0/7754  6:37 PM Latoya Rogue Remove [F49.836E] Skin tear of elbow without complication     1/5/2147  6:37 PM Latoya Rogue Add [S51.019A] Skin tear of elbow without complication       ED Disposition     ED Disposition   Discharge    Condition   Stable    Date/Time   Mon Jul 3, 2023  6:36 PM    [address] discharge to home/self care. Follow-up Information     Follow up With Specialties Details Why Contact Info Additional Information    [address] [facility] Emergency Department Emergency Medicine  If symptoms worsen [address] [address]  1306 [facility] Emergency Department, 2000 [facility] Mary Baker MD Family Medicine Schedule an appointment as soon as possible for a visit   [address] [place] [address],[address] 15[address] [facility]  [phone]             Discharge Medication List as of 7/3/2023  6:38 PM      CONTINUE these medications which have NOT CHANGED    Details   acetaminophen (TYLENOL) 325 mg tablet Take 2 tablets (650 mg total) by mouth every 4 (four) hours as needed for mild pain, Starting Fri 11/19/2021, No Print      Coenzyme Q10 (CO Q 10) 10 MG CAPS Take by mouth daily, Historical Med      cyanocobalamin (VITAMIN B-12) 100 mcg tablet Take by mouth daily, Historical Med      famotidine (PEPCID) 20 mg tablet Take 1 tablet (20 mg total) by mouth daily, Starting Wed 6/21/2023, Normal      finasteride (PROSCAR) 5 mg tablet Take 5 mg by mouth daily at bedtime  , Historical Med      hydrocortisone 1 % cream Apply topically 2 (two) times a day as needed , Historical Med      ketoconazole (NIZORAL) 2 % shampoo SHAMPOO 3 TIMES A WEEK AS A SCALP TREATMENT, LEAVE ON 5-10 MINUTES AND RINSE, Historical Med      pantoprazole (PROTONIX) 40 mg tablet Take 1 tablet (40 mg total) by mouth 2 (two) times a day before meals, Starting Fri 5/31/2019, No Print      pravastatin (PRAVACHOL) 40 mg tablet Take 40 mg by mouth daily, Starting Mon 10/24/2022, Historical Med      rivaroxaban (Xarelto) 20 mg tablet Take 20 mg by mouth daily with dinner  , Starting Wed 4/22/2020, Historical Med      sucralfate (CARAFATE) 1 g tablet Take 1 tablet (1 g total) by mouth 4 (four) times a day (before meals and at bedtime) for 21 days, Starting Wed 6/21/2023, Until Wed 7/12/2023, Normal             No discharge procedures on file.     PDMP Review     None          ED Provider  Electronically Signed by           Charlie Collazo PA-C  07/06/23 9835

## 2023-07-03 NOTE — DISCHARGE INSTRUCTIONS
Please refer to the attached information for strict return instructions. If symptoms worsen or new symptoms develop please return to the ER. Please follow up with your primary care physician for re-evaluation of symptoms.

## 2023-07-04 LAB
ATRIAL RATE: 65 BPM
QRS AXIS: 23 DEGREES
QRSD INTERVAL: 90 MS
QT INTERVAL: 388 MS
QTC INTERVAL: 415 MS
T WAVE AXIS: 80 DEGREES
VENTRICULAR RATE: 69 BPM

## 2023-07-04 PROCEDURE — 93010 ELECTROCARDIOGRAM REPORT: CPT | Performed by: INTERNAL MEDICINE

## 2023-07-06 ENCOUNTER — IN-CLINIC DEVICE VISIT (OUTPATIENT)
Dept: CARDIOLOGY CLINIC | Facility: CLINIC | Age: 73
End: 2023-07-06

## 2023-07-06 DIAGNOSIS — Z95.0 PRESENCE OF PERMANENT CARDIAC PACEMAKER: Primary | ICD-10-CM

## 2023-07-06 PROCEDURE — 99024 POSTOP FOLLOW-UP VISIT: CPT | Performed by: INTERNAL MEDICINE

## 2023-07-06 NOTE — PROGRESS NOTES
Results for orders placed or performed in visit on 07/06/23   Cardiac EP device report    Narrative    MDT D-PM/ACTIVE SYSTEM IS MRI CONDITIONAL  DEVICE INTERROGATED IN THE [facility] OFFICE. BATTERY VOLTAGE ADEQUATE (12.8 YRS). AP <0.1%  74.7% (>40%/AAIR-DDDR 60) ALL LEAD PARAMETERS WITHIN NORMAL LIMITS. NO NEW SIGNIFICANT HIGH RATE EPISODES.; CURRENTLY IN AF SINCE 6/20/23. AT/AF BURDEN 100% SINCE 6/21/23. Abhinav Medina. NO PROGRAMMING CHANGES MADE TO DEVICE PARAMETERS. WOUND CHECK: INCISION CLEAN AND DRY WITH EDGES APPROXIMATED; STAPLES REMOVED; WOUND CARE AND RESTRICTIONS REVIEWED WITH PATIENT. NORMAL DEVICE FUNCTION.  AM/GV

## 2023-07-11 NOTE — PROGRESS NOTES
Cardiology Office Follow Up  Tandy Olszewski  1950  [id number]    ASSESSMENT:  · Atrial fibrillation with slow ventricular response   - new diagnosis, June 2023. - Medtronic dual-chamber permanent pacemaker in situ, 6/20/23.   - prior medtronic loop recorder, explanted 7/2020. · Chronic diastolic congestive heart failure   - TTE 6/19/23: LVEF 65%, RV cavity size mildly dilated, mild to moderate left atrial dilatation, mild to moderate right atrial dilatation, mild AR, mild MAC with mild MR.  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 · Currently on anticoagulation with Xarelto 20 mg daily. · Currently on statin therapy with pravastatin 40 mg daily. · Patient has a follow-up appointment with Jahaira VILLARREAL with EP on 8/9/23. Patient and wife will discuss whether ablation is an option for patient's atrial fibrillation. · We will have patient follow-up with Dr. Arpan Agarwal in approximately 8 weeks. Interval History/ HPI:   80-year-old male presents for follow-up post recent hospitalization. Patient initially presented to the [facility] on 6/16/23 with complaints of abdominal discomfort. In the emergency department, patient was given morphine for pain was also found to have atrial fibrillation with slow ventricular response with a heart rate down to the 20-30 range. Patient was asymptomatic. Through hospitalization, patient's heart rate continued to be low and worsened to the 20-30 range off morphine/opioid analgesia. Given frequency of ventricular ectopy including bigeminy, frequent PVCs and history of NSVT, patient was transferred to the [address] [place] [address] for permanent pacemaker implantation.     Upon office visit today, patient is without shortness of breath, chest pain, dizziness, lightheadedness, syncope, presyncope, palpitations. Noted recent fall outpatient, however this was a mechanical fall as patient tripped. Vitals:  /80   Pulse 91   Ht 6' (1.829 m)   Wt 89.4 kg (197 lb)   BMI 26.72 kg/m²     Past Medical History:   Diagnosis Date   • Acute encephalopathy 5/27/2019   • Arthritis     BL knees   • Atrial fibrillation (HCC)    • CHF (congestive heart failure) (HCC)    • Colon polyp    • CVA (cerebral vascular accident) ([address]) 4/27/2019    NIHSS 26 on presentation   • Depression    • Diabetes mellitus ([facility])     borderline   • Encephalopathy acute 4/28/2019   • History of transfusion 04/2019   • Hyperlipidemia    • Irregular heart beat     Afib   • Stroke ([address]) 04/27/2019    Left sided weakness-aphasia   • Urinary retention    • Urinary retention 6/4/2019     Social History     Socioeconomic History   • Marital status: /Civil Union     Spouse name: Not on file   • Number of children: Not on file   • Years of education: Not on file   • Highest education level: Not on file   Occupational History   • Not on file   Tobacco Use   • Smoking status: Never   • Smokeless tobacco: Never   Vaping Use   • Vaping Use: Never used   Substance and Sexual Activity   • Alcohol use: Never     Comment: social   • Drug use: Never   • Sexual activity: Not on file   Other Topics Concern   • Not on file   Social History Narrative   • Not on file     Social Determinants of Health     Financial Resource Strain: Not on file   Food Insecurity: No Food Insecurity (6/20/2023)    Hunger Vital Sign    • Worried About Running Out of Food in the Last Year: Never true    • Ran Out of Food in the Last Year: Never true   Transportation Needs: No Transportation Needs (6/20/2023)    PRAPARE - Transportation    • Lack of Transportation (Medical): No    • Lack of Transportation (Non-Medical):  No   Physical Activity: Not on file   Stress: Not on file   Social Connections: Not on file   Intimate Partner Violence: Not on file   Housing Stability: Unknown (6/20/2023)    Housing Stability Vital Sign    • Unable to Pay for Housing in the Last Year: No    • Number of Places Lived in the Last Year: Not on file    • Unstable Housing in the Last Year: No      No family history on file. Past Surgical History:   Procedure Laterality Date   • CARDIAC ELECTROPHYSIOLOGY PROCEDURE Left 6/20/2023    Procedure: Cardiac pacer implant;  Surgeon: Darrius Hester DO;  Location: [facility];   Service: Cardiology   • COLONOSCOPY     • IR STROKE ALERT  4/27/2019   • MENISCECTOMY Right    • [place] CYSTO INSERTION TRANSPROSTATIC IMPLANT SINGLE N/A 11/19/2021    Procedure: CYSTOSCOPY WITH INSERTION UROLIFT;  Surgeon: Sravani Babb MD;  Location: [facility];  Service: Urology       Current Outpatient Medications:   •  acetaminophen (TYLENOL) 325 mg tablet, Take 2 tablets (650 mg total) by mouth every 4 (four) hours as needed for mild pain, Disp: 30 tablet, Rfl: 0  •  Coenzyme Q10 (CO Q 10) 10 MG CAPS, Take by mouth daily, Disp: , Rfl:   •  cyanocobalamin (VITAMIN B-12) 100 mcg tablet, Take by mouth daily, Disp: , Rfl:   •  famotidine (PEPCID) 20 mg tablet, Take 1 tablet (20 mg total) by mouth daily, Disp: 30 tablet, Rfl: 0  •  finasteride (PROSCAR) 5 mg tablet, Take 5 mg by mouth daily at bedtime  , Disp: , Rfl:   •  hydrocortisone 1 % cream, Apply topically 2 (two) times a day as needed , Disp: , Rfl:   •  ketoconazole (NIZORAL) 2 % shampoo, SHAMPOO 3 TIMES A WEEK AS A SCALP TREATMENT, LEAVE ON 5-10 MINUTES AND RINSE, Disp: , Rfl:   •  pantoprazole (PROTONIX) 40 mg tablet, Take 1 tablet (40 mg total) by mouth 2 (two) times a day before meals, Disp: 120 tablet, Rfl: 0  •  pravastatin (PRAVACHOL) 40 mg tablet, Take 40 mg by mouth daily, Disp: , Rfl:   •  rivaroxaban (Xarelto) 20 mg tablet, Take 20 mg by mouth daily with dinner  , Disp: , Rfl:   •  sucralfate (CARAFATE) 1 g tablet, Take 1 tablet (1 g total) by mouth 4 (four) times a day (before meals and at bedtime) for 21 days, Disp: 84 tablet, Rfl: 0      Review of Systems:  Review of Systems   Constitutional: Negative for activity change and appetite change. Respiratory: Negative for chest tightness, shortness of breath and wheezing. Cardiovascular: Negative for palpitations and leg swelling. Gastrointestinal: Negative for abdominal distention and abdominal pain. Neurological: Negative for dizziness, syncope, weakness and light-headedness. Psychiatric/Behavioral: Negative for confusion. Physical Exam:  Physical Exam  Constitutional:       Appearance: He is not ill-appearing. Cardiovascular:      Rate and Rhythm: Normal rate. Rhythm irregular. Pulses: Normal pulses. Pulmonary:      Effort: Pulmonary effort is normal. No respiratory distress. Abdominal:      General: Bowel sounds are normal. There is no distension. Musculoskeletal:         General: No swelling. Right lower leg: No edema. Left lower leg: No edema. Skin:     General: Skin is warm. Neurological:      General: No focal deficit present. Mental Status: He is alert. Mental status is at baseline. Psychiatric:         Mood and Affect: Mood normal.         Thought Content: Thought content normal.         This note was completed in part utilizing [employer] Software. Grammatical errors, random word insertions, spelling mistakes, and incomplete sentences can be an occasional consequence of this system secondary to software limitations, ambient noise, and hardware issues. If you have any questions or concerns about the content, text, or information contained within the body of this dictation, please contact the provider for clarification.

## 2023-07-12 ENCOUNTER — OFFICE VISIT (OUTPATIENT)
Dept: CARDIOLOGY CLINIC | Facility: CLINIC | Age: 73
End: 2023-07-12
Payer: MEDICARE

## 2023-07-12 VITALS
HEART RATE: 91 BPM | WEIGHT: 197 LBS | HEIGHT: 72 IN | DIASTOLIC BLOOD PRESSURE: 80 MMHG | BODY MASS INDEX: 26.68 KG/M2 | SYSTOLIC BLOOD PRESSURE: 146 MMHG

## 2023-07-12 DIAGNOSIS — E11.9 TYPE 2 DIABETES MELLITUS WITHOUT COMPLICATION, UNSPECIFIED WHETHER LONG TERM INSULIN USE (HCC): ICD-10-CM

## 2023-07-12 DIAGNOSIS — I48.0 PAROXYSMAL ATRIAL FIBRILLATION (HCC): Primary | ICD-10-CM

## 2023-07-12 PROCEDURE — 99024 POSTOP FOLLOW-UP VISIT: CPT | Performed by: NURSE PRACTITIONER

## 2023-07-12 PROCEDURE — 93000 ELECTROCARDIOGRAM COMPLETE: CPT | Performed by: NURSE PRACTITIONER

## 2023-07-12 RX ORDER — METOPROLOL SUCCINATE 25 MG/1
25 TABLET, EXTENDED RELEASE ORAL DAILY
Qty: 30 TABLET | Refills: 2 | Status: SHIPPED | OUTPATIENT
Start: 2023-07-12

## 2023-07-12 NOTE — LETTER
July 12, 2023     Patient: Cale Watkins  YOB: 1950  Date of Visit: 7/12/2023      To Whom it May Concern:    Jean-Claude Nneka is under my professional care. Luz Maria Phillip was seen in my office on 7/12/2023. Luz Maria Phillip is referred for speech, PT, OT for evaluation and treatment. If you have any questions or concerns, please don't hesitate to call.          Sincerely,          Maryellen Kang

## 2023-07-24 PROBLEM — Z12.5 PROSTATE CANCER SCREENING: Status: RESOLVED | Noted: 2023-05-25 | Resolved: 2023-07-24

## 2023-08-02 ENCOUNTER — OFFICE VISIT (OUTPATIENT)
Dept: VASCULAR SURGERY | Facility: CLINIC | Age: 73
End: 2023-08-02
Payer: MEDICARE

## 2023-08-02 VITALS
OXYGEN SATURATION: 98 % | HEIGHT: 72 IN | DIASTOLIC BLOOD PRESSURE: 88 MMHG | SYSTOLIC BLOOD PRESSURE: 126 MMHG | WEIGHT: 199 LBS | HEART RATE: 65 BPM | BODY MASS INDEX: 26.95 KG/M2

## 2023-08-02 DIAGNOSIS — K55.1 SUPERIOR MESENTERIC ARTERY STENOSIS (HCC): Primary | ICD-10-CM

## 2023-08-02 PROCEDURE — 99204 OFFICE O/P NEW MOD 45 MIN: CPT | Performed by: SURGERY

## 2023-08-02 NOTE — PROGRESS NOTES
Assessment/Plan:    Superior mesenteric artery stenosis ([address])  Pierre Moses is a pleasant 60-year-old gentleman with prior history of stroke approximately 4 and half years ago with residual dense left-sided hemiparesis who follows up in the office from recent hospitalization where he had presented with sudden onset of lower abdominal pain with associated nausea and vomiting. His wife reports he was feeling his usual self and following dinner had developed pain that "doubles him over." Their daughter is a nurse and she came over to assess him and ultimately when symptoms did not gabriel was taken to hospital for further evaluation. He underwent work-up including a CTA which suggested a approximately 50% SMA stenosis. His celiac and HONG widely patent. There is no evidence of any acute thrombus on CTA imaging  Underlying etiology for his acute onset abdominal pain is/remains somewhat unclear. The wife did apparently notice what she felt were "kidney stones" in urine. He was also started on treatment for PUD by GI. Ultimately his symptoms resolved however prior to discharge also did require pacemaker placement. He has had no further episodes since discharge. Recommend he follow up with GI. He is apparently due for colonoscopy. From a vascular standpoint I will obtain a surveillance mesenteric duplex in 6 months. If there is no change and clinically asymptomatic can likely forego any longterm routine surveillance at that time. Diagnoses and all orders for this visit:    Superior mesenteric artery stenosis ([address])  -     Ambulatory Referral to Vascular Surgery  -     VAS celiac and/or mesenteric duplex; complete study; Future          Subjective:      Patient ID: Krunal Vazquez is a 68 y.o. male. Patient presents for hospital follow up and review CTA abd/pel done 06/16/2023. He denies abd pain, pain after eating, or nausea. He is taking Pravastatin and Xarelto.      Vini Lynne is a pleasant 60-year-old gentleman who is accompanied to the office by his wife and nurse aide in follow-up from recent hospitalization where he had presented with sudden onset of abdominal pain. He was worked up and noted to have an approximately 50% SMA stenosis. The remainder of his mesenteric vessels are widely patent. He was started on treatment for peptic ulcer disease. His symptoms ultimately resolved. He is referred to the office for routine follow-up given the findings on CTA imaging. He denies any antecedent postprandial abdominal pain/weight loss. The following portions of the patient's history were reviewed and updated as appropriate: allergies, current medications, past family history, past medical history, past social history, past surgical history and problem list.    Review of Systems   Constitutional: Negative. HENT: Negative. Eyes: Negative. Respiratory: Negative. Cardiovascular: Negative. Gastrointestinal: Negative. Endocrine: Negative. Genitourinary: Negative. Musculoskeletal: Positive for gait problem. Skin: Negative. Allergic/Immunologic: Negative. Neurological: Negative for dizziness and headaches. Hematological: Negative. Psychiatric/Behavioral: Negative. Objective:      /88 (BP Location: Right arm, Patient Position: Sitting, Cuff Size: Standard)   Pulse 65   Ht 6' (1.829 m)   Wt 90.3 kg (199 lb)   SpO2 98%   BMI 26.99 kg/m²          Physical Exam  Constitutional:       General: He is not in acute distress. Appearance: He is not ill-appearing, toxic-appearing or diaphoretic. HENT:      Head: Normocephalic and atraumatic. Right Ear: External ear normal.      Left Ear: External ear normal.   Eyes:      General: No scleral icterus. Right eye: No discharge. Left eye: No discharge.    Cardiovascular:      Comments: Recent pacemaker placement  Pulmonary:      Effort: Pulmonary effort is normal.   Abdominal:      General: There is no distension. Tenderness: There is no abdominal tenderness. Neurological:      Mental Status: He is alert. Motor: Weakness present. Gait: Gait abnormal.      Comments: Left hemiplegia (hemorrhagic stroke per wife)   Psychiatric:         Mood and Affect: Mood normal.         Behavior: Behavior normal.         Thought Content: Thought content normal.         Judgment: Judgment normal.             CTA imagin. Mild-moderate narrowing at the origin of the superior mesenteric artery with otherwise normal visceral arterial supply. No evidence for mesenteric ischemia.

## 2023-08-02 NOTE — PROGRESS NOTES
Electrophysiology Office Note    Collin Stewart  1950  [id number]  HEART & VASCULAR [facility]  [address] [place] [address]    Assessment/Plan     Primary diagnosis:   1. Atrial fibrillation with slow ventricular response  -Presented with symptomatic bradycardia in late June   -Status post dual-chamber Medtronic pacemaker 6/20 for slow bradycardia  -Anticoagulated with Xarelto for [id number] of 4  -on BB with metop 25 mg  What is in-Device check shows 100% A-fib. With relatively controlled rates 74% V paced less than 1% a paced  -EGK here ventricular paced complexes with some abarrency    -Send office to discuss A-fib ablation, however given that patient is currently asymptomatic and rates are relatively well controlled on device interrogation, wife would like to postpone ablation until patient improves through therapy  -We will set patient up for 3-month appointment with either myself or Dr. Cinda King to rediscuss ablation as I do think patient would benefit from it in the future but we are in no rush to do this yet. 2. Prior medtronic loop reordered explanted 7/2020   -Underlying rhythm on prior loop appeared to be normal sinus  3. Chronic diastolic congestive heart failure   -with prior EF 65%   -no on BB or diuretic   4. History of NSVT   -Prior history of frequent PVCs  -Last device check does not show any significant ventricular ectopy  -Has not been on beta-blocker secondary to syncope according to wife  5. Superior mesenteric artery stenosis   6. 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   Status post dual-chamber pacemaker 6/20 by Dr. Cinda King implantation of right ventricular septal lead as well as right atrial appendage lead    Cardiac Testing:     ECHO: Results for orders placed during the hospital encounter of 11/14/18    Echo complete with contrast if indicated    Narrative  [facility]  [address] [facility], [address] [place] [address]  [phone]    Transthoracic Echocardiogram  2D, M-mode, Doppler, and Color Doppler    Study date:  15-Nov-2018    Patient: Keisha Springer  MR number: [id number]  Account number: [de-identified]  : 1950  Age: 76 years  Gender: Male  Status: Outpatient  Location: Bedside  Height: 70 in  Weight: 240 lb  BP: 141/ 77 mmHg    Indications: heart failure    Diagnoses: I50.9 - Heart failure, unspecified    Sonographer:  GLENIS Zaidi  Primary Physician:  Tahir Merritt MD  Referring Physician:  Viky Barrera MD  Group:  [facility]  Interpreting Physician:  Baldo Barreto MD    SUMMARY    LEFT VENTRICLE:  The ventricle was mildly dilated. Systolic function was normal. Ejection fraction was estimated to be 60 %. Although no diagnostic regional wall motion abnormality was identified, this possibility cannot be completely excluded on the basis of this study. Features were consistent with a pseudonormal left ventricular filling pattern, with concomitant abnormal relaxation and increased filling pressure (grade 2 diastolic dysfunction). LEFT ATRIUM:  The atrium was mildly to moderately dilated. RIGHT ATRIUM:  The atrium was mildly dilated. MITRAL VALVE:  There was trace to mild regurgitation. AORTA:  The aortic root is not well visualized but appears mildly dilated. HISTORY: PRIOR HISTORY: HTN. Chol. CHF. PROCEDURE: The procedure was performed at the bedside. This was a routine study. The transthoracic approach was used. The study included complete 2D imaging, M-mode, complete spectral Doppler, and color Doppler. The heart rate was 70 bpm,  at the start of the study. Images were obtained from the parasternal, apical, subcostal, and suprasternal notch acoustic windows. Image quality was adequate. LEFT VENTRICLE: The ventricle was mildly dilated. Systolic function was normal. Ejection fraction was estimated to be 60 %.  Although no diagnostic regional wall motion abnormality was identified, this possibility cannot be completely  excluded on the basis of this study. Wall thickness was normal. No evidence of apical thrombus. DOPPLER: Features were consistent with a pseudonormal left ventricular filling pattern, with concomitant abnormal relaxation and increased  filling pressure (grade 2 diastolic dysfunction). RIGHT VENTRICLE: The size was normal. Systolic function was normal. Wall thickness was normal.    LEFT ATRIUM: The atrium was mildly to moderately dilated. RIGHT ATRIUM: The atrium was mildly dilated. MITRAL VALVE: Valve structure was normal. There was normal leaflet separation. DOPPLER: The transmitral velocity was within the normal range. There was no evidence for stenosis. There was trace to mild regurgitation. AORTIC VALVE: The valve was probably trileaflet. Leaflets exhibited normal thickness, mild calcification, and normal cuspal separation. DOPPLER: Transaortic velocity was within the normal range. There was no evidence for stenosis. There  was no significant regurgitation. TRICUSPID VALVE: The valve structure was normal. There was normal leaflet separation. DOPPLER: The transtricuspid velocity was within the normal range. There was no evidence for stenosis. There was no significant regurgitation. PULMONIC VALVE: Not well visualized. DOPPLER: The transpulmonic velocity was within the normal range. There was no significant regurgitation. PERICARDIUM: There was no pericardial effusion. The pericardium was normal in appearance. AORTA: The aortic root is not well visualized but appears mildly dilated. SYSTEMIC VEINS: IVC: The inferior vena cava was normal in size.     SYSTEM MEASUREMENT TABLES    2D  %FS: 31.1 %  Ao Diam: 2.8 cm  EDV(Teich): 157.4 ml  EF(Teich): 58.2 %  ESV(Teich): 65.9 ml  IVSd: 1 cm  LA Area: 26 cm2  LA Diam: 4.6 cm  LVEDV MOD A4C: 142.2 ml  LVEF MOD A4C: 73.4 %  LVESV MOD A4C: 37.8 ml  LVIDd: 5.7 cm  LVIDs: 3.9 cm  LVLd A4C: 8.9 cm  LVLs A4C: 6.8 cm  LVPWd: 0.9 cm  RA Area: 20.2 cm2  RVIDd: 3.6 cm  SV MOD A4C: 104.4 ml  SV(Teich): 91.6 ml    MM  TAPSE: 2.6 cm    PW  E': 0.1 m/s  MV A Larry: 0 m/s  MV E Larry: 0.8 m/s  MV E/A Ratio: 186.1    [facility] Accredited Echocardiography Laboratory    Prepared and electronically signed by    Lali Kirkpatrick MD  Signed [id number] 14:40:07        History of Present Illness     HPI/INTERVAL HISTORY:  This is a 59-year-old gentleman with past medical history of frequent PVCs, chronic diastolic heart failure with EF 65%, superior mesenteric artery stenosis, right MCA/ROB territory CVA in 2019, atrial fibrillation who was initially seen by electrophysiology during hospitalization in July for slow atrial fibrillation. Patient had prior Holter monitor placed in January 2022 showing 0% A-fib burden and low PVC burden as well. Patient underwent loop recorder placement for his CVA in 2019 and was initially diagnosed with rapid atrial fibrillation on EKG during hospital stay in May 2019. Loop recorder interrogation at that time revealed new onset atrial fibrillation     He was started on Xarelto and metoprolol, however metoprolol was decreased in September 2019 after patient was having low blood pressure readings and lethargy to 12.5 mg twice daily. He had persistent symptoms on metoprolol thus it was completely held in 2019. He was noted to be in paroxysmal atrial fibrillation a couple days later thus metoprolol was restarted and followed up with electrophysiology whereby it was found that his atrial fibrillation was being overestimated by device and it was mostly sinus rhythm with PACs. He had further hospital stay in January 2020 after discontinuation of metoprolol for 3 weeks and had episode of syncope.   Loop recorder was reviewed at that time and again showed atrial fibrillation however further review showed no definitive atrial fibrillation but rather noise artifact. Did have some brief runs of NSVT on telemetry during that hospital stay. For this he underwent 14-day ZIO monitor revealing no episodes of atrial fibrillation only short runs of SVT. Loop was explanted on 6/20 and patient had recurrent vasovagal episode on 7/2020. He was then hospitalized again in June of this year for abdominal pain and found to have slow atrial fibrillation requiring permanent pacemaker placement for symptomatic bradycardia. This was placed on 6/20. He presents to the office today to discuss possible atrial fibrillation ablation. Currently he has been relatively asymptomatic from his A-fib and is actually been improving in his exertional dyspnea and other symptoms as he has been working at therapy for his stroke in 2019. Review of Systems   Constitutional: Negative for diaphoresis, fatigue and fever. Respiratory: Negative for chest tightness and shortness of breath. Cardiovascular: Negative for chest pain and leg swelling. Neurological: Positive for facial asymmetry, speech difficulty and weakness. Negative for dizziness and light-headedness. All other systems reviewed and are negative. ROS as noted above, otherwise 12 point review of systems was performed and is negative.        Historical Information   Social History     Socioeconomic History   • Marital status: /Civil Union     Spouse name: Not on file   • Number of children: Not on file   • Years of education: Not on file   • Highest education level: Not on file   Occupational History   • Not on file   Tobacco Use   • Smoking status: Never   • Smokeless tobacco: Never   Vaping Use   • Vaping Use: Never used   Substance and Sexual Activity   • Alcohol use: Never     Comment: social   • Drug use: Never   • Sexual activity: Not on file   Other Topics Concern   • Not on file   Social History Narrative   • Not on file     Social Determinants of Health     Financial Resource Strain: Not on file   Food Insecurity: No Food Insecurity (6/20/2023)    Hunger Vital Sign    • Worried About Running Out of Food in the Last Year: Never true    • Ran Out of Food in the Last Year: Never true   Transportation Needs: No Transportation Needs (6/20/2023)    PRAPARE - Transportation    • Lack of Transportation (Medical): No    • Lack of Transportation (Non-Medical): No   Physical Activity: Not on file   Stress: Not on file   Social Connections: Not on file   Intimate Partner Violence: Not on file   Housing Stability: Unknown (6/20/2023)    Housing Stability Vital Sign    • Unable to Pay for Housing in the Last Year: No    • Number of Places Lived in the Last Year: Not on file    • Unstable Housing in the Last Year: No     Past Medical History:   Diagnosis Date   • Acute encephalopathy 5/27/2019   • Arthritis     BL knees   • Atrial fibrillation (HCC)    • CHF (congestive heart failure) (HCC)    • Colon polyp    • CVA (cerebral vascular accident) ([address]) 4/27/2019    NIHSS 26 on presentation   • Depression    • Diabetes mellitus ([address])     borderline   • Encephalopathy acute 4/28/2019   • History of transfusion 04/2019   • Hyperlipidemia    • Irregular heart beat     Afib   • Stroke ([address]) 04/27/2019    Left sided weakness-aphasia   • Urinary retention    • Urinary retention 6/4/2019     Past Surgical History:   Procedure Laterality Date   • CARDIAC ELECTROPHYSIOLOGY PROCEDURE Left 6/20/2023    Procedure: Cardiac pacer implant;  Surgeon: Johnathan Lopez DO;  Location: [facility];   Service: Cardiology   • COLONOSCOPY     • IR STROKE ALERT  4/27/2019   • MENISCECTOMY Right    • TN CYSTO INSERTION TRANSPROSTATIC IMPLANT SINGLE N/A 11/19/2021    Procedure: Melissa Royce WITH INSERTION UROLIFT;  Surgeon: Nai Gardner MD;  Location: [facility];  Service: Urology     Social History     Substance and Sexual Activity   Alcohol Use Never    Comment: social     Social History     Substance and Sexual Activity   Drug Use Never     Social History     Tobacco Use   Smoking Status Never   Smokeless Tobacco Never     No family history on file. Meds/Allergies       Current Outpatient Medications:   •  acetaminophen (TYLENOL) 325 mg tablet, Take 2 tablets (650 mg total) by mouth every 4 (four) hours as needed for mild pain, Disp: 30 tablet, Rfl: 0  •  Coenzyme Q10 (CO Q 10) 10 MG CAPS, Take by mouth daily, Disp: , Rfl:   •  cyanocobalamin (VITAMIN B-12) 100 mcg tablet, Take by mouth daily, Disp: , Rfl:   •  famotidine (PEPCID) 20 mg tablet, Take 1 tablet (20 mg total) by mouth daily, Disp: 30 tablet, Rfl: 0  •  finasteride (PROSCAR) 5 mg tablet, Take 5 mg by mouth daily at bedtime  , Disp: , Rfl:   •  hydrocortisone 1 % cream, Apply topically 2 (two) times a day as needed , Disp: , Rfl:   •  ketoconazole (NIZORAL) 2 % shampoo, SHAMPOO 3 TIMES A WEEK AS A SCALP TREATMENT, LEAVE ON 5-10 MINUTES AND RINSE, Disp: , Rfl:   •  metoprolol succinate (TOPROL-XL) 25 mg 24 hr tablet, Take 1 tablet (25 mg total) by mouth daily, Disp: 30 tablet, Rfl: 2  •  pantoprazole (PROTONIX) 40 mg tablet, Take 1 tablet (40 mg total) by mouth 2 (two) times a day before meals, Disp: 120 tablet, Rfl: 0  •  pravastatin (PRAVACHOL) 40 mg tablet, Take 40 mg by mouth daily, Disp: , Rfl:   •  rivaroxaban (Xarelto) 20 mg tablet, Take 20 mg by mouth daily with dinner  , Disp: , Rfl:   •  sucralfate (CARAFATE) 1 g tablet, Take 1 tablet (1 g total) by mouth 4 (four) times a day (before meals and at bedtime) for 21 days, Disp: 84 tablet, Rfl: 0    Allergies   Allergen Reactions   • Cephalexin Throat Swelling     Pt reported throat swelling after taking antibiotic    • Penicillins Other (See Comments)       Objective   Vitals: There were no vitals taken for this visit. Physical Exam  Vitals and nursing note reviewed. Constitutional:       General: He is not in acute distress. HENT:      Head: Normocephalic and atraumatic. Cardiovascular:      Rate and Rhythm: Normal rate and regular rhythm. Pulmonary:      Effort: Pulmonary effort is normal.      Breath sounds: Normal breath sounds. Abdominal:      General: Abdomen is flat. Palpations: Abdomen is soft. Skin:     General: Skin is warm and dry. Neurological:      Mental Status: He is alert. Motor: Weakness present.       Comments: Left-sided hemiparesis           Labs:  Admission on 07/03/2023, Discharged on 07/03/2023   Component Date Value   • Ventricular Rate 07/03/2023 69    • Atrial Rate 07/03/2023 65    • QRSD Interval 07/03/2023 90    • QT Interval 07/03/2023 388    • QTC Interval 07/03/2023 415    • QRS Axis 07/03/2023 23    • T Wave Farson 07/03/2023 80    Admission on 06/20/2023, Discharged on 06/21/2023   Component Date Value   • Ventricular Rate 06/20/2023 66    • Atrial Rate 06/20/2023 35    • QRSD Interval 06/20/2023 88    • QT Interval 06/20/2023 398    • QTC Interval 06/20/2023 417    • QRS Axis 06/20/2023 21    • T Wave Axis 06/20/2023 9    • WBC 06/21/2023 11.11 (H)    • RBC 06/21/2023 4.68    • Hemoglobin 06/21/2023 14.8    • Hematocrit 06/21/2023 44.3    • MCV 06/21/2023 95    • MCH 06/21/2023 31.6    • MCHC 06/21/2023 33.4    • RDW 06/21/2023 12.9    • MPV 06/21/2023 9.6    • Platelets 71/27/6338 161    • nRBC 06/21/2023 0    • Neutrophils Relative 06/21/2023 83 (H)    • Immat GRANS % 06/21/2023 1    • Lymphocytes Relative 06/21/2023 9 (L)    • Monocytes Relative 06/21/2023 5    • Eosinophils Relative 06/21/2023 2    • Basophils Relative 06/21/2023 0    • Neutrophils Absolute 06/21/2023 9.23 (H)    • Immature Grans Absolute 06/21/2023 0.07    • Lymphocytes Absolute 06/21/2023 1.02    • Monocytes Absolute 06/21/2023 0.51    • Eosinophils Absolute 06/21/2023 0.27    • Basophils Absolute 06/21/2023 0.01    • Sodium 06/21/2023 142    • Potassium 06/21/2023 3.5    • Chloride 06/21/2023 116 (H)    • CO2 06/21/2023 22    • ANION GAP 06/21/2023 4    • BUN 06/21/2023 23    • Creatinine 06/21/2023 0.71    • Glucose 06/21/2023 132    • Calcium 06/21/2023 7.7 (L)    • eGFR 06/21/2023 93    Admission on 06/16/2023, Discharged on 06/20/2023   Component Date Value   • Ventricular Rate 06/16/2023 50    • Atrial Rate 06/16/2023 79    • QRSD Interval 06/16/2023 88    • QT Interval 06/16/2023 434    • QTC Interval 06/16/2023 395    • QRS Axis 06/16/2023 17    • T Wave Axis 06/16/2023 50    • WBC 06/16/2023 14.02 (H)    • RBC 06/16/2023 5.45    • Hemoglobin 06/16/2023 17.1 (H)    • Hematocrit 06/16/2023 52.0 (H)    • MCV 06/16/2023 95    • MCH 06/16/2023 31.4    • MCHC 06/16/2023 32.9    • RDW 06/16/2023 13.3    • MPV 06/16/2023 9.3    • Platelets 44/29/7352 198    • Sodium 06/16/2023 133 (L)    • Potassium 06/16/2023 5.6 (H)    • Chloride 06/16/2023 89 (L)    • CO2 06/16/2023 29    • ANION GAP 06/16/2023 15 (H)    • BUN 06/16/2023 35 (H)    • Creatinine 06/16/2023 1.64 (H)    • Glucose 06/16/2023 171 (H)    • Calcium 06/16/2023 9.4    • AST 06/16/2023 38    • ALT 06/16/2023 24    • Alkaline Phosphatase 06/16/2023 49    • Total Protein 06/16/2023 7.8    • Albumin 06/16/2023 4.7    • Total Bilirubin 06/16/2023 0.79    • eGFR 06/16/2023 40    • Lipase 06/16/2023 14    • LACTIC ACID 06/16/2023 3.1 (HH)    • hs TnI 0hr 06/16/2023 5    • LACTIC ACID 06/17/2023 2.0    • Potassium 06/16/2023 5.0    • hs TnI 2hr 06/17/2023 11    • Delta 2hr hsTnI 06/17/2023 6    • hs TnI 4hr 06/17/2023 4    • Delta 4hr hsTnI 06/17/2023 -1    • Ventricular Rate 06/17/2023 63    • Atrial Rate 06/17/2023 93    • QRSD Interval 06/17/2023 96    • QT Interval 06/17/2023 444    • QTC Interval 06/17/2023 454    • QRS Axis 06/17/2023 30    • T Wave Axis 06/17/2023 -72    • Ventricular Rate 06/17/2023 39    • Atrial Rate 06/17/2023 312    • QRSD Interval 06/17/2023 92    • QT Interval 06/17/2023 454    • QTC Interval 06/17/2023 365    • QRS Axis 06/17/2023 32    • T Wave Axis 06/17/2023 88    • WBC 06/17/2023 11.76 (H)    • RBC 06/17/2023 5.08    • Hemoglobin 06/17/2023 15.8    • Hematocrit 06/17/2023 47.7    • MCV 06/17/2023 94    • MCH 06/17/2023 31.1    • MCHC 06/17/2023 33.1    • RDW 06/17/2023 13.2    • MPV 06/17/2023 9.1    • Platelets 29/20/2332 177    • nRBC 06/17/2023 0    • Neutrophils Relative 06/17/2023 87 (H)    • Immat GRANS % 06/17/2023 1    • Lymphocytes Relative 06/17/2023 8 (L)    • Monocytes Relative 06/17/2023 4    • Eosinophils Relative 06/17/2023 0    • Basophils Relative 06/17/2023 0    • Neutrophils Absolute 06/17/2023 10.21 (H)    • Immature Grans Absolute 06/17/2023 0.06    • Lymphocytes Absolute 06/17/2023 0.96    • Monocytes Absolute 06/17/2023 0.52    • Eosinophils Absolute 06/17/2023 0.00    • Basophils Absolute 06/17/2023 0.01    • Sodium 06/17/2023 139    • Potassium 06/17/2023 4.2    • Chloride 06/17/2023 104    • CO2 06/17/2023 26    • ANION GAP 06/17/2023 9    • BUN 06/17/2023 21    • Creatinine 06/17/2023 0.86    • Glucose 06/17/2023 143 (H)    • Glucose, Fasting 06/17/2023 143 (H)    • Calcium 06/17/2023 9.4    • AST 06/17/2023 11 (L)    • ALT 06/17/2023 16    • Alkaline Phosphatase 06/17/2023 47    • Total Protein 06/17/2023 6.0 (L)    • Albumin 06/17/2023 3.8    • Total Bilirubin 06/17/2023 0.66    • eGFR 06/17/2023 86    • Lipase 06/17/2023 8 (L)    • LA size 06/19/2023 4.3    • Aortic valve mean veloci* 06/19/2023 8.60    • LVPWd 06/19/2023 1.30    • Left Atrium Area-systoli* 06/19/2023 21.1    • Left Atrium Area-systoli* 06/19/2023 23.6    • MV E' Tissue Velocity Se* 06/19/2023 9    • MV E' Tissue Velocity La* 06/19/2023 12    • RA 2D Volume 06/19/2023 79.0    • Tricuspid annular plane * 06/19/2023 1.90    • IVSd 06/19/2023 3.00    • LV DIASTOLIC VOLUME (MOD* 75/51/8902 106    • LEFT VENTRICLE SYSTOLIC * 99/80/8397 36    • Left ventricular stroke * 06/19/2023 70.00    • LA length (A2C) 06/19/2023 5.30    • LVIDd 06/19/2023 4.80    • IVS 06/19/2023 1.4    • LVIDS 06/19/2023 3.00 • FS 06/19/2023 38    • Ao root 06/19/2023 2.70    • RVID d 06/19/2023 4.5    • LVOT mn grad 06/19/2023 1.0    • AV mean gradient 06/19/2023 3    • AV LVOT peak gradient 06/19/2023 2    • MV valve area p 1/2 meth* 06/19/2023 3.61    • E wave deceleration time 06/19/2023 210    • LVOT peak larry 06/19/2023 0.68    • LVOT peak VTI 06/19/2023 14.57    • Aortic valve peak veloci* 06/19/2023 1.17    • Ao VTI 06/19/2023 24.31    • AV peak gradient 06/19/2023 5    • MV Peak E Larry 06/19/2023 68    • RAA A4C 06/19/2023 23.9    • MV stenosis pressure 1/2* 06/19/2023 61    • LVSV, 2D 06/19/2023 70    • DVI 06/19/2023 0.58    • LV EF 06/19/2023 65    • Sodium 06/18/2023 138    • Potassium 06/18/2023 4.2    • Chloride 06/18/2023 104    • CO2 06/18/2023 28    • ANION GAP 06/18/2023 6    • BUN 06/18/2023 20    • Creatinine 06/18/2023 0.85    • Glucose 06/18/2023 133    • Calcium 06/18/2023 9.0    • eGFR 06/18/2023 86    • WBC 06/18/2023 8.30    • RBC 06/18/2023 4.78    • Hemoglobin 06/18/2023 14.9    • Hematocrit 06/18/2023 45.4    • MCV 06/18/2023 95    • MCH 06/18/2023 31.2    • MCHC 06/18/2023 32.8    • RDW 06/18/2023 13.0    • MPV 06/18/2023 9.7    • Platelets 56/05/9613 160    • nRBC 06/18/2023 0    • Neutrophils Relative 06/18/2023 81 (H)    • Immat GRANS % 06/18/2023 0    • Lymphocytes Relative 06/18/2023 12 (L)    • Monocytes Relative 06/18/2023 7    • Eosinophils Relative 06/18/2023 0    • Basophils Relative 06/18/2023 0    • Neutrophils Absolute 06/18/2023 6.73    • Immature Grans Absolute 06/18/2023 0.03    • Lymphocytes Absolute 06/18/2023 1.00    • Monocytes Absolute 06/18/2023 0.54    • Eosinophils Absolute 06/18/2023 0.00    • Basophils Absolute 06/18/2023 0.00    • COLOR 06/19/2023 Milligan    • Size 06/19/2023 3x3    • Stone Weight 06/19/2023 26    • Composition 06/19/2023 Comment    • URIC ACID 06/19/2023 100    • STONE COMMENT 06/19/2023 Comment    • Please note 06/19/2023 Comment    • Disclaimer 06/19/2023 Comment • Photo 06/19/2023 Comment    • Stone Source 06/19/2023 Comment        Imaging: I have personally reviewed pertinent reports.

## 2023-08-02 NOTE — ASSESSMENT & PLAN NOTE
Elvia Gallo is a pleasant 68-year-old gentleman with prior history of stroke approximately 4 and half years ago with residual dense left-sided hemiparesis who follows up in the office from recent hospitalization where he had presented with sudden onset of lower abdominal pain with associated nausea and vomiting. His wife reports he was feeling his usual self and following dinner had developed pain that "doubles him over." Their daughter is a nurse and she came over to assess him and ultimately when symptoms did not gabriel was taken to hospital for further evaluation. He underwent work-up including a CTA which suggested a approximately 50% SMA stenosis. His celiac and HONG widely patent. There is no evidence of any acute thrombus on CTA imaging  Underlying etiology for his acute onset abdominal pain is/remains somewhat unclear. The wife did apparently notice what she felt were "kidney stones" in urine. He was also started on treatment for PUD by GI. Ultimately his symptoms resolved however prior to discharge also did require pacemaker placement. He has had no further episodes since discharge. Recommend he follow up with GI. He is apparently due for colonoscopy. From a vascular standpoint I will obtain a surveillance mesenteric duplex in 6 months. If there is no change and clinically asymptomatic can likely forego any longterm routine surveillance at that time.

## 2023-08-03 ENCOUNTER — OFFICE VISIT (OUTPATIENT)
Dept: CARDIOLOGY CLINIC | Facility: CLINIC | Age: 73
End: 2023-08-03
Payer: MEDICARE

## 2023-08-03 VITALS
HEIGHT: 72 IN | SYSTOLIC BLOOD PRESSURE: 120 MMHG | BODY MASS INDEX: 27.14 KG/M2 | DIASTOLIC BLOOD PRESSURE: 78 MMHG | HEART RATE: 76 BPM | WEIGHT: 200.4 LBS

## 2023-08-03 DIAGNOSIS — I48.91 ATRIAL FIBRILLATION WITH SLOW VENTRICULAR RESPONSE (HCC): ICD-10-CM

## 2023-08-03 DIAGNOSIS — I48.0 PAROXYSMAL ATRIAL FIBRILLATION (HCC): Primary | ICD-10-CM

## 2023-08-03 PROCEDURE — 93000 ELECTROCARDIOGRAM COMPLETE: CPT | Performed by: PHYSICIAN ASSISTANT

## 2023-08-03 PROCEDURE — 99214 OFFICE O/P EST MOD 30 MIN: CPT | Performed by: PHYSICIAN ASSISTANT

## 2023-08-03 NOTE — LETTER
August 3, 2023    [address] [id number]      To Whom It May Confirm:    Dylan Fuller is under my professional cardiology care. I would clear this patient for supervised pool activity for therapy. If you have any questions or concerns, please don't hesitate to call out office at [phone]. Sincerely,             Vikki Roach PA-C        CC: Kay Herrera.  [facility] Verbal from  Tonya Tate in addition to the notes below. Inform patient that script for glipizide was also sent over.  This is because of the metformin reduction.   Metformin is hard on the kidneys.  Gilpizide will help to reduce his blood sugar numbers.  Glipizide should be taken before meals.   Phone call to patient to inform.  Wife answered states patient is there but can't come to the phone.  States release to give her information should be on file.   It is.  Gave information.  Wife verbalized understanding.

## 2023-08-31 ENCOUNTER — TELEPHONE (OUTPATIENT)
Dept: CARDIOLOGY CLINIC | Facility: CLINIC | Age: 73
End: 2023-08-31

## 2023-08-31 NOTE — TELEPHONE ENCOUNTER
Damaris mejia faxed clearance request for neuromuscular electrical stimulation. Clearance request signed by Dr. Claude Gilbert faxed to [address] @ [phone].

## 2023-09-01 ENCOUNTER — OFFICE VISIT (OUTPATIENT)
Dept: GASTROENTEROLOGY | Facility: MEDICAL CENTER | Age: 73
End: 2023-09-01
Payer: MEDICARE

## 2023-09-01 VITALS
BODY MASS INDEX: 27.17 KG/M2 | HEART RATE: 64 BPM | TEMPERATURE: 98.1 F | WEIGHT: 200.6 LBS | HEIGHT: 72 IN | DIASTOLIC BLOOD PRESSURE: 84 MMHG | SYSTOLIC BLOOD PRESSURE: 131 MMHG

## 2023-09-01 DIAGNOSIS — K21.9 GASTROESOPHAGEAL REFLUX DISEASE WITHOUT ESOPHAGITIS: Primary | ICD-10-CM

## 2023-09-01 DIAGNOSIS — R06.6 HICCUPS: ICD-10-CM

## 2023-09-01 PROCEDURE — 99214 OFFICE O/P EST MOD 30 MIN: CPT | Performed by: NURSE PRACTITIONER

## 2023-09-01 NOTE — PROGRESS NOTES
[facility] - Outpatient Consultation  Courtney Lang 68 y.o. male MRN: [id number]  Encounter: [id number]          ASSESSMENT AND PLAN:    Courtney Lang is a 68 y.o. male who presents with complaint of abdominal pain and hiccups    1. Periumbilical abdominal pain  2. Colon polyp     He was recently admitted for periumbilical abdominal pain. Reports this pain sharp/stabbing, a 10/10. Was found to be bradycardic in the ED and was transferred to [facility] for pacemaker. After the pacemaker was inserted the abdominal pain resolved after he passed 5 kidney stones. The pain has not returned. CTA noted mild to moderate narrowing at the origin of the superior mesenteric artery with otherwise normal visceral arterial supply. No evidence of mesenteric ischemia. Patient was evaluated by vascular surgeon and was told that the pain was most likely not related to the narrowing in his superior mesenteric artery. Pain has not returned since passing the kidney stones. BMs are brown and formed daily to every 2 days. He is using MiraLAX 1 capful as needed which helps with harder stools. Denies any melena or hematochezia. He does have a history of colon polyps in the past.  Last colonoscopy per patient was 2018. He is on a 5-year recall. We did discuss colonoscopy and potential risks with his comorbidities. Patient does not want a colonoscopy at this time. 3.  Hiccups  4. GERD    Reporting some increase in hiccups over the past few weeks. He is taking pantoprazole 40 mg twice daily, famotidine 20 mg nightly as needed and recently completed a 2-week course of Carafate. While he was hospitalized differential diagnosis included potential peptic ulcer disease. Patient did have ulcers many years ago. Denies any nausea, vomiting or dysphagia. Weight is stable.   We did discuss options for evaluation to rule out peptic ulcer disease including upper GI versus EGD, H. pylori testing. Patient prefers an upper GI and a stool for H. pylori at this time due to Xarelto use and concern for holding [facility] for procedures. Rare caffeine use. No NSAID use. No alcohol use. -Continue pantoprazole 40 mg twice daily  -Pepcid 20 mg nightly  -Antireflux diet  -Upper GI  -Follow-up in office after testing        ______________________________________________________________________    HPI:    Jewels Mcarthur is a 68 y.o. male who presents with complaint of abdominal pain and hiccups. History of superior mesenteric artery stenosis, type 2 diabetes, HTN, aphasia secondary to CVA, A-fib (on Xarelto), diastolic CHF, depression, carotid artery stenosis, left hemiplegia, BPH, renal calculi, status post pacemaker. He was recently admitted for periumbilical abdominal pain. Reports this pain sharp/stabbing, a 10/10. Was found to be bradycardic in the ED and was transferred to [address] [place] [address] for pacemaker. After the pacemaker was inserted the abdominal pain resolved after he passed 5 kidney stones. The pain has not returned. CTA noted mild to moderate narrowing at the origin of the superior mesenteric artery with otherwise normal visceral arterial supply. No evidence of mesenteric ischemia. Patient was evaluated by vascular surgeon and was told that the pain was most likely not related to the narrowing in his superior mesenteric artery. He is here today for follow-up from hospital.    Abdominal pain has not returned. BMs are brown and formed daily to every 2 days. He is using MiraLAX 1 capful as needed which helps with harder stools. Denies any melena or hematochezia. He does have a history of colon polyps in the past.  Last colonoscopy per patient was 2018. He is on a 5-year recall. We did discuss colonoscopy and potential risks with his comorbidities. Patient does not want a colonoscopy at this time. Reporting some increase in hiccups over the past few weeks.   He is taking pantoprazole 40 mg twice daily, famotidine 20 mg nightly as needed and recently completed a 2-week course of Carafate. While he was hospitalized differential diagnosis included potential peptic ulcer disease. Patient did have ulcers many years ago. Denies any nausea, vomiting or dysphagia. Weight is stable. We did discuss options for evaluation to rule out peptic ulcer disease including upper GI versus EGD, H. pylori testing. REVIEW OF SYSTEMS:    CONSTITUTIONAL: Denies any fever, chills, rigors, and weight loss. HEENT: No earache or tinnitus. Denies hearing loss or visual disturbances. CARDIOVASCULAR: No chest pain or palpitations. RESPIRATORY: Denies any cough, hemoptysis, shortness of breath or dyspnea on exertion. GASTROINTESTINAL: As noted in the History of Present Illness. GENITOURINARY: No problems with urination. Denies any hematuria or dysuria. NEUROLOGIC: No dizziness or vertigo, denies headaches. MUSCULOSKELETAL: Denies any muscle or joint pain. SKIN: Denies skin rashes or itching. ENDOCRINE: Denies excessive thirst. Denies intolerance to heat or cold. PSYCHOSOCIAL: Denies depression or anxiety. Denies any recent memory loss.        Historical Information   Past Medical History:   Diagnosis Date   • Acute encephalopathy 5/27/2019   • Arthritis     BL knees   • Atrial fibrillation (HCC)    • CHF (congestive heart failure) (HCC)    • Colon polyp    • CVA (cerebral vascular accident) ([facility]) 4/27/2019    NIHSS 26 on presentation   • Depression    • Diabetes mellitus ([address] )     borderline   • Encephalopathy acute 4/28/2019   • History of transfusion 04/2019   • Hyperlipidemia    • Irregular heart beat     Afib   • Stroke ([address]) 04/27/2019    Left sided weakness-aphasia   • Urinary retention    • Urinary retention 6/4/2019     Past Surgical History:   Procedure Laterality Date   • CARDIAC ELECTROPHYSIOLOGY PROCEDURE Left 6/20/2023    Procedure: Cardiac pacer implant;  Surgeon: Mai Castañeda Jasmin Schreiber;  [facility]; Service: Cardiology   • COLONOSCOPY     • IR STROKE ALERT  4/27/2019   • MENISCECTOMY Right    • IN CYSTO INSERTION TRANSPROSTATIC IMPLANT SINGLE N/A 11/19/2021    Procedure: CYSTOSCOPY WITH INSERTION UROLIFT;  Surgeon: Sanford Greenberg MD;  Location[facility];  Service: Urology     Social History   Social History     Substance and Sexual Activity   Alcohol Use Never    Comment: social     Social History     Substance and Sexual Activity   Drug Use Never     Social History     Tobacco Use   Smoking Status Never   Smokeless Tobacco Never     History reviewed. No pertinent family history. Meds/Allergies       Current Outpatient Medications:   •  Coenzyme Q10 (CO Q 10) 10 MG CAPS  •  cyanocobalamin (VITAMIN B-12) 100 mcg tablet  •  finasteride (PROSCAR) 5 mg tablet  •  hydrocortisone 1 % cream  •  ketoconazole (NIZORAL) 2 % shampoo  •  metoprolol succinate (TOPROL-XL) 25 mg 24 hr tablet  •  pantoprazole (PROTONIX) 40 mg tablet  •  pravastatin (PRAVACHOL) 40 mg tablet  •  rivaroxaban (Xarelto) 20 mg tablet  •  acetaminophen (TYLENOL) 325 mg tablet  •  famotidine (PEPCID) 20 mg tablet  •  sucralfate (CARAFATE) 1 g tablet    Allergies   Allergen Reactions   • Cephalexin Throat Swelling     Pt reported throat swelling after taking antibiotic    • Penicillins Other (See Comments)           Objective     Blood pressure 131/84, pulse 64, temperature 98.1 °F (36.7 °C), height 6' (1.829 m), weight 91 kg (200 lb 9.6 oz). Body mass index is 27.21 kg/m². PHYSICAL EXAM:      General Appearance:   Alert, cooperative, no distress   HEENT:   Normocephalic, atraumatic, anicteric. Neck:  Supple, symmetrical, trachea midline   Lungs:   Clear to auscultation bilaterally; no rales, rhonchi or wheezing; respirations unlabored    Heart[de-identified]   Regular rate and rhythm; no murmur, rub, or gallop.    Abdomen:   Soft, non-tender, non-distended; normal bowel sounds; no masses, no organomegaly    Genitalia:   Deferred    Rectal:   Deferred    Extremities:  No cyanosis, clubbing or edema    Pulses:  2+ and symmetric    Skin:  No jaundice, rashes, or lesions    Lymph nodes:  No palpable cervical lymphadenopathy        Lab Results:   No visits with results within 1 Day(s) from this visit. Latest known visit with results is:   Admission on 07/03/2023, Discharged on 07/03/2023   Component Date Value   • Ventricular Rate 07/03/2023 69    • Atrial Rate 07/03/2023 65    • QRSD Interval 07/03/2023 90    • QT Interval 07/03/2023 388    • QTC Interval 07/03/2023 415    • QRS Axis 07/03/2023 23    • T Wave Axis 07/03/2023 80        Lab Results   Component Value Date    WBC 11.11 (H) 06/21/2023    HGB 14.8 06/21/2023    HCT 44.3 06/21/2023    MCV 95 06/21/2023     06/21/2023       Lab Results   Component Value Date    SODIUM 142 06/21/2023    K 3.5 06/21/2023     (H) 06/21/2023    CO2 22 06/21/2023    AGAP 4 06/21/2023    BUN 23 06/21/2023    CREATININE 0.71 06/21/2023    GLUC 132 06/21/2023    GLUF 143 (H) 06/17/2023    CALCIUM 7.7 (L) 06/21/2023    AST 11 (L) 06/17/2023    ALT 16 06/17/2023    ALKPHOS 47 06/17/2023    TP 6.0 (L) 06/17/2023    TBILI 0.66 06/17/2023    EGFR 93 06/21/2023       No results found for: "CRP"    Lab Results   Component Value Date    ENL1WIJYILYY 2.571 02/02/2022       No results found for: "IRON", "TIBC", "FERRITIN"    Radiology Results:   No results found.

## 2023-09-19 DIAGNOSIS — I48.0 PAROXYSMAL ATRIAL FIBRILLATION (HCC): ICD-10-CM

## 2023-09-19 RX ORDER — METOPROLOL SUCCINATE 25 MG/1
25 TABLET, EXTENDED RELEASE ORAL DAILY
Qty: 30 TABLET | Refills: 2 | Status: SHIPPED | OUTPATIENT
Start: 2023-09-19

## 2023-09-26 ENCOUNTER — OFFICE VISIT (OUTPATIENT)
Dept: CARDIOLOGY CLINIC | Facility: CLINIC | Age: 73
End: 2023-09-26
Payer: MEDICARE

## 2023-09-26 VITALS
SYSTOLIC BLOOD PRESSURE: 134 MMHG | BODY MASS INDEX: 27.12 KG/M2 | WEIGHT: 200 LBS | DIASTOLIC BLOOD PRESSURE: 80 MMHG | HEART RATE: 68 BPM

## 2023-09-26 DIAGNOSIS — I48.91 ATRIAL FIBRILLATION, UNSPECIFIED TYPE (HCC): Primary | ICD-10-CM

## 2023-09-26 PROCEDURE — 99214 OFFICE O/P EST MOD 30 MIN: CPT | Performed by: INTERNAL MEDICINE

## 2023-09-26 NOTE — PROGRESS NOTES
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 Continue metoprolol  Continue anticoagulation with Xarelto  Home blood pressures reviewed, occasional readings in the 140s to 150s noted. Otherwise mostly in the 130s. We will keep some blood pressure safety margin for now as he has had hypotension and syncope in the past.  Advised patient's wife to continue monitoring blood pressures at home and if blood pressures more consistently 140s to 150s, she should call me in which case we can likely increase metoprolol for better blood pressure control          Follow-up in 6 months           Interval History:      This is a 68year-old male admitted to [facility] 04/2019 with acute CVA, and subsequent event right MCA and ROB thrombectomy and tPA infusion.  He residual aphasia and dysphasia. Subsequent ALYX revealed no atrial septal interruption, no evidence of thrombus.  Subsequently, he underwent loop recorder placement. He then presents to the hospital 05/13/2019 after a routine ECG revealed rapid atrial fibrillation.  Loop recorder interrogation had revealed new onset atrial fibrillation ongoing since 05/11/2019. Carolynn Bolton was then initiated on Eliquis and metoprolol, and a rate control strategy was pursued.     After discharge, he has started rehabilitation.  His wife called 09/03/2019, stating that he was quite lethargic with low blood pressure readings.  He went to the ER, where metoprolol was decreased to 12.5 mg b.i.d. Wes Myers he felt better, he still continued to have lethargy, after which metoprolol was completed held 09/04/2019.     During a prior visit 09/06/2019, at which time metoprolol was restarted at a low dose secondary to his paroxysmal atrial fibrillation.  Amiodarone was considered and he was evaluated by Dr. Jose Cordon 10/25/2019, and was deemed that his device was over estimating the burden of atrial fibrillation and he mostly had sinus rhythm with PACs.  Low-dose metoprolol at 12.5 mg b.i.d. Was continued.     He was hospitalized 01/2020 after he discontinue metoprolol for about 3 weeks, and early 730 in the morning transferred into the shower and was getting washed he became lightheaded and dizzy. Mague Zapata had an episode of unresponsiveness was head rolled forward, lasting 1-5 minutes.  Review of loop recorder showed to atrial fibrillation events and 1 tachycardia event at 353 beats per minute.  Review of rhythm strips however revealed only noise artifact.  There was no definitive atrial fibrillation, no evidence of pauses or significant tachycardia or bradycardia associated with his episodes.  He had a 4 beat run of nonsustained ventricular tachycardia on telemetry. The patient was diagnosed with vasovagal syncope and discharged.     He underwent a 14 day o Holter monitor revealing no evidence of atrial fibrillation in several short runs of SVT up to 11 beats and short nonsustained ventricular tachycardia.  Subsequently, he underwent explantation of his loop recorder 06/2020.  In 07/2020 he had recurrent vasovagal episode after he got up to walk in the shower. He was hospitalized 6/2023 with atrial fibrillation with slow ventricular response. Subsequently he underwent placement of a Medtronic dual-chamber permanent pacemaker on 6/20/2023. He presents today for follow-up with no complaints. He denies chest pain, shortness of breath, lightheadedness, lower extremity edema. Occasional blood pressure readings at home have been in the 140s to 150s.     Vitals:  Vitals:    09/26/23 1546   BP: 134/80   BP Location: Right arm   Patient Position: Sitting   Cuff Size: Adult   Pulse: 68   Weight: 90.7 kg (200 lb)         Past Medical History:   Diagnosis Date   • Acute encephalopathy 5/27/2019   • Arthritis     BL knees   • Atrial fibrillation (HCC)    • CHF (congestive heart failure) (HCC)    • Colon polyp    • CVA (cerebral vascular accident) ([address] [facility]) 4/27/2019    NIHSS 26 on presentation   • Depression    • Diabetes mellitus ([address] [facility])     borderline   • Encephalopathy acute 4/28/2019   • History of transfusion 04/2019   • Hyperlipidemia    • Irregular heart beat     Afib   • Stroke ([address] [facility]) 04/27/2019    Left sided weakness-aphasia   • Urinary retention    • Urinary retention 6/4/2019     Social History     Socioeconomic History   • Marital status: /Civil Union     Spouse name: Not on file   • Number of children: Not on file   • Years of education: Not on file   • Highest education level: Not on file   Occupational History   • Not on file   Tobacco Use   • Smoking status: Never   • Smokeless tobacco: Never   Vaping Use   • Vaping Use: Never used   Substance and Sexual Activity   • Alcohol use: Never     Comment: social   • Drug use: Never   • Sexual activity: Not on file   Other Topics Concern   • Not on file   Social History Narrative   • Not on file     Social Determinants of Health     Financial Resource Strain: Not on file   Food Insecurity: No Food Insecurity (6/20/2023)    Hunger Vital Sign    • Worried About Running Out of Food in the Last Year: Never true    • Ran Out of Food in the Last Year: Never true   Transportation Needs: No Transportation Needs (6/20/2023)    PRAPARE - Transportation    • Lack of Transportation (Medical): No    • Lack of Transportation (Non-Medical):  No   Physical Activity: Not on file   Stress: Not on file   Social Connections: Not on file   Intimate Partner Violence: Not on file   Housing Stability: Unknown (6/20/2023)    Housing Stability Vital Sign    • Unable to Pay for Housing in the Last Year: No    • Number of Places Lived in the Last Year: Not on file    • Unstable Housing in the Last Year: No      No family history on file. Past Surgical History:   Procedure Laterality Date   • CARDIAC ELECTROPHYSIOLOGY PROCEDURE Left 6/20/2023    Procedure: Cardiac pacer implant;  Surgeon: Johnathon Sesay DO;  [facility];   Service: Cardiology   • COLONOSCOPY     • IR STROKE ALERT  4/27/2019   • MENISCECTOMY Right    • NE CYSTO INSERTION TRANSPROSTATIC IMPLANT SINGLE N/A 11/19/2021    Procedure: Amlin Hang WITH INSERTION UROLIFT;  Surgeon: Andres Sosa MD;  [facility];  Service: Urology       Current Outpatient Medications:   •  Coenzyme Q10 (CO Q 10) 10 MG CAPS, Take by mouth daily, Disp: , Rfl:   •  cyanocobalamin (VITAMIN B-12) 100 mcg tablet, Take by mouth daily, Disp: , Rfl:   •  finasteride (PROSCAR) 5 mg tablet, Take 5 mg by mouth daily at bedtime  , Disp: , Rfl:   •  ketoconazole (NIZORAL) 2 % shampoo, SHAMPOO 3 TIMES A WEEK AS A SCALP TREATMENT, LEAVE ON 5-10 MINUTES AND RINSE, Disp: , Rfl:   •  metoprolol succinate (TOPROL-XL) 25 mg 24 hr tablet, TAKE 1 TABLET BY MOUTH EVERY DAY, Disp: 30 tablet, Rfl: 2  •  pantoprazole (PROTONIX) 40 mg tablet, Take 1 tablet (40 mg total) by mouth 2 (two) times a day before meals, Disp: 120 tablet, Rfl: 0  •  pravastatin (PRAVACHOL) 40 mg tablet, Take 40 mg by mouth daily, Disp: , Rfl:   •  rivaroxaban (Xarelto) 20 mg tablet, Take 20 mg by mouth daily with dinner  , Disp: , Rfl:   •  acetaminophen (TYLENOL) 325 mg tablet, Take 2 tablets (650 mg total) by mouth every 4 (four) hours as needed for mild pain (Patient not taking: Reported on 8/3/2023), Disp: 30 tablet, Rfl: 0  •  famotidine (PEPCID) 20 mg tablet, Take 1 tablet (20 mg total) by mouth daily, Disp: 30 tablet, Rfl: 0  •  hydrocortisone 1 % cream, Apply topically 2 (two) times a day as needed  (Patient not taking: Reported on 9/26/2023), Disp: , Rfl:   •  sucralfate (CARAFATE) 1 g tablet, Take 1 tablet (1 g total) by mouth 4 (four) times a day (before meals and at bedtime) for 21 days, Disp: 84 tablet, Rfl: 0        Review of Systems:  Review of Systems   Respiratory: Negative. Cardiovascular: Negative. All other systems reviewed and are negative. Physical Exam:  Physical Exam  Constitutional:       General: He is not in acute distress. Appearance: He is well-developed. He is not diaphoretic. HENT:      Head: Normocephalic and atraumatic. Eyes:      General: No scleral icterus. Right eye: No discharge. Pupils: Pupils are equal, round, and reactive to light. Neck:      Thyroid: No thyromegaly. Cardiovascular:      Rate and Rhythm: Normal rate and regular rhythm. Heart sounds: Normal heart sounds. No murmur heard. No friction rub. No gallop. Pulmonary:      Effort: Pulmonary effort is normal.      Breath sounds: Normal breath sounds. Abdominal:      General: There is no distension. Tenderness: There is no abdominal tenderness. There is no guarding or rebound. Musculoskeletal:         General: Normal range of motion. Cervical back: Normal range of motion and neck supple. Skin:     General: Skin is warm and dry. Coloration: Skin is not pale. Findings: No erythema or rash. Neurological:      Mental Status: He is alert and oriented to person, place, and time. Motor: Weakness present. Coordination: Coordination normal.      Gait: Gait abnormal.   Psychiatric:         Behavior: Behavior normal.         Thought Content: Thought content normal.         Judgment: Judgment normal.         This note was completed in part utilizing M-Modal Fluency Direct Software. Grammatical errors, random word insertions, spelling mistakes, and incomplete sentences can be an occasional consequence of this system secondary to software limitations, ambient noise, and hardware issues.   If you have any questions or concerns about the content, text, or information contained within the body of this dictation, please contact the provider for clarification.

## 2023-10-04 ENCOUNTER — IN-CLINIC DEVICE VISIT (OUTPATIENT)
Dept: CARDIOLOGY CLINIC | Facility: CLINIC | Age: 73
End: 2023-10-04
Payer: MEDICARE

## 2023-10-04 DIAGNOSIS — Z95.0 PRESENCE OF PERMANENT CARDIAC PACEMAKER: Primary | ICD-10-CM

## 2023-10-04 PROCEDURE — 93280 PM DEVICE PROGR EVAL DUAL: CPT | Performed by: INTERNAL MEDICINE

## 2023-10-04 NOTE — PROGRESS NOTES
Results for orders placed or performed in visit on 10/04/23   Cardiac EP device report    Narrative    MDT D-PM/ACTIVE SYSTEM IS MRI CONDITIONAL  DEVICE INTERROGATED IN THE [facility] OFFICE. BATTERY VOLTAGE ADEQUATE (13.2 YRS). AP <0.1%  83.1% (>40%/AAIR~DDDR 60) ALL LEAD PARAMETERS WITHIN NORMAL LIMITS. NO SIGNIFICANT HIGH RATE EPISODES; CURRENTLY IN AF SINCE 6/20/2023. AT/AF BURDEN 100% SINCE 7/6/2023. TAKES XARELTO & METOPROLOL SUCC. NO PROGRAMMING CHANGES MADE TO DEVICE PARAMETERS. NORMAL DEVICE FUNCTION.  AM/NC

## 2023-10-23 ENCOUNTER — TELEPHONE (OUTPATIENT)
Age: 73
End: 2023-10-23

## 2023-10-23 NOTE — TELEPHONE ENCOUNTER
Patients GI provider:  Dr. Catherine Zuluaga    Number to return call: [phone]    Reason for call: Pt's wife called asking about imaging study that was cancelled due to other appts. Needs to be rescheduled prior to upcoming appt. Does another order need to be placed.     Scheduled procedure/appointment date if applicable: 81/4/5739

## 2023-11-27 ENCOUNTER — HOSPITAL ENCOUNTER (OUTPATIENT)
Dept: RADIOLOGY | Facility: HOSPITAL | Age: 73
Discharge: HOME | End: 2023-11-27
Payer: MEDICARE

## 2023-11-27 DIAGNOSIS — K21.9 GASTROESOPHAGEAL REFLUX DISEASE WITHOUT ESOPHAGITIS: ICD-10-CM

## 2023-11-27 PROCEDURE — 74240 X-RAY XM UPR GI TRC 1CNTRST: CPT

## 2023-11-29 ENCOUNTER — OFFICE VISIT (OUTPATIENT)
Dept: GASTROENTEROLOGY | Facility: MEDICAL CENTER | Age: 73
End: 2023-11-29
Payer: MEDICARE

## 2023-11-29 VITALS
WEIGHT: 203 LBS | HEART RATE: 70 BPM | BODY MASS INDEX: 27.5 KG/M2 | HEIGHT: 72 IN | TEMPERATURE: 97.8 F | OXYGEN SATURATION: 97 % | DIASTOLIC BLOOD PRESSURE: 60 MMHG | SYSTOLIC BLOOD PRESSURE: 128 MMHG

## 2023-11-29 DIAGNOSIS — K26.4 GASTROINTESTINAL HEMORRHAGE ASSOCIATED WITH DUODENAL ULCER: ICD-10-CM

## 2023-11-29 DIAGNOSIS — K27.9 PUD (PEPTIC ULCER DISEASE): ICD-10-CM

## 2023-11-29 DIAGNOSIS — R10.9 ABDOMINAL PAIN, UNSPECIFIED ABDOMINAL LOCATION: Primary | ICD-10-CM

## 2023-11-29 PROCEDURE — 99214 OFFICE O/P EST MOD 30 MIN: CPT | Performed by: STUDENT IN AN ORGANIZED HEALTH CARE EDUCATION/TRAINING PROGRAM

## 2023-11-29 RX ORDER — PANTOPRAZOLE SODIUM 40 MG/1
40 TABLET, DELAYED RELEASE ORAL DAILY
Qty: 90 TABLET | Refills: 3 | Status: SHIPPED | OUTPATIENT
Start: 2023-11-29

## 2023-11-29 NOTE — PROGRESS NOTES
[facility] - Outpatient Consultation  Yossi Montgomery 68 y.o. male MRN: [id number]  Encounter: [id number]      Assessment and Plan:    1. Abdominal pain, unspecified abdominal location    2. Gastrointestinal hemorrhage associated with duodenal ulcer    3. PUD (peptic ulcer disease)        68 y.o. 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 Patient has had complete resolution of abdominal pain after passing 5 kidney stones during his hospitalization in 6/2023. This is consistent with pain from kidney stones (with possible contribution from significant bradycardia at the time), and this does not warrant further GI evaluation. However, he has been on twice daily PPI since 2019 for bleeding duodenal ulcer. This is not typical, as PPI is generally weaned down to once per day (and often even discontinued in certain cases). It does not appear he ever had appropriate H. pylori testing which is strongly indicated. He does not have any GERD symptoms to warrant PPI use on its own. I have recommended that he wean down to once daily PPI. After 1 week, he should stop PPI for 2 weeks so that we can test for H. pylori. He can then resume once daily PPI at that point. In this patient on chronic anticoagulation, I would probably continue PPI once daily indefinitely for secondary ulcer prophylaxis unless H. pylori is found. Finally, I agree with patient and wife regarding risks likely outweighing benefits for further colon cancer screening. However, I cannot locate the last colonoscopy report or pathology done at [facility] in 2018.  I will obtain records.    - H.pylori stool Ag (after stopping PPI x 2 weeks)  - Decrease pantoprazole to 40 mg daily  - Records of prior colonoscopy from [address] in 3 months    Orders Placed This Encounter   Procedures    H. pylori antigen, stool     ______________________________________________________________________    History of Present Illness:    Reymundo Gilford is a 68 y.o. male w/ hx of A-fib on Xarelto, tachybradycardia syndrome s/p pacemaker (6/2023), right ROB/MCA embolic CVA 4/0131 s/p tPA with residual left-sided hemiparesis, PUD with duodenal ulcers (2019) after stroke who presents for follow-up of abdominal pain. He is accompanied by his wife. Patient was admitted 6/2023 with severe periumbilical pain, found to have bradycardia s/p pacemaker and kidney stones. Abdominal pain resolved completely after passing kidney stones. CTA showed mild to moderate narrowing of the SMA, but this was not deemed to be mesenteric ischemia per vascular surgery. He saw the GI RAJAN 9/2023 at which time patient had no further abdominal pain. Upper GI series 11/2023 for prior history of ulcers and GERD was extremely limited but normal.    Today, patient continues to have no pain. Last colonoscopy was reportedly around 2018 at [facility]; 5-year follow-up was recommended but patient and wife are not keen on getting colonoscopies given his comorbidities and need to come off anticoagulation. Patient has been on pantoprazole 40 mg twice daily since 2019 when he had duodenal ulcers. This dose was never adjusted. It also appears that he never had H. pylori biopsies during any of his EGDs in 5/2019. H. pylori IgG was positive in 5/2019, but he did not get treated. H. pylori stool antigen 6/2019 was negative, but he was presumably on PPI at this time. Review of Systems:  As per HPI. Otherwise negative.       Historical Information   Past Medical History:   Diagnosis Date    Acute encephalopathy 5/27/2019    Arthritis     BL knees    Atrial fibrillation (HCC)     CHF (congestive heart failure) (HCC)     Colon polyp CVA (cerebral vascular accident) ([address]) 4/27/2019    NIHSS 26 on presentation    Depression     Diabetes mellitus ([address])     borderline    Encephalopathy acute 4/28/2019    History of transfusion 04/2019    Hyperlipidemia     Irregular heart beat     Afib    Stroke ([address]) 04/27/2019    Left sided weakness-aphasia    Urinary retention     Urinary retention 6/4/2019     Past Surgical History:   Procedure Laterality Date    CARDIAC ELECTROPHYSIOLOGY PROCEDURE Left 6/20/2023    Procedure: Cardiac pacer implant;  Surgeon: Avila Fonseca DO;  [facility]; Service: Cardiology    COLONOSCOPY      IR STROKE ALERT  4/27/2019    MENISCECTOMY Right     NM CYSTO INSERTION TRANSPROSTATIC IMPLANT SINGLE N/A 11/19/2021    Procedure: CYSTOSCOPY WITH INSERTION UROLIFT;  Surgeon: Srini Fernandez MD;  Location[facility];  Service: Urology     Social History   Social History     Substance and Sexual Activity   Alcohol Use Never    Comment: social     Social History     Substance and Sexual Activity   Drug Use Never     Social History     Tobacco Use   Smoking Status Never   Smokeless Tobacco Never     History reviewed. No pertinent family history. Meds/Allergies       Current Outpatient Medications:     Coenzyme Q10 (CO Q 10) 10 MG CAPS    cyanocobalamin (VITAMIN B-12) 100 mcg tablet    finasteride (PROSCAR) 5 mg tablet    hydrocortisone 1 % cream    ketoconazole (NIZORAL) 2 % shampoo    metoprolol succinate (TOPROL-XL) 25 mg 24 hr tablet    pantoprazole (PROTONIX) 40 mg tablet    pravastatin (PRAVACHOL) 40 mg tablet    rivaroxaban (Xarelto) 20 mg tablet    acetaminophen (TYLENOL) 325 mg tablet    Allergies   Allergen Reactions    Cephalexin Throat Swelling     Pt reported throat swelling after taking antibiotic     Penicillins Other (See Comments)           Objective     Blood pressure 128/60, pulse 70, temperature 97.8 °F (36.6 °C), height 6' (1.829 m), weight 92.1 kg (203 lb), SpO2 97 %.  Body mass index is 27.53 kg/m².         Physical Exam:      General: No acute distress, ambulates with great difficulty using a with walker  Abdomen: Soft, non-tender, non-distended, normoactive bowel sounds  Neuro: Awake, alert, oriented x 3    Lab Results:   Lab Results   Component Value Date/Time    WBC 11.11 (H) 06/21/2023 04:37 AM    HGB 14.8 06/21/2023 04:37 AM     06/21/2023 04:37 AM    SODIUM 142 06/21/2023 04:37 AM    K 3.5 06/21/2023 04:37 AM     (H) 06/21/2023 04:37 AM    CO2 22 06/21/2023 04:37 AM    CO2 28 04/27/2019 12:33 PM    BUN 23 06/21/2023 04:37 AM    CREATININE 0.71 06/21/2023 04:37 AM    AST 11 (L) 06/17/2023 06:31 AM    ALT 16 06/17/2023 06:31 AM    ALKPHOS 47 06/17/2023 06:31 AM    TBILI 0.66 06/17/2023 06:31 AM    BILIDIR 0.15 06/02/2019 10:45 AM    ALB 3.8 06/17/2023 06:31 AM    INR 1.25 (H) 02/02/2022 01:08 PM    LIPASE 8 (L) 06/17/2023 06:31 AM

## 2023-12-07 DIAGNOSIS — I48.0 PAROXYSMAL ATRIAL FIBRILLATION (HCC): ICD-10-CM

## 2023-12-07 RX ORDER — METOPROLOL SUCCINATE 25 MG/1
25 TABLET, EXTENDED RELEASE ORAL DAILY
Qty: 30 TABLET | Refills: 2 | Status: SHIPPED | OUTPATIENT
Start: 2023-12-07

## 2024-01-05 ENCOUNTER — APPOINTMENT (OUTPATIENT)
Dept: LAB | Facility: MEDICAL CENTER | Age: 74
End: 2024-01-05
Payer: MEDICARE

## 2024-01-05 DIAGNOSIS — K27.9 PUD (PEPTIC ULCER DISEASE): ICD-10-CM

## 2024-01-05 PROCEDURE — 87338 HPYLORI STOOL AG IA: CPT

## 2024-01-06 LAB — H PYLORI AG STL QL IA: NEGATIVE

## 2024-01-08 ENCOUNTER — REMOTE DEVICE CLINIC VISIT (OUTPATIENT)
Dept: CARDIOLOGY CLINIC | Facility: CLINIC | Age: 74
End: 2024-01-08
Payer: MEDICARE

## 2024-01-08 DIAGNOSIS — Z95.0 CARDIAC PACEMAKER IN SITU: Primary | ICD-10-CM

## 2024-01-08 PROCEDURE — 93294 REM INTERROG EVL PM/LDLS PM: CPT | Performed by: INTERNAL MEDICINE

## 2024-01-08 PROCEDURE — 93296 REM INTERROG EVL PM/IDS: CPT | Performed by: INTERNAL MEDICINE

## 2024-01-08 NOTE — PROGRESS NOTES
"Results for orders placed or performed in visit on 01/08/24   Cardiac EP device report    Narrative    MDT D-PM/ACTIVE SYSTEM IS MRI CONDITIONAL  CARELINK TRANSMISSION: BATTERY STATUS \"12 YRS.\" AP 0%  82%. ALL AVAILABLE LEAD PARAMETERS WITHIN NORMAL LIMITS. 100% AT/AF BURDEN; PT ON XARELTO. 1 FAST A/V NOTED; APPROX 22 BEATS@ 188 BPM. RVR VS NSVT CAN'T BE EXCLUDED. PT ON METO SUCC & EF 65% (ECHO 2023). SENT TO DR. BABCOCK. NORMAL DEVICE FUNCTION. NC         "

## 2024-02-02 ENCOUNTER — HOSPITAL ENCOUNTER (OUTPATIENT)
Dept: NON INVASIVE DIAGNOSTICS | Facility: CLINIC | Age: 74
Discharge: HOME | End: 2024-02-02
Payer: MEDICARE

## 2024-02-02 DIAGNOSIS — K55.1 SUPERIOR MESENTERIC ARTERY STENOSIS (HCC): ICD-10-CM

## 2024-02-02 PROCEDURE — 93975 VASCULAR STUDY: CPT

## 2024-02-02 PROCEDURE — 93975 VASCULAR STUDY: CPT | Performed by: SURGERY

## 2024-03-09 DIAGNOSIS — I48.0 PAROXYSMAL ATRIAL FIBRILLATION (HCC): ICD-10-CM

## 2024-03-11 RX ORDER — METOPROLOL SUCCINATE 25 MG/1
25 TABLET, EXTENDED RELEASE ORAL DAILY
Qty: 30 TABLET | Refills: 5 | Status: SHIPPED | OUTPATIENT
Start: 2024-03-11

## 2024-03-26 ENCOUNTER — OFFICE VISIT (OUTPATIENT)
Dept: GASTROENTEROLOGY | Facility: MEDICAL CENTER | Age: 74
End: 2024-03-26
Payer: MEDICARE

## 2024-03-26 VITALS
WEIGHT: 203 LBS | SYSTOLIC BLOOD PRESSURE: 138 MMHG | HEART RATE: 75 BPM | TEMPERATURE: 97.9 F | HEIGHT: 72 IN | BODY MASS INDEX: 27.5 KG/M2 | DIASTOLIC BLOOD PRESSURE: 85 MMHG

## 2024-03-26 DIAGNOSIS — K21.9 GASTROESOPHAGEAL REFLUX DISEASE WITHOUT ESOPHAGITIS: ICD-10-CM

## 2024-03-26 DIAGNOSIS — K27.9 PUD (PEPTIC ULCER DISEASE): Primary | ICD-10-CM

## 2024-03-26 PROCEDURE — 99213 OFFICE O/P EST LOW 20 MIN: CPT | Performed by: NURSE PRACTITIONER

## 2024-03-26 NOTE — PROGRESS NOTES
[facility] - Outpatient Follow-up Note  Les Malone 73 y.o. male MRN: [id number]  Encounter: [id number]          ASSESSMENT AND PLAN:      1.  PUD  2.  GERD    History of duodenal ulcer.  Currently on Xarelto secondary to A-fib.  Taking pantoprazole 40 mg daily and doing well overall.  Denies any abdominal pain, heartburn/reflux, nausea, vomiting or dysphagia.  Appetite is good.  BMs are brown and formed daily.  Denies any melena or hematochezia.  Recent stool for H. pylori was negative.  Due to chronic anticoagulation PPI daily indefinitely would be recommended.    -Continue pantoprazole 40 mg daily  -Follow-up in office in 6 to 12 months or sooner if needed    ______________________________________________________________________    SUBJECTIVE: 73-year-old male here for follow-up.  He was last seen by Dr. Arana 11/29/2023 for abdominal pain, gastrointestinal hemorrhage associated duodenal ulcer and PUD.    History of GI bleed secondary to duodenal ulcer.  He is on Xarelto secondary to A-fib.  PPI was reduced to daily at last visit.  Patient is doing well overall.  Denies any heartburn/reflux, abdominal pain, nausea, vomiting or dysphagia. Recent stool for H. pylori was negative.     BMs are brown and formed daily.  Denies any melena or hematochezia.        Cologuard neg 8/23.     Prior EGD/colonoscopy     Multiple EGD's 5/19-small sliding hiatal hernia, single 10 mm ulcer in the second part of the duodenum.    Colonoscopy 5/16-small sessile polyp of 6 mm in the sigmoid colon, diverticulosis.    REVIEW OF SYSTEMS IS OTHERWISE NEGATIVE.  10 point review of systems negative other than per HPI      Historical Information   Past Medical History:   Diagnosis Date   • Acute encephalopathy 05/27/2019   • Arthritis     BL knees   • Atrial fibrillation (HCC)    • CHF (congestive heart failure) (HCC)    • Colon polyp    • Coronary artery disease    • CVA (cerebral vascular accident) (HCC)  04/27/2019    NIHSS 26 on presentation   • Depression    • Diabetes mellitus (HCC)     borderline   • Encephalopathy acute 04/28/2019   • History of transfusion 04/2019   • Hyperlipidemia    • Irregular heart beat     Afib   • Stroke (HCC) 04/27/2019    Left sided weakness-aphasia   • Urinary retention    • Urinary retention 06/04/2019     Past Surgical History:   Procedure Laterality Date   • CARDIAC ELECTROPHYSIOLOGY PROCEDURE Left 6/20/2023    Procedure: Cardiac pacer implant;  Surgeon: Marquis Haney DO;  [facility];  Service: Cardiology   • COLONOSCOPY     • IR STROKE ALERT  4/27/2019   • MENISCECTOMY Right    • HI CYSTO INSERTION TRANSPROSTATIC IMPLANT SINGLE N/A 11/19/2021    Procedure: CYSTOSCOPY WITH INSERTION UROLIFT;  Surgeon: Jack Christopher MD;  [facility];  Service: Urology     Social History   Social History     Substance and Sexual Activity   Alcohol Use Not Currently    Comment: social     Social History     Substance and Sexual Activity   Drug Use Never     Social History     Tobacco Use   Smoking Status Never   Smokeless Tobacco Never     Family History   Problem Relation Age of Onset   • Cancer Mother    • Hypertension Mother        Meds/Allergies       Current Outpatient Medications:   •  Coenzyme Q10 (CO Q 10) 10 MG CAPS  •  cyanocobalamin (VITAMIN B-12) 100 mcg tablet  •  finasteride (PROSCAR) 5 mg tablet  •  hydrocortisone 1 % cream  •  ketoconazole (NIZORAL) 2 % shampoo  •  metoprolol succinate (TOPROL-XL) 25 mg 24 hr tablet  •  pantoprazole (PROTONIX) 40 mg tablet  •  pravastatin (PRAVACHOL) 40 mg tablet  •  rivaroxaban (Xarelto) 20 mg tablet  •  acetaminophen (TYLENOL) 325 mg tablet    Allergies   Allergen Reactions   • Cephalexin Throat Swelling     Pt reported throat swelling after taking antibiotic    • Penicillins Other (See Comments)           Objective     Blood pressure 138/85, pulse 75, temperature 97.9 °F (36.6 °C), temperature source Tympanic,  height 6' (1.829 m), weight 92.1 kg (203 lb). Body mass index is 27.53 kg/m².      PHYSICAL EXAM:      General Appearance:   Alert, cooperative, no distress   HEENT:   Normocephalic, atraumatic, anicteric.     Neck:  Supple, symmetrical, trachea midline   Lungs:   Clear to auscultation bilaterally; no rales, rhonchi or wheezing; respirations unlabored    Heart::   Regular rate and rhythm; no murmur, rub, or gallop.   Abdomen:   Soft, non-tender, non-distended; normal bowel sounds; no masses, no organomegaly    Genitalia:   Deferred    Rectal:   Deferred    Extremities:  No cyanosis, clubbing or edema    Pulses:  2+ and symmetric    Skin:  No jaundice, rashes, or lesions    Lymph nodes:  No palpable cervical lymphadenopathy        Lab Results:   No visits with results within 1 Day(s) from this visit.   Latest known visit with results is:   Appointment on 01/05/2024   Component Date Value   • H pylori Ag, Stl 01/05/2024 Negative          Radiology Results:   No results found.

## 2024-04-05 ENCOUNTER — REMOTE DEVICE CLINIC VISIT (OUTPATIENT)
Dept: CARDIOLOGY CLINIC | Facility: CLINIC | Age: 74
End: 2024-04-05
Payer: MEDICARE

## 2024-04-05 DIAGNOSIS — Z95.0 CARDIAC PACEMAKER IN SITU: Primary | ICD-10-CM

## 2024-04-05 PROCEDURE — 93296 REM INTERROG EVL PM/IDS: CPT | Performed by: INTERNAL MEDICINE

## 2024-04-05 PROCEDURE — 93294 REM INTERROG EVL PM/LDLS PM: CPT | Performed by: INTERNAL MEDICINE

## 2024-04-05 NOTE — PROGRESS NOTES
MDT D-PM/ACTIVE SYSTEM IS MRI CONDITIONAL  CARELINK TRANSMISSION: BATTERY VOLTAGE ADEQUATE (12.6 YRS). AP <0.1%  82.9% (>40%/AAIR-DDDDR 60PPM/AMS - DDIR); ALL AVAILABLE LEAD PARAMETERS WITHIN NORMAL LIMITS. 1 PERSISTENT AF EPISODE IN PROGRESS SINCE 6/2023; AT/AF BURDEN 100%; NO SIGNIFICANT HIGH RATE EPISODES. PATIENT TAKING XARELTO, METOPROLOL SUCC; NORMAL DEVICE FUNCTION.  ES

## 2024-04-09 ENCOUNTER — TELEPHONE (OUTPATIENT)
Dept: NEUROLOGY | Facility: CLINIC | Age: 74
End: 2024-04-09

## 2024-04-10 ENCOUNTER — OFFICE VISIT (OUTPATIENT)
Dept: CARDIOLOGY CLINIC | Facility: CLINIC | Age: 74
End: 2024-04-10
Payer: MEDICARE

## 2024-04-10 VITALS
DIASTOLIC BLOOD PRESSURE: 58 MMHG | HEART RATE: 68 BPM | BODY MASS INDEX: 27.5 KG/M2 | WEIGHT: 203 LBS | HEIGHT: 72 IN | SYSTOLIC BLOOD PRESSURE: 120 MMHG

## 2024-04-10 DIAGNOSIS — M79.89 LEFT UPPER EXTREMITY SWELLING: Primary | ICD-10-CM

## 2024-04-10 DIAGNOSIS — R60.0 EDEMA OF LEFT UPPER EXTREMITY: ICD-10-CM

## 2024-04-10 DIAGNOSIS — E11.9 TYPE 2 DIABETES MELLITUS WITHOUT COMPLICATION, UNSPECIFIED WHETHER LONG TERM INSULIN USE (HCC): ICD-10-CM

## 2024-04-10 DIAGNOSIS — I48.0 PAROXYSMAL ATRIAL FIBRILLATION (HCC): ICD-10-CM

## 2024-04-10 DIAGNOSIS — I50.32 CHRONIC DIASTOLIC CHF (CONGESTIVE HEART FAILURE) (HCC): ICD-10-CM

## 2024-04-10 PROCEDURE — 99214 OFFICE O/P EST MOD 30 MIN: CPT | Performed by: INTERNAL MEDICINE

## 2024-04-10 NOTE — PROGRESS NOTES
Cardiology             Les Malone  1950  [id number]              Assessment/Plan:     Persistent atrial fibrillation  Tachycardia-bradycardia syndrome status post Medtronic dual-chamber permanent pacemaker placed 6/20/2023  Right ROB/MCA territory embolic CVA 04/2019--significant aphasia  Syncope 1/2020, suspect vasovagal  Chronic hypotension        Permanent atrial fibrillation noted on prior device interrogation with no high rate episodes, continue metoprolol  Continue Xarelto anticoagulation  Left arm/hand swelling noted for which patient is getting Botox.  This may be due to his prior stroke.  I will check a left upper extremity venous duplex to rule out DVT.  DVT would be less likely as he is compliant with his Xarelto, but in light of his left-sided pacemaker, we will be sure.  His wife will call me after completion of the study to discuss results over the phone.          Follow-up in 6 months, patient will follow-up over the phone to discuss results of left upper extremity duplex           Interval History:      This is a 73-year-old male admitted to [facility] 04/2019 with acute CVA, and subsequent event right MCA and ROB thrombectomy and tPA infusion.  He residual aphasia and dysphasia. Subsequent ALYX revealed no atrial septal interruption, no evidence of thrombus.  Subsequently, he underwent loop recorder placement. He then presents to the hospital 05/13/2019 after a routine ECG revealed rapid atrial fibrillation.  Loop recorder interrogation had revealed new onset atrial fibrillation ongoing since 05/11/2019.  He was then initiated on Eliquis and metoprolol, and a rate control strategy was pursued.     After discharge, he has started rehabilitation.  His wife called 09/03/2019, stating that he was quite lethargic with low blood pressure readings.  He went to the ER, where metoprolol was decreased to 12.5 mg b.i.d..  Although he felt better, he still continued to have  lethargy, after which metoprolol was completed held 09/04/2019.     During a prior visit 09/06/2019, at which time metoprolol was restarted at a low dose secondary to his paroxysmal atrial fibrillation.  Amiodarone was considered and he was evaluated by Dr. Haney 10/25/2019, and was deemed that his device was over estimating the burden of atrial fibrillation and he mostly had sinus rhythm with PACs.  Low-dose metoprolol at 12.5 mg b.i.d. Was continued.     He was hospitalized 01/2020 after he discontinue metoprolol for about 3 weeks, and early 730 in the morning transferred into the shower and was getting washed he became lightheaded and dizzy.  He had an episode of unresponsiveness was head rolled forward, lasting 1-5 minutes.  Review of loop recorder showed to atrial fibrillation events and 1 tachycardia event at 353 beats per minute.  Review of rhythm strips however revealed only noise artifact.  There was no definitive atrial fibrillation, no evidence of pauses or significant tachycardia or bradycardia associated with his episodes.  He had a 4 beat run of nonsustained ventricular tachycardia on telemetry.  The patient was diagnosed with vasovagal syncope and discharged.     He underwent a 14 day [facility] Holter monitor revealing no evidence of atrial fibrillation in several short runs of SVT up to 11 beats and short nonsustained ventricular tachycardia.  Subsequently, he underwent explantation of his loop recorder 06/2020.  In 07/2020 he had recurrent vasovagal episode after he got up to walk in the shower.    He was hospitalized 6/2023 with atrial fibrillation with slow ventricular response.  Subsequently he underwent placement of a Medtronic dual-chamber permanent pacemaker on 6/20/2023.    He presents today for follow-up with no complaints.  Specifically, he denies any chest pain, shortness of breath, dizziness, palpitations, lower extremity edema.  Blood pressures typically have been in the  130s.        Vitals:  Vitals:    04/10/24 0916   BP: 120/58   Pulse: 68   Weight: 92.1 kg (203 lb)   Height: 6' (1.829 m)         Past Medical History:   Diagnosis Date   • Acute encephalopathy 05/27/2019   • Arthritis     BL knees   • Atrial fibrillation (HCC)    • CHF (congestive heart failure) (HCC)    • Colon polyp    • Coronary artery disease    • CVA (cerebral vascular accident) (HCC) 04/27/2019    NIHSS 26 on presentation   • Depression    • Diabetes mellitus (HCC)     borderline   • Encephalopathy acute 04/28/2019   • History of transfusion 04/2019   • Hyperlipidemia    • Irregular heart beat     Afib   • Stroke (HCC) 04/27/2019    Left sided weakness-aphasia   • Urinary retention    • Urinary retention 06/04/2019     Social History     Socioeconomic History   • Marital status: /Civil Union     Spouse name: Not on file   • Number of children: Not on file   • Years of education: Not on file   • Highest education level: Not on file   Occupational History   • Not on file   Tobacco Use   • Smoking status: Never   • Smokeless tobacco: Never   Vaping Use   • Vaping status: Never Used   Substance and Sexual Activity   • Alcohol use: Not Currently     Comment: social   • Drug use: Never   • Sexual activity: Yes     Partners: Female   Other Topics Concern   • Not on file   Social History Narrative   • Not on file     Social Determinants of Health     Financial Resource Strain: Not on file   Food Insecurity: No Food Insecurity (6/20/2023)    Hunger Vital Sign    • Worried About Running Out of Food in the Last Year: Never true    • Ran Out of Food in the Last Year: Never true   Transportation Needs: No Transportation Needs (6/20/2023)    PRAPARE - Transportation    • Lack of Transportation (Medical): No    • Lack of Transportation (Non-Medical): No   Physical Activity: Not on file   Stress: Not on file   Social Connections: Not on file   Intimate Partner Violence: Not on file   Housing Stability: Unknown  (6/20/2023)    Housing Stability Vital Sign    • Unable to Pay for Housing in the Last Year: No    • Number of Places Lived in the Last Year: Not on file    • Unstable Housing in the Last Year: No      Family History   Problem Relation Age of Onset   • Cancer Mother    • Hypertension Mother      Past Surgical History:   Procedure Laterality Date   • CARDIAC ELECTROPHYSIOLOGY PROCEDURE Left 6/20/2023    Procedure: Cardiac pacer implant;  Surgeon: Marquis Haney DO;  [facility];  Service: Cardiology   • COLONOSCOPY     • IR STROKE ALERT  4/27/2019   • MENISCECTOMY Right    • DC CYSTO INSERTION TRANSPROSTATIC IMPLANT SINGLE N/A 11/19/2021    Procedure: CYSTOSCOPY WITH INSERTION UROLIFT;  Surgeon: Jack Christopher MD;  [facility];  Service: Urology       Current Outpatient Medications:   •  Coenzyme Q10 (CO Q 10) 10 MG CAPS, Take by mouth daily, Disp: , Rfl:   •  cyanocobalamin (VITAMIN B-12) 100 mcg tablet, Take by mouth daily, Disp: , Rfl:   •  finasteride (PROSCAR) 5 mg tablet, Take 5 mg by mouth daily at bedtime  , Disp: , Rfl:   •  hydrocortisone 1 % cream, Apply topically 2 (two) times a day as needed, Disp: , Rfl:   •  metoprolol succinate (TOPROL-XL) 25 mg 24 hr tablet, TAKE 1 TABLET BY MOUTH EVERY DAY, Disp: 30 tablet, Rfl: 5  •  pantoprazole (PROTONIX) 40 mg tablet, Take 1 tablet (40 mg total) by mouth daily, Disp: 90 tablet, Rfl: 3  •  pravastatin (PRAVACHOL) 40 mg tablet, Take 40 mg by mouth daily, Disp: , Rfl:   •  rivaroxaban (Xarelto) 20 mg tablet, Take 20 mg by mouth daily with dinner  , Disp: , Rfl:   •  acetaminophen (TYLENOL) 325 mg tablet, Take 2 tablets (650 mg total) by mouth every 4 (four) hours as needed for mild pain (Patient not taking: Reported on 8/3/2023), Disp: 30 tablet, Rfl: 0  •  ketoconazole (NIZORAL) 2 % shampoo, SHAMPOO 3 TIMES A WEEK AS A SCALP TREATMENT, LEAVE ON 5-10 MINUTES AND RINSE, Disp: , Rfl:         Review of Systems:  Review of Systems    Respiratory: Negative.     Cardiovascular: Negative.    All other systems reviewed and are negative.        Physical Exam:  Physical Exam  Constitutional:       General: He is not in acute distress.     Appearance: He is well-developed. He is not diaphoretic.   HENT:      Head: Normocephalic and atraumatic.   Eyes:      General: No scleral icterus.        Right eye: No discharge.      Pupils: Pupils are equal, round, and reactive to light.   Neck:      Thyroid: No thyromegaly.   Cardiovascular:      Rate and Rhythm: Normal rate and regular rhythm.      Heart sounds: Normal heart sounds. No murmur heard.     No friction rub. No gallop.   Pulmonary:      Effort: Pulmonary effort is normal.      Breath sounds: Normal breath sounds.   Abdominal:      General: There is no distension.      Tenderness: There is no abdominal tenderness. There is no guarding or rebound.   Musculoskeletal:         General: Normal range of motion.      Cervical back: Normal range of motion and neck supple.      Right lower leg: No edema.      Left lower leg: No edema.   Skin:     General: Skin is warm and dry.      Coloration: Skin is not pale.      Findings: No erythema or rash.   Neurological:      Mental Status: He is alert and oriented to person, place, and time.      Motor: Weakness present.      Coordination: Coordination normal.      Gait: Gait abnormal.   Psychiatric:         Behavior: Behavior normal.         Thought Content: Thought content normal.         Judgment: Judgment normal.         This note was completed in part utilizing M-Modal Fluency Direct Software.  Grammatical errors, random word insertions, spelling mistakes, and incomplete sentences can be an occasional consequence of this system secondary to software limitations, ambient noise, and hardware issues.  If you have any questions or concerns about the content, text, or information contained within the body of this dictation, please contact the provider for  clarification.

## 2024-04-17 ENCOUNTER — OFFICE VISIT (OUTPATIENT)
Dept: NEUROLOGY | Facility: CLINIC | Age: 74
End: 2024-04-17
Payer: MEDICARE

## 2024-04-17 VITALS
DIASTOLIC BLOOD PRESSURE: 72 MMHG | TEMPERATURE: 97.6 F | HEART RATE: 75 BPM | SYSTOLIC BLOOD PRESSURE: 126 MMHG | OXYGEN SATURATION: 99 % | BODY MASS INDEX: 27.44 KG/M2 | WEIGHT: 202.3 LBS

## 2024-04-17 DIAGNOSIS — Z86.73 HISTORY OF STROKE: ICD-10-CM

## 2024-04-17 DIAGNOSIS — I65.23 BILATERAL CAROTID ARTERY STENOSIS: ICD-10-CM

## 2024-04-17 DIAGNOSIS — I48.0 PAROXYSMAL ATRIAL FIBRILLATION (HCC): ICD-10-CM

## 2024-04-17 DIAGNOSIS — E78.00 PURE HYPERCHOLESTEROLEMIA: ICD-10-CM

## 2024-04-17 DIAGNOSIS — G81.94 LEFT HEMIPLEGIA (HCC): ICD-10-CM

## 2024-04-17 DIAGNOSIS — R73.03 PREDIABETES: ICD-10-CM

## 2024-04-17 DIAGNOSIS — I10 ESSENTIAL HYPERTENSION: Primary | ICD-10-CM

## 2024-04-17 PROCEDURE — 99215 OFFICE O/P EST HI 40 MIN: CPT

## 2024-04-17 NOTE — PATIENT INSTRUCTIONS
History of Stroke:    - At this moment in time, the patient's most recent LDL from July 2023 was 91 mg/dL.  Personally, would recommend at this time potentially increasing his pravastatin to 80 mg daily.  However, the patient is having blood work when his primary care next week and he will have an updated lipid panel.  I would say that if his lipid panel is still elevated above 70 mg/dL that I would recommend that his primary care switch his pravastatin from 40 mg daily to 80 mg daily at that time.  - In regards to the patient's bruising and open wounds of the left arm, it did not seem that the wounds and bruising were too extensive.  I would say with the combination of Botox of the left upper extremity and physical activity and just normal use of the left arm it was likely he was going to have a lot of the issues with bruising of the left arm at this time.  Just based on the patient using a stronger blood thinner anticoagulant like Xarelto it is certainly possible that he is going to have more issues with recovering from open wounds or bruises currently.  However, would be a good idea to also check with the patient's spasticity doctor who is injecting the Botox in the left arm and see if it is appropriate to take a break.  Would like for the patient to follow with his primary care in regards to recent blood work and make sure that his hemoglobin is not decreased or there are any signs of other bleeding issues.  Also, there are no current concerns for any blood in the stool, blood in the urine, or any blood in the cough or sputum which is also reassuring.  - Recommend that the patient continue to follow with his cardiologist in regards to persistent atrial fibrillation and monitoring of his pacemaker at this moment in time.    - For ongoing stroke prevention continue: Xarelto 20 mg daily at dinner, pravastatin 40 mg daily  - Discussed the importance of antiplatelet management with the patient to prevent future  strokes.   - Recommend to check blood pressure occasionally away from the doctor's office to make sure that those numbers are typically less than 130/80.  If they are frequently higher than that, we recommend checking a little more often and to follow up with primary care team   - Will defer to primary care team for monitoring of cholesterol panel and blood sugar numbers with target LDL cholesterol of less than 70 and hemoglobin A1c less than 7%  - Recommend following a low salt, mediterranean diet   - Recommend routine physical exercise as tolerated     We will plan for him to return to the office in 1 year time to see on of the APPs or Dr. Pulido but would be happy to see him sooner if the need should arise.  If he has any symptoms concerning for TIA or stroke including sudden painless loss of vision or double vision, difficulty speaking or swallowing, vertigo/room spinning that does not quickly resolve, or weakness/numbness/loss of coordination affecting 1 side of the face or body he should proceed by ambulance to the nearest emergency room immediately.

## 2024-04-17 NOTE — PROGRESS NOTES
Patient ID: Les Malone is a 73 y.o. male who presents to the [facility].    Assessment/Plan:    History of Stroke:    I had the pleasure of seeing Jose today in the office at [facility] in [place].  He is presenting for an annual office visit follow-up in regard to his history of stroke.  It was noted that the patient had a significant past history of cardioembolic stroke which did cause the patient to have issues with aphasia and also significant left-sided hemiparesis.  At this appointment today he is doing relatively fine and not experiencing any new strokelike symptoms.  There was recent concern in regards to the patient's bruising of the left upper extremity.  It was reported by the patient and his wife that he was very active and was also receiving Botox injections of the left upper extremity as well for spasticity.  After evaluating and examining the arm, did not appear that the bruising was too extensive for someone already taking Xarelto.  Although did advise the patient and his wife to keep a close eye on this.  If they feel as though the Botox is not significantly beneficial to him with his spasticity and he continues to have bruising I would recommend potentially taking a short break from this.  No other bleeding issues internally were noted at this visit.  The patient is doing well at controlling his vascular risk factors for the most part, would like to see if we could potentially improve the patient's cholesterol by increasing his pravastatin.  Advised patient to talk with his primary care provider once he has his most recent lipid panel.  He is still taking his Xarelto 20 mg daily at dinnertime and would recommend he still continue as such.  No other issues or concerns at this point in time, the patient can again follow-up in about 1 year for stroke follow-up.      - At this moment in time, the patient's most recent LDL from July 2023 was 91 mg/dL.   Personally, would recommend at this time potentially increasing his pravastatin to 80 mg daily.  However, the patient is having blood work when his primary care next week and he will have an updated lipid panel.  I would say that if his lipid panel is still elevated above 70 mg/dL that I would recommend that his primary care switch his pravastatin from 40 mg daily to 80 mg daily at that time.  - In regards to the patient's bruising and open wounds of the left arm, it did not seem that the wounds and bruising were too extensive.  I would say with the combination of Botox of the left upper extremity and physical activity and just normal use of the left arm it was likely he was going to have a lot of the issues with bruising of the left arm at this time.  Just based on the patient using a stronger blood thinner anticoagulant like Xarelto it is certainly possible that he is going to have more issues with recovering from open wounds or bruises currently.  However, would be a good idea to also check with the patient's spasticity doctor who is injecting the Botox in the left arm and see if it is appropriate to take a break.  Would like for the patient to follow with his primary care in regards to recent blood work and make sure that his hemoglobin is not decreased or there are any signs of other bleeding issues.  Also, there are no current concerns for any blood in the stool, blood in the urine, or any blood in the cough or sputum which is also reassuring.  - Recommend that the patient continue to follow with his cardiologist in regards to persistent atrial fibrillation and monitoring of his pacemaker at this moment in time.    - For ongoing stroke prevention continue: Xarelto 20 mg daily at dinner, pravastatin 40 mg daily  - Discussed the importance of antiplatelet management with the patient to prevent future strokes.   - Recommend to check blood pressure occasionally away from the doctor's office to make sure that those  numbers are typically less than 130/80.  If they are frequently higher than that, we recommend checking a little more often and to follow up with primary care team   - Will defer to primary care team for monitoring of cholesterol panel and blood sugar numbers with target LDL cholesterol of less than 70 and hemoglobin A1c less than 7%  - Recommend following a low salt, mediterranean diet   - Recommend routine physical exercise as tolerated     We will plan for him to return to the office in 1 year time to see on of the APPs or Dr. Pulido but would be happy to see him sooner if the need should arise.  If he has any symptoms concerning for TIA or stroke including sudden painless loss of vision or double vision, difficulty speaking or swallowing, vertigo/room spinning that does not quickly resolve, or weakness/numbness/loss of coordination affecting 1 side of the face or body he should proceed by ambulance to the nearest emergency room immediately.     Subjective:    HPI      For Review:    The patient was last seen in the office on 12/15/2022 by Dr. Pulido.  The patient was presenting for a yearly follow-up in regard to his past history of stroke.  It was noted by the patient's stroke that he had significant left-sided hemiparesis.  The patient had been continuing to try and walk although it seems that the patient may have plateaued with occupational therapy and the spasticity may have been hindering his movement.  It was recommended that he continue to follow-up with [facility] in regards to his therapy as he had seen some improvement over time.  He was provided a referral for through [facility] for spasticity of the left upper extremity.  It was noted that the patient was still taking Xarelto and pravastatin for secondary stroke prevention.  It was noted that the patient had a carotid artery Doppler ultrasound last in 2021 and was due for 1 in February 2023.  The  patient did have this completed and his Doppler appeared to be stable, therefore Dr. Pulido recommended repeating in about 2 years time, which has already been ordered for the patient currently.  Was recommended to encourage the patient to stay as physically and mentally active as much as possible at this point in time and to continue to work on building his strength from his previous stroke.      Interval History:      New stroke symptoms/residual symptoms:    Any new, sudden onset weakness, numbness, facial droop, slurred speech, difficulty speaking, trouble swallowing, persistent vertigo, or sudden double vision or vision loss? No new stroke like symptoms    Residual symptoms include: weakness of the left UE/LE: upper extremity and lower extremity and aphasia    Stroke Etiology and Risk Factor modification:    This was a(n) embolic stroke, most likely related to Cardioembolic: Non-Valvular A-Fib    Stroke risk factors were evaluated including: Hypertension, carotid artery stenosis, paroxysmal atrial fibrillation, hypercholesterolemia, prediabetes    AP/AC therapy: Xarelto 20 mg once daily with dinner. Bruising of the left arm at this point in time.  Noted to have had some bleeding and bruising difficulty of the left arm with open sores at this time.  Is noted that the patient was still receiving Botox and was quite physically active with the left side as well.  During evaluation, did not seem that the bruising was too extensive given that the patient was taking Xarelto.    Statin therapy: Pravastatin 40 mg once daily     Blood pressure today and as of late: 126/72 BP today in the office Checking blood pressure at home he states.     Most recent LDL: 91 mg/dL as of July 2023    Most recent hemoglobin A1C: 6.1% as of July 2023    Cardiology evaluation? Any cardiac monitoring required?: Currently follows with cardiology for persistent atrial fibrillation. Currently on AC for Afib and has a cardiac pacemaker.      Endocrinology evaluation? Following proper glycemic treatment/diet? None    Lifestyle history/modifications:    Diet/Exercise regimen: Has a pretty well balanced diet at this point in time. No issues with appetite. Does go in the pool once a week, in the gym once a week.     Any physical therapy, occupational therapy or speech therapy performed/required at this time?: PT twice a week, OT once a week, and ST twice a week at [facility]    Any difficulty with sleep? No issues with sleep     Any history of sleep apnea? CPAP compliance?: Issues with sleep apnea prior to stroke but not now     Post-stroke depression/anxiety? No anxiety or depression     Any history of smoking? No never have smoked before       Lab Results   Component Value Date/Time    CHOLESTEROL 131 12/27/2019 06:13 AM     Lab Results   Component Value Date/Time    TRIG 73 12/27/2019 06:13 AM     Lab Results   Component Value Date/Time    HDL 33 (L) 12/27/2019 06:13 AM     Lab Results   Component Value Date/Time    LDLCALC 83 12/27/2019 06:13 AM       Lab Results   Component Value Date/Time    HGBA1C 6.1 (H) 07/31/2023 12:12 PM     Lab Results   Component Value Date/Time     07/31/2023 12:12 PM           Past Medical History:   Diagnosis Date    Acute encephalopathy 05/27/2019    Arthritis     BL knees    Atrial fibrillation (HCC)     CHF (congestive heart failure) (HCC)     Colon polyp     Coronary artery disease     CVA (cerebral vascular accident) (HCC) 04/27/2019    NIHSS 26 on presentation    Depression     Diabetes mellitus (HCC)     borderline    Encephalopathy acute 04/28/2019    History of transfusion 04/2019    Hyperlipidemia     Irregular heart beat     Afib    Stroke (HCC) 04/27/2019    Left sided weakness-aphasia    Urinary retention     Urinary retention 06/04/2019       Current Outpatient Medications:     acetaminophen (TYLENOL) 325 mg tablet, Take 2 tablets (650 mg total) by mouth every 4 (four) hours  as needed for mild pain (Patient not taking: Reported on 8/3/2023), Disp: 30 tablet, Rfl: 0    Coenzyme Q10 (CO Q 10) 10 MG CAPS, Take by mouth daily, Disp: , Rfl:     cyanocobalamin (VITAMIN B-12) 100 mcg tablet, Take by mouth daily, Disp: , Rfl:     finasteride (PROSCAR) 5 mg tablet, Take 5 mg by mouth daily at bedtime  , Disp: , Rfl:     hydrocortisone 1 % cream, Apply topically 2 (two) times a day as needed, Disp: , Rfl:     ketoconazole (NIZORAL) 2 % shampoo, SHAMPOO 3 TIMES A WEEK AS A SCALP TREATMENT, LEAVE ON 5-10 MINUTES AND RINSE, Disp: , Rfl:     metoprolol succinate (TOPROL-XL) 25 mg 24 hr tablet, TAKE 1 TABLET BY MOUTH EVERY DAY, Disp: 30 tablet, Rfl: 5    pantoprazole (PROTONIX) 40 mg tablet, Take 1 tablet (40 mg total) by mouth daily, Disp: 90 tablet, Rfl: 3    pravastatin (PRAVACHOL) 40 mg tablet, Take 40 mg by mouth daily, Disp: , Rfl:     rivaroxaban (Xarelto) 20 mg tablet, Take 20 mg by mouth daily with dinner  , Disp: , Rfl:      Objective:    Physical Exam:                                                                 Vitals:            Constitutional:    There were no vitals taken for this visit.  BP Readings from Last 3 Encounters:   04/10/24 120/58   03/26/24 138/85   11/29/23 128/60     Pulse Readings from Last 3 Encounters:   04/10/24 68   03/26/24 75   11/29/23 70         Well developed, well nourished, well groomed. No dysmorphic features.       Psychiatric:  Normal behavior and appropriate affect        Neurological Examination:     Mental status/cognitive function:   Orientated to time, place and person. Recent and remote memory intact. Attention span and concentration as well as fund of knowledge are appropriate for age. Normal language and some expressive aphasia noted    Cranial Nerves:  II-visual fields full.   III, IV, VI-Pupils were equal, round, and reactive to light and accomodation. Extraocular movements were full and conjugate without nystagmus. Conjugate gaze, normal smooth  pursuits, normal saccades   V-facial sensation symmetric.    VII-facial expression symmetric, intact forehead wrinkle, strong eye closure, symmetric smile    VIII-hearing grossly intact bilaterally   IX, X-palate elevation symmetric, mild dysarthria noted  XI-shoulder shrug strength intact    XII-tongue protrusion midline.    Motor Exam: Increased tone/spasticity of the left upper extremity.  Power/strength 5/5 of the right upper and lower extremity.  Power/strength 1/5 of the left upper extremity.  Power/strength 3/5 of the left lower extremity.  Decreased  strength of the left hand compared to right hand.  Sensory: grossly intact light touch in all extremities.   Reflexes: brachioradialis 2+, biceps 2+, knee 2+, bilaterally  Coordination: Finger nose finger intact with the right hand  Gait: Currently in a wheelchair at this time      ROS:    Review of Systems   Constitutional:  Negative for appetite change, fatigue and fever.   HENT: Negative.  Negative for hearing loss, tinnitus, trouble swallowing and voice change.    Eyes: Negative.  Negative for photophobia, pain and visual disturbance.   Respiratory: Negative.  Negative for shortness of breath.    Cardiovascular: Negative.  Negative for palpitations.   Gastrointestinal: Negative.  Negative for nausea and vomiting.   Endocrine: Negative.  Negative for cold intolerance.   Genitourinary: Negative.  Negative for dysuria, frequency and urgency.   Musculoskeletal:  Negative for back pain, gait problem, myalgias, neck pain and neck stiffness.   Skin: Negative.  Negative for rash.   Allergic/Immunologic: Negative.    Neurological: Negative.  Negative for dizziness, tremors, seizures, syncope, facial asymmetry, speech difficulty, weakness, light-headedness, numbness and headaches.   Hematological: Negative.  Does not bruise/bleed easily.   Psychiatric/Behavioral: Negative.  Negative for confusion, hallucinations and sleep disturbance.    All other systems reviewed  and are negative.      I have spent 45 minutes today on this case including chart review, performing history and exam, patient counseling, and documentation/communication      Moshe Eliazlde PA-C  4/17/2024 9:16 AM

## 2024-04-19 ENCOUNTER — HOSPITAL ENCOUNTER (OUTPATIENT)
Dept: NON INVASIVE DIAGNOSTICS | Facility: HOSPITAL | Age: 74
Discharge: HOME | End: 2024-04-19
Attending: INTERNAL MEDICINE
Payer: MEDICARE

## 2024-04-19 DIAGNOSIS — R60.0 EDEMA OF LEFT UPPER EXTREMITY: ICD-10-CM

## 2024-04-19 DIAGNOSIS — M79.89 LEFT UPPER EXTREMITY SWELLING: ICD-10-CM

## 2024-04-19 PROCEDURE — 93971 EXTREMITY STUDY: CPT

## 2024-04-19 PROCEDURE — 93971 EXTREMITY STUDY: CPT | Performed by: SURGERY

## 2024-04-22 ENCOUNTER — TELEPHONE (OUTPATIENT)
Dept: CARDIOLOGY CLINIC | Facility: CLINIC | Age: 74
End: 2024-04-22

## 2024-04-22 NOTE — TELEPHONE ENCOUNTER
Left message with above information (ok per communication consent). Advised to call with questions or concerns.

## 2024-04-22 NOTE — TELEPHONE ENCOUNTER
----- Message from Jenn Biswas DO sent at 4/22/2024  1:21 PM EDT -----  Please let patient know that ultrasound of upper extremities look normal, no evidence of blood clots.  Thank you  ----- Message -----  From: Hailee, Radiology Results In  Sent: 4/19/2024  10:27 PM EDT  To: Jenn Biswas DO

## 2024-04-24 ENCOUNTER — APPOINTMENT (OUTPATIENT)
Dept: LAB | Facility: MEDICAL CENTER | Age: 74
End: 2024-04-24
Payer: MEDICARE

## 2024-04-24 DIAGNOSIS — I48.0 PAROXYSMAL ATRIAL FIBRILLATION (HCC): ICD-10-CM

## 2024-04-24 DIAGNOSIS — R35.1 NOCTURIA: ICD-10-CM

## 2024-04-24 DIAGNOSIS — G81.90 HEMIPLEGIA, UNSPECIFIED ETIOLOGY, UNSPECIFIED HEMIPLEGIA TYPE, UNSPECIFIED LATERALITY (HCC): ICD-10-CM

## 2024-04-24 LAB
BASOPHILS # BLD AUTO: 0.02 THOUSANDS/ÂΜL (ref 0–0.1)
BASOPHILS NFR BLD AUTO: 0 % (ref 0–1)
CREAT UR-MCNC: 144.8 MG/DL
EOSINOPHIL # BLD AUTO: 0.06 THOUSAND/ÂΜL (ref 0–0.61)
EOSINOPHIL NFR BLD AUTO: 1 % (ref 0–6)
ERYTHROCYTE [DISTWIDTH] IN BLOOD BY AUTOMATED COUNT: 13.2 % (ref 11.6–15.1)
EST. AVERAGE GLUCOSE BLD GHB EST-MCNC: 137 MG/DL
HBA1C MFR BLD: 6.4 %
HCT VFR BLD AUTO: 53 % (ref 36.5–49.3)
HGB BLD-MCNC: 17.1 G/DL (ref 12–17)
IMM GRANULOCYTES # BLD AUTO: 0.02 THOUSAND/UL (ref 0–0.2)
IMM GRANULOCYTES NFR BLD AUTO: 0 % (ref 0–2)
LYMPHOCYTES # BLD AUTO: 1.4 THOUSANDS/ÂΜL (ref 0.6–4.47)
LYMPHOCYTES NFR BLD AUTO: 15 % (ref 14–44)
MCH RBC QN AUTO: 31.1 PG (ref 26.8–34.3)
MCHC RBC AUTO-ENTMCNC: 32.3 G/DL (ref 31.4–37.4)
MCV RBC AUTO: 96 FL (ref 82–98)
MICROALBUMIN UR-MCNC: 8.4 MG/L
MICROALBUMIN/CREAT 24H UR: 6 MG/G CREATININE (ref 0–30)
MONOCYTES # BLD AUTO: 0.44 THOUSAND/ÂΜL (ref 0.17–1.22)
MONOCYTES NFR BLD AUTO: 5 % (ref 4–12)
NEUTROPHILS # BLD AUTO: 7.73 THOUSANDS/ÂΜL (ref 1.85–7.62)
NEUTS SEG NFR BLD AUTO: 79 % (ref 43–75)
NRBC BLD AUTO-RTO: 0 /100 WBCS
PLATELET # BLD AUTO: 168 THOUSANDS/UL (ref 149–390)
PMV BLD AUTO: 9.5 FL (ref 8.9–12.7)
PSA SERPL-MCNC: 1.67 NG/ML (ref 0–4)
RBC # BLD AUTO: 5.5 MILLION/UL (ref 3.88–5.62)
TSH SERPL DL<=0.05 MIU/L-ACNC: 1.98 UIU/ML (ref 0.45–4.5)
WBC # BLD AUTO: 9.67 THOUSAND/UL (ref 4.31–10.16)

## 2024-04-24 PROCEDURE — 36415 COLL VENOUS BLD VENIPUNCTURE: CPT

## 2024-04-24 PROCEDURE — 84443 ASSAY THYROID STIM HORMONE: CPT

## 2024-04-24 PROCEDURE — 83036 HEMOGLOBIN GLYCOSYLATED A1C: CPT

## 2024-04-24 PROCEDURE — 80061 LIPID PANEL: CPT

## 2024-04-24 PROCEDURE — G0103 PSA SCREENING: HCPCS

## 2024-04-24 PROCEDURE — 82043 UR ALBUMIN QUANTITATIVE: CPT

## 2024-04-24 PROCEDURE — 82570 ASSAY OF URINE CREATININE: CPT

## 2024-04-24 PROCEDURE — 80053 COMPREHEN METABOLIC PANEL: CPT

## 2024-04-24 PROCEDURE — 85025 COMPLETE CBC W/AUTO DIFF WBC: CPT

## 2024-04-25 LAB
ALBUMIN SERPL BCP-MCNC: 3.8 G/DL (ref 3.5–5)
ALP SERPL-CCNC: 60 U/L (ref 34–104)
ALT SERPL W P-5'-P-CCNC: 23 U/L (ref 7–52)
ANION GAP SERPL CALCULATED.3IONS-SCNC: 10 MMOL/L (ref 4–13)
AST SERPL W P-5'-P-CCNC: 12 U/L (ref 13–39)
BILIRUB SERPL-MCNC: 0.92 MG/DL (ref 0.2–1)
BUN SERPL-MCNC: 20 MG/DL (ref 5–25)
CALCIUM SERPL-MCNC: 9.4 MG/DL (ref 8.4–10.2)
CHLORIDE SERPL-SCNC: 104 MMOL/L (ref 96–108)
CHOLEST SERPL-MCNC: 188 MG/DL
CO2 SERPL-SCNC: 27 MMOL/L (ref 21–32)
CREAT SERPL-MCNC: 0.87 MG/DL (ref 0.6–1.3)
GFR SERPL CREATININE-BSD FRML MDRD: 85 ML/MIN/1.73SQ M
GLUCOSE P FAST SERPL-MCNC: 105 MG/DL (ref 65–99)
HDLC SERPL-MCNC: 56 MG/DL
LDLC SERPL CALC-MCNC: 112 MG/DL (ref 0–100)
NONHDLC SERPL-MCNC: 132 MG/DL
POTASSIUM SERPL-SCNC: 4.2 MMOL/L (ref 3.5–5.3)
PROT SERPL-MCNC: 6.4 G/DL (ref 6.4–8.4)
SODIUM SERPL-SCNC: 141 MMOL/L (ref 135–147)
TRIGL SERPL-MCNC: 98 MG/DL

## 2024-06-25 ENCOUNTER — TELEPHONE (OUTPATIENT)
Dept: CARDIOLOGY CLINIC | Facility: CLINIC | Age: 74
End: 2024-06-25

## 2024-06-25 ENCOUNTER — NURSE TRIAGE (OUTPATIENT)
Age: 74
End: 2024-06-25

## 2024-06-25 NOTE — TELEPHONE ENCOUNTER
"Received call from pt's spouse, Elvia, stating pt has been having extreme fatigue starting 2-3 weeks ago but is getting worse. She stated he doesn't have the stamina to perform his daily activities. Denies chest pain, sob, headache, dizziness, N/V, bleeding, diarrhea, fever, chills. His BP's have been the following: BP: 120/78, 128/79, 126/82, 119/72, 126/77. 143/93 one day last week.  HR: 59-63. He is currently taking Metoprolol 25mg daily. Spouse did call the device line to do a device check on his pacemaker as well.    Advised will send a message to the provider to review and advise.     Please call wife back at [phone].      Answer Assessment - Initial Assessment Questions  1. DESCRIPTION: \"Describe how you are feeling.\"      Extreme fatigue   2. SEVERITY: \"How bad is it?\"  \"Can you stand and walk?\"    - MILD - Feels weak or tired, but does not interfere with work, school or normal activities    - MODERATE - Able to stand and walk; weakness interferes with work, school, or normal activities    - SEVERE - Unable to stand or walk      Can stand and walk but doesn't have the stamina to do usually activities   3. ONSET:  \"When did the weakness begin?\"      2-3 weeks ago but now getting worse   4. CAUSE: \"What do you think is causing the weakness?\"      unsure  5. MEDICINES: \"Have you recently started a new medicine or had a change in the amount of a medicine?\"      Metoprolol 25mg daily   6. OTHER SYMPTOMS: \"Do you have any other symptoms?\" (e.g., chest pain, fever, cough, SOB, vomiting, diarrhea, bleeding, other areas of pain)      Denies chest pain, sob, headache, dizziness, N/V, bleeding, diarrhea       BP: 120/78, 128/79, 126/82, 119/72, 126/77. 143/93 one day last week     HR: 59-63    Protocols used: Weakness (Generalized) and Fatigue-ADULT-[place]    "

## 2024-06-25 NOTE — TELEPHONE ENCOUNTER
6/25/24 09:51   Spoke with Wife as requested with review of manual device remote transmission per device clinical clerical staff.     She has concerns with  having extreme fatigue, unable to attend PT sessions.     Normal device function on remote. Persistent AF in progress. See narrative below:       NON-BILLABLE CARELINK TRANSMISSION: MANUAL PER SPOUSE CALL FOR CONCERNS W/ EXTREME FATIGUE, UNABLE TO ATTEND PT SESSIONS. BATTERY VOLTAGE ADEQUATE (12.4 YRS). AP <0.1%  81.4% (>40%/AAIR-DDDR 60/MODE SWITCH). ALL AVAILABLE LEAD PARAMETERS WITHIN NORMAL LIMITS. NO SIGNIFICANT HIGH RATE EPISODES. 1 PERSISTENT AF EPISODE IN PROGRESS SINCE 6/20/2023 - BURDEN 100%. EF 65% (6/19/23 ECHO). PATIENT TAKING XARELTO, METOPROLOL SUCC. TASK TO DR. BABCOCK FOR REVIEW. NORMAL DEVICE FUNCTION.   ES

## 2024-06-25 NOTE — TELEPHONE ENCOUNTER
Called Elvia and spoke to her about her . Told her he should probably to to his family doctor because the symptoms are pretty vague. His blood pressures ore ok and he is not taking a lot of Metoprolol. His pacemaker check is normal also. She will call her family doctor and make an appt.

## 2024-07-05 ENCOUNTER — REMOTE DEVICE CLINIC VISIT (OUTPATIENT)
Dept: CARDIOLOGY CLINIC | Facility: CLINIC | Age: 74
End: 2024-07-05
Payer: MEDICARE

## 2024-07-05 DIAGNOSIS — Z95.0 CARDIAC PACEMAKER IN SITU: Primary | ICD-10-CM

## 2024-07-05 PROCEDURE — 93294 REM INTERROG EVL PM/LDLS PM: CPT | Performed by: INTERNAL MEDICINE

## 2024-07-05 PROCEDURE — 93296 REM INTERROG EVL PM/IDS: CPT | Performed by: INTERNAL MEDICINE

## 2024-07-05 NOTE — PROGRESS NOTES
Results for orders placed or performed in visit on 07/05/24   Cardiac EP device report    Narrative    MDT D-PM/ACTIVE SYSTEM IS MRI CONDITIONAL  CARELINK TRANSMISSION: BATTERY VOLTAGE ADEQUATE (12.4 YRS). AP<0.1%, -77% (>40% MVP@60PPM). ALL AVAILABLE LEAD PARAMETERS WITHIN NORMAL LIMITS. NO NEW SIGNIFICANT HIGH RATE EPISODES. PT IN %OF TIME & ON XARELTO & METOPROLOL. NORMAL DEVICE FUNCTION. GV

## 2024-07-25 ENCOUNTER — OFFICE VISIT (OUTPATIENT)
Dept: GASTROENTEROLOGY | Facility: MEDICAL CENTER | Age: 74
End: 2024-07-25
Payer: MEDICARE

## 2024-07-25 VITALS
BODY MASS INDEX: 27.2 KG/M2 | DIASTOLIC BLOOD PRESSURE: 84 MMHG | HEIGHT: 72 IN | HEART RATE: 64 BPM | WEIGHT: 200.8 LBS | TEMPERATURE: 97.3 F | OXYGEN SATURATION: 98 % | SYSTOLIC BLOOD PRESSURE: 136 MMHG

## 2024-07-25 DIAGNOSIS — K21.9 GASTROESOPHAGEAL REFLUX DISEASE WITHOUT ESOPHAGITIS: ICD-10-CM

## 2024-07-25 DIAGNOSIS — K27.9 PUD (PEPTIC ULCER DISEASE): ICD-10-CM

## 2024-07-25 DIAGNOSIS — K64.9 HEMORRHOIDS, UNSPECIFIED HEMORRHOID TYPE: Primary | ICD-10-CM

## 2024-07-25 PROCEDURE — 99213 OFFICE O/P EST LOW 20 MIN: CPT | Performed by: NURSE PRACTITIONER

## 2024-07-25 RX ORDER — HYDROCORTISONE ACETATE PRAMOXINE HCL 2.5; 1 G/100G; G/100G
CREAM TOPICAL 2 TIMES DAILY
Qty: 3 G | Refills: 3 | Status: SHIPPED | OUTPATIENT
Start: 2024-07-25 | End: 2024-08-01 | Stop reason: SDUPTHER

## 2024-07-25 NOTE — PROGRESS NOTES
[facility] - Outpatient Follow-up Note  Les Malone 74 y.o. male MRN: [id number]  Encounter: [id number]          ASSESSMENT AND PLAN:      1.  History of PUD  2.  GERD    Doing well on pantoprazole 40 mg daily.  Reports heartburn/reflux is controlled with this.  Denies any nausea, vomiting or dysphagia.  Will continue pantoprazole 40 mg daily as patient will be on long-term anticoagulation.    -Continue pantoprazole 40 mg daily  -Follow-up in office in 6 months or sooner if needed    3. Hemorrhoids    Started several weeks ago with intermittent bouts of wetness after BM.  Does have a history of hemorrhoids.  Rectal exam today did reveal nonthrombosed hemorrhoids.  No anal fissures noted.  He did have a Cologuard last year which was negative.  Reports BMs are brown and formed 1-2 times per day.  No straining or loose stools.    -Analpram 2.5% twice daily for 7 to 10 days  -Contact office with update in a few weeks  -If no help consider colorectal referral  ______________________________________________________________________    SUBJECTIVE: 74-year-old male here for follow-up.  He was last seen by myself 3/26/2024 for history of duodenal ulcer and GERD.    History of duodenal ulcer. Currently on Xarelto secondary to A-fib. Taking pantoprazole 40 mg daily and doing well overall. Denies any abdominal pain, heartburn/reflux, nausea, vomiting or dysphagia. Appetite is good. BMs are brown and formed daily. Denies any melena or hematochezia. Recent stool for H. pylori was negative. Due to chronic anticoagulation PPI daily indefinitely would be recommended.  Hemoglobin A1c 6.4, TSH 2.75.    Interval history: Doing well on pantoprazole 40 mg daily.  Reports heartburn/reflux is controlled with this.  Denies any nausea, vomiting or dysphagia.    Started several weeks ago with intermittent bouts of wetness after BM.  Does have a history of hemorrhoids.  Rectal exam today did reveal  nonthrombosed hemorrhoids.  No anal fissures noted.  He did have a Cologuard last year which was negative.  Reports BMs are brown and formed 1-2 times per day.  No straining or loose stools.    Labs 7/24-CMP normal other than total protein 6.2, CBC normal other than hemoglobin 17.1.  Normal indices.,  Stool for H. pylori was -1/24.    Upper GI x-ray did note some minimal gastroesophageal reflux but was extremely limited due to patient not being able to stand.    Prior EGD/colonoscopy     Multiple EGD's 5/19-small sliding hiatal hernia, single 10 mm ulcer in the second part of the duodenum.     Colonoscopy 5/16-small sessile polyp of 6 mm in the sigmoid colon, diverticulosis.       REVIEW OF SYSTEMS IS OTHERWISE NEGATIVE.  10 point review of systems negative other than per HPI      Historical Information   Past Medical History:   Diagnosis Date   • Acute encephalopathy 05/27/2019   • Arthritis     BL knees   • Atrial fibrillation (HCC)    • CHF (congestive heart failure) (HCC)    • Colon polyp    • Coronary artery disease    • CVA (cerebral vascular accident) (HCC) 04/27/2019    NIHSS 26 on presentation   • Depression    • Diabetes mellitus (HCC)     borderline   • Encephalopathy acute 04/28/2019   • History of transfusion 04/2019   • Hyperlipidemia    • Irregular heart beat     Afib   • Stroke (HCC) 04/27/2019    Left sided weakness-aphasia   • Urinary retention    • Urinary retention 06/04/2019     Past Surgical History:   Procedure Laterality Date   • CARDIAC ELECTROPHYSIOLOGY PROCEDURE Left 6/20/2023    Procedure: Cardiac pacer implant;  Surgeon: Marquis Haney DO;  [facility];  Service: Cardiology   • COLONOSCOPY     • IR STROKE ALERT  4/27/2019   • MENISCECTOMY Right    • NY CYSTO INSERTION TRANSPROSTATIC IMPLANT SINGLE N/A 11/19/2021    Procedure: CYSTOSCOPY WITH INSERTION UROLIFT;  Surgeon: Jack Christopher MD;  Location[facility];  Service: Urology     Social History   Social History      Substance and Sexual Activity   Alcohol Use Not Currently    Comment: social     Social History     Substance and Sexual Activity   Drug Use Never     Social History     Tobacco Use   Smoking Status Never   Smokeless Tobacco Never     Family History   Problem Relation Age of Onset   • Cancer Mother    • Hypertension Mother        Meds/Allergies       Current Outpatient Medications:   •  Coenzyme Q10 (CO Q 10) 10 MG CAPS  •  cyanocobalamin (VITAMIN B-12) 100 mcg tablet  •  finasteride (PROSCAR) 5 mg tablet  •  hydrocortisone 1 % cream  •  hydrocortisone-pramoxine (ANALPRAM-HC) 2.5-1 % rectal cream  •  ketoconazole (NIZORAL) 2 % shampoo  •  metoprolol succinate (TOPROL-XL) 25 mg 24 hr tablet  •  mupirocin (BACTROBAN) 2 % ointment  •  pantoprazole (PROTONIX) 40 mg tablet  •  pravastatin (PRAVACHOL) 40 mg tablet  •  rivaroxaban (Xarelto) 20 mg tablet  •  acetaminophen (TYLENOL) 325 mg tablet    Allergies   Allergen Reactions   • Cephalexin Throat Swelling     Pt reported throat swelling after taking antibiotic    • Penicillins Other (See Comments)           Objective     Blood pressure 136/84, pulse 64, temperature (!) 97.3 °F (36.3 °C), temperature source Tympanic, height 6' (1.829 m), weight 91.1 kg (200 lb 12.8 oz), SpO2 98%. Body mass index is 27.23 kg/m².      PHYSICAL EXAM:      General Appearance:   Alert, cooperative, no distress   HEENT:   Normocephalic, atraumatic, anicteric.     Neck:  Supple, symmetrical, trachea midline   Lungs:   Clear to auscultation bilaterally; no rales, rhonchi or wheezing; respirations unlabored    Heart::   Regular rate and rhythm; no murmur, rub, or gallop.   Abdomen:   Soft, non-tender, non-distended; normal bowel sounds; no masses, no organomegaly    Genitalia:   Deferred    Rectal:   No anal fissures, small external hemorrhoid, nonthrombosed   Extremities:  No cyanosis, clubbing or edema    Pulses:  2+ and symmetric    Skin:  No jaundice, rashes, or lesions    Lymph nodes:   No palpable cervical lymphadenopathy        Lab Results:   No visits with results within 1 Day(s) from this visit.   Latest known visit with results is:   Appointment on 04/24/2024   Component Date Value   • Hemoglobin A1C 04/24/2024 6.4 (H)    • EAG 04/24/2024 137    • TSH 3RD GENERATON 04/24/2024 1.976    • WBC 04/24/2024 9.67    • RBC 04/24/2024 5.50    • Hemoglobin 04/24/2024 17.1 (H)    • Hematocrit 04/24/2024 53.0 (H)    • MCV 04/24/2024 96    • MCH 04/24/2024 31.1    • MCHC 04/24/2024 32.3    • RDW 04/24/2024 13.2    • MPV 04/24/2024 9.5    • Platelets 04/24/2024 168    • nRBC 04/24/2024 0    • Segmented % 04/24/2024 79 (H)    • Immature Grans % 04/24/2024 0    • Lymphocytes % 04/24/2024 15    • Monocytes % 04/24/2024 5    • Eosinophils Relative 04/24/2024 1    • Basophils Relative 04/24/2024 0    • Absolute Neutrophils 04/24/2024 7.73 (H)    • Absolute Immature Grans 04/24/2024 0.02    • Absolute Lymphocytes 04/24/2024 1.40    • Absolute Monocytes 04/24/2024 0.44    • Eosinophils Absolute 04/24/2024 0.06    • Basophils Absolute 04/24/2024 0.02    • PSA 04/24/2024 1.67    • Cholesterol 04/24/2024 188    • Triglycerides 04/24/2024 98    • HDL, Direct 04/24/2024 56    • LDL Calculated 04/24/2024 112 (H)    • Non-HDL-Chol (CHOL-HDL) 04/24/2024 132    • Sodium 04/24/2024 141    • Potassium 04/24/2024 4.2    • Chloride 04/24/2024 104    • CO2 04/24/2024 27    • ANION GAP 04/24/2024 10    • BUN 04/24/2024 20    • Creatinine 04/24/2024 0.87    • Glucose, Fasting 04/24/2024 105 (H)    • Calcium 04/24/2024 9.4    • AST 04/24/2024 12 (L)    • ALT 04/24/2024 23    • Alkaline Phosphatase 04/24/2024 60    • Total Protein 04/24/2024 6.4    • Albumin 04/24/2024 3.8    • Total Bilirubin 04/24/2024 0.92    • eGFR 04/24/2024 85    • Creatinine, Ur 04/24/2024 144.8    • Albumin,U,Random 04/24/2024 8.4    • Albumin Creat Ratio 04/24/2024 6          Radiology Results:   Cardiac EP device report    Result Date:  7/5/2024  Narrative: MDT D-PM/ACTIVE SYSTEM IS MRI CONDITIONAL CARELINK TRANSMISSION: BATTERY VOLTAGE ADEQUATE (12.4 YRS). AP<0.1%, -77% (>40% MVP@60PPM). ALL AVAILABLE LEAD PARAMETERS WITHIN NORMAL LIMITS. NO NEW SIGNIFICANT HIGH RATE EPISODES. PT IN %OF TIME & ON XARELTO & METOPROLOL. NORMAL DEVICE FUNCTION. GV

## 2024-07-31 ENCOUNTER — TELEPHONE (OUTPATIENT)
Age: 74
End: 2024-07-31

## 2024-07-31 DIAGNOSIS — K64.9 HEMORRHOIDS, UNSPECIFIED HEMORRHOID TYPE: ICD-10-CM

## 2024-07-31 NOTE — TELEPHONE ENCOUNTER
Pharmacy called the RX Refill Line. Message is being forwarded to the office.     Pharmacy is requesting verification on quantity. Cream either comes in 4 g small individual tubes or 30 g tubes and they are unsure if this was a typo and should be 30 instead of 3.    Please contact pharmacy at [phone]

## 2024-07-31 NOTE — TELEPHONE ENCOUNTER
Patients GI provider:  Jamilah HOLLEY    Number to return call: [phone]    Reason for call: Pt's wife Elvia(on consent) called because she called [facility] about pt's hydrocortisone-pramoxine, and they told her the office has to do something with the prescription before they can fill it. If prescription needs prior auth, please contact pt's wife to advise    Scheduled procedure/appointment date if applicable: Appt 1/27/25

## 2024-08-01 RX ORDER — HYDROCORTISONE ACETATE PRAMOXINE HCL 2.5; 1 G/100G; G/100G
CREAM TOPICAL 2 TIMES DAILY
Qty: 30 G | Refills: 3 | Status: SHIPPED | OUTPATIENT
Start: 2024-08-01

## 2024-08-01 NOTE — TELEPHONE ENCOUNTER
I called pharmacy, had to leave message on refill line. I did confirm that the order should have been  Analpram-HC 30 gram vs the 3 gram ordered. I did provide phone number to call back if any other questions/concerns. I did amend order as phone in, please sign off so records are correct on patient chart.

## 2024-08-06 DIAGNOSIS — K64.8 OTHER HEMORRHOIDS: Primary | ICD-10-CM

## 2024-08-06 RX ORDER — HYDROCORTISONE 25 MG/G
CREAM TOPICAL 2 TIMES DAILY
Qty: 2 G | Refills: 3 | Status: SHIPPED | OUTPATIENT
Start: 2024-08-06

## 2024-09-04 DIAGNOSIS — I48.0 PAROXYSMAL ATRIAL FIBRILLATION (HCC): ICD-10-CM

## 2024-09-04 RX ORDER — METOPROLOL SUCCINATE 25 MG/1
25 TABLET, EXTENDED RELEASE ORAL DAILY
Qty: 30 TABLET | Refills: 5 | Status: SHIPPED | OUTPATIENT
Start: 2024-09-04

## 2024-10-04 ENCOUNTER — IN-CLINIC DEVICE VISIT (OUTPATIENT)
Dept: CARDIOLOGY CLINIC | Facility: CLINIC | Age: 74
End: 2024-10-04
Payer: MEDICARE

## 2024-10-04 DIAGNOSIS — Z95.0 CARDIAC PACEMAKER IN SITU: Primary | ICD-10-CM

## 2024-10-04 PROCEDURE — 93280 PM DEVICE PROGR EVAL DUAL: CPT | Performed by: INTERNAL MEDICINE

## 2024-10-04 NOTE — PROGRESS NOTES
Results for orders placed or performed in visit on 10/04/24   Cardiac EP device report    Narrative    MDT DUAL (VVIR) PM/ACTIVE SYSTEM IS MRI CONDITIONAL  DEVICE INTERROGATED IN THE [place] OFFICE. BATTERY VOLTAGE ADEQUATE (12.1 YRS). AP<0.1%, -80% (>40% MVP@60PPM). ALL LEAD PARAMETERS WITHIN NORMAL LIMITS. NO NEW SIGNIFICANT HIGH RATE EPISODES. PT IN AF SINCE 6/20/23. PT ON XARELTO & METOPROLOL. REPROGRAMMED MODE TO VVIR DUE TO AF>1YR. NORMAL DEVICE FUNCTION. GV

## 2024-11-01 PROBLEM — I48.19 PERSISTENT ATRIAL FIBRILLATION (HCC): Status: ACTIVE | Noted: 2019-05-13

## 2024-11-01 NOTE — ASSESSMENT & PLAN NOTE
HFpEF, LVEF 65%, LVIDd 4.8 cm, NYHA Class I, AHA Stage C  Neurohormonal Blockade:  --Beta Blocker: Toprol 25 mg daily  --ARNi / ACEi / ARB: None  --Aldosterone Antagonist: None  --SGLT2 Inhibitor: None   --Home Diuretic: Euvolemic without    Euvolemic at 200#.

## 2024-11-01 NOTE — PROGRESS NOTES
"   Cardiology Follow Up    [facility] [address] [address]  PHONE: [phone]  FAX: [phone]    Les Malone  1950  [id number]    Assessment/Plan:  Persistent atrial fibrillation (HCC)  No high rate episodes on current metoprolol  Stroke prevention with Xarelto 20 mg QD  I don't think A-fib ablation would be a good option for him but patient and wife interested in discussing LAAO procedure and have appt with EP this month.    History of stroke  April 2019 from r MCA & ROB clot treated with thrombectomy & tPA  Continue AC as above    Chronic diastolic CHF (congestive heart failure) (HCC)  HFpEF, LVEF 65%, LVIDd 4.8 cm, NYHA Class I, AHA Stage C  Neurohormonal Blockade:  --Beta Blocker: Toprol 25 mg daily  --ARNi / ACEi / ARB: None  --Aldosterone Antagonist: None  --SGLT2 Inhibitor: None   --Home Diuretic: Euvolemic without    Euvolemic at 200#.    Hyperlipidemia  LDL not at goal 11/4/24  Did not tolerated increased pravastatin in the past.  Can add Zetia -- patient and wife wish to discuss with his other doctors, too.    RTO in 6 months with Dr. Biswas.    Interval History:   Les Malone is a 74 y.o. male with past medical history as below who presents to the office for routine cardiovascular follow-up.    Patient reports \"I feel fantastic.\" Patient denies chest pain, chest pressure, chest discomfort or burning, shortness of breath, dyspnea on exertion, palpitations, skipped heartbeats, lightheadedness, dizziness, presyncope or syncope.  Patient had bleeding and bruising problems over the summer which are only just now healing. No melena or hematochezia.    Patient feels fatigued after his [facility] but he is able to complete his work outs 5x/week.    Review of Systems   Constitutional: Positive for malaise/fatigue. Negative for weight gain.   Cardiovascular:  Negative for chest pain, dyspnea on exertion, irregular heartbeat, leg " swelling, near-syncope, palpitations and syncope.   Respiratory:  Negative for cough and shortness of breath.    Hematologic/Lymphatic: Bruises/bleeds easily.   Gastrointestinal:  Negative for hematochezia and melena.   Genitourinary:  Negative for hematuria.   Neurological:  Negative for dizziness and light-headedness.     Past Medical History:   Diagnosis Date    Acute encephalopathy 05/27/2019    Arthritis     BL knees    Atrial fibrillation (HCC)     CHF (congestive heart failure) (HCC)     Colon polyp     Coronary artery disease     CVA (cerebral vascular accident) (HCC) 04/27/2019    NIHSS 26 on presentation    Depression     Diabetes mellitus (HCC)     borderline    Encephalopathy acute 04/28/2019    History of transfusion 04/2019    Hyperlipidemia     Irregular heart beat     Afib    Stroke (HCC) 04/27/2019    Left sided weakness-aphasia    Urinary retention     Urinary retention 06/04/2019      Past Surgical History:   Procedure Laterality Date    CARDIAC ELECTROPHYSIOLOGY PROCEDURE Left 6/20/2023    Procedure: Cardiac pacer implant;  Surgeon: Marquis Haney DO;  [facility];  Service: Cardiology    COLONOSCOPY      IR STROKE ALERT  4/27/2019    MENISCECTOMY Right     [place] CYSTO INSERTION TRANSPROSTATIC IMPLANT SINGLE N/A 11/19/2021    Procedure: CYSTOSCOPY WITH INSERTION UROLIFT;  Surgeon: Jack Christopher MD;  [facility];  Service: Urology      Family History   Problem Relation Age of Onset    Cancer Mother     Hypertension Mother       Current Outpatient Medications:     Coenzyme Q10 (CO Q 10) 10 MG CAPS, Take by mouth daily, Disp: , Rfl:     cyanocobalamin (VITAMIN B-12) 100 mcg tablet, Take by mouth daily, Disp: , Rfl:     finasteride (PROSCAR) 5 mg tablet, Take 5 mg by mouth daily at bedtime  , Disp: , Rfl:     metoprolol succinate (TOPROL-XL) 25 mg 24 hr tablet, TAKE 1 TABLET BY MOUTH EVERY DAY, Disp: 30 tablet, Rfl: 5    mupirocin (BACTROBAN) 2 % ointment, Apply 1  Application topically daily, Disp: , Rfl:     pantoprazole (PROTONIX) 40 mg tablet, Take 1 tablet (40 mg total) by mouth daily, Disp: 90 tablet, Rfl: 3    pravastatin (PRAVACHOL) 40 mg tablet, Take 40 mg by mouth daily, Disp: , Rfl:     rivaroxaban (Xarelto) 20 mg tablet, Take 20 mg by mouth daily with dinner  , Disp: , Rfl:     hydrocortisone (ANUSOL-HC) 2.5 % rectal cream, Apply topically 2 (two) times a day, Disp: 2 g, Rfl: 3    hydrocortisone 1 % cream, Apply topically 2 (two) times a day as needed, Disp: , Rfl:     hydrocortisone-pramoxine (ANALPRAM-HC) 2.5-1 % rectal cream, Apply topically 2 (two) times a day, Disp: 30 g, Rfl: 3    ketoconazole (NIZORAL) 2 % shampoo, SHAMPOO 3 TIMES A WEEK AS A SCALP TREATMENT, LEAVE ON 5-10 MINUTES AND RINSE, Disp: , Rfl:      Allergies   Allergen Reactions    Cephalexin Throat Swelling     Pt reported throat swelling after taking antibiotic     Penicillins Other (See Comments)     Physical Exam:  Vitals:    11/07/24 0802   BP: 120/60   Pulse: 58     Vitals:    11/07/24 0802   Weight: 90.7 kg (200 lb)     Height: 6' (182.9 cm)   Body mass index is 27.12 kg/m².    Physical Exam  Vitals and nursing note reviewed.   Constitutional:       General: He is not in acute distress.     Appearance: He is not ill-appearing.   HENT:      Head: Normocephalic and atraumatic.      Nose: No congestion or rhinorrhea.      Mouth/Throat:      Mouth: Mucous membranes are moist.   Eyes:      Conjunctiva/sclera: Conjunctivae normal.   Neck:      Vascular: No carotid bruit or JVD.   Cardiovascular:      Rate and Rhythm: Normal rate. Rhythm irregularly irregular.      Heart sounds: S1 normal and S2 normal. No murmur heard.  Pulmonary:      Effort: Pulmonary effort is normal.      Breath sounds: No wheezing, rhonchi or rales.   Abdominal:      General: Abdomen is flat.   Musculoskeletal:      Comments: Left leg brace, walks with cane in the right   Skin:     General: Skin is warm and dry.       Comments: Left arm flaccid and ecchymosis no wounds now    Lymphedema has greatly decreased compared to exam from earlier in the spring   Neurological:      Mental Status: He is alert and oriented to person, place, and time.   Psychiatric:         Behavior: Behavior normal.     Data:  Lab Results   Component Value Date    HDL 46 11/04/2024    LDLCALC 91 11/04/2024    TRIG 90 11/04/2024     Cardiac EP device report: 10/4/2024  BATTERY VOLTAGE ADEQUATE (12.1 YRS). AP<0.1%, -80% (>40% MVP@60PPM). ALL LEAD PARAMETERS WITHIN NORMAL LIMITS. NO NEW SIGNIFICANT HIGH RATE EPISODES. PT IN AF SINCE 6/20/23. PT ON XARELTO & METOPROLOL. REPROGRAMMED MODE TO VVIR DUE TO AF>1YR. NORMAL DEVICE FUNCTION.     Yulisa Gray PA-C  [facility]

## 2024-11-01 NOTE — ASSESSMENT & PLAN NOTE
LDL not at goal on April measurement. Was continued on home pravastatin at that time  Recheck lipid panel now  Can increase pravastatin for goal LDL< 70 if necessary.

## 2024-11-04 ENCOUNTER — APPOINTMENT (OUTPATIENT)
Dept: LAB | Facility: MEDICAL CENTER | Age: 74
End: 2024-11-04
Payer: MEDICARE

## 2024-11-04 DIAGNOSIS — I10 ESSENTIAL HYPERTENSION, MALIGNANT: ICD-10-CM

## 2024-11-04 DIAGNOSIS — Z00.01 ENCOUNTER FOR GENERAL ADULT MEDICAL EXAMINATION WITH ABNORMAL FINDINGS: ICD-10-CM

## 2024-11-04 DIAGNOSIS — I48.0 PAROXYSMAL ATRIAL FIBRILLATION (HCC): ICD-10-CM

## 2024-11-04 LAB
ALBUMIN SERPL BCG-MCNC: 4.1 G/DL (ref 3.5–5)
ALP SERPL-CCNC: 66 U/L (ref 34–104)
ALT SERPL W P-5'-P-CCNC: 15 U/L (ref 7–52)
ANION GAP SERPL CALCULATED.3IONS-SCNC: 10 MMOL/L (ref 4–13)
AST SERPL W P-5'-P-CCNC: 16 U/L (ref 13–39)
BASOPHILS # BLD AUTO: 0.02 THOUSANDS/ÂΜL (ref 0–0.1)
BASOPHILS NFR BLD AUTO: 0 % (ref 0–1)
BILIRUB SERPL-MCNC: 0.96 MG/DL (ref 0.2–1)
BUN SERPL-MCNC: 14 MG/DL (ref 5–25)
CALCIUM SERPL-MCNC: 9.2 MG/DL (ref 8.4–10.2)
CHLORIDE SERPL-SCNC: 104 MMOL/L (ref 96–108)
CHOLEST SERPL-MCNC: 155 MG/DL
CO2 SERPL-SCNC: 28 MMOL/L (ref 21–32)
CREAT SERPL-MCNC: 0.73 MG/DL (ref 0.6–1.3)
EOSINOPHIL # BLD AUTO: 0.19 THOUSAND/ÂΜL (ref 0–0.61)
EOSINOPHIL NFR BLD AUTO: 4 % (ref 0–6)
ERYTHROCYTE [DISTWIDTH] IN BLOOD BY AUTOMATED COUNT: 13.6 % (ref 11.6–15.1)
GFR SERPL CREATININE-BSD FRML MDRD: 91 ML/MIN/1.73SQ M
GLUCOSE P FAST SERPL-MCNC: 118 MG/DL (ref 65–99)
HCT VFR BLD AUTO: 50.6 % (ref 36.5–49.3)
HDLC SERPL-MCNC: 46 MG/DL
HGB BLD-MCNC: 16.3 G/DL (ref 12–17)
IMM GRANULOCYTES # BLD AUTO: 0.01 THOUSAND/UL (ref 0–0.2)
IMM GRANULOCYTES NFR BLD AUTO: 0 % (ref 0–2)
LDLC SERPL CALC-MCNC: 91 MG/DL (ref 0–100)
LYMPHOCYTES # BLD AUTO: 1.35 THOUSANDS/ÂΜL (ref 0.6–4.47)
LYMPHOCYTES NFR BLD AUTO: 28 % (ref 14–44)
MCH RBC QN AUTO: 31.2 PG (ref 26.8–34.3)
MCHC RBC AUTO-ENTMCNC: 32.2 G/DL (ref 31.4–37.4)
MCV RBC AUTO: 97 FL (ref 82–98)
MONOCYTES # BLD AUTO: 0.32 THOUSAND/ÂΜL (ref 0.17–1.22)
MONOCYTES NFR BLD AUTO: 7 % (ref 4–12)
NEUTROPHILS # BLD AUTO: 3.02 THOUSANDS/ÂΜL (ref 1.85–7.62)
NEUTS SEG NFR BLD AUTO: 61 % (ref 43–75)
NONHDLC SERPL-MCNC: 109 MG/DL
NRBC BLD AUTO-RTO: 0 /100 WBCS
PLATELET # BLD AUTO: 160 THOUSANDS/UL (ref 149–390)
PMV BLD AUTO: 10 FL (ref 8.9–12.7)
POTASSIUM SERPL-SCNC: 3.8 MMOL/L (ref 3.5–5.3)
PROT SERPL-MCNC: 6.5 G/DL (ref 6.4–8.4)
PSA FREE MFR SERPL: 25.56 %
PSA FREE SERPL-MCNC: 0.41 NG/ML
PSA SERPL-MCNC: 1.6 NG/ML (ref 0–4)
RBC # BLD AUTO: 5.23 MILLION/UL (ref 3.88–5.62)
SODIUM SERPL-SCNC: 142 MMOL/L (ref 135–147)
TRIGL SERPL-MCNC: 90 MG/DL
WBC # BLD AUTO: 4.91 THOUSAND/UL (ref 4.31–10.16)

## 2024-11-04 PROCEDURE — 84154 ASSAY OF PSA FREE: CPT

## 2024-11-04 PROCEDURE — 84153 ASSAY OF PSA TOTAL: CPT

## 2024-11-04 PROCEDURE — 80061 LIPID PANEL: CPT

## 2024-11-04 PROCEDURE — 36415 COLL VENOUS BLD VENIPUNCTURE: CPT

## 2024-11-04 PROCEDURE — 85025 COMPLETE CBC W/AUTO DIFF WBC: CPT

## 2024-11-04 PROCEDURE — 80053 COMPREHEN METABOLIC PANEL: CPT

## 2024-11-07 ENCOUNTER — OFFICE VISIT (OUTPATIENT)
Dept: CARDIOLOGY CLINIC | Facility: CLINIC | Age: 74
End: 2024-11-07
Payer: MEDICARE

## 2024-11-07 VITALS
WEIGHT: 200 LBS | BODY MASS INDEX: 27.09 KG/M2 | SYSTOLIC BLOOD PRESSURE: 120 MMHG | HEART RATE: 58 BPM | HEIGHT: 72 IN | DIASTOLIC BLOOD PRESSURE: 60 MMHG

## 2024-11-07 DIAGNOSIS — I50.32 CHRONIC DIASTOLIC CHF (CONGESTIVE HEART FAILURE) (HCC): ICD-10-CM

## 2024-11-07 DIAGNOSIS — I48.19 PERSISTENT ATRIAL FIBRILLATION (HCC): Primary | ICD-10-CM

## 2024-11-07 DIAGNOSIS — Z86.73 HISTORY OF STROKE: ICD-10-CM

## 2024-11-07 DIAGNOSIS — E78.5 HYPERLIPIDEMIA: ICD-10-CM

## 2024-11-07 PROCEDURE — 99214 OFFICE O/P EST MOD 30 MIN: CPT | Performed by: PHYSICIAN ASSISTANT

## 2024-11-14 PROBLEM — Z95.0 CARDIAC PACEMAKER IN SITU: Status: ACTIVE | Noted: 2024-11-14

## 2024-11-14 NOTE — ASSESSMENT & PLAN NOTE
Lab Results   Component Value Date    HGBA1C 6.4 (H) 04/24/2024   A1C 6.4 per latest lab eval  Continue PCP f/u

## 2024-11-14 NOTE — PROGRESS NOTES
Electrophysiology Follow Up  [facility]  [facility] [address], [place] [address]    Name: Les Malone  : 1950  MRN: [id number]    Assessment & Plan  Persistent atrial fibrillation (HCC)  Asymptomatic in regards to afib  Latest pacemaker interrogation shows ongoing afib since 23 without significant high-rate episode  Rate control: metoprolol succinate 25mg daily  AC: xarelto 20mg daily ([id number] 5 (age, HTN, CVA hx, T2DM))  AAD: none  No prior ablation/cardioversion hx  ECHO (2023) - EF 65%, LA mild-mod dilation  Cardiac pacemaker in situ  Placed 23 given ongoing bradycardia  Latest device interrogation 10/4/24 showing normal device function  Essential hypertension  BP in office today 132/82  History of stroke  Maintained on xarelto and statin  Type 2 diabetes mellitus without complication, unspecified whether long term insulin use (HCC)    Lab Results   Component Value Date    HGBA1C 6.4 (H) 2024   A1C 6.4 per latest lab eval  Continue PCP f/u       Discussion/Plan:    Patient has a history of persistent A-fib with ongoing episodes since 2023    He is asymptomatic in regards to his A-fib    Latest device interrogation shows ongoing A-fib, however no significant high rate episodes    His latest echo 2023 shows preserved EF 65% w/ mild-mod LA dilation    [id number] 5 (age, HTN, CVA hx, T2DM) w/ HASBLED 2 (age, CVA hx)    He denies any significant adverse bleeding effects whilst on Xarelto, however he did express interest in the watchman procedure. We discussed this procedure in detail today and the patient + wife are agreeable to implantation.    No acute medication changes made today, to continue metoprolol and Xarelto at current dosing    Patient has been instructed to follow up in our EP office post-procedure or as needed. He will call our office with any questions or concerns in the meantime.    Rhythm  History:   Atrial fibrillation:      Atrial flutter:      SVT:      VT/VF/PVC:     Device history:   Pacemaker:     Defibrillator:     BIV PPM:     BIV ICD:     ILR:    Interim History/HPI:   Interim history: Les Malone is a 74 y.o. male with a PMH of persistent A-fib, pacemaker in situ, HTN, history of CVA, and T2DM.    He presents today for routine outpatient f/u given his hx of afib and PPM in situ. He reports that since his last visit he has been feeling well from a cardiac standpoint, but does note easy bruising on his current xarelto dosing. He did express interest in watchman procedure today.     EKG: V paced rhythm with ventricular rate 60 bpm    Review of Systems   Constitutional:  Negative for activity change, appetite change, chills, fatigue and fever.   HENT:  Negative for nosebleeds.    Respiratory:  Negative for chest tightness and shortness of breath.    Cardiovascular:  Negative for chest pain, palpitations and leg swelling.   Neurological:  Negative for dizziness, syncope, weakness and light-headedness.   Hematological:  Bruises/bleeds easily.         OBJECTIVE:   Vitals:   There were no vitals taken for this visit.  There is no height or weight on file to calculate BMI.        Physical Exam:   Physical Exam  Constitutional:       General: He is not in acute distress.     Appearance: Normal appearance. He is not toxic-appearing.   HENT:      Head: Normocephalic and atraumatic.   Eyes:      General:         Right eye: No discharge.         Left eye: No discharge.   Cardiovascular:      Rate and Rhythm: Normal rate and regular rhythm.      Pulses: Normal pulses.   Pulmonary:      Effort: Pulmonary effort is normal.      Breath sounds: Normal breath sounds.   Musculoskeletal:      Right lower leg: No edema.      Left lower leg: No edema.   Skin:     General: Skin is warm and dry.      Capillary Refill: Capillary refill takes less than 2 seconds.   Neurological:      Mental Status: He is alert.             Medications:      Current Outpatient Medications:     Coenzyme Q10 (CO Q 10) 10 MG CAPS, Take by mouth daily, Disp: , Rfl:     cyanocobalamin (VITAMIN B-12) 100 mcg tablet, Take by mouth daily, Disp: , Rfl:     finasteride (PROSCAR) 5 mg tablet, Take 5 mg by mouth daily at bedtime  , Disp: , Rfl:     hydrocortisone (ANUSOL-HC) 2.5 % rectal cream, Apply topically 2 (two) times a day, Disp: 2 g, Rfl: 3    hydrocortisone 1 % cream, Apply topically 2 (two) times a day as needed, Disp: , Rfl:     hydrocortisone-pramoxine (ANALPRAM-HC) 2.5-1 % rectal cream, Apply topically 2 (two) times a day, Disp: 30 g, Rfl: 3    ketoconazole (NIZORAL) 2 % shampoo, SHAMPOO 3 TIMES A WEEK AS A SCALP TREATMENT, LEAVE ON 5-10 MINUTES AND RINSE, Disp: , Rfl:     metoprolol succinate (TOPROL-XL) 25 mg 24 hr tablet, TAKE 1 TABLET BY MOUTH EVERY DAY, Disp: 30 tablet, Rfl: 5    mupirocin (BACTROBAN) 2 % ointment, Apply 1 Application topically daily, Disp: , Rfl:     pantoprazole (PROTONIX) 40 mg tablet, Take 1 tablet (40 mg total) by mouth daily, Disp: 90 tablet, Rfl: 3    pravastatin (PRAVACHOL) 40 mg tablet, Take 40 mg by mouth daily, Disp: , Rfl:     rivaroxaban (Xarelto) 20 mg tablet, Take 20 mg by mouth daily with dinner  , Disp: , Rfl:        Historical Information   Past Medical History:   Diagnosis Date    Acute encephalopathy 05/27/2019    Arthritis     BL knees    Atrial fibrillation (HCC)     CHF (congestive heart failure) (HCC)     Colon polyp     Coronary artery disease     CVA (cerebral vascular accident) (HCC) 04/27/2019    NIHSS 26 on presentation    Depression     Diabetes mellitus (HCC)     borderline    Encephalopathy acute 04/28/2019    History of transfusion 04/2019    Hyperlipidemia     Irregular heart beat     Afib    Stroke (HCC) 04/27/2019    Left sided weakness-aphasia    Urinary retention     Urinary retention 06/04/2019       Past Surgical History:   Procedure Laterality Date    CARDIAC ELECTROPHYSIOLOGY  PROCEDURE Left 6/20/2023    Procedure: Cardiac pacer implant;  Surgeon: Marquis Haney DO;  Location: [facility];  Service: Cardiology    COLONOSCOPY      IR STROKE ALERT  4/27/2019    MENISCECTOMY Right     IL CYSTO INSERTION TRANSPROSTATIC IMPLANT SINGLE N/A 11/19/2021    Procedure: CYSTOSCOPY WITH INSERTION UROLIFT;  Surgeon: Jack Christopher MD;  Location: [facility];  Service: Urology       Social History     Substance and Sexual Activity   Alcohol Use Not Currently    Comment: social     Social History     Substance and Sexual Activity   Drug Use Never     Social History     Tobacco Use   Smoking Status Never   Smokeless Tobacco Never       Family History   Problem Relation Age of Onset    Cancer Mother     Hypertension Mother          Labs & Results:  Below is the patient's most recent value for Albumin, ALT, AST, BUN, Calcium, Chloride, Cholesterol, CO2, Creatinine, GFR, Glucose, HDL, Hematocrit, Hemoglobin, Hemoglobin A1C, LDL, Magnesium, Phosphorus, Platelets, Potassium, PSA, Sodium, Triglycerides, and WBC.   Lab Results   Component Value Date    ALT 15 11/04/2024    AST 16 11/04/2024    BUN 14 11/04/2024    CALCIUM 9.2 11/04/2024     11/04/2024    CO2 28 11/04/2024    CREATININE 0.73 11/04/2024    HDL 46 11/04/2024    HCT 50.6 (H) 11/04/2024    HGB 16.3 11/04/2024    HGBA1C 6.4 (H) 04/24/2024    MG 1.7 02/03/2022    PHOS 5.0 (H) 05/02/2019     11/04/2024    K 3.8 11/04/2024    PSA 1.604 11/04/2024    TRIG 90 11/04/2024    WBC 4.91 11/04/2024     Note: for a comprehensive list of the patient's lab results, access the Results Review activity.

## 2024-11-14 NOTE — ASSESSMENT & PLAN NOTE
Placed 06/20/23 given ongoing bradycardia  Latest device interrogation 10/4/24 showing normal device function

## 2024-11-14 NOTE — ASSESSMENT & PLAN NOTE
Asymptomatic in regards to afib  Latest pacemaker interrogation shows ongoing afib since 06/20/23 without significant high-rate episode  Rate control: metoprolol succinate 25mg daily  AC: xarelto 20mg daily ([id number] 5 (age, HTN, CVA hx, T2DM))  AAD: none  No prior ablation/cardioversion hx  ECHO (06/19/2023) - EF 65%, LA mild-mod dilation

## 2024-11-19 ENCOUNTER — OFFICE VISIT (OUTPATIENT)
Dept: CARDIOLOGY CLINIC | Facility: CLINIC | Age: 74
End: 2024-11-19
Payer: MEDICARE

## 2024-11-19 VITALS
HEART RATE: 60 BPM | SYSTOLIC BLOOD PRESSURE: 132 MMHG | DIASTOLIC BLOOD PRESSURE: 82 MMHG | WEIGHT: 202.7 LBS | BODY MASS INDEX: 27.45 KG/M2 | HEIGHT: 72 IN

## 2024-11-19 DIAGNOSIS — I10 ESSENTIAL HYPERTENSION: ICD-10-CM

## 2024-11-19 DIAGNOSIS — I48.19 PERSISTENT ATRIAL FIBRILLATION (HCC): Primary | ICD-10-CM

## 2024-11-19 DIAGNOSIS — Z95.0 CARDIAC PACEMAKER IN SITU: ICD-10-CM

## 2024-11-19 DIAGNOSIS — E11.9 TYPE 2 DIABETES MELLITUS WITHOUT COMPLICATION, UNSPECIFIED WHETHER LONG TERM INSULIN USE (HCC): ICD-10-CM

## 2024-11-19 DIAGNOSIS — Z86.73 HISTORY OF STROKE: ICD-10-CM

## 2024-11-19 PROCEDURE — 93000 ELECTROCARDIOGRAM COMPLETE: CPT

## 2024-11-19 PROCEDURE — 99214 OFFICE O/P EST MOD 30 MIN: CPT

## 2024-11-22 ENCOUNTER — TELEPHONE (OUTPATIENT)
Dept: CARDIOLOGY CLINIC | Facility: CLINIC | Age: 74
End: 2024-11-22

## 2024-11-26 ENCOUNTER — RESULTS FOLLOW-UP (OUTPATIENT)
Dept: CARDIOLOGY CLINIC | Facility: CLINIC | Age: 74
End: 2024-11-26

## 2024-12-03 ENCOUNTER — HOSPITAL ENCOUNTER (INPATIENT)
Facility: HOSPITAL | Age: 74
LOS: 3 days | DRG: 178 | End: 2024-12-06
Attending: EMERGENCY MEDICINE | Admitting: INTERNAL MEDICINE
Payer: MEDICARE

## 2024-12-03 ENCOUNTER — APPOINTMENT (INPATIENT)
Dept: CT IMAGING | Facility: HOSPITAL | Age: 74
DRG: 178 | End: 2024-12-03
Payer: MEDICARE

## 2024-12-03 ENCOUNTER — APPOINTMENT (EMERGENCY)
Dept: RADIOLOGY | Facility: HOSPITAL | Age: 74
DRG: 178 | End: 2024-12-03
Payer: MEDICARE

## 2024-12-03 ENCOUNTER — APPOINTMENT (INPATIENT)
Dept: NON INVASIVE DIAGNOSTICS | Facility: HOSPITAL | Age: 74
DRG: 178 | End: 2024-12-03
Payer: MEDICARE

## 2024-12-03 ENCOUNTER — APPOINTMENT (EMERGENCY)
Dept: CT IMAGING | Facility: HOSPITAL | Age: 74
DRG: 178 | End: 2024-12-03
Payer: MEDICARE

## 2024-12-03 DIAGNOSIS — R55 SYNCOPE: Primary | ICD-10-CM

## 2024-12-03 DIAGNOSIS — U07.1 COVID-19: ICD-10-CM

## 2024-12-03 DIAGNOSIS — I77.74 VERTEBRAL ARTERY DISSECTION (HCC): ICD-10-CM

## 2024-12-03 PROBLEM — R91.1 LUNG NODULE SEEN ON IMAGING STUDY: Status: ACTIVE | Noted: 2024-12-03

## 2024-12-03 LAB
2HR DELTA HS TROPONIN: 0 NG/L
4HR DELTA HS TROPONIN: 2 NG/L
ALBUMIN SERPL BCG-MCNC: 2.8 G/DL (ref 3.5–5)
ALP SERPL-CCNC: 41 U/L (ref 34–104)
ALT SERPL W P-5'-P-CCNC: 13 U/L (ref 7–52)
ANION GAP SERPL CALCULATED.3IONS-SCNC: 5 MMOL/L (ref 4–13)
ANION GAP SERPL CALCULATED.3IONS-SCNC: 6 MMOL/L (ref 4–13)
AORTIC ROOT: 3.9 CM
AORTIC VALVE MEAN VELOCITY: 6.7 M/S
APICAL FOUR CHAMBER EJECTION FRACTION: 59 %
APTT PPP: 34 SECONDS (ref 23–34)
AST SERPL W P-5'-P-CCNC: 16 U/L (ref 13–39)
ATRIAL RATE: 258 BPM
ATRIAL RATE: 65 BPM
AV AREA BY CONTINUOUS VTI: 3.1 CM2
AV AREA PEAK VELOCITY: 2.8 CM2
AV LVOT MEAN GRADIENT: 1 MMHG
AV LVOT PEAK GRADIENT: 2 MMHG
AV MEAN GRADIENT: 2 MMHG
AV PEAK GRADIENT: 4 MMHG
AV VALVE AREA: 3.05 CM2
AV VELOCITY RATIO: 0.63
BACTERIA UR QL AUTO: ABNORMAL /HPF
BASOPHILS # BLD AUTO: 0.01 THOUSANDS/ÂΜL (ref 0–0.1)
BASOPHILS NFR BLD AUTO: 0 % (ref 0–1)
BILIRUB SERPL-MCNC: 0.71 MG/DL (ref 0.2–1)
BILIRUB UR QL STRIP: NEGATIVE
BSA FOR ECHO PROCEDURE: 2.14 M2
BUN SERPL-MCNC: 12 MG/DL (ref 5–25)
BUN SERPL-MCNC: 15 MG/DL (ref 5–25)
CALCIUM ALBUM COR SERPL-MCNC: 7.2 MG/DL (ref 8.3–10.1)
CALCIUM SERPL-MCNC: 6.2 MG/DL (ref 8.4–10.2)
CALCIUM SERPL-MCNC: 7.8 MG/DL (ref 8.4–10.2)
CARDIAC TROPONIN I PNL SERPL HS: 11 NG/L (ref ?–50)
CARDIAC TROPONIN I PNL SERPL HS: 11 NG/L (ref ?–50)
CARDIAC TROPONIN I PNL SERPL HS: 13 NG/L (ref ?–50)
CHLORIDE SERPL-SCNC: 105 MMOL/L (ref 96–108)
CHLORIDE SERPL-SCNC: 72 MMOL/L (ref 96–108)
CLARITY UR: CLEAR
CO2 SERPL-SCNC: 20 MMOL/L (ref 21–32)
CO2 SERPL-SCNC: 24 MMOL/L (ref 21–32)
COLOR UR: YELLOW
CREAT SERPL-MCNC: 0.72 MG/DL (ref 0.6–1.3)
CREAT SERPL-MCNC: 0.87 MG/DL (ref 0.6–1.3)
CREAT UR-MCNC: 160.2 MG/DL
DOP CALC AO PEAK VEL: 1.02 M/S
DOP CALC AO VTI: 18.98 CM
DOP CALC LVOT AREA: 4.52 CM2
DOP CALC LVOT CARDIAC INDEX: 1.7 L/MIN/M2
DOP CALC LVOT CARDIAC OUTPUT: 3.64 L/MIN
DOP CALC LVOT DIAMETER: 2.4 CM
DOP CALC LVOT PEAK VEL VTI: 12.81 CM
DOP CALC LVOT PEAK VEL: 0.64 M/S
DOP CALC LVOT STROKE INDEX: 26.6 ML/M2
DOP CALC LVOT STROKE VOLUME: 57.92
EOSINOPHIL # BLD AUTO: 0.01 THOUSAND/ÂΜL (ref 0–0.61)
EOSINOPHIL NFR BLD AUTO: 0 % (ref 0–6)
ERYTHROCYTE [DISTWIDTH] IN BLOOD BY AUTOMATED COUNT: 13.5 % (ref 11.6–15.1)
EST. AVERAGE GLUCOSE BLD GHB EST-MCNC: 117 MG/DL
FLUAV AG UPPER RESP QL IA.RAPID: NEGATIVE
FLUBV AG UPPER RESP QL IA.RAPID: NEGATIVE
GFR SERPL CREATININE-BSD FRML MDRD: 85 ML/MIN/1.73SQ M
GFR SERPL CREATININE-BSD FRML MDRD: 91 ML/MIN/1.73SQ M
GLUCOSE SERPL-MCNC: 114 MG/DL (ref 65–140)
GLUCOSE SERPL-MCNC: 125 MG/DL (ref 65–140)
GLUCOSE SERPL-MCNC: 133 MG/DL (ref 65–140)
GLUCOSE SERPL-MCNC: 89 MG/DL (ref 65–140)
GLUCOSE UR STRIP-MCNC: NEGATIVE MG/DL
HBA1C MFR BLD: 5.7 %
HCT VFR BLD AUTO: 44.7 % (ref 36.5–49.3)
HGB BLD-MCNC: 14.5 G/DL (ref 12–17)
HGB UR QL STRIP.AUTO: NEGATIVE
IMM GRANULOCYTES # BLD AUTO: 0.03 THOUSAND/UL (ref 0–0.2)
IMM GRANULOCYTES NFR BLD AUTO: 0 % (ref 0–2)
INR PPP: 1.99 (ref 0.85–1.19)
KETONES UR STRIP-MCNC: ABNORMAL MG/DL
LAAS-AP2: 23.1 CM2
LAAS-AP4: 21.5 CM2
LEFT ATRIUM SIZE: 5.1 CM
LEFT ATRIUM VOLUME (MOD BIPLANE): 69 ML
LEFT ATRIUM VOLUME INDEX (MOD BIPLANE): 32.2 ML/M2
LEUKOCYTE ESTERASE UR QL STRIP: NEGATIVE
LYMPHOCYTES # BLD AUTO: 0.58 THOUSANDS/ÂΜL (ref 0.6–4.47)
LYMPHOCYTES NFR BLD AUTO: 8 % (ref 14–44)
MCH RBC QN AUTO: 31 PG (ref 26.8–34.3)
MCHC RBC AUTO-ENTMCNC: 32.4 G/DL (ref 31.4–37.4)
MCV RBC AUTO: 96 FL (ref 82–98)
MONOCYTES # BLD AUTO: 0.49 THOUSAND/ÂΜL (ref 0.17–1.22)
MONOCYTES NFR BLD AUTO: 7 % (ref 4–12)
MUCOUS THREADS UR QL AUTO: ABNORMAL
NEUTROPHILS # BLD AUTO: 5.85 THOUSANDS/ÂΜL (ref 1.85–7.62)
NEUTS SEG NFR BLD AUTO: 85 % (ref 43–75)
NITRITE UR QL STRIP: NEGATIVE
NON-SQ EPI CELLS URNS QL MICRO: ABNORMAL /HPF
NRBC BLD AUTO-RTO: 0 /100 WBCS
OSMOLALITY UR/SERPL-RTO: 289 MMOL/KG (ref 282–298)
OSMOLALITY UR: 687 MMOL/KG (ref 250–900)
PH UR STRIP.AUTO: 5.5 [PH]
PLATELET # BLD AUTO: 121 THOUSANDS/UL (ref 149–390)
PMV BLD AUTO: 9 FL (ref 8.9–12.7)
POTASSIUM SERPL-SCNC: 3.1 MMOL/L (ref 3.5–5.3)
POTASSIUM SERPL-SCNC: 3.9 MMOL/L (ref 3.5–5.3)
PROT SERPL-MCNC: 4.4 G/DL (ref 6.4–8.4)
PROT UR STRIP-MCNC: ABNORMAL MG/DL
PROTHROMBIN TIME: 22.5 SECONDS (ref 12.3–15)
QRS AXIS: 57 DEGREES
QRS AXIS: 64 DEGREES
QRSD INTERVAL: 118 MS
QRSD INTERVAL: 122 MS
QT INTERVAL: 410 MS
QT INTERVAL: 424 MS
QTC INTERVAL: 442 MS
QTC INTERVAL: 444 MS
RBC # BLD AUTO: 4.68 MILLION/UL (ref 3.88–5.62)
RBC #/AREA URNS AUTO: ABNORMAL /HPF
RIGHT ATRIAL 2D VOLUME: 62 ML
RIGHT ATRIUM AREA SYSTOLE A4C: 21 CM2
RIGHT VENTRICLE ID DIMENSION: 3.3 CM
SARS-COV+SARS-COV-2 AG RESP QL IA.RAPID: POSITIVE
SL CV LEFT ATRIUM LENGTH A2C: 5.7 CM
SL CV LV EF: 55
SODIUM 24H UR-SCNC: 68 MOL/L
SODIUM SERPL-SCNC: 135 MMOL/L (ref 135–147)
SODIUM SERPL-SCNC: 97 MMOL/L (ref 135–147)
SP GR UR STRIP.AUTO: >=1.05 (ref 1–1.03)
T WAVE AXIS: 251 DEGREES
T WAVE AXIS: 257 DEGREES
TRICUSPID ANNULAR PLANE SYSTOLIC EXCURSION: 1.5 CM
UROBILINOGEN UR STRIP-ACNC: <2 MG/DL
VENTRICULAR RATE: 66 BPM
VENTRICULAR RATE: 70 BPM
WBC # BLD AUTO: 6.97 THOUSAND/UL (ref 4.31–10.16)
WBC #/AREA URNS AUTO: ABNORMAL /HPF

## 2024-12-03 PROCEDURE — 70496 CT ANGIOGRAPHY HEAD: CPT

## 2024-12-03 PROCEDURE — 82570 ASSAY OF URINE CREATININE: CPT | Performed by: EMERGENCY MEDICINE

## 2024-12-03 PROCEDURE — 93010 ELECTROCARDIOGRAM REPORT: CPT | Performed by: INTERNAL MEDICINE

## 2024-12-03 PROCEDURE — 96365 THER/PROPH/DIAG IV INF INIT: CPT

## 2024-12-03 PROCEDURE — 85025 COMPLETE CBC W/AUTO DIFF WBC: CPT

## 2024-12-03 PROCEDURE — 36415 COLL VENOUS BLD VENIPUNCTURE: CPT

## 2024-12-03 PROCEDURE — 99285 EMERGENCY DEPT VISIT HI MDM: CPT | Performed by: EMERGENCY MEDICINE

## 2024-12-03 PROCEDURE — 93306 TTE W/DOPPLER COMPLETE: CPT

## 2024-12-03 PROCEDURE — 96367 TX/PROPH/DG ADDL SEQ IV INF: CPT

## 2024-12-03 PROCEDURE — 93005 ELECTROCARDIOGRAM TRACING: CPT

## 2024-12-03 PROCEDURE — 96366 THER/PROPH/DIAG IV INF ADDON: CPT

## 2024-12-03 PROCEDURE — 85610 PROTHROMBIN TIME: CPT

## 2024-12-03 PROCEDURE — 93306 TTE W/DOPPLER COMPLETE: CPT | Performed by: INTERNAL MEDICINE

## 2024-12-03 PROCEDURE — 83930 ASSAY OF BLOOD OSMOLALITY: CPT

## 2024-12-03 PROCEDURE — 85730 THROMBOPLASTIN TIME PARTIAL: CPT

## 2024-12-03 PROCEDURE — 84300 ASSAY OF URINE SODIUM: CPT

## 2024-12-03 PROCEDURE — 81001 URINALYSIS AUTO W/SCOPE: CPT

## 2024-12-03 PROCEDURE — 83935 ASSAY OF URINE OSMOLALITY: CPT | Performed by: INTERNAL MEDICINE

## 2024-12-03 PROCEDURE — 71260 CT THORAX DX C+: CPT

## 2024-12-03 PROCEDURE — 80048 BASIC METABOLIC PNL TOTAL CA: CPT | Performed by: EMERGENCY MEDICINE

## 2024-12-03 PROCEDURE — 87811 SARS-COV-2 COVID19 W/OPTIC: CPT

## 2024-12-03 PROCEDURE — 84484 ASSAY OF TROPONIN QUANT: CPT

## 2024-12-03 PROCEDURE — 71046 X-RAY EXAM CHEST 2 VIEWS: CPT

## 2024-12-03 PROCEDURE — 83036 HEMOGLOBIN GLYCOSYLATED A1C: CPT | Performed by: INTERNAL MEDICINE

## 2024-12-03 PROCEDURE — 87804 INFLUENZA ASSAY W/OPTIC: CPT

## 2024-12-03 PROCEDURE — 80053 COMPREHEN METABOLIC PANEL: CPT

## 2024-12-03 PROCEDURE — 70498 CT ANGIOGRAPHY NECK: CPT

## 2024-12-03 PROCEDURE — 99285 EMERGENCY DEPT VISIT HI MDM: CPT

## 2024-12-03 PROCEDURE — 82948 REAGENT STRIP/BLOOD GLUCOSE: CPT

## 2024-12-03 PROCEDURE — 99223 1ST HOSP IP/OBS HIGH 75: CPT | Performed by: INTERNAL MEDICINE

## 2024-12-03 RX ORDER — ONDANSETRON 2 MG/ML
4 INJECTION INTRAMUSCULAR; INTRAVENOUS EVERY 6 HOURS PRN
Status: DISCONTINUED | OUTPATIENT
Start: 2024-12-03 | End: 2024-12-06 | Stop reason: HOSPADM

## 2024-12-03 RX ORDER — METOPROLOL SUCCINATE 25 MG/1
25 TABLET, EXTENDED RELEASE ORAL DAILY
Status: DISCONTINUED | OUTPATIENT
Start: 2024-12-04 | End: 2024-12-06 | Stop reason: HOSPADM

## 2024-12-03 RX ORDER — PANTOPRAZOLE SODIUM 40 MG/1
40 TABLET, DELAYED RELEASE ORAL
Status: DISCONTINUED | OUTPATIENT
Start: 2024-12-04 | End: 2024-12-06 | Stop reason: HOSPADM

## 2024-12-03 RX ORDER — ACETAMINOPHEN 10 MG/ML
1000 INJECTION, SOLUTION INTRAVENOUS ONCE
Status: COMPLETED | OUTPATIENT
Start: 2024-12-03 | End: 2024-12-03

## 2024-12-03 RX ORDER — SODIUM CHLORIDE, SODIUM GLUCONATE, SODIUM ACETATE, POTASSIUM CHLORIDE, MAGNESIUM CHLORIDE, SODIUM PHOSPHATE, DIBASIC, AND POTASSIUM PHOSPHATE .53; .5; .37; .037; .03; .012; .00082 G/100ML; G/100ML; G/100ML; G/100ML; G/100ML; G/100ML; G/100ML
1000 INJECTION, SOLUTION INTRAVENOUS ONCE
Status: COMPLETED | OUTPATIENT
Start: 2024-12-03 | End: 2024-12-03

## 2024-12-03 RX ORDER — INSULIN LISPRO 100 [IU]/ML
1-5 INJECTION, SOLUTION INTRAVENOUS; SUBCUTANEOUS
Status: DISCONTINUED | OUTPATIENT
Start: 2024-12-03 | End: 2024-12-04

## 2024-12-03 RX ORDER — PRAVASTATIN SODIUM 40 MG
40 TABLET ORAL DAILY
Status: DISCONTINUED | OUTPATIENT
Start: 2024-12-04 | End: 2024-12-06 | Stop reason: HOSPADM

## 2024-12-03 RX ORDER — DOCUSATE SODIUM 100 MG/1
100 CAPSULE, LIQUID FILLED ORAL 2 TIMES DAILY
Status: DISCONTINUED | OUTPATIENT
Start: 2024-12-03 | End: 2024-12-06 | Stop reason: HOSPADM

## 2024-12-03 RX ORDER — FINASTERIDE 5 MG/1
5 TABLET, FILM COATED ORAL
Status: DISCONTINUED | OUTPATIENT
Start: 2024-12-03 | End: 2024-12-06 | Stop reason: HOSPADM

## 2024-12-03 RX ORDER — ASCORBIC ACID 1000 MG
TABLET ORAL DAILY
Status: DISCONTINUED | OUTPATIENT
Start: 2024-12-04 | End: 2024-12-03

## 2024-12-03 RX ORDER — ACETAMINOPHEN 325 MG/1
650 TABLET ORAL EVERY 6 HOURS PRN
Status: DISCONTINUED | OUTPATIENT
Start: 2024-12-03 | End: 2024-12-06 | Stop reason: HOSPADM

## 2024-12-03 RX ADMIN — DOCUSATE SODIUM 100 MG: 100 CAPSULE, LIQUID FILLED ORAL at 17:30

## 2024-12-03 RX ADMIN — IOHEXOL 85 ML: 350 INJECTION, SOLUTION INTRAVENOUS at 11:10

## 2024-12-03 RX ADMIN — REMDESIVIR 200 MG: 100 INJECTION, POWDER, LYOPHILIZED, FOR SOLUTION INTRAVENOUS at 16:22

## 2024-12-03 RX ADMIN — ACETAMINOPHEN 1000 MG: 10 INJECTION INTRAVENOUS at 09:52

## 2024-12-03 RX ADMIN — SODIUM CHLORIDE, SODIUM GLUCONATE, SODIUM ACETATE, POTASSIUM CHLORIDE, MAGNESIUM CHLORIDE, SODIUM PHOSPHATE, DIBASIC, AND POTASSIUM PHOSPHATE 1000 ML: .53; .5; .37; .037; .03; .012; .00082 INJECTION, SOLUTION INTRAVENOUS at 10:37

## 2024-12-03 RX ADMIN — RIVAROXABAN 20 MG: 20 TABLET, FILM COATED ORAL at 17:30

## 2024-12-03 RX ADMIN — IOHEXOL 55 ML: 350 INJECTION, SOLUTION INTRAVENOUS at 15:58

## 2024-12-03 RX ADMIN — FINASTERIDE 5 MG: 5 TABLET, FILM COATED ORAL at 22:28

## 2024-12-03 NOTE — ASSESSMENT & PLAN NOTE
Patient with history of stroke and residual dysphagia and ambulatory dysfunction  Requires assistance at home with home health aides for ADLs  Continue statin therapy  PT/OT/speech

## 2024-12-03 NOTE — ASSESSMENT & PLAN NOTE
"Patient presents from home after wife and home health provider noticed he syncopized during shower  Lowered to chair without head strike; presents for further evaluation    1.Cardiac   a. Arrhythmia: EKG shows ventricularly paced rhythm with occasional PVCs, monitor on telemetry   b. Ischemia: Troponins negative; EKG with no acute ischemic findings; patient denies chest pain   c.Structural/valvular: No findings on most recent echocardiogram, will repeat echo this admission   D. Cardiac tamponade: No suspicious findings as per vitals or on physical exam  2.Hemmorrhage/ volume loss   A. Diarrhea: Denies   B. Vomiting: Denies   C. Orthostatic: Will obtain orthostatics; limited benefit however as patient received fluids in the ED  3.Pulmonary embolism: Low suspicion given stable vitals and compliance with Xarelto  4.Subarachnoid bleed: CT head reveals no acute bleed or midline shift  5.Neurocardiogenic: High on differential given recent COVID infection and patient being assisted with shower at time of event  6.Orthostasis: Obtain orthostatics  7.Drugs of abuse: Pt doesn't report drug use  8.Metabolic   A.hypoglycemia: Glucose stable   B.Hypoxic: SpO2 of 96% on room air  9.Psychiatric  10.Neurogenic   A.TIA/CVA: History of CVA; CTA head and neck reveals: \"When compared to CTA head and neck dated 12/27/2019, there is new diminutive opacification/occlusion of the intradural left vertebral artery, which could reflect occlusion, dissection and/or high-grade stenosis.\"  Consult to neurology to evaluate.   B.Migraines: No history; patient denies headaches   C.Subclavian steal   D.Seizures: No history; family denies seizure-like activity    Cause of syncopal event likely acute COVID infection in the setting of neurocardiogenic syncope while bathing and extreme deconditioning  PT/OT/speech; await further neurology recommendations    "

## 2024-12-03 NOTE — ED PROVIDER NOTES
Time reflects when diagnosis was documented in both MDM as applicable and the Disposition within this note       Time User Action Codes Description Comment    12/3/2024  1:17 PM Ronald Guidry [R55] Syncope     12/3/2024  1:17 PM Ronald Guidry [U07.1] COVID-19           ED Disposition       ED Disposition   Admit    Condition   Stable    Date/Time   Tue Dec 3, 2024  1:16 PM    Comment   Case was discussed with Dr. Willy Perez and the patient's admission status was agreed to be Admission Status: inpatient status to the service of Dr. Willy Perez .               Assessment & Plan       Medical Decision Making  Jose is a 73 y/o male with pertinent PMHx of A-fib, CVA, and pacemaker placement presenting to the ED following a syncopal episode that occurred about 30-60 minutes prior to arrival. Patient was taking a shower with the assistance of his wife and home aide when he became pale and looked like he was going to pass out. The home aide and his wife were able to move him to a chair before he could collapse to the floor. Negative headstrike. He is on Xarelto. They deny any facial droop, slurred speech, or asymmetry in the extremities. Patient also endorses a 4 day history of fatigue, cough, headaches, and congestion. He tested positive for COVID yesterday at home. He denies lightheadedness, dizziness, visual disturbances, chest pain, SOB, abdominal pain, N/V/D, dysuria, flank pain, fevers, chills, or diaphoresis. Patient has focal deficits at baseline following his stroke including diminished sensation in the left upper and lower extremities, 1/5 strength in left upper and lower extremities, and aphasia.     Differential diagnoses include but not limited to cardiac syncope, dehydration, TIA. Low suspicion for CVA given patient is at baseline, but will order CTA head and neck to rule this out. CBC, CMP, trop, and coagulation studies ordered. CXR and EKG ordered. COVID swab ordered to confirm home test result. Will  "give patient 1L of isolyte and IV Tylenol for fluid replacement and analgesia. Pacemaker interrogation performed. Report from [employer] showed that patient is in atrial fibrillation constantly, and there was no evidence of any ventricular tachycardia or fibrillation.      CMP came back with critical value of 97 for sodium. Ordered BMP for repeat draw. Contacted critical care team to inform them of finding. Orders placed for urine sodium, urine creatinine, urine osmolality, serum sodium, serum creatinine, and serum osmolality. Repeat draw of sodium came back as 135. Critical care team had no further recommendations at that time.     CTA head and neck was read by radiologist with pertinent finding of \"focal moderate-marked stenosis of the origin of the left vertebral artery. Compared to prior CTA head and neck 12/27/2019, there is diminutive opacification/occlusion of the intradural left vertebral artery.\" Neurosurgery team was contacted for guidance on further workup or management for this finding. Since the patient is not having any new deficits, neurosurgery stated that there is no indication for intervention or follow up from their perspective.     COVID swab done here confirmed findings from home test. CBC unremarkable. Initial troponin was 11, and repeat troponin at 2 hours was 11 with delta of 0.     Had discussion at bedside with patient, his wife, and his caregiver regarding plan moving forward. Patient's wife and his caregiver expressed concern about their ability to care for him, as it has become increasingly difficult to assist him at home over the past few days because he is so fatigued. Considering this alongside his syncopal episode and several comorbidities, admission is warranted at this time. Discussed case with Dr. Perez of [facility] service and he agreed for admission under inpatient status. All questions from the patient and his loved ones at bedside answered to their satisfaction.     Amount and/or " Complexity of Data Reviewed  Independent Historian: caregiver and spouse     Details: Wife and home aide witnessed syncopal episode, provided majority of history  Labs: ordered.  Radiology: ordered.    Risk  Prescription drug management.  Decision regarding hospitalization.             Medications   insulin lispro (HumALOG/ADMELOG) 100 units/mL subcutaneous injection 1-5 Units (has no administration in time range)   insulin lispro (HumALOG/ADMELOG) 100 units/mL subcutaneous injection 1-5 Units (has no administration in time range)   remdesivir (Veklury) 200 mg in sodium chloride 0.9 % 290 mL IVPB (has no administration in time range)     Followed by   remdesivir (Veklury) 100 mg in sodium chloride 0.9 % 270 mL IVPB (has no administration in time range)   multi-electrolyte (ISOLYTE-S PH 7.4) bolus 1,000 mL (0 mL Intravenous Stopped 12/3/24 1209)   acetaminophen (Ofirmev) injection 1,000 mg (0 mg Intravenous Stopped 12/3/24 1037)   iohexol (OMNIPAQUE) 350 MG/ML injection (SINGLE-DOSE) 85 mL (85 mL Intravenous Given 12/3/24 1110)   iohexol (OMNIPAQUE) 350 MG/ML injection (SINGLE-DOSE) 55 mL (55 mL Intravenous Given 12/3/24 1558)       ED Risk Strat Scores                           SBIRT 20yo+      Flowsheet Row Most Recent Value   Initial Alcohol Screen: US AUDIT-C     1. How often do you have a drink containing alcohol? 0 Filed at: 12/03/2024 1000   2. How many drinks containing alcohol do you have on a typical day you are drinking?  0 Filed at: 12/03/2024 1000   3a. Male UNDER 65: How often do you have five or more drinks on one occasion? 0 Filed at: 12/03/2024 1000   3b. FEMALE Any Age, or MALE 65+: How often do you have 4 or more drinks on one occassion? 0 Filed at: 12/03/2024 1000   Audit-C Score 0 Filed at: 12/03/2024 1000   JANETTE: How many times in the past year have you...    Used an illegal drug or used a prescription medication for non-medical reasons? Never Filed at: 12/03/2024 1000                             History of Present Illness       Chief Complaint   Patient presents with    Syncope     Pt went to transfer from chair at home and got dizzy and passed out. Pt denies hitting head.  Pt covid positive.        Past Medical History:   Diagnosis Date    Acute encephalopathy 05/27/2019    Arthritis     BL knees    Atrial fibrillation (HCC)     CHF (congestive heart failure) (HCC)     Colon polyp     Coronary artery disease     CVA (cerebral vascular accident) (HCC) 04/27/2019    NIHSS 26 on presentation    Depression     Diabetes mellitus (HCC)     borderline    Encephalopathy acute 04/28/2019    History of transfusion 04/2019    Hyperlipidemia     Irregular heart beat     Afib    Stroke (HCC) 04/27/2019    Left sided weakness-aphasia    Urinary retention     Urinary retention 06/04/2019      Past Surgical History:   Procedure Laterality Date    CARDIAC ELECTROPHYSIOLOGY PROCEDURE Left 6/20/2023    Procedure: Cardiac pacer implant;  Surgeon: Marquis Haney DO;  Location: [facility];  Service: Cardiology    COLONOSCOPY      IR STROKE ALERT  4/27/2019    MENISCECTOMY Right     PA CYSTO INSERTION TRANSPROSTATIC IMPLANT SINGLE N/A 11/19/2021    Procedure: CYSTOSCOPY WITH INSERTION UROLIFT;  Surgeon: Jack Christopher MD;  Location: [facility];  Service: Urology      Family History   Problem Relation Age of Onset    Cancer Mother     Hypertension Mother       Social History     Tobacco Use    Smoking status: Never    Smokeless tobacco: Never   Vaping Use    Vaping status: Never Used   Substance Use Topics    Alcohol use: Not Currently     Comment: social    Drug use: Never      E-Cigarette/Vaping    E-Cigarette Use Never User       E-Cigarette/Vaping Substances    Nicotine No     THC No     CBD No     Flavoring No     Other No     Unknown No       I have reviewed and agree with the history as documented.     Jose is a 75 y/o male with pertinent PMHx of A-fib, CVA, and pacemaker placement presenting to the ED  following a syncopal episode that occurred 30-60 minutes prior to arrival. Patient was taking a shower with the assistance of his wife and home aide when he began to appear pale and looked like he was going to pass out. Patient's wife and home aide moved him to a chair, after which he appeared completely out of it for a few seconds and was not verbally responding. Patient did not fall to the ground or hit his head, however he is on Xarelto. Patient's wife and his home aide both deny any facial droop, slurred speech, or asymmetry in the extremities. Patient also has had a four day history of fatigue, congestion, headache and cough. He tested positive on a home kit for COVID-19 yesterday. He denies lightheadedness, dizziness, visual disturbances, chest pain, SOB, abdominal pain, N/V/D, dysuria, fevers, chills, or diaphoresis. Patient has focal deficits at baseline following his stroke including diminished sensation in the left upper and lower extremities, 1/5 strength in left upper and lower extremities, and aphasia. Patient ambulates with assistance at home using a leg brace and a cane with the help of his home aide.       History provided by:  Patient, spouse and caregiver   used: No        Review of Systems   Constitutional:  Positive for appetite change and fatigue. Negative for chills, diaphoresis and fever.   HENT:  Positive for congestion and rhinorrhea. Negative for ear discharge, ear pain, sinus pressure, sinus pain, sneezing, sore throat and tinnitus.    Eyes:  Negative for discharge, redness and visual disturbance.   Respiratory:  Positive for cough. Negative for choking, chest tightness, shortness of breath and wheezing.    Cardiovascular:  Negative for chest pain and palpitations.   Gastrointestinal:  Negative for abdominal pain, diarrhea, nausea and vomiting.   Genitourinary:  Negative for dysuria and flank pain.   Musculoskeletal:  Negative for arthralgias and neck stiffness.   Skin:   Negative for color change.   Neurological:  Positive for syncope and headaches. Negative for dizziness, speech difficulty, weakness, light-headedness and numbness.   All other systems reviewed and are negative.          Objective       ED Triage Vitals   Temperature Pulse Blood Pressure Respirations SpO2 Patient Position - Orthostatic VS   12/03/24 0857 12/03/24 0854 12/03/24 0854 12/03/24 0854 12/03/24 0854 12/03/24 0854   98.2 °F (36.8 °C) 69 143/70 16 95 % Lying      Temp Source Heart Rate Source BP Location FiO2 (%) Pain Score    12/03/24 0857 12/03/24 0854 12/03/24 0854 -- 12/03/24 0854    Axillary Monitor Right arm  No Pain      Vitals      Date and Time Temp Pulse SpO2 Resp BP Pain Score FACES Pain Rating User   12/03/24 1620 -- 70 98 % 20 149/78 -- -- DII   12/03/24 1545 -- 64 99 % 12 129/62 -- -- AA   12/03/24 1415 -- 65 98 % 20 140/64 -- -- EP   12/03/24 1300 -- 70 98 % 21 135/72 -- -- EP   12/03/24 1200 -- 64 96 % 19 121/60 -- -- MF   12/03/24 1138 -- 69 97 % 22 137/60 No Pain -- EP   12/03/24 1100 -- 64 94 % 22 112/61 -- -- EP   12/03/24 1045 -- 72 96 % 19 118/65 -- -- TM   12/03/24 1015 98.2 °F (36.8 °C) -- -- -- -- -- -- EP   12/03/24 0857 98.2 °F (36.8 °C) -- -- -- -- -- -- TM   12/03/24 0854 -- 69 95 % 16 143/70 No Pain -- TM            Physical Exam  Vitals and nursing note reviewed.   Constitutional:       General: He is not in acute distress.     Appearance: He is ill-appearing.   HENT:      Head: Normocephalic and atraumatic.      Nose: Congestion and rhinorrhea present.      Mouth/Throat:      Mouth: Mucous membranes are dry.      Pharynx: Oropharynx is clear. No oropharyngeal exudate or posterior oropharyngeal erythema.   Eyes:      General:         Right eye: No discharge.         Left eye: No discharge.      Extraocular Movements: Extraocular movements intact.      Conjunctiva/sclera: Conjunctivae normal.      Pupils: Pupils are equal, round, and reactive to light.   Cardiovascular:       Rate and Rhythm: Normal rate. Rhythm irregular.      Pulses: Normal pulses.      Heart sounds: Normal heart sounds. No murmur heard.  Pulmonary:      Effort: Pulmonary effort is normal. No respiratory distress.      Breath sounds: Normal breath sounds. No wheezing, rhonchi or rales.   Abdominal:      General: Bowel sounds are normal.      Palpations: Abdomen is soft.      Tenderness: There is no abdominal tenderness. There is no right CVA tenderness, left CVA tenderness, guarding or rebound.   Musculoskeletal:         General: No swelling or tenderness.      Cervical back: Neck supple. No rigidity or tenderness.      Right lower leg: No edema.      Left lower leg: No edema.   Lymphadenopathy:      Cervical: No cervical adenopathy.   Skin:     General: Skin is warm and dry.      Capillary Refill: Capillary refill takes less than 2 seconds.   Neurological:      Mental Status: He is alert and oriented to person, place, and time. Mental status is at baseline.      Sensory: Sensory deficit present.      Motor: Weakness present.      Comments: Diminished sensation in left upper and lower extremities, at baseline. 1/5 strength in left upper and lower extremities, at baseline. Aphasia at baseline. No new focal deficits.    Psychiatric:         Mood and Affect: Mood normal.         Behavior: Behavior normal.         Results Reviewed       Procedure Component Value Units Date/Time    Urine Microscopic [[id number]]  (Abnormal) Collected: 12/03/24 1403    Lab Status: Final result Specimen: Urine, Straight Cath Updated: 12/03/24 1509     RBC, UA 4-10 /hpf      WBC, UA 2-4 /hpf      Epithelial Cells Occasional /hpf      Bacteria, UA Occasional /hpf      MUCUS THREADS Moderate    HS Troponin I 4hr [[id number]]  (Normal) Collected: 12/03/24 1433    Lab Status: Final result Specimen: Blood from Arm, Right Updated: 12/03/24 1509     hs TnI 4hr 13 ng/L      Delta 4hr hsTnI 2 ng/L     UA w Reflex to Microscopic w Reflex to Culture  [[id number]]  (Abnormal) Collected: 12/03/24 1403    Lab Status: Final result Specimen: Urine, Straight Cath Updated: 12/03/24 1505     Color, UA Yellow     Clarity, UA Clear     Specific Gravity, UA >=1.050     pH, UA 5.5     Leukocytes, UA Negative     Nitrite, UA Negative     Protein, UA Trace mg/dl      Glucose, UA Negative mg/dl      Ketones, UA 10 (1+) mg/dl      Urobilinogen, UA <2.0 mg/dl      Bilirubin, UA Negative     Occult Blood, UA Negative    Sodium, urine, random [[id number]] Collected: 12/03/24 1401    Lab Status: Final result Specimen: Urine, Other Updated: 12/03/24 1432     Sodium, Ur 68    Creatinine, urine, random [[id number]] Collected: 12/03/24 1401    Lab Status: Final result Specimen: Urine, Other Updated: 12/03/24 1432     Creatinine, Ur 160.2 mg/dL     Osmolality, urine [[id number]] Collected: 12/03/24 1401    Lab Status: In process Specimen: Urine, Other Updated: 12/03/24 1412    Hemoglobin A1c w/EAG Estimation (Prechecked if no A1C within 90 days) [[id number]] Collected: 12/03/24 1007    Lab Status: In process Specimen: Blood from Arm, Right Updated: 12/03/24 1403    HS Troponin I 2hr [[id number]]  (Normal) Collected: 12/03/24 1209    Lab Status: Final result Specimen: Blood from Arm, Right Updated: 12/03/24 1243     hs TnI 2hr 11 ng/L      Delta 2hr hsTnI 0 ng/L     Basic metabolic panel [[id number]]  (Abnormal) Collected: 12/03/24 1123    Lab Status: Final result Specimen: Blood from Hand, Left Updated: 12/03/24 1153     Sodium 135 mmol/L      Potassium 3.9 mmol/L      Chloride 105 mmol/L      CO2 24 mmol/L      ANION GAP 6 mmol/L      BUN 15 mg/dL      Creatinine 0.87 mg/dL      Glucose 133 mg/dL      Calcium 7.8 mg/dL      eGFR 85 ml/min/1.73sq m     Narrative:      National Kidney Disease Foundation guidelines for Chronic Kidney Disease (CKD):     Stage 1 with normal or high GFR (GFR > 90 mL/min/1.73 square meters)    Stage 2 Mild CKD (GFR = 60-89 mL/min/1.73 square meters)    Stage 3A  "Moderate CKD (GFR = 45-59 mL/min/1.73 square meters)    Stage 3B Moderate CKD (GFR = 30-44 mL/min/1.73 square meters)    Stage 4 Severe CKD (GFR = 15-29 mL/min/1.73 square meters)    Stage 5 End Stage CKD (GFR <15 mL/min/1.73 square meters)  Note: GFR calculation is accurate only with a steady state creatinine    Osmolality-\"If this is regarding a toxic alcohol, please STOP and consult medical  for further guidance.\" [[id number]] Collected: 12/03/24 1123    Lab Status: In process Specimen: Blood from Hand, Left Updated: 12/03/24 1126    Comprehensive metabolic panel [[id number]]  (Abnormal) Collected: 12/03/24 1007    Lab Status: Final result Specimen: Blood from Arm, Right Updated: 12/03/24 1057     Sodium 97 mmol/L      Potassium 3.1 mmol/L      Chloride 72 mmol/L      CO2 20 mmol/L      ANION GAP 5 mmol/L      BUN 12 mg/dL      Creatinine 0.72 mg/dL      Glucose 125 mg/dL      Calcium 6.2 mg/dL      Corrected Calcium 7.2 mg/dL      AST 16 U/L      ALT 13 U/L      Alkaline Phosphatase 41 U/L      Total Protein 4.4 g/dL      Albumin 2.8 g/dL      Total Bilirubin 0.71 mg/dL      eGFR 91 ml/min/1.73sq m     Narrative:      National Kidney Disease Foundation guidelines for Chronic Kidney Disease (CKD):     Stage 1 with normal or high GFR (GFR > 90 mL/min/1.73 square meters)    Stage 2 Mild CKD (GFR = 60-89 mL/min/1.73 square meters)    Stage 3A Moderate CKD (GFR = 45-59 mL/min/1.73 square meters)    Stage 3B Moderate CKD (GFR = 30-44 mL/min/1.73 square meters)    Stage 4 Severe CKD (GFR = 15-29 mL/min/1.73 square meters)    Stage 5 End Stage CKD (GFR <15 mL/min/1.73 square meters)  Note: GFR calculation is accurate only with a steady state creatinine    APTT [[id number]]  (Normal) Collected: 12/03/24 1007    Lab Status: Final result Specimen: Blood from Arm, Right Updated: 12/03/24 1046     PTT 34 seconds     Protime-INR [[id number]]  (Abnormal) Collected: 12/03/24 1007    Lab Status: Final result Specimen: " Blood from Arm, Right Updated: 12/03/24 1046     Protime 22.5 seconds      INR 1.99    Narrative:      INR Therapeutic Range    Indication                                             INR Range      Atrial Fibrillation                                               2.0-3.0  Hypercoagulable State                                    2.0.2.3  Left Ventricular Asist Device                            2.0-3.0  Mechanical Heart Valve                                  -    Aortic(with afib, MI, embolism, HF, LA enlargement,    and/or coagulopathy)                                     2.0-3.0 (2.5-3.5)     Mitral                                                             2.5-3.5  Prosthetic/Bioprosthetic Heart Valve               2.0-3.0  Venous thromboembolism (VTE: VT, PE        2.0-3.0    HS Troponin 0hr (reflex protocol) [[id number]]  (Normal) Collected: 12/03/24 1007    Lab Status: Final result Specimen: Blood from Arm, Right Updated: 12/03/24 1040     hs TnI 0hr 11 ng/L     FLU/COVID Rapid Antigen (30 min. TAT) - Preferred screening test in ED [[id number]]  (Abnormal) Collected: 12/03/24 0955    Lab Status: Final result Specimen: Nares from Nose Updated: 12/03/24 1026     SARS COV Rapid Antigen Positive     Influenza A Rapid Antigen Negative     Influenza B Rapid Antigen Negative    Narrative:      This test has been performed using the HomeTouchidel Perla 2 FLU+SARS Antigen test under the Emergency Use Authorization (EUA). This test has been validated by the  and verified by the performing laboratory. The Perla uses lateral flow immunofluorescent sandwich assay to detect SARS-COV, Influenza A and Influenza B Antigen.     The Quidel Perla 2 SARS Antigen test does not differentiate between SARS-CoV and SARS-CoV-2.     Negative results are presumptive and may be confirmed with a molecular assay, if necessary, for patient management. Negative results do not rule out SARS-CoV-2 or influenza infection and should not be used  as the sole basis for treatment or patient management decisions. A negative test result may occur if the level of antigen in a sample is below the limit of detection of this test.     Positive results are indicative of the presence of viral antigens, but do not rule out bacterial infection or co-infection with other viruses.     All test results should be used as an adjunct to clinical observations and other information available to the provider.    FOR PEDIATRIC PATIENTS - copy/paste COVID Guidelines URL to browser: [email or web address]    CBC and differential [[id number]]  (Abnormal) Collected: 12/03/24 1007    Lab Status: Final result Specimen: Blood from Arm, Right Updated: 12/03/24 1018     WBC 6.97 Thousand/uL      RBC 4.68 Million/uL      Hemoglobin 14.5 g/dL      Hematocrit 44.7 %      MCV 96 fL      MCH 31.0 pg      MCHC 32.4 g/dL      RDW 13.5 %      MPV 9.0 fL      Platelets 121 Thousands/uL      nRBC 0 /100 WBCs      Segmented % 85 %      Immature Grans % 0 %      Lymphocytes % 8 %      Monocytes % 7 %      Eosinophils Relative 0 %      Basophils Relative 0 %      Absolute Neutrophils 5.85 Thousands/µL      Absolute Immature Grans 0.03 Thousand/uL      Absolute Lymphocytes 0.58 Thousands/µL      Absolute Monocytes 0.49 Thousand/µL      Eosinophils Absolute 0.01 Thousand/µL      Basophils Absolute 0.01 Thousands/µL             CT chest w contrast   Final Interpretation by Amaury Noel DO (12/03 1649)      1. No convincing CT evidence for COVID-pneumonia. Mild dependent atelectatic changes and/or sequela of bronchiolitis given lower lobe bronchial wall thickening.      2. 11 mm V shaped opacity in the left upper lobe with associated nodularity is indeterminate but probably infectious/inflammatory. Follow-up CT chest in 3 months recommended to reevaluate this finding to exclude early malignancy.      The study was marked in EPIC for immediate  notification and follow-up.         Workstation performed: [id number]         CTA head and neck with and without contrast   Final Interpretation by Mynor Ashby MD (12/03 1666)      1. No acute intracranial hemorrhage, significant mass effect or new midline shift. Extensive sequela of prior right ROB/MCA territory infarcts.      2. When compared to CTA head and neck dated 12/27/2019, there is new diminutive opacification/occlusion of the intradural left vertebral artery, which could reflect occlusion, dissection and/or high-grade stenosis.      3. There is a 1.7 cm spiculated nodule in the left upper lobe, which is suspicious for primary lung malignancy. Partially evaluated mediastinal lymphadenopathy. Recommend further evaluation with chest CT.      4. Moderate-marked mucosal thickening of the paranasal sinuses, noting intrinsically hyperdense material, which could represent inspissated secretions or fungal elements.      5. Multifocal vascular stenoses, as detailed above.      The study was marked in EPIC for immediate notification.                  Workstation performed: [id number]         XR chest 2 views   Final Interpretation by Zohaib Fung MD (12/03 1042)      Suspected trace pleural effusions. No appreciable pulmonary infiltrates.            Workstation performed: [id number]                 Procedures    ED Medication and Procedure Management   Prior to Admission Medications   Prescriptions Last Dose Informant Patient Reported? Taking?   Coenzyme Q10 (CO Q 10) 10 MG CAPS  Spouse/Significant Other Yes Yes   Sig: Take by mouth daily   cyanocobalamin (VITAMIN B-12) 100 mcg tablet  Spouse/Significant Other Yes Yes   Sig: Take by mouth daily   finasteride (PROSCAR) 5 mg tablet  Spouse/Significant Other Yes Yes   Sig: Take 5 mg by mouth daily at bedtime     hydrocortisone (ANUSOL-HC) 2.5 % rectal cream Unknown Spouse/Significant Other No No   Sig: Apply topically 2 (two) times a day    hydrocortisone 1 % cream Unknown Spouse/Significant Other Yes No   Sig: Apply topically 2 (two) times a day as needed   hydrocortisone-pramoxine (ANALPRAM-HC) 2.5-1 % rectal cream Unknown Spouse/Significant Other No No   Sig: Apply topically 2 (two) times a day   ketoconazole (NIZORAL) 2 % shampoo Unknown Spouse/Significant Other Yes No   Sig: SHAMPOO 3 TIMES A WEEK AS A SCALP TREATMENT, LEAVE ON 5-10 MINUTES AND RINSE   metoprolol succinate (TOPROL-XL) 25 mg 24 hr tablet  Spouse/Significant Other No Yes   Sig: TAKE 1 TABLET BY MOUTH EVERY DAY   mupirocin (BACTROBAN) 2 % ointment Unknown Spouse/Significant Other Yes No   Sig: Apply 1 Application topically daily   pantoprazole (PROTONIX) 40 mg tablet  Spouse/Significant Other No Yes   Sig: Take 1 tablet (40 mg total) by mouth daily   pravastatin (PRAVACHOL) 40 mg tablet  Spouse/Significant Other Yes Yes   Sig: Take 40 mg by mouth daily   rivaroxaban (Xarelto) 20 mg tablet  Spouse/Significant Other Yes Yes   Sig: Take 20 mg by mouth daily with dinner        Facility-Administered Medications: None     Current Discharge Medication List        CONTINUE these medications which have NOT CHANGED    Details   Coenzyme Q10 (CO Q 10) 10 MG CAPS Take by mouth daily      cyanocobalamin (VITAMIN B-12) 100 mcg tablet Take by mouth daily      finasteride (PROSCAR) 5 mg tablet Take 5 mg by mouth daily at bedtime        metoprolol succinate (TOPROL-XL) 25 mg 24 hr tablet TAKE 1 TABLET BY MOUTH EVERY DAY  Qty: 30 tablet, Refills: 5    Associated Diagnoses: Paroxysmal atrial fibrillation (HCC)      pantoprazole (PROTONIX) 40 mg tablet Take 1 tablet (40 mg total) by mouth daily  Qty: 90 tablet, Refills: 3    Associated Diagnoses: Gastrointestinal hemorrhage associated with duodenal ulcer      pravastatin (PRAVACHOL) 40 mg tablet Take 40 mg by mouth daily      rivaroxaban (Xarelto) 20 mg tablet Take 20 mg by mouth daily with dinner        hydrocortisone (ANUSOL-HC) 2.5 % rectal cream  Apply topically 2 (two) times a day  Qty: 2 g, Refills: 3    Associated Diagnoses: Other hemorrhoids      hydrocortisone 1 % cream Apply topically 2 (two) times a day as needed      hydrocortisone-pramoxine (ANALPRAM-HC) 2.5-1 % rectal cream Apply topically 2 (two) times a day  Qty: 30 g, Refills: 3    Associated Diagnoses: Hemorrhoids, unspecified hemorrhoid type      ketoconazole (NIZORAL) 2 % shampoo SHAMPOO 3 TIMES A WEEK AS A SCALP TREATMENT, LEAVE ON 5-10 MINUTES AND RINSE      mupirocin (BACTROBAN) 2 % ointment Apply 1 Application topically daily           No discharge procedures on file.  ED SEPSIS DOCUMENTATION   Time reflects when diagnosis was documented in both MDM as applicable and the Disposition within this note       Time User Action Codes Description Comment    12/3/2024  1:17 PM Ronald Guidry [R55] Syncope     12/3/2024  1:17 PM Ronald Guidry [U07.1] COVID-19                  Ronald Guidry PA-C  12/03/24 8015

## 2024-12-03 NOTE — ED ATTENDING ATTESTATION
12/3/2024  IPopeye MD, saw and evaluated the patient. I have discussed the patient with the resident/non-physician practitioner and agree with the resident's/non-physician practitioner's findings, Plan of Care, and MDM as documented in the resident's/non-physician practitioner's note, except where noted. All available labs and Radiology studies were reviewed.  I was present for key portions of any procedure(s) performed by the resident/non-physician practitioner and I was immediately available to provide assistance.       At this point I agree with the current assessment done in the Emergency Department.  I have conducted an independent evaluation of this patient a history and physical is as follows:  Patient is a 73 yo male, history of atrial fibrillation on Xarelto, syncope in the past, currently with pacemaker, stroke few years back, with baseline left-sided weakness; brought in by EMS after he had an episode of syncope 30 min prior to arrival, patient was with wife and home nurse, was in shower, appeared weak, passed out, fall was avoided by the nurse, did not it head, lasted couple of seconds, did not speak, was unresponsive, recovered on scene, no facial droop, slurred speech, found out to be COVID + recently, feels fatigued, no HA, CP, dyspnea, not been eating drinking normally. On exam, VSS, Lungs CTA, CVS RRR, Abd soft, NTND, Non-focal neuro exam. Differential Diagnosis: Syncope, COVID-positive, decreased intake, failure to thrive, dehydration, electrolyte derangement, arrhythmia, no new signs of stroke, on anticoagulation, rule out bleed, Arrhythmia we will get Pacemaker check, will check cardiac workup for normal labs, EKG, CT head.    ED Course     ER workup, labs, EKG, CT/imaging results were independently reviewed, likely erroneous Na result from initial sample, repeated Na was normal. Patient with significant amb dysfunction due gen weakness likely sec to COVID infection, Syncope at home.  Admitted under SLIM for further evaluation and management.    Critical Care Time  Procedures

## 2024-12-03 NOTE — ASSESSMENT & PLAN NOTE
Wt Readings from Last 3 Encounters:   12/03/24 91.6 kg (202 lb)   11/19/24 91.9 kg (202 lb 11.2 oz)   11/07/24 90.7 kg (200 lb)     Patient with EF of 65%; diastolic function unable to be evaluated secondary to persistent atrial fibrillation  SpO2 96% on room air, does not appear to be in acute exacerbation  Appears to be euvolemic; continue home medications with beta-blocker, statin  Troponin negative X2; monitor

## 2024-12-03 NOTE — ASSESSMENT & PLAN NOTE
"Patient with history of persistent atrial fibrillation  As per ED staff: \"He is currently in a-fib and according to pacemaker interrogation he is always in A-fib, he did not have any other arrhythmias according to the report.\"  Heart rate well-controlled at 64; patient with pacemaker  Continue Toprol for rate control and Xarelto for anticoagulation  "

## 2024-12-03 NOTE — ASSESSMENT & PLAN NOTE
Lab Results   Component Value Date    HGBA1C 6.4 (H) 04/24/2024       Recent Labs     12/03/24  1625 12/03/24 2124   POCGLU 89 114       Blood Sugar Average: Last 72 hrs:    SSI; Subcutaneous Insulin Order Set  Blood Glucose checks TIDWM and QHS (Q6H for NPO patients)  Hold oral medications  Blood Glucose goal while inpatient is 140-180  Reduce basal insulin by 25-50% while inpatient  Consistent Carbohydrate Diet    (P) 101.5

## 2024-12-03 NOTE — ASSESSMENT & PLAN NOTE
"As per CTA head neck: \"There is a 1.7 cm spiculated nodule in the left upper lobe, which is suspicious for primary lung malignancy. Partially evaluated mediastinal lymphadenopathy. Recommend further evaluation with chest CT.\"    Will obtain CT chest with and without contrast  Pending results, consider consult to pulmonology versus IR for biopsy  "

## 2024-12-03 NOTE — PLAN OF CARE
Problem: INFECTION - ADULT  Goal: Absence or prevention of progression during hospitalization  Description: INTERVENTIONS:  - Assess and monitor for signs and symptoms of infection  - Monitor lab/diagnostic results  - Monitor all insertion sites, i.e. indwelling lines, tubes, and drains  - Monitor endotracheal if appropriate and nasal secretions for changes in amount and color  - [place] appropriate cooling/warming therapies per order  - Administer medications as ordered  - Instruct and encourage patient and family to use good hand hygiene technique  - Identify and instruct in appropriate isolation precautions for identified infection/condition  Outcome: Progressing     Problem: SAFETY ADULT  Goal: Patient will remain free of falls  Description: INTERVENTIONS:  - Educate patient/family on patient safety including physical limitations  - Instruct patient to call for assistance with activity   - Consult OT/PT to assist with strengthening/mobility   - Keep Call bell within reach  - Keep bed low and locked with side rails adjusted as appropriate  - Keep care items and personal belongings within reach  - Initiate and maintain comfort rounds  - Make Fall Risk Sign visible to staff  - Offer Toileting every 4 Hours, in advance of need  - Initiate/Maintain bed alarm  - Obtain necessary fall risk management equipment: walker/cane  - Apply yellow socks and bracelet for high fall risk patients  - Consider moving patient to room near nurses station  Outcome: Progressing     Problem: CARDIOVASCULAR - ADULT  Goal: Maintains optimal cardiac output and hemodynamic stability  Description: INTERVENTIONS:  - Monitor I/O, vital signs and rhythm  - Monitor for S/S and trends of decreased cardiac output  - Administer and titrate ordered vasoactive medications to optimize hemodynamic stability  - Assess quality of pulses, skin color and temperature  - Assess for signs of decreased coronary artery perfusion  - Instruct patient to report  change in severity of symptoms  Outcome: Progressing     Problem: RESPIRATORY - ADULT  Goal: Achieves optimal ventilation and oxygenation  Description: INTERVENTIONS:  - Assess for changes in respiratory status  - Assess for changes in mentation and behavior  - Position to facilitate oxygenation and minimize respiratory effort  - Oxygen administered by appropriate delivery if ordered  - Initiate smoking cessation education as indicated  - Encourage broncho-pulmonary hygiene including cough, deep breathe, Incentive Spirometry  - Assess the need for suctioning and aspirate as needed  - Assess and instruct to report SOB or any respiratory difficulty  - Respiratory Therapy support as indicated  Outcome: Progressing     Problem: Nutrition/Hydration-ADULT  Goal: Nutrient/Hydration intake appropriate for improving, restoring or maintaining nutritional needs  Description: Monitor and assess patient's nutrition/hydration status for malnutrition. Collaborate with interdisciplinary team and initiate plan and interventions as ordered.  Monitor patient's weight and dietary intake as ordered or per policy. Utilize nutrition screening tool and intervene as necessary. Determine patient's food preferences and provide high-protein, high-caloric foods as appropriate.     INTERVENTIONS:  - Monitor oral intake, urinary output, labs, and treatment plans  - Assess nutrition and hydration status and recommend course of action  - Evaluate amount of meals eaten  - Assist patient with eating if necessary   - Allow adequate time for meals  - Recommend/ encourage appropriate diets, oral nutritional supplements, and vitamin/mineral supplements  - Order, calculate, and assess calorie counts as needed  - Recommend, monitor, and adjust tube feedings and TPN/PPN based on assessed needs  - Assess need for intravenous fluids  - Provide specific nutrition/hydration education as appropriate  - Include patient/family/caregiver in decisions related to  nutrition  Outcome: Progressing     Problem: MUSCULOSKELETAL - ADULT  Goal: Maintain or return mobility to safest level of function  Description: INTERVENTIONS:  - Assess patient's ability to carry out ADLs; assess patient's baseline for ADL function and identify physical deficits which impact ability to perform ADLs (bathing, care of mouth/teeth, toileting, grooming, dressing, etc.)  - Assess/evaluate cause of self-care deficits   - Assess range of motion  - Assess patient's mobility  - Assess patient's need for assistive devices and provide as appropriate  - Encourage maximum independence but intervene and supervise when necessary  - Involve family in performance of ADLs  - Assess for home care needs following discharge   - Consider OT consult to assist with ADL evaluation and planning for discharge  - Provide patient education as appropriate  Outcome: Progressing  Goal: Maintain proper alignment of affected body part  Description: INTERVENTIONS:  - Support, maintain and protect limb and body alignment  - Provide patient/ family with appropriate education  Outcome: Progressing

## 2024-12-03 NOTE — ASSESSMENT & PLAN NOTE
Patient noted to have COVID; likely contributing to underlying syncope  Per most recent COVID guidelines, patient will be admitted on mild pathway; troponin negative; obtain CK, BNP, CRP  Patient is high risk; will initiate remdesivir 200 mg followed by 100 mg daily for 2 days  As per treatment guidelines, continue Xarelto at current dose  Isolation; supportive care  SpO2 currently 96% on room air; start respiratory protocol if oxygen required

## 2024-12-04 ENCOUNTER — TELEPHONE (OUTPATIENT)
Age: 74
End: 2024-12-04

## 2024-12-04 PROBLEM — D69.6 THROMBOCYTOPENIA (HCC): Status: ACTIVE | Noted: 2024-12-04

## 2024-12-04 LAB
ALBUMIN SERPL BCG-MCNC: 3.2 G/DL (ref 3.5–5)
ALP SERPL-CCNC: 39 U/L (ref 34–104)
ALT SERPL W P-5'-P-CCNC: 14 U/L (ref 7–52)
ANION GAP SERPL CALCULATED.3IONS-SCNC: 10 MMOL/L (ref 4–13)
AST SERPL W P-5'-P-CCNC: 24 U/L (ref 13–39)
BASOPHILS # BLD AUTO: 0.02 THOUSANDS/ÂΜL (ref 0–0.1)
BASOPHILS NFR BLD AUTO: 0 % (ref 0–1)
BILIRUB SERPL-MCNC: 0.68 MG/DL (ref 0.2–1)
BNP SERPL-MCNC: 82 PG/ML (ref 0–100)
BUN SERPL-MCNC: 14 MG/DL (ref 5–25)
CALCIUM ALBUM COR SERPL-MCNC: 8.8 MG/DL (ref 8.3–10.1)
CALCIUM SERPL-MCNC: 8.2 MG/DL (ref 8.4–10.2)
CHLORIDE SERPL-SCNC: 104 MMOL/L (ref 96–108)
CK SERPL-CCNC: 79 U/L (ref 39–308)
CO2 SERPL-SCNC: 23 MMOL/L (ref 21–32)
CREAT SERPL-MCNC: 0.7 MG/DL (ref 0.6–1.3)
CRP SERPL QL: 129.2 MG/L
EOSINOPHIL # BLD AUTO: 0.03 THOUSAND/ÂΜL (ref 0–0.61)
EOSINOPHIL NFR BLD AUTO: 1 % (ref 0–6)
ERYTHROCYTE [DISTWIDTH] IN BLOOD BY AUTOMATED COUNT: 13.3 % (ref 11.6–15.1)
GFR SERPL CREATININE-BSD FRML MDRD: 92 ML/MIN/1.73SQ M
GLUCOSE SERPL-MCNC: 115 MG/DL (ref 65–140)
GLUCOSE SERPL-MCNC: 116 MG/DL (ref 65–140)
GLUCOSE SERPL-MCNC: 117 MG/DL (ref 65–140)
GLUCOSE SERPL-MCNC: 145 MG/DL (ref 65–140)
GLUCOSE SERPL-MCNC: 147 MG/DL (ref 65–140)
HCT VFR BLD AUTO: 45.1 % (ref 36.5–49.3)
HGB BLD-MCNC: 14.4 G/DL (ref 12–17)
IMM GRANULOCYTES # BLD AUTO: 0.01 THOUSAND/UL (ref 0–0.2)
IMM GRANULOCYTES NFR BLD AUTO: 0 % (ref 0–2)
LYMPHOCYTES # BLD AUTO: 1.35 THOUSANDS/ÂΜL (ref 0.6–4.47)
LYMPHOCYTES NFR BLD AUTO: 23 % (ref 14–44)
MAGNESIUM SERPL-MCNC: 1.9 MG/DL (ref 1.9–2.7)
MCH RBC QN AUTO: 31.2 PG (ref 26.8–34.3)
MCHC RBC AUTO-ENTMCNC: 31.9 G/DL (ref 31.4–37.4)
MCV RBC AUTO: 98 FL (ref 82–98)
MONOCYTES # BLD AUTO: 0.84 THOUSAND/ÂΜL (ref 0.17–1.22)
MONOCYTES NFR BLD AUTO: 14 % (ref 4–12)
NEUTROPHILS # BLD AUTO: 3.74 THOUSANDS/ÂΜL (ref 1.85–7.62)
NEUTS SEG NFR BLD AUTO: 62 % (ref 43–75)
NRBC BLD AUTO-RTO: 0 /100 WBCS
PHOSPHATE SERPL-MCNC: 3.1 MG/DL (ref 2.3–4.1)
PLATELET # BLD AUTO: 125 THOUSANDS/UL (ref 149–390)
PMV BLD AUTO: 9.5 FL (ref 8.9–12.7)
POTASSIUM SERPL-SCNC: 4.4 MMOL/L (ref 3.5–5.3)
PROT SERPL-MCNC: 5.6 G/DL (ref 6.4–8.4)
RBC # BLD AUTO: 4.62 MILLION/UL (ref 3.88–5.62)
SODIUM SERPL-SCNC: 137 MMOL/L (ref 135–147)
WBC # BLD AUTO: 5.99 THOUSAND/UL (ref 4.31–10.16)

## 2024-12-04 PROCEDURE — 99233 SBSQ HOSP IP/OBS HIGH 50: CPT | Performed by: INTERNAL MEDICINE

## 2024-12-04 PROCEDURE — 82550 ASSAY OF CK (CPK): CPT

## 2024-12-04 PROCEDURE — 83735 ASSAY OF MAGNESIUM: CPT | Performed by: INTERNAL MEDICINE

## 2024-12-04 PROCEDURE — 82948 REAGENT STRIP/BLOOD GLUCOSE: CPT

## 2024-12-04 PROCEDURE — 85025 COMPLETE CBC W/AUTO DIFF WBC: CPT | Performed by: INTERNAL MEDICINE

## 2024-12-04 PROCEDURE — 84100 ASSAY OF PHOSPHORUS: CPT | Performed by: INTERNAL MEDICINE

## 2024-12-04 PROCEDURE — 97167 OT EVAL HIGH COMPLEX 60 MIN: CPT

## 2024-12-04 PROCEDURE — 86140 C-REACTIVE PROTEIN: CPT

## 2024-12-04 PROCEDURE — 83880 ASSAY OF NATRIURETIC PEPTIDE: CPT

## 2024-12-04 PROCEDURE — 99222 1ST HOSP IP/OBS MODERATE 55: CPT | Performed by: PSYCHIATRY & NEUROLOGY

## 2024-12-04 PROCEDURE — 97163 PT EVAL HIGH COMPLEX 45 MIN: CPT

## 2024-12-04 PROCEDURE — 80053 COMPREHEN METABOLIC PANEL: CPT | Performed by: INTERNAL MEDICINE

## 2024-12-04 RX ORDER — LEVALBUTEROL INHALATION SOLUTION 1.25 MG/3ML
1.25 SOLUTION RESPIRATORY (INHALATION) EVERY 6 HOURS PRN
Status: DISCONTINUED | OUTPATIENT
Start: 2024-12-04 | End: 2024-12-06 | Stop reason: HOSPADM

## 2024-12-04 RX ORDER — LEVALBUTEROL INHALATION SOLUTION 1.25 MG/3ML
1.25 SOLUTION RESPIRATORY (INHALATION)
Status: DISCONTINUED | OUTPATIENT
Start: 2024-12-04 | End: 2024-12-04

## 2024-12-04 RX ORDER — MAGNESIUM SULFATE HEPTAHYDRATE 40 MG/ML
2 INJECTION, SOLUTION INTRAVENOUS ONCE
Status: COMPLETED | OUTPATIENT
Start: 2024-12-04 | End: 2024-12-04

## 2024-12-04 RX ORDER — FUROSEMIDE 10 MG/ML
40 INJECTION INTRAMUSCULAR; INTRAVENOUS ONCE
Status: COMPLETED | OUTPATIENT
Start: 2024-12-04 | End: 2024-12-04

## 2024-12-04 RX ADMIN — METOPROLOL SUCCINATE 25 MG: 25 TABLET, EXTENDED RELEASE ORAL at 08:01

## 2024-12-04 RX ADMIN — MAGNESIUM SULFATE HEPTAHYDRATE 2 G: 40 INJECTION, SOLUTION INTRAVENOUS at 09:53

## 2024-12-04 RX ADMIN — RIVAROXABAN 20 MG: 20 TABLET, FILM COATED ORAL at 16:51

## 2024-12-04 RX ADMIN — DOCUSATE SODIUM 100 MG: 100 CAPSULE, LIQUID FILLED ORAL at 08:01

## 2024-12-04 RX ADMIN — FINASTERIDE 5 MG: 5 TABLET, FILM COATED ORAL at 21:16

## 2024-12-04 RX ADMIN — PANTOPRAZOLE SODIUM 40 MG: 40 TABLET, DELAYED RELEASE ORAL at 05:31

## 2024-12-04 RX ADMIN — REMDESIVIR 100 MG: 100 INJECTION, POWDER, LYOPHILIZED, FOR SOLUTION INTRAVENOUS at 15:04

## 2024-12-04 RX ADMIN — FUROSEMIDE 40 MG: 10 INJECTION, SOLUTION INTRAVENOUS at 11:42

## 2024-12-04 RX ADMIN — DOCUSATE SODIUM 100 MG: 100 CAPSULE, LIQUID FILLED ORAL at 17:02

## 2024-12-04 RX ADMIN — CYANOCOBALAMIN TAB 500 MCG 250 MCG: 500 TAB at 08:01

## 2024-12-04 RX ADMIN — PRAVASTATIN SODIUM 40 MG: 40 TABLET ORAL at 08:01

## 2024-12-04 NOTE — PLAN OF CARE
Problem: Potential for Falls  Goal: Patient will remain free of falls  Description: INTERVENTIONS:  - Educate patient/family on patient safety including physical limitations  - Instruct patient to call for assistance with activity   - Consult OT/PT to assist with strengthening/mobility   - Keep Call bell within reach  - Keep bed low and locked with side rails adjusted as appropriate  - Keep care items and personal belongings within reach  - Initiate and maintain comfort rounds  - Make Fall Risk Sign visible to staff  - Offer Toileting every 2 Hours, in advance of need  - Initiate/Maintain Bed alarm  - Obtain necessary fall risk management equipment  - Apply yellow socks and bracelet for high fall risk patients  - Consider moving patient to room near nurses station  Outcome: Progressing     Problem: Prexisting or High Potential for Compromised Skin Integrity  Goal: Skin integrity is maintained or improved  Description: INTERVENTIONS:  - Identify patients at risk for skin breakdown  - Assess and monitor skin integrity  - Assess and monitor nutrition and hydration status  - Monitor labs   - Assess for incontinence   - Turn and reposition patient  - Assist with mobility/ambulation  - Relieve pressure over bony prominences  - Avoid friction and shearing  - Provide appropriate hygiene as needed including keeping skin clean and dry  - Evaluate need for skin moisturizer/barrier cream  - Collaborate with interdisciplinary team   - Patient/family teaching  - Consider wound care consult   Outcome: Progressing     Problem: PAIN - ADULT  Goal: Verbalizes/displays adequate comfort level or baseline comfort level  Description: Interventions:  - Encourage patient to monitor pain and request assistance  - Assess pain using appropriate pain scale  - Administer analgesics based on type and severity of pain and evaluate response  - Implement non-pharmacological measures as appropriate and evaluate response  - Consider cultural and  social influences on pain and pain management  - Notify physician/advanced practitioner if interventions unsuccessful or patient reports new pain  Outcome: Progressing     Problem: INFECTION - ADULT  Goal: Absence or prevention of progression during hospitalization  Description: INTERVENTIONS:  - Assess and monitor for signs and symptoms of infection  - Monitor lab/diagnostic results  - Monitor all insertion sites, i.e. indwelling lines, tubes, and drains  - Monitor endotracheal if appropriate and nasal secretions for changes in amount and color  - [place] appropriate cooling/warming therapies per order  - Administer medications as ordered  - Instruct and encourage patient and family to use good hand hygiene technique  - Identify and instruct in appropriate isolation precautions for identified infection/condition  Outcome: Progressing     Problem: SAFETY ADULT  Goal: Patient will remain free of falls  Description: INTERVENTIONS:  - Educate patient/family on patient safety including physical limitations  - Instruct patient to call for assistance with activity   - Consult OT/PT to assist with strengthening/mobility   - Keep Call bell within reach  - Keep bed low and locked with side rails adjusted as appropriate  - Keep care items and personal belongings within reach  - Initiate and maintain comfort rounds  - Make Fall Risk Sign visible to staff  - Offer Toileting every  Hours, in advance of need  - Initiate/Maintain alarm  - Obtain necessary fall risk management equipment:  - Apply yellow socks and bracelet for high fall risk patients  - Consider moving patient to room near nurses station  Outcome: Progressing     Problem: DISCHARGE PLANNING  Goal: Discharge to home or other facility with appropriate resources  Description: INTERVENTIONS:  - Identify barriers to discharge w/patient and caregiver  - Arrange for needed discharge resources and transportation as appropriate  - Identify discharge learning needs (meds,  wound care, etc.)  - Arrange for interpretive services to assist at discharge as needed  - Refer to Case Management Department for coordinating discharge planning if the patient needs post-hospital services based on physician/advanced practitioner order or complex needs related to functional status, cognitive ability, or social support system  Outcome: Progressing     Problem: Knowledge Deficit  Goal: Patient/family/caregiver demonstrates understanding of disease process, treatment plan, medications, and discharge instructions  Description: Complete learning assessment and assess knowledge base.  Interventions:  - Provide teaching at level of understanding  - Provide teaching via preferred learning methods  Outcome: Progressing     Problem: Nutrition/Hydration-ADULT  Goal: Nutrient/Hydration intake appropriate for improving, restoring or maintaining nutritional needs  Description: Monitor and assess patient's nutrition/hydration status for malnutrition. Collaborate with interdisciplinary team and initiate plan and interventions as ordered.  Monitor patient's weight and dietary intake as ordered or per policy. Utilize nutrition screening tool and intervene as necessary. Determine patient's food preferences and provide high-protein, high-caloric foods as appropriate.     INTERVENTIONS:  - Monitor oral intake, urinary output, labs, and treatment plans  - Assess nutrition and hydration status and recommend course of action  - Evaluate amount of meals eaten  - Assist patient with eating if necessary   - Allow adequate time for meals  - Recommend/ encourage appropriate diets, oral nutritional supplements, and vitamin/mineral supplements  - Order, calculate, and assess calorie counts as needed  - Recommend, monitor, and adjust tube feedings and TPN/PPN based on assessed needs  - Assess need for intravenous fluids  - Provide specific nutrition/hydration education as appropriate  - Include patient/family/caregiver in  decisions related to nutrition  Outcome: Progressing     Problem: CARDIOVASCULAR - ADULT  Goal: Maintains optimal cardiac output and hemodynamic stability  Description: INTERVENTIONS:  - Monitor I/O, vital signs and rhythm  - Monitor for S/S and trends of decreased cardiac output  - Administer and titrate ordered vasoactive medications to optimize hemodynamic stability  - Assess quality of pulses, skin color and temperature  - Assess for signs of decreased coronary artery perfusion  - Instruct patient to report change in severity of symptoms  Outcome: Progressing     Problem: RESPIRATORY - ADULT  Goal: Achieves optimal ventilation and oxygenation  Description: INTERVENTIONS:  - Assess for changes in respiratory status  - Assess for changes in mentation and behavior  - Position to facilitate oxygenation and minimize respiratory effort  - Oxygen administered by appropriate delivery if ordered  - Initiate smoking cessation education as indicated  - Encourage broncho-pulmonary hygiene including cough, deep breathe, Incentive Spirometry  - Assess the need for suctioning and aspirate as needed  - Assess and instruct to report SOB or any respiratory difficulty  - Respiratory Therapy support as indicated  Outcome: Progressing     Problem: GASTROINTESTINAL - ADULT  Goal: Maintains or returns to baseline bowel function  Description: INTERVENTIONS:  - Assess bowel function  - Encourage oral fluids to ensure adequate hydration  - Administer IV fluids if ordered to ensure adequate hydration  - Administer ordered medications as needed  - Encourage mobilization and activity  - Consider nutritional services referral to assist patient with adequate nutrition and appropriate food choices  Outcome: Progressing     Problem: GENITOURINARY - ADULT  Goal: Maintains or returns to baseline urinary function  Description: INTERVENTIONS:  - Assess urinary function  - Encourage oral fluids to ensure adequate hydration if ordered  - Administer  IV fluids as ordered to ensure adequate hydration  - Administer ordered medications as needed  - Offer frequent toileting  - Follow urinary retention protocol if ordered  Outcome: Progressing     Problem: MUSCULOSKELETAL - ADULT  Goal: Maintain or return mobility to safest level of function  Description: INTERVENTIONS:  - Assess patient's ability to carry out ADLs; assess patient's baseline for ADL function and identify physical deficits which impact ability to perform ADLs (bathing, care of mouth/teeth, toileting, grooming, dressing, etc.)  - Assess/evaluate cause of self-care deficits   - Assess range of motion  - Assess patient's mobility  - Assess patient's need for assistive devices and provide as appropriate  - Encourage maximum independence but intervene and supervise when necessary  - Involve family in performance of ADLs  - Assess for home care needs following discharge   - Consider OT consult to assist with ADL evaluation and planning for discharge  - Provide patient education as appropriate  Outcome: Progressing  Goal: Maintain proper alignment of affected body part  Description: INTERVENTIONS:  - Support, maintain and protect limb and body alignment  - Provide patient/ family with appropriate education  Outcome: Progressing

## 2024-12-04 NOTE — PROGRESS NOTES
Progress Note - Hospitalist   Name: Les Malone 74 y.o. male I MRN: [id number]  Unit/Bed#: [place] [address] -01 I Date of Admission: 12/3/2024   Date of Service: 12/4/2024 I Hospital Day: 1     Assessment & Plan  Syncope  Patient presented after a fall at home, his aide reports that he was getting out of the shower and then his eyes rolled back and he syncopized, he was lowered to a chair so had no head strike, trauma workup on arrival was negative  Later found to be COVID-positive, this is possibly the cause of syncope  Would also consider cardiac etiology, though pacer interrogation on arrival without any events, telemetry does show frequent PVCs and a significant run of NSVT though it is possible that this is secondary to acute COVID as well  Obtain orthostatic vitals, no clear evidence of volume depletion especially given edema and rales suggesting mild volume overload, though could have some autonomic dysfunction  Neurology consulted, given significant history of CVAs would maintain concern for this though no findings to explain such on imaging on arrival, CTA head did show left vertebral artery abnormality which was nonspecific and could represent occlusion versus stenosis versus dissection though unclear that this would explain syncope  Essential hypertension  Continue home Toprol 25 mg daily  Hyperlipidemia  Continue home statin  Persistent atrial fibrillation (HCC)  Patient with history of persistent atrial fibrillation  Pacer interrogation on arrival showing 100% in A-fib, persistent on telemetry as well with rates controlled  Continue home Toprol for rate control and Xarelto for anticoagulation  Chronic diastolic CHF (congestive heart failure) (HCC)  Wt Readings from Last 3 Encounters:   12/03/24 91.6 kg (202 lb)   11/19/24 91.9 kg (202 lb 11.2 oz)   11/07/24 90.7 kg (200 lb)     Echo this admission showing EF 55% with normal systolic function, mild LA and RA dilation, mild AI, and mild TR  Saturating  well on room air, does not appear to be in acute exacerbation  Continue home beta-blocker & statin  Mild edema in extremities and rales on exam, possibly after fluids given from admission, trial 40 mg IV Lasix today and monitor response and adjust diuretics as needed  History of stroke  Patient with history of stroke and residual dysphagia, word finding difficulty, LUE and LLE paralysis  Requires assistance at home with home health aides for ADLs  Continue home statin and anticoagulation for A-fib  PT/OT/speech, fall precautions  Type 2 diabetes mellitus without complication, unspecified whether long term insulin use (HCC)  Lab Results   Component Value Date    HGBA1C 5.7 (H) 12/03/2024       Recent Labs     12/03/24  1625 12/03/24  2124 12/04/24  0740 12/04/24  1113   POCGLU 89 114 115 147*     Controlled, not on medications at home  Monitor BG and start SSI if necessary  Carb controlled diet  Cardiac pacemaker in situ  Pacer interrogation as in principal problem  Lung nodule seen on imaging study  Initial CTA head and neck noted 1.7 cm spiculated nodule in GOMEZ suspicious for malignancy  Follow-up CT chest noted 11 mm opacity in GOMEZ which probably represents infectious/inflammatory etiology, recommend follow-up CT chest in approximately 3 months to ensure not concerning for malignancy  COVID  Patient noted to have COVID; likely contributing to underlying syncope  Per most recent COVID guidelines, patient will be admitted on mild pathway; troponin negative; follow-up CK, BNP, CRP  Patient is high risk; will initiate remdesivir 200 mg followed by 100 mg daily for 2 days  As per treatment guidelines, continue Xarelto at current dose  Isolation; supportive care  Currently saturating well on room air and not complaining of shortness of breath  Does have some wheezing on exam so we will start respiratory protocol and scheduled nebs  Thrombocytopenia (HCC)  Platelet count in 120s, previously normal though under  "200  Likely secondary to acute COVID infection, continue to monitor    24 Hour Events : Admitted overnight, see H&P.  Since admission has remained stable on room air.  Telemetry showing run of NSVT this morning, but has still remained HD stable.  Subjective : Patient feeling overall \"rundown\" with fatigue.  Continues to have dry cough.  Denies SOB, chest pain, abdominal pain, nausea/vomiting, fevers.    Called patient's wife Elvia to update them on patient's current condition and plan of care. All of their questions were answered to their satisfaction and they are appreciative of call.       Objective :  Temp:  [97.7 °F (36.5 °C)-99.6 °F (37.6 °C)] 97.7 °F (36.5 °C)  HR:  [64-78] 74  BP: (121-150)/(60-89) 137/72  Resp:  [12-22] 17  SpO2:  [96 %-99 %] 97 %  O2 Device: None (Room air)    Physical Exam    Lab Results: I have reviewed the following results:CBC/BMP:   .     12/04/24  0456   WBC 5.99   HGB 14.4   HCT 45.1   *   SODIUM 137   K 4.4      CO2 23   BUN 14   CREATININE 0.70   GLUC 116   MG 1.9   PHOS 3.1        Imaging Results Review: I personally reviewed the following image studies/reports in PACS and discussed pertinent findings with Radiology: CT chest and CT head. My interpretation of the radiology images/reports is: CTA H/N with L vertebral artery abnormality possibly representing occlusion vs stenosis vs dissection, unclear if this is related to current presentation; overall unremarkable CT chest w/o pneumonia or edema, nodule as noted which will require outpatient follow up.  Other Study Results Review: Other studies reviewed include: echocardiogram    VTE Pharmacologic Prophylaxis: VTE covered by:  rivaroxaban, Oral, 20 mg at 12/03/24 1730      VTE Mechanical Prophylaxis: sequential compression device    Administrative Statements   I have spent a total time of 60 minutes in caring for this patient on the day of the visit/encounter including Diagnostic results, Instructions for management, " Patient and family education, Impressions, Counseling / Coordination of care, Documenting in the medical record, Reviewing / ordering tests, medicine, procedures  , Obtaining or reviewing history  , and Communicating with other healthcare professionals .

## 2024-12-04 NOTE — ASSESSMENT & PLAN NOTE
Platelet count in 120s, previously normal though under 200  Likely secondary to acute COVID infection, continue to monitor

## 2024-12-04 NOTE — ASSESSMENT & PLAN NOTE
Lab Results   Component Value Date    HGBA1C 5.7 (H) 12/03/2024       Recent Labs     12/03/24  1625 12/03/24  2124 12/04/24  0740 12/04/24  1113   POCGLU 89 114 115 147*     Controlled, not on medications at home  Monitor BG and start SSI if necessary  Carb controlled diet

## 2024-12-04 NOTE — CASE MANAGEMENT
Case Management Assessment & Discharge Planning Note    Patient name Les Malone  Location [facility] M* MRN [id number]  : 1950 Date 2024       Current Admission Date: 12/3/2024  Current Admission Diagnosis:Syncope   Patient Active Problem List    Diagnosis Date Noted Date Diagnosed    Thrombocytopenia (HCC) 2024     Lung nodule seen on imaging study 2024     COVID 2024     Cardiac pacemaker in situ 2024     Type 2 diabetes mellitus without complication, unspecified whether long term insulin use ([facility]) 2023     Abdominal pain 2023     Superior mesenteric artery stenosis ([facility]) 2023     History of gross hematuria 10/12/2021     BPH with obstruction/lower urinary tract symptoms 10/12/2021     Bladder stone 10/12/2021     Bilateral carotid artery stenosis 2021     Syncope 2020     Nonintractable epilepsy without status epilepticus ([facility]) 2020     Fatigue 10/25/2019     History of stroke 2019     Acute leg pain, right 2019     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     Hypernatremia 2019     Acute blood loss anemia 2019     Acute kidney injury ([facility]) 2019     Chronic diastolic CHF (congestive heart failure) ([facility]) 2019     Persistent atrial fibrillation ([facility]) 2019     Dysphagia 2019     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  ([facility]) 2019     Left hemiplegia ([facility]) 2019     Prediabetes 11/15/2018     Essential hypertension 2018     Hypokalemia 2018     Elevated brain natriuretic peptide (BNP) level 2018     Hx of right knee surgery 2018     Hyperlipidemia 2018       LOS (days): 1  Geometric Mean LOS (GMLOS) (days): 3.2  Days to GMLOS:2.1     OBJECTIVE:    Risk of Unplanned Readmission Score: 17.22         Current admission status: Inpatient       Preferred Pharmacy:   [id number] - [place] - [address] [place] [address]  [facility] [address]  Phone: [phone] Fax: [phone]    Primary Care Provider: Ralph Roberts MD    Primary Insurance: MEDICARE  Secondary Insurance: BLUE CROSS    ASSESSMENT:  Active Health Care Proxies       Elvia Malone Health Care Agent - Spouse   Primary Phone: [phone] (Mobile)  Home Phone: [phone]                 Advance Directives  Does patient have a Health Care POA?: Yes (in EHR 4/28/2020)  Does patient have Advance Directives?: Yes (in EHR 4/28/2020)  Advance Directives: Power of  for health care, Living will  Primary Contact: Elvia Malone- wife         Readmission Root Cause  30 Day Readmission: No    Patient Information  Admitted from:: Home  Mental Status: Alert  During Assessment patient was accompanied by: Spouse  Assessment information provided by:: Spouse (via phone)  Support Systems: Spouse/significant other, Daughter, Private Caregivers  County of Residence: [place]  What city do you live in?: [place]  Home entry access options. Select all that apply.: Stairs  Number of steps to enter home.: 1 (1/2 step through garage)  Do the steps have railings?: No  Type of Current Residence: [facility]  Living Arrangements: Lives w/ Spouse/significant other (daughter lives next door)  Is patient a ?: No    Activities of Daily Living Prior to Admission  Functional Status: Assistance  Completes ADLs independently?: No  Level of ADL dependence: Assistance (pt able to assist with rinsing off with wife performing actual washing up)  Ambulates independently?: Yes  Does patient use assisted devices?: Yes  Assisted Devices (DME) used: Shower Chair, Quad Cane, Other (Comment) (transport chair, raised toilet with grab bars around same and in walk in shower; LLE Brace (goes up to knee) needed for transfers and ambulation)  Does patient currently own DME?: Yes  What DME does the patient currently own?: Wheelchair, Quad Cane, Walker, Shower Chair, Other  (Comment), Bedside Commode (transport chair, LLE Brace (goes up to knee and needed for transfers/ambulation))  Does patient have a history of Outpatient Therapy (PT/OT)?: Yes  Does the patient have a history of Short-Term Rehab?: Yes  Does patient have a history of HHC?: No  Does patient currently have HHC?: Yes    Current Home Health Care  Type of Current Home Care Services: Home health aide (Private paid caregiver as needed up to 8hrs/wk)    Patient Information Continued  Income Source: SSI/SSD  Does patient have prescription coverage?: Yes (Uses [employer] on [address])  Does patient have a history of substance abuse?: No  Does patient have a history of Mental Health Diagnosis?: No         Means of Transportation  Means of Transport to Appts:: Family transport          DISCHARGE DETAILS:    Discharge planning discussed with:: pts wife        CM contacted family/caregiver?: Yes  Were Treatment Team discharge recommendations reviewed with patient/caregiver?: Yes  Did patient/caregiver verbalize understanding of patient care needs?: Yes  Were patient/caregiver advised of the risks associated with not following Treatment Team discharge recommendations?: Yes (Discussed at length current therapy recommendations however wife concerned pt was unable to perform as he needs his leg brace in order to transfer/ambulate.  Request for PT/OT to re-eval now that brace in room so to make informed decision on dispo.)    Contacts  Patient Contacts: Elvia Malone  Relationship to Patient:: Family  Contact Method: Phone  Phone Number: [phone]  Reason/Outcome: Continuity of Care, Emergency Contact, Discharge Planning         DME Referral Provided  Referral made for DME?: No         Would you like to participate in our [facility] service program?  : No - Declined    Treatment Team Recommendation: Short Term Rehab  Discharge Destination Plan::  (TBD pending re-eval by therapy w/ leg brace)

## 2024-12-04 NOTE — PLAN OF CARE
Problem: OCCUPATIONAL THERAPY ADULT  Goal: Performs self-care activities at highest level of function for planned discharge setting.  See evaluation for individualized goals.  Description: Treatment Interventions: ADL retraining, Functional transfer training, UE strengthening/ROM, Endurance training, Cognitive reorientation, Patient/family training, Compensatory technique education, Continued evaluation          See flowsheet documentation for full assessment, interventions and recommendations.   Note: Limitation: Decreased ADL status, Decreased UE ROM, Decreased UE strength, Decreased Safe judgement during ADL, Decreased cognition, Decreased endurance, Decreased high-level ADLs, Non-func L UE  Prognosis: Fair  Assessment: Pt is a 75y/o male admitted to the hospital after having a syncopal episode at home. Pt noted with A-fib and COVID. Pt with PMH CHF, CAD, CVA--L residual weakness, DM, R knee sx. PTA wife states pt had assistance with his ADLs, transfer, ambulation--with QC; neg falls, neg home alone, neg . During initial eval, pt demonstrated deficits with his functional balance, functional mobility, ADL status, transfer safety, L UE ROM/strength, activity tolerance(currently fair=15-20mins). Pt would benefit from continued OT tx for the above deficits. 2-5xwk/1-2wks. The patient's raw score on the AM-PAC Daily Activity Inpatient Short Form is 14. A raw score of less than 19 suggests the patient may benefit from discharge to post-acute rehabilitation services. Please refer to the recommendation of the Occupational Therapist for safe discharge planning.     Rehab Resource Intensity Level, OT: I (Maximum Resource Intensity)

## 2024-12-04 NOTE — ASSESSMENT & PLAN NOTE
Patient noted to have COVID; likely contributing to underlying syncope  Per most recent COVID guidelines, patient will be admitted on mild pathway; troponin negative; follow-up CK, BNP, CRP  Patient is high risk; will initiate remdesivir 200 mg followed by 100 mg daily for 2 days  As per treatment guidelines, continue Xarelto at current dose  Isolation; supportive care  Currently saturating well on room air and not complaining of shortness of breath  Does have some wheezing on exam so we will start respiratory protocol and scheduled nebs

## 2024-12-04 NOTE — CONSULTS
"Consultation - Neurology   Name: Les Malone 74 y.o. male I MRN: [id number]  Unit/Bed#: [place] [address] -01 I Date of Admission: 12/3/2024   Date of Service: 12/4/2024 I Hospital Day: 1   Inpatient consult to Neurology  Consult performed by: Anmol Serna PA-C  Consult ordered by: Willy Perez MD        Physician Requesting Evaluation: David Silverio DO   Reason for Evaluation / Principal Problem: Syncope    Assessment & Plan  Syncope  74 year old with past medical history of dysphagia, hypertension, type 2 diabetes, atrial fibrillation on Xarelto, CVA, hyperlipidemia, pacemaker who presented with an episode of syncope while at his residence.  Per initial HPI is reported the patient is a poor historian however by ED staff patient was being assisted with showering by his wife and caregiving at home when he had a syncopal episode.  It was reported that he was lowered to the chair without head strike and later presented to the ED for workup.  Patient was found to have COVID in the ED and was admitted to the hospital for syncopal workup.  CTA of head and neck was completed as below with his hx of prior stroke.     VS -on arrival patient's was afebrile respirations were 16 blood pressure was 143/70 mmhg.    Labs/Testing reviewed as below (pertinent).   COVID-positive  CBC with a platelet of 121L  A1c 5.7 high  UA negative for nitrates or leukocytes  CMP with a low calcium low total protein and albumin    CTA of head and neck -  \"No acute intracranial hemorrhage, significant mass effect or new midline shift. Extensive sequela of prior right ROB/MCA territory infarcts.When compared to CTA head and neck dated 12/27/2019, there is new diminutive opacification/occlusion of the intradural left vertebral artery, which could reflect occlusion, dissection and/or high-grade stenosis.There is a 1.7 cm spiculated nodule in the left upper lobe, which is suspicious for primary lung malignancy. Partially evaluated " "mediastinal lymphadenopathy. Recommend further evaluation with chest CT.Moderate-marked mucosal thickening of the paranasal sinuses, noting intrinsically hyperdense material, which could represent inspissated secretions or fungal elements.Multifocal vascular stenoses, as detailed above.\"    CT of chest - \"No convincing CT evidence for COVID-pneumonia. Mild dependent atelectatic changes and/or sequela of bronchiolitis given lower lobe bronchial wall thickening.11 mm V shaped opacity in the left upper lobe with associated nodularity is indeterminate but probably infectious/inflammatory. Follow-up CT chest in 3 months recommended to reevaluate this finding to exclude early malignancy\"    Echo - EF 55%, bilateral atrium mildly dilated    Syncope -patient presents from home after wife and home health care provider noticed he syncopized during shower lower to chair without head strike -etiology unclear but this may be vasovagal/neurocardiogenic possibly orthostatic related given recent covid infection, rule out cardiac etiology as well.  No evidence to suspect seizure or new ischemic stroke at this time (no reported seizure-like activity or new focality)  Hx of prior stroke on CTA right ROB/MCA stroke sequela, \"...When compared to CTA head and neck dated 12/27/2019, there is new diminutive opacification/occlusion of the intradural left vertebral artery, which could reflect occlusion, dissection and/or high-grade stenosis..\"  Patient is back to his baseline with no evidence to suspect new stroke.   Afib on xarelto  Cardiac pacemaker  Covid +    -Continue home Xarelto for secondary stroke prevention  -No need for further neurological imaging at this time  -Continue to monitor and workup his episode of syncope checking orthostatics, consider pacemaker interrogation, telemetry echo completed as above  -Continue to treat underlying metabolic/infectious derangements per medicine team, COVID positive and management/treatment per " "medicine team  -Frequent neurological checks notify neurology with any changes in neurological exam  -Delirium precautions, fall precautions, limit CNS affecting medications  -Reviewed with attending plan of care per attending, please see attestation for details.   Persistent atrial fibrillation (HCC)  As above  History of stroke  As above  Cardiac pacemaker in situ  Has a pacemaker  COVID  Management per primary team    Les Malone will need follow-up in in 6 weeks with neurovascular team for Other in 60 minute appointment. They will not require outpatient neurological testing.   The patient follows with Dr. Puldio and Tonio VILLARREAL    History of Present Illness   Les Malone is a 74 y.o. with past medical history of dysphagia, hypertension, type 2 diabetes, atrial fibrillation on Xarelto, CVA, hyperlipidemia, pacemaker who presented with an episode of syncope while at his residence.  Per initial HPI is reported the patient is a poor historian however by ED staff patient was being assisted with showering by his wife and caregiving at home when he had a syncopal episode.  It was reported that he was lowered to the chair without head strike and later presented to the ED for workup.  Patient was found to have COVID in the ED and was admitted to the hospital for syncopal workup.  The patient has a history of CVA a CTA of the head and neck was completed -  \"No acute intracranial hemorrhage, significant mass effect or new midline shift. Extensive sequela of prior right ROB/MCA territory infarcts. When compared to CTA head and neck dated 12/27/2019, there is new diminutive opacification/occlusion of the intradural left vertebral artery, which could reflect occlusion, dissection and/or high-grade stenosis.There is a 1.7 cm spiculated nodule in the left upper lobe, which is suspicious for primary lung malignancy. Partially evaluated mediastinal lymphadenopathy. Recommend further evaluation with chest CT. " "Moderate-marked mucosal thickening of the paranasal sinuses, noting intrinsically hyperdense material, which could represent inspissated secretions or fungal elements.Multifocal vascular stenoses, as detailed above.\"  It was reported by the ED staff that neurosurgery was contacted for guidance on further workup and management for the above finding, there was reported neurosurgery stated there was no indication for intervention or follow-up from their perspective.  Neurology was consulted for the the CTA findings.     VS -on arrival patient's was afebrile respirations were 16 blood pressure was 143/70 mmhg.    Labs/Testing reviewed as below (pertinent).   COVID-positive  CBC with a platelet of 121L  A1c 5.7 high  UA negative for nitrates or leukocytes  CMP with a low calcium low total protein and albumin    CTA of head and neck -  \"No acute intracranial hemorrhage, significant mass effect or new midline shift. Extensive sequela of prior right ROB/MCA territory infarcts.When compared to CTA head and neck dated 12/27/2019, there is new diminutive opacification/occlusion of the intradural left vertebral artery, which could reflect occlusion, dissection and/or high-grade stenosis.There is a 1.7 cm spiculated nodule in the left upper lobe, which is suspicious for primary lung malignancy. Partially evaluated mediastinal lymphadenopathy. Recommend further evaluation with chest CT.Moderate-marked mucosal thickening of the paranasal sinuses, noting intrinsically hyperdense material, which could represent inspissated secretions or fungal elements.Multifocal vascular stenoses, as detailed above.\"    CT of chest - \"No convincing CT evidence for COVID-pneumonia. Mild dependent atelectatic changes and/or sequela of bronchiolitis given lower lobe bronchial wall thickening.11 mm V shaped opacity in the left upper lobe with associated nodularity is indeterminate but probably infectious/inflammatory. Follow-up CT chest in 3 months " "recommended to reevaluate this finding to exclude early malignancy\"    Per wife -patient was in the shower when all of a sudden became pale and clammy he passed out/lost consciousness he was lowered to the chair he did not fall or hit his head, there is her eyes rolled in the back of his head but there was no other seizure-like activity, no tongue bite or bowel or bladder incontinence no tremor.  While on the chair he regained consciousness, per witness no seizure activity, no gross new focal abnormality reported.  Currently he is back to his baseline neurologically with no new deficits per wife.  ROS is limited but patient denies any new complaints, he does have chronic left sided deficits from his prior stroke.     The patient does follow with Deandre Elizalde/Dr. Pulido of neurology in the stroke clinic, reviewed prior notes.  Please refer to notes for details.     Review of Systems   12 point ROS as per HPI.   I have reviewed the patient's PMH, PSH, Social History, Family History, Meds, and Allergies  Historical Information   Past Medical History:   Diagnosis Date    Acute encephalopathy 05/27/2019    Arthritis     BL knees    Atrial fibrillation (HCC)     CHF (congestive heart failure) (HCC)     Colon polyp     Coronary artery disease     CVA (cerebral vascular accident) (HCC) 04/27/2019    NIHSS 26 on presentation    Depression     Diabetes mellitus (HCC)     borderline    Encephalopathy acute 04/28/2019    History of transfusion 04/2019    Hyperlipidemia     Irregular heart beat     Afib    Stroke (HCC) 04/27/2019    Left sided weakness-aphasia    Urinary retention     Urinary retention 06/04/2019     Past Surgical History:   Procedure Laterality Date    CARDIAC ELECTROPHYSIOLOGY PROCEDURE Left 6/20/2023    Procedure: Cardiac pacer implant;  Surgeon: Marquis Haney DO;  Location: [facility];  Service: Cardiology    COLONOSCOPY      IR STROKE ALERT  4/27/2019    MENISCECTOMY Right     AZ CYSTO INSERTION " TRANSPROSTATIC IMPLANT SINGLE N/A 11/19/2021    Procedure: CYSTOSCOPY WITH INSERTION UROLIFT;  Surgeon: Jack Christopher MD;  Location: [facility];  Service: Urology     Social History     Tobacco Use    Smoking status: Never    Smokeless tobacco: Never   Vaping Use    Vaping status: Never Used   Substance and Sexual Activity    Alcohol use: Not Currently     Comment: social    Drug use: Never    Sexual activity: Yes     Partners: Female     E-Cigarette/Vaping    E-Cigarette Use Never User      E-Cigarette/Vaping Substances    Nicotine No     THC No     CBD No     Flavoring No     Other No     Unknown No      Family History   Problem Relation Age of Onset    Cancer Mother     Hypertension Mother      Social History     Tobacco Use    Smoking status: Never    Smokeless tobacco: Never   Vaping Use    Vaping status: Never Used   Substance and Sexual Activity    Alcohol use: Not Currently     Comment: social    Drug use: Never    Sexual activity: Yes     Partners: Female       Current Facility-Administered Medications:     acetaminophen (TYLENOL) tablet 650 mg, Q6H PRN    cyanocobalamin (VITAMIN B-12) tablet 250 mcg, Daily    docusate sodium (COLACE) capsule 100 mg, BID    finasteride (PROSCAR) tablet 5 mg, HS    insulin lispro (HumALOG/ADMELOG) 100 units/mL subcutaneous injection 1-5 Units, TID AC **AND** Fingerstick Glucose (POCT), TID AC    insulin lispro (HumALOG/ADMELOG) 100 units/mL subcutaneous injection 1-5 Units, HS    metoprolol succinate (TOPROL-XL) 24 hr tablet 25 mg, Daily    ondansetron (ZOFRAN) injection 4 mg, Q6H PRN    pantoprazole (PROTONIX) EC tablet 40 mg, Daily Before Breakfast    Pramox-PE-Glycerin-Petrolatum (PREPARATION H MAX) 1-0.25-14.4-15 % rectal cream, BID PRN    pravastatin (PRAVACHOL) tablet 40 mg, Daily    [COMPLETED] remdesivir (Veklury) 200 mg in sodium chloride 0.9 % 290 mL IVPB, Q24H, Last Rate: Stopped (12/03/24 8439) **FOLLOWED BY** remdesivir (Veklury) 100 mg in sodium chloride  0.9 % 270 mL IVPB, Q24H    rivaroxaban (XARELTO) tablet 20 mg, Daily With Dinner  Prior to Admission Medications   Prescriptions Last Dose Informant Patient Reported? Taking?   Coenzyme Q10 (CO Q 10) 10 MG CAPS  Spouse/Significant Other Yes Yes   Sig: Take by mouth daily   cyanocobalamin (VITAMIN B-12) 100 mcg tablet  Spouse/Significant Other Yes Yes   Sig: Take by mouth daily   finasteride (PROSCAR) 5 mg tablet  Spouse/Significant Other Yes Yes   Sig: Take 5 mg by mouth daily at bedtime     hydrocortisone (ANUSOL-HC) 2.5 % rectal cream Unknown Spouse/Significant Other No No   Sig: Apply topically 2 (two) times a day   hydrocortisone 1 % cream Unknown Spouse/Significant Other Yes No   Sig: Apply topically 2 (two) times a day as needed   hydrocortisone-pramoxine (ANALPRAM-HC) 2.5-1 % rectal cream Unknown Spouse/Significant Other No No   Sig: Apply topically 2 (two) times a day   ketoconazole (NIZORAL) 2 % shampoo Unknown Spouse/Significant Other Yes No   Sig: SHAMPOO 3 TIMES A WEEK AS A SCALP TREATMENT, LEAVE ON 5-10 MINUTES AND RINSE   metoprolol succinate (TOPROL-XL) 25 mg 24 hr tablet  Spouse/Significant Other No Yes   Sig: TAKE 1 TABLET BY MOUTH EVERY DAY   mupirocin (BACTROBAN) 2 % ointment Unknown Spouse/Significant Other Yes No   Sig: Apply 1 Application topically daily   pantoprazole (PROTONIX) 40 mg tablet  Spouse/Significant Other No Yes   Sig: Take 1 tablet (40 mg total) by mouth daily   pravastatin (PRAVACHOL) 40 mg tablet  Spouse/Significant Other Yes Yes   Sig: Take 40 mg by mouth daily   rivaroxaban (Xarelto) 20 mg tablet  Spouse/Significant Other Yes Yes   Sig: Take 20 mg by mouth daily with dinner        Facility-Administered Medications: None     Cephalexin and Penicillins    Objective :  Temp:  [98 °F (36.7 °C)-99.1 °F (37.3 °C)] 98 °F (36.7 °C)  HR:  [64-72] 67  BP: (112-149)/(60-78) 146/72  Resp:  [12-22] 20  SpO2:  [94 %-99 %] 97 %  O2 Device: None (Room air)    Vital Sign  reviewed  Constitutional - in NAD, sitting in chair cooperative and pleasant, limited verbal output  HEENT - NC/AT, no icterus noted, conjunctiva clear,   Cardiac - rate regular  Lungs -no obvious respiratory distress noted, occasional cough  Abdomen - Soft and non tender, non distended  Extremities - No edema noted  Skin - no rashes noted  Neurological  Mental status -the patient is awake and alert he does have hesitancy in his speech and aphasia, however he was able to tell me his name place year and president, he was slow to respond and needed extra cueing.  He had poor insight into current mental condition and was a poor historian.  He did slight decrease in attention and concentration.  He was able to follow some simple commands but more difficulty with complex cross body commands.  He was able to recognize some objects been about objects presented to him.   Cranial nerves 2 through 12 -pupils were equal and reactive, he did track side-to-side to examiner without gross gaze palsy or disconjugate gaze, there was a left facial asymmetry noted which is chronic  Motor -increased tone suspect spasticity of the left upper extremity, 1-2 out of 5 of the left upper extremity, 2 out of 5 of the left lower extremity.  Right upper extremity was a full power, right lower extremity was full power.   No tremor or fixed deficit noted  Rapid movements are equal bilaterally  Sensation - reported decrease in touch in the left upper compared to right, lowers symmetric.   Coordination - FTN intact on the right upper extremity normal  No evidence of tremor.  No evidence of seizure activity, observed.        Lab Results: I have reviewed the following results:I have personally reviewed pertinent reports.    Recent Labs     12/04/24  0456   WBC 5.99   HGB 14.4   HCT 45.1   *   SODIUM 137   K 4.4      CO2 23   BUN 14   CREATININE 0.70   GLUC 116   MG 1.9   PHOS 3.1     Recent Results (from the past 24 hours)   ECG 12 lead     Collection Time: 12/03/24  8:53 AM   Result Value Ref Range    Ventricular Rate 70 BPM    Atrial Rate 258 BPM    SD Interval  ms    QRSD Interval 122 ms    QT Interval 410 ms    QTC Interval 442 ms    P Axis  degrees    QRS Axis 57 degrees    T Wave Axis 251 degrees   FLU/COVID Rapid Antigen (30 min. TAT) - Preferred screening test in ED    Collection Time: 12/03/24  9:55 AM    Specimen: Nose; Nares   Result Value Ref Range    SARS COV Rapid Antigen Positive (A) Negative    Influenza A Rapid Antigen Negative Negative    Influenza B Rapid Antigen Negative Negative   CBC and differential    Collection Time: 12/03/24 10:07 AM   Result Value Ref Range    WBC 6.97 4.31 - 10.16 Thousand/uL    RBC 4.68 3.88 - 5.62 Million/uL    Hemoglobin 14.5 12.0 - 17.0 g/dL    Hematocrit 44.7 36.5 - 49.3 %    MCV 96 82 - 98 fL    MCH 31.0 26.8 - 34.3 pg    MCHC 32.4 31.4 - 37.4 g/dL    RDW 13.5 11.6 - 15.1 %    MPV 9.0 8.9 - 12.7 fL    Platelets 121 (L) 149 - 390 Thousands/uL    nRBC 0 /100 WBCs    Segmented % 85 (H) 43 - 75 %    Immature Grans % 0 0 - 2 %    Lymphocytes % 8 (L) 14 - 44 %    Monocytes % 7 4 - 12 %    Eosinophils Relative 0 0 - 6 %    Basophils Relative 0 0 - 1 %    Absolute Neutrophils 5.85 1.85 - 7.62 Thousands/µL    Absolute Immature Grans 0.03 0.00 - 0.20 Thousand/uL    Absolute Lymphocytes 0.58 (L) 0.60 - 4.47 Thousands/µL    Absolute Monocytes 0.49 0.17 - 1.22 Thousand/µL    Eosinophils Absolute 0.01 0.00 - 0.61 Thousand/µL    Basophils Absolute 0.01 0.00 - 0.10 Thousands/µL   Comprehensive metabolic panel    Collection Time: 12/03/24 10:07 AM   Result Value Ref Range    Sodium 97 (LL) 135 - 147 mmol/L    Potassium 3.1 (L) 3.5 - 5.3 mmol/L    Chloride 72 (L) 96 - 108 mmol/L    CO2 20 (L) 21 - 32 mmol/L    ANION GAP 5 4 - 13 mmol/L    BUN 12 5 - 25 mg/dL    Creatinine 0.72 0.60 - 1.30 mg/dL    Glucose 125 65 - 140 mg/dL    Calcium 6.2 (L) 8.4 - 10.2 mg/dL    Corrected Calcium 7.2 (L) 8.3 - 10.1 mg/dL    AST 16 13  "- 39 U/L    ALT 13 7 - 52 U/L    Alkaline Phosphatase 41 34 - 104 U/L    Total Protein 4.4 (L) 6.4 - 8.4 g/dL    Albumin 2.8 (L) 3.5 - 5.0 g/dL    Total Bilirubin 0.71 0.20 - 1.00 mg/dL    eGFR 91 ml/min/1.73sq m   HS Troponin 0hr (reflex protocol)    Collection Time: 12/03/24 10:07 AM   Result Value Ref Range    hs TnI 0hr 11 \"Refer to ACS Flowchart\"- see link ng/L   APTT    Collection Time: 12/03/24 10:07 AM   Result Value Ref Range    PTT 34 23 - 34 seconds   Protime-INR    Collection Time: 12/03/24 10:07 AM   Result Value Ref Range    Protime 22.5 (H) 12.3 - 15.0 seconds    INR 1.99 (H) 0.85 - 1.19   Hemoglobin A1c w/EAG Estimation (Prechecked if no A1C within 90 days)    Collection Time: 12/03/24 10:07 AM   Result Value Ref Range    Hemoglobin A1C 5.7 (H) Normal 4.0-5.6%; PreDiabetic 5.7-6.4%; Diabetic >=6.5%; Glycemic control for adults with diabetes <7.0% %     mg/dl   Basic metabolic panel    Collection Time: 12/03/24 11:23 AM   Result Value Ref Range    Sodium 135 135 - 147 mmol/L    Potassium 3.9 3.5 - 5.3 mmol/L    Chloride 105 96 - 108 mmol/L    CO2 24 21 - 32 mmol/L    ANION GAP 6 4 - 13 mmol/L    BUN 15 5 - 25 mg/dL    Creatinine 0.87 0.60 - 1.30 mg/dL    Glucose 133 65 - 140 mg/dL    Calcium 7.8 (L) 8.4 - 10.2 mg/dL    eGFR 85 ml/min/1.73sq m   Osmolality-\"If this is regarding a toxic alcohol, please STOP and consult medical  for further guidance.\"    Collection Time: 12/03/24 11:23 AM   Result Value Ref Range    Osmolality Serum 289 282 - 298 mmol/KG   ECG 12 lead    Collection Time: 12/03/24 12:06 PM   Result Value Ref Range    Ventricular Rate 66 BPM    Atrial Rate 65 BPM    OK Interval  ms    QRSD Interval 118 ms    QT Interval 424 ms    QTC Interval 444 ms    P Axis  degrees    QRS Axis 64 degrees    T Wave [place] 257 degrees   HS Troponin I 2hr    Collection Time: 12/03/24 12:09 PM   Result Value Ref Range    hs TnI 2hr 11 \"Refer to ACS Flowchart\"- see link ng/L    Delta 2hr " "hsTnI 0 <20 ng/L   Sodium, urine, random    Collection Time: 12/03/24  2:01 PM   Result Value Ref Range    Sodium, Ur 68 Reference range not established.   Osmolality, urine    Collection Time: 12/03/24  2:01 PM   Result Value Ref Range    Osmolality, Ur 687 250 - 900 mmol/KG   Creatinine, urine, random    Collection Time: 12/03/24  2:01 PM   Result Value Ref Range    Creatinine, Ur 160.2 Reference range not established. mg/dL   UA w Reflex to Microscopic w Reflex to Culture    Collection Time: 12/03/24  2:03 PM    Specimen: Urine, Straight Cath   Result Value Ref Range    Color, UA Yellow     Clarity, UA Clear     Specific Gravity, UA >=1.050 (H) 1.003 - 1.030    pH, UA 5.5 4.5, 5.0, 5.5, 6.0, 6.5, 7.0, 7.5, 8.0    Leukocytes, UA Negative Negative    Nitrite, UA Negative Negative    Protein, UA Trace (A) Negative mg/dl    Glucose, UA Negative Negative mg/dl    Ketones, UA 10 (1+) (A) Negative mg/dl    Urobilinogen, UA <2.0 <2.0 mg/dl mg/dl    Bilirubin, UA Negative Negative    Occult Blood, UA Negative Negative   Urine Microscopic    Collection Time: 12/03/24  2:03 PM   Result Value Ref Range    RBC, UA 4-10 (A) None Seen, 1-2 /hpf    WBC, UA 2-4 (A) None Seen, 1-2 /hpf    Epithelial Cells Occasional None Seen, Occasional /hpf    Bacteria, UA Occasional None Seen, Occasional /hpf    MUCUS THREADS Moderate (A) None Seen   HS Troponin I 4hr    Collection Time: 12/03/24  2:33 PM   Result Value Ref Range    hs TnI 4hr 13 \"Refer to ACS Flowchart\"- see link ng/L    Delta 4hr hsTnI 2 <20 ng/L   Echo complete w/ contrast if indicated    Collection Time: 12/03/24  3:05 PM   Result Value Ref Range    RAA A4C 21 cm2    LA Volume Index (BP) 32.2 mL/m2    AV peak gradient 4 mmHg    LVOT stroke volume 57.92     Ao VTI 18.98 cm    Aortic valve peak velocity 1.02 m/s    LVOT peak VTI 12.81 cm    LVOT peak joyce 0.64 m/s    LVOT diameter 2.4 cm    AV LVOT peak gradient 2 mmHg    AV mean gradient 2 mmHg    RA 2D Volume 62.0 mL    AV " area peak joyce 2.8 cm2    AV area by cont VTI 3.1 cm2    LVOT mn grad 1.0 mmHg    RVID d 3.3 cm    A4C EF 59 %    Aortic valve mean velocity 6.70 m/s    Tricuspid annular plane systolic excursion 1.50 cm    Ao root 3.90 cm    LA size 5.1 cm    LA volume (BP) 69 mL    LA length (A2C) 5.70 cm    LVOT Cardiac Index 1.70 l/min/m2    LVOT stroke volume index 26.60 ml/m2    LVOT Cardiac Output 3.64 l/min    Left Atrium Area-systolic Four Chamber 21.5 cm2    Left Atrium Area-systolic Apical Two Chamber 23.1 cm2    BSA 2.14 m2    LVOT area 4.52 cm2    DVI 0.63     AV valve area 3.05 cm2    LV EF 55    Fingerstick Glucose (POCT)    Collection Time: 12/03/24  4:25 PM   Result Value Ref Range    POC Glucose 89 65 - 140 mg/dl   Fingerstick Glucose (POCT)    Collection Time: 12/03/24  9:24 PM   Result Value Ref Range    POC Glucose 114 65 - 140 mg/dl   Comprehensive metabolic panel    Collection Time: 12/04/24  4:56 AM   Result Value Ref Range    Sodium 137 135 - 147 mmol/L    Potassium 4.4 3.5 - 5.3 mmol/L    Chloride 104 96 - 108 mmol/L    CO2 23 21 - 32 mmol/L    ANION GAP 10 4 - 13 mmol/L    BUN 14 5 - 25 mg/dL    Creatinine 0.70 0.60 - 1.30 mg/dL    Glucose 116 65 - 140 mg/dL    Calcium 8.2 (L) 8.4 - 10.2 mg/dL    Corrected Calcium 8.8 8.3 - 10.1 mg/dL    AST 24 13 - 39 U/L    ALT 14 7 - 52 U/L    Alkaline Phosphatase 39 34 - 104 U/L    Total Protein 5.6 (L) 6.4 - 8.4 g/dL    Albumin 3.2 (L) 3.5 - 5.0 g/dL    Total Bilirubin 0.68 0.20 - 1.00 mg/dL    eGFR 92 ml/min/1.73sq m   Magnesium    Collection Time: 12/04/24  4:56 AM   Result Value Ref Range    Magnesium 1.9 1.9 - 2.7 mg/dL   Phosphorus    Collection Time: 12/04/24  4:56 AM   Result Value Ref Range    Phosphorus 3.1 2.3 - 4.1 mg/dL   CBC and differential    Collection Time: 12/04/24  4:56 AM   Result Value Ref Range    WBC 5.99 4.31 - 10.16 Thousand/uL    RBC 4.62 3.88 - 5.62 Million/uL    Hemoglobin 14.4 12.0 - 17.0 g/dL    Hematocrit 45.1 36.5 - 49.3 %    MCV 98 82 -  "98 fL    MCH 31.2 26.8 - 34.3 pg    MCHC 31.9 31.4 - 37.4 g/dL    RDW 13.3 11.6 - 15.1 %    MPV 9.5 8.9 - 12.7 fL    Platelets 125 (L) 149 - 390 Thousands/uL    nRBC 0 /100 WBCs    Segmented % 62 43 - 75 %    Immature Grans % 0 0 - 2 %    Lymphocytes % 23 14 - 44 %    Monocytes % 14 (H) 4 - 12 %    Eosinophils Relative 1 0 - 6 %    Basophils Relative 0 0 - 1 %    Absolute Neutrophils 3.74 1.85 - 7.62 Thousands/µL    Absolute Immature Grans 0.01 0.00 - 0.20 Thousand/uL    Absolute Lymphocytes 1.35 0.60 - 4.47 Thousands/µL    Absolute Monocytes 0.84 0.17 - 1.22 Thousand/µL    Eosinophils Absolute 0.03 0.00 - 0.61 Thousand/µL    Basophils Absolute 0.02 0.00 - 0.10 Thousands/µL     Per radiology interpretation -    \"  Procedure: CT chest w contrast  Result Date: 12/3/2024  Narrative: CT CHEST WITH IV CONTRAST INDICATION: Patient passed out transferring from chair at home. Patient is COVID-19 positive. COMPARISON: Chest radiograph earlier today and 6/21/2023 TECHNIQUE: CT examination of the chest was performed. Multiplanar 2D reformatted images were created from the source data. This examination, like all CT scans performed in the [facility], was performed utilizing techniques to minimize radiation dose exposure, including the use of iterative reconstruction and automated exposure control. Radiation dose length product (DLP) for this visit: 704 mGy-cm IV Contrast: 55 mL of iohexol (OMNIPAQUE) FINDINGS: Please note the study was not protocoled as a PE exam. LUNGS: There is a V shaped opacity with nodular inferior configuration, overall measuring up to about 11 mm maximal size. This is likely infectious/inflammatory, however will require follow-up. Finding is not clearly appreciated on radiograph. Mild dependent hypoventilatory changes noted in both lower lobes and right upper lobe with subsegmental atelectasis or scarring anterolateral left lower lobe. AIRWAYS: Mild bilateral lower lobe (R>L) bronchial " wall thickening may reflect bronchitis. PLEURA: Unremarkable. HEART/GREAT VESSELS: Heart is unremarkable for patient's age. Trace pericardial fluid. Mild coronary artery calcification. No thoracic aortic aneurysm. Pacer lead tips right atrial appendage and right ventricular septum. Although not a dedicated CT pulmonary angiogram, no large central pulmonary embolus is seen within limitations. MEDIASTINUM AND GONZALES: Shotty lymph nodes, not enlarged by CT criteria. The esophagus is unremarkable. CHEST WALL AND LOWER NECK: Left chest wall pacer. VISUALIZED STRUCTURES IN THE UPPER ABDOMEN: Apparent contrast excretion within the left renal collecting system. The included upper abdomen is otherwise grossly unremarkable. OSSEOUS STRUCTURES: No acute fracture or destructive osseous lesion. Degenerative changes of the spine. Mild osteoarthritis bilateral glenohumeral joints.     Impression: 1. No convincing CT evidence for COVID-pneumonia. Mild dependent atelectatic changes and/or sequela of bronchiolitis given lower lobe bronchial wall thickening. 2. 11 mm V shaped opacity in the left upper lobe with associated nodularity is indeterminate but probably infectious/inflammatory. Follow-up CT chest in 3 months recommended to reevaluate this finding to exclude early malignancy. The study was marked in EPIC for immediate notification and follow-up. Workstation performed: [id number]     Procedure: Echo complete w/ contrast if indicated  Result Date: 12/3/2024  Narrative:   Left Ventricle: Left ventricular cavity size is normal. Wall thickness is mildly increased. The left ventricular ejection fraction is 55%. Systolic function is normal. Wall motion is normal.   Left Atrium: The atrium is mildly dilated.   Right Atrium: The atrium is mildly dilated.   Aortic Valve: There is mild regurgitation. There is aortic valve sclerosis.   Tricuspid Valve: There is mild regurgitation.     Procedure: CTA head and neck with and without  contrast  Result Date: 12/3/2024  Narrative: CTA NECK AND BRAIN WITH AND WITHOUT CONTRAST INDICATION: syncope, hx of cva COMPARISON:   CT head 7/3/2023. CTA head and neck 12/27/2019. TECHNIQUE:  Routine CT imaging of the Brain without contrast.Post contrast imaging was performed after administration of iodinated contrast through the neck and brain. Post contrast axial 0.625 mm images timed to opacify the arterial system.  3D rendering was performed on an independent workstation.   MIP reconstructions performed. Coronal and sagittal reconstructions were performed of the non contrast portion of the brain. Radiation dose length product (DLP) for this visit:  1632 mGy-cm .  This examination, like all CT scans performed in the [facility], was performed utilizing techniques to minimize radiation dose exposure, including the use of iterative reconstruction and automated exposure control. IV Contrast:  85 mL of iohexol (OMNIPAQUE) IMAGE QUALITY:   Diagnostic FINDINGS: NONCONTRAST BRAIN PARENCHYMA:No acute intracranial hemorrhage, new mass effect or midline shift. Extensive encephalomalacia/gliosis in the right ROB and MCA territories with ex vacuo dilation of the right lateral ventricle. Mild-moderate chronic ischemic changes of the white matter. Intracranial vascular calcifications. VENTRICLES AND EXTRA-AXIAL SPACES:Normal for the patient's age. VISUALIZED ORBITS: No acute abnormality. PARANASAL SINUSES: Moderate-marked mucosal thickening of the paranasal sinuses, noting intrinsically hyperdense material. CTA NECK ARCH AND GREAT VESSELS: Atherosclerosis. VERTEBRAL ARTERIES: Focal moderate-marked stenosis of the origin of the left vertebral artery. Compared to prior CTA head and neck 12/27/2019, there is diminutive opacification/occlusion of the intradural left vertebral artery. RIGHT CAROTID: Atherosclerosis without significant stenosis or occlusion. LEFT CAROTID: Atherosclerosis without significant  stenosis or occlusion. NASCET criteria was used to determine the degree of internal carotid artery diameter stenosis. CTA BRAIN: INTERNAL CAROTID ARTERIES: Atherosclerosis without high-grade stenosis or occlusion. ANTERIOR CEREBRAL ARTERY CIRCULATION:  No significant stenosis or occlusion. MIDDLE CEREBRAL ARTERY CIRCULATION:  No significant stenosis or occlusion. Redemonstrated diminutive opacification of the right MCA territory, likely related to sequela of prior infarction and stable from prior study. DISTAL VERTEBRAL ARTERIES: Please see above. BASILAR ARTERY:  No significant stenosis or occlusion. POSTERIOR CEREBRAL ARTERIES: No significant stenosis or occlusion. VENOUS STRUCTURES: No acute abnormality, noting this examination is not tailored for assessment. NON VASCULAR ANATOMY BONY STRUCTURES:  No acute fracture. SOFT TISSUES OF THE NECK: No acute abnormality. THORACIC INLET: Mild bronchial wall thickening in the partially visualized lungs. There is a 1.7 cm spiculated nodule in the left upper lobe on series 4, image 278. Partially evaluated mediastinal lymphadenopathy.     Impression: 1. No acute intracranial hemorrhage, significant mass effect or new midline shift. Extensive sequela of prior right ROB/MCA territory infarcts. 2. When compared to CTA head and neck dated 12/27/2019, there is new diminutive opacification/occlusion of the intradural left vertebral artery, which could reflect occlusion, dissection and/or high-grade stenosis. 3. There is a 1.7 cm spiculated nodule in the left upper lobe, which is suspicious for primary lung malignancy. Partially evaluated mediastinal lymphadenopathy. Recommend further evaluation with chest CT. 4. Moderate-marked mucosal thickening of the paranasal sinuses, noting intrinsically hyperdense material, which could represent inspissated secretions or fungal elements. 5. Multifocal vascular stenoses, as detailed above. The study was marked in EPIC for immediate  "notification. Workstation performed: [id number]     Procedure: XR chest 2 views  Result Date: 12/3/2024  Narrative: XR CHEST PA AND LATERAL INDICATION: covid, syncope. COMPARISON: 6/21/2023 FINDINGS: Left chest wall intracardiac device with intact lead(s). No abandoned lead(s). Clear lungs. Slight blunting of the posterior costophrenic sulci is suggestive of trace effusions. No pneumothorax seen. Normal cardiomediastinal silhouette. Bones are unremarkable for age. Normal upper abdomen.     Impression: Suspected trace pleural effusions. No appreciable pulmonary infiltrates. Workstation performed: [id number]   \"     Reviewed case with neurology attending, history and physical examination, labs and imaging completed, plan of care as per attending physician.    Please see attestation for further details.    Examined alongside attending physician.      "

## 2024-12-04 NOTE — OCCUPATIONAL THERAPY NOTE
Occupational Therapy Evaluation     Patient Name: Les Malone  Today's Date: 12/4/2024  Problem List  Principal Problem:    Syncope  Active Problems:    Essential hypertension    Hyperlipidemia    Persistent atrial fibrillation (HCC)    Chronic diastolic CHF (congestive heart failure) (HCC)    History of stroke    Type 2 diabetes mellitus without complication, unspecified whether long term insulin use (HCC)    Cardiac pacemaker in situ    Lung nodule seen on imaging study    COVID    Thrombocytopenia (HCC)    Past Medical History  Past Medical History:   Diagnosis Date    Acute encephalopathy 05/27/2019    Arthritis     BL knees    Atrial fibrillation (HCC)     CHF (congestive heart failure) (HCC)     Colon polyp     Coronary artery disease     CVA (cerebral vascular accident) (HCC) 04/27/2019    NIHSS 26 on presentation    Depression     Diabetes mellitus (HCC)     borderline    Encephalopathy acute 04/28/2019    History of transfusion 04/2019    Hyperlipidemia     Irregular heart beat     Afib    Stroke (HCC) 04/27/2019    Left sided weakness-aphasia    Urinary retention     Urinary retention 06/04/2019     Past Surgical History  Past Surgical History:   Procedure Laterality Date    CARDIAC ELECTROPHYSIOLOGY PROCEDURE Left 6/20/2023    Procedure: Cardiac pacer implant;  Surgeon: Marquis Haney DO;  Location: [facility];  Service: Cardiology    COLONOSCOPY      IR STROKE ALERT  4/27/2019    MENISCECTOMY Right     NJ CYSTO INSERTION TRANSPROSTATIC IMPLANT SINGLE N/A 11/19/2021    Procedure: CYSTOSCOPY WITH INSERTION UROLIFT;  Surgeon: Jack Christopher MD;  Location: [facility];  Service: Urology           12/04/24 0900   Note Type   Note type Evaluation   Pain Assessment   Pain Assessment Tool 0-10   Pain Score No Pain   Restrictions/Precautions   Weight Bearing Precautions Per Order No   Braces or Orthoses   (MAFO, L LE)   Other Precautions Contact/isolation;Airborne/isolation;Cognitive;Chair  "Alarm;Bed Alarm;Fall Risk   Home Living   Type of Home House   Home Layout One level;Ramped entrance   Bathroom Shower/Tub Walk-in shower   Bathroom Toilet Raised   Bathroom Equipment Grab bars in shower;Grab bars around toilet;Shower chair   Home Equipment Wheelchair-manual;Quad cane  (transport chair)   Prior Function   Level of [place] Needs assistance with functional mobility;Needs assistance with IADLS;Needs assistance with ADLs   Lives With Spouse   Falls in the last 6 months 0   Lifestyle   Autonomy PTA wife states pt had assistance with his ADLs, transfer, ambulation--with QC; neg falls, neg home alone, neg    Reciprocal Relationships supportive wife   Service to Others pt did not state   Intrinsic Gratification watching TV   Subjective   Subjective \"I would like to sit in a chair.\"   ADL   Where Assessed Edge of bed   Eating Assistance 5  Supervision/Setup   Grooming Assistance 5  Supervision/Setup   UB Bathing Assistance 3  Moderate Assistance   LB Bathing Assistance 2  Maximal Assistance   UB Dressing Assistance 3  Moderate Assistance   LB Dressing Assistance 2  Maximal Assistance   Toileting Assistance  2  Maximal Assistance   Bed Mobility   Rolling R 2  Maximal assistance   Additional items Assist x 1;Increased time required;Verbal cues;LE management   Rolling L 2  Maximal assistance   Additional items Assist x 1;Increased time required;Verbal cues;LE management   Supine to Sit 2  Maximal assistance   Additional items Assist x 2;Increased time required;Verbal cues;LE management   Transfers   Sit to Stand 3  Moderate assistance   Additional items Assist x 2;Increased time required;Verbal cues   Stand to Sit 3  Moderate assistance   Additional items Assist x 2;Increased time required;Verbal cues   Stand pivot 2  Maximal assistance   Additional items Assist x 1;Increased time required;Verbal cues   Additional Comments bp's=150/89(EOB, sitting in chair after ambulation)   Functional Mobility " "  Functional Mobility   (recommend \"quick-move\" for OOB with nsg)   Balance   Static Sitting Fair   Dynamic Sitting Fair -   Static Standing Poor   Dynamic Standing Poor -   Activity Tolerance   Activity Tolerance Patient limited by fatigue   Medical Staff Made Aware nsg, P.T.   RUE Assessment   RUE Assessment WFL   RUE Strength   RUE Overall Strength Within Functional Limits - strength 5/5   LUE Assessment   LUE Assessment X  (poor AROM noted throughout; limited PROM throughout)   LUE Strength   LUE Overall Strength   (shr/elbow=1/5 throughout; hand=0/5)   Hand Function   Gross Motor Coordination   (R=int, L=impaired)   Fine Motor Coordination   (R=int, L=impaired)   Sensation   Light Touch No apparent deficits   Proprioception   Proprioception   (unable to fully assess)   Vision - Complex Assessment   Visual Fields   (able to scan visual fields)   Psychosocial   Psychosocial (WDL) X   Patient Behaviors/Mood Flat affect;Cooperative   Perception   Inattention/Neglect Cues to attend to left side of body   Cognition   Overall Cognitive Status Impaired   Arousal/Participation Alert   Attention Attends with cues to redirect   Orientation Level Oriented to place;Oriented to person;Oriented to time   Memory Decreased recall of precautions   Following Commands Follows one step commands without difficulty   Assessment   Limitation Decreased ADL status;Decreased UE ROM;Decreased UE strength;Decreased Safe judgement during ADL;Decreased cognition;Decreased endurance;Decreased high-level ADLs;Non-func L UE   Prognosis Fair   Assessment Pt is a 73y/o male admitted to the hospital after having a syncopal episode at home. Pt noted with A-fib and COVID. Pt with PMH CHF, CAD, CVA--L residual weakness, DM, R knee sx. PTA wife states pt had assistance with his ADLs, transfer, ambulation--with QC; neg falls, neg home alone, neg . During initial eval, pt demonstrated deficits with his functional balance, functional mobility, ADL " "status, transfer safety, L UE ROM/strength, activity tolerance(currently fair=15-20mins). Pt would benefit from continued OT tx for the above deficits. 2-5xwk/1-2wks. The patient's raw score on the AM-PAC Daily Activity Inpatient Short Form is 14. A raw score of less than 19 suggests the patient may benefit from discharge to post-acute rehabilitation services. Please refer to the recommendation of the Occupational Therapist for safe discharge planning.   Goals   Patient Goals \"to go home\"   STG Time Frame   (1-7 days)   Short Term Goal #1 Pt will demonstrate improved activity tolerance to good(20-30mins) and standing tolerance to 3-5mins to assist with ADLs.   Short Term Goal #2 Pt will demonstrate Dedrick with their bed mobility to facilitate EOB ADLs.   Short Term Goal  Pt will demonstrate Dedrick with their sit-stand transfers to assist with completion of their LE dressing.   LTG Time Frame   (7-14 days)   Long Term Goal #1 Pt will demonstrate Dedrick with his UE and mod A with her LE bathing/dressing.   Long Term Goal #2 Pt will demonstrate improved functional balance by 1 grade to assist with ADLs.   Long Term Goal Pt will demonstrate proper walker/transfer safety(with leydi-walker/QC) 100% of the time.   Plan   Treatment Interventions ADL retraining;Functional transfer training;UE strengthening/ROM;Endurance training;Cognitive reorientation;Patient/family training;Compensatory technique education;Continued evaluation   Goal Expiration Date 12/18/24   OT Treatment Day 0   OT Frequency   (2-5xwk/1-2wks)   Discharge Recommendation   Rehab Resource Intensity Level, OT I (Maximum Resource Intensity)   AM-PAC Daily Activity Inpatient   Lower Body Dressing 2   Bathing 2   Toileting 2   Upper Body Dressing 2   Grooming 3   Eating 3   Daily Activity Raw Score 14   Daily Activity Standardized Score (Calc for Raw Score >=11) 33.39   AM-PAC Applied Cognition Inpatient   Following a Speech/Presentation 3   Understanding Ordinary " Conversation 3   Taking Medications 2   Remembering Where Things Are Placed or Put Away 2   Remembering List of 4-5 Errands 2   Taking Care of Complicated Tasks 2   Applied Cognition Raw Score 14   Applied Cognition Standardized Score 32.02   Renaldo Evans

## 2024-12-04 NOTE — ASSESSMENT & PLAN NOTE
Patient presented after a fall at home, his aide reports that he was getting out of the shower and then his eyes rolled back and he syncopized, he was lowered to a chair so had no head strike, trauma workup on arrival was negative  Later found to be COVID-positive, this is possibly the cause of syncope  Would also consider cardiac etiology, though pacer interrogation on arrival without any events, telemetry does show frequent PVCs and a significant run of NSVT though it is possible that this is secondary to acute COVID as well  Obtain orthostatic vitals, no clear evidence of volume depletion especially given edema and rales suggesting mild volume overload, though could have some autonomic dysfunction  Neurology consulted, given significant history of CVAs would maintain concern for this though no findings to explain such on imaging on arrival, CTA head did show left vertebral artery abnormality which was nonspecific and could represent occlusion versus stenosis versus dissection though unclear that this would explain syncope

## 2024-12-04 NOTE — PLAN OF CARE
Problem: PHYSICAL THERAPY ADULT  Goal: Performs mobility at highest level of function for planned discharge setting.  See evaluation for individualized goals.  Description: Treatment/Interventions: Functional transfer training, LE strengthening/ROM, Therapeutic exercise, Endurance training, Patient/family training, Bed mobility, Gait training, Spoke to nursing, OT, Family  Equipment Recommended: Wheelchair (pt has)       See flowsheet documentation for full assessment, interventions and recommendations.  Note: Prognosis: Fair  Problem List: Decreased strength, Decreased endurance, Impaired balance, Decreased mobility, Decreased cognition, Impaired judgement, Decreased safety awareness, Impaired sensation, Impaired tone  Assessment: Pt. 74 y.o.male admitted for Syncope w/ COVID-19 (U07.1).  Pt referred to PT for mobility assessment & D/C planning w/ orders of Up with assistance. Please see above for information re: home set-up & PLOF as well as objective findings during PT assessment. PTA, pt's wife reports that pt requires assistance w/ overall ADL's & ambulates w/ QC at baseline. On eval, pt functioning below baseline hence will continue skilled PT to improve function & safety. Pt require maxAx2 for bed mobility; modAx2 for sit<>stand transition; maxAx1 for SPT + cues for techniques & safety. Pt require L knee blocking during standing & SPT. Pt unable to tolerate amb at this time.  The patient's AM-PAC Basic Mobility Inpatient Short Form Raw Score is 7. A Raw score of less than or equal to 16 suggests the patient may benefit from discharge to post-acute rehabilitation services. Please also refer to the recommendation of the Physical Therapist for safe discharge planning. From PT standpoint, will recommend Level I (maximun resource intensity) rehab services at D/C. No SOB & dizziness reported t/o session. Nsg staff most recent vital signs as follows: /72   Pulse 74   Temp 97.7 °F (36.5 °C)   Resp 17   Ht  6' (1.829 m)   Wt 91.6 kg (202 lb)   SpO2 97%   BMI 27.40 kg/m² . At end of session, pt OOB in chair in stable condition, call bell & phone in reach, chair alarm activated. Fall precautions reinforced w/ good understanding. CM to follow. Nsg staff to continue to mobilized pt (OOB in chair for all meals) as tolerated to prevent further decline in function. Will recommend quick move device for safe OOB transfers.  Nsg notified. Co-eval was necessary to complete this PT eval for the pt's best interest given pt's medical acuity & complexity.        Rehab Resource Intensity Level, PT: I (Maximum Resource Intensity)    See flowsheet documentation for full assessment.

## 2024-12-04 NOTE — H&P
"H&P - Hospitalist   Name: Les Malone 74 y.o. male I MRN: [id number]  Unit/Bed#: [place] [address] -01 I Date of Admission: 12/3/2024   Date of Service: 12/3/2024 I Hospital Day: 0     Assessment & Plan  Syncope  Patient presents from home after wife and home health provider noticed he syncopized during shower  Lowered to chair without head strike; presents for further evaluation    1.Cardiac   a. Arrhythmia: EKG shows ventricularly paced rhythm with occasional PVCs, monitor on telemetry   b. Ischemia: Troponins negative; EKG with no acute ischemic findings; patient denies chest pain   c.Structural/valvular: No findings on most recent echocardiogram, will repeat echo this admission   D. Cardiac tamponade: No suspicious findings as per vitals or on physical exam  2.Hemmorrhage/ volume loss   A. Diarrhea: Denies   B. Vomiting: Denies   C. Orthostatic: Will obtain orthostatics; limited benefit however as patient received fluids in the ED  3.Pulmonary embolism: Low suspicion given stable vitals and compliance with Xarelto  4.Subarachnoid bleed: CT head reveals no acute bleed or midline shift  5.Neurocardiogenic: High on differential given recent COVID infection and patient being assisted with shower at time of event  6.Orthostasis: Obtain orthostatics  7.Drugs of abuse: Pt doesn't report drug use  8.Metabolic   A.hypoglycemia: Glucose stable   B.Hypoxic: SpO2 of 96% on room air  9.Psychiatric  10.Neurogenic   A.TIA/CVA: History of CVA; CTA head and neck reveals: \"When compared to CTA head and neck dated 12/27/2019, there is new diminutive opacification/occlusion of the intradural left vertebral artery, which could reflect occlusion, dissection and/or high-grade stenosis.\"  Consult to neurology to evaluate.   B.Migraines: No history; patient denies headaches   C.Subclavian steal   D.Seizures: No history; family denies seizure-like activity    Cause of syncopal event likely acute COVID infection in the setting of " "neurocardiogenic syncope while bathing and extreme deconditioning  PT/OT/speech; await further neurology recommendations    Essential hypertension  Blood pressure well-controlled on admission, continue Toprol 25 mg daily  Hyperlipidemia  Continue statin therapy  Persistent atrial fibrillation ([facility])  Patient with history of persistent atrial fibrillation  As per ED staff: \"He is currently in a-fib and according to pacemaker interrogation he is always in A-fib, he did not have any other arrhythmias according to the report.\"  Heart rate well-controlled at 64; patient with pacemaker  Continue Toprol for rate control and Xarelto for anticoagulation  Chronic diastolic CHF (congestive heart failure) ([facility])  Wt Readings from Last 3 Encounters:   12/03/24 91.6 kg (202 lb)   11/19/24 91.9 kg (202 lb 11.2 oz)   11/07/24 90.7 kg (200 lb)     Patient with EF of 65%; diastolic function unable to be evaluated secondary to persistent atrial fibrillation  SpO2 96% on room air, does not appear to be in acute exacerbation  Appears to be euvolemic; continue home medications with beta-blocker, statin  Troponin negative X2; monitor        History of stroke  Patient with history of stroke and residual dysphagia and ambulatory dysfunction  Requires assistance at home with home health aides for ADLs  Continue statin therapy  PT/OT/speech  Type 2 diabetes mellitus without complication, unspecified whether long term insulin use ([facility])  Lab Results   Component Value Date    HGBA1C 6.4 (H) 04/24/2024       Recent Labs     12/03/24  1625 12/03/24  2124   POCGLU 89 114       Blood Sugar Average: Last 72 hrs:    SSI; Subcutaneous Insulin Order Set  Blood Glucose checks TIDWM and QHS (Q6H for NPO patients)  Hold oral medications  Blood Glucose goal while inpatient is 140-180  Reduce basal insulin by 25-50% while inpatient  Consistent Carbohydrate Diet    (P) 101.5    Cardiac pacemaker in situ  Noted  Lung nodule seen on imaging study  As per CTA head " "neck: \"There is a 1.7 cm spiculated nodule in the left upper lobe, which is suspicious for primary lung malignancy. Partially evaluated mediastinal lymphadenopathy. Recommend further evaluation with chest CT.\"    Will obtain CT chest with and without contrast  Pending results, consider consult to pulmonology versus IR for biopsy  COVID  Patient noted to have COVID; likely contributing to underlying syncope  Per most recent COVID guidelines, patient will be admitted on mild pathway; troponin negative; obtain CK, BNP, CRP  Patient is high risk; will initiate remdesivir 200 mg followed by 100 mg daily for 2 days  As per treatment guidelines, continue Xarelto at current dose  Isolation; supportive care  SpO2 currently 96% on room air; start respiratory protocol if oxygen required      VTE Pharmacologic Prophylaxis: VTE Score: 5 High Risk (Score >/= 5) - Pharmacological DVT Prophylaxis Ordered: rivaroxaban (Xarelto). Sequential Compression Devices Ordered.  Code Status: Level 1 - Full Code   Discussion with family: Care plan discussed with patient who voiced understanding and agrees with recommendations.      Anticipated Length of Stay: Patient will be admitted on an inpatient basis with an anticipated length of stay of greater than 2 midnights secondary to workup of syncope.    History of Present Illness   Chief Complaint: Syncope    Les Malone is a 74 y.o. male with a PMH of dysphagia, hypertension, type 2 diabetes, atrial fibrillation, CVA, hyperlipidemia, pacemaker who presents with episode of syncope noted at home.  Patient very pleasant 74-year-old male, but poor historian secondary to past history of CVA.  As per ED staff, patient was being assisted with showering by his wife and caregiver at home when he was noted to have syncopal episode.  He was then lowered to a chair without head strike and later presented to the ED for workup.  In the ED, patient noted to have COVID and admitted for further " evaluation.  On exam, patient states that he feels fantastic and has no acute complaints.  Speech somewhat altered, but this appears to be his baseline.  Will admit for syncopal workup and treatment on the mild COVID pathway.    Review of Systems   Constitutional:  Negative for chills and fever.   HENT:  Negative for ear pain and sore throat.    Eyes:  Negative for pain and visual disturbance.   Respiratory:  Negative for cough and shortness of breath.    Cardiovascular:  Negative for chest pain and palpitations.   Gastrointestinal:  Negative for abdominal pain and vomiting.   Genitourinary:  Negative for dysuria and hematuria.   Musculoskeletal:  Negative for arthralgias and back pain.   Skin:  Negative for color change and rash.   Neurological:  Positive for syncope. Negative for seizures.   All other systems reviewed and are negative.      Historical Information   Past Medical History:   Diagnosis Date    Acute encephalopathy 05/27/2019    Arthritis     BL knees    Atrial fibrillation (HCC)     CHF (congestive heart failure) (HCC)     Colon polyp     Coronary artery disease     CVA (cerebral vascular accident) ([facility]) 04/27/2019    NIHSS 26 on presentation    Depression     Diabetes mellitus (HCC)     borderline    Encephalopathy acute 04/28/2019    History of transfusion 04/2019    Hyperlipidemia     Irregular heart beat     Afib    Stroke ([facility]) 04/27/2019    Left sided weakness-aphasia    Urinary retention     Urinary retention 06/04/2019     Past Surgical History:   Procedure Laterality Date    CARDIAC ELECTROPHYSIOLOGY PROCEDURE Left 6/20/2023    Procedure: Cardiac pacer implant;  Surgeon: Marquis Haney DO;  [facility];  Service: Cardiology    COLONOSCOPY      IR STROKE ALERT  4/27/2019    MENISCECTOMY Right     IA CYSTO INSERTION TRANSPROSTATIC IMPLANT SINGLE N/A 11/19/2021    Procedure: CYSTOSCOPY WITH INSERTION UROLIFT;  Surgeon: Jack Christopher MD;  Location: [facility];  Service:  Urology     Social History     Tobacco Use    Smoking status: Never    Smokeless tobacco: Never   Vaping Use    Vaping status: Never Used   Substance and Sexual Activity    Alcohol use: Not Currently     Comment: social    Drug use: Never    Sexual activity: Yes     Partners: Female     E-Cigarette/Vaping    E-Cigarette Use Never User      E-Cigarette/Vaping Substances    Nicotine No     THC No     CBD No     Flavoring No     Other No     Unknown No      Family History   Problem Relation Age of Onset    Cancer Mother     Hypertension Mother      Social History:  Marital Status: /Civil Union   Occupation:   Patient Pre-hospital Living Situation: Home  Patient Pre-hospital Level of Mobility: unable to be assessed at time of evaluation  Patient Pre-hospital Diet Restrictions: Diabetic    Meds/Allergies   I have reviewed home medications using recent Epic encounter.  Prior to Admission medications    Medication Sig Start Date End Date Taking? Authorizing Provider   Coenzyme Q10 (CO Q 10) 10 MG CAPS Take by mouth daily   Yes Historical Provider, MD   cyanocobalamin (VITAMIN B-12) 100 mcg tablet Take by mouth daily   Yes Historical Provider, MD   finasteride (PROSCAR) 5 mg tablet Take 5 mg by mouth daily at bedtime     Yes Historical Provider, MD   metoprolol succinate (TOPROL-XL) 25 mg 24 hr tablet TAKE 1 TABLET BY MOUTH EVERY DAY 9/4/24  Yes Jenn Biswas,    pantoprazole (PROTONIX) 40 mg tablet Take 1 tablet (40 mg total) by mouth daily 11/29/23  Yes Mumtaz Arana MD   pravastatin (PRAVACHOL) 40 mg tablet Take 40 mg by mouth daily 10/24/22  Yes Historical Provider, MD   rivaroxaban (Xarelto) 20 mg tablet Take 20 mg by mouth daily with dinner   4/22/20  Yes Historical Provider, MD   hydrocortisone (ANUSOL-HC) 2.5 % rectal cream Apply topically 2 (two) times a day 8/6/24   KISHAN Hua   hydrocortisone 1 % cream Apply topically 2 (two) times a day as needed    Historical Provider, MD    hydrocortisone-pramoxine (ANALPRAM-HC) 2.5-1 % rectal cream Apply topically 2 (two) times a day 8/1/24   Yessica López PA-C   ketoconazole (NIZORAL) 2 % shampoo SHAMPOO 3 TIMES A WEEK AS A SCALP TREATMENT, LEAVE ON 5-10 MINUTES AND RINSE 7/16/21   Historical Provider, MD   mupirocin (BACTROBAN) 2 % ointment Apply 1 Application topically daily 6/12/24   Historical Provider, MD     Allergies   Allergen Reactions    Cephalexin Throat Swelling     Pt reported throat swelling after taking antibiotic     Penicillins Other (See Comments)       Objective :  Temp:  [98.2 °F (36.8 °C)-98.5 °F (36.9 °C)] 98.5 °F (36.9 °C)  HR:  [64-72] 64  BP: (112-149)/(60-78) 147/75  Resp:  [12-22] 20  SpO2:  [94 %-99 %] 96 %  O2 Device: None (Room air)    Physical Exam  Vitals and nursing note reviewed.   Constitutional:       Appearance: He is well-developed.   HENT:      Head: Normocephalic and atraumatic.      Right Ear: External ear normal.      Left Ear: External ear normal.   Eyes:      Pupils: Pupils are equal, round, and reactive to light.   Cardiovascular:      Rate and Rhythm: Normal rate.   Pulmonary:      Effort: Pulmonary effort is normal.   Abdominal:      Palpations: Abdomen is soft.   Musculoskeletal:         General: Normal range of motion.      Cervical back: Normal range of motion and neck supple.   Skin:     General: Skin is warm and dry.   Neurological:      Mental Status: He is alert.      Motor: Weakness present.      Coordination: Coordination abnormal.   Psychiatric:         Judgment: Judgment normal.      Comments: Flat affect            Lines/Drains:            Lab Results: I have reviewed the following results:  Results from last 7 days   Lab Units 12/03/24  1007   WBC Thousand/uL 6.97   HEMOGLOBIN g/dL 14.5   HEMATOCRIT % 44.7   PLATELETS Thousands/uL 121*   SEGS PCT % 85*   LYMPHO PCT % 8*   MONO PCT % 7   EOS PCT % 0     Results from last 7 days   Lab Units 12/03/24  1123 12/03/24  1007   SODIUM mmol/L  135 97*   POTASSIUM mmol/L 3.9 3.1*   CHLORIDE mmol/L 105 72*   CO2 mmol/L 24 20*   BUN mg/dL 15 12   CREATININE mg/dL 0.87 0.72   ANION GAP mmol/L 6 5   CALCIUM mg/dL 7.8* 6.2*   ALBUMIN g/dL  --  2.8*   TOTAL BILIRUBIN mg/dL  --  0.71   ALK PHOS U/L  --  41   ALT U/L  --  13   AST U/L  --  16   GLUCOSE RANDOM mg/dL 133 125     Results from last 7 days   Lab Units 12/03/24  1007   INR  1.99*     Results from last 7 days   Lab Units 12/03/24  2124 12/03/24  1625   POC GLUCOSE mg/dl 114 89     Lab Results   Component Value Date    HGBA1C 6.4 (H) 04/24/2024    HGBA1C 6.1 (H) 07/31/2023    HGBA1C 5.7 (H) 12/14/2022           Imaging Results Review: I reviewed radiology reports from this admission including: CT chest.  Other Study Results Review: EKG was reviewed.     Administrative Statements       ** Please Note: This note has been constructed using a voice recognition system. **

## 2024-12-04 NOTE — ASSESSMENT & PLAN NOTE
Patient with history of persistent atrial fibrillation  Pacer interrogation on arrival showing 100% in A-fib, persistent on telemetry as well with rates controlled  Continue home Toprol for rate control and Xarelto for anticoagulation

## 2024-12-04 NOTE — PHYSICAL THERAPY NOTE
PT EVALUATION    Pt. Name: Les Malone  Pt. Age: 74 y.o.  MRN: [id number]  LENGTH OF STAY: 1      Admitting Diagnoses:   Syncope [R55]  COVID-19 [U07.1]    Past Medical History:   Diagnosis Date    Acute encephalopathy 05/27/2019    Arthritis     BL knees    Atrial fibrillation (HCC)     CHF (congestive heart failure) (HCC)     Colon polyp     Coronary artery disease     CVA (cerebral vascular accident) (HCC) 04/27/2019    NIHSS 26 on presentation    Depression     Diabetes mellitus (HCC)     borderline    Encephalopathy acute 04/28/2019    History of transfusion 04/2019    Hyperlipidemia     Irregular heart beat     Afib    Stroke (HCC) 04/27/2019    Left sided weakness-aphasia    Urinary retention     Urinary retention 06/04/2019       Past Surgical History:   Procedure Laterality Date    CARDIAC ELECTROPHYSIOLOGY PROCEDURE Left 6/20/2023    Procedure: Cardiac pacer implant;  Surgeon: Marquis Haney DO;  [facility];  Service: Cardiology    COLONOSCOPY      IR STROKE ALERT  4/27/2019    MENISCECTOMY Right     AL CYSTO INSERTION TRANSPROSTATIC IMPLANT SINGLE N/A 11/19/2021    Procedure: CYSTOSCOPY WITH INSERTION UROLIFT;  Surgeon: Jack Christopher MD;  [facility];  Service: Urology       Imaging Studies:  CT chest w contrast   Final Result by Amaury Noel DO (12/03 8359)      1. No convincing CT evidence for COVID-pneumonia. Mild dependent atelectatic changes and/or sequela of bronchiolitis given lower lobe bronchial wall thickening.      2. 11 mm V shaped opacity in the left upper lobe with associated nodularity is indeterminate but probably infectious/inflammatory. Follow-up CT chest in 3 months recommended to reevaluate this finding to exclude early malignancy.      The study was marked in EPIC for immediate notification and follow-up.         Workstation performed: [id number]         CTA head and neck with and without contrast   Final Result by Mynor  MD Symone (12/03 6545)      1. No acute intracranial hemorrhage, significant mass effect or new midline shift. Extensive sequela of prior right ROB/MCA territory infarcts.      2. When compared to CTA head and neck dated 12/27/2019, there is new diminutive opacification/occlusion of the intradural left vertebral artery, which could reflect occlusion, dissection and/or high-grade stenosis.      3. There is a 1.7 cm spiculated nodule in the left upper lobe, which is suspicious for primary lung malignancy. Partially evaluated mediastinal lymphadenopathy. Recommend further evaluation with chest CT.      4. Moderate-marked mucosal thickening of the paranasal sinuses, noting intrinsically hyperdense material, which could represent inspissated secretions or fungal elements.      5. Multifocal vascular stenoses, as detailed above.      The study was marked in EPIC for immediate notification.                  Workstation performed: [id number]         XR chest 2 views   Final Result by Zohaib Fung MD (12/03 6964)      Suspected trace pleural effusions. No appreciable pulmonary infiltrates.            Workstation performed: [id number]               12/04/24 0925   PT Last Visit   PT Visit Date 12/04/24   Note Type   Note type Evaluation   Pain Assessment   Pain Score No Pain   Restrictions/Precautions   Weight Bearing Precautions Per Order No   Other Precautions Contact/isolation;Airborne/isolation;Cognitive;Chair Alarm;Bed Alarm;Fall Risk  (L sided hemiplegia)   Home Living   Type of Home House   Home Layout One level;Ramped entrance   Bathroom Shower/Tub Walk-in shower   Bathroom Toilet Raised   Bathroom Equipment Grab bars in shower;Shower chair;Grab bars around toilet   Home Equipment Wheelchair-manual;Quad cane;Grab bars;Other (Comment)  (transport chair)   Prior Function   Level of [place] Needs assistance with functional mobility;Needs assistance with ADLs;Needs assistance with IADLS  (ambulates  w/ QC)   Lives With Spouse   Receives Help From Family;Other (Comment)  (?HHA)   Falls in the last 6 months 0   Vocational Retired   Comments (-) ; (-) home alone; pt going to  OPPT 5x/wk PTA   General   Additional Pertinent History h/o CVA 2019 w/ residual L sided hemiplegia & aphasia   Family/Caregiver Present Yes  (wife)   Cognition   Overall Cognitive Status Impaired   Arousal/Participation Alert   Orientation Level Oriented to person;Oriented to place;Oriented to time   Following Commands Follows one step commands without difficulty   Comments pleasant & cooperative   Subjective   Subjective Pt agreeable to PT/OT evals.   RUE Assessment   RUE Assessment   (refer to OT)   LUE Assessment   LUE Assessment   (refer to OT)   RLE Assessment   RLE Assessment WFL  (4/5 grossly)   LLE Assessment   LLE Assessment X  (2-/5 grossly except ankle 1/5; wears a MAFO at baseline)   Coordination   Sensation X  (impaired sensation to LLE)   Bed Mobility   Supine to Sit 2  Maximal assistance   Additional items Assist x 2   Additional Comments cues for techniques & safety   Transfers   Sit to Stand 3  Moderate assistance   Additional items Assist x 2;Increased time required;Verbal cues;Bedrails   Stand to Sit 3  Moderate assistance   Additional items Assist x 2;Increased time required;Verbal cues;Bedrails   Stand pivot 2  Maximal assistance   Additional items Assist x 1;Increased time required;Verbal cues   Additional Comments cues for techniques & safety; require L knee blocking; will recommend quick move device for safe OOB transfers   Ambulation/Elevation   Gait pattern Not appropriate   Ambulation/Elevation Additional Comments attempted but pt unable to weight shift & initiate any steps; wife reports that pt cannot ambulate w/o his MAFO; pt's MAFO unavailable at this time   Balance   Static Sitting Fair   Dynamic Sitting Fair -   Static Standing Poor   Dynamic Standing Poor -   Ambulatory Zero   Endurance Deficit    Endurance Deficit Yes   Endurance Deficit Description weakness   Activity Tolerance   Activity Tolerance Patient limited by fatigue;Treatment limited secondary to medical complications (Comment)   Medical Staff Made Aware OTR Renaldo   Nurse Made Aware yes   Assessment   Prognosis Fair   Problem List Decreased strength;Decreased endurance;Impaired balance;Decreased mobility;Decreased cognition;Impaired judgement;Decreased safety awareness;Impaired sensation;Impaired tone   Assessment Pt. 74 y.o.male admitted for Syncope w/ COVID-19 (U07.1).  Pt referred to PT for mobility assessment & D/C planning w/ orders of Up with assistance. Please see above for information re: home set-up & PLOF as well as objective findings during PT assessment. PTA, pt's wife reports that pt requires assistance w/ overall ADL's & ambulates w/ QC at baseline. On eval, pt functioning below baseline hence will continue skilled PT to improve function & safety. Pt require maxAx2 for bed mobility; modAx2 for sit<>stand transition; maxAx1 for SPT + cues for techniques & safety. Pt require L knee blocking during standing & SPT. Pt unable to tolerate amb at this time.  The patient's AM-PAC Basic Mobility Inpatient Short Form Raw Score is 7. A Raw score of less than or equal to 16 suggests the patient may benefit from discharge to post-acute rehabilitation services. Please also refer to the recommendation of the Physical Therapist for safe discharge planning. From PT standpoint, will recommend Level I (maximun resource intensity) rehab services at D/C. No SOB & dizziness reported t/o session. Nsg staff most recent vital signs as follows: /72   Pulse 74   Temp 97.7 °F (36.5 °C)   Resp 17   Ht 6' (1.829 m)   Wt 91.6 kg (202 lb)   SpO2 97%   BMI 27.40 kg/m² . At end of session, pt OOB in chair in stable condition, call bell & phone in reach, chair alarm activated. Fall precautions reinforced w/ good understanding. CM to follow. Nsg staff to  continue to mobilized pt (OOB in chair for all meals) as tolerated to prevent further decline in function. Will recommend quick move device for safe OOB transfers.  Nsg notified. Co-eval was necessary to complete this PT eval for the pt's best interest given pt's medical acuity & complexity.   Goals   Patient Goals to go home   STG Expiration Date 12/18/24   Short Term Goal #1 Goals to be met in 14 days; pt will be able to: 1) inc strength & balance by 1/2 grade to improve overall functional mobility & dec fall risk; 2) inc bed mobility to minAx1 for pt to be able to get in/OOB safely w/ proper techniques 100% of the time, to dec caregiver burden & safely function at home; 3) inc transfers to minAx1 for pt to transition safely from one surface to another w/o % of the time, to dec caregiver burden & safely function at home; 4) inc amb w/ appropriate AD approx. >20 w/ minAx1 for pt to ambulate as tolerated w/o any % of the time, to dec caregiver burden & safely function at home; 5) 6) pt/caregiver ed   PT Treatment Day 0   Plan   Treatment/Interventions Functional transfer training;LE strengthening/ROM;Therapeutic exercise;Endurance training;Patient/family training;Bed mobility;Gait training;Spoke to nursing;OT;Family   PT Frequency 3-5x/wk   Discharge Recommendation   Rehab Resource Intensity Level, PT I (Maximum Resource Intensity)   Equipment Recommended Wheelchair  (pt has)   AM-PAC Basic Mobility Inpatient   Turning in Flat Bed Without Bedrails 2   Lying on Back to Sitting on Edge of Flat Bed Without Bedrails 1   Moving Bed to Chair 1   Standing Up From Chair Using Arms 1   Walk in Room 1   Climb 3-5 Stairs With Railing 1   Basic Mobility Inpatient Raw Score 7   Turning Head Towards Sound 4   Follow Simple Instructions 3   Low Function Basic Mobility Raw Score  14   Low Function Basic Mobility Standardized Score  22.01   [facility] Level Of Mobility   [facility] Goal 2: Bed  activities/Dependent transfer   Hx/personal factors: co-morbidities, advanced age, use of AD, dec cognition, fall risk, and assist w/ ADL's  Examination: dec mobility, dec balance, dec endurance, dec amb, risk for falls, dec cognition  Clinical: unpredictable (ongoing medical status and risk for falls)  Complexity: high    Ted Avalos

## 2024-12-04 NOTE — ASSESSMENT & PLAN NOTE
"74 year old with past medical history of dysphagia, hypertension, type 2 diabetes, atrial fibrillation on Xarelto, CVA, hyperlipidemia, pacemaker who presented with an episode of syncope while at his residence.  Per initial HPI is reported the patient is a poor historian however by ED staff patient was being assisted with showering by his wife and caregiving at home when he had a syncopal episode.  It was reported that he was lowered to the chair without head strike and later presented to the ED for workup.  Patient was found to have COVID in the ED and was admitted to the hospital for syncopal workup.  CTA of head and neck was completed as below with his hx of prior stroke.     VS -on arrival patient's was afebrile respirations were 16 blood pressure was 143/70 mmhg.    Labs/Testing reviewed as below (pertinent).   COVID-positive  CBC with a platelet of 121L  A1c 5.7 high  UA negative for nitrates or leukocytes  CMP with a low calcium low total protein and albumin    CTA of head and neck -  \"No acute intracranial hemorrhage, significant mass effect or new midline shift. Extensive sequela of prior right ROB/MCA territory infarcts.When compared to CTA head and neck dated 12/27/2019, there is new diminutive opacification/occlusion of the intradural left vertebral artery, which could reflect occlusion, dissection and/or high-grade stenosis.There is a 1.7 cm spiculated nodule in the left upper lobe, which is suspicious for primary lung malignancy. Partially evaluated mediastinal lymphadenopathy. Recommend further evaluation with chest CT.Moderate-marked mucosal thickening of the paranasal sinuses, noting intrinsically hyperdense material, which could represent inspissated secretions or fungal elements.Multifocal vascular stenoses, as detailed above.\"    CT of chest - \"No convincing CT evidence for COVID-pneumonia. Mild dependent atelectatic changes and/or sequela of bronchiolitis given lower lobe bronchial wall " "thickening.11 mm V shaped opacity in the left upper lobe with associated nodularity is indeterminate but probably infectious/inflammatory. Follow-up CT chest in 3 months recommended to reevaluate this finding to exclude early malignancy\"    Echo - EF 55%, bilateral atrium mildly dilated    Syncope -patient presents from home after wife and home health care provider noticed he syncopized during shower lower to chair without head strike -etiology unclear but this may be vasovagal/neurocardiogenic possibly orthostatic related given recent covid infection, rule out cardiac etiology as well.  No evidence to suspect seizure or new ischemic stroke at this time (no reported seizure-like activity or new focality)  Hx of prior stroke on CTA right ROB/MCA stroke sequela, \"...When compared to CTA head and neck dated 12/27/2019, there is new diminutive opacification/occlusion of the intradural left vertebral artery, which could reflect occlusion, dissection and/or high-grade stenosis..\"  Patient is back to his baseline with no evidence to suspect new stroke.   Afib on xarelto  Cardiac pacemaker  Covid +    -Continue home Xarelto for secondary stroke prevention  -No need for further neurological imaging at this time  -Continue to monitor and workup his episode of syncope checking orthostatics, consider pacemaker interrogation, telemetry echo completed as above  -Continue to treat underlying metabolic/infectious derangements per medicine team, COVID positive and management/treatment per medicine team  -Frequent neurological checks notify neurology with any changes in neurological exam  -Delirium precautions, fall precautions, limit CNS affecting medications  -Reviewed with attending plan of care per attending, please see attestation for details.   "

## 2024-12-04 NOTE — ASSESSMENT & PLAN NOTE
Initial CTA head and neck noted 1.7 cm spiculated nodule in GOMEZ suspicious for malignancy  Follow-up CT chest noted 11 mm opacity in GOMEZ which probably represents infectious/inflammatory etiology, recommend follow-up CT chest in approximately 3 months to ensure not concerning for malignancy

## 2024-12-04 NOTE — PLAN OF CARE
Problem: Potential for Falls  Goal: Patient will remain free of falls  Description: INTERVENTIONS:  - Educate patient/family on patient safety including physical limitations  - Instruct patient to call for assistance with activity   - Consult OT/PT to assist with strengthening/mobility   - Keep Call bell within reach  - Keep bed low and locked with side rails adjusted as appropriate  - Keep care items and personal belongings within reach  - Initiate and maintain comfort rounds  - Make Fall Risk Sign visible to staff  - Offer Toileting every 2 Hours, in advance of need  - Initiate/Maintain bed alarm  - Obtain necessary fall risk management equipment  - Apply yellow socks and bracelet for high fall risk patients  - Consider moving patient to room near nurses station  Outcome: Progressing     Problem: Prexisting or High Potential for Compromised Skin Integrity  Goal: Skin integrity is maintained or improved  Description: INTERVENTIONS:  - Identify patients at risk for skin breakdown  - Assess and monitor skin integrity  - Assess and monitor nutrition and hydration status  - Monitor labs   - Assess for incontinence   - Turn and reposition patient  - Assist with mobility/ambulation  - Relieve pressure over bony prominences  - Avoid friction and shearing  - Provide appropriate hygiene as needed including keeping skin clean and dry  - Evaluate need for skin moisturizer/barrier cream  - Collaborate with interdisciplinary team   - Patient/family teaching  - Consider wound care consult   Outcome: Progressing     Problem: PAIN - ADULT  Goal: Verbalizes/displays adequate comfort level or baseline comfort level  Description: Interventions:  - Encourage patient to monitor pain and request assistance  - Assess pain using appropriate pain scale  - Administer analgesics based on type and severity of pain and evaluate response  - Implement non-pharmacological measures as appropriate and evaluate response  - Consider cultural and  social influences on pain and pain management  - Notify physician/advanced practitioner if interventions unsuccessful or patient reports new pain  Outcome: Progressing     Problem: INFECTION - ADULT  Goal: Absence or prevention of progression during hospitalization  Description: INTERVENTIONS:  - Assess and monitor for signs and symptoms of infection  - Monitor lab/diagnostic results  - Monitor all insertion sites, i.e. indwelling lines, tubes, and drains  - Monitor endotracheal if appropriate and nasal secretions for changes in amount and color  - [place] appropriate cooling/warming therapies per order  - Administer medications as ordered  - Instruct and encourage patient and family to use good hand hygiene technique  - Identify and instruct in appropriate isolation precautions for identified infection/condition  Outcome: Progressing     Problem: SAFETY ADULT  Goal: Patient will remain free of falls  Description: INTERVENTIONS:  - Educate patient/family on patient safety including physical limitations  - Instruct patient to call for assistance with activity   - Consult OT/PT to assist with strengthening/mobility   - Keep Call bell within reach  - Keep bed low and locked with side rails adjusted as appropriate  - Keep care items and personal belongings within reach  - Initiate and maintain comfort rounds  - Make Fall Risk Sign visible to staff  - Offer Toileting every 2 Hours, in advance of need  - Initiate/Maintain bed alarm  - Obtain necessary fall risk management equipment  - Apply yellow socks and bracelet for high fall risk patients  - Consider moving patient to room near nurses station  Outcome: Progressing     Problem: DISCHARGE PLANNING  Goal: Discharge to home or other facility with appropriate resources  Description: INTERVENTIONS:  - Identify barriers to discharge w/patient and caregiver  - Arrange for needed discharge resources and transportation as appropriate  - Identify discharge learning needs  (meds, wound care, etc.)  - Arrange for interpretive services to assist at discharge as needed  - Refer to Case Management Department for coordinating discharge planning if the patient needs post-hospital services based on physician/advanced practitioner order or complex needs related to functional status, cognitive ability, or social support system  Outcome: Progressing     Problem: Knowledge Deficit  Goal: Patient/family/caregiver demonstrates understanding of disease process, treatment plan, medications, and discharge instructions  Description: Complete learning assessment and assess knowledge base.  Interventions:  - Provide teaching at level of understanding  - Provide teaching via preferred learning methods  Outcome: Progressing     Problem: Nutrition/Hydration-ADULT  Goal: Nutrient/Hydration intake appropriate for improving, restoring or maintaining nutritional needs  Description: Monitor and assess patient's nutrition/hydration status for malnutrition. Collaborate with interdisciplinary team and initiate plan and interventions as ordered.  Monitor patient's weight and dietary intake as ordered or per policy. Utilize nutrition screening tool and intervene as necessary. Determine patient's food preferences and provide high-protein, high-caloric foods as appropriate.     INTERVENTIONS:  - Monitor oral intake, urinary output, labs, and treatment plans  - Assess nutrition and hydration status and recommend course of action  - Evaluate amount of meals eaten  - Assist patient with eating if necessary   - Allow adequate time for meals  - Recommend/ encourage appropriate diets, oral nutritional supplements, and vitamin/mineral supplements  - Order, calculate, and assess calorie counts as needed  - Recommend, monitor, and adjust tube feedings and TPN/PPN based on assessed needs  - Assess need for intravenous fluids  - Provide specific nutrition/hydration education as appropriate  - Include patient/family/caregiver in  decisions related to nutrition  Outcome: Progressing     Problem: CARDIOVASCULAR - ADULT  Goal: Maintains optimal cardiac output and hemodynamic stability  Description: INTERVENTIONS:  - Monitor I/O, vital signs and rhythm  - Monitor for S/S and trends of decreased cardiac output  - Administer and titrate ordered vasoactive medications to optimize hemodynamic stability  - Assess quality of pulses, skin color and temperature  - Assess for signs of decreased coronary artery perfusion  - Instruct patient to report change in severity of symptoms  Outcome: Progressing     Problem: RESPIRATORY - ADULT  Goal: Achieves optimal ventilation and oxygenation  Description: INTERVENTIONS:  - Assess for changes in respiratory status  - Assess for changes in mentation and behavior  - Position to facilitate oxygenation and minimize respiratory effort  - Oxygen administered by appropriate delivery if ordered  - Initiate smoking cessation education as indicated  - Encourage broncho-pulmonary hygiene including cough, deep breathe, Incentive Spirometry  - Assess the need for suctioning and aspirate as needed  - Assess and instruct to report SOB or any respiratory difficulty  - Respiratory Therapy support as indicated  Outcome: Progressing     Problem: GASTROINTESTINAL - ADULT  Goal: Maintains or returns to baseline bowel function  Description: INTERVENTIONS:  - Assess bowel function  - Encourage oral fluids to ensure adequate hydration  - Administer IV fluids if ordered to ensure adequate hydration  - Administer ordered medications as needed  - Encourage mobilization and activity  - Consider nutritional services referral to assist patient with adequate nutrition and appropriate food choices  Outcome: Progressing     Problem: GENITOURINARY - ADULT  Goal: Maintains or returns to baseline urinary function  Description: INTERVENTIONS:  - Assess urinary function  - Encourage oral fluids to ensure adequate hydration if ordered  - Administer  IV fluids as ordered to ensure adequate hydration  - Administer ordered medications as needed  - Offer frequent toileting  - Follow urinary retention protocol if ordered  Outcome: Progressing     Problem: MUSCULOSKELETAL - ADULT  Goal: Maintain or return mobility to safest level of function  Description: INTERVENTIONS:  - Assess patient's ability to carry out ADLs; assess patient's baseline for ADL function and identify physical deficits which impact ability to perform ADLs (bathing, care of mouth/teeth, toileting, grooming, dressing, etc.)  - Assess/evaluate cause of self-care deficits   - Assess range of motion  - Assess patient's mobility  - Assess patient's need for assistive devices and provide as appropriate  - Encourage maximum independence but intervene and supervise when necessary  - Involve family in performance of ADLs  - Assess for home care needs following discharge   - Consider OT consult to assist with ADL evaluation and planning for discharge  - Provide patient education as appropriate  Outcome: Progressing  Goal: Maintain proper alignment of affected body part  Description: INTERVENTIONS:  - Support, maintain and protect limb and body alignment  - Provide patient/ family with appropriate education  Outcome: Progressing

## 2024-12-04 NOTE — TELEPHONE ENCOUNTER
STILL ADMITTED :12/3/2024 - present (1 day)  [facility]    HFU/ SL ALL/ SYNCOPE    ----- Message from Anmol Serna PA-C sent at 12/4/2024 12:24 PM EST -----  Regarding: Hospital follow up  Les Malone will need follow-up in in 6 weeks with neurovascular team for Other in 60 minute appointment. They will not require outpatient neurological testing.   The patient follows with Dr. Pulido and Tonio VILLARREAL

## 2024-12-04 NOTE — ASSESSMENT & PLAN NOTE
Wt Readings from Last 3 Encounters:   12/03/24 91.6 kg (202 lb)   11/19/24 91.9 kg (202 lb 11.2 oz)   11/07/24 90.7 kg (200 lb)     Echo this admission showing EF 55% with normal systolic function, mild LA and RA dilation, mild AI, and mild TR  Saturating well on room air, does not appear to be in acute exacerbation  Continue home beta-blocker & statin  Mild edema in extremities and rales on exam, possibly after fluids given from admission, trial 40 mg IV Lasix today and monitor response and adjust diuretics as needed

## 2024-12-04 NOTE — ASSESSMENT & PLAN NOTE
Patient with history of stroke and residual dysphagia, word finding difficulty, LUE and LLE paralysis  Requires assistance at home with home health aides for ADLs  Continue home statin and anticoagulation for A-fib  PT/OT/speech, fall precautions

## 2024-12-04 NOTE — PLAN OF CARE
Problem: Potential for Falls  Goal: Patient will remain free of falls  Description: INTERVENTIONS:  - Educate patient/family on patient safety including physical limitations  - Instruct patient to call for assistance with activity   - Consult OT/PT to assist with strengthening/mobility   - Keep Call bell within reach  - Keep bed low and locked with side rails adjusted as appropriate  - Keep care items and personal belongings within reach  - Initiate and maintain comfort rounds  - Make Fall Risk Sign visible to staff  - Offer Toileting every 2 Hours, in advance of need  - Initiate/Maintain bed alarm  - Obtain necessary fall risk management equipment: yellow socks  - Apply yellow socks and bracelet for high fall risk patients  - Consider moving patient to room near nurses station  Outcome: Progressing     Problem: Prexisting or High Potential for Compromised Skin Integrity  Goal: Skin integrity is maintained or improved  Description: INTERVENTIONS:  - Identify patients at risk for skin breakdown  - Assess and monitor skin integrity  - Assess and monitor nutrition and hydration status  - Monitor labs   - Assess for incontinence   - Turn and reposition patient  - Assist with mobility/ambulation  - Relieve pressure over bony prominences  - Avoid friction and shearing  - Provide appropriate hygiene as needed including keeping skin clean and dry  - Evaluate need for skin moisturizer/barrier cream  - Collaborate with interdisciplinary team   - Patient/family teaching  - Consider wound care consult   Outcome: Progressing     Problem: PAIN - ADULT  Goal: Verbalizes/displays adequate comfort level or baseline comfort level  Description: Interventions:  - Encourage patient to monitor pain and request assistance  - Assess pain using appropriate pain scale  - Administer analgesics based on type and severity of pain and evaluate response  - Implement non-pharmacological measures as appropriate and evaluate response  - Consider  cultural and social influences on pain and pain management  - Notify physician/advanced practitioner if interventions unsuccessful or patient reports new pain  Outcome: Progressing     Problem: INFECTION - ADULT  Goal: Absence or prevention of progression during hospitalization  Description: INTERVENTIONS:  - Assess and monitor for signs and symptoms of infection  - Monitor lab/diagnostic results  - Monitor all insertion sites, i.e. indwelling lines, tubes, and drains  - Monitor endotracheal if appropriate and nasal secretions for changes in amount and color  - [place] appropriate cooling/warming therapies per order  - Administer medications as ordered  - Instruct and encourage patient and family to use good hand hygiene technique  - Identify and instruct in appropriate isolation precautions for identified infection/condition  Outcome: Progressing     Problem: SAFETY ADULT  Goal: Patient will remain free of falls  Description: INTERVENTIONS:  - Educate patient/family on patient safety including physical limitations  - Instruct patient to call for assistance with activity   - Consult OT/PT to assist with strengthening/mobility   - Keep Call bell within reach  - Keep bed low and locked with side rails adjusted as appropriate  - Keep care items and personal belongings within reach  - Initiate and maintain comfort rounds  - Make Fall Risk Sign visible to staff  - Offer Toileting every 2 Hours, in advance of need  - Initiate/Maintain bed alarm  - Obtain necessary fall risk management equipment: yellow socks  - Apply yellow socks and bracelet for high fall risk patients  - Consider moving patient to room near nurses station  Outcome: Progressing     Problem: DISCHARGE PLANNING  Goal: Discharge to home or other facility with appropriate resources  Description: INTERVENTIONS:  - Identify barriers to discharge w/patient and caregiver  - Arrange for needed discharge resources and transportation as appropriate  - Identify  discharge learning needs (meds, wound care, etc.)  - Arrange for interpretive services to assist at discharge as needed  - Refer to Case Management Department for coordinating discharge planning if the patient needs post-hospital services based on physician/advanced practitioner order or complex needs related to functional status, cognitive ability, or social support system  Outcome: Progressing     Problem: Knowledge Deficit  Goal: Patient/family/caregiver demonstrates understanding of disease process, treatment plan, medications, and discharge instructions  Description: Complete learning assessment and assess knowledge base.  Interventions:  - Provide teaching at level of understanding  - Provide teaching via preferred learning methods  Outcome: Progressing     Problem: Nutrition/Hydration-ADULT  Goal: Nutrient/Hydration intake appropriate for improving, restoring or maintaining nutritional needs  Description: Monitor and assess patient's nutrition/hydration status for malnutrition. Collaborate with interdisciplinary team and initiate plan and interventions as ordered.  Monitor patient's weight and dietary intake as ordered or per policy. Utilize nutrition screening tool and intervene as necessary. Determine patient's food preferences and provide high-protein, high-caloric foods as appropriate.     INTERVENTIONS:  - Monitor oral intake, urinary output, labs, and treatment plans  - Assess nutrition and hydration status and recommend course of action  - Evaluate amount of meals eaten  - Assist patient with eating if necessary   - Allow adequate time for meals  - Recommend/ encourage appropriate diets, oral nutritional supplements, and vitamin/mineral supplements  - Order, calculate, and assess calorie counts as needed  - Recommend, monitor, and adjust tube feedings and TPN/PPN based on assessed needs  - Assess need for intravenous fluids  - Provide specific nutrition/hydration education as appropriate  - Include  patient/family/caregiver in decisions related to nutrition  Outcome: Progressing     Problem: CARDIOVASCULAR - ADULT  Goal: Maintains optimal cardiac output and hemodynamic stability  Description: INTERVENTIONS:  - Monitor I/O, vital signs and rhythm  - Monitor for S/S and trends of decreased cardiac output  - Administer and titrate ordered vasoactive medications to optimize hemodynamic stability  - Assess quality of pulses, skin color and temperature  - Assess for signs of decreased coronary artery perfusion  - Instruct patient to report change in severity of symptoms  Outcome: Progressing     Problem: RESPIRATORY - ADULT  Goal: Achieves optimal ventilation and oxygenation  Description: INTERVENTIONS:  - Assess for changes in respiratory status  - Assess for changes in mentation and behavior  - Position to facilitate oxygenation and minimize respiratory effort  - Oxygen administered by appropriate delivery if ordered  - Initiate smoking cessation education as indicated  - Encourage broncho-pulmonary hygiene including cough, deep breathe, Incentive Spirometry  - Assess the need for suctioning and aspirate as needed  - Assess and instruct to report SOB or any respiratory difficulty  - Respiratory Therapy support as indicated  Outcome: Progressing     Problem: GASTROINTESTINAL - ADULT  Goal: Maintains or returns to baseline bowel function  Description: INTERVENTIONS:  - Assess bowel function  - Encourage oral fluids to ensure adequate hydration  - Administer IV fluids if ordered to ensure adequate hydration  - Administer ordered medications as needed  - Encourage mobilization and activity  - Consider nutritional services referral to assist patient with adequate nutrition and appropriate food choices  Outcome: Progressing     Problem: GENITOURINARY - ADULT  Goal: Maintains or returns to baseline urinary function  Description: INTERVENTIONS:  - Assess urinary function  - Encourage oral fluids to ensure adequate  hydration if ordered  - Administer IV fluids as ordered to ensure adequate hydration  - Administer ordered medications as needed  - Offer frequent toileting  - Follow urinary retention protocol if ordered  Outcome: Progressing     Problem: MUSCULOSKELETAL - ADULT  Goal: Maintain or return mobility to safest level of function  Description: INTERVENTIONS:  - Assess patient's ability to carry out ADLs; assess patient's baseline for ADL function and identify physical deficits which impact ability to perform ADLs (bathing, care of mouth/teeth, toileting, grooming, dressing, etc.)  - Assess/evaluate cause of self-care deficits   - Assess range of motion  - Assess patient's mobility  - Assess patient's need for assistive devices and provide as appropriate  - Encourage maximum independence but intervene and supervise when necessary  - Involve family in performance of ADLs  - Assess for home care needs following discharge   - Consider OT consult to assist with ADL evaluation and planning for discharge  - Provide patient education as appropriate  Outcome: Progressing  Goal: Maintain proper alignment of affected body part  Description: INTERVENTIONS:  - Support, maintain and protect limb and body alignment  - Provide patient/ family with appropriate education  Outcome: Progressing

## 2024-12-05 LAB
ANION GAP SERPL CALCULATED.3IONS-SCNC: 8 MMOL/L (ref 4–13)
BUN SERPL-MCNC: 21 MG/DL (ref 5–25)
CALCIUM SERPL-MCNC: 8.6 MG/DL (ref 8.4–10.2)
CHLORIDE SERPL-SCNC: 102 MMOL/L (ref 96–108)
CO2 SERPL-SCNC: 28 MMOL/L (ref 21–32)
CREAT SERPL-MCNC: 0.84 MG/DL (ref 0.6–1.3)
ERYTHROCYTE [DISTWIDTH] IN BLOOD BY AUTOMATED COUNT: 13.4 % (ref 11.6–15.1)
GFR SERPL CREATININE-BSD FRML MDRD: 86 ML/MIN/1.73SQ M
GLUCOSE SERPL-MCNC: 108 MG/DL (ref 65–140)
GLUCOSE SERPL-MCNC: 125 MG/DL (ref 65–140)
HCT VFR BLD AUTO: 45.4 % (ref 36.5–49.3)
HGB BLD-MCNC: 14.8 G/DL (ref 12–17)
MAGNESIUM SERPL-MCNC: 2.1 MG/DL (ref 1.9–2.7)
MCH RBC QN AUTO: 30.4 PG (ref 26.8–34.3)
MCHC RBC AUTO-ENTMCNC: 32.6 G/DL (ref 31.4–37.4)
MCV RBC AUTO: 93 FL (ref 82–98)
PLATELET # BLD AUTO: 133 THOUSANDS/UL (ref 149–390)
PMV BLD AUTO: 9.8 FL (ref 8.9–12.7)
POTASSIUM SERPL-SCNC: 3.5 MMOL/L (ref 3.5–5.3)
RBC # BLD AUTO: 4.87 MILLION/UL (ref 3.88–5.62)
SODIUM SERPL-SCNC: 138 MMOL/L (ref 135–147)
WBC # BLD AUTO: 3.6 THOUSAND/UL (ref 4.31–10.16)

## 2024-12-05 PROCEDURE — 85027 COMPLETE CBC AUTOMATED: CPT | Performed by: INTERNAL MEDICINE

## 2024-12-05 PROCEDURE — 82948 REAGENT STRIP/BLOOD GLUCOSE: CPT

## 2024-12-05 PROCEDURE — 83735 ASSAY OF MAGNESIUM: CPT | Performed by: INTERNAL MEDICINE

## 2024-12-05 PROCEDURE — 80048 BASIC METABOLIC PNL TOTAL CA: CPT | Performed by: INTERNAL MEDICINE

## 2024-12-05 PROCEDURE — 97116 GAIT TRAINING THERAPY: CPT

## 2024-12-05 PROCEDURE — 99232 SBSQ HOSP IP/OBS MODERATE 35: CPT | Performed by: INTERNAL MEDICINE

## 2024-12-05 PROCEDURE — 97530 THERAPEUTIC ACTIVITIES: CPT

## 2024-12-05 PROCEDURE — 97535 SELF CARE MNGMENT TRAINING: CPT

## 2024-12-05 RX ORDER — POTASSIUM CHLORIDE 1500 MG/1
40 TABLET, EXTENDED RELEASE ORAL ONCE
Status: COMPLETED | OUTPATIENT
Start: 2024-12-05 | End: 2024-12-05

## 2024-12-05 RX ORDER — GUAIFENESIN/DEXTROMETHORPHAN 100-10MG/5
10 SYRUP ORAL 3 TIMES DAILY
Status: DISCONTINUED | OUTPATIENT
Start: 2024-12-05 | End: 2024-12-06 | Stop reason: HOSPADM

## 2024-12-05 RX ADMIN — PRAVASTATIN SODIUM 40 MG: 40 TABLET ORAL at 09:13

## 2024-12-05 RX ADMIN — GUAIFENESIN AND DEXTROMETHORPHAN 10 ML: 20; 200 SYRUP ORAL at 23:08

## 2024-12-05 RX ADMIN — CYANOCOBALAMIN TAB 500 MCG 250 MCG: 500 TAB at 09:13

## 2024-12-05 RX ADMIN — GUAIFENESIN AND DEXTROMETHORPHAN 10 ML: 20; 200 SYRUP ORAL at 09:54

## 2024-12-05 RX ADMIN — FINASTERIDE 5 MG: 5 TABLET, FILM COATED ORAL at 23:08

## 2024-12-05 RX ADMIN — POTASSIUM CHLORIDE 40 MEQ: 1500 TABLET, EXTENDED RELEASE ORAL at 09:32

## 2024-12-05 RX ADMIN — DOCUSATE SODIUM 100 MG: 100 CAPSULE, LIQUID FILLED ORAL at 09:13

## 2024-12-05 RX ADMIN — PANTOPRAZOLE SODIUM 40 MG: 40 TABLET, DELAYED RELEASE ORAL at 05:38

## 2024-12-05 RX ADMIN — REMDESIVIR 100 MG: 100 INJECTION, POWDER, LYOPHILIZED, FOR SOLUTION INTRAVENOUS at 16:11

## 2024-12-05 RX ADMIN — METOPROLOL SUCCINATE 25 MG: 25 TABLET, EXTENDED RELEASE ORAL at 09:13

## 2024-12-05 RX ADMIN — RIVAROXABAN 20 MG: 20 TABLET, FILM COATED ORAL at 16:34

## 2024-12-05 RX ADMIN — DOCUSATE SODIUM 100 MG: 100 CAPSULE, LIQUID FILLED ORAL at 17:52

## 2024-12-05 RX ADMIN — GUAIFENESIN AND DEXTROMETHORPHAN 10 ML: 20; 200 SYRUP ORAL at 16:34

## 2024-12-05 NOTE — ASSESSMENT & PLAN NOTE
Patient with history of stroke and residual dysphagia, word finding difficulty, LUE and LLE paralysis  Requires assistance at home with home health aides for ADLs  Continue home statin and anticoagulation for A-fib

## 2024-12-05 NOTE — ASSESSMENT & PLAN NOTE
Patient with history of stroke and residual dysphagia, word finding difficulty, LUE and LLE paralysis  Requires assistance at home with home health aides for ADLs  Continue home statin and anticoagulation for A-fib  Pending STR placement

## 2024-12-05 NOTE — DISCHARGE INSTR - AVS FIRST PAGE
Follow-up with your primary doctor in 1 to 2 weeks.  We have ordered a CAT scan of the head for you to complete in 4 weeks.  This is to follow-up on the abnormality in the blood vessel in the brain noted on the CAT scan during hospitalization.  We have ordered a CAT scan of the lungs for you to complete in 3 months.  This is to follow-up on the lung nodule noted on the CAT scan during hospitalization which may have been due to COVID.

## 2024-12-05 NOTE — ASSESSMENT & PLAN NOTE
Wt Readings from Last 3 Encounters:   12/06/24 87.9 kg (193 lb 12.6 oz)   11/19/24 91.9 kg (202 lb 11.2 oz)   11/07/24 90.7 kg (200 lb)     Echo this admission showing EF 55% with normal systolic function, mild LA and RA dilation, mild AI, and mild TR  Saturating well on room air, does not appear to be in acute exacerbation  Continue home beta-blocker & statin  Mild edema in extremities and rales on exam, possibly after fluids given from admission, given 40 mg IV Lasix yesterday with improvement

## 2024-12-05 NOTE — ASSESSMENT & PLAN NOTE
Patient presented after a fall at home, his aide reports that he was getting out of the shower and then his eyes rolled back and he syncopized, he was lowered to a chair so had no head strike, trauma workup on arrival was negative  Later found to be COVID-positive, this is possibly the cause of syncope  CTA head/neck on arrival showing abnormality in left vertebral artery possibly representing occlusion versus stenosis versus dissection  Would also consider cardiac etiology, though pacer interrogation on arrival without any events, telemetry does show frequent PVCs and one significant run of NSVT yesterday though it is possible that this is secondary to acute COVID as well, no further events on tele so will discontinue  Neurology consulted with no additional recommendations  Repeat CTA head/neck in approximately 4 weeks as outpatient for above finding

## 2024-12-05 NOTE — ASSESSMENT & PLAN NOTE
Platelet count in 120-130s, previously normal though under 200  Likely secondary to acute COVID infection

## 2024-12-05 NOTE — INCIDENTAL FINDINGS
The following findings require follow up:  Radiographic finding   Finding: CTA head/neck with no opacification/occlusion of intradural left vertebral artery; CT chest with 11 mm V shaped opacity in GOEMZ indeterminate but probably infectious/inflammatory   Follow up required: CTA head/neck in 4 weeks, CT chest in 3 months   Follow up should be done within above specified timeframe     Incidental finding results were discussed with the Patient by Katarzyna Talbot MD on 12/05/24.   They expressed understanding and all questions answered.

## 2024-12-05 NOTE — ASSESSMENT & PLAN NOTE
Initial CTA head and neck noted 1.7 cm spiculated nodule in GOMEZ suspicious for malignancy  Follow-up CT chest noted 11 mm opacity in GOMEZ which probably represents infectious/inflammatory etiology, recommend follow-up CT chest in approximately 3 months to ensure not concerning for malignancy, repeat imaging ordered for discharge

## 2024-12-05 NOTE — ASSESSMENT & PLAN NOTE
Patient noted to have COVID; likely contributing to underlying syncope  Per most recent COVID guidelines, patient will be admitted on mild pathway; troponin negative; follow-up CK, BNP, CRP  Patient is high risk; remdesivir 200 mg followed by 100 mg daily for 2 days  As per treatment guidelines, continue Xarelto at current dose  Isolation; supportive care  Currently saturating well on room air and not complaining of shortness of breath  Respiratory protocol and scheduled nebs

## 2024-12-05 NOTE — PLAN OF CARE
Problem: Potential for Falls  Goal: Patient will remain free of falls  Description: INTERVENTIONS:  - Educate patient/family on patient safety including physical limitations  - Instruct patient to call for assistance with activity   - Consult OT/PT to assist with strengthening/mobility   - Keep Call bell within reach  - Keep bed low and locked with side rails adjusted as appropriate  - Keep care items and personal belongings within reach  - Initiate and maintain comfort rounds  - Make Fall Risk Sign visible to staff  - Offer Toileting every 2 Hours, in advance of need  - Initiate/Maintain bed alarm  - Obtain necessary fall risk management equipment: yellow socks  - Apply yellow socks and bracelet for high fall risk patients  - Consider moving patient to room near nurses station  Outcome: Progressing     Problem: Prexisting or High Potential for Compromised Skin Integrity  Goal: Skin integrity is maintained or improved  Description: INTERVENTIONS:  - Identify patients at risk for skin breakdown  - Assess and monitor skin integrity  - Assess and monitor nutrition and hydration status  - Monitor labs   - Assess for incontinence   - Turn and reposition patient  - Assist with mobility/ambulation  - Relieve pressure over bony prominences  - Avoid friction and shearing  - Provide appropriate hygiene as needed including keeping skin clean and dry  - Evaluate need for skin moisturizer/barrier cream  - Collaborate with interdisciplinary team   - Patient/family teaching  - Consider wound care consult   Outcome: Progressing     Problem: PAIN - ADULT  Goal: Verbalizes/displays adequate comfort level or baseline comfort level  Description: Interventions:  - Encourage patient to monitor pain and request assistance  - Assess pain using appropriate pain scale  - Administer analgesics based on type and severity of pain and evaluate response  - Implement non-pharmacological measures as appropriate and evaluate response  - Consider  cultural and social influences on pain and pain management  - Notify physician/advanced practitioner if interventions unsuccessful or patient reports new pain  Outcome: Progressing     Problem: INFECTION - ADULT  Goal: Absence or prevention of progression during hospitalization  Description: INTERVENTIONS:  - Assess and monitor for signs and symptoms of infection  - Monitor lab/diagnostic results  - Monitor all insertion sites, i.e. indwelling lines, tubes, and drains  - Monitor endotracheal if appropriate and nasal secretions for changes in amount and color  - Cher[place] appropriate cooling/warming therapies per order  - Administer medications as ordered  - Instruct and encourage patient and family to use good hand hygiene technique  - Identify and instruct in appropriate isolation precautions for identified infection/condition  Outcome: Progressing     Problem: SAFETY ADULT  Goal: Patient will remain free of falls  Description: INTERVENTIONS:  - Educate patient/family on patient safety including physical limitations  - Instruct patient to call for assistance with activity   - Consult OT/PT to assist with strengthening/mobility   - Keep Call bell within reach  - Keep bed low and locked with side rails adjusted as appropriate  - Keep care items and personal belongings within reach  - Initiate and maintain comfort rounds  - Make Fall Risk Sign visible to staff  - Offer Toileting every 2 Hours, in advance of need  - Initiate/Maintain bed alarm  - Obtain necessary fall risk management equipment: yellow socks  - Apply yellow socks and bracelet for high fall risk patients  - Consider moving patient to room near nurses station  Outcome: Progressing     Problem: DISCHARGE PLANNING  Goal: Discharge to home or other facility with appropriate resources  Description: INTERVENTIONS:  - Identify barriers to discharge w/patient and caregiver  - Arrange for needed discharge resources and transportation as appropriate  - Identify  discharge learning needs (meds, wound care, etc.)  - Arrange for interpretive services to assist at discharge as needed  - Refer to Case Management Department for coordinating discharge planning if the patient needs post-hospital services based on physician/advanced practitioner order or complex needs related to functional status, cognitive ability, or social support system  Outcome: Progressing     Problem: Knowledge Deficit  Goal: Patient/family/caregiver demonstrates understanding of disease process, treatment plan, medications, and discharge instructions  Description: Complete learning assessment and assess knowledge base.  Interventions:  - Provide teaching at level of understanding  - Provide teaching via preferred learning methods  Outcome: Progressing     Problem: Nutrition/Hydration-ADULT  Goal: Nutrient/Hydration intake appropriate for improving, restoring or maintaining nutritional needs  Description: Monitor and assess patient's nutrition/hydration status for malnutrition. Collaborate with interdisciplinary team and initiate plan and interventions as ordered.  Monitor patient's weight and dietary intake as ordered or per policy. Utilize nutrition screening tool and intervene as necessary. Determine patient's food preferences and provide high-protein, high-caloric foods as appropriate.     INTERVENTIONS:  - Monitor oral intake, urinary output, labs, and treatment plans  - Assess nutrition and hydration status and recommend course of action  - Evaluate amount of meals eaten  - Assist patient with eating if necessary   - Allow adequate time for meals  - Recommend/ encourage appropriate diets, oral nutritional supplements, and vitamin/mineral supplements  - Order, calculate, and assess calorie counts as needed  - Recommend, monitor, and adjust tube feedings and TPN/PPN based on assessed needs  - Assess need for intravenous fluids  - Provide specific nutrition/hydration education as appropriate  - Include  patient/family/caregiver in decisions related to nutrition  Outcome: Progressing     Problem: CARDIOVASCULAR - ADULT  Goal: Maintains optimal cardiac output and hemodynamic stability  Description: INTERVENTIONS:  - Monitor I/O, vital signs and rhythm  - Monitor for S/S and trends of decreased cardiac output  - Administer and titrate ordered vasoactive medications to optimize hemodynamic stability  - Assess quality of pulses, skin color and temperature  - Assess for signs of decreased coronary artery perfusion  - Instruct patient to report change in severity of symptoms  Outcome: Progressing     Problem: RESPIRATORY - ADULT  Goal: Achieves optimal ventilation and oxygenation  Description: INTERVENTIONS:  - Assess for changes in respiratory status  - Assess for changes in mentation and behavior  - Position to facilitate oxygenation and minimize respiratory effort  - Oxygen administered by appropriate delivery if ordered  - Initiate smoking cessation education as indicated  - Encourage broncho-pulmonary hygiene including cough, deep breathe, Incentive Spirometry  - Assess the need for suctioning and aspirate as needed  - Assess and instruct to report SOB or any respiratory difficulty  - Respiratory Therapy support as indicated  Outcome: Progressing     Problem: GASTROINTESTINAL - ADULT  Goal: Maintains or returns to baseline bowel function  Description: INTERVENTIONS:  - Assess bowel function  - Encourage oral fluids to ensure adequate hydration  - Administer IV fluids if ordered to ensure adequate hydration  - Administer ordered medications as needed  - Encourage mobilization and activity  - Consider nutritional services referral to assist patient with adequate nutrition and appropriate food choices  Outcome: Progressing     Problem: GENITOURINARY - ADULT  Goal: Maintains or returns to baseline urinary function  Description: INTERVENTIONS:  - Assess urinary function  - Encourage oral fluids to ensure adequate  hydration if ordered  - Administer IV fluids as ordered to ensure adequate hydration  - Administer ordered medications as needed  - Offer frequent toileting  - Follow urinary retention protocol if ordered  Outcome: Progressing     Problem: MUSCULOSKELETAL - ADULT  Goal: Maintain or return mobility to safest level of function  Description: INTERVENTIONS:  - Assess patient's ability to carry out ADLs; assess patient's baseline for ADL function and identify physical deficits which impact ability to perform ADLs (bathing, care of mouth/teeth, toileting, grooming, dressing, etc.)  - Assess/evaluate cause of self-care deficits   - Assess range of motion  - Assess patient's mobility  - Assess patient's need for assistive devices and provide as appropriate  - Encourage maximum independence but intervene and supervise when necessary  - Involve family in performance of ADLs  - Assess for home care needs following discharge   - Consider OT consult to assist with ADL evaluation and planning for discharge  - Provide patient education as appropriate  Outcome: Progressing  Goal: Maintain proper alignment of affected body part  Description: INTERVENTIONS:  - Support, maintain and protect limb and body alignment  - Provide patient/ family with appropriate education  Outcome: Progressing

## 2024-12-05 NOTE — PLAN OF CARE
Problem: OCCUPATIONAL THERAPY ADULT  Goal: Performs self-care activities at highest level of function for planned discharge setting.  See evaluation for individualized goals.  Description: Treatment Interventions: ADL retraining, Functional transfer training, UE strengthening/ROM, Endurance training, Cognitive reorientation, Patient/family training, Equipment evaluation/education, Compensatory technique education          See flowsheet documentation for full assessment, interventions and recommendations.   Outcome: Progressing  Note: Limitation: Decreased ADL status, Decreased UE ROM, Decreased UE strength, Decreased Safe judgement during ADL, Decreased cognition, Decreased endurance, Decreased high-level ADLs, Non-func L UE  Prognosis: Fair  Assessment: Pt seen for 50min tx session with focus on functional balance, functional mobility, ADL status, transfer safety, and cognition. Pt able to tolerate OOB mobility; sitting balance=f/f-, standing balance=f-/p+. Pt required heavy assistance to maintain transfer safety(i.e.fatigued quickly), but improved since yesterday. Pt demonstrating need for assistance with his UE and LE ADLs; dependent with pao ORDOÑEZ. Cognitive deficits noted--i.e.higher-level direction-following, problem-solving. Pt would benefit from inpt rehab to improve his overall level of independence. Pt pleasant and cooperative with tx session. The patient's raw score on the -PAC Daily Activity Inpatient Short Form is 14. A raw score of less than 19 suggests the patient may benefit from discharge to post-acute rehabilitation services. Please refer to the recommendation of the Occupational Therapist for safe discharge planning.     Rehab Resource Intensity Level, OT: I (Maximum Resource Intensity)

## 2024-12-05 NOTE — ASSESSMENT & PLAN NOTE
Platelet count in 120-130s, previously normal though under 200  Likely secondary to acute COVID infection, continue to monitor

## 2024-12-05 NOTE — OCCUPATIONAL THERAPY NOTE
Occupational Therapy Progress Note     Patient Name: Les Malone  Today's Date: 12/5/2024  Problem List  Principal Problem:    Syncope  Active Problems:    Essential hypertension    Hyperlipidemia    Persistent atrial fibrillation (HCC)    Chronic diastolic CHF (congestive heart failure) (HCC)    History of stroke    Type 2 diabetes mellitus without complication, unspecified whether long term insulin use (HCC)    Cardiac pacemaker in situ    Lung nodule seen on imaging study    COVID    Thrombocytopenia (HCC)            12/05/24 1010   Note Type   Note Type Treatment   Pain Assessment   Pain Assessment Tool 0-10   Pain Score 2   Pain Location/Orientation Location: Head  (frontal congestion discomfort)   Restrictions/Precautions   Weight Bearing Precautions Per Order No   Other Precautions Cognitive;Chair Alarm;Bed Alarm;Fall Risk;Contact/isolation;Airborne/isolation   ADL   Where Assessed Edge of bed   Eating Assistance 5  Supervision/Setup   Grooming Assistance 4  Minimal Assistance   UB Bathing Assistance 4  Minimal Assistance   LB Bathing Assistance 2  Maximal Assistance   UB Dressing Assistance 3  Moderate Assistance   LB Dressing Assistance 2  Maximal Assistance   Functional Standing Tolerance   Time 1-2mins   Bed Mobility   Rolling R 2  Maximal assistance   Additional items Assist x 1;Increased time required;Verbal cues;LE management   Rolling L 2  Maximal assistance   Additional items Assist x 1;Increased time required;Verbal cues;LE management   Supine to Sit 2  Maximal assistance   Additional items Assist x 1;Increased time required;Verbal cues;LE management   Transfers   Sit to Stand 3  Moderate assistance  (initially minAx2 for sit-stand on bed, but later progressed to mod Ax2)   Additional items Assist x 2;Increased time required;Verbal cues   Stand to Sit 3  Moderate assistance   Additional items Assist x 2;Increased time required   Functional Mobility   Functional Mobility 3  Moderate  "assistance   Additional Comments x2   Additional items   (QC)   Subjective   Subjective \"I would like to go home\"   Cognition   Overall Cognitive Status Impaired   Arousal/Participation Alert   Attention Attends with cues to redirect   Orientation Level Oriented to person;Oriented to place;Oriented to time   Memory Decreased short term memory;Decreased recall of recent events;Decreased recall of precautions   Following Commands Follows one step commands with increased time or repetition   Comments hx aphasia; cognitive deficits?   Additional Activities   Additional Activities   (Pt seen as a partial co-tx with P.T. to improve safety with pt handling and provide challenging safe tx techniques)   Activity Tolerance   Activity Tolerance Patient limited by fatigue;Patient limited by pain   Medical Staff Made Aware nsg, P.T. CM   Assessment   Assessment Pt seen for 50min tx session with focus on functional balance, functional mobility, ADL status, transfer safety, and cognition. Pt able to tolerate OOB mobility; sitting balance=f/f-, standing balance=f-/p+. Pt required heavy assistance to maintain transfer safety(i.e.fatigued quickly), but improved since yesterday. Pt demonstrating need for assistance with his UE and LE ADLs; dependent with pao ORDOÑEZ. Cognitive deficits noted--i.e.higher-level direction-following, problem-solving. Pt would benefit from inpt rehab to improve his overall level of independence. Pt pleasant and cooperative with tx session. The patient's raw score on the AM-PAC Daily Activity Inpatient Short Form is 14. A raw score of less than 19 suggests the patient may benefit from discharge to post-acute rehabilitation services. Please refer to the recommendation of the Occupational Therapist for safe discharge planning.   Plan   Treatment Interventions ADL retraining;Functional transfer training;UE strengthening/ROM;Endurance training;Cognitive reorientation;Patient/family training;Equipment " evaluation/education;Compensatory technique education   Goal Expiration Date 12/18/24   OT Treatment Day 1   OT Frequency   (2-5xwk/1-2wks)   Discharge Recommendation   Rehab Resource Intensity Level, OT I (Maximum Resource Intensity)   AM-PAC Daily Activity Inpatient   Lower Body Dressing 2   Bathing 2   Toileting 2   Upper Body Dressing 2   Grooming 3   Eating 3   Daily Activity Raw Score 14   Daily Activity Standardized Score (Calc for Raw Score >=11) 33.39   AM-PAC Applied Cognition Inpatient   Following a Speech/Presentation 3   Understanding Ordinary Conversation 3   Taking Medications 2   Remembering Where Things Are Placed or Put Away 2   Remembering List of 4-5 Errands 2   Taking Care of Complicated Tasks 2   Applied Cognition Raw Score 14   Applied Cognition Standardized Score 32.02     Renaldo Evans

## 2024-12-05 NOTE — PHYSICAL THERAPY NOTE
"   Physical Therapy Treatment Session:       12/05/24 1100   PT Last Visit   PT Visit Date 12/05/24   Note Type   Note Type Treatment   Pain Assessment   Pain Assessment Tool 0-10   Pain Score 2   Pain Location/Orientation Location: Head   Hospital Pain Intervention(s) Repositioned;Ambulation/increased activity;Emotional support;Rest   Restrictions/Precautions   Other Precautions Cognitive;Chair Alarm;Bed Alarm;Impulsive;Multiple lines;Telemetry;Fall Risk;Pain  (dec safety awareness and dec awareness of current deficits and weakness)   General   Chart Reviewed Yes   Family/Caregiver Present Yes  (pts wife)   Cognition   Overall Cognitive Status Impaired   Arousal/Participation Cooperative;Responsive;Arousable   Attention Attends with cues to redirect   Orientation Level Oriented to person;Oriented to place;Oriented to time   Following Commands Follows one step commands with increased time or repetition   Comments impulsive, h/o aphasia, dec safety awareness and dec awareness of current deficits and weakness   Subjective   Subjective Pt sitting at EOB with OTR. Pt willing and agreeable to work with PT and to participate in therapy intervention. Limited verbal communication during therapy tx session.Pts wife present during therapy session; \"I can walk\"   Bed Mobility   Rolling R 2  Maximal assistance   Additional items Assist x 1;HOB elevated;Bedrails;Increased time required;Verbal cues;LE management   Rolling L 2  Maximal assistance   Additional items Assist x 1;HOB elevated;Bedrails;Increased time required;Verbal cues;LE management   Supine to Sit 2  Maximal assistance   Additional items Assist x 1;Bedrails;Increased time required;Verbal cues;LE management   Transfers   Sit to Stand 3  Moderate assistance  (initial sit<->stand min Ax2, with progressive and multiple sit<->stand transfers, pt fatigues and needs modAx2)   Additional items Assist x 2;Bedrails;Impulsive;Verbal cues   Stand to Sit 3  Moderate assistance "   Additional items Assist x 2;Armrests;Impulsive;Verbal cues  (A for control descent, A for education and instruction for proper placement of BLE and B hands prior to transfer;pt tends to fall into chair during descent)   Ambulation/Elevation   Gait pattern Poor UE support;Improper Weight shift;Antalgic;Narrow TARUN;Forward Flexion;Decreased L stance;Inconsistent yolanda;Foward flexed;Short stride;Ataxia;Step to;Excessively slow  (dec forward movement of LLE;pts LLE MAFO available for use during ambulation)   Gait Assistance 3  Moderate assist   Additional items Assist x 2;Verbal cues;Tactile cues   Assistive Device Large base quad cane  (personal WBQC)   Distance 12 feet, plus additional 10 feet and additional 7 feet with seated rest breaks inbetween each ambulation distance due to fatigue,weakness and inability to advance LLE   Balance   Static Sitting Fair   Dynamic Sitting   (zero)   Static Standing Zero   Dynamic Standing   (zero)   Ambulatory Zero   Endurance Deficit   Endurance Deficit Yes   Endurance Deficit Description fatigues quickly, dec safety awareness, dec awareness of current mobility deficits,dec advancement of LLE when fatigue and tiredness occur,limited ambulation distance and limited activity tolerance   Activity Tolerance   Activity Tolerance Patient limited by fatigue;Patient limited by pain  (poor->fair)   Medical Staff Made Aware OTR Renaldo   Nurse Made Aware yes   Assessment   Prognosis Fair   Problem List Decreased strength;Decreased range of motion;Decreased endurance;Impaired balance;Decreased mobility;Decreased coordination;Impaired judgement;Decreased safety awareness;Decreased skin integrity;Pain   Assessment Ambulation and gait assessment complete. Pt able to perform bed mobility needing maxAx1, initial sit<->stand transfers from raised seated surface needing min Ax2 and modAX2 for gait with use of LLE MAFO and personal WBQC. Pt fatigues very quickly with dec ability to advance LLE  following first ambulation distance of 12 feet with LOB x2 needing mod to maxAx1 to recover. Pt able to ambulate an additional short distance needing modAx2 and chair follow. Pt fatigues easily and has dec safety awareness and dec awareness of current functional mobility deficits. Pt would cont to benefit from skilled inpt PT services to maximize functional independence and to dec caregiver burden upon being DC from the hospital.   Goals   Patient Goals to return home   STG Expiration Date 12/18/24   PT Treatment Day 1   Plan   Treatment/Interventions Functional transfer training;LE strengthening/ROM;Therapeutic exercise;Endurance training;Patient/family training;Equipment eval/education;Bed mobility;Gait training;Continued evaluation;Spoke to nursing;OT;Family   Progress Slow progress, decreased activity tolerance   PT Frequency 3-5x/wk   Discharge Recommendation   Rehab Resource Intensity Level, PT I (Maximum Resource Intensity)   Equipment Recommended Wheelchair  (personal WBQC)   AM-PAC Basic Mobility Inpatient   Turning in Flat Bed Without Bedrails 2   Lying on Back to Sitting on Edge of Flat Bed Without Bedrails 2   Moving Bed to Chair 2   Standing Up From Chair Using Arms 2   Walk in Room 2   Climb 3-5 Stairs With Railing 1   Basic Mobility Inpatient Raw Score 11   Basic Mobility Standardized Score 30.25   [facility] Highest Level Of Mobility   -HLM Goal 4: Move to chair/commode   -HLM Achieved 6: Walk 10 steps or more

## 2024-12-05 NOTE — PROGRESS NOTES
Progress Note - Hospitalist   Name: Les Malone 74 y.o. male I MRN: [id number]  Unit/Bed#: [place] [address] -01 I Date of Admission: 12/3/2024   Date of Service: 12/5/2024 I Hospital Day: 2     Assessment & Plan  Syncope  Patient presented after a fall at home, his aide reports that he was getting out of the shower and then his eyes rolled back and he syncopized, he was lowered to a chair so had no head strike, trauma workup on arrival was negative  Later found to be COVID-positive, this is possibly the cause of syncope  CTA head/neck on arrival showing abnormality in left vertebral artery possibly representing occlusion versus stenosis versus dissection  Would also consider cardiac etiology, though pacer interrogation on arrival without any events, telemetry does show frequent PVCs and one significant run of NSVT yesterday though it is possible that this is secondary to acute COVID as well, no further events on tele so will discontinue  Neurology consulted with no additional recommendations  Repeat CTA head/neck in approximately 4 weeks as outpatient for above finding  Essential hypertension  Continue home Toprol 25 mg daily  Hyperlipidemia  Continue home statin  Persistent atrial fibrillation (HCC)  Patient with history of persistent atrial fibrillation  Pacer interrogation on arrival showing 100% in A-fib, persistent on telemetry as well with rates controlled  Continue home Toprol for rate control and Xarelto for anticoagulation  Chronic diastolic CHF (congestive heart failure) (HCC)  Wt Readings from Last 3 Encounters:   12/03/24 91.6 kg (202 lb)   11/19/24 91.9 kg (202 lb 11.2 oz)   11/07/24 90.7 kg (200 lb)     Echo this admission showing EF 55% with normal systolic function, mild LA and RA dilation, mild AI, and mild TR  Saturating well on room air, does not appear to be in acute exacerbation  Continue home beta-blocker & statin  Mild edema in extremities and rales on exam, possibly after fluids given  from admission, given 40 mg IV Lasix yesterday with improvement  History of stroke  Patient with history of stroke and residual dysphagia, word finding difficulty, LUE and LLE paralysis  Requires assistance at home with home health aides for ADLs  Continue home statin and anticoagulation for A-fib  Pending STR placement  Type 2 diabetes mellitus without complication, unspecified whether long term insulin use (HCC)  Lab Results   Component Value Date    HGBA1C 5.7 (H) 12/03/2024       Recent Labs     12/04/24  1113 12/04/24  1604 12/04/24 2028 12/05/24  0621   POCGLU 147* 117 145* 125     Controlled, not on medications at home  Monitor BG and start SSI if necessary  Carb controlled diet  Cardiac pacemaker in situ  Pacer interrogation as in principal problem  Lung nodule seen on imaging study  Initial CTA head and neck noted 1.7 cm spiculated nodule in GOMEZ suspicious for malignancy  Follow-up CT chest noted 11 mm opacity in GOMEZ which probably represents infectious/inflammatory etiology, recommend follow-up CT chest in approximately 3 months to ensure not concerning for malignancy, repeat imaging ordered for discharge  COVID  Patient noted to have COVID; likely contributing to underlying syncope  Per most recent COVID guidelines, patient will be admitted on mild pathway; troponin negative; follow-up CK, BNP, CRP  Patient is high risk; remdesivir 200 mg followed by 100 mg daily for 2 days  As per treatment guidelines, continue Xarelto at current dose  Isolation; supportive care  Currently saturating well on room air and not complaining of shortness of breath  Respiratory protocol and scheduled nebs  Thrombocytopenia (HCC)  Platelet count in 120-130s, previously normal though under 200  Likely secondary to acute COVID infection, continue to monitor    24 Hour Events : No events, remains on room air.  Subjective : Patient feeling slightly improved today though still mildly fatigued and with cough.  No additional  complaints.  Denies shortness of breath.    Patient's wife updated at bedside and all questions answered.    Objective :  Temp:  [97.3 °F (36.3 °C)-97.8 °F (36.6 °C)] 97.5 °F (36.4 °C)  HR:  [57-86] 86  BP: (119-168)/(56-86) 135/75  Resp:  [18-20] 18  SpO2:  [90 %-97 %] 96 %  O2 Device: None (Room air)    Physical Exam  Vitals reviewed.   Constitutional:       General: He is not in acute distress.     Appearance: He is not ill-appearing.   HENT:      Head: Normocephalic and atraumatic.      Right Ear: External ear normal.      Left Ear: External ear normal.      Nose: Nose normal.      Mouth/Throat:      Mouth: Mucous membranes are moist.   Eyes:      Conjunctiva/sclera: Conjunctivae normal.   Cardiovascular:      Rate and Rhythm: Normal rate and regular rhythm.      Heart sounds: Normal heart sounds.   Pulmonary:      Effort: Pulmonary effort is normal. No respiratory distress.      Breath sounds: Normal breath sounds.   Abdominal:      General: Bowel sounds are normal. There is no distension.      Palpations: Abdomen is soft.      Tenderness: There is no abdominal tenderness. There is no guarding.   Musculoskeletal:         General: Swelling (LUE) present.      Right lower leg: No edema.      Left lower leg: No edema.   Skin:     General: Skin is warm and dry.   Neurological:      Mental Status: He is alert and oriented to person, place, and time. Mental status is at baseline.      Cranial Nerves: No cranial nerve deficit.      Motor: Weakness present.      Comments: Baseline left-sided paralysis noted   Psychiatric:         Mood and Affect: Mood normal.         Behavior: Behavior normal.         Lab Results: I have reviewed the following results:CBC/BMP:   .     12/05/24  0415   WBC 3.60*   HGB 14.8   HCT 45.4   *   SODIUM 138   K 3.5      CO2 28   BUN 21   CREATININE 0.84   GLUC 108   MG 2.1        Imaging Results Review: No pertinent imaging studies reviewed.  Other Study Results Review: Other  studies reviewed include: telemetry    VTE Pharmacologic Prophylaxis: VTE covered by:  rivaroxaban, Oral, 20 mg at 12/04/24 1651      VTE Mechanical Prophylaxis: sequential compression device    Administrative Statements   I have spent a total time of 45 minutes in caring for this patient on the day of the visit/encounter including Diagnostic results, Prognosis, Risks and benefits of tx options, Instructions for management, Patient and family education, Importance of tx compliance, Risk factor reductions, Impressions, Counseling / Coordination of care, Documenting in the medical record, Reviewing / ordering tests, medicine, procedures  , Obtaining or reviewing history  , and Communicating with other healthcare professionals .

## 2024-12-05 NOTE — ASSESSMENT & PLAN NOTE
Wt Readings from Last 3 Encounters:   12/03/24 91.6 kg (202 lb)   11/19/24 91.9 kg (202 lb 11.2 oz)   11/07/24 90.7 kg (200 lb)     Echo this admission showing EF 55% with normal systolic function, mild LA and RA dilation, mild AI, and mild TR  Saturating well on room air, does not appear to be in acute exacerbation  Continue home beta-blocker & statin  Mild edema in extremities and rales on exam, possibly after fluids given from admission, given 40 mg IV Lasix yesterday with improvement

## 2024-12-05 NOTE — ASSESSMENT & PLAN NOTE
Lab Results   Component Value Date    HGBA1C 5.7 (H) 12/03/2024       Recent Labs     12/04/24  1113 12/04/24  1604 12/04/24 2028 12/05/24  0621   POCGLU 147* 117 145* 125     Controlled, not on medications at home  Monitor BG and start SSI if necessary  Carb controlled diet

## 2024-12-05 NOTE — DISCHARGE SUMMARY
Discharge Summary - Hospitalist   Name: Les Malone 74 y.o. male I MRN: [id number]  Unit/Bed#: [place] [address] -01 I Date of Admission: 12/3/2024   Date of Service: 12/6/2024 I Hospital Day: 3     Assessment & Plan  Syncope  Patient presented after a fall at home, his aide reports that he was getting out of the shower and then his eyes rolled back and he syncopized, he was lowered to a chair so had no head strike, trauma workup on arrival was negative  Later found to be COVID-positive, this is possibly the cause of syncope  CTA head/neck on arrival showing abnormality in left vertebral artery possibly representing occlusion versus stenosis versus dissection  Would also consider cardiac etiology, though pacer interrogation on arrival without any events, telemetry does show frequent PVCs and one significant run of NSVT yesterday though it is possible that this is secondary to acute COVID as well, no further events on tele so will discontinue  Neurology consulted with no additional recommendations  Repeat CTA head/neck in approximately 4 weeks as outpatient for above finding  Essential hypertension  Continue home Toprol 25 mg daily  Hyperlipidemia  Continue home statin  Persistent atrial fibrillation (HCC)  Patient with history of persistent atrial fibrillation  Pacer interrogation on arrival showing 100% in A-fib, persistent on telemetry as well with rates controlled  Continue home Toprol for rate control and Xarelto for anticoagulation  Chronic diastolic CHF (congestive heart failure) (HCC)  Wt Readings from Last 3 Encounters:   12/06/24 87.9 kg (193 lb 12.6 oz)   11/19/24 91.9 kg (202 lb 11.2 oz)   11/07/24 90.7 kg (200 lb)     Echo this admission showing EF 55% with normal systolic function, mild LA and RA dilation, mild AI, and mild TR  Saturating well on room air, does not appear to be in acute exacerbation  Continue home beta-blocker & statin  Mild edema in extremities and rales on exam, possibly after  fluids given from admission, given 40 mg IV Lasix yesterday with improvement  History of stroke  Patient with history of stroke and residual dysphagia, word finding difficulty, LUE and LLE paralysis  Requires assistance at home with home health aides for ADLs  Continue home statin and anticoagulation for A-fib  Type 2 diabetes mellitus without complication, unspecified whether long term insulin use (HCC)  Lab Results   Component Value Date    HGBA1C 5.7 (H) 12/03/2024       Recent Labs     12/04/24  1113 12/04/24  1604 12/04/24 2028 12/05/24  0621   POCGLU 147* 117 145* 125     Controlled, not on medications at home  Monitor BG and start SSI if necessary  Carb controlled diet  Cardiac pacemaker in situ  Pacer interrogation as in principal problem  Lung nodule seen on imaging study  Initial CTA head and neck noted 1.7 cm spiculated nodule in GOMEZ suspicious for malignancy  Follow-up CT chest noted 11 mm opacity in GOMEZ which probably represents infectious/inflammatory etiology, recommend follow-up CT chest in approximately 3 months to ensure not concerning for malignancy, repeat imaging ordered for discharge  COVID  Patient noted to have COVID; likely contributing to underlying syncope  Per most recent COVID guidelines, patient will be admitted on mild pathway; troponin negative; CK and BNP WNL, CRP elevated at 130  Patient is high risk; completed remdesivir  As per treatment guidelines, continue Xarelto at current dose  Isolation; supportive care  Saturating well on room air and not complaining of shortness of breath  Thrombocytopenia (HCC)  Platelet count in 120-130s, previously normal though under 200  Likely secondary to acute COVID infection     Medical Problems       Resolved Problems  Date Reviewed: 11/19/2024   None       Discharging Physician / Practitioner: Katarzyna Talbot MD  PCP: Ralph Roberts MD  Admission Date:   Admission Orders (From admission, onward)       Ordered        12/03/24 1319  INPATIENT  ADMISSION  Once                          Discharge Date: 12/06/24    Consultations During Hospital Stay:  Neurology    Procedures Performed:   None    Significant Findings / Test Results:   COVID-positive    Incidental Findings:   Abnormality of left vertebral artery on CTA  GOMEZ lung nodule  I reviewed the above mentioned incidental findings with the patient and/or family and they expressed understanding.    Test Results Pending at Discharge (will require follow up):   None     Outpatient Tests Requested:  CTA head/neck in 4 weeks  CT chest in 3 months    Complications: None    Reason for Admission: Syncope    Hospital Course:   Les Malone is a 74 y.o. male patient who originally presented to the hospital on 12/3/2024 due to syncopal episode.    Hospital course: Patient presented after a fall at home after getting out of the shower, was lowered to the ground with no head strike.  Trauma workup on arrival unremarkable.  Found to be COVID-positive which is suspected to be most likely etiology of syncope.  Admitted on mild COVID pathway and completed remdesivir treatment.  Patient remained stable on room air throughout admission without respiratory distress.  Monitored on telemetry to rule out cardiac causes which showed 1 run of NSVT shortly after admission, but no additional episodes.  His pacemaker was additionally interrogated and did not show any cardiac events to explain syncope.  Patient appeared mildly volume overloaded on day 1 of admission so was given Lasix with good response.    Please see above list of diagnoses and related plan for additional information.     Condition at Discharge: stable    Discharge Day Visit / Exam:   Subjective: Patient feeling well on day of discharge.  No new complaints.  Continues to have mild dry cough.  States breathing feels at baseline.  Vitals: Blood Pressure: 124/71 (12/06/24 0336)  Pulse: 61 (12/06/24 0336)  Temperature: 97.5 °F (36.4 °C) (12/06/24 0336)  Temp  Source: Oral (12/05/24 0318)  Respirations: 16 (12/06/24 0336)  Height: 6' (182.9 cm) (12/03/24 1415)  Weight - Scale: 87.9 kg (193 lb 12.6 oz) (12/06/24 0537)  SpO2: 98 % (12/06/24 0336)  Physical Exam  Vitals reviewed.   Constitutional:       General: He is not in acute distress.     Appearance: He is not ill-appearing.   HENT:      Head: Normocephalic and atraumatic.      Right Ear: External ear normal.      Left Ear: External ear normal.      Nose: Nose normal.      Mouth/Throat:      Mouth: Mucous membranes are moist.   Eyes:      Conjunctiva/sclera: Conjunctivae normal.   Cardiovascular:      Rate and Rhythm: Normal rate and regular rhythm.      Heart sounds: Normal heart sounds.   Pulmonary:      Effort: Pulmonary effort is normal. No respiratory distress.      Breath sounds: Normal breath sounds.   Abdominal:      General: Bowel sounds are normal. There is no distension.      Palpations: Abdomen is soft.      Tenderness: There is no abdominal tenderness. There is no guarding.   Musculoskeletal:         General: No swelling.      Right lower leg: No edema.      Left lower leg: No edema.   Skin:     General: Skin is warm and dry.   Neurological:      Mental Status: He is alert and oriented to person, place, and time. Mental status is at baseline.      Cranial Nerves: No cranial nerve deficit.      Motor: Weakness (LLE and LUE paralysis as baseline) present.   Psychiatric:         Mood and Affect: Mood normal.         Behavior: Behavior normal.          Discussion with Family: Updated  (wife) at bedside.    Discharge instructions/Information to patient and family:   See after visit summary for information provided to patient and family.      Provisions for Follow-Up Care:  See after visit summary for information related to follow-up care and any pertinent home health orders.      Mobility at time of Discharge:   Basic Mobility Inpatient Raw Score: 13  -[facility] Goal: 4: Move to chair/commode  -HLM  Achieved: 1: Laying in bed     Disposition:   Acute Rehab at [facility]    Planned Readmission: no     Discharge Medications:  See after visit summary for reconciled discharge medications provided to patient and/or family.      Administrative Statements   Discharge Statement:  I have spent a total time of 60 minutes in caring for this patient on the day of the visit/encounter. >30 minutes of time was spent on: Diagnostic results, Prognosis, Risks and benefits of tx options, Instructions for management, Patient and family education, Importance of tx compliance, Risk factor reductions, Impressions, Counseling / Coordination of care, Documenting in the medical record, Reviewing / ordering tests, medicine, procedures  , and Communicating with other healthcare professionals .    **Please Note: This note may have been constructed using a voice recognition system**

## 2024-12-05 NOTE — PLAN OF CARE
Problem: PHYSICAL THERAPY ADULT  Goal: Performs mobility at highest level of function for planned discharge setting.  See evaluation for individualized goals.  Description: Treatment/Interventions: Functional transfer training, LE strengthening/ROM, Therapeutic exercise, Endurance training, Patient/family training, Bed mobility, Gait training, Spoke to nursing, OT, Family  Equipment Recommended: Wheelchair (pt has)       See flowsheet documentation for full assessment, interventions and recommendations.  Note: Prognosis: Fair  Problem List: Decreased strength, Decreased range of motion, Decreased endurance, Impaired balance, Decreased mobility, Decreased coordination, Impaired judgement, Decreased safety awareness, Decreased skin integrity, Pain  Assessment: Ambulation and gait assessment complete. Pt able to perform bed mobility needing maxAx1, initial sit<->stand transfers from raised seated surface needing min Ax2 and modAX2 for gait with use of LLE MAFO and personal WBQC. Pt fatigues very quickly with dec ability to advance LLE following first ambulation distance of 12 feet with LOB x2 needing mod to maxAx1 to recover. Pt able to ambulate an additional short distance needing modAx2 and chair follow. Pt fatigues easily and has dec safety awareness and dec awareness of current functional mobility deficits. Pt would cont to benefit from skilled inpt PT services to maximize functional independence and to dec caregiver burden upon being DC from the hospital.        Rehab Resource Intensity Level, PT: I (Maximum Resource Intensity)    See flowsheet documentation for full assessment.

## 2024-12-05 NOTE — CASE MANAGEMENT
Case Management Discharge Planning Note    Patient name Les Malone  Location [place] [address] /South 2 M* MRN [id number]  : 1950 Date 2024       Current Admission Date: 12/3/2024  Current Admission Diagnosis:Syncope   Patient Active Problem List    Diagnosis Date Noted Date Diagnosed    Thrombocytopenia (HCC) 2024     Lung nodule seen on imaging study 2024     COVID 2024     Cardiac pacemaker in situ 2024     Type 2 diabetes mellitus without complication, unspecified whether long term insulin use (HCC) 2023     Abdominal pain 2023     Superior mesenteric artery stenosis (Formerly [facility]) 2023     History of gross hematuria 10/12/2021     BPH with obstruction/lower urinary tract symptoms 10/12/2021     Bladder stone 10/12/2021     Bilateral carotid artery stenosis 2021     Syncope 2020     Nonintractable epilepsy without status epilepticus (Formerly [facility]) 2020     Fatigue 10/25/2019     History of stroke 2019     Acute leg pain, right 2019     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(Formerly [facility]) 2019     Hypernatremia 2019     Acute blood loss anemia 2019     Acute kidney injury (Former[facility]) 2019     Chronic diastolic CHF (congestive heart failure) (Formerly [facility]) 2019     Persistent atrial fibrillation ([facility]) 2019     Dysphagia 2019     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  (Formerly [facility]) 2019     Left hemiplegia (Formerly [facility]) 2019     Prediabetes 11/15/2018     Essential hypertension 2018     Hypokalemia 2018     Elevated brain natriuretic peptide (BNP) level 2018     Hx of right knee surgery 2018     Hyperlipidemia 2018       LOS (days): 2  Geometric Mean LOS (GMLOS) (days): 3.2  Days to GMLOS:1.3     OBJECTIVE:  Risk of Unplanned Readmission Score: 14.69         Current admission status: Inpatient   Preferred Pharmacy:   [id number] - [place] - [address] [place] [address]  [facility] [id number]  Phone: [phone] Fax: [phone]    Primary Care Provider: Ralph Roberts MD    Primary Insurance: MEDICARE  Secondary Insurance: BLUE CROSS    DISCHARGE DETAILS:    Discharge planning discussed with:: pts wife Elvia  Freedom of Choice: Yes  Comments - Freedom of Choice: pt and wife have now agreed to STR on d/c only at [facility] with referral made.  Should pt be accepted would be a Thursday admission due to timing of final medications in hospital.  Will continue to follow for determination.  CM contacted family/caregiver?: Yes  Were Treatment Team discharge recommendations reviewed with patient/caregiver?: Yes  Did patient/caregiver verbalize understanding of patient care needs?: Yes       Contacts  Patient Contacts: Elvia Malone  Relationship to Patient:: Family  Contact Method: Phone  Phone Number: [phone]  Reason/Outcome: Referral, Discharge Planning                        Treatment Team Recommendation: Short Term Rehab                                   IMM Given (Date):: 12/05/24  IMM Given to:: Family (via phone w/ wife 2' covid)

## 2024-12-05 NOTE — ASSESSMENT & PLAN NOTE
Patient noted to have COVID; likely contributing to underlying syncope  Per most recent COVID guidelines, patient will be admitted on mild pathway; troponin negative; CK and BNP WNL, CRP elevated at 130  Patient is high risk; completed remdesivir  As per treatment guidelines, continue Xarelto at current dose  Isolation; supportive care  Saturating well on room air and not complaining of shortness of breath

## 2024-12-06 VITALS
HEART RATE: 61 BPM | DIASTOLIC BLOOD PRESSURE: 71 MMHG | SYSTOLIC BLOOD PRESSURE: 124 MMHG | TEMPERATURE: 97.5 F | WEIGHT: 193.78 LBS | OXYGEN SATURATION: 98 % | RESPIRATION RATE: 16 BRPM | HEIGHT: 72 IN | BODY MASS INDEX: 26.25 KG/M2

## 2024-12-06 PROCEDURE — 99239 HOSP IP/OBS DSCHRG MGMT >30: CPT | Performed by: INTERNAL MEDICINE

## 2024-12-06 RX ADMIN — PRAVASTATIN SODIUM 40 MG: 40 TABLET ORAL at 09:09

## 2024-12-06 RX ADMIN — CYANOCOBALAMIN TAB 500 MCG 250 MCG: 500 TAB at 09:09

## 2024-12-06 RX ADMIN — GUAIFENESIN AND DEXTROMETHORPHAN 10 ML: 20; 200 SYRUP ORAL at 09:09

## 2024-12-06 RX ADMIN — METOPROLOL SUCCINATE 25 MG: 25 TABLET, EXTENDED RELEASE ORAL at 09:09

## 2024-12-06 RX ADMIN — DOCUSATE SODIUM 100 MG: 100 CAPSULE, LIQUID FILLED ORAL at 09:09

## 2024-12-06 RX ADMIN — PANTOPRAZOLE SODIUM 40 MG: 40 TABLET, DELAYED RELEASE ORAL at 05:14

## 2024-12-06 NOTE — NURSING NOTE
Pt d/c to rehab via stretcher by transport team in a stretcher van. No s/s of any distress noted. Report called over to rehab center.Pt took all personal belongings.

## 2024-12-06 NOTE — CASE MANAGEMENT
Case Management Discharge Planning Note    Patient name Les Malone  Location [facility] 2 /South 2 M* MRN [id number]  : 1950 Date 2024         OBJECTIVE:  Risk of Unplanned Readmission Score: 14.91         Current admission status: Inpatient       DISCHARGE DETAILS:                                                   Treatment Team Recommendation: Short Term Rehab  Discharge Destination Plan:: Acute Rehab, Short Term Rehab ([facility])  Transport at Discharge : Stretcher van     Number/Name of Dispatcher: Roundtrip  Transported by (Company and Unit #): [employer]  ETA of Transport (Date): 24  ETA of Transport (Time): 1110                            Accepting Facility Name, City & State : [facility]  Receiving Facility/Agency Phone Number: [phone]  Facility/Agency Fax Number: [phone]     Room 225

## 2024-12-06 NOTE — PLAN OF CARE
Problem: Potential for Falls  Goal: Patient will remain free of falls  Description: INTERVENTIONS:  - Educate patient/family on patient safety including physical limitations  - Instruct patient to call for assistance with activity   - Consult OT/PT to assist with strengthening/mobility   - Keep Call bell within reach  - Keep bed low and locked with side rails adjusted as appropriate  - Keep care items and personal belongings within reach  - Initiate and maintain comfort rounds  - Make Fall Risk Sign visible to staff  - Offer Toileting every 2 Hours, in advance of need  - Initiate/Maintain alarm  - Obtain necessary fall risk management equipment  - Apply yellow socks and bracelet for high fall risk patients  - Consider moving patient to room near nurses station  Outcome: Progressing     Problem: Prexisting or High Potential for Compromised Skin Integrity  Goal: Skin integrity is maintained or improved  Description: INTERVENTIONS:  - Identify patients at risk for skin breakdown  - Assess and monitor skin integrity  - Assess and monitor nutrition and hydration status  - Monitor labs   - Assess for incontinence   - Turn and reposition patient  - Assist with mobility/ambulation  - Relieve pressure over bony prominences  - Avoid friction and shearing  - Provide appropriate hygiene as needed including keeping skin clean and dry  - Evaluate need for skin moisturizer/barrier cream  - Collaborate with interdisciplinary team   - Patient/family teaching  - Consider wound care consult   Outcome: Progressing     Problem: PAIN - ADULT  Goal: Verbalizes/displays adequate comfort level or baseline comfort level  Description: Interventions:  - Encourage patient to monitor pain and request assistance  - Assess pain using appropriate pain scale  - Administer analgesics based on type and severity of pain and evaluate response  - Implement non-pharmacological measures as appropriate and evaluate response  - Consider cultural and social  influences on pain and pain management  - Notify physician/advanced practitioner if interventions unsuccessful or patient reports new pain  Outcome: Progressing

## 2024-12-06 NOTE — PLAN OF CARE
Problem: Potential for Falls  Goal: Patient will remain free of falls  Description: INTERVENTIONS:  - Educate patient/family on patient safety including physical limitations  - Instruct patient to call for assistance with activity   - Consult OT/PT to assist with strengthening/mobility   - Keep Call bell within reach  - Keep bed low and locked with side rails adjusted as appropriate  - Keep care items and personal belongings within reach  - Initiate and maintain comfort rounds  - Make Fall Risk Sign visible to staff  - Offer Toileting every 2 Hours, in advance of need  - Initiate/Maintain bed alarm  - Obtain necessary fall risk management equipment: bed alarm  - Apply yellow socks and bracelet for high fall risk patients  - Consider moving patient to room near nurses station  Outcome: Progressing     Problem: Prexisting or High Potential for Compromised Skin Integrity  Goal: Skin integrity is maintained or improved  Description: INTERVENTIONS:  - Identify patients at risk for skin breakdown  - Assess and monitor skin integrity  - Assess and monitor nutrition and hydration status  - Monitor labs   - Assess for incontinence   - Turn and reposition patient  - Assist with mobility/ambulation  - Relieve pressure over bony prominences  - Avoid friction and shearing  - Provide appropriate hygiene as needed including keeping skin clean and dry  - Evaluate need for skin moisturizer/barrier cream  - Collaborate with interdisciplinary team   - Patient/family teaching  - Consider wound care consult   Outcome: Progressing     Problem: PAIN - ADULT  Goal: Verbalizes/displays adequate comfort level or baseline comfort level  Description: Interventions:  - Encourage patient to monitor pain and request assistance  - Assess pain using appropriate pain scale  - Administer analgesics based on type and severity of pain and evaluate response  - Implement non-pharmacological measures as appropriate and evaluate response  - Consider  cultural and social influences on pain and pain management  - Notify physician/advanced practitioner if interventions unsuccessful or patient reports new pain  Outcome: Progressing     Problem: INFECTION - ADULT  Goal: Absence or prevention of progression during hospitalization  Description: INTERVENTIONS:  - Assess and monitor for signs and symptoms of infection  - Monitor lab/diagnostic results  - Monitor all insertion sites, i.e. indwelling lines, tubes, and drains  - Monitor endotracheal if appropriate and nasal secretions for changes in amount and color  - Clayton appropriate cooling/warming therapies per order  - Administer medications as ordered  - Instruct and encourage patient and family to use good hand hygiene technique  - Identify and instruct in appropriate isolation precautions for identified infection/condition  Outcome: Progressing     Problem: SAFETY ADULT  Goal: Patient will remain free of falls  Description: INTERVENTIONS:  - Educate patient/family on patient safety including physical limitations  - Instruct patient to call for assistance with activity   - Consult OT/PT to assist with strengthening/mobility   - Keep Call bell within reach  - Keep bed low and locked with side rails adjusted as appropriate  - Keep care items and personal belongings within reach  - Initiate and maintain comfort rounds  - Make Fall Risk Sign visible to staff  - Offer Toileting every 2 Hours, in advance of need  - Initiate/Maintain bed alarm  - Obtain necessary fall risk management equipment: bed alarm  - Apply yellow socks and bracelet for high fall risk patients  - Consider moving patient to room near nurses station  Outcome: Progressing     Problem: DISCHARGE PLANNING  Goal: Discharge to home or other facility with appropriate resources  Description: INTERVENTIONS:  - Identify barriers to discharge w/patient and caregiver  - Arrange for needed discharge resources and transportation as appropriate  - Identify  discharge learning needs (meds, wound care, etc.)  - Arrange for interpretive services to assist at discharge as needed  - Refer to Case Management Department for coordinating discharge planning if the patient needs post-hospital services based on physician/advanced practitioner order or complex needs related to functional status, cognitive ability, or social support system  Outcome: Progressing     Problem: Knowledge Deficit  Goal: Patient/family/caregiver demonstrates understanding of disease process, treatment plan, medications, and discharge instructions  Description: Complete learning assessment and assess knowledge base.  Interventions:  - Provide teaching at level of understanding  - Provide teaching via preferred learning methods  Outcome: Progressing     Problem: Nutrition/Hydration-ADULT  Goal: Nutrient/Hydration intake appropriate for improving, restoring or maintaining nutritional needs  Description: Monitor and assess patient's nutrition/hydration status for malnutrition. Collaborate with interdisciplinary team and initiate plan and interventions as ordered.  Monitor patient's weight and dietary intake as ordered or per policy. Utilize nutrition screening tool and intervene as necessary. Determine patient's food preferences and provide high-protein, high-caloric foods as appropriate.     INTERVENTIONS:  - Monitor oral intake, urinary output, labs, and treatment plans  - Assess nutrition and hydration status and recommend course of action  - Evaluate amount of meals eaten  - Assist patient with eating if necessary   - Allow adequate time for meals  - Recommend/ encourage appropriate diets, oral nutritional supplements, and vitamin/mineral supplements  - Order, calculate, and assess calorie counts as needed  - Recommend, monitor, and adjust tube feedings and TPN/PPN based on assessed needs  - Assess need for intravenous fluids  - Provide specific nutrition/hydration education as appropriate  - Include  patient/family/caregiver in decisions related to nutrition  Outcome: Progressing     Problem: CARDIOVASCULAR - ADULT  Goal: Maintains optimal cardiac output and hemodynamic stability  Description: INTERVENTIONS:  - Monitor I/O, vital signs and rhythm  - Monitor for S/S and trends of decreased cardiac output  - Administer and titrate ordered vasoactive medications to optimize hemodynamic stability  - Assess quality of pulses, skin color and temperature  - Assess for signs of decreased coronary artery perfusion  - Instruct patient to report change in severity of symptoms  Outcome: Progressing     Problem: RESPIRATORY - ADULT  Goal: Achieves optimal ventilation and oxygenation  Description: INTERVENTIONS:  - Assess for changes in respiratory status  - Assess for changes in mentation and behavior  - Position to facilitate oxygenation and minimize respiratory effort  - Oxygen administered by appropriate delivery if ordered  - Initiate smoking cessation education as indicated  - Encourage broncho-pulmonary hygiene including cough, deep breathe, Incentive Spirometry  - Assess the need for suctioning and aspirate as needed  - Assess and instruct to report SOB or any respiratory difficulty  - Respiratory Therapy support as indicated  Outcome: Progressing     Problem: GASTROINTESTINAL - ADULT  Goal: Maintains or returns to baseline bowel function  Description: INTERVENTIONS:  - Assess bowel function  - Encourage oral fluids to ensure adequate hydration  - Administer IV fluids if ordered to ensure adequate hydration  - Administer ordered medications as needed  - Encourage mobilization and activity  - Consider nutritional services referral to assist patient with adequate nutrition and appropriate food choices  Outcome: Progressing     Problem: GENITOURINARY - ADULT  Goal: Maintains or returns to baseline urinary function  Description: INTERVENTIONS:  - Assess urinary function  - Encourage oral fluids to ensure adequate  hydration if ordered  - Administer IV fluids as ordered to ensure adequate hydration  - Administer ordered medications as needed  - Offer frequent toileting  - Follow urinary retention protocol if ordered  Outcome: Progressing     Problem: MUSCULOSKELETAL - ADULT  Goal: Maintain or return mobility to safest level of function  Description: INTERVENTIONS:  - Assess patient's ability to carry out ADLs; assess patient's baseline for ADL function and identify physical deficits which impact ability to perform ADLs (bathing, care of mouth/teeth, toileting, grooming, dressing, etc.)  - Assess/evaluate cause of self-care deficits   - Assess range of motion  - Assess patient's mobility  - Assess patient's need for assistive devices and provide as appropriate  - Encourage maximum independence but intervene and supervise when necessary  - Involve family in performance of ADLs  - Assess for home care needs following discharge   - Consider OT consult to assist with ADL evaluation and planning for discharge  - Provide patient education as appropriate  Outcome: Progressing  Goal: Maintain proper alignment of affected body part  Description: INTERVENTIONS:  - Support, maintain and protect limb and body alignment  - Provide patient/ family with appropriate education  Outcome: Progressing

## 2024-12-06 NOTE — CASE MANAGEMENT
Case Management Discharge Planning Note    Patient name Les Malone  Location [facility] 2 /South 2 M* MRN [id number]  : 1950 Date 2024         OBJECTIVE:  Risk of Unplanned Readmission Score: 14.87         Current admission status: Inpatient   Preferred Pharmacy:   [id number] - [place] - [address] [place] [address]  [address] [place] [address]  Phone: [phone] Fax: [phone]    Primary Care Provider: Ralph Roberts MD    Primary Insurance: MEDICARE  Secondary Insurance: BLUE CROSS    DISCHARGE DETAILS:                           Contacts  Patient Contacts: Elvia Malone  Relationship to Patient:: Family  Contact Method: Phone  Phone Number: [phone]  Reason/Outcome: Discharge Planning                        Treatment Team Recommendation: Short Term Rehab  Discharge Destination Plan:: Short Term Rehab, Acute Rehab ([facility])  Transport at Discharge : Brannon obrien     Number/Name of Dispatcher: Dave     ETA of Transport (Date): 24 (requested 1100  (not finalized))                               Accepting Facility Name, City & State : [facility]

## 2024-12-07 NOTE — TELEPHONE ENCOUNTER
1ST ATTEMPT,     [employer] MESSAGE SENT AS WELL.      Thank you,     Reba LOWE- FACILITY - 12/06/2024    Discharging Facility Name and Phone Number Flowsheet Row Most Recent Value Accepting Facility Name, City &State [facility] Receiving Facility/Agency Phone Number [phone]

## 2024-12-11 ENCOUNTER — RESULTS FOLLOW-UP (OUTPATIENT)
Dept: CARDIOLOGY CLINIC | Facility: CLINIC | Age: 74
End: 2024-12-11

## 2024-12-11 NOTE — TELEPHONE ENCOUNTER
Hello Good Day,     Can you please help schedule an HFU Long With   as per below requested below?   placed on waiting list , nothing available/sooner Than May 2025...    Thank you in advance,     Ana       SCHEDULED WITH , HFU LONG, CTR VL, 5/2/2025, 12:30 PM AND PLACED ON WAITING LIST    THANK YOU,     ANA

## 2024-12-23 ENCOUNTER — TELEPHONE (OUTPATIENT)
Age: 74
End: 2024-12-23

## 2024-12-23 DIAGNOSIS — K64.9 HEMORRHOIDS, UNSPECIFIED HEMORRHOID TYPE: Primary | ICD-10-CM

## 2024-12-23 NOTE — TELEPHONE ENCOUNTER
LOV:7/25/24    HX:Hx of PUD, GERD    Pt's wife, Elvia calling to ask if pt can be referred for banding for hemorrhoids.     Please advise.

## 2024-12-26 NOTE — TELEPHONE ENCOUNTER
Pt. Wife called back, had a missed call for CRS consult appointment scheduling, warm transfer to GI scheduling to further assist

## 2025-01-02 ENCOUNTER — HOSPITAL ENCOUNTER (OUTPATIENT)
Dept: CT IMAGING | Facility: HOSPITAL | Age: 75
Discharge: HOME | End: 2025-01-02
Attending: PSYCHIATRY & NEUROLOGY
Payer: MEDICARE

## 2025-01-02 DIAGNOSIS — I77.74 VERTEBRAL ARTERY DISSECTION (HCC): ICD-10-CM

## 2025-01-02 PROCEDURE — 70496 CT ANGIOGRAPHY HEAD: CPT

## 2025-01-02 PROCEDURE — 70498 CT ANGIOGRAPHY NECK: CPT

## 2025-01-02 RX ADMIN — IOHEXOL 85 ML: 350 INJECTION, SOLUTION INTRAVENOUS at 14:08

## 2025-01-07 ENCOUNTER — RESULTS FOLLOW-UP (OUTPATIENT)
Dept: CARDIOLOGY CLINIC | Facility: CLINIC | Age: 75
End: 2025-01-07

## 2025-01-07 ENCOUNTER — REMOTE DEVICE CLINIC VISIT (OUTPATIENT)
Dept: CARDIOLOGY CLINIC | Facility: CLINIC | Age: 75
End: 2025-01-07
Payer: MEDICARE

## 2025-01-07 DIAGNOSIS — Z95.0 CARDIAC PACEMAKER IN SITU: Primary | ICD-10-CM

## 2025-01-07 PROCEDURE — 93294 REM INTERROG EVL PM/LDLS PM: CPT | Performed by: INTERNAL MEDICINE

## 2025-01-07 PROCEDURE — 93296 REM INTERROG EVL PM/IDS: CPT | Performed by: INTERNAL MEDICINE

## 2025-01-07 NOTE — PROGRESS NOTES
"Results for orders placed or performed in visit on 01/07/25   Cardiac EP device report    Narrative    MDT DC PM (VVIR 60)/ACTIVE SYSTEM IS MRI CONDITIONAL  CARELINK TRANSMISSION: BATTERY STATUS \"11 YRS.\"  62%. ALL AVAILABLE LEAD PARAMETERS WITHIN NORMAL LIMITS. 100% AF BURDEN; PT ON XARELTO. NORMAL DEVICE FUNCTION. NC         "

## 2025-01-08 ENCOUNTER — TELEPHONE (OUTPATIENT)
Dept: CARDIAC SURGERY | Facility: CLINIC | Age: 75
End: 2025-01-08

## 2025-01-08 NOTE — TELEPHONE ENCOUNTER
Spoke with patient's wife, patient has had recent COVID and still recovering, will consider March for Watchman. I informed her I will call end of February to discuss.   
Stable

## 2025-01-22 ENCOUNTER — NURSE TRIAGE (OUTPATIENT)
Age: 75
End: 2025-01-22

## 2025-01-22 NOTE — TELEPHONE ENCOUNTER
"Reason for Conversation: Received call from pt's wife reporting elevated BP reading over the last few days.  She states the pt is asymptomatic and denies any headache, visual changes, unilateral weakness, slurred speech, palpitations, or chest pain.  She takes his blood pressure very morning and he has not missed any doses of metoprolol.  Pt had Covid back in December but has not had an illnesses since then.    VS/Weight: (Note: Please include date/time vitals/weight were measured)      Saturday 1/18 144/77  Sunday 1/19 150/77  1/21 151/92  Today at 5:30am 160/101 - repeat an hour after taking metoprolol 149/92    Pain: No    Risk Factors: Hypertension, Heart Failure, and Arrhythmia    Recent relevant testing and date of testing: Echo 12/3/24    Medication: metoprolol succinate 25mg daily, Xarelto 20mg      Upcoming Office Visit: No 5/12/25    Last Office Visit: 11/7/24        Reason for Disposition   Systolic BP >= 130 OR Diastolic >= 80, and is taking BP medications    Answer Assessment - Initial Assessment Questions  1. BLOOD PRESSURE: \"What is the blood pressure?\" \"Did you take at least two measurements 5 minutes apart?\"      See log  2. ONSET: \"When did you take your blood pressure?\"      Every morning  3. HOW: \"How did you obtain the blood pressure?\" (e.g., visiting nurse, automatic home BP monitor)      Home BP cuff  4. HISTORY: \"Do you have a history of high blood pressure?\"      yes  5. MEDICATIONS: \"Are you taking any medications for blood pressure?\" \"Have you missed any doses recently?\"      Metoprolol succinate, no missed doses  6. OTHER SYMPTOMS: \"Do you have any symptoms?\" (e.g., headache, chest pain, blurred vision, difficulty breathing, weakness)      Denies    Protocols used: High Blood Pressure-ADULT-[place]    "

## 2025-01-23 NOTE — TELEPHONE ENCOUNTER
Spoke w/ patient's wife, recommended an additional tablet of metoprolol succinate 25 mg, as he is only taking once a day.  She feels a bit uncomfortable with this but would like to take an extra half a tablet of metoprolol.  Okay to take 37.5 mg total for the day.  She will keep us up-to-date about blood pressure readings at home.

## 2025-01-23 NOTE — TELEPHONE ENCOUNTER
Received call from patient's wife regarding a MyChart message he received after the triage call yesterday. She states the message instructed him to call and schedule an appointment within 2 weeks. There is no record of this message in patient's chart. Please advise if patient should be scheduled.

## 2025-02-03 ENCOUNTER — TELEPHONE (OUTPATIENT)
Age: 75
End: 2025-02-03

## 2025-02-03 NOTE — TELEPHONE ENCOUNTER
Called patient to reschedule office visit due to provider schedule change        Scheduled for next available appt with provider and placed on wait list

## 2025-02-07 ENCOUNTER — TELEPHONE (OUTPATIENT)
Age: 75
End: 2025-02-07

## 2025-02-07 DIAGNOSIS — I48.0 PAROXYSMAL ATRIAL FIBRILLATION (HCC): ICD-10-CM

## 2025-02-07 RX ORDER — METOPROLOL SUCCINATE 25 MG/1
37.5 TABLET, EXTENDED RELEASE ORAL DAILY
Qty: 45 TABLET | Refills: 5 | Status: CANCELLED | OUTPATIENT
Start: 2025-02-07

## 2025-02-07 RX ORDER — METOPROLOL SUCCINATE 25 MG/1
TABLET, EXTENDED RELEASE ORAL
Qty: 120 TABLET | Refills: 5 | Status: SHIPPED | OUTPATIENT
Start: 2025-02-07

## 2025-02-07 NOTE — TELEPHONE ENCOUNTER
Received a call from Elvia, about update on BP's/medications    Yesterday at 715 am /78 gave him the the metoprolol 25 mg     Retook BP at 430 pm /90 HR 59 gave him 1/2(12.5mg) tablet of metoprolol.    Did not recheck again that night.     Today /87 HR 60.     Per Elvia, he is asymptomatic.   Looking to see if you want him to continue the regimen or want a different   HR only seem to be at 60 not going above.   They have not done the extra full tablet cause HR has not gone above 60.     Please advise

## 2025-02-07 NOTE — TELEPHONE ENCOUNTER
Please let tell patient's wife to continue the additional 12.5 mg metoprolol once a day in the afternoons.  She may give us a call if blood pressure remains persistently elevated.  Thank you

## 2025-02-07 NOTE — TELEPHONE ENCOUNTER
Placed call to Elvia. She verbalized understanding. She would like new script sent in to [facility].

## 2025-02-13 ENCOUNTER — TELEPHONE (OUTPATIENT)
Dept: CARDIAC SURGERY | Facility: CLINIC | Age: 75
End: 2025-02-13

## 2025-02-18 ENCOUNTER — OFFICE VISIT (OUTPATIENT)
Dept: NEUROLOGY | Facility: CLINIC | Age: 75
End: 2025-02-18
Payer: MEDICARE

## 2025-02-18 ENCOUNTER — TELEPHONE (OUTPATIENT)
Facility: MEDICAL CENTER | Age: 75
End: 2025-02-18

## 2025-02-18 VITALS
SYSTOLIC BLOOD PRESSURE: 130 MMHG | HEIGHT: 72 IN | BODY MASS INDEX: 25.81 KG/M2 | DIASTOLIC BLOOD PRESSURE: 80 MMHG | WEIGHT: 190.6 LBS

## 2025-02-18 DIAGNOSIS — I65.23 BILATERAL CAROTID ARTERY STENOSIS: ICD-10-CM

## 2025-02-18 DIAGNOSIS — E78.00 PURE HYPERCHOLESTEROLEMIA: ICD-10-CM

## 2025-02-18 DIAGNOSIS — I48.19 PERSISTENT ATRIAL FIBRILLATION (HCC): ICD-10-CM

## 2025-02-18 DIAGNOSIS — I63.9 APHASIA DUE TO ACUTE STROKE  (HCC): ICD-10-CM

## 2025-02-18 DIAGNOSIS — I65.02 STENOSIS OF LEFT VERTEBRAL ARTERY: ICD-10-CM

## 2025-02-18 DIAGNOSIS — R47.01 APHASIA DUE TO ACUTE STROKE  (HCC): ICD-10-CM

## 2025-02-18 DIAGNOSIS — G81.94 LEFT HEMIPLEGIA (HCC): ICD-10-CM

## 2025-02-18 DIAGNOSIS — I10 ESSENTIAL HYPERTENSION: ICD-10-CM

## 2025-02-18 DIAGNOSIS — Z86.73 HISTORY OF STROKE: Primary | ICD-10-CM

## 2025-02-18 DIAGNOSIS — G40.909 NONINTRACTABLE EPILEPSY WITHOUT STATUS EPILEPTICUS, UNSPECIFIED EPILEPSY TYPE (HCC): ICD-10-CM

## 2025-02-18 PROCEDURE — G2211 COMPLEX E/M VISIT ADD ON: HCPCS | Performed by: PSYCHIATRY & NEUROLOGY

## 2025-02-18 PROCEDURE — G2212 PROLONG OUTPT/OFFICE VIS: HCPCS | Performed by: PSYCHIATRY & NEUROLOGY

## 2025-02-18 PROCEDURE — 99215 OFFICE O/P EST HI 40 MIN: CPT | Performed by: PSYCHIATRY & NEUROLOGY

## 2025-02-18 NOTE — PATIENT INSTRUCTIONS
Stroke: Jose presents for a follow-up evaluation with regard to his prior history of stroke and more recent syncopal event.  Incidentally during that syncopal event he had a CT angiogram of the head and neck which I was able to directly review and I agree this shows new left vertebral artery stenosis which was not present at the time of his prior CT angiogram in 2019.  It is stable on his most recent scan and I suggested is more likely to be related to atherosclerosis than dissection although either is possible.  He does not report new strokelike symptoms and did have a reemergence of his prior stroke symptoms associated with his COVID infection which is not surprising he has made an excellent recovery.  -For ongoing stroke prevention he should continue his combination of Xarelto, pravastatin, and appropriate blood pressure and glycemic control  -We would recommend a blood pressure less than 130/80 most of the time.  They can continue to work with the cardiology group along these lines  -We will recheck his cholesterol panel at this time with a target LDL of 70 or less.  If his LDL remains greater than 70 consideration could be given to adjusting pravastatin versus adding an additional agent.  We would also recommend an ongoing hemoglobin A1c of less than 7%  -We will plan for a repeat MRI of the brain in order to evaluate if there is any increase in microvascular disease that would suggest we need to add aspirin to his current regimen, however we will not add aspirin at this time.  Given his significant bruising over time I agree that the Watchman device would be a reasonable option for him and if he were to receive the Watchman device I do not think he would need to remain on Xarelto for stroke prevention per se  -He should continue to receive speech and occupational therapy  -He will need a repeat carotid Doppler ultrasound in 1 year which I ordered for him today    He will return to the office to see me directly  in 6 months but I will be in touch with him with regard to the results of his studies as soon as they are available.  If he were to have new stroke symptoms such as sudden painless loss of vision or double vision, difficulty speaking or swallowing, vertigo/room spinning that does not quickly resolve, or sudden weakness/numbness/loss of coordination affecting 1 side of the face or body which is new he should proceed by ambulance to the nearest emergency room immediately.  If he were to fall and strike his head or suddenly have the worst headache of his life he should likewise proceed by ambulance to the nearest emergency room.

## 2025-02-18 NOTE — PROGRESS NOTES
Name: Les Malone      : 1950      MRN: [id number]  Encounter Provider: Howard Pulido MD  Encounter Date: 2025   Encounter department: NEUROLOGY [facility]  :  Assessment & Plan  History of stroke  Patient Instructions   Stroke: Jose presents for a follow-up evaluation with regard to his prior history of stroke and more recent syncopal event.  Incidentally during that syncopal event he had a CT angiogram of the head and neck which I was able to directly review and I agree this shows new left vertebral artery stenosis which was not present at the time of his prior CT angiogram in 2019.  It is stable on his most recent scan and I suggested is more likely to be related to atherosclerosis than dissection although either is possible.  He does not report new strokelike symptoms and did have a reemergence of his prior stroke symptoms associated with his COVID infection which is not surprising he has made an excellent recovery.  -For ongoing stroke prevention he should continue his combination of Xarelto, pravastatin, and appropriate blood pressure and glycemic control  -We would recommend a blood pressure less than 130/80 most of the time.  They can continue to work with the cardiology group along these lines  -We will recheck his cholesterol panel at this time with a target LDL of 70 or less.  If his LDL remains greater than 70 consideration could be given to adjusting pravastatin versus adding an additional agent.  We would also recommend an ongoing hemoglobin A1c of less than 7%  -We will plan for a repeat MRI of the brain in order to evaluate if there is any increase in microvascular disease that would suggest we need to add aspirin to his current regimen, however we will not add aspirin at this time.  Given his significant bruising over time I agree that the Watchman device would be a reasonable option for him and if he were to receive the Watchman device I do not think he would  need to remain on Xarelto for stroke prevention per se  -He should continue to receive speech and occupational therapy  -He will need a repeat carotid Doppler ultrasound in 1 year which I ordered for him today    He will return to the office to see me directly in 6 months but I will be in touch with him with regard to the results of his studies as soon as they are available.  If he were to have new stroke symptoms such as sudden painless loss of vision or double vision, difficulty speaking or swallowing, vertigo/room spinning that does not quickly resolve, or sudden weakness/numbness/loss of coordination affecting 1 side of the face or body which is new he should proceed by ambulance to the nearest emergency room immediately.  If he were to fall and strike his head or suddenly have the worst headache of his life he should likewise proceed by ambulance to the nearest emergency room.    Orders:  •  MRI brain without contrast; Future    Stenosis of left vertebral artery    Orders:  •  MRI brain without contrast; Future    Essential hypertension         Left hemiplegia (HCC)         Nonintractable epilepsy without status epilepticus, unspecified epilepsy type (HCC)         Pure hypercholesterolemia    Orders:  •  Lipid Panel with Direct LDL reflex; Future    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  (HCC)         Bilateral carotid artery stenosis    Orders:  •  VAS carotid complete study; Future    Persistent atrial fibrillation (HCC)               History of Present Illness   HPI   Jose presents for follow-up with his remote history of stroke.  More recently he presented to the hospital with a syncopal event which was felt to be likely vagal in nature, and he was found to have COVID at the time.  Incidentally he was found to have new left vertebral artery intracranial stenosis concerning for atherosclerotic change versus dissection.  This was new since 2019.  He reports that he did experience a recrudescence of his prior stroke  symptoms including significant aphasia while impacted by COVID but he is roughly 90% back to where he was at this time.  He did not have new strokelike symptoms.  I was able to directly review the noncontrast head CT and I agree there are no significant changes, on the CTA angiogram from December 2024 there is new left vertebral artery stenosis intracranially which is persistent on his more recent CTA.  I would suggest this is more likely to be atherosclerotic in nature than dissection.  He reports no history of neck pain, vertigo, diplopia.  He continues to get speech and occupational therapy and works out in the pool on a regular basis.  He gets Botox for spasticity of his left upper and lower extremity and is overdue to have that performed.  That appointment is scheduled for March 3.    He has a pacemaker in place.  I was able to review the card and it is MRI compatible/conditional.    He has experienced some easy bruising, particularly in the left upper extremity and they are considering the Watchman device.  Provided he does not have other indication to require Xarelto beyond atrial fibrillation I would agree that the Watchman device may be a reasonable alternative to remaining on regulation.    I was able to directly review his blood pressure log which is still above goal.  His most recent LDL cholesterol is in the 90s.  At this point it would be appropriate to check an MRI in order to determine if Xarelto really has provided the degree of protection that we believe it has, and to check for any evidence of significant progression of microvascular disease.  This will help us to determine if there is a need for antiplatelet medication and to help establish the most appropriate goals for cholesterol    He reports no issues with mood or appetite at this time  Review of Systems   Constitutional:  Negative for appetite change, fatigue and fever.   HENT: Negative.  Negative for hearing loss, tinnitus, trouble  swallowing and voice change.    Eyes: Negative.  Negative for photophobia, pain and visual disturbance.   Respiratory: Negative.  Negative for shortness of breath.    Cardiovascular: Negative.  Negative for palpitations.   Gastrointestinal: Negative.  Negative for nausea and vomiting.   Endocrine: Negative.  Negative for cold intolerance.   Genitourinary: Negative.  Negative for dysuria, frequency and urgency.   Musculoskeletal:  Negative for back pain, gait problem, myalgias, neck pain and neck stiffness.   Skin: Negative.  Negative for rash.   Allergic/Immunologic: Negative.    Neurological: Negative.  Negative for dizziness, tremors, seizures, syncope, facial asymmetry, speech difficulty, weakness, light-headedness, numbness and headaches.   Hematological: Negative.  Does not bruise/bleed easily.   Psychiatric/Behavioral: Negative.  Negative for confusion, hallucinations and sleep disturbance.     I have personally reviewed the MA's review of systems and made changes as necessary.         Objective   /80 (BP Location: Right arm, Patient Position: Sitting, Cuff Size: Standard)   Ht 6' (1.829 m)   Wt 86.5 kg (190 lb 9.6 oz)   BMI 25.85 kg/m²     Physical Exam  Neurological Exam    At the time of my examination he was awake and alert and in no distress.  He has moderate expressive aphasia.  His pupils were symmetric, palate raise was symmetric and tongue was midline.  There was mild facial asymmetry and mild vision loss on the left.  Facial sensation was symmetric.  Strength was 5/5 in the right upper and lower extremity.  In the left upper extremity there was severe spastic Paracets although he did have some  and some proximal motor control.  Left iliopsoas was/5.  He was able to rise with minimal to no assistance and his gait with the assistance of his quad cane was hemiparetic but stable.  Affect was bright        Administrative Statements   I have spent a total time of 60 minutes in caring for this  patient on the day of the visit/encounter including Diagnostic results, Prognosis, Risks and benefits of tx options, Instructions for management, Patient and family education, Importance of tx compliance, Risk factor reductions, Impressions, Counseling / Coordination of care, Documenting in the medical record, Reviewing/placing orders in the medical record (including tests, medications, and/or procedures), and Obtaining or reviewing history  .

## 2025-02-18 NOTE — QUICK NOTE
Note for Medtronic pacer implant:  Device investigated:   Medtronic pacer/icd and leads            Method investigated (surgical report, imaging report, vendor contacted, website used etc):  MRI Verify website    Time spent investigating in minutes: 15    Investigation findings:  MRI Conditional implant    Risk vs Benefit performed.  If so, list physician and outcome:  No

## 2025-02-18 NOTE — TELEPHONE ENCOUNTER
3-4-25 AT 100PM AT BE Room 2  Note in chart after scheduling pacemaker:  Topics covered in our conversation:  MRI scheduled on above date and time.

## 2025-02-18 NOTE — ASSESSMENT & PLAN NOTE
Patient Instructions   Stroke: Jose presents for a follow-up evaluation with regard to his prior history of stroke and more recent syncopal event.  Incidentally during that syncopal event he had a CT angiogram of the head and neck which I was able to directly review and I agree this shows new left vertebral artery stenosis which was not present at the time of his prior CT angiogram in 2019.  It is stable on his most recent scan and I suggested is more likely to be related to atherosclerosis than dissection although either is possible.  He does not report new strokelike symptoms and did have a reemergence of his prior stroke symptoms associated with his COVID infection which is not surprising he has made an excellent recovery.  -For ongoing stroke prevention he should continue his combination of Xarelto, pravastatin, and appropriate blood pressure and glycemic control  -We would recommend a blood pressure less than 130/80 most of the time.  They can continue to work with the cardiology group along these lines  -We will recheck his cholesterol panel at this time with a target LDL of 70 or less.  If his LDL remains greater than 70 consideration could be given to adjusting pravastatin versus adding an additional agent.  We would also recommend an ongoing hemoglobin A1c of less than 7%  -We will plan for a repeat MRI of the brain in order to evaluate if there is any increase in microvascular disease that would suggest we need to add aspirin to his current regimen, however we will not add aspirin at this time.  Given his significant bruising over time I agree that the Watchman device would be a reasonable option for him and if he were to receive the Watchman device I do not think he would need to remain on Xarelto for stroke prevention per se  -He should continue to receive speech and occupational therapy  -He will need a repeat carotid Doppler ultrasound in 1 year which I ordered for him today    He will return to the  office to see me directly in 6 months but I will be in touch with him with regard to the results of his studies as soon as they are available.  If he were to have new stroke symptoms such as sudden painless loss of vision or double vision, difficulty speaking or swallowing, vertigo/room spinning that does not quickly resolve, or sudden weakness/numbness/loss of coordination affecting 1 side of the face or body which is new he should proceed by ambulance to the nearest emergency room immediately.  If he were to fall and strike his head or suddenly have the worst headache of his life he should likewise proceed by ambulance to the nearest emergency room.    Orders:  •  MRI brain without contrast; Future

## 2025-02-19 ENCOUNTER — HOSPITAL ENCOUNTER (OUTPATIENT)
Dept: CT IMAGING | Facility: HOSPITAL | Age: 75
Discharge: HOME | End: 2025-02-19
Payer: MEDICARE

## 2025-02-19 DIAGNOSIS — R55 SYNCOPE: ICD-10-CM

## 2025-02-19 PROCEDURE — 71260 CT THORAX DX C+: CPT

## 2025-02-19 RX ADMIN — IOHEXOL 85 ML: 350 INJECTION, SOLUTION INTRAVENOUS at 11:44

## 2025-02-24 ENCOUNTER — APPOINTMENT (OUTPATIENT)
Dept: LAB | Facility: MEDICAL CENTER | Age: 75
End: 2025-02-24
Payer: MEDICARE

## 2025-02-24 DIAGNOSIS — E78.00 PURE HYPERCHOLESTEROLEMIA: ICD-10-CM

## 2025-02-24 LAB
CHOLEST SERPL-MCNC: 160 MG/DL (ref ?–200)
HDLC SERPL-MCNC: 42 MG/DL
LDLC SERPL CALC-MCNC: 99 MG/DL (ref 0–100)
TRIGL SERPL-MCNC: 96 MG/DL (ref ?–150)

## 2025-02-24 PROCEDURE — 80061 LIPID PANEL: CPT

## 2025-02-24 PROCEDURE — 36415 COLL VENOUS BLD VENIPUNCTURE: CPT

## 2025-02-26 ENCOUNTER — PATIENT MESSAGE (OUTPATIENT)
Dept: CARDIOLOGY CLINIC | Facility: CLINIC | Age: 75
End: 2025-02-26

## 2025-02-27 ENCOUNTER — TELEPHONE (OUTPATIENT)
Dept: CARDIAC SURGERY | Facility: CLINIC | Age: 75
End: 2025-02-27

## 2025-02-27 NOTE — TELEPHONE ENCOUNTER
Spoke with patient's wife, patient not ready to proceed with Watchman at this time. Will call back in April to discuss.

## 2025-03-04 ENCOUNTER — HOSPITAL ENCOUNTER (OUTPATIENT)
Dept: RADIOLOGY | Facility: HOSPITAL | Age: 75
Discharge: HOME | End: 2025-03-04
Payer: MEDICARE

## 2025-03-04 DIAGNOSIS — Z86.73 HISTORY OF STROKE: ICD-10-CM

## 2025-03-04 DIAGNOSIS — I65.02 STENOSIS OF LEFT VERTEBRAL ARTERY: ICD-10-CM

## 2025-03-04 PROCEDURE — 70551 MRI BRAIN STEM W/O DYE: CPT

## 2025-03-04 PROCEDURE — 76014 MR SFTY IMPLT&/FB ASMT STF 1: CPT

## 2025-03-04 PROCEDURE — 76018 MR SAFETY IMPLANT ELEC PREPJ: CPT

## 2025-03-04 NOTE — NURSING NOTE
Pt ambulates with cane. Arrived with wife.  Medtronic device interrogation for MRI done by ARMINDA Rico clinical specialist. Device set to MRI safe mode @100 bpm    MRI brain without contrast complete. Pt tolerated well.    VSS throughout scan. Pt alert and oriented on room air. No complaints or visible signs of distress.    Device reprogrammed to prior settings per Cardiology by CHARLES Carbajal RN.

## 2025-03-04 NOTE — NURSING NOTE
Device interrogation for MRI.  Normal device function prior to MRI.  Leads and device meet all requirements per policy for MRI.  Device programmed VOO 100bpm per Cardiology for MRI.  Patient has no complaints.  Vital signs monitored throughout by OKSANA Cuellar RN.  Normal device function post MRI.  Device reprogrammed to prior settings per Cardiology.

## 2025-03-23 ENCOUNTER — OFFICE VISIT (OUTPATIENT)
Dept: URGENT CARE | Facility: MEDICAL CENTER | Age: 75
End: 2025-03-23
Payer: MEDICARE

## 2025-03-23 VITALS
DIASTOLIC BLOOD PRESSURE: 75 MMHG | TEMPERATURE: 98.4 F | SYSTOLIC BLOOD PRESSURE: 161 MMHG | RESPIRATION RATE: 18 BRPM | OXYGEN SATURATION: 100 % | HEART RATE: 66 BPM

## 2025-03-23 DIAGNOSIS — L73.9 FOLLICULITIS: Primary | ICD-10-CM

## 2025-03-23 PROCEDURE — 99213 OFFICE O/P EST LOW 20 MIN: CPT | Performed by: FAMILY MEDICINE

## 2025-03-23 PROCEDURE — G0463 HOSPITAL OUTPT CLINIC VISIT: HCPCS | Performed by: FAMILY MEDICINE

## 2025-03-23 RX ORDER — DOXYCYCLINE 100 MG/1
100 TABLET ORAL 2 TIMES DAILY
Qty: 14 TABLET | Refills: 0 | Status: SHIPPED | OUTPATIENT
Start: 2025-03-23 | End: 2025-03-30

## 2025-03-23 NOTE — PATIENT INSTRUCTIONS
Avoid swimming pools until rash is resolved  Keep area clean and dry  Take Doxycycline 100 mg twice daily with food      Patient Education     Bacterial folliculitis   The Basics   Written by the doctors and editors at [facility]   What is folliculitis? -- This is a skin problem that happens when a hair follicle gets infected (figure 1). A hair follicle is a sac under the skin where a hair starts to grow. Usually, folliculitis happens because bacteria (a kind of germ) get into the hair follicle. Other times, folliculitis is caused by a fungus or virus in the hair follicle, or because of another reason.  What are the symptoms of bacterial folliculitis? -- The main symptom is small, raised bumps on the skin. The color of the bumps depends on your skin tone. They can be different shades of red, pink, or brown. They can even be your normal skin color. The bumps can be tender or itchy, and they might have pus in them.  Will I need tests? -- Not usually. To make sure that you do not have another skin condition, your doctor or nurse will ask about your symptoms and do an exam. If it is hard to tell what is causing your folliculitis, they might test a sample of pus, or do a different test.  What can I do on my own? -- You can:   Wet a clean washcloth with warm water, and put it on the bumps. When the cloth cools, wet it with warm water again and put it back on the area. Repeat these steps for 10 to 15 minutes, 3 times a day.   Avoid shaving the area that has folliculitis. That will irritate it more and might spread the infection.   Wear loose-fitting clothing, especially when you exercise or sweat. This might help prevent getting bacterial folliculitis again. Some people also find it helpful to use an antibacterial soap or body wash.  What other treatment might I have? -- If your bacterial folliculitis does not go away on its own, your doctor might prescribe an antibiotic to put on your skin. This could be a cream, ointment,  or liquid. If a lot of your skin is affected, you might need an antibiotic pill. But most of the time, folliculitis gets better without treatment.  When should I call the doctor? -- Call your doctor or nurse if:   The folliculitis does not go away after you treat it at home.   The bumps get larger or more painful.   The bumps go away but then come back.   You get a fever.  All topics are updated as new evidence becomes available and our peer review process is complete.  This topic retrieved from [employer] on: May 18, 2024.  Topic 61441 Version 8.0  Release: 32.4.3 - C32.137  © 2024 UpToDate, Inc. and/or its affiliates. All rights reserved.  figure 1: Infected hair follicle     Graphic 49308 Version 1.0  Consumer Information Use and Disclaimer   Disclaimer: This generalized information is a limited summary of diagnosis, treatment, and/or medication information. It is not meant to be comprehensive and should be used as a tool to help the user understand and/or assess potential diagnostic and treatment options. It does NOT include all information about conditions, treatments, medications, side effects, or risks that may apply to a specific patient. It is not intended to be medical advice or a substitute for the medical advice, diagnosis, or treatment of a health care provider based on the health care provider's examination and assessment of a patient's specific and unique circumstances. Patients must speak with a health care provider for complete information about their health, medical questions, and treatment options, including any risks or benefits regarding use of medications. This information does not endorse any treatments or medications as safe, effective, or approved for treating a specific patient. Up[employer]Date, Inc. and its affiliates disclaim any warranty or liability relating to this information or the use thereof.The use of this information is governed by the Terms of Use, available at  https://www.[employer].com/en/know/clinical-effectiveness-terms. 2024© [employer] and its affiliates and/or licensors. All rights reserved.  Copyright   © 2024 [employer] and/or its affiliates. All rights reserved.

## 2025-03-23 NOTE — PROGRESS NOTES
[facility]        NAME: Les Malone is a 74 y.o. male  : 1950    MRN: [id number]  DATE: 2025  TIME: 11:28 AM    Assessment and Plan   Folliculitis [L73.9]  1. Folliculitis  doxycycline (ADOXA) 100 MG tablet        Avoid swimming pools until rash is resolved  Keep area clean and dry  Take Doxycycline 100 mg twice daily with food      Patient Instructions       Follow up with PCP in 3-5 days.  Proceed to  ER if symptoms worsen.    If tests have been performed at [facility], our office will contact you with results if changes need to be made to the care plan discussed with you at the visit.  You can review your full results on [facility].    Chief Complaint     Chief Complaint   Patient presents with   • Rash     Patient c/o a rash on his back x 3-4 days          History of Present Illness       HPI  Les Malone is a 74 y.o. male  who presented to [facility] today accompanied by his wife.  Wife states that on 3/19/2025, last Wednesday,  she noticed a rash on the patient's back.  It started off with small red dots and then spread to his entire back and now is on the front of his belly and on the chest as well.  Patient attends physical therapy at [facility] and uses the swimming pool for the therapy.  The rash is itchy, not associated with any pain, nasal congestion, cough, headaches or blurred vision.    Review of Systems   Review of Systems   Constitutional:  Negative for chills and fever.   HENT:  Negative for congestion, rhinorrhea and sore throat.    Respiratory:  Negative for cough.    Cardiovascular:  Negative for chest pain.   Gastrointestinal:  Negative for nausea and vomiting.   Skin:  Positive for rash.         Current Medications       Current Outpatient Medications:   •  doxycycline (ADOXA) 100 MG tablet, Take 1 tablet (100 mg total) by mouth 2 (two) times a day for 7 days, Disp: 14 tablet, Rfl: 0  •  Cholecalciferol 50 MCG ( UT) TABS, Take 1  tablet every day by oral route., Disp: , Rfl:   •  Coenzyme Q10 (CO Q 10) 10 MG CAPS, Take by mouth daily, Disp: , Rfl:   •  cyanocobalamin (VITAMIN B-12) 100 mcg tablet, Take by mouth daily, Disp: , Rfl:   •  finasteride (PROSCAR) 5 mg tablet, Take 5 mg by mouth daily at bedtime  , Disp: , Rfl:   •  hydrocortisone (ANUSOL-HC) 2.5 % rectal cream, Apply topically 2 (two) times a day, Disp: 2 g, Rfl: 3  •  hydrocortisone 1 % cream, Apply topically 2 (two) times a day as needed, Disp: , Rfl:   •  hydrocortisone-pramoxine (ANALPRAM-HC) 2.5-1 % rectal cream, Apply topically 2 (two) times a day, Disp: 30 g, Rfl: 3  •  ketoconazole (NIZORAL) 2 % shampoo, SHAMPOO 3 TIMES A WEEK AS A SCALP TREATMENT, LEAVE ON 5-10 MINUTES AND RINSE, Disp: , Rfl:   •  metoprolol succinate (TOPROL-XL) 25 mg 24 hr tablet, TAKE 1 TABLET BY MOUTH IN AM, 1/2 TABLET BY MOUTH IN PM, Disp: 120 tablet, Rfl: 5  •  mupirocin (BACTROBAN) 2 % ointment, Apply 1 Application topically daily, Disp: , Rfl:   •  pantoprazole (PROTONIX) 40 mg tablet, Take 1 tablet (40 mg total) by mouth daily, Disp: 90 tablet, Rfl: 3  •  pravastatin (PRAVACHOL) 40 mg tablet, Take 40 mg by mouth daily, Disp: , Rfl:   •  rivaroxaban (Xarelto) 20 mg tablet, Take 20 mg by mouth daily with dinner  , Disp: , Rfl:     Current Allergies     Allergies as of 03/23/2025 - Reviewed 02/18/2025   Allergen Reaction Noted   • Cephalexin Throat Swelling, Anaphylaxis, and Edema 11/15/2018   • Penicillin g Other (See Comments) 03/23/2025   • Penicillins Other (See Comments) 08/02/2019   • Sulfamethoxazole-trimethoprim Rash 03/23/2025            The following portions of the patient's history were reviewed and updated as appropriate: allergies, current medications, past family history, past medical history, past social history, past surgical history and problem list.     Past Medical History:   Diagnosis Date   • Acute encephalopathy 05/27/2019   • Arthritis     BL knees   • Atrial fibrillation  ([facility])    • CHF (congestive heart failure) ([facility])    • Colon polyp    • Coronary artery disease    • CVA (cerebral vascular accident) ([facility]) 04/27/2019    NIHSS 26 on presentation   • Depression    • Diabetes mellitus (HCC)     borderline   • Encephalopathy acute 04/28/2019   • History of transfusion 04/2019   • Hyperlipidemia    • Irregular heart beat     Afib   • Stroke (HCC) 04/27/2019    Left sided weakness-aphasia   • Urinary retention    • Urinary retention 06/04/2019       Past Surgical History:   Procedure Laterality Date   • CARDIAC ELECTROPHYSIOLOGY PROCEDURE Left 6/20/2023    Procedure: Cardiac pacer implant;  Surgeon: Marquis Haney DO;  [facility];  Service: Cardiology   • COLONOSCOPY     • IR STROKE ALERT  4/27/2019   • MENISCECTOMY Right    • ID CYSTO INSERTION TRANSPROSTATIC IMPLANT SINGLE N/A 11/19/2021    Procedure: CYSTOSCOPY WITH INSERTION UROLIFT;  Surgeon: Jack Christopher MD;  Location: [facility];  Service: Urology       Family History   Problem Relation Age of Onset   • Cancer Mother    • Hypertension Mother          Medications have been verified.        Objective   /75   Pulse 66   Temp 98.4 °F (36.9 °C)   Resp 18   SpO2 100%   No LMP for male patient.       Physical Exam     Physical Exam  Vitals and nursing note reviewed.   Constitutional:       Appearance: He is well-developed.   HENT:      Head: Normocephalic and atraumatic.      Nose: Nose normal.      Mouth/Throat:      Mouth: Mucous membranes are moist.   Cardiovascular:      Rate and Rhythm: Normal rate.   Pulmonary:      Effort: Pulmonary effort is normal. No respiratory distress.   Skin:     Findings: Rash present. Rash is papular.      Comments: Generalized papular rash with excoriations on the entire back and then on abdomen up to his chest   Neurological:      Mental Status: He is alert and oriented to person, place, and time.      Motor: Weakness present.      Gait: Gait abnormal.   Psychiatric:          Mood and Affect: Mood normal.

## 2025-04-09 ENCOUNTER — REMOTE DEVICE CLINIC VISIT (OUTPATIENT)
Dept: CARDIOLOGY CLINIC | Facility: CLINIC | Age: 75
End: 2025-04-09
Payer: MEDICARE

## 2025-04-09 DIAGNOSIS — Z95.0 CARDIAC PACEMAKER IN SITU: Primary | ICD-10-CM

## 2025-04-09 PROCEDURE — 93294 REM INTERROG EVL PM/LDLS PM: CPT | Performed by: INTERNAL MEDICINE

## 2025-04-09 PROCEDURE — 93296 REM INTERROG EVL PM/IDS: CPT | Performed by: INTERNAL MEDICINE

## 2025-04-10 ENCOUNTER — RESULTS FOLLOW-UP (OUTPATIENT)
Dept: CARDIOLOGY CLINIC | Facility: CLINIC | Age: 75
End: 2025-04-10

## 2025-04-10 NOTE — PROGRESS NOTES
"Results for orders placed or performed in visit on 04/09/25   Cardiac EP device report    Narrative    MDT DC PM (VVIR 60)/ACTIVE SYSTEM IS MRI CONDITIONAL  CARELINK TRANSMISSION: PRESENTING EGRAM AF @ 65 BPM. BATTERY STATUS \"11 YRS.\"  79%. ALL AVAILABLE LEAD PARAMETERS WITHIN NORMAL LIMITS. NO SIGNIFICANT HIGH RATE EPISODES. NORMAL DEVICE FUNCTION. NC         "

## 2025-05-12 ENCOUNTER — OFFICE VISIT (OUTPATIENT)
Dept: CARDIOLOGY CLINIC | Facility: CLINIC | Age: 75
End: 2025-05-12
Payer: MEDICARE

## 2025-05-12 VITALS
WEIGHT: 203 LBS | OXYGEN SATURATION: 98 % | HEIGHT: 72 IN | HEART RATE: 73 BPM | DIASTOLIC BLOOD PRESSURE: 82 MMHG | BODY MASS INDEX: 27.5 KG/M2 | SYSTOLIC BLOOD PRESSURE: 162 MMHG

## 2025-05-12 DIAGNOSIS — I10 HTN (HYPERTENSION), BENIGN: Primary | ICD-10-CM

## 2025-05-12 DIAGNOSIS — I50.32 CHRONIC DIASTOLIC (CONGESTIVE) HEART FAILURE (HCC): ICD-10-CM

## 2025-05-12 DIAGNOSIS — I48.19 PERSISTENT ATRIAL FIBRILLATION (HCC): ICD-10-CM

## 2025-05-12 DIAGNOSIS — E11.9 TYPE 2 DIABETES MELLITUS WITHOUT COMPLICATION, WITHOUT LONG-TERM CURRENT USE OF INSULIN (HCC): ICD-10-CM

## 2025-05-12 PROCEDURE — 99214 OFFICE O/P EST MOD 30 MIN: CPT | Performed by: INTERNAL MEDICINE

## 2025-05-12 RX ORDER — AMLODIPINE BESYLATE 2.5 MG/1
2.5 TABLET ORAL DAILY
Qty: 90 TABLET | Refills: 3 | Status: SHIPPED | OUTPATIENT
Start: 2025-05-12

## 2025-05-12 NOTE — PROGRESS NOTES
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  Prior device interrogation revealing 79% ventricular pacing without high rate episodes.  Continue device interrogations as scheduled  Patient interested in Watchman device implantation and has been approved by electrophysiology.  The patient's wife will call and make an appointment for the same.  Blood pressure has been elevated home with systolic readings between 130s to 160s.  Will add amlodipine 2.5 mg p.o. daily.  He has had history of hypotension in the past, therefore we will call me right away if he experiences any lightheadedness or dizziness or if he has low blood pressure readings at home.  Continue metoprolol.  Continue Xarelto anticoagulation for now  Continue rosuvastatin for dyslipidemia          Follow-up in 4 months           Interval History:      This is a 74-year-old male admitted to [facility] 04/2019 with acute CVA, and subsequent event right MCA and ROB thrombectomy and tPA infusion.  He residual aphasia and dysphasia. Subsequent ALYX revealed no atrial septal interruption, no evidence of thrombus.  Subsequently, he underwent loop recorder placement. He then presents to the hospital 05/13/2019 after a routine ECG revealed rapid atrial fibrillation.  Loop recorder interrogation had revealed new onset atrial fibrillation ongoing since 05/11/2019.  He was then initiated on Eliquis and metoprolol, and a rate control strategy was pursued.     After discharge, he has started rehabilitation.  His wife called 09/03/2019, stating that he was quite lethargic with low blood pressure readings.  He  went to the ER, where metoprolol was decreased to 12.5 mg b.i.d..  Although he felt better, he still continued to have lethargy, after which metoprolol was completed held 09/04/2019.     During a prior visit 09/06/2019, at which time metoprolol was restarted at a low dose secondary to his paroxysmal atrial fibrillation.  Amiodarone was considered and he was evaluated by Dr. Haney 10/25/2019, and was deemed that his device was over estimating the burden of atrial fibrillation and he mostly had sinus rhythm with PACs.  Low-dose metoprolol at 12.5 mg b.i.d. Was continued.     He was hospitalized 01/2020 after he discontinue metoprolol for about 3 weeks, and early 730 in the morning transferred into the shower and was getting washed he became lightheaded and dizzy.  He had an episode of unresponsiveness was head rolled forward, lasting 1-5 minutes.  Review of loop recorder showed to atrial fibrillation events and 1 tachycardia event at 353 beats per minute.  Review of rhythm strips however revealed only noise artifact.  There was no definitive atrial fibrillation, no evidence of pauses or significant tachycardia or bradycardia associated with his episodes.  He had a 4 beat run of nonsustained ventricular tachycardia on telemetry.  The patient was diagnosed with vasovagal syncope and discharged.     He underwent a 14 day [facility] Holter monitor revealing no evidence of atrial fibrillation in several short runs of SVT up to 11 beats and short nonsustained ventricular tachycardia.  Subsequently, he underwent explantation of his loop recorder 06/2020.  In 07/2020 he had recurrent vasovagal episode after he got up to walk in the shower.    He was hospitalized 6/2023 with atrial fibrillation with slow ventricular response.  Subsequently he underwent placement of a Medtronic dual-chamber permanent pacemaker on 6/20/2023.    He was hospitalized 12/2020 for with syncope due to COVID.  He was thereafter evaluated by electrophysiology  on 11/19/2024 with interest in pursuing watchman implantation.    He presents today for follow-up.  His blood pressure has been mildly elevated with systolic readings between 130s to 160s.  From a symptomatic standpoint he feels well without chest pain, shortness of breath, lightheadedness, palpitations.        Vitals:  Vitals:    05/12/25 0811   BP: 162/82   Pulse: 73   SpO2: 98%   Weight: 92.1 kg (203 lb)   Height: 6' (1.829 m)         Past Medical History:   Diagnosis Date   • Acute encephalopathy 05/27/2019   • Arthritis     BL knees   • Atrial fibrillation (HCC)    • CHF (congestive heart failure) (HCC)    • Colon polyp    • Coronary artery disease    • CVA (cerebral vascular accident) ([facility]) 04/27/2019    NIHSS 26 on presentation   • Depression    • Diabetes mellitus (HCC)     borderline   • Encephalopathy acute 04/28/2019   • History of transfusion 04/2019   • Hyperlipidemia    • Irregular heart beat     Afib   • Stroke (HCC) 04/27/2019    Left sided weakness-aphasia   • Urinary retention    • Urinary retention 06/04/2019     Social History     Socioeconomic History   • Marital status: /Civil Union     Spouse name: Not on file   • Number of children: Not on file   • Years of education: Not on file   • Highest education level: Not on file   Occupational History   • Not on file   Tobacco Use   • Smoking status: Never   • Smokeless tobacco: Never   Vaping Use   • Vaping status: Never Used   Substance and Sexual Activity   • Alcohol use: Not Currently     Comment: social   • Drug use: Never   • Sexual activity: Yes     Partners: Female   Other Topics Concern   • Not on file   Social History Narrative   • Not on file     Social Drivers of Health     Financial Resource Strain: Not on file   Food Insecurity: No Food Insecurity (12/3/2024)    Nursing - Inadequate Food Risk Classification    • Worried About Running Out of Food in the Last Year: Not on file    • Ran Out of Food in the Last Year: Not on file    •  Ran Out of Food in the Last Year: Never true   Transportation Needs: No Transportation Needs (12/3/2024)    Nursing - Transportation Risk Classification    • Lack of Transportation: Not on file    • Lack of Transportation: No   Physical Activity: Not on file   Stress: Not on file   Social Connections: Not on file   Intimate Partner Violence: Unknown (12/3/2024)    Nursing IPS    • Feels Physically and Emotionally Safe: Not on file    • Physically Hurt by Someone: Not on file    • Humiliated or Emotionally Abused by Someone: Not on file    • Physically Hurt by Someone: No    • Hurt or Threatened by Someone: No   Housing Stability: Unknown (12/3/2024)    Nursing: Inadequate Housing Risk Classification    • Has Housing: Not on file    • Worried About Losing Housing: Not on file    • Unable to Get Utilities: Not on file    • Unable to Pay for Housing in the Last Year: No    • Has Housin      Family History   Problem Relation Age of Onset   • Cancer Mother    • Hypertension Mother      Past Surgical History:   Procedure Laterality Date   • CARDIAC ELECTROPHYSIOLOGY PROCEDURE Left 2023    Procedure: Cardiac pacer implant;  Surgeon: Marquis Haney DO;  Location[facility];  Service: Cardiology   • COLONOSCOPY     • IR STROKE ALERT  2019   • MENISCECTOMY Right    • [place] CYSTO INSERTION TRANSPROSTATIC IMPLANT SINGLE N/A 2021    Procedure: CYSTOSCOPY WITH INSERTION UROLIFT;  Surgeon: Jack Christopher MD;  Location[facility];  Service: Urology       Current Outpatient Medications:   •  amLODIPine (NORVASC) 2.5 mg tablet, Take 1 tablet (2.5 mg total) by mouth daily, Disp: 90 tablet, Rfl: 3  •  Cholecalciferol 50 MCG (2000 UT) TABS, Take 1 tablet every day by oral route., Disp: , Rfl:   •  Coenzyme Q10 (CO Q 10) 10 MG CAPS, Take by mouth daily, Disp: , Rfl:   •  cyanocobalamin (VITAMIN B-12) 100 mcg tablet, Take by mouth daily, Disp: , Rfl:   •  finasteride (PROSCAR) 5 mg tablet, Take 5 mg by  mouth daily at bedtime  , Disp: , Rfl:   •  hydrocortisone (ANUSOL-HC) 2.5 % rectal cream, Apply topically 2 (two) times a day, Disp: 2 g, Rfl: 3  •  hydrocortisone 1 % cream, Apply topically 2 (two) times a day as needed, Disp: , Rfl:   •  hydrocortisone-pramoxine (ANALPRAM-HC) 2.5-1 % rectal cream, Apply topically 2 (two) times a day, Disp: 30 g, Rfl: 3  •  ketoconazole (NIZORAL) 2 % shampoo, SHAMPOO 3 TIMES A WEEK AS A SCALP TREATMENT, LEAVE ON 5-10 MINUTES AND RINSE, Disp: , Rfl:   •  metoprolol succinate (TOPROL-XL) 25 mg 24 hr tablet, TAKE 1 TABLET BY MOUTH IN AM, 1/2 TABLET BY MOUTH IN PM (Patient taking differently: TAKE 1 TABLET BY MOUTH IN AM, 1 TABLET BY MOUTH IN PM), Disp: 120 tablet, Rfl: 5  •  mupirocin (BACTROBAN) 2 % ointment, Apply 1 Application topically daily, Disp: , Rfl:   •  pantoprazole (PROTONIX) 40 mg tablet, Take 1 tablet (40 mg total) by mouth daily, Disp: 90 tablet, Rfl: 3  •  rivaroxaban (Xarelto) 20 mg tablet, Take 20 mg by mouth daily with dinner  , Disp: , Rfl:   •  rosuvastatin (CRESTOR) 10 MG tablet, Take 1 tablet by mouth in the morning, Disp: , Rfl:         Review of Systems:  Review of Systems   Respiratory: Negative.     Cardiovascular: Negative.    All other systems reviewed and are negative.        Physical Exam:  Physical Exam  Constitutional:       General: He is not in acute distress.     Appearance: He is well-developed. He is not diaphoretic.   HENT:      Head: Normocephalic and atraumatic.   Eyes:      General: No scleral icterus.        Right eye: No discharge.      Pupils: Pupils are equal, round, and reactive to light.   Neck:      Thyroid: No thyromegaly.   Cardiovascular:      Rate and Rhythm: Normal rate and regular rhythm.      Heart sounds: Normal heart sounds. No murmur heard.     No friction rub. No gallop.   Pulmonary:      Effort: Pulmonary effort is normal.      Breath sounds: Normal breath sounds.   Abdominal:      General: There is no distension.       Tenderness: There is no abdominal tenderness. There is no guarding or rebound.   Musculoskeletal:         General: Normal range of motion.      Cervical back: Normal range of motion and neck supple.      Right lower leg: No edema.      Left lower leg: No edema.   Skin:     General: Skin is warm and dry.      Coloration: Skin is not pale.      Findings: No erythema or rash.   Neurological:      Mental Status: He is alert and oriented to person, place, and time.      Motor: Weakness present.      Coordination: Coordination normal.      Gait: Gait abnormal.   Psychiatric:         Behavior: Behavior normal.         Thought Content: Thought content normal.         Judgment: Judgment normal.         This note was completed in part utilizing [email or web address] Direct Software.  Grammatical errors, random word insertions, spelling mistakes, and incomplete sentences can be an occasional consequence of this system secondary to software limitations, ambient noise, and hardware issues.  If you have any questions or concerns about the content, text, or information contained within the body of this dictation, please contact the provider for clarification.

## 2025-05-16 ENCOUNTER — TELEPHONE (OUTPATIENT)
Age: 75
End: 2025-05-16

## 2025-05-16 NOTE — TELEPHONE ENCOUNTER
Recieved call from Patient's Wife on behalf of Patient for New Patient - Skin Check - 2 SOC. Scheduled 2/4/26 9:00 am Wendy Luna. Verified insurance, provided [place] addr.     Patient verbalized understanding.

## 2025-05-27 ENCOUNTER — APPOINTMENT (OUTPATIENT)
Dept: LAB | Facility: MEDICAL CENTER | Age: 75
End: 2025-05-27
Attending: FAMILY MEDICINE
Payer: MEDICARE

## 2025-05-27 DIAGNOSIS — I10 ESSENTIAL HYPERTENSION, MALIGNANT: ICD-10-CM

## 2025-05-27 DIAGNOSIS — E78.2 MIXED HYPERLIPIDEMIA: ICD-10-CM

## 2025-05-27 DIAGNOSIS — I48.0 PAROXYSMAL ATRIAL FIBRILLATION (HCC): ICD-10-CM

## 2025-05-27 LAB
ALBUMIN SERPL BCG-MCNC: 3.9 G/DL (ref 3.5–5)
ALP SERPL-CCNC: 65 U/L (ref 34–104)
ALT SERPL W P-5'-P-CCNC: 43 U/L (ref 7–52)
ANION GAP SERPL CALCULATED.3IONS-SCNC: 5 MMOL/L (ref 4–13)
AST SERPL W P-5'-P-CCNC: 23 U/L (ref 13–39)
BILIRUB SERPL-MCNC: 0.69 MG/DL (ref 0.2–1)
BUN SERPL-MCNC: 17 MG/DL (ref 5–25)
CALCIUM SERPL-MCNC: 9.5 MG/DL (ref 8.4–10.2)
CHLORIDE SERPL-SCNC: 106 MMOL/L (ref 96–108)
CHOLEST SERPL-MCNC: 143 MG/DL (ref ?–200)
CK SERPL-CCNC: 29 U/L (ref 39–308)
CO2 SERPL-SCNC: 32 MMOL/L (ref 21–32)
CREAT SERPL-MCNC: 0.72 MG/DL (ref 0.6–1.3)
GFR SERPL CREATININE-BSD FRML MDRD: 91 ML/MIN/1.73SQ M
GLUCOSE SERPL-MCNC: 109 MG/DL (ref 65–140)
HDLC SERPL-MCNC: 48 MG/DL
LDLC SERPL CALC-MCNC: 80 MG/DL (ref 0–100)
NONHDLC SERPL-MCNC: 95 MG/DL
POTASSIUM SERPL-SCNC: 4.5 MMOL/L (ref 3.5–5.3)
PROT SERPL-MCNC: 6.2 G/DL (ref 6.4–8.4)
SODIUM SERPL-SCNC: 143 MMOL/L (ref 135–147)
TRIGL SERPL-MCNC: 76 MG/DL (ref ?–150)

## 2025-05-27 PROCEDURE — 82550 ASSAY OF CK (CPK): CPT

## 2025-05-27 PROCEDURE — 80053 COMPREHEN METABOLIC PANEL: CPT

## 2025-05-27 PROCEDURE — 36415 COLL VENOUS BLD VENIPUNCTURE: CPT

## 2025-05-27 PROCEDURE — 80061 LIPID PANEL: CPT

## 2025-06-20 DIAGNOSIS — K26.4 GASTROINTESTINAL HEMORRHAGE ASSOCIATED WITH DUODENAL ULCER: ICD-10-CM

## 2025-06-20 RX ORDER — PANTOPRAZOLE SODIUM 40 MG/1
40 TABLET, DELAYED RELEASE ORAL DAILY
Qty: 90 TABLET | Refills: 0 | Status: SHIPPED | OUTPATIENT
Start: 2025-06-20

## 2025-06-26 DIAGNOSIS — I48.0 PAROXYSMAL ATRIAL FIBRILLATION (HCC): ICD-10-CM

## 2025-06-26 RX ORDER — METOPROLOL SUCCINATE 25 MG/1
25 TABLET, EXTENDED RELEASE ORAL 2 TIMES DAILY
Qty: 180 TABLET | Refills: 0 | Status: SHIPPED | OUTPATIENT
Start: 2025-06-26

## 2025-06-26 NOTE — TELEPHONE ENCOUNTER
Pt takes I tablet in the am and 1 tablet in the pm can the directions be changed and sent to the pharmacy    Reason for call:   [x] Refill   [] Prior Auth  [] Other:     Office:   [] PCP/Provider -   [x] Specialty/Provider - Cardio    Medication: metoprolol succinate (TOPROL-XL) 25 mg 24 hr tablet TAKE 1 TABLET BY MOUTH IN AM, 1/2 TABLET BY MOUTH IN PM       Pharmacy: [id number] - [place], [place] - [address] [place] [address] Pharmacy   Does the patient have enough for 3 days?   [x] Yes   [] No - Send as HP to POD    Mail Away Pharmacy   Does the patient have enough for 10 days?   [] Yes   [] No - Send as HP to POD

## 2025-06-26 NOTE — TELEPHONE ENCOUNTER
Med dose adjusted to 250 mg bid on 5/12/25. See patient message in chart for details.     Med filled until next appt.

## 2025-07-09 ENCOUNTER — REMOTE DEVICE CLINIC VISIT (OUTPATIENT)
Dept: CARDIOLOGY CLINIC | Facility: CLINIC | Age: 75
End: 2025-07-09
Payer: MEDICARE

## 2025-07-09 DIAGNOSIS — Z95.0 PRESENCE OF PERMANENT CARDIAC PACEMAKER: Primary | ICD-10-CM

## 2025-07-09 DIAGNOSIS — K64.8 OTHER HEMORRHOIDS: ICD-10-CM

## 2025-07-09 PROCEDURE — 93294 REM INTERROG EVL PM/LDLS PM: CPT | Performed by: INTERNAL MEDICINE

## 2025-07-09 PROCEDURE — 93296 REM INTERROG EVL PM/IDS: CPT | Performed by: INTERNAL MEDICINE

## 2025-07-09 RX ORDER — HYDROCORTISONE 25 MG/G
CREAM TOPICAL
Qty: 28 G | Refills: 1 | Status: SHIPPED | OUTPATIENT
Start: 2025-07-09

## 2025-07-09 NOTE — PROGRESS NOTES
Results for orders placed or performed in visit on 07/09/25   Cardiac EP device report    Narrative    MDT DC PM (VVIR 60)/ACTIVE SYSTEM IS MRI CONDITIONAL  CARELINK TRANSMISSION: BATTERY VOLTAGE ADEQUATE (11.4 YRS.)   82% (>40%/ VVIR 60 PPM).  ALL AVAILABLE LEAD PARAMETERS WITHIN NORMAL LIMITS. SINCE 4/9/25: 1 FAST A&V, 1 SVT-AF EPISODE WITH EGM'S SHOWING RVR VS NSVT, MAX DURATION 17 SECS @  BPM (SAME EVENT). AF BURDEN 100%. EF 55% (12/3/2024 ECHO). PATIENT TAKES XARELTO , METOPROLOL SUCC. NORMAL DEVICE FUNCTION.  ES

## 2025-07-24 ENCOUNTER — OFFICE VISIT (OUTPATIENT)
Dept: URGENT CARE | Facility: MEDICAL CENTER | Age: 75
End: 2025-07-24
Payer: MEDICARE

## 2025-07-24 VITALS
WEIGHT: 200 LBS | HEART RATE: 70 BPM | RESPIRATION RATE: 18 BRPM | HEIGHT: 72 IN | DIASTOLIC BLOOD PRESSURE: 87 MMHG | SYSTOLIC BLOOD PRESSURE: 155 MMHG | OXYGEN SATURATION: 97 % | TEMPERATURE: 97.6 F | BODY MASS INDEX: 27.09 KG/M2

## 2025-07-24 DIAGNOSIS — S51.012A SKIN TEAR OF LEFT ELBOW WITHOUT COMPLICATION, INITIAL ENCOUNTER: Primary | ICD-10-CM

## 2025-07-24 DIAGNOSIS — Z79.01 CURRENT USE OF ANTICOAGULANT THERAPY: ICD-10-CM

## 2025-07-24 PROCEDURE — G0463 HOSPITAL OUTPT CLINIC VISIT: HCPCS | Performed by: NURSE PRACTITIONER

## 2025-07-24 PROCEDURE — 99213 OFFICE O/P EST LOW 20 MIN: CPT | Performed by: NURSE PRACTITIONER

## 2025-07-24 NOTE — PROGRESS NOTES
[facility]  Name: Les Malone      : 1950      MRN: [id number]  Encounter Provider: KISHAN Arambula  Encounter Date: 2025   Encounter department: [facility] [place]  :  Assessment & Plan  Skin tear of left elbow without complication, initial encounter         Current use of anticoagulant therapy         Patient in NAD, wound cleansed at bedside by provider. Surgifoam applied to wound to achieve homeostasis. Wound dressed. Discussed supportive care and return precautions. Patient/Parent agreeable to plan of care and all questions answered.      Patient Instructions  Follow up with PCP in 3-5 days.  Proceed to  ER if symptoms worsen.    If tests are performed, our office will contact you with results only if changes need to made to the care plan discussed with you at the visit. [facility]    Chief Complaint:   Chief Complaint   Patient presents with   • skin tear to left elbow     Pt bumped his left arm against a table [employer].  They controlled the bleeding with pressure dressing.  Pt taking Xarelto.      History of Present Illness   Started: a few hours ago  Wound to: left arm  Injury: bumped on table while getting up  Negative: loss of sensation, numbness, tingling  Treatment: bandaged  Last tetanus:           Review of Systems   Skin:  Positive for wound.     Past Medical History   Past Medical History[1]  Past Surgical History[1]  Family History[1]  he reports that he has never smoked. He has never used smokeless tobacco. He reports that he does not currently use alcohol. He reports that he does not use drugs.  Current Outpatient Medications   Medication Instructions   • amLODIPine (NORVASC) 2.5 mg, Oral, Daily   • Cholecalciferol 50 MCG (2000) TABS    • Coenzyme Q10 (CO Q 10) 10 MG CAPS Daily   • cyanocobalamin (VITAMIN B-12) 100 mcg tablet Daily   • finasteride (PROSCAR) 5 mg, Daily at bedtime   •  "hydrocortisone (Procto-Med HC) 2.5 % rectal cream APPLY TOPICALLY TWO TIMES DAILY   • hydrocortisone 1 % cream 2 times daily PRN   • hydrocortisone-pramoxine (ANALPRAM-HC) 2.5-1 % rectal cream Topical, 2 times daily   • ketoconazole (NIZORAL) 2 % shampoo    • metoprolol succinate (TOPROL-XL) 25 mg, Oral, 2 times daily   • mupirocin (BACTROBAN) 2 % ointment 1 Application, Daily   • pantoprazole (PROTONIX) 40 mg, Oral, Daily   • rosuvastatin (CRESTOR) 10 MG tablet 1 tablet, Daily   • Xarelto 20 mg, Daily with dinner   Allergies[1]     Objective   /87 (BP Location: Right arm, Patient Position: Sitting)   Pulse 70   Temp 97.6 °F (36.4 °C) (Tympanic)   Resp 18   Ht 6' (1.829 m)   Wt 90.7 kg (200 lb)   SpO2 97%   BMI 27.12 kg/m²      Physical Exam  Constitutional:       General: He is not in acute distress.     Appearance: Normal appearance. He is not ill-appearing.   HENT:      Head: Normocephalic and atraumatic.      Right Ear: Hearing and external ear normal.      Left Ear: Hearing and external ear normal.      Nose: Nose normal.      Mouth/Throat:      Lips: Pink.      Mouth: Mucous membranes are moist.     Cardiovascular:      Rate and Rhythm: Normal rate.   Pulmonary:      Effort: Pulmonary effort is normal.     Musculoskeletal:         General: Normal range of motion.      Left elbow: Laceration present. No swelling. Normal range of motion. No tenderness.        Arms:      Neurological:      Mental Status: He is alert and oriented to person, place, and time.         Portions of the record may have been created with voice recognition software.  Occasional wrong word or \"sound a like\" substitutions may have occurred due to the inherent limitations of voice recognition software.  Read the chart carefully and recognize, using context, where substitutions have occurred.         [1]  Past Medical History:  Diagnosis Date   • Acute encephalopathy 05/27/2019   • Arthritis     BL knees   • Atrial fibrillation " ([facility])    • CHF (congestive heart failure) ([facility])    • Colon polyp    • Coronary artery disease    • CVA (cerebral vascular accident) ([facility]) 04/27/2019    NIHSS 26 on presentation   • Depression    • Diabetes mellitus ([facility])     borderline   • Encephalopathy acute 04/28/2019   • History of transfusion 04/2019   • Hyperlipidemia    • Irregular heart beat     Afib   • Stroke ([facility]) 04/27/2019    Left sided weakness-aphasia   • Urinary retention    • Urinary retention 06/04/2019   [1]  Past Surgical History:  Procedure Laterality Date   • CARDIAC ELECTROPHYSIOLOGY PROCEDURE Left 6/20/2023    Procedure: Cardiac pacer implant;  Surgeon: Marquis Haney DO;  Location: [facility] CARDIAC [facility] LAB;  Service: Cardiology   • COLONOSCOPY     • IR STROKE ALERT  4/27/2019   • MENISCECTOMY Right    • MA CYSTO INSERTION TRANSPROSTATIC IMPLANT SINGLE N/A 11/19/2021    Procedure: CYSTOSCOPY WITH INSERTION UROLIFT;  Surgeon: Jack Christopher MD;  Location: [facility];  Service: Urology   [1]  Family History  Problem Relation Name Age of Onset   • Cancer Mother Bernarda bernard    • Hypertension Mother Bernarda bernard    [1]  Allergies  Allergen Reactions   • Cephalexin Throat Swelling, Anaphylaxis and Edema     Pt reported throat swelling after taking antibiotic   • Penicillin G Other (See Comments)     Product containing penicillin (product)    Unknown   • Penicillins Other (See Comments)   • Sulfamethoxazole-Trimethoprim Rash

## 2025-08-06 ENCOUNTER — TELEPHONE (OUTPATIENT)
Age: 75
End: 2025-08-06

## 2025-08-18 ENCOUNTER — TELEPHONE (OUTPATIENT)
Dept: CARDIOLOGY CLINIC | Facility: CLINIC | Age: 75
End: 2025-08-18

## 2025-08-18 ENCOUNTER — TELEPHONE (OUTPATIENT)
Age: 75
End: 2025-08-18

## (undated) DEVICE — DGW .035 FC J3MM 150CM TEF: Brand: EMERALD

## (undated) DEVICE — INTRO SHEATH PEEL AWAY 7FR

## (undated) DEVICE — BAG URINE DRAINAGE 2000ML ANTI RFLX LF

## (undated) DEVICE — CATH FOLEY 20FR 5ML 2 WAY UNCOATED SILICONE

## (undated) DEVICE — PREMIUM DRY TRAY LF: Brand: MEDLINE INDUSTRIES, INC.

## (undated) DEVICE — GLOVE SRG BIOGEL 7

## (undated) DEVICE — CHLORHEXIDINE 4PCT 4 OZ

## (undated) DEVICE — CATH GUIDING FIXED SHAPE 43CM

## (undated) DEVICE — UROCATCH BAG

## (undated) DEVICE — STERILE SURGICAL LUBRICANT,  TUBE: Brand: SURGILUBE

## (undated) DEVICE — PACK TUR

## (undated) DEVICE — INVIEW CLEAR LEGGINGS: Brand: CONVERTORS